# Patient Record
Sex: FEMALE | Race: WHITE | NOT HISPANIC OR LATINO | URBAN - METROPOLITAN AREA
[De-identification: names, ages, dates, MRNs, and addresses within clinical notes are randomized per-mention and may not be internally consistent; named-entity substitution may affect disease eponyms.]

---

## 2017-01-10 ENCOUNTER — OUTPATIENT (OUTPATIENT)
Dept: OUTPATIENT SERVICES | Facility: HOSPITAL | Age: 67
LOS: 1 days | End: 2017-01-10
Payer: MEDICARE

## 2017-01-10 DIAGNOSIS — M96.1 POSTLAMINECTOMY SYNDROME, NOT ELSEWHERE CLASSIFIED: ICD-10-CM

## 2017-01-10 PROCEDURE — 64493 INJ PARAVERT F JNT L/S 1 LEV: CPT | Mod: 50

## 2017-01-10 PROCEDURE — 99156 MOD SED OTH PHYS/QHP 5/>YRS: CPT

## 2017-01-10 PROCEDURE — 99157 MOD SED OTHER PHYS/QHP EA: CPT

## 2017-01-10 PROCEDURE — 64494 INJ PARAVERT F JNT L/S 2 LEV: CPT | Mod: 50

## 2017-01-10 PROCEDURE — 76000 FLUOROSCOPY <1 HR PHYS/QHP: CPT

## 2017-06-02 ENCOUNTER — TRANSCRIPTION ENCOUNTER (OUTPATIENT)
Age: 67
End: 2017-06-02

## 2017-06-02 ENCOUNTER — OUTPATIENT (OUTPATIENT)
Dept: OUTPATIENT SERVICES | Facility: HOSPITAL | Age: 67
LOS: 1 days | End: 2017-06-02
Payer: MEDICARE

## 2017-06-02 DIAGNOSIS — M25.512 PAIN IN LEFT SHOULDER: ICD-10-CM

## 2017-06-02 DIAGNOSIS — M25.511 PAIN IN RIGHT SHOULDER: ICD-10-CM

## 2017-06-02 PROCEDURE — 77002 NEEDLE LOCALIZATION BY XRAY: CPT

## 2017-06-02 PROCEDURE — 20610 DRAIN/INJ JOINT/BURSA W/O US: CPT | Mod: 50

## 2017-07-18 ENCOUNTER — OUTPATIENT (OUTPATIENT)
Dept: OUTPATIENT SERVICES | Facility: HOSPITAL | Age: 67
LOS: 1 days | End: 2017-07-18
Payer: MEDICARE

## 2017-07-18 ENCOUNTER — TRANSCRIPTION ENCOUNTER (OUTPATIENT)
Age: 67
End: 2017-07-18

## 2017-07-18 DIAGNOSIS — M25.511 PAIN IN RIGHT SHOULDER: ICD-10-CM

## 2017-07-18 PROCEDURE — 77002 NEEDLE LOCALIZATION BY XRAY: CPT

## 2017-07-18 PROCEDURE — 20610 DRAIN/INJ JOINT/BURSA W/O US: CPT | Mod: 50

## 2017-08-08 ENCOUNTER — OUTPATIENT (OUTPATIENT)
Dept: OUTPATIENT SERVICES | Facility: HOSPITAL | Age: 67
LOS: 1 days | End: 2017-08-08
Payer: MEDICARE

## 2017-08-08 ENCOUNTER — TRANSCRIPTION ENCOUNTER (OUTPATIENT)
Age: 67
End: 2017-08-08

## 2017-08-08 DIAGNOSIS — M54.16 RADICULOPATHY, LUMBAR REGION: ICD-10-CM

## 2017-08-08 PROCEDURE — 64483 NJX AA&/STRD TFRM EPI L/S 1: CPT

## 2017-08-08 PROCEDURE — 64484 NJX AA&/STRD TFRM EPI L/S EA: CPT

## 2017-08-08 PROCEDURE — 76000 FLUOROSCOPY <1 HR PHYS/QHP: CPT

## 2017-09-08 ENCOUNTER — OUTPATIENT (OUTPATIENT)
Dept: OUTPATIENT SERVICES | Facility: HOSPITAL | Age: 67
LOS: 1 days | Discharge: HOME | End: 2017-09-08
Payer: MEDICARE

## 2017-09-08 ENCOUNTER — TRANSCRIPTION ENCOUNTER (OUTPATIENT)
Age: 67
End: 2017-09-08

## 2017-09-08 DIAGNOSIS — M54.16 RADICULOPATHY, LUMBAR REGION: ICD-10-CM

## 2017-09-08 PROCEDURE — 64483 NJX AA&/STRD TFRM EPI L/S 1: CPT | Mod: LT

## 2017-09-08 PROCEDURE — 76000 FLUOROSCOPY <1 HR PHYS/QHP: CPT

## 2017-09-08 PROCEDURE — 64484 NJX AA&/STRD TFRM EPI L/S EA: CPT | Mod: LT

## 2018-01-03 ENCOUNTER — APPOINTMENT (OUTPATIENT)
Dept: CARDIOLOGY | Facility: CLINIC | Age: 68
End: 2018-01-03
Payer: MEDICARE

## 2018-01-03 PROCEDURE — 93798 PHYS/QHP OP CAR RHAB W/ECG: CPT

## 2018-01-08 ENCOUNTER — APPOINTMENT (OUTPATIENT)
Dept: CARDIOLOGY | Facility: CLINIC | Age: 68
End: 2018-01-08
Payer: MEDICARE

## 2018-01-08 PROCEDURE — 93798 PHYS/QHP OP CAR RHAB W/ECG: CPT

## 2018-01-10 ENCOUNTER — APPOINTMENT (OUTPATIENT)
Dept: CARDIOLOGY | Facility: CLINIC | Age: 68
End: 2018-01-10
Payer: MEDICARE

## 2018-01-10 PROCEDURE — ZZZZZ: CPT

## 2018-01-10 PROCEDURE — 93798 PHYS/QHP OP CAR RHAB W/ECG: CPT

## 2018-01-12 ENCOUNTER — APPOINTMENT (OUTPATIENT)
Dept: CARDIOLOGY | Facility: CLINIC | Age: 68
End: 2018-01-12
Payer: MEDICARE

## 2018-01-12 PROCEDURE — 93798 PHYS/QHP OP CAR RHAB W/ECG: CPT

## 2018-01-12 PROCEDURE — ZZZZZ: CPT

## 2018-01-15 ENCOUNTER — APPOINTMENT (OUTPATIENT)
Dept: CARDIOLOGY | Facility: CLINIC | Age: 68
End: 2018-01-15
Payer: MEDICARE

## 2018-01-15 PROCEDURE — 93798 PHYS/QHP OP CAR RHAB W/ECG: CPT

## 2018-01-17 ENCOUNTER — APPOINTMENT (OUTPATIENT)
Dept: CARDIOLOGY | Facility: CLINIC | Age: 68
End: 2018-01-17
Payer: MEDICARE

## 2018-01-17 PROCEDURE — 93798 PHYS/QHP OP CAR RHAB W/ECG: CPT

## 2018-01-17 PROCEDURE — ZZZZZ: CPT

## 2018-01-23 ENCOUNTER — RECORD ABSTRACTING (OUTPATIENT)
Age: 68
End: 2018-01-23

## 2018-01-23 DIAGNOSIS — N39.41 URGE INCONTINENCE: ICD-10-CM

## 2018-01-23 DIAGNOSIS — F41.9 ANXIETY DISORDER, UNSPECIFIED: ICD-10-CM

## 2018-01-23 DIAGNOSIS — R00.2 PALPITATIONS: ICD-10-CM

## 2018-01-23 DIAGNOSIS — Z78.9 OTHER SPECIFIED HEALTH STATUS: ICD-10-CM

## 2018-01-23 DIAGNOSIS — Z87.898 PERSONAL HISTORY OF OTHER SPECIFIED CONDITIONS: ICD-10-CM

## 2018-01-23 DIAGNOSIS — Z87.19 PERSONAL HISTORY OF OTHER DISEASES OF THE DIGESTIVE SYSTEM: ICD-10-CM

## 2018-01-23 DIAGNOSIS — L40.50 ARTHROPATHIC PSORIASIS, UNSPECIFIED: ICD-10-CM

## 2018-01-23 DIAGNOSIS — Z87.440 PERSONAL HISTORY OF URINARY (TRACT) INFECTIONS: ICD-10-CM

## 2018-01-23 DIAGNOSIS — Z92.89 PERSONAL HISTORY OF OTHER MEDICAL TREATMENT: ICD-10-CM

## 2018-01-24 ENCOUNTER — APPOINTMENT (OUTPATIENT)
Dept: CARDIOLOGY | Facility: CLINIC | Age: 68
End: 2018-01-24
Payer: MEDICARE

## 2018-01-24 PROCEDURE — 93798 PHYS/QHP OP CAR RHAB W/ECG: CPT

## 2018-01-26 ENCOUNTER — APPOINTMENT (OUTPATIENT)
Dept: CARDIOLOGY | Facility: CLINIC | Age: 68
End: 2018-01-26
Payer: MEDICARE

## 2018-01-26 PROCEDURE — 93798 PHYS/QHP OP CAR RHAB W/ECG: CPT

## 2018-01-29 ENCOUNTER — APPOINTMENT (OUTPATIENT)
Dept: CARDIOLOGY | Facility: CLINIC | Age: 68
End: 2018-01-29
Payer: MEDICARE

## 2018-01-29 PROCEDURE — 93798 PHYS/QHP OP CAR RHAB W/ECG: CPT

## 2018-01-31 ENCOUNTER — APPOINTMENT (OUTPATIENT)
Dept: CARDIOLOGY | Facility: CLINIC | Age: 68
End: 2018-01-31

## 2018-02-02 ENCOUNTER — APPOINTMENT (OUTPATIENT)
Dept: CARDIOLOGY | Facility: CLINIC | Age: 68
End: 2018-02-02

## 2018-02-05 ENCOUNTER — APPOINTMENT (OUTPATIENT)
Dept: CARDIOLOGY | Facility: CLINIC | Age: 68
End: 2018-02-05
Payer: MEDICARE

## 2018-02-05 PROCEDURE — 93798 PHYS/QHP OP CAR RHAB W/ECG: CPT

## 2018-02-07 ENCOUNTER — APPOINTMENT (OUTPATIENT)
Dept: CARDIOLOGY | Facility: CLINIC | Age: 68
End: 2018-02-07

## 2018-02-09 ENCOUNTER — APPOINTMENT (OUTPATIENT)
Dept: CARDIOLOGY | Facility: CLINIC | Age: 68
End: 2018-02-09
Payer: MEDICARE

## 2018-02-09 PROCEDURE — 93798 PHYS/QHP OP CAR RHAB W/ECG: CPT

## 2018-02-12 ENCOUNTER — APPOINTMENT (OUTPATIENT)
Dept: CARDIOLOGY | Facility: CLINIC | Age: 68
End: 2018-02-12
Payer: MEDICARE

## 2018-02-12 PROCEDURE — 93798 PHYS/QHP OP CAR RHAB W/ECG: CPT

## 2018-02-14 ENCOUNTER — APPOINTMENT (OUTPATIENT)
Dept: CARDIOLOGY | Facility: CLINIC | Age: 68
End: 2018-02-14

## 2018-02-16 ENCOUNTER — APPOINTMENT (OUTPATIENT)
Dept: CARDIOLOGY | Facility: CLINIC | Age: 68
End: 2018-02-16
Payer: MEDICARE

## 2018-02-16 VITALS
HEIGHT: 63 IN | SYSTOLIC BLOOD PRESSURE: 104 MMHG | RESPIRATION RATE: 14 BRPM | HEART RATE: 61 BPM | WEIGHT: 168 LBS | DIASTOLIC BLOOD PRESSURE: 68 MMHG | BODY MASS INDEX: 29.77 KG/M2

## 2018-02-16 PROCEDURE — 93000 ELECTROCARDIOGRAM COMPLETE: CPT

## 2018-02-16 PROCEDURE — 99214 OFFICE O/P EST MOD 30 MIN: CPT

## 2018-02-19 ENCOUNTER — APPOINTMENT (OUTPATIENT)
Dept: CARDIOLOGY | Facility: CLINIC | Age: 68
End: 2018-02-19
Payer: MEDICARE

## 2018-02-19 PROCEDURE — 93798 PHYS/QHP OP CAR RHAB W/ECG: CPT

## 2018-02-21 ENCOUNTER — APPOINTMENT (OUTPATIENT)
Dept: CARDIOLOGY | Facility: CLINIC | Age: 68
End: 2018-02-21

## 2018-02-23 ENCOUNTER — RX RENEWAL (OUTPATIENT)
Age: 68
End: 2018-02-23

## 2018-02-23 ENCOUNTER — APPOINTMENT (OUTPATIENT)
Dept: CARDIOLOGY | Facility: CLINIC | Age: 68
End: 2018-02-23

## 2018-02-26 ENCOUNTER — APPOINTMENT (OUTPATIENT)
Dept: CARDIOLOGY | Facility: CLINIC | Age: 68
End: 2018-02-26

## 2018-02-28 ENCOUNTER — APPOINTMENT (OUTPATIENT)
Dept: CARDIOLOGY | Facility: CLINIC | Age: 68
End: 2018-02-28

## 2018-03-02 ENCOUNTER — APPOINTMENT (OUTPATIENT)
Dept: CARDIOLOGY | Facility: CLINIC | Age: 68
End: 2018-03-02

## 2018-03-05 ENCOUNTER — APPOINTMENT (OUTPATIENT)
Dept: CARDIOLOGY | Facility: CLINIC | Age: 68
End: 2018-03-05

## 2018-03-07 ENCOUNTER — APPOINTMENT (OUTPATIENT)
Dept: CARDIOLOGY | Facility: CLINIC | Age: 68
End: 2018-03-07

## 2018-03-08 ENCOUNTER — APPOINTMENT (OUTPATIENT)
Dept: CARDIOLOGY | Facility: CLINIC | Age: 68
End: 2018-03-08
Payer: MEDICARE

## 2018-03-08 PROCEDURE — 93306 TTE W/DOPPLER COMPLETE: CPT

## 2018-03-09 ENCOUNTER — APPOINTMENT (OUTPATIENT)
Dept: CARDIOLOGY | Facility: CLINIC | Age: 68
End: 2018-03-09

## 2018-03-12 ENCOUNTER — APPOINTMENT (OUTPATIENT)
Dept: CARDIOLOGY | Facility: CLINIC | Age: 68
End: 2018-03-12

## 2018-03-13 ENCOUNTER — OUTPATIENT (OUTPATIENT)
Dept: OUTPATIENT SERVICES | Facility: HOSPITAL | Age: 68
LOS: 1 days | Discharge: HOME | End: 2018-03-13
Payer: MEDICARE

## 2018-03-13 ENCOUNTER — TRANSCRIPTION ENCOUNTER (OUTPATIENT)
Age: 68
End: 2018-03-13

## 2018-03-13 DIAGNOSIS — M54.16 RADICULOPATHY, LUMBAR REGION: ICD-10-CM

## 2018-03-14 ENCOUNTER — APPOINTMENT (OUTPATIENT)
Dept: CARDIOLOGY | Facility: CLINIC | Age: 68
End: 2018-03-14

## 2018-03-16 ENCOUNTER — APPOINTMENT (OUTPATIENT)
Dept: CARDIOLOGY | Facility: CLINIC | Age: 68
End: 2018-03-16

## 2018-03-19 ENCOUNTER — APPOINTMENT (OUTPATIENT)
Dept: CARDIOLOGY | Facility: CLINIC | Age: 68
End: 2018-03-19

## 2018-03-21 ENCOUNTER — APPOINTMENT (OUTPATIENT)
Dept: CARDIOLOGY | Facility: CLINIC | Age: 68
End: 2018-03-21

## 2018-03-23 ENCOUNTER — APPOINTMENT (OUTPATIENT)
Dept: CARDIOLOGY | Facility: CLINIC | Age: 68
End: 2018-03-23

## 2018-03-26 ENCOUNTER — APPOINTMENT (OUTPATIENT)
Dept: CARDIOLOGY | Facility: CLINIC | Age: 68
End: 2018-03-26

## 2018-03-28 ENCOUNTER — APPOINTMENT (OUTPATIENT)
Dept: CARDIOLOGY | Facility: CLINIC | Age: 68
End: 2018-03-28

## 2018-03-29 ENCOUNTER — APPOINTMENT (OUTPATIENT)
Dept: CARDIOLOGY | Facility: CLINIC | Age: 68
End: 2018-03-29
Payer: MEDICARE

## 2018-03-29 ENCOUNTER — TRANSCRIPTION ENCOUNTER (OUTPATIENT)
Age: 68
End: 2018-03-29

## 2018-03-29 VITALS
SYSTOLIC BLOOD PRESSURE: 139 MMHG | BODY MASS INDEX: 27.46 KG/M2 | HEIGHT: 63 IN | RESPIRATION RATE: 14 BRPM | WEIGHT: 155 LBS | HEART RATE: 98 BPM | DIASTOLIC BLOOD PRESSURE: 82 MMHG

## 2018-03-29 PROCEDURE — 99214 OFFICE O/P EST MOD 30 MIN: CPT

## 2018-03-29 PROCEDURE — 93000 ELECTROCARDIOGRAM COMPLETE: CPT

## 2018-03-29 RX ORDER — FUROSEMIDE 20 MG/1
20 TABLET ORAL
Refills: 0 | Status: DISCONTINUED | COMMUNITY
End: 2018-03-29

## 2018-03-29 RX ORDER — ASPIRIN 81 MG
81 TABLET, DELAYED RELEASE (ENTERIC COATED) ORAL
Refills: 0 | Status: DISCONTINUED | COMMUNITY
End: 2018-03-29

## 2018-03-29 RX ORDER — SPIRONOLACTONE 25 MG/1
25 TABLET ORAL
Qty: 90 | Refills: 1 | Status: DISCONTINUED | COMMUNITY
Start: 2018-02-23 | End: 2018-03-29

## 2018-03-29 RX ORDER — TRAZODONE HYDROCHLORIDE 100 MG/1
100 TABLET ORAL
Qty: 30 | Refills: 0 | Status: ACTIVE | COMMUNITY
Start: 2017-12-01

## 2018-03-30 ENCOUNTER — OUTPATIENT (OUTPATIENT)
Dept: OUTPATIENT SERVICES | Facility: HOSPITAL | Age: 68
LOS: 1 days | End: 2018-03-30
Payer: MEDICARE

## 2018-03-30 ENCOUNTER — APPOINTMENT (OUTPATIENT)
Dept: CARDIOLOGY | Facility: CLINIC | Age: 68
End: 2018-03-30

## 2018-03-30 DIAGNOSIS — M47.816 SPONDYLOSIS WITHOUT MYELOPATHY OR RADICULOPATHY, LUMBAR REGION: ICD-10-CM

## 2018-03-30 DIAGNOSIS — M75.82 OTHER SHOULDER LESIONS, LEFT SHOULDER: ICD-10-CM

## 2018-03-30 PROCEDURE — 64494 INJ PARAVERT F JNT L/S 2 LEV: CPT | Mod: 50

## 2018-03-30 PROCEDURE — 76000 FLUOROSCOPY <1 HR PHYS/QHP: CPT

## 2018-03-30 PROCEDURE — 77002 NEEDLE LOCALIZATION BY XRAY: CPT

## 2018-03-30 PROCEDURE — 64493 INJ PARAVERT F JNT L/S 1 LEV: CPT | Mod: 50

## 2018-03-30 PROCEDURE — 64484 NJX AA&/STRD TFRM EPI L/S EA: CPT | Mod: RT

## 2018-03-30 PROCEDURE — 64483 NJX AA&/STRD TFRM EPI L/S 1: CPT | Mod: RT

## 2018-04-16 ENCOUNTER — RX RENEWAL (OUTPATIENT)
Age: 68
End: 2018-04-16

## 2018-04-26 ENCOUNTER — TRANSCRIPTION ENCOUNTER (OUTPATIENT)
Age: 68
End: 2018-04-26

## 2018-04-27 ENCOUNTER — OUTPATIENT (OUTPATIENT)
Dept: OUTPATIENT SERVICES | Facility: HOSPITAL | Age: 68
LOS: 1 days | Discharge: HOME | End: 2018-04-27
Payer: MEDICARE

## 2018-04-27 DIAGNOSIS — M47.816 SPONDYLOSIS WITHOUT MYELOPATHY OR RADICULOPATHY, LUMBAR REGION: ICD-10-CM

## 2018-04-27 PROCEDURE — 64494 INJ PARAVERT F JNT L/S 2 LEV: CPT | Mod: 50

## 2018-04-27 PROCEDURE — 64493 INJ PARAVERT F JNT L/S 1 LEV: CPT | Mod: 50

## 2018-04-27 PROCEDURE — 76000 FLUOROSCOPY <1 HR PHYS/QHP: CPT

## 2018-05-23 ENCOUNTER — APPOINTMENT (OUTPATIENT)
Dept: CARDIOLOGY | Facility: CLINIC | Age: 68
End: 2018-05-23

## 2018-07-25 ENCOUNTER — RX RENEWAL (OUTPATIENT)
Age: 68
End: 2018-07-25

## 2018-07-25 ENCOUNTER — MEDICATION RENEWAL (OUTPATIENT)
Age: 68
End: 2018-07-25

## 2018-07-26 ENCOUNTER — RX RENEWAL (OUTPATIENT)
Age: 68
End: 2018-07-26

## 2018-09-10 ENCOUNTER — TRANSCRIPTION ENCOUNTER (OUTPATIENT)
Age: 68
End: 2018-09-10

## 2018-09-11 ENCOUNTER — OUTPATIENT (OUTPATIENT)
Dept: OUTPATIENT SERVICES | Facility: HOSPITAL | Age: 68
LOS: 1 days | End: 2018-09-11
Payer: MEDICARE

## 2018-09-11 DIAGNOSIS — M54.16 RADICULOPATHY, LUMBAR REGION: ICD-10-CM

## 2018-09-11 PROCEDURE — 64484 NJX AA&/STRD TFRM EPI L/S EA: CPT | Mod: LT

## 2018-09-11 PROCEDURE — 76000 FLUOROSCOPY <1 HR PHYS/QHP: CPT

## 2018-09-11 PROCEDURE — 64483 NJX AA&/STRD TFRM EPI L/S 1: CPT | Mod: LT

## 2018-10-19 ENCOUNTER — RX RENEWAL (OUTPATIENT)
Age: 68
End: 2018-10-19

## 2018-12-17 ENCOUNTER — TRANSCRIPTION ENCOUNTER (OUTPATIENT)
Age: 68
End: 2018-12-17

## 2018-12-18 ENCOUNTER — OUTPATIENT (OUTPATIENT)
Dept: OUTPATIENT SERVICES | Facility: HOSPITAL | Age: 68
LOS: 1 days | End: 2018-12-18
Payer: MEDICARE

## 2018-12-18 DIAGNOSIS — M54.16 RADICULOPATHY, LUMBAR REGION: ICD-10-CM

## 2019-01-14 ENCOUNTER — TRANSCRIPTION ENCOUNTER (OUTPATIENT)
Age: 69
End: 2019-01-14

## 2019-01-15 ENCOUNTER — OUTPATIENT (OUTPATIENT)
Dept: OUTPATIENT SERVICES | Facility: HOSPITAL | Age: 69
LOS: 1 days | End: 2019-01-15
Payer: MEDICARE

## 2019-01-15 DIAGNOSIS — M54.16 RADICULOPATHY, LUMBAR REGION: ICD-10-CM

## 2019-01-15 PROCEDURE — 62323 NJX INTERLAMINAR LMBR/SAC: CPT

## 2019-01-15 PROCEDURE — 77003 FLUOROGUIDE FOR SPINE INJECT: CPT

## 2019-01-21 ENCOUNTER — RX RENEWAL (OUTPATIENT)
Age: 69
End: 2019-01-21

## 2019-02-11 ENCOUNTER — TRANSCRIPTION ENCOUNTER (OUTPATIENT)
Age: 69
End: 2019-02-11

## 2019-02-12 ENCOUNTER — OUTPATIENT (OUTPATIENT)
Dept: OUTPATIENT SERVICES | Facility: HOSPITAL | Age: 69
LOS: 1 days | End: 2019-02-12
Payer: MEDICARE

## 2019-02-12 DIAGNOSIS — M25.512 PAIN IN LEFT SHOULDER: ICD-10-CM

## 2019-03-11 ENCOUNTER — TRANSCRIPTION ENCOUNTER (OUTPATIENT)
Age: 69
End: 2019-03-11

## 2019-03-12 ENCOUNTER — OUTPATIENT (OUTPATIENT)
Dept: OUTPATIENT SERVICES | Facility: HOSPITAL | Age: 69
LOS: 1 days | Discharge: HOME | End: 2019-03-12
Payer: MEDICARE

## 2019-03-12 DIAGNOSIS — M54.16 RADICULOPATHY, LUMBAR REGION: ICD-10-CM

## 2019-03-12 PROCEDURE — 77003 FLUOROGUIDE FOR SPINE INJECT: CPT

## 2019-03-12 PROCEDURE — 20611 DRAIN/INJ JOINT/BURSA W/US: CPT | Mod: LT

## 2019-03-12 PROCEDURE — 62323 NJX INTERLAMINAR LMBR/SAC: CPT

## 2019-03-12 PROCEDURE — 77002 NEEDLE LOCALIZATION BY XRAY: CPT

## 2019-04-24 ENCOUNTER — TRANSCRIPTION ENCOUNTER (OUTPATIENT)
Age: 69
End: 2019-04-24

## 2019-04-25 ENCOUNTER — OUTPATIENT (OUTPATIENT)
Dept: OUTPATIENT SERVICES | Facility: HOSPITAL | Age: 69
LOS: 1 days | End: 2019-04-25
Payer: MEDICARE

## 2019-04-25 DIAGNOSIS — M54.16 RADICULOPATHY, LUMBAR REGION: ICD-10-CM

## 2019-04-25 PROCEDURE — 76000 FLUOROSCOPY <1 HR PHYS/QHP: CPT

## 2019-04-25 PROCEDURE — 62323 NJX INTERLAMINAR LMBR/SAC: CPT

## 2019-05-04 ENCOUNTER — INPATIENT (INPATIENT)
Facility: HOSPITAL | Age: 69
LOS: 2 days | Discharge: HOME HEALTH | DRG: 177 | End: 2019-05-07
Attending: HOSPITALIST | Admitting: HOSPITALIST
Payer: MEDICARE

## 2019-05-04 VITALS
SYSTOLIC BLOOD PRESSURE: 144 MMHG | WEIGHT: 158.95 LBS | TEMPERATURE: 103 F | DIASTOLIC BLOOD PRESSURE: 87 MMHG | HEART RATE: 123 BPM | HEIGHT: 63 IN | OXYGEN SATURATION: 93 % | RESPIRATION RATE: 22 BRPM

## 2019-05-04 LAB
ALBUMIN SERPL ELPH-MCNC: 4.2 G/DL — SIGNIFICANT CHANGE UP (ref 3.3–5.2)
ALP SERPL-CCNC: 97 U/L — SIGNIFICANT CHANGE UP (ref 40–120)
ALT FLD-CCNC: 26 U/L — SIGNIFICANT CHANGE UP
ANION GAP SERPL CALC-SCNC: 16 MMOL/L — SIGNIFICANT CHANGE UP (ref 5–17)
APPEARANCE UR: CLEAR — SIGNIFICANT CHANGE UP
APTT BLD: 33.8 SEC — SIGNIFICANT CHANGE UP (ref 27.5–36.3)
AST SERPL-CCNC: 29 U/L — SIGNIFICANT CHANGE UP
BILIRUB SERPL-MCNC: 0.4 MG/DL — SIGNIFICANT CHANGE UP (ref 0.4–2)
BILIRUB UR-MCNC: NEGATIVE — SIGNIFICANT CHANGE UP
BUN SERPL-MCNC: 14 MG/DL — SIGNIFICANT CHANGE UP (ref 8–20)
CALCIUM SERPL-MCNC: 9.4 MG/DL — SIGNIFICANT CHANGE UP (ref 8.6–10.2)
CHLORIDE SERPL-SCNC: 92 MMOL/L — LOW (ref 98–107)
CK SERPL-CCNC: 220 U/L — HIGH (ref 25–170)
CO2 SERPL-SCNC: 21 MMOL/L — LOW (ref 22–29)
COLOR SPEC: YELLOW — SIGNIFICANT CHANGE UP
CREAT SERPL-MCNC: 0.91 MG/DL — SIGNIFICANT CHANGE UP (ref 0.5–1.3)
DIFF PNL FLD: ABNORMAL
EOSINOPHIL NFR BLD AUTO: 1 % — SIGNIFICANT CHANGE UP (ref 0–5)
GLUCOSE SERPL-MCNC: 209 MG/DL — HIGH (ref 70–115)
GLUCOSE UR QL: 50 MG/DL
HCT VFR BLD CALC: 43.8 % — SIGNIFICANT CHANGE UP (ref 37–47)
HGB BLD-MCNC: 14.3 G/DL — SIGNIFICANT CHANGE UP (ref 12–16)
INR BLD: 1.09 RATIO — SIGNIFICANT CHANGE UP (ref 0.88–1.16)
KETONES UR-MCNC: NEGATIVE — SIGNIFICANT CHANGE UP
LACTATE BLDV-MCNC: 1.8 MMOL/L — SIGNIFICANT CHANGE UP (ref 0.5–2)
LEUKOCYTE ESTERASE UR-ACNC: ABNORMAL
LYMPHOCYTES # BLD AUTO: 8 % — LOW (ref 20–55)
MCHC RBC-ENTMCNC: 29.9 PG — SIGNIFICANT CHANGE UP (ref 27–31)
MCHC RBC-ENTMCNC: 32.6 G/DL — SIGNIFICANT CHANGE UP (ref 32–36)
MCV RBC AUTO: 91.4 FL — SIGNIFICANT CHANGE UP (ref 81–99)
MONOCYTES NFR BLD AUTO: 12 % — HIGH (ref 3–10)
NEUTROPHILS NFR BLD AUTO: 79 % — HIGH (ref 37–73)
NITRITE UR-MCNC: NEGATIVE — SIGNIFICANT CHANGE UP
PH UR: 6.5 — SIGNIFICANT CHANGE UP (ref 5–8)
PLATELET # BLD AUTO: 253 K/UL — SIGNIFICANT CHANGE UP (ref 150–400)
POTASSIUM SERPL-MCNC: 3.9 MMOL/L — SIGNIFICANT CHANGE UP (ref 3.5–5.3)
POTASSIUM SERPL-SCNC: 3.9 MMOL/L — SIGNIFICANT CHANGE UP (ref 3.5–5.3)
PROT SERPL-MCNC: 7.9 G/DL — SIGNIFICANT CHANGE UP (ref 6.6–8.7)
PROT UR-MCNC: 15 MG/DL
PROTHROM AB SERPL-ACNC: 12.6 SEC — SIGNIFICANT CHANGE UP (ref 10–12.9)
RBC # BLD: 4.79 M/UL — SIGNIFICANT CHANGE UP (ref 4.4–5.2)
RBC # FLD: 13 % — SIGNIFICANT CHANGE UP (ref 11–15.6)
SODIUM SERPL-SCNC: 129 MMOL/L — LOW (ref 135–145)
SP GR SPEC: 1.01 — SIGNIFICANT CHANGE UP (ref 1.01–1.02)
TROPONIN T SERPL-MCNC: <0.01 NG/ML — SIGNIFICANT CHANGE UP (ref 0–0.06)
UROBILINOGEN FLD QL: NEGATIVE MG/DL — SIGNIFICANT CHANGE UP
WBC # BLD: 15.6 K/UL — HIGH (ref 4.8–10.8)
WBC # FLD AUTO: 15.6 K/UL — HIGH (ref 4.8–10.8)

## 2019-05-04 PROCEDURE — 93010 ELECTROCARDIOGRAM REPORT: CPT

## 2019-05-04 PROCEDURE — 71046 X-RAY EXAM CHEST 2 VIEWS: CPT | Mod: 26

## 2019-05-04 PROCEDURE — 99291 CRITICAL CARE FIRST HOUR: CPT

## 2019-05-04 RX ORDER — SODIUM CHLORIDE 9 MG/ML
2200 INJECTION INTRAMUSCULAR; INTRAVENOUS; SUBCUTANEOUS ONCE
Qty: 0 | Refills: 0 | Status: COMPLETED | OUTPATIENT
Start: 2019-05-04 | End: 2019-05-04

## 2019-05-04 RX ORDER — AZITHROMYCIN 500 MG/1
500 TABLET, FILM COATED ORAL ONCE
Qty: 0 | Refills: 0 | Status: COMPLETED | OUTPATIENT
Start: 2019-05-04 | End: 2019-05-04

## 2019-05-04 RX ORDER — CEFTRIAXONE 500 MG/1
1 INJECTION, POWDER, FOR SOLUTION INTRAMUSCULAR; INTRAVENOUS ONCE
Qty: 0 | Refills: 0 | Status: COMPLETED | OUTPATIENT
Start: 2019-05-04 | End: 2019-05-04

## 2019-05-04 RX ORDER — ACETAMINOPHEN 500 MG
650 TABLET ORAL ONCE
Qty: 0 | Refills: 0 | Status: COMPLETED | OUTPATIENT
Start: 2019-05-04 | End: 2019-05-04

## 2019-05-04 RX ADMIN — CEFTRIAXONE 100 GRAM(S): 500 INJECTION, POWDER, FOR SOLUTION INTRAMUSCULAR; INTRAVENOUS at 20:51

## 2019-05-04 RX ADMIN — AZITHROMYCIN 500 MILLIGRAM(S): 500 TABLET, FILM COATED ORAL at 20:51

## 2019-05-04 RX ADMIN — Medication 125 MILLIGRAM(S): at 20:45

## 2019-05-04 RX ADMIN — Medication 650 MILLIGRAM(S): at 20:51

## 2019-05-04 RX ADMIN — SODIUM CHLORIDE 2200 MILLILITER(S): 9 INJECTION INTRAMUSCULAR; INTRAVENOUS; SUBCUTANEOUS at 20:45

## 2019-05-04 NOTE — ED ADULT NURSE NOTE - OBJECTIVE STATEMENT
pt came to ED as code sepsis after feeling SOB with chest pain since last night. Pt denies any N/V/D, headaches, dizziness, numbness/tingling. Pt placed on nasal canula- O2 sat up to 95%. Pt states she had a cold last week. Denies any other symptoms. Safety maintained. Will continue to monitor.

## 2019-05-04 NOTE — ED ADULT NURSE NOTE - CHIEF COMPLAINT QUOTE
patient states chest pain with SOB started yesterday denies N/V has cardiac HX and asthma did not use inhaler, code sepsis called

## 2019-05-04 NOTE — ED ADULT TRIAGE NOTE - CHIEF COMPLAINT QUOTE
patient states chest pain with SOB started yesterday denies N/V has cardiac HX and asthma did not use inhaler patient states chest pain with SOB started yesterday denies N/V has cardiac HX and asthma did not use inhaler, code sepsis called

## 2019-05-04 NOTE — ED PROVIDER NOTE - OBJECTIVE STATEMENT
This patient is a 68 year old woman who presents to the ER c/o chest pain and SOB.  Patient states that her symptoms began with URI and cough with chest pain one week ago.  Sick contacts include her  with URI symptoms.  She denies leg swelling.  Patient states that her breathing has been getting worse wince yesterday.  Patient states that the chest pain is in the middle of her chest and radiates to both arms 5/10 in severity and worse with cough.  Patient states that she called her PMD to get an appointment but will not be able to be seen until next week.

## 2019-05-04 NOTE — ED PROVIDER NOTE - CLINICAL SUMMARY MEDICAL DECISION MAKING FREE TEXT BOX
68 year old with URI symptoms for one week now with chest pain and SOB noted to be tachycardic and febrile code sepsis called.  Patient with right sided wheezing on exam and patient hypoxic.  IVF and Abx given.  Patient signed out to overnight physician pending CT.

## 2019-05-05 DIAGNOSIS — R06.02 SHORTNESS OF BREATH: ICD-10-CM

## 2019-05-05 LAB
ANION GAP SERPL CALC-SCNC: 13 MMOL/L — SIGNIFICANT CHANGE UP (ref 5–17)
BUN SERPL-MCNC: 11 MG/DL — SIGNIFICANT CHANGE UP (ref 8–20)
CALCIUM SERPL-MCNC: 9.6 MG/DL — SIGNIFICANT CHANGE UP (ref 8.6–10.2)
CHLORIDE SERPL-SCNC: 101 MMOL/L — SIGNIFICANT CHANGE UP (ref 98–107)
CO2 SERPL-SCNC: 23 MMOL/L — SIGNIFICANT CHANGE UP (ref 22–29)
CREAT SERPL-MCNC: 0.69 MG/DL — SIGNIFICANT CHANGE UP (ref 0.5–1.3)
CULTURE RESULTS: NO GROWTH — SIGNIFICANT CHANGE UP
GLUCOSE SERPL-MCNC: 266 MG/DL — HIGH (ref 70–115)
HCT VFR BLD CALC: 40.6 % — SIGNIFICANT CHANGE UP (ref 37–47)
HGB BLD-MCNC: 13.7 G/DL — SIGNIFICANT CHANGE UP (ref 12–16)
MAGNESIUM SERPL-MCNC: 2.2 MG/DL — SIGNIFICANT CHANGE UP (ref 1.6–2.6)
MCHC RBC-ENTMCNC: 30.4 PG — SIGNIFICANT CHANGE UP (ref 27–31)
MCHC RBC-ENTMCNC: 33.7 G/DL — SIGNIFICANT CHANGE UP (ref 32–36)
MCV RBC AUTO: 90 FL — SIGNIFICANT CHANGE UP (ref 81–99)
PHOSPHATE SERPL-MCNC: 2 MG/DL — LOW (ref 2.4–4.7)
PLATELET # BLD AUTO: 241 K/UL — SIGNIFICANT CHANGE UP (ref 150–400)
POTASSIUM SERPL-MCNC: 4.6 MMOL/L — SIGNIFICANT CHANGE UP (ref 3.5–5.3)
POTASSIUM SERPL-SCNC: 4.6 MMOL/L — SIGNIFICANT CHANGE UP (ref 3.5–5.3)
RAPID RVP RESULT: SIGNIFICANT CHANGE UP
RBC # BLD: 4.51 M/UL — SIGNIFICANT CHANGE UP (ref 4.4–5.2)
RBC # FLD: 13 % — SIGNIFICANT CHANGE UP (ref 11–15.6)
SODIUM SERPL-SCNC: 137 MMOL/L — SIGNIFICANT CHANGE UP (ref 135–145)
SPECIMEN SOURCE: SIGNIFICANT CHANGE UP
TROPONIN T SERPL-MCNC: <0.01 NG/ML — SIGNIFICANT CHANGE UP (ref 0–0.06)
TSH SERPL-MCNC: 0.51 UIU/ML — SIGNIFICANT CHANGE UP (ref 0.27–4.2)
WBC # BLD: 15.5 K/UL — HIGH (ref 4.8–10.8)
WBC # FLD AUTO: 15.5 K/UL — HIGH (ref 4.8–10.8)

## 2019-05-05 PROCEDURE — 99223 1ST HOSP IP/OBS HIGH 75: CPT

## 2019-05-05 PROCEDURE — 71275 CT ANGIOGRAPHY CHEST: CPT | Mod: 26

## 2019-05-05 PROCEDURE — 93010 ELECTROCARDIOGRAM REPORT: CPT

## 2019-05-05 PROCEDURE — 99232 SBSQ HOSP IP/OBS MODERATE 35: CPT

## 2019-05-05 RX ORDER — AZITHROMYCIN 500 MG/1
500 TABLET, FILM COATED ORAL EVERY 24 HOURS
Qty: 0 | Refills: 0 | Status: DISCONTINUED | OUTPATIENT
Start: 2019-05-05 | End: 2019-05-07

## 2019-05-05 RX ORDER — ASPIRIN/CALCIUM CARB/MAGNESIUM 324 MG
81 TABLET ORAL DAILY
Qty: 0 | Refills: 0 | Status: DISCONTINUED | OUTPATIENT
Start: 2019-05-05 | End: 2019-05-07

## 2019-05-05 RX ORDER — IPRATROPIUM/ALBUTEROL SULFATE 18-103MCG
3 AEROSOL WITH ADAPTER (GRAM) INHALATION EVERY 6 HOURS
Qty: 0 | Refills: 0 | Status: DISCONTINUED | OUTPATIENT
Start: 2019-05-05 | End: 2019-05-07

## 2019-05-05 RX ORDER — IPRATROPIUM BROMIDE 0.2 MG/ML
500 SOLUTION, NON-ORAL INHALATION EVERY 6 HOURS
Qty: 0 | Refills: 0 | Status: DISCONTINUED | OUTPATIENT
Start: 2019-05-05 | End: 2019-05-06

## 2019-05-05 RX ORDER — QUETIAPINE FUMARATE 200 MG/1
300 TABLET, FILM COATED ORAL DAILY
Qty: 0 | Refills: 0 | Status: DISCONTINUED | OUTPATIENT
Start: 2019-05-05 | End: 2019-05-07

## 2019-05-05 RX ORDER — ENOXAPARIN SODIUM 100 MG/ML
40 INJECTION SUBCUTANEOUS DAILY
Qty: 0 | Refills: 0 | Status: DISCONTINUED | OUTPATIENT
Start: 2019-05-05 | End: 2019-05-07

## 2019-05-05 RX ORDER — ZOLPIDEM TARTRATE 10 MG/1
5 TABLET ORAL AT BEDTIME
Qty: 0 | Refills: 0 | Status: DISCONTINUED | OUTPATIENT
Start: 2019-05-05 | End: 2019-05-07

## 2019-05-05 RX ORDER — GABAPENTIN 400 MG/1
300 CAPSULE ORAL THREE TIMES A DAY
Qty: 0 | Refills: 0 | Status: DISCONTINUED | OUTPATIENT
Start: 2019-05-05 | End: 2019-05-07

## 2019-05-05 RX ORDER — CEFTRIAXONE 500 MG/1
1 INJECTION, POWDER, FOR SOLUTION INTRAMUSCULAR; INTRAVENOUS EVERY 24 HOURS
Qty: 0 | Refills: 0 | Status: DISCONTINUED | OUTPATIENT
Start: 2019-05-05 | End: 2019-05-07

## 2019-05-05 RX ORDER — SODIUM CHLORIDE 9 MG/ML
1000 INJECTION INTRAMUSCULAR; INTRAVENOUS; SUBCUTANEOUS
Qty: 0 | Refills: 0 | Status: DISCONTINUED | OUTPATIENT
Start: 2019-05-05 | End: 2019-05-07

## 2019-05-05 RX ORDER — HYDROMORPHONE HYDROCHLORIDE 2 MG/ML
4 INJECTION INTRAMUSCULAR; INTRAVENOUS; SUBCUTANEOUS
Qty: 0 | Refills: 0 | Status: DISCONTINUED | OUTPATIENT
Start: 2019-05-05 | End: 2019-05-07

## 2019-05-05 RX ORDER — SACCHAROMYCES BOULARDII 250 MG
250 POWDER IN PACKET (EA) ORAL
Qty: 0 | Refills: 0 | Status: DISCONTINUED | OUTPATIENT
Start: 2019-05-05 | End: 2019-05-07

## 2019-05-05 RX ORDER — PRIMIDONE 250 MG/1
50 TABLET ORAL DAILY
Qty: 0 | Refills: 0 | Status: DISCONTINUED | OUTPATIENT
Start: 2019-05-05 | End: 2019-05-07

## 2019-05-05 RX ORDER — ATORVASTATIN CALCIUM 80 MG/1
40 TABLET, FILM COATED ORAL AT BEDTIME
Qty: 0 | Refills: 0 | Status: DISCONTINUED | OUTPATIENT
Start: 2019-05-05 | End: 2019-05-07

## 2019-05-05 RX ORDER — TRAZODONE HCL 50 MG
100 TABLET ORAL AT BEDTIME
Qty: 0 | Refills: 0 | Status: DISCONTINUED | OUTPATIENT
Start: 2019-05-05 | End: 2019-05-07

## 2019-05-05 RX ORDER — CARVEDILOL PHOSPHATE 80 MG/1
6.25 CAPSULE, EXTENDED RELEASE ORAL EVERY 12 HOURS
Qty: 0 | Refills: 0 | Status: DISCONTINUED | OUTPATIENT
Start: 2019-05-05 | End: 2019-05-07

## 2019-05-05 RX ADMIN — QUETIAPINE FUMARATE 300 MILLIGRAM(S): 200 TABLET, FILM COATED ORAL at 21:51

## 2019-05-05 RX ADMIN — Medication 81 MILLIGRAM(S): at 17:52

## 2019-05-05 RX ADMIN — Medication 3 MILLILITER(S): at 21:48

## 2019-05-05 RX ADMIN — Medication 40 MILLIGRAM(S): at 10:59

## 2019-05-05 RX ADMIN — Medication 100 MILLIGRAM(S): at 21:51

## 2019-05-05 RX ADMIN — CEFTRIAXONE 100 GRAM(S): 500 INJECTION, POWDER, FOR SOLUTION INTRAMUSCULAR; INTRAVENOUS at 21:52

## 2019-05-05 RX ADMIN — ENOXAPARIN SODIUM 40 MILLIGRAM(S): 100 INJECTION SUBCUTANEOUS at 11:00

## 2019-05-05 RX ADMIN — PRIMIDONE 50 MILLIGRAM(S): 250 TABLET ORAL at 10:59

## 2019-05-05 RX ADMIN — Medication 62.5 MILLIMOLE(S): at 17:49

## 2019-05-05 RX ADMIN — HYDROMORPHONE HYDROCHLORIDE 4 MILLIGRAM(S): 2 INJECTION INTRAMUSCULAR; INTRAVENOUS; SUBCUTANEOUS at 23:32

## 2019-05-05 RX ADMIN — Medication 250 MILLIGRAM(S): at 21:51

## 2019-05-05 RX ADMIN — Medication 3 MILLILITER(S): at 16:13

## 2019-05-05 RX ADMIN — GABAPENTIN 300 MILLIGRAM(S): 400 CAPSULE ORAL at 17:56

## 2019-05-05 RX ADMIN — Medication 500 MICROGRAM(S): at 21:47

## 2019-05-05 RX ADMIN — HYDROMORPHONE HYDROCHLORIDE 4 MILLIGRAM(S): 2 INJECTION INTRAMUSCULAR; INTRAVENOUS; SUBCUTANEOUS at 17:50

## 2019-05-05 RX ADMIN — ATORVASTATIN CALCIUM 40 MILLIGRAM(S): 80 TABLET, FILM COATED ORAL at 21:40

## 2019-05-05 RX ADMIN — GABAPENTIN 300 MILLIGRAM(S): 400 CAPSULE ORAL at 10:59

## 2019-05-05 RX ADMIN — CARVEDILOL PHOSPHATE 6.25 MILLIGRAM(S): 80 CAPSULE, EXTENDED RELEASE ORAL at 17:50

## 2019-05-05 RX ADMIN — SODIUM CHLORIDE 75 MILLILITER(S): 9 INJECTION INTRAMUSCULAR; INTRAVENOUS; SUBCUTANEOUS at 11:00

## 2019-05-05 RX ADMIN — Medication 100 MILLIGRAM(S): at 10:59

## 2019-05-05 RX ADMIN — ZOLPIDEM TARTRATE 5 MILLIGRAM(S): 10 TABLET ORAL at 21:53

## 2019-05-05 NOTE — H&P ADULT - NSICDXPASTMEDICALHX_GEN_ALL_CORE_FT
PAST MEDICAL HISTORY:  Cellulitis     Chronic back pain     Chronic obstructive asthma     HLD (hyperlipidemia)     HTN (hypertension)     NICM (nonischemic cardiomyopathy)     Rheumatoid arthritis

## 2019-05-05 NOTE — H&P ADULT - ASSESSMENT
Patient is a 68 year old female with PMH of RA (not currently on medications), HLD, NICM, Asthma, Neuropathy, Chronic Back Pain s/p L4/L5 fusion (follows with Dr. Ratliff as outpatient), Cellulitis, ETOH use (drinks 2 cocktails of whiskey every night), HTN, Mood Disorder and nonobstructive CAD who presented to the ED with progressively worsening SOB and chest pressure.     1. Acute Hypoxic Respiratory Failure likely secondary to Community Acquired Pneumonia (suspected gram negative organism)  -admit to monitored +  bed  -CTA reviewed; negative for PE, but opacity noted  -check RVP  -check urine legionella  -f/u blood and sputum cultures  -continue ceftriaxone and azithromycin  -supplemental O2 and bronchodilators  -cough expectorant ordered  -short course of PO prednisone   -monitor respiratory status closely    2. Chest pressure/pain; likely pleuritic   -TNI negative x 2; EKG without acute ST-T changes  -given underlying CAD and NICM, will obtain TTE  -cardiology evaluation requested (follows with Dr. Ervin as outpatient)    3. HLD  -continue statin    4. Mood Disorder  -psych medications all verified and resumed     5. Chronic Back pain   -patient on hydromorphone 4 mg BID PRN (confirmed with [facility])  -outpatient follow up with Dr. Ratliff    6. ETOH Abuse  -CIWA 0 in the ED  -continue CIWA protocol  -thiamine/folic acid/MVI    7. RA  -not currently on management  -outpatient follow up recommended    8. Neuropathy  -continue gabapentin    9. HTN  -BP stable off anti-HTN  -not currently on any anti-hypertensives per pharmacy    DVT ppx - Lovenox    Dispo - Discharge planning pending clinical course; estimated LOS 2 days

## 2019-05-05 NOTE — H&P ADULT - HISTORY OF PRESENT ILLNESS
Patient is a 68 year old female with PMH of RA (not currently on medications), HLD, NICM, Asthma, Neuropathy, Chronic Back Pain s/p L4/L5 fusion (follows with Dr. Ratliff as outpatient), Cellulitis, ETOH use (drinks 2 cocktails of whiskey every night), HTN, Mood Disorder and nonobstructive CAD who presented to the ED with progressively worsening SOB and chest pressure. Patient states the SOB started approximately one week ago and was associated with a green productive sputum. States her  was also sick with URI symptoms. Patient denied any fevers or chills, until friday evening. States she did not have any relief with Advil cold and sinus and did not have the opportunity to see her PCP. States inspiration makes her chest pressure worse; however does state the chest pressure is intermittent persist even at rest.  Patient reports that by Saturday she was feeling worse and her family brought her to the ED. In the ED, patient was a code sepsis. CTA was done which was negative for a PE, but revealed an opacity possibly inflammatory vs infectious. Patient was started on board spectrum abx. Cardiology was also consulted. TTE ordered.

## 2019-05-05 NOTE — CONSULT NOTE ADULT - SUBJECTIVE AND OBJECTIVE BOX
CHIEF COMPLAINT: CP    HPI: Patient is a  68y Female with h/o NICM (stress induced) with improvement in EF, nonobstructive CAD, HTN, HLD here with CP, SOB and cough past several days. Started last tuesday with congestion in chest. HAd cough with productive sputum as well. Started feelihng discomfort in middle of chest, worse with exertion and mostly pleuritic. Felt febrile as well. Pain worse at night time. Yesterday felt lethargic and out of it. CP and SOB worsened and came to ED. No pnd/orthopnea/palps/syncope/edema.     PAST MEDICAL & SURGICAL HISTORY:  No pertinent past medical history  No significant past surgical history      MEDICATIONS:  MEDICATIONS  (STANDING):  ALBUTerol/ipratropium for Nebulization 3 milliLiter(s) Nebulizer every 6 hours  atorvastatin 40 milliGRAM(s) Oral at bedtime  azithromycin  IVPB 500 milliGRAM(s) IV Intermittent every 24 hours  benzonatate 100 milliGRAM(s) Oral every 8 hours  cefTRIAXone   IVPB 1 Gram(s) IV Intermittent every 24 hours  enoxaparin Injectable 40 milliGRAM(s) SubCutaneous daily  gabapentin 300 milliGRAM(s) Oral three times a day  ipratropium    for Nebulization 500 MICROGram(s) Nebulizer every 6 hours  predniSONE   Tablet 40 milliGRAM(s) Oral daily  primidone 50 milliGRAM(s) Oral daily  QUEtiapine 300 milliGRAM(s) Oral daily  saccharomyces boulardii 250 milliGRAM(s) Oral two times a day  sodium chloride 0.9%. 1000 milliLiter(s) (75 mL/Hr) IV Continuous <Continuous>  traZODone 100 milliGRAM(s) Oral at bedtime    MEDICATIONS  (PRN):  guaiFENesin  milliGRAM(s) Oral every 12 hours PRN Cough  HYDROmorphone   Tablet 4 milliGRAM(s) Oral two times a day PRN Severe Pain (7 - 10)  zolpidem 5 milliGRAM(s) Oral at bedtime PRN Insomnia  zolpidem 5 milliGRAM(s) Oral at bedtime PRN Insomnia    ALBUTerol/ipratropium for Nebulization 3 milliLiter(s) Nebulizer every 6 hours  atorvastatin 40 milliGRAM(s) Oral at bedtime  azithromycin  IVPB 500 milliGRAM(s) IV Intermittent every 24 hours  benzonatate 100 milliGRAM(s) Oral every 8 hours  cefTRIAXone   IVPB 1 Gram(s) IV Intermittent every 24 hours  enoxaparin Injectable 40 milliGRAM(s) SubCutaneous daily  gabapentin 300 milliGRAM(s) Oral three times a day  guaiFENesin  milliGRAM(s) Oral every 12 hours PRN  HYDROmorphone   Tablet 4 milliGRAM(s) Oral two times a day PRN  ipratropium    for Nebulization 500 MICROGram(s) Nebulizer every 6 hours  predniSONE   Tablet 40 milliGRAM(s) Oral daily  primidone 50 milliGRAM(s) Oral daily  QUEtiapine 300 milliGRAM(s) Oral daily  saccharomyces boulardii 250 milliGRAM(s) Oral two times a day  sodium chloride 0.9%. 1000 milliLiter(s) IV Continuous <Continuous>  traZODone 100 milliGRAM(s) Oral at bedtime  zolpidem 5 milliGRAM(s) Oral at bedtime PRN  zolpidem 5 milliGRAM(s) Oral at bedtime PRN      FAMILY HISTORY:  FAMILY HISTORY:  No pertinent family history in first degree relatives      SOCIAL HISTORY: no EtOH, drugs or tobacco    ROS: GI negative, All others negative    PHYSCIAL EXAM:  Vital Signs Last 24 Hrs  T(C): 37.8 (05 May 2019 11:12), Max: 39.6 (04 May 2019 20:26)  T(F): 100 (05 May 2019 11:12), Max: 103.2 (04 May 2019 20:26)  HR: 86 (05 May 2019 11:12) (86 - 123)  BP: 121/75 (05 May 2019 11:12) (119/59 - 156/72)  BP(mean): --  RR: 18 (05 May 2019 11:12) (17 - 22)  SpO2: 98% (05 May 2019 11:12) (93% - 98%)  I&O's Summary    GEN: NAD  HEENT: MMM, sclera anicteric  RESP: CTA bilaterally  CVS: S1S2, RRR, no JVD, no M/R/G  GI: Soft, NT, ND, BS+  EXT: no C/C/E  NEURO: AAOX3, tremulous  PSYCH: Normal affect    ECG: SR, no ST-T abnormalities     LABS:                        13.7   15.5  )-----------( 241      ( 05 May 2019 08:28 )             40.6     05-05    137  |  101  |  11.0  ----------------------------<  266<H>  4.6   |  23.0  |  0.69    Ca    9.6      05 May 2019 08:28  Phos  2.0     05-05  Mg     2.2     05-05    TPro  7.9  /  Alb  4.2  /  TBili  0.4  /  DBili  x   /  AST  29  /  ALT  26  /  AlkPhos  97  05-04    CARDIAC MARKERS ( 05 May 2019 08:28 )  x     / <0.01 ng/mL / x     / x     / x      CARDIAC MARKERS ( 04 May 2019 20:51 )  x     / <0.01 ng/mL / 220 U/L / x     / 3.8 ng/mL      PT/INR - ( 04 May 2019 20:51 )   PT: 12.6 sec;   INR: 1.09 ratio         PTT - ( 04 May 2019 20:51 )  PTT:33.8 sec      RADIOLOGY & ADDITIONAL STUDIES:    Assessment:    Patient is a  68y Female with a PMH of NICM (stress induced), non-obstructive CAD, HTN here with CP/SOB/cough from bronchitis and possible PNA. Trops negative and ECG non-ischemic. Low suspicion for cardiac etiology to CP. Will obtain echo and continue medical management of prior NICM. PAtient in SR, no signs of CHF.     Plan:  1. Echo  2. Continue ASA, statin, enalapril, carvedilol  3. Antibiotics for possile PNA      Alvin Owens MD

## 2019-05-06 DIAGNOSIS — Z90.710 ACQUIRED ABSENCE OF BOTH CERVIX AND UTERUS: Chronic | ICD-10-CM

## 2019-05-06 DIAGNOSIS — Z98.891 HISTORY OF UTERINE SCAR FROM PREVIOUS SURGERY: Chronic | ICD-10-CM

## 2019-05-06 LAB
ANION GAP SERPL CALC-SCNC: 13 MMOL/L — SIGNIFICANT CHANGE UP (ref 5–17)
BASOPHILS # BLD AUTO: 0 K/UL — SIGNIFICANT CHANGE UP (ref 0–0.2)
BASOPHILS NFR BLD AUTO: 0.1 % — SIGNIFICANT CHANGE UP (ref 0–2)
BUN SERPL-MCNC: 13 MG/DL — SIGNIFICANT CHANGE UP (ref 8–20)
CALCIUM SERPL-MCNC: 9.5 MG/DL — SIGNIFICANT CHANGE UP (ref 8.6–10.2)
CHLORIDE SERPL-SCNC: 102 MMOL/L — SIGNIFICANT CHANGE UP (ref 98–107)
CO2 SERPL-SCNC: 24 MMOL/L — SIGNIFICANT CHANGE UP (ref 22–29)
CREAT SERPL-MCNC: 0.61 MG/DL — SIGNIFICANT CHANGE UP (ref 0.5–1.3)
EOSINOPHIL # BLD AUTO: 0 K/UL — SIGNIFICANT CHANGE UP (ref 0–0.5)
EOSINOPHIL NFR BLD AUTO: 0.1 % — SIGNIFICANT CHANGE UP (ref 0–6)
GLUCOSE SERPL-MCNC: 116 MG/DL — HIGH (ref 70–115)
HBA1C BLD-MCNC: 5.9 % — HIGH (ref 4–5.6)
HCT VFR BLD CALC: 38.2 % — SIGNIFICANT CHANGE UP (ref 37–47)
HGB BLD-MCNC: 12.7 G/DL — SIGNIFICANT CHANGE UP (ref 12–16)
LYMPHOCYTES # BLD AUTO: 1 K/UL — SIGNIFICANT CHANGE UP (ref 1–4.8)
LYMPHOCYTES # BLD AUTO: 6 % — LOW (ref 20–55)
MAGNESIUM SERPL-MCNC: 2.3 MG/DL — SIGNIFICANT CHANGE UP (ref 1.6–2.6)
MCHC RBC-ENTMCNC: 30.2 PG — SIGNIFICANT CHANGE UP (ref 27–31)
MCHC RBC-ENTMCNC: 33.2 G/DL — SIGNIFICANT CHANGE UP (ref 32–36)
MCV RBC AUTO: 90.7 FL — SIGNIFICANT CHANGE UP (ref 81–99)
MONOCYTES # BLD AUTO: 1.3 K/UL — HIGH (ref 0–0.8)
MONOCYTES NFR BLD AUTO: 7.2 % — SIGNIFICANT CHANGE UP (ref 3–10)
NEUTROPHILS # BLD AUTO: 15.1 K/UL — HIGH (ref 1.8–8)
NEUTROPHILS NFR BLD AUTO: 86.2 % — HIGH (ref 37–73)
PHOSPHATE SERPL-MCNC: 2.7 MG/DL — SIGNIFICANT CHANGE UP (ref 2.4–4.7)
PLATELET # BLD AUTO: 255 K/UL — SIGNIFICANT CHANGE UP (ref 150–400)
POTASSIUM SERPL-MCNC: 4.3 MMOL/L — SIGNIFICANT CHANGE UP (ref 3.5–5.3)
POTASSIUM SERPL-SCNC: 4.3 MMOL/L — SIGNIFICANT CHANGE UP (ref 3.5–5.3)
PROCALCITONIN SERPL-MCNC: 0.15 NG/ML — HIGH (ref 0.02–0.1)
RBC # BLD: 4.21 M/UL — LOW (ref 4.4–5.2)
RBC # FLD: 13.4 % — SIGNIFICANT CHANGE UP (ref 11–15.6)
SODIUM SERPL-SCNC: 139 MMOL/L — SIGNIFICANT CHANGE UP (ref 135–145)
TROPONIN T SERPL-MCNC: <0.01 NG/ML — SIGNIFICANT CHANGE UP (ref 0–0.06)
WBC # BLD: 17.4 K/UL — HIGH (ref 4.8–10.8)
WBC # FLD AUTO: 17.4 K/UL — HIGH (ref 4.8–10.8)

## 2019-05-06 PROCEDURE — 93306 TTE W/DOPPLER COMPLETE: CPT | Mod: 26

## 2019-05-06 PROCEDURE — 99232 SBSQ HOSP IP/OBS MODERATE 35: CPT

## 2019-05-06 PROCEDURE — 99232 SBSQ HOSP IP/OBS MODERATE 35: CPT | Mod: GC

## 2019-05-06 RX ORDER — SIMETHICONE 80 MG/1
80 TABLET, CHEWABLE ORAL
Qty: 0 | Refills: 0 | Status: DISCONTINUED | OUTPATIENT
Start: 2019-05-06 | End: 2019-05-07

## 2019-05-06 RX ORDER — PANTOPRAZOLE SODIUM 20 MG/1
40 TABLET, DELAYED RELEASE ORAL DAILY
Qty: 0 | Refills: 0 | Status: DISCONTINUED | OUTPATIENT
Start: 2019-05-06 | End: 2019-05-07

## 2019-05-06 RX ORDER — THIAMINE MONONITRATE (VIT B1) 100 MG
100 TABLET ORAL DAILY
Qty: 0 | Refills: 0 | Status: DISCONTINUED | OUTPATIENT
Start: 2019-05-06 | End: 2019-05-07

## 2019-05-06 RX ORDER — FOLIC ACID 0.8 MG
1 TABLET ORAL DAILY
Qty: 0 | Refills: 0 | Status: DISCONTINUED | OUTPATIENT
Start: 2019-05-06 | End: 2019-05-07

## 2019-05-06 RX ADMIN — GABAPENTIN 300 MILLIGRAM(S): 400 CAPSULE ORAL at 02:55

## 2019-05-06 RX ADMIN — Medication 3 MILLILITER(S): at 20:35

## 2019-05-06 RX ADMIN — GABAPENTIN 300 MILLIGRAM(S): 400 CAPSULE ORAL at 23:37

## 2019-05-06 RX ADMIN — Medication 100 MILLIGRAM(S): at 05:26

## 2019-05-06 RX ADMIN — ENOXAPARIN SODIUM 40 MILLIGRAM(S): 100 INJECTION SUBCUTANEOUS at 23:35

## 2019-05-06 RX ADMIN — AZITHROMYCIN 255 MILLIGRAM(S): 500 TABLET, FILM COATED ORAL at 02:58

## 2019-05-06 RX ADMIN — Medication 100 MILLIGRAM(S): at 23:37

## 2019-05-06 RX ADMIN — Medication 1 TABLET(S): at 13:03

## 2019-05-06 RX ADMIN — Medication 3 MILLILITER(S): at 04:20

## 2019-05-06 RX ADMIN — Medication 3 MILLILITER(S): at 08:23

## 2019-05-06 RX ADMIN — Medication 40 MILLIGRAM(S): at 05:25

## 2019-05-06 RX ADMIN — Medication 2 MILLIGRAM(S): at 08:02

## 2019-05-06 RX ADMIN — GABAPENTIN 300 MILLIGRAM(S): 400 CAPSULE ORAL at 08:01

## 2019-05-06 RX ADMIN — Medication 250 MILLIGRAM(S): at 05:26

## 2019-05-06 RX ADMIN — Medication 500 MICROGRAM(S): at 04:21

## 2019-05-06 RX ADMIN — PRIMIDONE 50 MILLIGRAM(S): 250 TABLET ORAL at 08:01

## 2019-05-06 RX ADMIN — Medication 3 MILLILITER(S): at 15:13

## 2019-05-06 RX ADMIN — Medication 1 MILLIGRAM(S): at 13:03

## 2019-05-06 RX ADMIN — SIMETHICONE 80 MILLIGRAM(S): 80 TABLET, CHEWABLE ORAL at 13:04

## 2019-05-06 RX ADMIN — Medication 100 MILLIGRAM(S): at 13:03

## 2019-05-06 RX ADMIN — ATORVASTATIN CALCIUM 40 MILLIGRAM(S): 80 TABLET, FILM COATED ORAL at 23:37

## 2019-05-06 RX ADMIN — HYDROMORPHONE HYDROCHLORIDE 4 MILLIGRAM(S): 2 INJECTION INTRAMUSCULAR; INTRAVENOUS; SUBCUTANEOUS at 01:00

## 2019-05-06 RX ADMIN — QUETIAPINE FUMARATE 300 MILLIGRAM(S): 200 TABLET, FILM COATED ORAL at 23:37

## 2019-05-06 RX ADMIN — GABAPENTIN 300 MILLIGRAM(S): 400 CAPSULE ORAL at 13:03

## 2019-05-06 RX ADMIN — CEFTRIAXONE 100 GRAM(S): 500 INJECTION, POWDER, FOR SOLUTION INTRAMUSCULAR; INTRAVENOUS at 23:36

## 2019-05-06 RX ADMIN — Medication 250 MILLIGRAM(S): at 17:07

## 2019-05-06 RX ADMIN — HYDROMORPHONE HYDROCHLORIDE 4 MILLIGRAM(S): 2 INJECTION INTRAMUSCULAR; INTRAVENOUS; SUBCUTANEOUS at 13:03

## 2019-05-06 RX ADMIN — PANTOPRAZOLE SODIUM 40 MILLIGRAM(S): 20 TABLET, DELAYED RELEASE ORAL at 13:04

## 2019-05-06 RX ADMIN — HYDROMORPHONE HYDROCHLORIDE 4 MILLIGRAM(S): 2 INJECTION INTRAMUSCULAR; INTRAVENOUS; SUBCUTANEOUS at 13:30

## 2019-05-06 RX ADMIN — Medication 5 MILLIGRAM(S): at 05:26

## 2019-05-06 RX ADMIN — Medication 100 MILLIGRAM(S): at 13:02

## 2019-05-06 RX ADMIN — CARVEDILOL PHOSPHATE 6.25 MILLIGRAM(S): 80 CAPSULE, EXTENDED RELEASE ORAL at 05:25

## 2019-05-06 RX ADMIN — Medication 81 MILLIGRAM(S): at 08:01

## 2019-05-06 NOTE — PROGRESS NOTE ADULT - ASSESSMENT
· Assessment		  Patient is a 68 year old female with PMH of RA (not currently on medications), HLD, NICM, Asthma, Neuropathy, Chronic Back Pain s/p L4/L5 fusion (follows with Dr. Ratliff as outpatient), Cellulitis, ETOH use (drinks 2 cocktails of whiskey every night), HTN, Mood Disorder and nonobstructive CAD who presented to the ED with progressively worsening SOB and chest pressure. cardiology on board, CT chest showed pneumonia and atelactasis. On iv antibiotics and oxygen. pending blood and sputum culture.    1. Acute Hypoxic Respiratory Failure likely secondary to Community Acquired Pneumonia (suspected gram negative organism), leukocytosis  -CTA reviewed; negative for PE, but opacity noted, and atelactasis  incentive spirometer  will check procalcitonin  - RVP _ve  -check urine legionella  -f/u blood and sputum cultures  -continue ceftriaxone and azithromycin day 2  -supplemental O2 and bronchodilators  -cough expectorant ordered  -short course of PO prednisone   -monitor respiratory status closely    2. Chest pressure/pain; likely pleuritic   -TNI negative x 2; EKG without acute ST-T changes  -given underlying CAD and NICM, will obtain TTE  -cardiology evaluation noticed    3. HLD  -continue statin    4. Mood Disorder  -psych medications all verified and resumed     5. Chronic Back pain   -patient on hydromorphone 4 mg BID PRN (confirmed with [facility])  -outpatient follow up with Dr. Ratliff    6. ETOH Abuse  -CIWA 0 in the ED  -continue CIWA protocol  -thiamine/folic acid/MVI    7. RA  -not currently on management  -outpatient follow up recommended    8. Neuropathy  -continue gabapentin    9. HTN  -BP stable off anti-HTN  -not currently on any anti-hypertensives per pharmacy    DVT ppx - Lovenox    Dispo - Discharge planning pending clinical course; estimated LOS 2 days

## 2019-05-06 NOTE — PROGRESS NOTE ADULT - SUBJECTIVE AND OBJECTIVE BOX
Patient seen today @ 1730    TULIO SUGGS  [id number]      Chief Complaint:  CP/SOB/Cough    Interval History:  Patient still with occ cough and mild SOB.   No further CP.  No palps.    Tele:  No events      ALBUTerol/ipratropium for Nebulization 3 milliLiter(s) Nebulizer every 6 hours  aspirin  chewable 81 milliGRAM(s) Oral daily  atorvastatin 40 milliGRAM(s) Oral at bedtime  azithromycin  IVPB 500 milliGRAM(s) IV Intermittent every 24 hours  benzonatate 100 milliGRAM(s) Oral every 8 hours  carvedilol 6.25 milliGRAM(s) Oral every 12 hours  cefTRIAXone   IVPB 1 Gram(s) IV Intermittent every 24 hours  enalapril 5 milliGRAM(s) Oral daily  enoxaparin Injectable 40 milliGRAM(s) SubCutaneous daily  folic acid 1 milliGRAM(s) Oral daily  gabapentin 300 milliGRAM(s) Oral three times a day  guaiFENesin  milliGRAM(s) Oral every 12 hours PRN  HYDROmorphone   Tablet 4 milliGRAM(s) Oral two times a day PRN  multivitamin 1 Tablet(s) Oral daily  pantoprazole   Suspension 40 milliGRAM(s) Oral daily  predniSONE   Tablet 40 milliGRAM(s) Oral daily  primidone 50 milliGRAM(s) Oral daily  QUEtiapine 300 milliGRAM(s) Oral daily  saccharomyces boulardii 250 milliGRAM(s) Oral two times a day  simethicone 80 milliGRAM(s) Chew two times a day PRN  sodium chloride 0.9%. 1000 milliLiter(s) IV Continuous <Continuous>  thiamine 100 milliGRAM(s) Oral daily  traZODone 100 milliGRAM(s) Oral at bedtime  zolpidem 5 milliGRAM(s) Oral at bedtime PRN  zolpidem 5 milliGRAM(s) Oral at bedtime PRN          Physical Exam:  T(C): 36.3 (05-06-19 @ 20:03), Max: 36.7 (05-06-19 @ 07:54)  HR: 88 (05-06-19 @ 20:36) (63 - 88)  BP: 112/65 (05-06-19 @ 20:03) (99/67 - 123/81)  RR: 18 (05-06-19 @ 20:03) (18 - 18)  SpO2: 97% (05-06-19 @ 20:36) (95% - 97%)  Wt(kg): --  General: Comfortable in NAD  Neck: No JVD  CVS: nl s1s2, no s3  Pulm: CTA b/l  Abd: soft, non-tender  Ext: No c/c/e  Neuro A&O x3  Psych: Normal affect      Labs:   06 May 2019 08:04    139    |  102    |  13.0   ----------------------------<  116    4.3     |  24.0   |  0.61     Ca    9.5        06 May 2019 08:04  Phos  2.7       06 May 2019 08:04  Mg     2.3       06 May 2019 08:04                            12.7   17.4  )-----------( 255      ( 06 May 2019 08:04 )             38.2       CARDIAC MARKERS ( 06 May 2019 08:04 )  x     / <0.01 ng/mL / x     / x     / x      CARDIAC MARKERS ( 05 May 2019 08:28 )  x     / <0.01 ng/mL / x     / x     / x          Echo personally reviewed:   1. Left ventricular ejection fraction, by visual estimation, is 55 to 60%.   2. Normal global left ventricular systolic function.   3. Normal left ventricular internal cavity size.   4. There is mild concentric left ventricular hypertrophy.   5. Normal left atrial size.   6. Mild thickening of the anterior and posterior mitral valve leaflets.   7. Trace mitral valve regurgitation.   8. There is no evidence of pericardial effusion.        Assessment:    Patient is a  68y Female with a PMH of NICM (stress induced), non-obstructive CAD, HTN here with CP/SOB/cough from bronchitis and possible PNA. Trops negative and ECG non-ischemic. Low suspicion for cardiac etiology to CP. Will obtain echo and continue medical management of prior NICM. Patient in SR, no signs of CHF.   -Echo with normal EF.  No effusion or significant VHD.  -CP likely MSK related, ?PNA.    Plan:  1. No further CV testing.  2. Continue current CV meds at current doses.  3. Antibiotics for PNA.  4. OP f/u for possible stress testing post d/c.  5. Will sign off.  Please call with questions.  Thanks!

## 2019-05-06 NOTE — PROGRESS NOTE ADULT - SUBJECTIVE AND OBJECTIVE BOX
TULIO SUGGS    [id number]    68y      Female    CC: SOB, CHEST PAIN    INTERVAL HPI/OVERNIGHT EVENTS:  Was seen and examined at bedside, mentioned her sob is better, never smoke cigg in past and never been diagnosed with copd. NO nebulizer and inhaler at home.      HPI:  Patient is a 68 year old female with PMH of RA (not currently on medications), HLD, NICM, Asthma, Neuropathy, Chronic Back Pain s/p L4/L5 fusion (follows with Dr. Ratliff as outpatient), Cellulitis, ETOH use (drinks 2 cocktails of whiskey every night), HTN, Mood Disorder and nonobstructive CAD who presented to the ED with progressively worsening SOB and chest pressure. Patient states the SOB started approximately one week ago and was associated with a green productive sputum. States her  was also sick with URI symptoms. Patient denied any fevers or chills, until friday evening. States she did not have any relief with Advil cold and sinus and did not have the opportunity to see her PCP. States inspiration makes her chest pressure worse; however does state the chest pressure is intermittent persist even at rest.  Patient reports that by Saturday she was feeling worse and her family brought her to the ED. In the ED, patient was a code sepsis. CTA was done which was negative for a PE, but revealed an opacity possibly inflammatory vs infectious. Patient was started on board spectrum abx. Cardiology was also consulted. TTE ordered. (2019)      Vital Signs Last 24 Hrs  T(C): 36.7 (06 May 2019 07:54), Max: 37.3 (05 May 2019 16:05)  T(F): 98.1 (06 May 2019 07:54), Max: 99.2 (05 May 2019 16:05)  HR: 71 (06 May 2019 08:24) (63 - 86)  BP: 99/67 (06 May 2019 07:54) (99/67 - 124/71)  BP(mean): --  RR: 18 (06 May 2019 07:54) (18 - 18)  SpO2: 96% (06 May 2019 08:24) (95% - 98%)    PHYSICAL EXAM:    GENERAL: NAD, well-groomed, on oxygen , getting nebulizer treatment  HEENT: PERRL, +EOMI  NECK: soft, Supple, No JVD,   CHEST/LUNG: decrease breath sounds at bases  HEART: S1S2+, Regular rate and rhythm; No murmurs, rubs, or gallops  ABDOMEN: Soft, Nontender, Nondistended; Bowel sounds present  EXTREMITIES:  2+ Peripheral Pulses, No clubbing, cyanosis, or edema  SKIN: No rashes or lesions  NEURO: AAOX3, no focal deficits, no motor r sensory loss  PSYCH: normal mood      LABS:                        12.7   17.4  )-----------( 255      ( 06 May 2019 08:04 )             38.2     05-06    139  |  102  |  13.0  ----------------------------<  116<H>  4.3   |  24.0  |  0.61    Ca    9.5      06 May 2019 08:04  Phos  2.7     05-06  Mg     2.3     05-06    TPro  7.9  /  Alb  4.2  /  TBili  0.4  /  DBili  x   /  AST  29  /  ALT  26  /  AlkPhos  97  05-04    PT/INR - ( 04 May 2019 20:51 )   PT: 12.6 sec;   INR: 1.09 ratio         PTT - ( 04 May 2019 20:51 )  PTT:33.8 sec  Urinalysis Basic - ( 04 May 2019 21:17 )    Color: Yellow / Appearance: Clear / S.010 / pH: x  Gluc: x / Ketone: Negative  / Bili: Negative / Urobili: Negative mg/dL   Blood: x / Protein: 15 mg/dL / Nitrite: Negative   Leuk Esterase: Trace / RBC: 0-2 /HPF / WBC 0-2   Sq Epi: x / Non Sq Epi: Occasional / Bacteria: x          MEDICATIONS  (STANDING):  ALBUTerol/ipratropium for Nebulization 3 milliLiter(s) Nebulizer every 6 hours  aspirin  chewable 81 milliGRAM(s) Oral daily  atorvastatin 40 milliGRAM(s) Oral at bedtime  azithromycin  IVPB 500 milliGRAM(s) IV Intermittent every 24 hours  benzonatate 100 milliGRAM(s) Oral every 8 hours  carvedilol 6.25 milliGRAM(s) Oral every 12 hours  cefTRIAXone   IVPB 1 Gram(s) IV Intermittent every 24 hours  enalapril 5 milliGRAM(s) Oral daily  enoxaparin Injectable 40 milliGRAM(s) SubCutaneous daily  folic acid 1 milliGRAM(s) Oral daily  gabapentin 300 milliGRAM(s) Oral three times a day  multivitamin 1 Tablet(s) Oral daily  predniSONE   Tablet 40 milliGRAM(s) Oral daily  primidone 50 milliGRAM(s) Oral daily  QUEtiapine 300 milliGRAM(s) Oral daily  saccharomyces boulardii 250 milliGRAM(s) Oral two times a day  sodium chloride 0.9%. 1000 milliLiter(s) (75 mL/Hr) IV Continuous <Continuous>  thiamine 100 milliGRAM(s) Oral daily  traZODone 100 milliGRAM(s) Oral at bedtime    MEDICATIONS  (PRN):  guaiFENesin  milliGRAM(s) Oral every 12 hours PRN Cough  HYDROmorphone   Tablet 4 milliGRAM(s) Oral two times a day PRN Severe Pain (7 - 10)  zolpidem 5 milliGRAM(s) Oral at bedtime PRN Insomnia  zolpidem 5 milliGRAM(s) Oral at bedtime PRN Insomnia      RADIOLOGY & ADDITIONAL TESTS:

## 2019-05-07 ENCOUNTER — TRANSCRIPTION ENCOUNTER (OUTPATIENT)
Age: 69
End: 2019-05-07

## 2019-05-07 ENCOUNTER — INBOUND DOCUMENT (OUTPATIENT)
Age: 69
End: 2019-05-07

## 2019-05-07 VITALS — SYSTOLIC BLOOD PRESSURE: 110 MMHG | HEART RATE: 77 BPM | TEMPERATURE: 99 F | DIASTOLIC BLOOD PRESSURE: 71 MMHG

## 2019-05-07 LAB
HCV AB S/CO SERPL IA: 0.04 S/CO — SIGNIFICANT CHANGE UP (ref 0–0.99)
HCV AB SERPL-IMP: SIGNIFICANT CHANGE UP
LEGIONELLA AG UR QL: NEGATIVE — SIGNIFICANT CHANGE UP

## 2019-05-07 PROCEDURE — 84443 ASSAY THYROID STIM HORMONE: CPT

## 2019-05-07 PROCEDURE — 87486 CHLMYD PNEUM DNA AMP PROBE: CPT

## 2019-05-07 PROCEDURE — 84145 PROCALCITONIN (PCT): CPT

## 2019-05-07 PROCEDURE — 93005 ELECTROCARDIOGRAM TRACING: CPT

## 2019-05-07 PROCEDURE — 82550 ASSAY OF CK (CPK): CPT

## 2019-05-07 PROCEDURE — 71046 X-RAY EXAM CHEST 2 VIEWS: CPT

## 2019-05-07 PROCEDURE — 87633 RESP VIRUS 12-25 TARGETS: CPT

## 2019-05-07 PROCEDURE — 85027 COMPLETE CBC AUTOMATED: CPT

## 2019-05-07 PROCEDURE — 87449 NOS EACH ORGANISM AG IA: CPT

## 2019-05-07 PROCEDURE — 83036 HEMOGLOBIN GLYCOSYLATED A1C: CPT

## 2019-05-07 PROCEDURE — 87086 URINE CULTURE/COLONY COUNT: CPT

## 2019-05-07 PROCEDURE — 94640 AIRWAY INHALATION TREATMENT: CPT

## 2019-05-07 PROCEDURE — 94760 N-INVAS EAR/PLS OXIMETRY 1: CPT

## 2019-05-07 PROCEDURE — 80048 BASIC METABOLIC PNL TOTAL CA: CPT

## 2019-05-07 PROCEDURE — 96375 TX/PRO/DX INJ NEW DRUG ADDON: CPT | Mod: XU

## 2019-05-07 PROCEDURE — 83735 ASSAY OF MAGNESIUM: CPT

## 2019-05-07 PROCEDURE — 87040 BLOOD CULTURE FOR BACTERIA: CPT

## 2019-05-07 PROCEDURE — 36415 COLL VENOUS BLD VENIPUNCTURE: CPT

## 2019-05-07 PROCEDURE — 96374 THER/PROPH/DIAG INJ IV PUSH: CPT | Mod: XU

## 2019-05-07 PROCEDURE — 85730 THROMBOPLASTIN TIME PARTIAL: CPT

## 2019-05-07 PROCEDURE — 84484 ASSAY OF TROPONIN QUANT: CPT

## 2019-05-07 PROCEDURE — 86803 HEPATITIS C AB TEST: CPT

## 2019-05-07 PROCEDURE — 87581 M.PNEUMON DNA AMP PROBE: CPT

## 2019-05-07 PROCEDURE — 99285 EMERGENCY DEPT VISIT HI MDM: CPT | Mod: 25

## 2019-05-07 PROCEDURE — 71275 CT ANGIOGRAPHY CHEST: CPT

## 2019-05-07 PROCEDURE — 81001 URINALYSIS AUTO W/SCOPE: CPT

## 2019-05-07 PROCEDURE — 83605 ASSAY OF LACTIC ACID: CPT

## 2019-05-07 PROCEDURE — 82553 CREATINE MB FRACTION: CPT

## 2019-05-07 PROCEDURE — 87798 DETECT AGENT NOS DNA AMP: CPT

## 2019-05-07 PROCEDURE — 80053 COMPREHEN METABOLIC PANEL: CPT

## 2019-05-07 PROCEDURE — 99239 HOSP IP/OBS DSCHRG MGMT >30: CPT

## 2019-05-07 PROCEDURE — 93306 TTE W/DOPPLER COMPLETE: CPT

## 2019-05-07 PROCEDURE — 85610 PROTHROMBIN TIME: CPT

## 2019-05-07 PROCEDURE — 84100 ASSAY OF PHOSPHORUS: CPT

## 2019-05-07 RX ORDER — ALBUTEROL 90 UG/1
1 AEROSOL, METERED ORAL
Qty: 15 | Refills: 0
Start: 2019-05-07

## 2019-05-07 RX ORDER — IPRATROPIUM/ALBUTEROL SULFATE 18-103MCG
3 AEROSOL WITH ADAPTER (GRAM) INHALATION
Qty: 15 | Refills: 0 | OUTPATIENT
Start: 2019-05-07 | End: 2019-05-13

## 2019-05-07 RX ORDER — CARVEDILOL PHOSPHATE 80 MG/1
1 CAPSULE, EXTENDED RELEASE ORAL
Qty: 30 | Refills: 0
Start: 2019-05-07 | End: 2019-05-21

## 2019-05-07 RX ORDER — ASPIRIN/CALCIUM CARB/MAGNESIUM 324 MG
1 TABLET ORAL
Qty: 0 | Refills: 0 | DISCHARGE
Start: 2019-05-07

## 2019-05-07 RX ORDER — PANTOPRAZOLE SODIUM 20 MG/1
0 TABLET, DELAYED RELEASE ORAL
Qty: 0 | Refills: 0 | DISCHARGE
Start: 2019-05-07

## 2019-05-07 RX ORDER — HYDROMORPHONE HYDROCHLORIDE 2 MG/ML
1 INJECTION INTRAMUSCULAR; INTRAVENOUS; SUBCUTANEOUS
Qty: 0 | Refills: 0 | COMMUNITY

## 2019-05-07 RX ORDER — SACCHAROMYCES BOULARDII 250 MG
1 POWDER IN PACKET (EA) ORAL
Qty: 8 | Refills: 0
Start: 2019-05-07 | End: 2019-05-10

## 2019-05-07 RX ORDER — SIMETHICONE 80 MG/1
1 TABLET, CHEWABLE ORAL
Qty: 0 | Refills: 0 | DISCHARGE
Start: 2019-05-07

## 2019-05-07 RX ORDER — FOLIC ACID 0.8 MG
1 TABLET ORAL
Qty: 0 | Refills: 0 | DISCHARGE
Start: 2019-05-07

## 2019-05-07 RX ORDER — AZITHROMYCIN 500 MG/1
1 TABLET, FILM COATED ORAL
Qty: 3 | Refills: 0
Start: 2019-05-07 | End: 2019-05-09

## 2019-05-07 RX ORDER — CEFPODOXIME PROXETIL 100 MG
1 TABLET ORAL
Qty: 8 | Refills: 0
Start: 2019-05-07 | End: 2019-05-10

## 2019-05-07 RX ORDER — CARVEDILOL PHOSPHATE 80 MG/1
1 CAPSULE, EXTENDED RELEASE ORAL
Qty: 0 | Refills: 0 | COMMUNITY
Start: 2019-05-07

## 2019-05-07 RX ORDER — THIAMINE MONONITRATE (VIT B1) 100 MG
1 TABLET ORAL
Qty: 0 | Refills: 0 | DISCHARGE
Start: 2019-05-07

## 2019-05-07 RX ORDER — IPRATROPIUM/ALBUTEROL SULFATE 18-103MCG
3 AEROSOL WITH ADAPTER (GRAM) INHALATION
Qty: 15 | Refills: 0
Start: 2019-05-07 | End: 2019-05-13

## 2019-05-07 RX ADMIN — GABAPENTIN 300 MILLIGRAM(S): 400 CAPSULE ORAL at 12:52

## 2019-05-07 RX ADMIN — QUETIAPINE FUMARATE 300 MILLIGRAM(S): 200 TABLET, FILM COATED ORAL at 12:52

## 2019-05-07 RX ADMIN — Medication 250 MILLIGRAM(S): at 05:32

## 2019-05-07 RX ADMIN — PANTOPRAZOLE SODIUM 40 MILLIGRAM(S): 20 TABLET, DELAYED RELEASE ORAL at 12:51

## 2019-05-07 RX ADMIN — CARVEDILOL PHOSPHATE 6.25 MILLIGRAM(S): 80 CAPSULE, EXTENDED RELEASE ORAL at 05:32

## 2019-05-07 RX ADMIN — Medication 100 MILLIGRAM(S): at 05:32

## 2019-05-07 RX ADMIN — Medication 1 MILLIGRAM(S): at 12:52

## 2019-05-07 RX ADMIN — Medication 81 MILLIGRAM(S): at 12:52

## 2019-05-07 RX ADMIN — Medication 3 MILLILITER(S): at 09:07

## 2019-05-07 RX ADMIN — CARVEDILOL PHOSPHATE 6.25 MILLIGRAM(S): 80 CAPSULE, EXTENDED RELEASE ORAL at 17:24

## 2019-05-07 RX ADMIN — Medication 1 TABLET(S): at 12:52

## 2019-05-07 RX ADMIN — Medication 100 MILLIGRAM(S): at 12:51

## 2019-05-07 RX ADMIN — Medication 100 MILLIGRAM(S): at 12:52

## 2019-05-07 RX ADMIN — ENOXAPARIN SODIUM 40 MILLIGRAM(S): 100 INJECTION SUBCUTANEOUS at 12:50

## 2019-05-07 RX ADMIN — GABAPENTIN 300 MILLIGRAM(S): 400 CAPSULE ORAL at 05:32

## 2019-05-07 RX ADMIN — Medication 5 MILLIGRAM(S): at 05:32

## 2019-05-07 RX ADMIN — AZITHROMYCIN 255 MILLIGRAM(S): 500 TABLET, FILM COATED ORAL at 02:59

## 2019-05-07 RX ADMIN — ZOLPIDEM TARTRATE 5 MILLIGRAM(S): 10 TABLET ORAL at 00:52

## 2019-05-07 RX ADMIN — Medication 40 MILLIGRAM(S): at 05:32

## 2019-05-07 RX ADMIN — Medication 100 MILLIGRAM(S): at 00:08

## 2019-05-07 RX ADMIN — Medication 250 MILLIGRAM(S): at 17:24

## 2019-05-07 RX ADMIN — PRIMIDONE 50 MILLIGRAM(S): 250 TABLET ORAL at 12:52

## 2019-05-07 RX ADMIN — Medication 3 MILLILITER(S): at 16:02

## 2019-05-07 NOTE — DISCHARGE NOTE NURSING/CASE MANAGEMENT/SOCIAL WORK - NSDCDPATPORTLINK_GEN_ALL_CORE
You can access the [facility] Portal, offered by [facility], by registering with the following website: [email or web address]

## 2019-05-07 NOTE — DISCHARGE NOTE PROVIDER - HOSPITAL COURSE
Patient is a 68 year old female with PMH of RA (not currently on medications), HLD, NICM, Asthma, Neuropathy, Chronic Back Pain s/p L4/L5 fusion (follows with Dr. Ratliff as outpatient), Cellulitis, ETOH use (drinks 2 cocktails of whiskey every night), HTN, Mood Disorder and nonobstructive CAD who presented to the ED with progressively worsening SOB and chest pressure. cardiology on board, CT chest showed pneumonia and atelactasis. started on iv antiobiotics and nebulizer treatment. Blood culture negative. Needs to follow up with cardiology as an outpatient for stress test. and pulmonary follow up for pulmonary function test. discussed with patient.        1. Acute Hypoxic Respiratory Failure likely secondary to Community Acquired Pneumonia (suspected gram negative organism), leukocytosis    -CTA reviewed; negative for PE, but opacity noted, and atelactasis    incentive spirometer    - RVP _ve    blood culture negative    -continue ceftriaxone and azithromycin     oxygen on room air improved    -cough expectorant ordered    -short course of PO prednisone     -        2. Chest pressure/pain; likely pleuritic     -TNI negative x 2; EKG without acute ST-T changes    -given underlying CAD and NICM, TTE results noted, f.u with cardiology and outpatient stress test.    -cardiology evaluation noticed        3. HLD    -continue statin        4. Mood Disorder    -psych medications all verified and resumed         5. Chronic Back pain     -patient on hydromorphone 4 mg BID PRN (confirmed with [facility])    -outpatient follow up with Dr. Ratliff        6. ETOH Abuse    -CIWA 0 in the ED    -continue CIWA protocol    -thiamine/folic acid/MVI        7. RA    -not currently on management    -outpatient follow up recommended        8. Neuropathy    -continue gabapentin        9. HTN    continue bb and acei                Vital Signs Last 24 Hrs    T(C): 36.7 (06 May 2019 07:54), Max: 37.3 (05 May 2019 16:05)    T(F): 98.1 (06 May 2019 07:54), Max: 99.2 (05 May 2019 16:05)    HR: 71 (06 May 2019 08:24) (63 - 86)    BP: 99/67 (06 May 2019 07:54) (99/67 - 124/71)    BP(mean): --    RR: 18 (06 May 2019 07:54) (18 - 18)    SpO2: 96% (06 May 2019 08:24) (95% - 98%)        PHYSICAL EXAM:        GENERAL: NAD, well-groomed, on room air    HEENT: PERRL, +EOMI    NECK: soft, Supple, No JVD,     CHEST/LUNG: decrease breath sounds at bases    HEART: S1S2+, Regular rate and rhythm; No murmurs, rubs, or gallops    ABDOMEN: Soft, Nontender, Nondistended; Bowel sounds present    EXTREMITIES:  2+ Peripheral Pulses, No clubbing, cyanosis, or edema    SKIN: No rashes or lesions    NEURO: AAOX3, no focal deficits, no motor r sensory loss    PSYCH: normal mood        35 min spent in discharging the patient.

## 2019-05-07 NOTE — DISCHARGE NOTE PROVIDER - NSDCCPCAREPLAN_GEN_ALL_CORE_FT
PRINCIPAL DISCHARGE DIAGNOSIS  Diagnosis: SOB (shortness of breath)  Assessment and Plan of Treatment: improved, you have pneumonia, needs oral antibiotics and oral prednisone. f/u with pulmnary as an outpatient  cardiology recommended stress test for you as an outpatient. pelase call to make an appointment      SECONDARY DISCHARGE DIAGNOSES  Diagnosis: Hypoxia  Assessment and Plan of Treatment: improved

## 2019-05-07 NOTE — DISCHARGE NOTE PROVIDER - CARE PROVIDERS DIRECT ADDRESSES
[email or web address]

## 2019-05-07 NOTE — DISCHARGE NOTE PROVIDER - CARE PROVIDER_API CALL
Alvin Owens)  Cardiovascular Disease  [address] [place] [address]  Phone: [phone]  Fax: [phone]  Follow Up Time:     Oscar Hawkins ()  Critical Care Medicine; Internal Medicine; Pulmonary Disease  [address] [facility], [address]  [place] [address]  Phone: [phone]  Fax: [phone]  Follow Up Time:

## 2019-05-08 ENCOUNTER — INBOUND DOCUMENT (OUTPATIENT)
Age: 69
End: 2019-05-08

## 2019-05-08 PROBLEM — I42.8 OTHER CARDIOMYOPATHIES: Chronic | Status: ACTIVE | Noted: 2019-05-06

## 2019-05-08 PROBLEM — L03.90 CELLULITIS, UNSPECIFIED: Chronic | Status: ACTIVE | Noted: 2019-05-06

## 2019-05-08 PROBLEM — J44.9 CHRONIC OBSTRUCTIVE PULMONARY DISEASE, UNSPECIFIED: Chronic | Status: ACTIVE | Noted: 2019-05-06

## 2019-05-08 PROBLEM — M54.9 DORSALGIA, UNSPECIFIED: Chronic | Status: ACTIVE | Noted: 2019-05-06

## 2019-05-08 PROBLEM — E78.5 HYPERLIPIDEMIA, UNSPECIFIED: Chronic | Status: ACTIVE | Noted: 2019-05-06

## 2019-05-08 PROBLEM — M06.9 RHEUMATOID ARTHRITIS, UNSPECIFIED: Chronic | Status: ACTIVE | Noted: 2019-05-06

## 2019-05-08 PROBLEM — I10 ESSENTIAL (PRIMARY) HYPERTENSION: Chronic | Status: ACTIVE | Noted: 2019-05-06

## 2019-05-09 ENCOUNTER — APPOINTMENT (OUTPATIENT)
Dept: PULMONOLOGY | Facility: CLINIC | Age: 69
End: 2019-05-09
Payer: MEDICARE

## 2019-05-09 VITALS
SYSTOLIC BLOOD PRESSURE: 130 MMHG | DIASTOLIC BLOOD PRESSURE: 92 MMHG | WEIGHT: 157 LBS | BODY MASS INDEX: 28.89 KG/M2 | OXYGEN SATURATION: 96 % | HEART RATE: 90 BPM | HEIGHT: 62 IN

## 2019-05-09 DIAGNOSIS — Z78.9 OTHER SPECIFIED HEALTH STATUS: ICD-10-CM

## 2019-05-09 DIAGNOSIS — Z87.39 PERSONAL HISTORY OF OTHER DISEASES OF THE MUSCULOSKELETAL SYSTEM AND CONNECTIVE TISSUE: ICD-10-CM

## 2019-05-09 DIAGNOSIS — Z80.6 FAMILY HISTORY OF LEUKEMIA: ICD-10-CM

## 2019-05-09 DIAGNOSIS — Z87.19 PERSONAL HISTORY OF OTHER DISEASES OF THE DIGESTIVE SYSTEM: ICD-10-CM

## 2019-05-09 DIAGNOSIS — J18.9 PNEUMONIA, UNSPECIFIED ORGANISM: ICD-10-CM

## 2019-05-09 DIAGNOSIS — Z82.49 FAMILY HISTORY OF ISCHEMIC HEART DISEASE AND OTHER DISEASES OF THE CIRCULATORY SYSTEM: ICD-10-CM

## 2019-05-09 DIAGNOSIS — Z82.3 FAMILY HISTORY OF STROKE: ICD-10-CM

## 2019-05-09 LAB
CULTURE RESULTS: SIGNIFICANT CHANGE UP
CULTURE RESULTS: SIGNIFICANT CHANGE UP
SPECIMEN SOURCE: SIGNIFICANT CHANGE UP
SPECIMEN SOURCE: SIGNIFICANT CHANGE UP

## 2019-05-09 PROCEDURE — 94060 EVALUATION OF WHEEZING: CPT

## 2019-05-09 PROCEDURE — 99496 TRANSJ CARE MGMT HIGH F2F 7D: CPT | Mod: 25

## 2019-05-09 PROCEDURE — 94727 GAS DIL/WSHOT DETER LNG VOL: CPT

## 2019-05-09 PROCEDURE — 85018 HEMOGLOBIN: CPT | Mod: QW

## 2019-05-09 PROCEDURE — 94664 DEMO&/EVAL PT USE INHALER: CPT | Mod: 59

## 2019-05-09 PROCEDURE — 94729 DIFFUSING CAPACITY: CPT

## 2019-05-09 RX ORDER — SOLIFENACIN SUCCINATE 10 MG/1
TABLET, FILM COATED ORAL
Refills: 0 | Status: DISCONTINUED | COMMUNITY
End: 2019-05-09

## 2019-05-09 RX ORDER — ESTROGENS, CONJUGATED 0.45 MG/1
TABLET, FILM COATED ORAL
Refills: 0 | Status: DISCONTINUED | COMMUNITY
End: 2019-05-09

## 2019-05-09 RX ORDER — ALPRAZOLAM 0.5 MG/1
0.5 TABLET ORAL
Qty: 60 | Refills: 0 | Status: DISCONTINUED | COMMUNITY
Start: 2017-11-14 | End: 2019-05-09

## 2019-05-09 RX ORDER — METHYLPREDNISOLONE 4 MG/1
4 TABLET ORAL
Refills: 0 | Status: DISCONTINUED | COMMUNITY
End: 2019-05-09

## 2019-05-09 NOTE — PROCEDURE
[FreeTextEntry1] : EXAM: CT ANGIO CHEST (W)AW IC \par \par PROCEDURE DATE: 05/05/2019 \par \par \par \par INTERPRETATION: CT ANGIOGRAM CHEST WITH CONTRAST \par \par HISTORY: SOB and hypoxia. \par \par COMPARISON: No prior chest CT. \par \par TECHNIQUE: CT pulmonary angiogram of the chest was performed after \par intravenous contrast. Sagittal, coronal and 3-D/MIP reformations were made. \par \par FINDINGS: \par There is no luminal filling defect in the pulmonary arteries to the \par segmental level to suggest thromboembolic disease. 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 \par \par No enlarged axillary, mediastinal or hilar adenopathy. \par \par Heart size is within normal limits. No pericardial effusion. \par \par There is a bandlike opacity in the left upper lobe probably represents \par subsegmental atelectasis. A nodular opacity in the right upper lobe \par measuring approximately 1.2 cm nonspecific, may also represent subsegmental \par atelectasis versus infection/inflammation. There is also subsegmental \par atelectasis of the right middle lobe and both lower lobes. No pneumothorax \par or pleural effusion. Trachea and central airways are patent. \par \par Limited arterial phase images of the visualized upper abdomen are grossly \par unremarkable. \par \par Degenerative changes in the spine. \par \par IMPRESSION: \par No CT evidence of pulmonary embolism. \par \par Bilateral subsegmental atelectasis. Small nodular opacity right upper lobe \par measuring 1.2 cm is nonspecific, may also represent subsegmental atelectasis \par versus infection/inflammation. Recommend clinical correlation and follow-up. \par \par \par \par CHARISSA BUTLER MD., ATTENDING RADIOLOGIST \par This document has been electronically signed. May 5 2019 4:38AM \par Chest CAT scan reviewed on PACS with the patient.\par \par Pulmonary function test demonstrates a moderate degree of restriction and airway obstruction without postbronchodilator response. Diffusion capacity is normal

## 2019-05-09 NOTE — PHYSICAL EXAM
[General Appearance - Well Developed] : well developed [Normal Appearance] : normal appearance [General Appearance - In No Acute Distress] : no acute distress [No Deformities] : no deformities [Well Groomed] : well groomed [General Appearance - Well Nourished] : well nourished [Eyelids - No Xanthelasma] : the eyelids demonstrated no xanthelasmas [Normal Conjunctiva] : the conjunctiva exhibited no abnormalities [Neck Cervical Mass (___cm)] : no neck mass was observed [Normal Oropharynx] : normal oropharynx [Neck Appearance] : the appearance of the neck was normal [Thyroid Nodule] : there were no palpable thyroid nodules [Thyroid Diffuse Enlargement] : the thyroid was not enlarged [Jugular Venous Distention Increased] : there was no jugular-venous distention [Heart Rate And Rhythm] : heart rate and rhythm were normal [Heart Sounds] : normal S1 and S2 [Murmurs] : no murmurs present [Respiration, Rhythm And Depth] : normal respiratory rhythm and effort [Exaggerated Use Of Accessory Muscles For Inspiration] : no accessory muscle use [Auscultation Breath Sounds / Voice Sounds] : lungs were clear to auscultation bilaterally [Abdomen Soft] : soft [Abdomen Mass (___ Cm)] : no abdominal mass palpated [Abdomen Tenderness] : non-tender [Gait - Sufficient For Exercise Testing] : the gait was sufficient for exercise testing [Abnormal Walk] : normal gait [Nail Clubbing] : no clubbing of the fingernails [Cyanosis, Localized] : no localized cyanosis [Petechial Hemorrhages (___cm)] : no petechial hemorrhages [Skin Color & Pigmentation] : normal skin color and pigmentation [Deep Tendon Reflexes (DTR)] : deep tendon reflexes were 2+ and symmetric [] : no rash [Skin Turgor] : normal skin turgor [Sensation] : the sensory exam was normal to light touch and pinprick [No Focal Deficits] : no focal deficits [FreeTextEntry1] : scars

## 2019-05-09 NOTE — DISCUSSION/SUMMARY
[FreeTextEntry1] : 60-year-old female, seen today status post hospitalization. Patient is to have bilateral pneumonia, complicated by moderate persistent asthma. Patient requires long-acting bronchodilators and inhaled corticosteroids. Has been provided a prescription for the same as well as nebulizer. A followup CAT scan was performed in 12 weeks.

## 2019-05-09 NOTE — HISTORY OF PRESENT ILLNESS
[FreeTextEntry1] : 68-year-old female with a history of a nonischemic myopathy, rheumatoid arthritis without treatment seen today status post discharge from [facility] on 5/6/19 for community-acquired pneumonia. Patient states that she developed symptoms of increased cough with wheezing and yellow sputum approximately one week prior to admission and on admission was found to have fever with infiltrates as described below. She is treated with a course of antibiotics, as well as steroids and nebulization with complete resolution of symptoms.\par \par Patient does not have a rescue inhaler or is maintained on long acting bronchodilators. A diagnosis of asthma. Has been present for many years and is usually treated on a p.r.n. basis by your office. She has no complaints of chronic dyspnea on exertion, cough, or wheeze. She has suffered from intermittent sinusitis, as well as reflux, which has mostly resolved since her fundoplication. There is no history of chest pains, palpitations, leg edema, paroxysmal nocturnal dyspnea, or orthopnea

## 2019-05-09 NOTE — CONSULT LETTER
[Dear  ___] : Dear  [unfilled], [Please see my note below.] : Please see my note below. [Consult Letter:] : I had the pleasure of evaluating your patient, [unfilled]. [Sincerely,] : Sincerely, [FreeTextEntry3] : Maicol Pederson MD FCCP\par Pulmonary/Critical Care/Sleep Medicine\par Department of Internal Medicine\par \par [facility] [employer]\par

## 2019-05-13 ENCOUNTER — EMERGENCY (EMERGENCY)
Facility: HOSPITAL | Age: 69
LOS: 1 days | Discharge: HOME | End: 2019-05-13
Attending: EMERGENCY MEDICINE
Payer: MEDICARE

## 2019-05-13 VITALS
TEMPERATURE: 97 F | OXYGEN SATURATION: 97 % | SYSTOLIC BLOOD PRESSURE: 112 MMHG | DIASTOLIC BLOOD PRESSURE: 70 MMHG | RESPIRATION RATE: 20 BRPM | HEART RATE: 88 BPM

## 2019-05-13 VITALS
RESPIRATION RATE: 18 BRPM | WEIGHT: 158.95 LBS | OXYGEN SATURATION: 98 % | HEART RATE: 113 BPM | TEMPERATURE: 99 F | DIASTOLIC BLOOD PRESSURE: 75 MMHG | HEIGHT: 62 IN | SYSTOLIC BLOOD PRESSURE: 114 MMHG

## 2019-05-13 DIAGNOSIS — Z90.710 ACQUIRED ABSENCE OF BOTH CERVIX AND UTERUS: Chronic | ICD-10-CM

## 2019-05-13 DIAGNOSIS — Z98.891 HISTORY OF UTERINE SCAR FROM PREVIOUS SURGERY: Chronic | ICD-10-CM

## 2019-05-13 LAB
ALBUMIN SERPL ELPH-MCNC: 4 G/DL — SIGNIFICANT CHANGE UP (ref 3.3–5.2)
ALP SERPL-CCNC: 81 U/L — SIGNIFICANT CHANGE UP (ref 40–120)
ALT FLD-CCNC: 25 U/L — SIGNIFICANT CHANGE UP
ANION GAP SERPL CALC-SCNC: 13 MMOL/L — SIGNIFICANT CHANGE UP (ref 5–17)
APTT BLD: 29.3 SEC — SIGNIFICANT CHANGE UP (ref 27.5–36.3)
AST SERPL-CCNC: 16 U/L — SIGNIFICANT CHANGE UP
BILIRUB SERPL-MCNC: 0.4 MG/DL — SIGNIFICANT CHANGE UP (ref 0.4–2)
BUN SERPL-MCNC: 20 MG/DL — SIGNIFICANT CHANGE UP (ref 8–20)
CALCIUM SERPL-MCNC: 9.7 MG/DL — SIGNIFICANT CHANGE UP (ref 8.6–10.2)
CHLORIDE SERPL-SCNC: 99 MMOL/L — SIGNIFICANT CHANGE UP (ref 98–107)
CK SERPL-CCNC: 42 U/L — SIGNIFICANT CHANGE UP (ref 25–170)
CO2 SERPL-SCNC: 27 MMOL/L — SIGNIFICANT CHANGE UP (ref 22–29)
CREAT SERPL-MCNC: 0.85 MG/DL — SIGNIFICANT CHANGE UP (ref 0.5–1.3)
EOSINOPHIL # BLD AUTO: 0.2 K/UL — SIGNIFICANT CHANGE UP (ref 0–0.5)
GLUCOSE SERPL-MCNC: 75 MG/DL — SIGNIFICANT CHANGE UP (ref 70–115)
HCT VFR BLD CALC: 43.7 % — SIGNIFICANT CHANGE UP (ref 37–47)
HGB BLD-MCNC: 14.7 G/DL — SIGNIFICANT CHANGE UP (ref 12–16)
INR BLD: 0.95 RATIO — SIGNIFICANT CHANGE UP (ref 0.88–1.16)
LACTATE BLDV-MCNC: 1.3 MMOL/L — SIGNIFICANT CHANGE UP (ref 0.5–2)
LYMPHOCYTES # BLD AUTO: 1.6 K/UL — SIGNIFICANT CHANGE UP (ref 1–4.8)
LYMPHOCYTES # BLD AUTO: 15 % — LOW (ref 20–55)
MCHC RBC-ENTMCNC: 30 PG — SIGNIFICANT CHANGE UP (ref 27–31)
MCHC RBC-ENTMCNC: 33.6 G/DL — SIGNIFICANT CHANGE UP (ref 32–36)
MCV RBC AUTO: 89.2 FL — SIGNIFICANT CHANGE UP (ref 81–99)
MONOCYTES # BLD AUTO: 1.8 K/UL — HIGH (ref 0–0.8)
MONOCYTES NFR BLD AUTO: 14 % — HIGH (ref 3–10)
NEUTROPHILS # BLD AUTO: 6.8 K/UL — SIGNIFICANT CHANGE UP (ref 1.8–8)
NEUTROPHILS NFR BLD AUTO: 71 % — SIGNIFICANT CHANGE UP (ref 37–73)
PLAT MORPH BLD: NORMAL — SIGNIFICANT CHANGE UP
PLATELET # BLD AUTO: 319 K/UL — SIGNIFICANT CHANGE UP (ref 150–400)
POTASSIUM SERPL-MCNC: 4.1 MMOL/L — SIGNIFICANT CHANGE UP (ref 3.5–5.3)
POTASSIUM SERPL-SCNC: 4.1 MMOL/L — SIGNIFICANT CHANGE UP (ref 3.5–5.3)
PROT SERPL-MCNC: 7.5 G/DL — SIGNIFICANT CHANGE UP (ref 6.6–8.7)
PROTHROM AB SERPL-ACNC: 10.9 SEC — SIGNIFICANT CHANGE UP (ref 10–12.9)
RBC # BLD: 4.9 M/UL — SIGNIFICANT CHANGE UP (ref 4.4–5.2)
RBC # FLD: 13.3 % — SIGNIFICANT CHANGE UP (ref 11–15.6)
RBC BLD AUTO: NORMAL — SIGNIFICANT CHANGE UP
SODIUM SERPL-SCNC: 139 MMOL/L — SIGNIFICANT CHANGE UP (ref 135–145)
TROPONIN T SERPL-MCNC: <0.01 NG/ML — SIGNIFICANT CHANGE UP (ref 0–0.06)
TROPONIN T SERPL-MCNC: <0.01 NG/ML — SIGNIFICANT CHANGE UP (ref 0–0.06)
WBC # BLD: 10.5 K/UL — SIGNIFICANT CHANGE UP (ref 4.8–10.8)
WBC # FLD AUTO: 10.5 K/UL — SIGNIFICANT CHANGE UP (ref 4.8–10.8)

## 2019-05-13 PROCEDURE — 85730 THROMBOPLASTIN TIME PARTIAL: CPT

## 2019-05-13 PROCEDURE — 84484 ASSAY OF TROPONIN QUANT: CPT

## 2019-05-13 PROCEDURE — 96361 HYDRATE IV INFUSION ADD-ON: CPT

## 2019-05-13 PROCEDURE — 82550 ASSAY OF CK (CPK): CPT

## 2019-05-13 PROCEDURE — 71275 CT ANGIOGRAPHY CHEST: CPT

## 2019-05-13 PROCEDURE — 99284 EMERGENCY DEPT VISIT MOD MDM: CPT | Mod: 25

## 2019-05-13 PROCEDURE — 85610 PROTHROMBIN TIME: CPT

## 2019-05-13 PROCEDURE — 36415 COLL VENOUS BLD VENIPUNCTURE: CPT

## 2019-05-13 PROCEDURE — 71275 CT ANGIOGRAPHY CHEST: CPT | Mod: 26

## 2019-05-13 PROCEDURE — 99222 1ST HOSP IP/OBS MODERATE 55: CPT

## 2019-05-13 PROCEDURE — 80053 COMPREHEN METABOLIC PANEL: CPT

## 2019-05-13 PROCEDURE — 96374 THER/PROPH/DIAG INJ IV PUSH: CPT | Mod: XU

## 2019-05-13 PROCEDURE — 93005 ELECTROCARDIOGRAM TRACING: CPT

## 2019-05-13 PROCEDURE — 99285 EMERGENCY DEPT VISIT HI MDM: CPT

## 2019-05-13 PROCEDURE — 71046 X-RAY EXAM CHEST 2 VIEWS: CPT

## 2019-05-13 PROCEDURE — 93010 ELECTROCARDIOGRAM REPORT: CPT

## 2019-05-13 PROCEDURE — 85027 COMPLETE CBC AUTOMATED: CPT

## 2019-05-13 PROCEDURE — 71046 X-RAY EXAM CHEST 2 VIEWS: CPT | Mod: 26

## 2019-05-13 PROCEDURE — 83605 ASSAY OF LACTIC ACID: CPT

## 2019-05-13 RX ORDER — KETOROLAC TROMETHAMINE 30 MG/ML
15 SYRINGE (ML) INJECTION ONCE
Refills: 0 | Status: DISCONTINUED | OUTPATIENT
Start: 2019-05-13 | End: 2019-05-13

## 2019-05-13 RX ORDER — SODIUM CHLORIDE 9 MG/ML
500 INJECTION INTRAMUSCULAR; INTRAVENOUS; SUBCUTANEOUS ONCE
Refills: 0 | Status: COMPLETED | OUTPATIENT
Start: 2019-05-13 | End: 2019-05-13

## 2019-05-13 RX ORDER — IBUPROFEN 200 MG
1 TABLET ORAL
Qty: 21 | Refills: 0
Start: 2019-05-13 | End: 2019-05-19

## 2019-05-13 RX ADMIN — Medication 15 MILLIGRAM(S): at 16:36

## 2019-05-13 RX ADMIN — SODIUM CHLORIDE 500 MILLILITER(S): 9 INJECTION INTRAMUSCULAR; INTRAVENOUS; SUBCUTANEOUS at 14:52

## 2019-05-13 RX ADMIN — SODIUM CHLORIDE 500 MILLILITER(S): 9 INJECTION INTRAMUSCULAR; INTRAVENOUS; SUBCUTANEOUS at 13:52

## 2019-05-13 NOTE — CONSULT NOTE ADULT - SUBJECTIVE AND OBJECTIVE BOX
TULIO SUGGS  [id number]      HPI:  67 y/o female PMHx NICM (stress induced) with improvement in EF, nonobstructive CAD, HTN, HLD p/w CP.  Patient here last week with CP a/w SOB and cough and treated for PNA.  Pain persisted, improving briefly on Saturday, but again worsened yesterday and awoke from sleep at 4am this am.  Pain is now persistent, worse with inspiration, cough and lying flat, improved with sitting up.  SOB and cough remain improved.  Denies palps, syncope.      ALLERGIES:  No Known Allergies      PAST MEDICAL & SURGICAL HISTORY:  Chronic obstructive asthma  Chronic back pain  Rheumatoid arthritis  S/P hysterectomy  S/P   Otherwise, as noted above      MEDICATIONS (Hospital):  ketorolac   Injectable 15 milliGRAM(s) IV Push Once      SOCIAL HISTORY:  Patient denies alcohol, tobacco, drug use    FAMILY HISTORY:  FH: CAD (coronary artery disease)  Family history of diabetes mellitus (DM)      ROS:  Patient denies cough, and other than noted above full ROS is unremarkable      PHYSICAL EXAM:  Vital Signs Last 24 Hrs  T(C): 37.4 (13 May 2019 11:17), Max: 37.4 (13 May 2019 11:17)  T(F): 99.4 (13 May 2019 11:17), Max: 99.4 (13 May 2019 11:17)  HR: 113 (13 May 2019 11:17) (113 - 113)  BP: 114/75 (13 May 2019 11:17) (114/75 - 114/75)  BP(mean): --  RR: 18 (13 May 2019 11:17) (18 - 18)  SpO2: 98% (13 May 2019 11:17) (98% - 98%)  General: Patient comfortable in NAD  HEENT: NCAT, mmm, EOMI  Neck: no JVD, no carotid bruits  CVS: nl s1, split s2, no s3, no s4, no murmur, ?rub, RRR  Chest: CTA b/l  Abdomen: soft, nt/nd  Extremities: No c/c/e  Neuro: A&O x3  Psych: Normal affect      ECG:  SR with LAFB.   No significant ST-T changes.  PRWP.    LABS:                        14.7   10.5  )-----------( 319      ( 13 May 2019 13:39 )             43.7     05-13    139  |  99  |  20.0  ----------------------------<  75  4.1   |  27.0  |  0.85    Ca    9.7      13 May 2019 13:39    TPro  7.5  /  Alb  4.0  /  TBili  0.4  /  DBili  x   /  AST  16  /  ALT  25  /  AlkPhos  81  05-13    CARDIAC MARKERS ( 13 May 2019 13:39 )  x     / <0.01 ng/mL / 42 U/L / x     / x          PT/INR - ( 13 May 2019 13:39 )   PT: 10.9 sec;   INR: 0.95 ratio         PTT - ( 13 May 2019 13:39 )  PTT:29.3 sec      RADIOLOGY:  CXR:  No acute radiographic cardiopulmonary pathology.        Assessment:  67 y/o female PMHx NICM (stress induced) with improvement in EF, nonobstructive CAD, HTN, HLD p/w CP.  Similar CP last week a/w ?PNA, but CP never really improved.  Symptoms most c/w pericarditis/pleuritis.  Would treat as such.  Highly doubt ACS, EKG nonischemic, trop negative, symptoms very atypical.  Echo and CTPA last week for similar pain unremarkable, CXR clear.      Plan:  1. Check 2nd trop.  2. If trop negative can d/c home.  3. Tordaol given, continue with Ibuprofen 600mg TID for 2 weeks along with PPI.  4. Continue other current CV meds at current doses from home.    D/w Dr. Garcia.  Thanks!

## 2019-05-13 NOTE — ED ADULT NURSE NOTE - NSIMPLEMENTINTERV_GEN_ALL_ED
Implemented All Universal Safety Interventions:  [place] to call system. Call bell, personal items and telephone within reach. Instruct patient to call for assistance. Room bathroom lighting operational. Non-slip footwear when patient is off stretcher. Physically safe environment: no spills, clutter or unnecessary equipment. Stretcher in lowest position, wheels locked, appropriate side rails in place.

## 2019-05-13 NOTE — ED STATDOCS - PMH
Cellulitis    Chronic back pain    Chronic obstructive asthma    HLD (hyperlipidemia)    HTN (hypertension)    NICM (nonischemic cardiomyopathy)    Rheumatoid arthritis

## 2019-05-13 NOTE — ED STATDOCS - PROGRESS NOTE DETAILS
Sukhi Gray for Dr Nicky Clay : Pt is a 67 y/o F, with PMHx, of HLD, HTN, RA, presenting to the ED with c/o chest pain. Pt discharged from [facility] 6 days ago after 5 day hospital stay for PNA. States she was feeling fine post discharge but developed chest pain last night that has been constant since. Pain is worse with deep breath. Reports her cough has improved. Denies fever and SOB. Sent to [facility] for further evaluation.   Pt with pleuritic chest pain. ACS vs PE.

## 2019-05-13 NOTE — ED ADULT TRIAGE NOTE - CHIEF COMPLAINT QUOTE
Patient arrived to ED today with c/o chest pains since yesterday.  Patient had recent pneumonia admit.

## 2019-05-13 NOTE — ED ADULT NURSE NOTE - CADM POA PRESS ULCER
Called patient regarding FMLA form  Please call office.  
Patient is calling to follow up on her FMLA, pls. Call back [phone]  
No

## 2019-05-13 NOTE — ED PROVIDER NOTE - OBJECTIVE STATEMENT
The patient is a 68 year old female presents with chest pain pleuritic and SOB and [place] and also positional.  Dr Ervin the Cardiology

## 2019-05-13 NOTE — ED ADULT NURSE NOTE - OBJECTIVE STATEMENT
generalized sharp cp with mild sob, constant since 5/12 in the am. pt reports worse with laying down. reports was admitted last week for pna. pt has no other complaints at this time. a and o x3. breathing even and unlabored. nsr on cm. will continue to monitor.

## 2019-05-13 NOTE — ED PROVIDER NOTE - CLINICAL SUMMARY MEDICAL DECISION MAKING FREE TEXT BOX
The patient presents with pleuritic chest pain and CT angio negative for PE and two trop negative and seen by Cardiology and recommend DC home.

## 2019-05-13 NOTE — ED PROVIDER NOTE - PROGRESS NOTE DETAILS
The patient feels better and two trop negative and CT angio negative for PE and Cardiology seen patient and recommend dc home and follow up with him

## 2019-05-16 NOTE — CDI QUERY NOTE - NSCDIOTHERTXTBX_GEN_ALL_CORE_HH
Clinical documentation indicates that this patient has Sepsis.  The Physician's or Provider's documentation of sepsis is clinically supported by the patient's presentation, evaluation and medical management, as indicated below.  There is a need to further clarify the status of sepsis.  Please indicate status of the patient's sepsis diagnosis in your progress notes and discharge summary as : Sepsis treatment continues, or Sepsis resolving, or Sepsis resolved, or Sepsis ruled-out.     SUPPORTING DOCUMENTATION AND/OR CLINICAL EVIDENCE:  code sepsis called in ED, met 3 SIRS criteria  code sepsis documented in hospitalist's note 5/6, H&P and chest x-ray clinical indicator  patient treated for pna    Vital Signs on Admission:  T 97.4 - 103.2  HR 70 - 123  BP 99/67 - 156/72  RR 18 - 22  O2 sat 92 - 98%      WBC: WBC Count: 15.6                              Bands:  15.1 on 5/6         Procalcitonin: 0.15 on 5/6        Blood Cultures:  Culture - Blood  Source: .Blood  Final Report (05-09-19):    No growth at 5 days.    Culture - Blood  Source: .Blood  Final Report (05-09-19):    No growth at 5 days.        Antibiotics:  Zithromax 500 mg q24h  ceftriaxone 1 gm q24h  discharged on:  azithromycin 250 mg qd                          cefpodoxime 100 mg BID          Please clarify, in addendum to dc summary, the status of the sepsis.     STATUS:  Sepsis on admission, resolved  Sepsis ruled out  other  not clinically significant        Thank you.

## 2019-05-21 ENCOUNTER — INBOUND DOCUMENT (OUTPATIENT)
Age: 69
End: 2019-05-21

## 2019-05-22 ENCOUNTER — INBOUND DOCUMENT (OUTPATIENT)
Age: 69
End: 2019-05-22

## 2019-06-05 ENCOUNTER — TRANSCRIPTION ENCOUNTER (OUTPATIENT)
Age: 69
End: 2019-06-05

## 2019-06-06 ENCOUNTER — OUTPATIENT (OUTPATIENT)
Dept: OUTPATIENT SERVICES | Facility: HOSPITAL | Age: 69
LOS: 1 days | End: 2019-06-06
Payer: MEDICARE

## 2019-06-06 DIAGNOSIS — Z90.710 ACQUIRED ABSENCE OF BOTH CERVIX AND UTERUS: Chronic | ICD-10-CM

## 2019-06-06 DIAGNOSIS — Z98.891 HISTORY OF UTERINE SCAR FROM PREVIOUS SURGERY: Chronic | ICD-10-CM

## 2019-06-06 DIAGNOSIS — M25.512 PAIN IN LEFT SHOULDER: ICD-10-CM

## 2019-07-01 ENCOUNTER — TRANSCRIPTION ENCOUNTER (OUTPATIENT)
Age: 69
End: 2019-07-01

## 2019-07-02 ENCOUNTER — OUTPATIENT (OUTPATIENT)
Dept: OUTPATIENT SERVICES | Facility: HOSPITAL | Age: 69
LOS: 1 days | End: 2019-07-02
Payer: MEDICARE

## 2019-07-02 DIAGNOSIS — Z98.891 HISTORY OF UTERINE SCAR FROM PREVIOUS SURGERY: Chronic | ICD-10-CM

## 2019-07-02 DIAGNOSIS — Z90.710 ACQUIRED ABSENCE OF BOTH CERVIX AND UTERUS: Chronic | ICD-10-CM

## 2019-07-02 DIAGNOSIS — M25.512 PAIN IN LEFT SHOULDER: ICD-10-CM

## 2019-07-02 PROCEDURE — 64642 CHEMODENERV 1 EXTREMITY 1-4: CPT

## 2019-07-02 PROCEDURE — 77003 FLUOROGUIDE FOR SPINE INJECT: CPT

## 2019-07-02 PROCEDURE — 62323 NJX INTERLAMINAR LMBR/SAC: CPT

## 2019-07-02 PROCEDURE — 76000 FLUOROSCOPY <1 HR PHYS/QHP: CPT

## 2019-07-18 ENCOUNTER — RX RENEWAL (OUTPATIENT)
Age: 69
End: 2019-07-18

## 2019-08-05 ENCOUNTER — FORM ENCOUNTER (OUTPATIENT)
Age: 69
End: 2019-08-05

## 2019-08-06 ENCOUNTER — OUTPATIENT (OUTPATIENT)
Dept: OUTPATIENT SERVICES | Facility: HOSPITAL | Age: 69
LOS: 1 days | End: 2019-08-06
Payer: MEDICARE

## 2019-08-06 ENCOUNTER — APPOINTMENT (OUTPATIENT)
Dept: CT IMAGING | Facility: CLINIC | Age: 69
End: 2019-08-06
Payer: MEDICARE

## 2019-08-06 DIAGNOSIS — Z98.891 HISTORY OF UTERINE SCAR FROM PREVIOUS SURGERY: Chronic | ICD-10-CM

## 2019-08-06 DIAGNOSIS — R91.8 OTHER NONSPECIFIC ABNORMAL FINDING OF LUNG FIELD: ICD-10-CM

## 2019-08-06 DIAGNOSIS — Z90.710 ACQUIRED ABSENCE OF BOTH CERVIX AND UTERUS: Chronic | ICD-10-CM

## 2019-08-06 PROCEDURE — 71250 CT THORAX DX C-: CPT

## 2019-08-06 PROCEDURE — 71250 CT THORAX DX C-: CPT | Mod: 26

## 2019-08-09 ENCOUNTER — APPOINTMENT (OUTPATIENT)
Dept: PULMONOLOGY | Facility: CLINIC | Age: 69
End: 2019-08-09
Payer: MEDICARE

## 2019-08-09 VITALS — OXYGEN SATURATION: 95 % | DIASTOLIC BLOOD PRESSURE: 80 MMHG | HEART RATE: 86 BPM | SYSTOLIC BLOOD PRESSURE: 124 MMHG

## 2019-08-09 VITALS — WEIGHT: 164 LBS | BODY MASS INDEX: 30 KG/M2

## 2019-08-09 PROCEDURE — 94010 BREATHING CAPACITY TEST: CPT

## 2019-08-09 PROCEDURE — 99215 OFFICE O/P EST HI 40 MIN: CPT | Mod: 25

## 2019-08-09 RX ORDER — FLUTICASONE FUROATE AND VILANTEROL TRIFENATATE 200; 25 UG/1; UG/1
200-25 POWDER RESPIRATORY (INHALATION) DAILY
Qty: 1 | Refills: 5 | Status: DISCONTINUED | COMMUNITY
Start: 2019-05-09 | End: 2019-08-09

## 2019-08-09 NOTE — HISTORY OF PRESENT ILLNESS
[Moderate Persistent] : moderate persistent [Doing Well] : doing well [Well Controlled] : Well controlled [None] : The patient is currently asymptomatic [PFTs] : pulmonary function tests [Adherent] : the patient is not adherent with ~his/her~ medication regimen [FreeTextEntry3] : not using Breo [FreeTextEntry1] : S/p CCT

## 2019-08-09 NOTE — DISCUSSION/SUMMARY
[Stable] : stable [Asthma] : asthma [Medication Changes Per Orders] : Medication changes are as documented in orders [Intermittent] : intermittent [FreeTextEntry1] : Patient's findings on CAT scan demonstrated resolution of previously seen pneumonia. Residual atelectasis. No evidence of malignancy

## 2019-08-09 NOTE — CONSULT LETTER
[Dear  ___] : Dear  [unfilled], [Consult Letter:] : I had the pleasure of evaluating your patient, [unfilled]. [Please see my note below.] : Please see my note below. [Sincerely,] : Sincerely, [FreeTextEntry3] : Maicol Pederson MD FCCP\par Pulmonary/Critical Care/Sleep Medicine\par Department of Internal Medicine\par \par [facility] [employer] [facility]\par

## 2019-09-02 ENCOUNTER — TRANSCRIPTION ENCOUNTER (OUTPATIENT)
Age: 69
End: 2019-09-02

## 2019-09-03 ENCOUNTER — OUTPATIENT (OUTPATIENT)
Dept: OUTPATIENT SERVICES | Facility: HOSPITAL | Age: 69
LOS: 1 days | End: 2019-09-03
Payer: MEDICARE

## 2019-09-03 DIAGNOSIS — M25.512 PAIN IN LEFT SHOULDER: ICD-10-CM

## 2019-09-03 DIAGNOSIS — M25.511 PAIN IN RIGHT SHOULDER: ICD-10-CM

## 2019-09-03 DIAGNOSIS — Z90.710 ACQUIRED ABSENCE OF BOTH CERVIX AND UTERUS: Chronic | ICD-10-CM

## 2019-09-03 DIAGNOSIS — Z98.891 HISTORY OF UTERINE SCAR FROM PREVIOUS SURGERY: Chronic | ICD-10-CM

## 2019-09-03 PROCEDURE — 77002 NEEDLE LOCALIZATION BY XRAY: CPT

## 2019-09-03 PROCEDURE — 20610 DRAIN/INJ JOINT/BURSA W/O US: CPT | Mod: 50

## 2019-10-09 ENCOUNTER — TRANSCRIPTION ENCOUNTER (OUTPATIENT)
Age: 69
End: 2019-10-09

## 2019-10-10 ENCOUNTER — OUTPATIENT (OUTPATIENT)
Dept: OUTPATIENT SERVICES | Facility: HOSPITAL | Age: 69
LOS: 1 days | End: 2019-10-10
Payer: MEDICARE

## 2019-10-10 DIAGNOSIS — Z90.710 ACQUIRED ABSENCE OF BOTH CERVIX AND UTERUS: Chronic | ICD-10-CM

## 2019-10-10 DIAGNOSIS — Z98.891 HISTORY OF UTERINE SCAR FROM PREVIOUS SURGERY: Chronic | ICD-10-CM

## 2019-10-10 DIAGNOSIS — M19.012 PRIMARY OSTEOARTHRITIS, LEFT SHOULDER: ICD-10-CM

## 2019-10-10 PROCEDURE — 20610 DRAIN/INJ JOINT/BURSA W/O US: CPT | Mod: LT

## 2019-10-10 PROCEDURE — 77002 NEEDLE LOCALIZATION BY XRAY: CPT

## 2019-10-28 ENCOUNTER — TRANSCRIPTION ENCOUNTER (OUTPATIENT)
Age: 69
End: 2019-10-28

## 2019-10-29 ENCOUNTER — OUTPATIENT (OUTPATIENT)
Dept: OUTPATIENT SERVICES | Facility: HOSPITAL | Age: 69
LOS: 1 days | End: 2019-10-29
Payer: MEDICARE

## 2019-10-29 DIAGNOSIS — M19.012 PRIMARY OSTEOARTHRITIS, LEFT SHOULDER: ICD-10-CM

## 2019-10-29 DIAGNOSIS — Z98.891 HISTORY OF UTERINE SCAR FROM PREVIOUS SURGERY: Chronic | ICD-10-CM

## 2019-10-29 DIAGNOSIS — Z90.710 ACQUIRED ABSENCE OF BOTH CERVIX AND UTERUS: Chronic | ICD-10-CM

## 2019-10-29 PROCEDURE — 20610 DRAIN/INJ JOINT/BURSA W/O US: CPT | Mod: LT

## 2019-10-29 PROCEDURE — 77002 NEEDLE LOCALIZATION BY XRAY: CPT

## 2019-11-04 ENCOUNTER — TRANSCRIPTION ENCOUNTER (OUTPATIENT)
Age: 69
End: 2019-11-04

## 2019-11-05 ENCOUNTER — OUTPATIENT (OUTPATIENT)
Dept: OUTPATIENT SERVICES | Facility: HOSPITAL | Age: 69
LOS: 1 days | End: 2019-11-05
Payer: MEDICARE

## 2019-11-05 DIAGNOSIS — Z98.891 HISTORY OF UTERINE SCAR FROM PREVIOUS SURGERY: Chronic | ICD-10-CM

## 2019-11-05 DIAGNOSIS — M19.012 PRIMARY OSTEOARTHRITIS, LEFT SHOULDER: ICD-10-CM

## 2019-11-05 DIAGNOSIS — Z90.710 ACQUIRED ABSENCE OF BOTH CERVIX AND UTERUS: Chronic | ICD-10-CM

## 2019-11-25 ENCOUNTER — TRANSCRIPTION ENCOUNTER (OUTPATIENT)
Age: 69
End: 2019-11-25

## 2019-11-26 ENCOUNTER — OUTPATIENT (OUTPATIENT)
Dept: OUTPATIENT SERVICES | Facility: HOSPITAL | Age: 69
LOS: 1 days | End: 2019-11-26
Payer: MEDICARE

## 2019-11-26 DIAGNOSIS — Z98.891 HISTORY OF UTERINE SCAR FROM PREVIOUS SURGERY: Chronic | ICD-10-CM

## 2019-11-26 DIAGNOSIS — Z90.710 ACQUIRED ABSENCE OF BOTH CERVIX AND UTERUS: Chronic | ICD-10-CM

## 2019-11-26 DIAGNOSIS — M46.1 SACROILIITIS, NOT ELSEWHERE CLASSIFIED: ICD-10-CM

## 2019-11-26 PROCEDURE — G0260: CPT | Mod: 50

## 2019-11-26 PROCEDURE — 20610 DRAIN/INJ JOINT/BURSA W/O US: CPT | Mod: LT

## 2019-11-26 PROCEDURE — 77002 NEEDLE LOCALIZATION BY XRAY: CPT

## 2019-11-26 PROCEDURE — 76000 FLUOROSCOPY <1 HR PHYS/QHP: CPT

## 2020-01-20 ENCOUNTER — TRANSCRIPTION ENCOUNTER (OUTPATIENT)
Age: 70
End: 2020-01-20

## 2020-01-21 ENCOUNTER — OUTPATIENT (OUTPATIENT)
Dept: OUTPATIENT SERVICES | Facility: HOSPITAL | Age: 70
LOS: 1 days | End: 2020-01-21
Payer: MEDICARE

## 2020-01-21 DIAGNOSIS — Z98.891 HISTORY OF UTERINE SCAR FROM PREVIOUS SURGERY: Chronic | ICD-10-CM

## 2020-01-21 DIAGNOSIS — M19.019 PRIMARY OSTEOARTHRITIS, UNSPECIFIED SHOULDER: ICD-10-CM

## 2020-01-21 DIAGNOSIS — Z90.710 ACQUIRED ABSENCE OF BOTH CERVIX AND UTERUS: Chronic | ICD-10-CM

## 2020-01-21 PROCEDURE — 77002 NEEDLE LOCALIZATION BY XRAY: CPT

## 2020-01-21 PROCEDURE — 20610 DRAIN/INJ JOINT/BURSA W/O US: CPT | Mod: LT

## 2020-02-12 ENCOUNTER — APPOINTMENT (OUTPATIENT)
Dept: PULMONOLOGY | Facility: CLINIC | Age: 70
End: 2020-02-12
Payer: MEDICARE

## 2020-02-12 VITALS
BODY MASS INDEX: 29.79 KG/M2 | HEIGHT: 62.5 IN | SYSTOLIC BLOOD PRESSURE: 120 MMHG | DIASTOLIC BLOOD PRESSURE: 80 MMHG | WEIGHT: 166 LBS | HEART RATE: 78 BPM | OXYGEN SATURATION: 94 %

## 2020-02-12 PROCEDURE — 99214 OFFICE O/P EST MOD 30 MIN: CPT | Mod: 25

## 2020-02-12 PROCEDURE — 94010 BREATHING CAPACITY TEST: CPT

## 2020-02-12 RX ORDER — ENALAPRIL MALEATE 5 MG/1
5 TABLET ORAL
Qty: 180 | Refills: 1 | Status: DISCONTINUED | COMMUNITY
Start: 2018-04-16 | End: 2020-02-12

## 2020-02-12 RX ORDER — ATORVASTATIN CALCIUM 40 MG/1
40 TABLET, FILM COATED ORAL
Qty: 90 | Refills: 1 | Status: DISCONTINUED | COMMUNITY
Start: 2018-10-19 | End: 2020-02-12

## 2020-02-12 RX ORDER — CARVEDILOL 6.25 MG/1
6.25 TABLET, FILM COATED ORAL
Qty: 180 | Refills: 0 | Status: DISCONTINUED | COMMUNITY
End: 2020-02-12

## 2020-02-12 RX ORDER — MELOXICAM 15 MG/1
15 TABLET ORAL
Qty: 14 | Refills: 0 | Status: DISCONTINUED | COMMUNITY
Start: 2019-01-09 | End: 2020-02-12

## 2020-02-12 NOTE — HISTORY OF PRESENT ILLNESS
[Moderate Persistent] : moderate persistent [Doing Well] : doing well [Well Controlled] : Well controlled [None] : The patient is currently asymptomatic [PFTs] : pulmonary function tests [More Frequent Use Needed Recently] : normal [Adherent] : the patient is not adherent with ~his/her~ medication regimen [de-identified] : sinusitis [FreeTextEntry3] : not using Breo [de-identified] : coughing episode 2 wks ago that resolved [de-identified] : bronchitis x 1

## 2020-02-12 NOTE — DISCUSSION/SUMMARY
[Asthma] : asthma [Medication Changes Per Orders] : Medication changes are as documented in orders [Decompensated] : decompensated [Mild Persistent] : mild persistent

## 2020-02-12 NOTE — CONSULT LETTER
[Dear  ___] : Dear  [unfilled], [Please see my note below.] : Please see my note below. [Consult Letter:] : I had the pleasure of evaluating your patient, [unfilled]. [Sincerely,] : Sincerely, [FreeTextEntry3] : Maicol Pederson MD FCCP\par Pulmonary/Critical Care/Sleep Medicine\par Department of Internal Medicine\par \par [facility] [employer]\par

## 2020-02-12 NOTE — PHYSICAL EXAM
[General Appearance - Well Developed] : well developed [Normal Appearance] : normal appearance [General Appearance - Well Nourished] : well nourished [Well Groomed] : well groomed [No Deformities] : no deformities [Eyelids - No Xanthelasma] : the eyelids demonstrated no xanthelasmas [General Appearance - In No Acute Distress] : no acute distress [Normal Conjunctiva] : the conjunctiva exhibited no abnormalities [Neck Cervical Mass (___cm)] : no neck mass was observed [Normal Oropharynx] : normal oropharynx [Neck Appearance] : the appearance of the neck was normal [Thyroid Nodule] : there were no palpable thyroid nodules [Jugular Venous Distention Increased] : there was no jugular-venous distention [Thyroid Diffuse Enlargement] : the thyroid was not enlarged [Heart Rate And Rhythm] : heart rate and rhythm were normal [Murmurs] : no murmurs present [Heart Sounds] : normal S1 and S2 [Respiration, Rhythm And Depth] : normal respiratory rhythm and effort [Exaggerated Use Of Accessory Muscles For Inspiration] : no accessory muscle use [Abdomen Soft] : soft [Auscultation Breath Sounds / Voice Sounds] : lungs were clear to auscultation bilaterally [Abdomen Tenderness] : non-tender [Abdomen Mass (___ Cm)] : no abdominal mass palpated [Gait - Sufficient For Exercise Testing] : the gait was sufficient for exercise testing [Abnormal Walk] : normal gait [Nail Clubbing] : no clubbing of the fingernails [Cyanosis, Localized] : no localized cyanosis [] : no ischemic changes [Petechial Hemorrhages (___cm)] : no petechial hemorrhages [Deep Tendon Reflexes (DTR)] : deep tendon reflexes were 2+ and symmetric [Sensation] : the sensory exam was normal to light touch and pinprick [No Focal Deficits] : no focal deficits [FreeTextEntry1] : scars

## 2020-04-14 ENCOUNTER — APPOINTMENT (OUTPATIENT)
Dept: PULMONOLOGY | Facility: CLINIC | Age: 70
End: 2020-04-14
Payer: MEDICARE

## 2020-06-02 NOTE — H&P ADULT - REASON FOR ADMISSION
chest pain, SOB
[FreeTextEntry1] : \par Kiarra Garnett returns for f/u regarding non-occlusive CAD, HTN and HLD.

## 2020-06-04 ENCOUNTER — APPOINTMENT (OUTPATIENT)
Dept: PULMONOLOGY | Facility: CLINIC | Age: 70
End: 2020-06-04
Payer: MEDICARE

## 2020-06-04 VITALS — BODY MASS INDEX: 30.6 KG/M2 | SYSTOLIC BLOOD PRESSURE: 124 MMHG | WEIGHT: 170 LBS | DIASTOLIC BLOOD PRESSURE: 80 MMHG

## 2020-06-04 VITALS — HEART RATE: 84 BPM | OXYGEN SATURATION: 92 %

## 2020-06-04 DIAGNOSIS — R91.8 OTHER NONSPECIFIC ABNORMAL FINDING OF LUNG FIELD: ICD-10-CM

## 2020-06-04 PROCEDURE — 99214 OFFICE O/P EST MOD 30 MIN: CPT

## 2020-06-04 NOTE — CONSULT LETTER
[Dear  ___] : Dear  [unfilled], [Please see my note below.] : Please see my note below. [Consult Letter:] : I had the pleasure of evaluating your patient, [unfilled]. [Consult Closing:] : Thank you very much for allowing me to participate in the care of this patient.  If you have any questions, please do not hesitate to contact me. [Sincerely,] : Sincerely, [FreeTextEntry3] : Maicol Pederson MD FCCP\par Pulmonary/Critical Care/Sleep Medicine\par Department of Internal Medicine\par \par [facility] [employer]\par

## 2020-06-04 NOTE — DISCUSSION/SUMMARY
[Asthma] : asthma [Mild Persistent] : mild persistent [Stable] : stable [Responding to Treatment] : responding to treatment [None] : There are no changes in medication management

## 2020-06-04 NOTE — PHYSICAL EXAM
[No Acute Distress] : no acute distress [Normal Appearance] : normal appearance [Normal Oropharynx] : normal oropharynx [No Neck Mass] : no neck mass [Normal Rate/Rhythm] : normal rate/rhythm [Normal S1, S2] : normal s1, s2 [No Murmurs] : no murmurs [No Resp Distress] : no resp distress [Clear to Auscultation Bilaterally] : clear to auscultation bilaterally [No Abnormalities] : no abnormalities [Benign] : benign [Normal Gait] : normal gait [No Clubbing] : no clubbing [No Cyanosis] : no cyanosis [No Edema] : no edema [FROM] : FROM [No Focal Deficits] : no focal deficits [Normal Color/ Pigmentation] : normal color/ pigmentation [Oriented x3] : oriented x3 [Normal Affect] : normal affect

## 2020-06-04 NOTE — HISTORY OF PRESENT ILLNESS
[Moderate Persistent] : moderate persistent [Doing Well] : doing well [Well Controlled] : Well controlled [None] : The patient is currently asymptomatic [PFTs] : pulmonary function tests [More Frequent Use Needed Recently] : normal [Adherent] : the patient is not adherent with ~his/her~ medication regimen [de-identified] : sinusitis [de-identified] : Breo added at last visit

## 2020-06-07 ENCOUNTER — APPOINTMENT (OUTPATIENT)
Dept: DISASTER EMERGENCY | Facility: CLINIC | Age: 70
End: 2020-06-07

## 2020-06-07 LAB — SARS-COV-2 N GENE NPH QL NAA+PROBE: NOT DETECTED

## 2020-06-08 ENCOUNTER — TRANSCRIPTION ENCOUNTER (OUTPATIENT)
Age: 70
End: 2020-06-08

## 2020-06-09 ENCOUNTER — OUTPATIENT (OUTPATIENT)
Dept: OUTPATIENT SERVICES | Facility: HOSPITAL | Age: 70
LOS: 1 days | End: 2020-06-09
Payer: MEDICARE

## 2020-06-09 DIAGNOSIS — Z90.710 ACQUIRED ABSENCE OF BOTH CERVIX AND UTERUS: Chronic | ICD-10-CM

## 2020-06-09 DIAGNOSIS — Z98.891 HISTORY OF UTERINE SCAR FROM PREVIOUS SURGERY: Chronic | ICD-10-CM

## 2020-06-09 DIAGNOSIS — M25.512 PAIN IN LEFT SHOULDER: ICD-10-CM

## 2020-06-09 PROCEDURE — 76000 FLUOROSCOPY <1 HR PHYS/QHP: CPT

## 2020-06-09 PROCEDURE — 64640 INJECTION TREATMENT OF NERVE: CPT | Mod: LT

## 2020-07-19 ENCOUNTER — APPOINTMENT (OUTPATIENT)
Dept: DISASTER EMERGENCY | Facility: CLINIC | Age: 70
End: 2020-07-19

## 2020-07-20 LAB — SARS-COV-2 N GENE NPH QL NAA+PROBE: NOT DETECTED

## 2020-08-15 ENCOUNTER — NON-APPOINTMENT (OUTPATIENT)
Age: 70
End: 2020-08-15

## 2020-08-18 NOTE — ED PROVIDER NOTE - TOBACCO USE
[phone]    denies domestic or international travel in the past 3 weeks Never smoker [phone]    denies domestic or international travel in the past 3 weeks/yes

## 2020-09-05 ENCOUNTER — APPOINTMENT (OUTPATIENT)
Dept: DISASTER EMERGENCY | Facility: CLINIC | Age: 70
End: 2020-09-05

## 2020-09-15 ENCOUNTER — APPOINTMENT (OUTPATIENT)
Dept: CARDIOLOGY | Facility: CLINIC | Age: 70
End: 2020-09-15

## 2020-10-03 ENCOUNTER — APPOINTMENT (OUTPATIENT)
Dept: DISASTER EMERGENCY | Facility: CLINIC | Age: 70
End: 2020-10-03

## 2020-10-04 LAB — SARS-COV-2 N GENE NPH QL NAA+PROBE: NOT DETECTED

## 2020-10-05 ENCOUNTER — TRANSCRIPTION ENCOUNTER (OUTPATIENT)
Age: 70
End: 2020-10-05

## 2020-10-06 ENCOUNTER — OUTPATIENT (OUTPATIENT)
Dept: OUTPATIENT SERVICES | Facility: HOSPITAL | Age: 70
LOS: 1 days | End: 2020-10-06
Payer: MEDICARE

## 2020-10-06 DIAGNOSIS — M25.512 PAIN IN LEFT SHOULDER: ICD-10-CM

## 2020-10-06 DIAGNOSIS — Z98.891 HISTORY OF UTERINE SCAR FROM PREVIOUS SURGERY: Chronic | ICD-10-CM

## 2020-10-06 DIAGNOSIS — Z90.710 ACQUIRED ABSENCE OF BOTH CERVIX AND UTERUS: Chronic | ICD-10-CM

## 2020-10-06 PROCEDURE — 0440T ABLTJ PERC UXTR/PERPH NRV: CPT

## 2020-10-06 PROCEDURE — 76000 FLUOROSCOPY <1 HR PHYS/QHP: CPT

## 2020-10-30 ENCOUNTER — APPOINTMENT (OUTPATIENT)
Dept: CARDIOLOGY | Facility: CLINIC | Age: 70
End: 2020-10-30
Payer: MEDICARE

## 2020-10-30 ENCOUNTER — APPOINTMENT (OUTPATIENT)
Dept: DISASTER EMERGENCY | Facility: CLINIC | Age: 70
End: 2020-10-30

## 2020-10-30 ENCOUNTER — NON-APPOINTMENT (OUTPATIENT)
Age: 70
End: 2020-10-30

## 2020-10-30 VITALS
HEART RATE: 99 BPM | DIASTOLIC BLOOD PRESSURE: 93 MMHG | HEIGHT: 62 IN | SYSTOLIC BLOOD PRESSURE: 154 MMHG | WEIGHT: 154 LBS | TEMPERATURE: 95.3 F | BODY MASS INDEX: 28.34 KG/M2 | RESPIRATION RATE: 15 BRPM

## 2020-10-30 DIAGNOSIS — Z01.818 ENCOUNTER FOR OTHER PREPROCEDURAL EXAMINATION: ICD-10-CM

## 2020-10-30 PROCEDURE — 99214 OFFICE O/P EST MOD 30 MIN: CPT

## 2020-10-30 PROCEDURE — 93000 ELECTROCARDIOGRAM COMPLETE: CPT

## 2020-10-30 NOTE — ASSESSMENT
[FreeTextEntry1] : Echocardiogram March 8, 2018 reveals left ventricle normal in size with low-normal function ejection fraction of 50-55%. There is mild mitral and trace aortic regurgitation with mild tricuspid and pulmonic regurgitation but no other significant structural abnormalities noted.\par \par EKG: Sinus rhythm with no significant ST or T wave changes. Left anterior fascicular block and poor R-wave progression.\par \par 70-year-old female with a past medical history of CAD, prior severe CMP, with improved EF, hypertension presenting for f/u. Patient recently was in the hospital with chest pain and had a catheterization showing severe stenosis of the circumflex status post a single stent. She also has residual LAD disease. There is no evidence of ongoing ischemia at this time. Her episode of chest pain from a month ago was very brief and likely either radiculopathic or gastrointestinally mediated. No evidence of cardiac symptoms. She does have some shortness of breath on exertion which is more likely secondary to deconditioning. There is no overt evidence of acute heart failure. Her EF in the hospital was slightly lower than her last echo here but not very significantly so.

## 2020-10-30 NOTE — HISTORY OF PRESENT ILLNESS
[FreeTextEntry1] : Patient presents back having not been in the office for over 2-1/2 years. She presented to the hospital over the summer with complaints of chest pain and underwent catheterization. She received a stent to the circumflex and had residual 40% LAD disease. She was ultimately discharged home. Since being home she is feeling reasonably well. She does report some shortness of breath on exertion at times which has been stable and relatively unchanged since even before her stenting. A month ago she had another episode of chest discomfort across her upper chest with some abnormal sensation in her left arm with tingling which lasted for a few seconds. She has had no other chest pain since the hospital and that episode felt different than the one that brought her to the hospital. She seems to be tolerating all of her medications and taking them faithfully. Patient denies palpitations, orthopnea, presyncope, syncope.\par \par

## 2020-10-30 NOTE — DISCUSSION/SUMMARY
[FreeTextEntry1] : 1. Continue current cardiac meds in doses as noted above for hypertension, CAD, cardiomyopathy and CHF.\par 2. No additional cardiac testing at this time.\par 3. Monitor BP at home, keep a log and bring to f/u.\par 4. Patient is encouraged to exercise at least 30 minutes a day everyday of the week.\par 5. Repeat blood work.\par 6. Follow up here in two months.\par \par

## 2020-10-30 NOTE — PHYSICAL EXAM
[General Appearance - In No Acute Distress] : no acute distress [Normal Conjunctiva] : the conjunctiva exhibited no abnormalities [Normal Oral Mucosa] : normal oral mucosa [Normal Oropharynx] : normal oropharynx [Auscultation Breath Sounds / Voice Sounds] : lungs were clear to auscultation bilaterally [Abdomen Soft] : soft [Abdomen Tenderness] : non-tender [Abnormal Walk] : normal gait [Nail Clubbing] : no clubbing of the fingernails [Cyanosis, Localized] : no localized cyanosis [Skin Color & Pigmentation] : normal skin color and pigmentation [Oriented To Time, Place, And Person] : oriented to person, place, and time [Affect] : the affect was normal [Normal Rate] : normal [Rhythm Regular] : regular [Normal S1] : normal S1 [Normal S2] : normal S2 [No Murmur] : no murmurs heard [FreeTextEntry1] : No JVD, no carotid bruits. [S3] : no S3 [S4] : no S4

## 2020-11-01 LAB — SARS-COV-2 N GENE NPH QL NAA+PROBE: NOT DETECTED

## 2020-11-02 ENCOUNTER — TRANSCRIPTION ENCOUNTER (OUTPATIENT)
Age: 70
End: 2020-11-02

## 2020-11-03 ENCOUNTER — OUTPATIENT (OUTPATIENT)
Dept: OUTPATIENT SERVICES | Facility: HOSPITAL | Age: 70
LOS: 1 days | End: 2020-11-03
Payer: MEDICARE

## 2020-11-03 DIAGNOSIS — Z98.891 HISTORY OF UTERINE SCAR FROM PREVIOUS SURGERY: Chronic | ICD-10-CM

## 2020-11-03 DIAGNOSIS — M47.816 SPONDYLOSIS WITHOUT MYELOPATHY OR RADICULOPATHY, LUMBAR REGION: ICD-10-CM

## 2020-11-03 DIAGNOSIS — Z90.710 ACQUIRED ABSENCE OF BOTH CERVIX AND UTERUS: Chronic | ICD-10-CM

## 2020-11-03 PROCEDURE — 64495 INJ PARAVERT F JNT L/S 3 LEV: CPT | Mod: RT

## 2020-11-03 PROCEDURE — 76000 FLUOROSCOPY <1 HR PHYS/QHP: CPT

## 2020-11-03 PROCEDURE — 64494 INJ PARAVERT F JNT L/S 2 LEV: CPT | Mod: RT

## 2020-11-03 PROCEDURE — 64493 INJ PARAVERT F JNT L/S 1 LEV: CPT | Mod: RT

## 2020-11-12 LAB
ALBUMIN SERPL ELPH-MCNC: 4.9 G/DL
ALP BLD-CCNC: 94 U/L
ALT SERPL-CCNC: 20 U/L
ANION GAP SERPL CALC-SCNC: 18 MMOL/L
AST SERPL-CCNC: 20 U/L
BASOPHILS # BLD AUTO: 0.05 K/UL
BASOPHILS NFR BLD AUTO: 0.6 %
BILIRUB SERPL-MCNC: 0.3 MG/DL
BUN SERPL-MCNC: 19 MG/DL
CALCIUM SERPL-MCNC: 9.8 MG/DL
CHLORIDE SERPL-SCNC: 95 MMOL/L
CHOLEST SERPL-MCNC: 233 MG/DL
CO2 SERPL-SCNC: 22 MMOL/L
CREAT SERPL-MCNC: 0.86 MG/DL
EOSINOPHIL # BLD AUTO: 0.09 K/UL
EOSINOPHIL NFR BLD AUTO: 1.1 %
ESTIMATED AVERAGE GLUCOSE: 123 MG/DL
GLUCOSE SERPL-MCNC: 95 MG/DL
HBA1C MFR BLD HPLC: 5.9 %
HCT VFR BLD CALC: 44.9 %
HDLC SERPL-MCNC: 60 MG/DL
HGB BLD-MCNC: 14.4 G/DL
IMM GRANULOCYTES NFR BLD AUTO: 0.2 %
LDLC SERPL CALC-MCNC: 132 MG/DL
LYMPHOCYTES # BLD AUTO: 1.82 K/UL
LYMPHOCYTES NFR BLD AUTO: 22.7 %
MAGNESIUM SERPL-MCNC: 2.2 MG/DL
MAN DIFF?: NORMAL
MCHC RBC-ENTMCNC: 29.3 PG
MCHC RBC-ENTMCNC: 32.1 GM/DL
MCV RBC AUTO: 91.3 FL
MONOCYTES # BLD AUTO: 0.74 K/UL
MONOCYTES NFR BLD AUTO: 9.2 %
NEUTROPHILS # BLD AUTO: 5.29 K/UL
NEUTROPHILS NFR BLD AUTO: 66.2 %
NONHDLC SERPL-MCNC: 173 MG/DL
NT-PROBNP SERPL-MCNC: 199 PG/ML
PLATELET # BLD AUTO: 293 K/UL
POTASSIUM SERPL-SCNC: 4.3 MMOL/L
PROT SERPL-MCNC: 7.4 G/DL
RBC # BLD: 4.92 M/UL
RBC # FLD: 13.3 %
SODIUM SERPL-SCNC: 134 MMOL/L
TRIGL SERPL-MCNC: 207 MG/DL
WBC # FLD AUTO: 8.01 K/UL

## 2020-11-21 ENCOUNTER — APPOINTMENT (OUTPATIENT)
Dept: DISASTER EMERGENCY | Facility: CLINIC | Age: 70
End: 2020-11-21

## 2020-11-22 LAB — SARS-COV-2 N GENE NPH QL NAA+PROBE: NOT DETECTED

## 2020-11-28 ENCOUNTER — APPOINTMENT (OUTPATIENT)
Dept: DISASTER EMERGENCY | Facility: CLINIC | Age: 70
End: 2020-11-28

## 2020-11-29 LAB — SARS-COV-2 N GENE NPH QL NAA+PROBE: NOT DETECTED

## 2020-11-30 ENCOUNTER — TRANSCRIPTION ENCOUNTER (OUTPATIENT)
Age: 70
End: 2020-11-30

## 2020-11-30 ENCOUNTER — NON-APPOINTMENT (OUTPATIENT)
Age: 70
End: 2020-11-30

## 2020-12-01 ENCOUNTER — OUTPATIENT (OUTPATIENT)
Dept: OUTPATIENT SERVICES | Facility: HOSPITAL | Age: 70
LOS: 1 days | End: 2020-12-01
Payer: MEDICARE

## 2020-12-01 DIAGNOSIS — Z90.710 ACQUIRED ABSENCE OF BOTH CERVIX AND UTERUS: Chronic | ICD-10-CM

## 2020-12-01 DIAGNOSIS — Z98.891 HISTORY OF UTERINE SCAR FROM PREVIOUS SURGERY: Chronic | ICD-10-CM

## 2020-12-01 DIAGNOSIS — M47.816 SPONDYLOSIS WITHOUT MYELOPATHY OR RADICULOPATHY, LUMBAR REGION: ICD-10-CM

## 2020-12-01 PROCEDURE — 76000 FLUOROSCOPY <1 HR PHYS/QHP: CPT

## 2020-12-01 PROCEDURE — 62323 NJX INTERLAMINAR LMBR/SAC: CPT

## 2020-12-10 ENCOUNTER — APPOINTMENT (OUTPATIENT)
Dept: PULMONOLOGY | Facility: CLINIC | Age: 70
End: 2020-12-10

## 2020-12-26 ENCOUNTER — APPOINTMENT (OUTPATIENT)
Dept: DISASTER EMERGENCY | Facility: CLINIC | Age: 70
End: 2020-12-26

## 2020-12-27 LAB — SARS-COV-2 N GENE NPH QL NAA+PROBE: NOT DETECTED

## 2020-12-29 ENCOUNTER — APPOINTMENT (OUTPATIENT)
Dept: PULMONOLOGY | Facility: CLINIC | Age: 70
End: 2020-12-29
Payer: MEDICARE

## 2020-12-29 PROCEDURE — 94010 BREATHING CAPACITY TEST: CPT

## 2021-01-05 ENCOUNTER — APPOINTMENT (OUTPATIENT)
Dept: CARDIOLOGY | Facility: CLINIC | Age: 71
End: 2021-01-05
Payer: MEDICARE

## 2021-01-05 ENCOUNTER — NON-APPOINTMENT (OUTPATIENT)
Age: 71
End: 2021-01-05

## 2021-01-05 VITALS
TEMPERATURE: 95.2 F | WEIGHT: 160 LBS | BODY MASS INDEX: 29.44 KG/M2 | HEART RATE: 56 BPM | OXYGEN SATURATION: 96 % | DIASTOLIC BLOOD PRESSURE: 66 MMHG | HEIGHT: 62 IN | RESPIRATION RATE: 16 BRPM | SYSTOLIC BLOOD PRESSURE: 105 MMHG

## 2021-01-05 PROCEDURE — 99214 OFFICE O/P EST MOD 30 MIN: CPT

## 2021-01-05 PROCEDURE — 93000 ELECTROCARDIOGRAM COMPLETE: CPT

## 2021-01-05 RX ORDER — METOPROLOL TARTRATE 25 MG/1
25 TABLET, FILM COATED ORAL TWICE DAILY
Qty: 180 | Refills: 0 | Status: DISCONTINUED | COMMUNITY
Start: 2020-09-25 | End: 2021-01-05

## 2021-01-05 RX ORDER — SERTRALINE HYDROCHLORIDE 50 MG/1
50 TABLET, FILM COATED ORAL
Qty: 30 | Refills: 0 | Status: DISCONTINUED | COMMUNITY
Start: 2018-12-21 | End: 2021-01-05

## 2021-01-05 RX ORDER — ZOLPIDEM TARTRATE 5 MG/1
5 TABLET, FILM COATED ORAL
Refills: 0 | Status: DISCONTINUED | COMMUNITY
End: 2021-01-05

## 2021-01-05 NOTE — DISCUSSION/SUMMARY
[FreeTextEntry1] : 1. Proceed with procedure as planned.\par 2. Monitor through the procedure until stable post procedurally.\par 3. Continue current cardiac meds in doses as noted above for hypertension, CAD, cardiomyopathy and CHF.\par 4. Plan repeat echocardiogram to evaluate her EF prior to follow-up.\par 5. Monitor BP at home, keep a log and bring to f/u.\par 6. Patient is encouraged to exercise at least 30 minutes a day everyday of the week.\par 7. Repeat blood work.\par 8. Follow up here in two months.\par \par

## 2021-01-05 NOTE — ASSESSMENT
[FreeTextEntry1] : Echocardiogram March 8, 2018 reveals left ventricle normal in size with low-normal function ejection fraction of 50-55%. There is mild mitral and trace aortic regurgitation with mild tricuspid and pulmonic regurgitation but no other significant structural abnormalities noted.\par \par EKG: Sinus rhythm with no significant ST or T wave changes. Poor R-wave progression.\par \par 70-year-old female with a past medical history of CAD, prior severe CMP, with improved EF, hypertension presenting for f/u.  Patient presents back today planning to undergo cryoablation of nerves in her shoulder.  She did successfully have a back injection last month with holding of her antiplatelet therapy without a problem.  There is certainly some risk given her unstable angina and stenting in August but at this point with modern day stenting this is less concerning.  She shows no evidence of recurrent ischemia at this time or any CHF.  She is without cardiac contraindication to this procedure.

## 2021-01-05 NOTE — HISTORY OF PRESENT ILLNESS
[FreeTextEntry1] : Patient presents back to the office today having a lot of issues still with her back and now her shoulder.  She had an injection in her back last month which went well but she is still having some pain.  She is going to have a cryoablation of her nerves in her shoulder.  She did hold her antiplatelets for the purpose of the back injection and had no issues.  She reports no chest pain at all.  Her shortness of breath is unchanged.  She reports no other new symptoms at this time.  Patient denies palpitations, orthopnea, presyncope, syncope.

## 2021-01-05 NOTE — PHYSICAL EXAM
[General Appearance - In No Acute Distress] : no acute distress [Normal Conjunctiva] : the conjunctiva exhibited no abnormalities [Normal Oral Mucosa] : normal oral mucosa [Normal Oropharynx] : normal oropharynx [Auscultation Breath Sounds / Voice Sounds] : lungs were clear to auscultation bilaterally [Abdomen Soft] : soft [Abdomen Tenderness] : non-tender [Abnormal Walk] : normal gait [Nail Clubbing] : no clubbing of the fingernails [Cyanosis, Localized] : no localized cyanosis [Skin Color & Pigmentation] : normal skin color and pigmentation [Oriented To Time, Place, And Person] : oriented to person, place, and time [Affect] : the affect was normal [FreeTextEntry1] : No JVD, no carotid bruits. [Normal Rate] : normal [Rhythm Regular] : regular [Normal S1] : normal S1 [Normal S2] : normal S2 [S3] : no S3 [S4] : no S4 [No Murmur] : no murmurs heard

## 2021-01-09 ENCOUNTER — APPOINTMENT (OUTPATIENT)
Dept: DISASTER EMERGENCY | Facility: CLINIC | Age: 71
End: 2021-01-09

## 2021-01-09 ENCOUNTER — LABORATORY RESULT (OUTPATIENT)
Age: 71
End: 2021-01-09

## 2021-01-12 ENCOUNTER — OUTPATIENT (OUTPATIENT)
Dept: OUTPATIENT SERVICES | Facility: HOSPITAL | Age: 71
LOS: 1 days | End: 2021-01-12
Payer: MEDICARE

## 2021-01-12 DIAGNOSIS — Z90.710 ACQUIRED ABSENCE OF BOTH CERVIX AND UTERUS: Chronic | ICD-10-CM

## 2021-01-12 DIAGNOSIS — Z98.891 HISTORY OF UTERINE SCAR FROM PREVIOUS SURGERY: Chronic | ICD-10-CM

## 2021-01-12 DIAGNOSIS — M25.511 PAIN IN RIGHT SHOULDER: ICD-10-CM

## 2021-01-12 PROCEDURE — 76000 FLUOROSCOPY <1 HR PHYS/QHP: CPT

## 2021-01-12 PROCEDURE — 64640 INJECTION TREATMENT OF NERVE: CPT | Mod: RT

## 2021-02-21 ENCOUNTER — LABORATORY RESULT (OUTPATIENT)
Age: 71
End: 2021-02-21

## 2021-02-21 ENCOUNTER — APPOINTMENT (OUTPATIENT)
Dept: DISASTER EMERGENCY | Facility: CLINIC | Age: 71
End: 2021-02-21

## 2021-02-24 ENCOUNTER — NON-APPOINTMENT (OUTPATIENT)
Age: 71
End: 2021-02-24

## 2021-02-27 LAB
ALBUMIN SERPL ELPH-MCNC: 4.4 G/DL
ALP BLD-CCNC: 85 U/L
ALT SERPL-CCNC: 28 U/L
AST SERPL-CCNC: 20 U/L
BILIRUB DIRECT SERPL-MCNC: 0.1 MG/DL
BILIRUB INDIRECT SERPL-MCNC: 0.2 MG/DL
BILIRUB SERPL-MCNC: 0.4 MG/DL
CHOLEST SERPL-MCNC: 166 MG/DL
HDLC SERPL-MCNC: 63 MG/DL
LDLC SERPL CALC-MCNC: 73 MG/DL
NONHDLC SERPL-MCNC: 104 MG/DL
PROT SERPL-MCNC: 6.9 G/DL
TRIGL SERPL-MCNC: 152 MG/DL

## 2021-03-02 ENCOUNTER — APPOINTMENT (OUTPATIENT)
Dept: CARDIOLOGY | Facility: CLINIC | Age: 71
End: 2021-03-02
Payer: MEDICARE

## 2021-03-02 PROCEDURE — 93306 TTE W/DOPPLER COMPLETE: CPT

## 2021-03-02 RX ORDER — PERFLUTREN 6.52 MG/ML
6.52 INJECTION, SUSPENSION INTRAVENOUS
Qty: 4 | Refills: 0 | Status: COMPLETED | OUTPATIENT
Start: 2021-03-02

## 2021-03-02 RX ADMIN — PERFLUTREN MG/ML: 6.52 INJECTION, SUSPENSION INTRAVENOUS at 00:00

## 2021-03-09 ENCOUNTER — OUTPATIENT (OUTPATIENT)
Dept: OUTPATIENT SERVICES | Facility: HOSPITAL | Age: 71
LOS: 1 days | Discharge: HOME | End: 2021-03-09

## 2021-03-09 DIAGNOSIS — Z90.710 ACQUIRED ABSENCE OF BOTH CERVIX AND UTERUS: Chronic | ICD-10-CM

## 2021-03-09 DIAGNOSIS — D45 POLYCYTHEMIA VERA: ICD-10-CM

## 2021-03-09 DIAGNOSIS — Z98.891 HISTORY OF UTERINE SCAR FROM PREVIOUS SURGERY: Chronic | ICD-10-CM

## 2021-03-10 ENCOUNTER — APPOINTMENT (OUTPATIENT)
Dept: HEMATOLOGY ONCOLOGY | Facility: CLINIC | Age: 71
End: 2021-03-10
Payer: MEDICARE

## 2021-03-10 ENCOUNTER — RESULT REVIEW (OUTPATIENT)
Age: 71
End: 2021-03-10

## 2021-03-10 VITALS
BODY MASS INDEX: 27.89 KG/M2 | HEIGHT: 62 IN | WEIGHT: 151.56 LBS | SYSTOLIC BLOOD PRESSURE: 136 MMHG | HEART RATE: 57 BPM | OXYGEN SATURATION: 95 % | DIASTOLIC BLOOD PRESSURE: 84 MMHG

## 2021-03-10 DIAGNOSIS — D58.2 OTHER HEMOGLOBINOPATHIES: ICD-10-CM

## 2021-03-10 LAB
BASOPHILS # BLD AUTO: 0 K/UL — SIGNIFICANT CHANGE UP (ref 0–0.2)
BASOPHILS NFR BLD AUTO: 0.7 % — SIGNIFICANT CHANGE UP (ref 0–2)
EOSINOPHIL # BLD AUTO: 0.1 K/UL — SIGNIFICANT CHANGE UP (ref 0–0.5)
EOSINOPHIL NFR BLD AUTO: 1.1 % — SIGNIFICANT CHANGE UP (ref 0–6)
HCT VFR BLD CALC: 43.4 % — SIGNIFICANT CHANGE UP (ref 34.5–45)
HGB BLD-MCNC: 13.7 G/DL — SIGNIFICANT CHANGE UP (ref 11.5–15.5)
LYMPHOCYTES # BLD AUTO: 1.8 K/UL — SIGNIFICANT CHANGE UP (ref 1–3.3)
LYMPHOCYTES # BLD AUTO: 25.1 % — SIGNIFICANT CHANGE UP (ref 13–44)
MCHC RBC-ENTMCNC: 29.2 PG — SIGNIFICANT CHANGE UP (ref 27–34)
MCHC RBC-ENTMCNC: 31.6 G/DL — LOW (ref 32–36)
MCV RBC AUTO: 92.6 FL — SIGNIFICANT CHANGE UP (ref 80–100)
MONOCYTES # BLD AUTO: 0.9 K/UL — SIGNIFICANT CHANGE UP (ref 0–0.9)
MONOCYTES NFR BLD AUTO: 11.8 % — SIGNIFICANT CHANGE UP (ref 2–14)
NEUTROPHILS # BLD AUTO: 4.5 K/UL — SIGNIFICANT CHANGE UP (ref 1.8–7.4)
NEUTROPHILS NFR BLD AUTO: 61.3 % — SIGNIFICANT CHANGE UP (ref 43–77)
PLATELET # BLD AUTO: 289 K/UL — SIGNIFICANT CHANGE UP (ref 150–400)
RBC # BLD: 4.68 M/UL — SIGNIFICANT CHANGE UP (ref 3.8–5.2)
RBC # FLD: 12.2 % — SIGNIFICANT CHANGE UP (ref 10.3–14.5)
WBC # BLD: 7.3 K/UL — SIGNIFICANT CHANGE UP (ref 3.8–10.5)
WBC # FLD AUTO: 7.3 K/UL — SIGNIFICANT CHANGE UP (ref 3.8–10.5)

## 2021-03-10 PROCEDURE — 99203 OFFICE O/P NEW LOW 30 MIN: CPT

## 2021-03-10 RX ORDER — MELOXICAM 15 MG/1
15 TABLET ORAL
Qty: 30 | Refills: 2 | Status: DISCONTINUED | COMMUNITY
Start: 2020-10-30 | End: 2021-03-10

## 2021-03-11 ENCOUNTER — APPOINTMENT (OUTPATIENT)
Dept: PULMONOLOGY | Facility: CLINIC | Age: 71
End: 2021-03-11
Payer: MEDICARE

## 2021-03-11 VITALS
WEIGHT: 154 LBS | DIASTOLIC BLOOD PRESSURE: 62 MMHG | SYSTOLIC BLOOD PRESSURE: 126 MMHG | BODY MASS INDEX: 28.17 KG/M2 | OXYGEN SATURATION: 91 % | HEART RATE: 71 BPM | TEMPERATURE: 96.5 F

## 2021-03-11 PROCEDURE — 99215 OFFICE O/P EST HI 40 MIN: CPT

## 2021-03-11 NOTE — DISCUSSION/SUMMARY
[Asthma] : asthma [Stable] : stable [Responding to Treatment] : responding to treatment [Mild Persistent] : mild persistent [None] : There are no changes in medication management [FreeTextEntry1] : 70-year-old female, seen today for the above. Patient's findings on CAT scan, mostly. Be on the basis of chronic right diaphragmatic paralysis without any evidence of endobronchial obstruction. This most likely lead to compressive atelectasis and right middle lobe. A sniff test will be performed on followup CAT scan in 3 months. Her pulmonary functions are consistent with secondary restrictive disease from the above

## 2021-03-11 NOTE — HISTORY OF PRESENT ILLNESS
[Moderate Persistent] : moderate persistent [Doing Well] : doing well [None] : ~He/She~ has no significant interval events [Well Controlled] : Well controlled [Adherent] : the patient is adherent with ~his/her~ medication regimen [PFTs] : pulmonary function tests [ESS] : 0 [Wheezing] : denies wheezing [Cough] : denies coughing [Shortness Of Breath] : stable shortness of breath [Chest Tightness Or Heavy Pressure] : denies chest tightness [Cough at Night] : not awakened at night with cough [Wheezing at Night] : not awakened at night because of wheezing or trouble breathing [More Frequent Use Needed Recently] : normal [de-identified] : sinusitis

## 2021-03-11 NOTE — CONSULT LETTER
[Dear  ___] : Dear  [unfilled], [Consult Letter:] : I had the pleasure of evaluating your patient, [unfilled]. [Please see my note below.] : Please see my note below. [Consult Closing:] : Thank you very much for allowing me to participate in the care of this patient.  If you have any questions, please do not hesitate to contact me. [Sincerely,] : Sincerely, [FreeTextEntry3] : Maicol Pederson MD FCCP\par Pulmonary/Critical Care/Sleep Medicine\par Department of Internal Medicine\par \par [facility] [employer]\par

## 2021-03-12 ENCOUNTER — APPOINTMENT (OUTPATIENT)
Dept: CARDIOLOGY | Facility: CLINIC | Age: 71
End: 2021-03-12
Payer: MEDICARE

## 2021-03-12 VITALS
HEART RATE: 61 BPM | RESPIRATION RATE: 16 BRPM | TEMPERATURE: 96.9 F | DIASTOLIC BLOOD PRESSURE: 78 MMHG | HEIGHT: 62 IN | BODY MASS INDEX: 27.97 KG/M2 | SYSTOLIC BLOOD PRESSURE: 115 MMHG | WEIGHT: 152 LBS

## 2021-03-12 PROCEDURE — 99214 OFFICE O/P EST MOD 30 MIN: CPT

## 2021-03-12 PROCEDURE — 93000 ELECTROCARDIOGRAM COMPLETE: CPT

## 2021-03-12 NOTE — HISTORY OF PRESENT ILLNESS
[FreeTextEntry1] : Patient presents back today having successfully had her nerve ablation and then a subsequent steroid injection recently and now her shoulder is much improved.  She is planning for additional injections in her back in the future.  She continues to have shortness of breath on exertion which has not really changed.  She is also reporting episodes where she will be sitting or eating and suddenly feels short of breath.  She discussed this with her pulmonologist who believes it may be related to atelectasis in her right middle lobe secondary to a paralyzed right hemidiaphragm, and this is being worked up further.  No other new symptoms at this time.  She has not been checking her blood pressure but says it is well controlled when she goes to doctors offices, which she does frequently.  Medications have not changed.  Patient denies chest pain, palpitations, orthopnea, presyncope, syncope.

## 2021-03-12 NOTE — ASSESSMENT
[FreeTextEntry1] : Echocardiogram March 2, 2021 demonstrated left ventricle normal size and function with ejection fraction of 55 to 60%.  Mild mitral, trace aortic and mild tricuspid regurgitation noted.\par \par EKG: Sinus rhythm with no significant ST or T wave changes. Poor R-wave progression.\par \par 70-year-old female with a past medical history of CAD, prior severe CMP, with improved EF, hypertension presenting for f/u.  Patient is having some issues with shortness of breath which seems to be related to her pulmonary disease.  There is no evidence of a cardiac cause and no evidence of CHF.  Echocardiogram shows a now normal EF and no significant valvular disease.  There is no symptoms that are very consistent with ischemia and I do not believe further work-up to rule that out is needed at this time.  Blood pressure appears to be adequately controlled.  Lipids are now well controlled as long as she takes her atorvastatin.

## 2021-03-12 NOTE — DISCUSSION/SUMMARY
[FreeTextEntry1] : 1. Continue current cardiac meds in doses as noted above for hypertension, CAD, cardiomyopathy and CHF.\par 2. No additional cardiac testing at this time.\par 3. Monitor BP at home, keep a log and bring to f/u.\par 4. Patient is encouraged to exercise at least 30 minutes a day everyday of the week.\par 5. There is no cardiac contraindication at this time to pain injections.  She may hold her aspirin and Plavix for 5 days prior to the procedure.  She is again made aware of the risks given her stenting in the summer of last year but this risk is low given moderate stenting.  Patient understands the risks and tolerated holding the medication well in January.\par 6. Follow up here in three months.\par \par

## 2021-04-01 ENCOUNTER — APPOINTMENT (OUTPATIENT)
Dept: CT IMAGING | Facility: CLINIC | Age: 71
End: 2021-04-01
Payer: MEDICARE

## 2021-04-01 ENCOUNTER — APPOINTMENT (OUTPATIENT)
Dept: INTERVENTIONAL RADIOLOGY/VASCULAR | Facility: CLINIC | Age: 71
End: 2021-04-01
Payer: MEDICARE

## 2021-04-01 ENCOUNTER — OUTPATIENT (OUTPATIENT)
Dept: OUTPATIENT SERVICES | Facility: HOSPITAL | Age: 71
LOS: 1 days | End: 2021-04-01

## 2021-04-01 ENCOUNTER — RESULT REVIEW (OUTPATIENT)
Age: 71
End: 2021-04-01

## 2021-04-01 DIAGNOSIS — J98.6 DISORDERS OF DIAPHRAGM: ICD-10-CM

## 2021-04-01 DIAGNOSIS — Z98.891 HISTORY OF UTERINE SCAR FROM PREVIOUS SURGERY: Chronic | ICD-10-CM

## 2021-04-01 DIAGNOSIS — Z90.710 ACQUIRED ABSENCE OF BOTH CERVIX AND UTERUS: Chronic | ICD-10-CM

## 2021-04-01 PROCEDURE — 76000 FLUOROSCOPY <1 HR PHYS/QHP: CPT | Mod: 26

## 2021-04-01 PROCEDURE — 71250 CT THORAX DX C-: CPT | Mod: 26

## 2021-04-16 ENCOUNTER — NON-APPOINTMENT (OUTPATIENT)
Age: 71
End: 2021-04-16

## 2021-04-17 ENCOUNTER — LABORATORY RESULT (OUTPATIENT)
Age: 71
End: 2021-04-17

## 2021-04-17 ENCOUNTER — APPOINTMENT (OUTPATIENT)
Dept: DISASTER EMERGENCY | Facility: CLINIC | Age: 71
End: 2021-04-17

## 2021-04-19 ENCOUNTER — TRANSCRIPTION ENCOUNTER (OUTPATIENT)
Age: 71
End: 2021-04-19

## 2021-04-20 ENCOUNTER — OUTPATIENT (OUTPATIENT)
Dept: OUTPATIENT SERVICES | Facility: HOSPITAL | Age: 71
LOS: 1 days | End: 2021-04-20
Payer: MEDICARE

## 2021-04-20 DIAGNOSIS — Z98.891 HISTORY OF UTERINE SCAR FROM PREVIOUS SURGERY: Chronic | ICD-10-CM

## 2021-04-20 DIAGNOSIS — Z90.710 ACQUIRED ABSENCE OF BOTH CERVIX AND UTERUS: Chronic | ICD-10-CM

## 2021-04-20 DIAGNOSIS — M54.16 RADICULOPATHY, LUMBAR REGION: ICD-10-CM

## 2021-04-20 PROCEDURE — 77003 FLUOROGUIDE FOR SPINE INJECT: CPT

## 2021-04-20 PROCEDURE — 62323 NJX INTERLAMINAR LMBR/SAC: CPT

## 2021-05-07 ENCOUNTER — APPOINTMENT (OUTPATIENT)
Dept: NUCLEAR MEDICINE | Facility: CLINIC | Age: 71
End: 2021-05-07
Payer: MEDICARE

## 2021-05-07 ENCOUNTER — OUTPATIENT (OUTPATIENT)
Dept: OUTPATIENT SERVICES | Facility: HOSPITAL | Age: 71
LOS: 1 days | End: 2021-05-07

## 2021-05-07 DIAGNOSIS — Z98.891 HISTORY OF UTERINE SCAR FROM PREVIOUS SURGERY: Chronic | ICD-10-CM

## 2021-05-07 DIAGNOSIS — Z90.710 ACQUIRED ABSENCE OF BOTH CERVIX AND UTERUS: Chronic | ICD-10-CM

## 2021-05-07 DIAGNOSIS — G25.0 ESSENTIAL TREMOR: ICD-10-CM

## 2021-05-07 PROCEDURE — 78803 RP LOCLZJ TUM SPECT 1 AREA: CPT | Mod: 26

## 2021-05-22 ENCOUNTER — APPOINTMENT (OUTPATIENT)
Dept: DISASTER EMERGENCY | Facility: CLINIC | Age: 71
End: 2021-05-22

## 2021-05-23 LAB — SARS-COV-2 N GENE NPH QL NAA+PROBE: NOT DETECTED

## 2021-05-24 ENCOUNTER — TRANSCRIPTION ENCOUNTER (OUTPATIENT)
Age: 71
End: 2021-05-24

## 2021-05-25 ENCOUNTER — OUTPATIENT (OUTPATIENT)
Dept: OUTPATIENT SERVICES | Facility: HOSPITAL | Age: 71
LOS: 1 days | End: 2021-05-25
Payer: MEDICARE

## 2021-05-25 DIAGNOSIS — Z90.710 ACQUIRED ABSENCE OF BOTH CERVIX AND UTERUS: Chronic | ICD-10-CM

## 2021-05-25 DIAGNOSIS — Z98.891 HISTORY OF UTERINE SCAR FROM PREVIOUS SURGERY: Chronic | ICD-10-CM

## 2021-05-25 DIAGNOSIS — M47.816 SPONDYLOSIS WITHOUT MYELOPATHY OR RADICULOPATHY, LUMBAR REGION: ICD-10-CM

## 2021-05-25 PROCEDURE — 64493 INJ PARAVERT F JNT L/S 1 LEV: CPT | Mod: RT

## 2021-05-25 PROCEDURE — 64494 INJ PARAVERT F JNT L/S 2 LEV: CPT

## 2021-05-25 PROCEDURE — 64495 INJ PARAVERT F JNT L/S 3 LEV: CPT

## 2021-05-25 PROCEDURE — 76000 FLUOROSCOPY <1 HR PHYS/QHP: CPT

## 2021-06-02 ENCOUNTER — RX RENEWAL (OUTPATIENT)
Age: 71
End: 2021-06-02

## 2021-06-12 ENCOUNTER — APPOINTMENT (OUTPATIENT)
Dept: DISASTER EMERGENCY | Facility: CLINIC | Age: 71
End: 2021-06-12

## 2021-06-13 LAB — SARS-COV-2 N GENE NPH QL NAA+PROBE: NOT DETECTED

## 2021-06-16 ENCOUNTER — APPOINTMENT (OUTPATIENT)
Dept: PULMONOLOGY | Facility: CLINIC | Age: 71
End: 2021-06-16
Payer: MEDICARE

## 2021-06-16 VITALS — DIASTOLIC BLOOD PRESSURE: 60 MMHG | SYSTOLIC BLOOD PRESSURE: 110 MMHG

## 2021-06-16 VITALS — HEIGHT: 60.5 IN | TEMPERATURE: 96.7 F

## 2021-06-16 VITALS — HEART RATE: 75 BPM | OXYGEN SATURATION: 97 % | RESPIRATION RATE: 16 BRPM

## 2021-06-16 PROCEDURE — 94010 BREATHING CAPACITY TEST: CPT

## 2021-06-16 PROCEDURE — 99215 OFFICE O/P EST HI 40 MIN: CPT | Mod: 25

## 2021-06-16 NOTE — DISCUSSION/SUMMARY
[Asthma] : asthma [Responding to Treatment] : responding to treatment [Mild Persistent] : mild persistent [FreeTextEntry1] : 70-year-old female, seen today for the above. Patient's findings on CAT scan, mostly. Be on the basis of chronic right diaphragmatic paralysis without any evidence of endobronchial obstruction. This most likely lead to compressive atelectasis of right middle lobe. A sniff test will be performed  [Decompensated] : decompensated [Medication Changes Per Orders] : Medication changes are as documented in orders [de-identified] : due to poor complaince with Breo

## 2021-06-16 NOTE — CONSULT LETTER
[Dear  ___] : Dear  [unfilled], [Consult Letter:] : I had the pleasure of evaluating your patient, [unfilled]. [Please see my note below.] : Please see my note below. [Consult Closing:] : Thank you very much for allowing me to participate in the care of this patient.  If you have any questions, please do not hesitate to contact me. [Sincerely,] : Sincerely, [FreeTextEntry3] : Maicol Pederson MD FCCP\par Pulmonary/Critical Care/Sleep Medicine\par Department of Internal Medicine\par \par [facility] [employer]\par

## 2021-06-16 NOTE — HISTORY OF PRESENT ILLNESS
[Moderate Persistent] : moderate persistent [Doing Well] : doing well [None] : ~He/She~ has no significant interval events [Cough] : denies coughing [Chest Tightness Or Heavy Pressure] : denies chest tightness [PFTs] : pulmonary function tests [Wheezing] : worsened wheezing [Shortness Of Breath] : worsened shortness of breath [ESS] : 0 [Not Well Controlled] : Not well controlled [Cough at Night] : not awakened at night with cough [Wheezing at Night] : not awakened at night because of wheezing or trouble breathing [More Frequent Use Needed Recently] : normal [Adherent] : the patient is not adherent with ~his/her~ medication regimen [de-identified] : sinusitis, rt diaphragmatic paralysis [FreeTextEntry3] : forgets to take

## 2021-06-16 NOTE — PHYSICAL EXAM
[No Acute Distress] : no acute distress [Normal Oropharynx] : normal oropharynx [Normal Appearance] : normal appearance [No Neck Mass] : no neck mass [Normal Rate/Rhythm] : normal rate/rhythm [Normal S1, S2] : normal s1, s2 [No Murmurs] : no murmurs [Benign] : benign [Normal Gait] : normal gait [No Clubbing] : no clubbing [No Cyanosis] : no cyanosis [No Edema] : no edema [FROM] : FROM [Normal Color/ Pigmentation] : normal color/ pigmentation [No Focal Deficits] : no focal deficits [Oriented x3] : oriented x3 [Normal Affect] : normal affect [TextBox_68] : decreased BS ricardo [TextBox_80] : absent diaphragmatic descent on rt

## 2021-06-16 NOTE — QUALITY MEASURES
Patient complains of nausea and emesis that started today. [Spirometry performed within] : spirometry performed within the last 12 months

## 2021-06-16 NOTE — PROCEDURE
[___%] : last visit [unfilled]U% [___%] : last visit [unfilled]U% [Spirometry] : worsening [FreeTextEntry1] : \par \par

## 2021-06-24 ENCOUNTER — APPOINTMENT (OUTPATIENT)
Dept: CARDIOLOGY | Facility: CLINIC | Age: 71
End: 2021-06-24
Payer: MEDICARE

## 2021-06-24 ENCOUNTER — NON-APPOINTMENT (OUTPATIENT)
Age: 71
End: 2021-06-24

## 2021-06-24 VITALS
RESPIRATION RATE: 16 BRPM | WEIGHT: 156 LBS | HEART RATE: 76 BPM | SYSTOLIC BLOOD PRESSURE: 124 MMHG | HEIGHT: 63 IN | DIASTOLIC BLOOD PRESSURE: 70 MMHG | BODY MASS INDEX: 27.64 KG/M2

## 2021-06-24 PROCEDURE — 93000 ELECTROCARDIOGRAM COMPLETE: CPT

## 2021-06-24 PROCEDURE — 99214 OFFICE O/P EST MOD 30 MIN: CPT

## 2021-06-24 NOTE — PHYSICAL EXAM
[General Appearance - In No Acute Distress] : no acute distress [Normal Conjunctiva] : the conjunctiva exhibited no abnormalities [Normal Oral Mucosa] : normal oral mucosa [Normal Oropharynx] : normal oropharynx [Auscultation Breath Sounds / Voice Sounds] : lungs were clear to auscultation bilaterally [Normal Rate] : normal [Rhythm Regular] : regular [Normal S1] : normal S1 [Normal S2] : normal S2 [No Murmur] : no murmurs heard [Abdomen Soft] : soft [Abdomen Tenderness] : non-tender [Abnormal Walk] : normal gait [Nail Clubbing] : no clubbing of the fingernails [Cyanosis, Localized] : no localized cyanosis [Skin Color & Pigmentation] : normal skin color and pigmentation [Oriented To Time, Place, And Person] : oriented to person, place, and time [Affect] : the affect was normal [FreeTextEntry1] : No JVD, no carotid bruits. [S3] : no S3 [S4] : no S4

## 2021-06-24 NOTE — DISCUSSION/SUMMARY
[FreeTextEntry1] : 1. She will take aspirin 81 mg daily for a week and then she can discontinue Plavix.  She understands the risks and altering her antiplatelet therapy.\par 2. Continue other current cardiac meds in doses as noted above for hypertension, CAD, cardiomyopathy and CHF.\par 3. No additional cardiac testing at this time.  Consider stress testing at follow-up if she is still having issues with shortness of breath despite adequate pulmonary treatment.\par 4. Monitor BP at home, keep a log and bring to f/u.\par 5. Patient is encouraged to exercise at least 30 minutes a day everyday of the week.\par 6. There is no cardiac contraindication at this time to pain injections. She may hold her aspirin and Plavix for 5 days prior to the procedure. \par 7. Follow up here in 6 weeks.\par

## 2021-06-24 NOTE — HISTORY OF PRESENT ILLNESS
[FreeTextEntry1] : Patient presents back today having more issues with shortness of breath recently.  She followed up with pulmonary who said she appears to be in the midst of an asthma exacerbation and gave her Breo.  She notes some mild improvement with the Breo but the breathing is still an issue.  She did have the injection in her back and that seemed to help some and now she is planning one on the other side.  She confesses to me that a few months ago she stopped aspirin because she was having issues with bruising.  She is still having issues with bruising and wants to stop the Plavix.  She does not have any chest pain.  She is taking her other medications.  Patient denies palpitations, orthopnea, presyncope, syncope.

## 2021-06-24 NOTE — ASSESSMENT
[FreeTextEntry1] : Echocardiogram March 2, 2021 demonstrated left ventricle normal size and function with ejection fraction of 55 to 60%.  Mild mitral, trace aortic and mild tricuspid regurgitation noted.\par \par EKG: Sinus rhythm with no significant ST or T wave changes. Poor R-wave progression.  LAFB.\par \par 70-year-old female with a past medical history of CAD, prior severe CMP, with improved EF, hypertension presenting for f/u.  Patient continues to have issues with shortness of breath but this still seems to be more likely pulmonary and related to her asthma rather than cardiac cause.  There is no other evidence of heart failure at this time.  If she continues to have issues with shortness of breath despite adequate treatment from a pulmonary perspective I might reconsider stress testing.  I will hold off for now.  With regards to her antiplatelet agents I have told her not to change her medication without speaking to me.  For now she is unwilling to consider on dual antiplatelet therapy and given that it has been almost a year since her stenting this is not unreasonable.  She would like to switch from Plavix and I have asked her to take aspirin for a week along with the Plavix before discontinuing Plavix and she is willing to do that.  Her blood pressure appears to be adequately controlled.

## 2021-07-28 ENCOUNTER — APPOINTMENT (OUTPATIENT)
Dept: PULMONOLOGY | Facility: CLINIC | Age: 71
End: 2021-07-28
Payer: MEDICARE

## 2021-07-28 VITALS
OXYGEN SATURATION: 91 % | BODY MASS INDEX: 27.64 KG/M2 | DIASTOLIC BLOOD PRESSURE: 60 MMHG | WEIGHT: 156 LBS | SYSTOLIC BLOOD PRESSURE: 120 MMHG | HEART RATE: 76 BPM | HEIGHT: 63 IN

## 2021-07-28 PROCEDURE — 99215 OFFICE O/P EST HI 40 MIN: CPT | Mod: 25

## 2021-07-28 PROCEDURE — 94010 BREATHING CAPACITY TEST: CPT

## 2021-07-28 RX ORDER — FLUTICASONE FUROATE AND VILANTEROL TRIFENATATE 200; 25 UG/1; UG/1
200-25 POWDER RESPIRATORY (INHALATION) DAILY
Qty: 1 | Refills: 5 | Status: DISCONTINUED | COMMUNITY
Start: 2020-02-12 | End: 2021-07-28

## 2021-07-28 RX ORDER — PREDNISONE 10 MG/1
10 TABLET ORAL
Qty: 30 | Refills: 1 | Status: DISCONTINUED | COMMUNITY
Start: 2021-06-25 | End: 2021-07-28

## 2021-07-28 RX ORDER — PREDNISONE 50 MG/1
50 TABLET ORAL
Qty: 5 | Refills: 0 | Status: DISCONTINUED | COMMUNITY
Start: 2021-06-16 | End: 2021-07-28

## 2021-07-28 NOTE — DISCUSSION/SUMMARY
[Asthma] : asthma [Responding to Treatment] : responding to treatment [Mild Persistent] : mild persistent [Medication Changes Per Orders] : Medication changes are as documented in orders [FreeTextEntry1] : Chest pain most likely MSK. Would consider w/u with spinal MRI [Improving] : improving [de-identified] : with chronic rt diaphragmatic paralysis [de-identified] : Not at baseline

## 2021-07-28 NOTE — HISTORY OF PRESENT ILLNESS
[Moderate Persistent] : moderate persistent [Doing Well] : doing well [Not Well Controlled] : Not well controlled [Cough] : denies coughing [Shortness Of Breath] : worsened shortness of breath [Chest Tightness Or Heavy Pressure] : denies chest tightness [PFTs] : pulmonary function tests [Wheezing] : improved wheezing [Never] : never [ESS] : 0 [Cough at Night] : not awakened at night with cough [Wheezing at Night] : not awakened at night because of wheezing or trouble breathing [More Frequent Use Needed Recently] : normal [Adherent] : the patient is not adherent with ~his/her~ medication regimen [de-identified] : sinusitis, rt diaphragmatic paralysis [de-identified] : Chest pain resolved after prednisone [FreeTextEntry3] : forgets to take

## 2021-07-28 NOTE — CONSULT LETTER
[Dear  ___] : Dear  [unfilled], [Consult Letter:] : I had the pleasure of evaluating your patient, [unfilled]. [Please see my note below.] : Please see my note below. [Consult Closing:] : Thank you very much for allowing me to participate in the care of this patient.  If you have any questions, please do not hesitate to contact me. [Sincerely,] : Sincerely, [FreeTextEntry3] : Maicol Pederson MD FCCP\par Pulmonary/Critical Care/Sleep Medicine\par Department of Internal Medicine\par \par [facility] [employer]\par

## 2021-07-30 ENCOUNTER — NON-APPOINTMENT (OUTPATIENT)
Age: 71
End: 2021-07-30

## 2021-07-30 ENCOUNTER — APPOINTMENT (OUTPATIENT)
Dept: CARDIOLOGY | Facility: CLINIC | Age: 71
End: 2021-07-30
Payer: MEDICARE

## 2021-07-30 VITALS
RESPIRATION RATE: 16 BRPM | HEIGHT: 65 IN | SYSTOLIC BLOOD PRESSURE: 143 MMHG | HEART RATE: 74 BPM | BODY MASS INDEX: 25.66 KG/M2 | WEIGHT: 154 LBS | DIASTOLIC BLOOD PRESSURE: 85 MMHG

## 2021-07-30 PROCEDURE — 99214 OFFICE O/P EST MOD 30 MIN: CPT

## 2021-07-30 PROCEDURE — 93000 ELECTROCARDIOGRAM COMPLETE: CPT

## 2021-07-30 RX ORDER — CLOPIDOGREL BISULFATE 75 MG/1
75 TABLET, FILM COATED ORAL DAILY
Qty: 90 | Refills: 1 | Status: DISCONTINUED | COMMUNITY
Start: 2020-09-25 | End: 2021-07-30

## 2021-07-30 NOTE — DISCUSSION/SUMMARY
[FreeTextEntry1] : 1. Plan nuclear stress test to fully rule out recurrent ischemia as any part of her shortness of breath issues.\par 2. Continue other current cardiac meds in doses as noted above for hypertension, CAD, cardiomyopathy and CHF.\par 3. Monitor BP at home, keep a log and bring to f/u.\par 4. Patient is encouraged to exercise at least 30 minutes a day everyday of the week.\par 5. There is no cardiac contraindication at this time to pain injections. She may hold her aspirin for 7 days prior to the procedure. \par 6. Follow up here in 6 weeks.\par

## 2021-07-30 NOTE — HISTORY OF PRESENT ILLNESS
[FreeTextEntry1] : Patient presents back today still having some issues with shortness of breath.  She was again seen in [facility] about a month ago with shortness of breath and back pain.  She was given pain meds and a nebulizer and got better.  She still believes at this point her shortness of breath is more secondary to her asthma.  She does get some intermittent heartburn at time but that gets better with treatment for the heartburn.  She does not have any exertional chest pain.  Patient denies palpitations, orthopnea, presyncope, syncope.

## 2021-07-30 NOTE — ASSESSMENT
[FreeTextEntry1] : Echocardiogram March 2, 2021 demonstrated left ventricle normal size and function with ejection fraction of 55 to 60%.  Mild mitral, trace aortic and mild tricuspid regurgitation noted.\par \par EKG: Sinus rhythm with no significant ST or T wave changes. Poor R-wave progression.  LAFB.\par \par 70-year-old female with a past medical history of CAD, prior severe CMP, with improved EF, hypertension presenting for f/u.  Patient continues to have issues with shortness of breath.  This still more likely is related to her underlying lung disease but at this point I have recommended stress testing to fully rule out any recurrent ischemia.  This will also let us reevaluate her EF.  There is no overt evidence of CHF on exam.  Blood pressure appears to be likely adequately controlled but she should be checking it more closely at home.  I'll make no changes to her medication at this time.

## 2021-09-04 ENCOUNTER — APPOINTMENT (OUTPATIENT)
Dept: DISASTER EMERGENCY | Facility: CLINIC | Age: 71
End: 2021-09-04

## 2021-09-04 ENCOUNTER — LABORATORY RESULT (OUTPATIENT)
Age: 71
End: 2021-09-04

## 2021-09-08 ENCOUNTER — APPOINTMENT (OUTPATIENT)
Dept: PULMONOLOGY | Facility: CLINIC | Age: 71
End: 2021-09-08
Payer: MEDICARE

## 2021-09-08 VITALS
RESPIRATION RATE: 16 BRPM | HEART RATE: 72 BPM | OXYGEN SATURATION: 95 % | DIASTOLIC BLOOD PRESSURE: 80 MMHG | SYSTOLIC BLOOD PRESSURE: 140 MMHG

## 2021-09-08 VITALS — HEIGHT: 62 IN | BODY MASS INDEX: 27.6 KG/M2 | WEIGHT: 150 LBS

## 2021-09-08 PROCEDURE — G0008: CPT

## 2021-09-08 PROCEDURE — 99214 OFFICE O/P EST MOD 30 MIN: CPT | Mod: 25

## 2021-09-08 PROCEDURE — 90662 IIV NO PRSV INCREASED AG IM: CPT

## 2021-09-08 PROCEDURE — 94010 BREATHING CAPACITY TEST: CPT

## 2021-09-08 NOTE — HISTORY OF PRESENT ILLNESS
[Never] : never [Moderate Persistent] : moderate persistent [Doing Well] : doing well [Not Well Controlled] : Not well controlled [Cough] : denies coughing [Chest Tightness Or Heavy Pressure] : denies chest tightness [PFTs] : pulmonary function tests [FreeTextEntry3] : forgets to take [Wheezing] : denies wheezing [Shortness Of Breath] : denies shortness of breath [Adherent] : the patient is adherent with ~his/her~ medication regimen [ESS] : 0 [Cough at Night] : not awakened at night with cough [Wheezing at Night] : not awakened at night because of wheezing or trouble breathing [More Frequent Use Needed Recently] : normal [de-identified] : sinusitis, rt diaphragmatic paralysis [de-identified] : had pain management

## 2021-09-08 NOTE — DISCUSSION/SUMMARY
[Asthma] : asthma [Improving] : improving [Responding to Treatment] : responding to treatment [Mild Persistent] : mild persistent [Medication Changes Per Orders] : Medication changes are as documented in orders [de-identified] : with chronic rt diaphragmatic paralysis

## 2021-09-08 NOTE — CONSULT LETTER
[Dear  ___] : Dear  [unfilled], [Consult Letter:] : I had the pleasure of evaluating your patient, [unfilled]. [Please see my note below.] : Please see my note below. [Consult Closing:] : Thank you very much for allowing me to participate in the care of this patient.  If you have any questions, please do not hesitate to contact me. [Sincerely,] : Sincerely, [FreeTextEntry3] : Maicol Pederson MD FCCP\par Pulmonary/Critical Care/Sleep Medicine\par Department of Internal Medicine\par \par [facility] [employer]\par

## 2021-10-07 ENCOUNTER — APPOINTMENT (OUTPATIENT)
Dept: CARDIOLOGY | Facility: CLINIC | Age: 71
End: 2021-10-07

## 2021-11-01 ENCOUNTER — NON-APPOINTMENT (OUTPATIENT)
Age: 71
End: 2021-11-01

## 2021-11-01 ENCOUNTER — APPOINTMENT (OUTPATIENT)
Dept: CARDIOLOGY | Facility: CLINIC | Age: 71
End: 2021-11-01
Payer: MEDICARE

## 2021-11-01 VITALS
HEART RATE: 89 BPM | DIASTOLIC BLOOD PRESSURE: 91 MMHG | OXYGEN SATURATION: 93 % | BODY MASS INDEX: 26.34 KG/M2 | RESPIRATION RATE: 16 BRPM | WEIGHT: 144 LBS | SYSTOLIC BLOOD PRESSURE: 136 MMHG

## 2021-11-01 DIAGNOSIS — Z01.818 ENCOUNTER FOR OTHER PREPROCEDURAL EXAMINATION: ICD-10-CM

## 2021-11-01 DIAGNOSIS — I42.8 OTHER CARDIOMYOPATHIES: ICD-10-CM

## 2021-11-01 PROCEDURE — 99214 OFFICE O/P EST MOD 30 MIN: CPT

## 2021-11-01 PROCEDURE — 93000 ELECTROCARDIOGRAM COMPLETE: CPT

## 2021-11-01 RX ORDER — HYDROMORPHONE HYDROCHLORIDE 4 MG/1
4 TABLET ORAL
Qty: 60 | Refills: 0 | Status: DISCONTINUED | COMMUNITY
Start: 2019-01-03 | End: 2021-11-01

## 2021-11-01 RX ORDER — ABATACEPT 250 MG/15ML
250 INJECTION, POWDER, LYOPHILIZED, FOR SOLUTION INTRAVENOUS
Refills: 0 | Status: DISCONTINUED | COMMUNITY
Start: 2021-06-24 | End: 2021-11-01

## 2021-11-01 RX ORDER — ALBUTEROL SULFATE 2.5 MG/3ML
(2.5 MG/3ML) SOLUTION RESPIRATORY (INHALATION)
Qty: 1 | Refills: 3 | Status: DISCONTINUED | COMMUNITY
Start: 2019-05-09 | End: 2021-11-01

## 2021-11-01 RX ORDER — BREXPIPRAZOLE 2 MG/1
2 TABLET ORAL
Refills: 0 | Status: DISCONTINUED | COMMUNITY
End: 2021-11-01

## 2021-11-01 RX ORDER — LISINOPRIL 10 MG/1
10 TABLET ORAL DAILY
Qty: 90 | Refills: 0 | Status: DISCONTINUED | COMMUNITY
Start: 2020-09-25 | End: 2021-11-01

## 2021-11-01 RX ORDER — MIRABEGRON 50 MG/1
50 TABLET, FILM COATED, EXTENDED RELEASE ORAL
Qty: 30 | Refills: 0 | Status: DISCONTINUED | COMMUNITY
Start: 2021-07-26 | End: 2021-11-01

## 2021-11-01 RX ORDER — PANTOPRAZOLE SODIUM 40 MG/1
40 TABLET, DELAYED RELEASE ORAL DAILY
Qty: 90 | Refills: 1 | Status: ACTIVE | COMMUNITY

## 2021-11-01 RX ORDER — CYCLOBENZAPRINE HYDROCHLORIDE 10 MG/1
10 TABLET, FILM COATED ORAL
Qty: 60 | Refills: 0 | Status: DISCONTINUED | COMMUNITY
Start: 2021-04-26 | End: 2021-11-01

## 2021-11-01 RX ORDER — ALPRAZOLAM 0.25 MG/1
0.25 TABLET ORAL
Qty: 60 | Refills: 0 | Status: DISCONTINUED | COMMUNITY
Start: 2021-07-02 | End: 2021-11-01

## 2021-11-01 RX ORDER — ALBUTEROL SULFATE 90 UG/1
108 (90 BASE) POWDER, METERED RESPIRATORY (INHALATION) EVERY 4 HOURS
Qty: 1 | Refills: 5 | Status: DISCONTINUED | COMMUNITY
Start: 2019-05-09 | End: 2021-11-01

## 2021-11-01 RX ORDER — ENOXAPARIN SODIUM 100 MG/ML
40 INJECTION SUBCUTANEOUS
Refills: 0 | Status: DISCONTINUED | COMMUNITY
End: 2021-11-01

## 2021-11-01 RX ORDER — ATORVASTATIN CALCIUM 80 MG/1
80 TABLET, FILM COATED ORAL
Qty: 90 | Refills: 1 | Status: DISCONTINUED | COMMUNITY
Start: 2020-10-30 | End: 2021-11-01

## 2021-11-01 RX ORDER — ZOLPIDEM TARTRATE 10 MG/1
10 TABLET ORAL
Qty: 30 | Refills: 0 | Status: DISCONTINUED | COMMUNITY
Start: 2018-11-26 | End: 2021-11-01

## 2021-11-01 RX ORDER — FLUTICASONE FUROATE, UMECLIDINIUM BROMIDE AND VILANTEROL TRIFENATATE 200; 62.5; 25 UG/1; UG/1; UG/1
200-62.5-25 POWDER RESPIRATORY (INHALATION) DAILY
Qty: 3 | Refills: 5 | Status: DISCONTINUED | OUTPATIENT
Start: 2021-07-28 | End: 2021-11-01

## 2021-11-01 RX ORDER — PRIMIDONE 50 MG/1
50 TABLET ORAL
Refills: 0 | Status: ACTIVE | COMMUNITY

## 2021-11-01 RX ORDER — PANTOPRAZOLE SODIUM 20 MG/1
TABLET, DELAYED RELEASE ORAL
Refills: 0 | Status: DISCONTINUED | COMMUNITY
End: 2021-11-01

## 2021-11-01 RX ORDER — ESCITALOPRAM OXALATE 20 MG/1
TABLET ORAL
Refills: 0 | Status: DISCONTINUED | COMMUNITY
End: 2021-11-01

## 2021-11-01 RX ORDER — MAGNESIUM OXIDE 241.3 MG/1000MG
400 TABLET ORAL
Qty: 90 | Refills: 0 | Status: DISCONTINUED | COMMUNITY
Start: 2021-05-14 | End: 2021-11-01

## 2021-11-01 RX ORDER — DONEPEZIL HYDROCHLORIDE 23 MG/1
TABLET, FILM COATED ORAL
Refills: 0 | Status: DISCONTINUED | COMMUNITY
End: 2021-11-01

## 2021-11-01 RX ORDER — TAMSULOSIN HYDROCHLORIDE 0.4 MG/1
0.4 CAPSULE ORAL
Qty: 30 | Refills: 0 | Status: COMPLETED | COMMUNITY
Start: 2021-10-26

## 2021-11-01 NOTE — DISCUSSION/SUMMARY
[FreeTextEntry1] : 1. Plan nuclear stress test to evaluate her new drop in EF.\par 2. Plan repeat echocardiogram to evaluate to see for recovery of her EF if this was a stress-induced cardiomyopathy.\par 3. Start valsartan 80 mg daily.  If her EF remains this low we will change to Entresto.  Blood work in 2 weeks.\par 4. Continue other current cardiac meds in doses as noted above for hypertension, CAD, cardiomyopathy and CHF.\par 5. Monitor BP at home, keep a log and bring to f/u.\par 6. Continue work with physical therapy.\par 7. Follow up here in one month.\par

## 2021-11-01 NOTE — ASSESSMENT
[FreeTextEntry1] : Echocardiogram March 2, 2021 demonstrated left ventricle normal size and function with ejection fraction of 55 to 60%.  Mild mitral, trace aortic and mild tricuspid regurgitation noted.\par \par Echocardiogram November 1, 2021 demonstrated left ventricle normal in size with severely reduced function and an ejection fraction of 20 to 25%.  Mild concentric LVH.  Mid to apical cavity hypokinesis with basal preservation of function noted possibly consistent with stress-induced cardiomyopathy.  No significant valve disease noted.\par \par EKG: Sinus rhythm with diffuse T wave inversions.\par \par 70-year-old female with a past medical history of CAD, prior severe CMP, with improved EF, hypertension presenting for f/u.  Patient was recently in the hospital with initially a fall but then moderately severe COVID-19.  Patient did recover and was ultimately sent home.  Prior to discharge an echocardiogram showed a severe drop in her EF in a pattern possibly consistent with stress-induced cardiomyopathy.  She was sent home only on a beta-blocker because she had been having issues with her renal function and hypotension.  Blood pressures gotten better.  I will start her with valsartan at this time and if her EF remains low consider change to Entresto.  She is also now clopidogrel and I will continue off that for now as it has been over a year since her MI and stents.  She does not show significant acute heart failure on exam here today.

## 2021-11-30 ENCOUNTER — RX RENEWAL (OUTPATIENT)
Age: 71
End: 2021-11-30

## 2021-12-03 LAB
ANION GAP SERPL CALC-SCNC: 14 MMOL/L
BUN SERPL-MCNC: 13 MG/DL
CALCIUM SERPL-MCNC: 9.6 MG/DL
CHLORIDE SERPL-SCNC: 100 MMOL/L
CO2 SERPL-SCNC: 28 MMOL/L
CREAT SERPL-MCNC: 1.24 MG/DL
GLUCOSE SERPL-MCNC: 201 MG/DL
MAGNESIUM SERPL-MCNC: 1.8 MG/DL
POTASSIUM SERPL-SCNC: 4 MMOL/L
SODIUM SERPL-SCNC: 142 MMOL/L

## 2021-12-06 ENCOUNTER — NON-APPOINTMENT (OUTPATIENT)
Age: 71
End: 2021-12-06

## 2021-12-13 ENCOUNTER — MED ADMIN CHARGE (OUTPATIENT)
Age: 71
End: 2021-12-13

## 2021-12-13 ENCOUNTER — APPOINTMENT (OUTPATIENT)
Dept: CARDIOLOGY | Facility: CLINIC | Age: 71
End: 2021-12-13
Payer: MEDICARE

## 2021-12-13 PROCEDURE — 93306 TTE W/DOPPLER COMPLETE: CPT

## 2021-12-13 RX ORDER — PERFLUTREN 6.52 MG/ML
6.52 INJECTION, SUSPENSION INTRAVENOUS
Qty: 2 | Refills: 0 | Status: COMPLETED | OUTPATIENT
Start: 2021-12-13

## 2021-12-13 RX ADMIN — PERFLUTREN MG/ML: 6.52 INJECTION, SUSPENSION INTRAVENOUS at 00:00

## 2021-12-20 ENCOUNTER — APPOINTMENT (OUTPATIENT)
Dept: CARDIOLOGY | Facility: CLINIC | Age: 71
End: 2021-12-20
Payer: MEDICARE

## 2021-12-20 PROCEDURE — ZZZZZ: CPT

## 2021-12-27 ENCOUNTER — APPOINTMENT (OUTPATIENT)
Dept: CARDIOLOGY | Facility: CLINIC | Age: 71
End: 2021-12-27
Payer: MEDICARE

## 2021-12-27 PROCEDURE — 93015 CV STRESS TEST SUPVJ I&R: CPT

## 2021-12-27 PROCEDURE — 78452 HT MUSCLE IMAGE SPECT MULT: CPT

## 2021-12-27 PROCEDURE — A9500: CPT

## 2021-12-27 RX ADMIN — REGADENOSON 0 MG/5ML: 0.08 INJECTION, SOLUTION INTRAVENOUS at 00:00

## 2021-12-27 RX ADMIN — AMINOPHYLLINE 3 MG/ML: 25 INJECTION, SOLUTION INTRAVENOUS at 00:00

## 2022-01-03 ENCOUNTER — TRANSCRIPTION ENCOUNTER (OUTPATIENT)
Age: 72
End: 2022-01-03

## 2022-01-03 RX ORDER — REGADENOSON 0.08 MG/ML
0.4 INJECTION, SOLUTION INTRAVENOUS
Qty: 4 | Refills: 0 | Status: COMPLETED | OUTPATIENT
Start: 2021-12-27

## 2022-01-03 RX ORDER — AMINOPHYLLINE 25 MG/ML
25 INJECTION, SOLUTION INTRAVENOUS
Qty: 0 | Refills: 0 | Status: COMPLETED | OUTPATIENT
Start: 2021-12-27

## 2022-01-04 ENCOUNTER — OUTPATIENT (OUTPATIENT)
Dept: OUTPATIENT SERVICES | Facility: HOSPITAL | Age: 72
LOS: 1 days | End: 2022-01-04
Payer: MEDICARE

## 2022-01-04 DIAGNOSIS — Z98.891 HISTORY OF UTERINE SCAR FROM PREVIOUS SURGERY: Chronic | ICD-10-CM

## 2022-01-04 DIAGNOSIS — M54.16 RADICULOPATHY, LUMBAR REGION: ICD-10-CM

## 2022-01-04 DIAGNOSIS — Z90.710 ACQUIRED ABSENCE OF BOTH CERVIX AND UTERUS: Chronic | ICD-10-CM

## 2022-01-04 PROCEDURE — 76000 FLUOROSCOPY <1 HR PHYS/QHP: CPT

## 2022-01-04 PROCEDURE — 64484 NJX AA&/STRD TFRM EPI L/S EA: CPT

## 2022-01-04 PROCEDURE — 64483 NJX AA&/STRD TFRM EPI L/S 1: CPT | Mod: RT

## 2022-01-10 ENCOUNTER — APPOINTMENT (OUTPATIENT)
Dept: CARDIOLOGY | Facility: CLINIC | Age: 72
End: 2022-01-10

## 2022-02-07 ENCOUNTER — TRANSCRIPTION ENCOUNTER (OUTPATIENT)
Age: 72
End: 2022-02-07

## 2022-02-08 ENCOUNTER — OUTPATIENT (OUTPATIENT)
Dept: OUTPATIENT SERVICES | Facility: HOSPITAL | Age: 72
LOS: 1 days | Discharge: HOME | End: 2022-02-08
Payer: MEDICARE

## 2022-02-08 DIAGNOSIS — M54.16 RADICULOPATHY, LUMBAR REGION: ICD-10-CM

## 2022-02-08 DIAGNOSIS — Z98.891 HISTORY OF UTERINE SCAR FROM PREVIOUS SURGERY: Chronic | ICD-10-CM

## 2022-02-08 DIAGNOSIS — Z90.710 ACQUIRED ABSENCE OF BOTH CERVIX AND UTERUS: Chronic | ICD-10-CM

## 2022-02-08 PROCEDURE — 64483 NJX AA&/STRD TFRM EPI L/S 1: CPT | Mod: LT

## 2022-02-08 PROCEDURE — 64484 NJX AA&/STRD TFRM EPI L/S EA: CPT | Mod: LT

## 2022-02-08 PROCEDURE — 76000 FLUOROSCOPY <1 HR PHYS/QHP: CPT

## 2022-02-09 ENCOUNTER — NON-APPOINTMENT (OUTPATIENT)
Age: 72
End: 2022-02-09

## 2022-02-09 ENCOUNTER — APPOINTMENT (OUTPATIENT)
Dept: CARDIOLOGY | Facility: CLINIC | Age: 72
End: 2022-02-09
Payer: MEDICARE

## 2022-02-09 VITALS
SYSTOLIC BLOOD PRESSURE: 118 MMHG | WEIGHT: 146 LBS | BODY MASS INDEX: 26.87 KG/M2 | HEIGHT: 62 IN | RESPIRATION RATE: 15 BRPM | DIASTOLIC BLOOD PRESSURE: 88 MMHG | HEART RATE: 102 BPM | OXYGEN SATURATION: 93 %

## 2022-02-09 DIAGNOSIS — Z23 ENCOUNTER FOR IMMUNIZATION: ICD-10-CM

## 2022-02-09 PROCEDURE — 99214 OFFICE O/P EST MOD 30 MIN: CPT

## 2022-02-09 PROCEDURE — 93000 ELECTROCARDIOGRAM COMPLETE: CPT

## 2022-02-09 RX ORDER — LORATADINE 5 MG
17 TABLET,CHEWABLE ORAL
Refills: 0 | Status: DISCONTINUED | COMMUNITY
End: 2022-02-09

## 2022-02-09 RX ORDER — GABAPENTIN 300 MG/1
300 CAPSULE ORAL TWICE DAILY
Refills: 0 | Status: ACTIVE | COMMUNITY

## 2022-02-09 RX ORDER — VALSARTAN 80 MG/1
80 TABLET, COATED ORAL DAILY
Qty: 90 | Refills: 1 | Status: DISCONTINUED | COMMUNITY
Start: 2021-11-01 | End: 2022-02-09

## 2022-02-09 RX ORDER — METOPROLOL SUCCINATE 50 MG/1
50 TABLET, EXTENDED RELEASE ORAL DAILY
Qty: 90 | Refills: 1 | Status: DISCONTINUED | COMMUNITY
Start: 2021-01-05 | End: 2022-02-09

## 2022-03-04 NOTE — DISCUSSION/SUMMARY
[FreeTextEntry1] : 1. No additional cardiac testing at this time.\par 2. Hold off on the addition of ACE inhibitor or ARB now but will reconsider at follow-up if her blood pressures are stable.\par 3. Continue other current cardiac meds in doses as noted above for hypertension, CAD, cardiomyopathy and CHF.\par 4. Monitor BP at home, keep a log and bring to f/u.\par 5. Encourage patient to be as active as possible.\par 6. Follow up here in three months.

## 2022-03-04 NOTE — ADDENDUM
[FreeTextEntry1] : Given patient's stable cardiac pattern as noted above there is no cardiac contraindication to lumbar spine injection at this time.  Aspirin may be held for up to a week prior to injection and restarted at the discretion of pain management.  Monitor through the procedure until stable post procedurally.  Continue other current cardiac meds in doses as noted above

## 2022-03-04 NOTE — HISTORY OF PRESENT ILLNESS
[FreeTextEntry1] : Patient presents back to the office today feeling fairly well.  Her blood pressures have apparently been low when she went to her primary doctors office and her valsartan was discontinued and her metoprolol was lowered.  Since then her blood pressures seem to have come up into the 110s over 70s to 80s.  She reports any real symptoms with the low blood pressure.  No significant dizziness or lightheadedness.  She is trying to be a little bit more active and has not had any significant shortness of breath.  No edema.  Patient denies chest pain, palpitations, orthopnea, presyncope, syncope.

## 2022-03-04 NOTE — ASSESSMENT
[FreeTextEntry1] : Echocardiogram March 2, 2021 demonstrated left ventricle normal size and function with ejection fraction of 55 to 60%.  Mild mitral, trace aortic and mild tricuspid regurgitation noted.\par \par Echocardiogram November 1, 2021 demonstrated left ventricle normal in size with severely reduced function and an ejection fraction of 20 to 25%.  Mild concentric LVH.  Mid to apical cavity hypokinesis with basal preservation of function noted possibly consistent with stress-induced cardiomyopathy.  No significant valve disease noted.\par \par Echocardiogram December 13, 2021 demonstrated left ventricle normal in size and function with ejection fraction of 55 to 60%.  Mild asymmetric LVH.  Mild mitral, trace aortic and mild tricuspid regurgitation.\par \par Nuclear stress test December 27, 2021 was a pharmacologic study which was tolerated well.  Blood pressure response to infusion was normal.  EKG showed no evidence of ischemia with infusion.  Evaluation of nuclear imaging showed no evidence of ischemia or infarct and ejection fraction of 58%.  Prominent liver noted in the right chest consistent with known elevated right hemidiaphragm.\par \par EKG: Sinus rhythm with borderline T wave changes.  Poor R-wave progression.\par \par 70-year-old female with a past medical history of CAD, prior severe CMP with improved EF, hypertension presenting for f/u.  Echocardiogram demonstrates a complete recovery in her EF following her illness.  Stress testing shows no evidence of ischemia and again a normal EF.  There is no evidence of heart failure at this time.  Her blood pressures have been low, likely secondary to her low EF and recent illness and her valsartan was stopped and her metoprolol decreased.  Ultimately I would like to get her back on lisinopril that she was on previously but will hold off for now.  She will start monitoring her blood pressure at home to ensure that is stable for restarting that or possibly increasing her metoprolol.

## 2022-03-12 ENCOUNTER — APPOINTMENT (OUTPATIENT)
Dept: DISASTER EMERGENCY | Facility: CLINIC | Age: 72
End: 2022-03-12

## 2022-03-15 ENCOUNTER — OUTPATIENT (OUTPATIENT)
Dept: OUTPATIENT SERVICES | Facility: HOSPITAL | Age: 72
LOS: 1 days | End: 2022-03-15
Payer: MEDICARE

## 2022-03-15 ENCOUNTER — APPOINTMENT (OUTPATIENT)
Dept: PULMONOLOGY | Facility: CLINIC | Age: 72
End: 2022-03-15
Payer: MEDICARE

## 2022-03-15 VITALS — BODY MASS INDEX: 26.62 KG/M2 | HEIGHT: 61 IN | WEIGHT: 141 LBS

## 2022-03-15 VITALS
SYSTOLIC BLOOD PRESSURE: 132 MMHG | OXYGEN SATURATION: 93 % | HEART RATE: 82 BPM | RESPIRATION RATE: 16 BRPM | DIASTOLIC BLOOD PRESSURE: 88 MMHG

## 2022-03-15 DIAGNOSIS — Z98.891 HISTORY OF UTERINE SCAR FROM PREVIOUS SURGERY: Chronic | ICD-10-CM

## 2022-03-15 DIAGNOSIS — Z90.710 ACQUIRED ABSENCE OF BOTH CERVIX AND UTERUS: Chronic | ICD-10-CM

## 2022-03-15 DIAGNOSIS — J45.909 UNSPECIFIED ASTHMA, UNCOMPLICATED: ICD-10-CM

## 2022-03-15 PROCEDURE — 99214 OFFICE O/P EST MOD 30 MIN: CPT | Mod: 25

## 2022-03-15 PROCEDURE — 94010 BREATHING CAPACITY TEST: CPT

## 2022-03-15 PROCEDURE — 71046 X-RAY EXAM CHEST 2 VIEWS: CPT | Mod: 26

## 2022-03-15 RX ORDER — FLUTICASONE FUROATE AND VILANTEROL TRIFENATATE 100; 25 UG/1; UG/1
100-25 POWDER RESPIRATORY (INHALATION)
Refills: 0 | Status: DISCONTINUED | COMMUNITY
End: 2022-03-15

## 2022-03-15 NOTE — CONSULT LETTER
[Dear  ___] : Dear  [unfilled], [Consult Letter:] : I had the pleasure of evaluating your patient, [unfilled]. [Please see my note below.] : Please see my note below. [Consult Closing:] : Thank you very much for allowing me to participate in the care of this patient.  If you have any questions, please do not hesitate to contact me. [Sincerely,] : Sincerely, [FreeTextEntry3] : Maicol Pederson MD FCCP\par Pulmonary/Critical Care/Sleep Medicine\par Department of Internal Medicine\par \par [facility] [employer]\par

## 2022-03-15 NOTE — DISCUSSION/SUMMARY
[Asthma] : asthma [Responding to Treatment] : responding to treatment [Mild Persistent] : mild persistent [Medication Changes Per Orders] : Medication changes are as documented in orders [Decompensated] : decompensated [de-identified] : with chronic rt diaphragmatic paralysis [de-identified] : CHF

## 2022-03-15 NOTE — HISTORY OF PRESENT ILLNESS
[Never] : never [Moderate Persistent] : moderate persistent [Doing Well] : doing well [Not Well Controlled] : Not well controlled [Wheezing] : denies wheezing [Cough] : denies coughing [Shortness Of Breath] : denies shortness of breath [Chest Tightness Or Heavy Pressure] : denies chest tightness [Adherent] : the patient is adherent with ~his/her~ medication regimen [PFTs] : pulmonary function tests [ESS] : 0 [Cough at Night] : not awakened at night with cough [Wheezing at Night] : not awakened at night because of wheezing or trouble breathing [More Frequent Use Needed Recently] : normal [de-identified] : sinusitis, rt diaphragmatic paralysis, cardiomyopathy [de-identified] : Hospitalized at [facility] 14 days in Sept for Covid 19.

## 2022-03-30 RX ORDER — KIT FOR THE PREPARATION OF TECHNETIUM TC99M SESTAMIBI 1 MG/5ML
INJECTION, POWDER, LYOPHILIZED, FOR SOLUTION PARENTERAL
Refills: 0 | Status: COMPLETED | OUTPATIENT
Start: 2022-03-30

## 2022-03-30 RX ADMIN — KIT FOR THE PREPARATION OF TECHNETIUM TC99M SESTAMIBI 0: 1 INJECTION, POWDER, LYOPHILIZED, FOR SOLUTION PARENTERAL at 00:00

## 2022-04-06 NOTE — ASSESSMENT
[FreeTextEntry1] : Mrs. Simpson is a 69 yo WF referred for evaluation of an elevated HGB / HCT. Review of her records reveal that her HGB / HCT has been normal up until Feb. 10, 2021 when her HGb was 16.7 and HCT was 50.1.Her WBC and Platelet count and differential were always norm. Today her HGB is 13.7, HCT-43.4, MCV 92.6,  WBC-7.3, Platelet count is 289,000. The differential is normal. Dr. Jean Baptiste in to see patient, reviewed prior lab work , compared to today's lab work, She feels no further work up is needed at this time. Patient has lab work done with Dr. Martins  regularly. They may contact us if there are any concerns regarding her CBC in the future.

## 2022-04-06 NOTE — REVIEW OF SYSTEMS
[Fatigue] : fatigue [Negative] : Allergic/Immunologic [FreeTextEntry9] : chronic back pain and joint pain

## 2022-04-06 NOTE — PHYSICAL EXAM
[Fully active, able to carry on all pre-disease performance without restriction] : Status 0 - Fully active, able to carry on all pre-disease performance without restriction [Thin] : thin [Normal] : affect appropriate [de-identified] : FRANNY changes

## 2022-04-06 NOTE — HISTORY OF PRESENT ILLNESS
[de-identified] : Mrs. Simpson is a 69 yo WF referred for evaluation of an elevated HGB / HCT. Review of her records reveal that her HGB / HCT has been normal up until Feb. 10, 2021 when her HGb was 16.7 and HCT was 50.1.Her WBC and Platelet count and differential were always normal. [de-identified] : She has fatigue, but this is not new, she is receiving Orencia for RA. She has chronic back pain an peripheral neuropathy from prior back surgery, for which she is on multiple medications, including narcotics and gabapentin.. She denies any neurological symptoms, no pruritis.

## 2022-06-20 ENCOUNTER — APPOINTMENT (OUTPATIENT)
Dept: CARDIOLOGY | Facility: CLINIC | Age: 72
End: 2022-06-20
Payer: MEDICARE

## 2022-06-20 ENCOUNTER — NON-APPOINTMENT (OUTPATIENT)
Age: 72
End: 2022-06-20

## 2022-06-20 ENCOUNTER — TRANSCRIPTION ENCOUNTER (OUTPATIENT)
Age: 72
End: 2022-06-20

## 2022-06-20 VITALS
WEIGHT: 145 LBS | HEART RATE: 84 BPM | HEIGHT: 61 IN | SYSTOLIC BLOOD PRESSURE: 143 MMHG | DIASTOLIC BLOOD PRESSURE: 87 MMHG | RESPIRATION RATE: 16 BRPM | BODY MASS INDEX: 27.38 KG/M2 | OXYGEN SATURATION: 95 %

## 2022-06-20 DIAGNOSIS — M25.569 PAIN IN UNSPECIFIED KNEE: ICD-10-CM

## 2022-06-20 PROCEDURE — 93000 ELECTROCARDIOGRAM COMPLETE: CPT

## 2022-06-20 PROCEDURE — 99214 OFFICE O/P EST MOD 30 MIN: CPT

## 2022-06-20 RX ORDER — VENLAFAXINE HYDROCHLORIDE 75 MG/1
75 CAPSULE, EXTENDED RELEASE ORAL
Qty: 30 | Refills: 0 | Status: ACTIVE | COMMUNITY
Start: 2022-05-02

## 2022-06-20 RX ORDER — QUETIAPINE FUMARATE 300 MG/1
300 TABLET ORAL
Qty: 30 | Refills: 0 | Status: ACTIVE | COMMUNITY
Start: 2021-11-10

## 2022-06-20 RX ORDER — QUETIAPINE FUMARATE 100 MG/1
100 TABLET ORAL
Refills: 0 | Status: DISCONTINUED | COMMUNITY
End: 2022-06-20

## 2022-06-20 RX ORDER — VENLAFAXINE HYDROCHLORIDE 150 MG/1
150 CAPSULE, EXTENDED RELEASE ORAL
Qty: 30 | Refills: 0 | Status: ACTIVE | COMMUNITY
Start: 2022-05-02

## 2022-06-21 ENCOUNTER — OUTPATIENT (OUTPATIENT)
Dept: OUTPATIENT SERVICES | Facility: HOSPITAL | Age: 72
LOS: 1 days | End: 2022-06-21
Payer: MEDICARE

## 2022-06-21 DIAGNOSIS — Z90.710 ACQUIRED ABSENCE OF BOTH CERVIX AND UTERUS: Chronic | ICD-10-CM

## 2022-06-21 DIAGNOSIS — Z98.891 HISTORY OF UTERINE SCAR FROM PREVIOUS SURGERY: Chronic | ICD-10-CM

## 2022-06-21 DIAGNOSIS — M25.511 PAIN IN RIGHT SHOULDER: ICD-10-CM

## 2022-06-21 PROCEDURE — 64999 UNLISTED PX NERVOUS SYSTEM: CPT | Mod: RT

## 2022-06-21 PROCEDURE — 76000 FLUOROSCOPY <1 HR PHYS/QHP: CPT

## 2022-08-11 NOTE — ASSESSMENT
[FreeTextEntry1] : Echocardiogram March 2, 2021 demonstrated left ventricle normal size and function with ejection fraction of 55 to 60%.  Mild mitral, trace aortic and mild tricuspid regurgitation noted.\par \par Echocardiogram November 1, 2021 demonstrated left ventricle normal in size with severely reduced function and an ejection fraction of 20 to 25%.  Mild concentric LVH.  Mid to apical cavity hypokinesis with basal preservation of function noted possibly consistent with stress-induced cardiomyopathy.  No significant valve disease noted.\par \par Echocardiogram December 13, 2021 demonstrated left ventricle normal in size and function with ejection fraction of 55 to 60%.  Mild asymmetric LVH.  Mild mitral, trace aortic and mild tricuspid regurgitation.\par \par Nuclear stress test December 27, 2021 was a pharmacologic study which was tolerated well.  Blood pressure response to infusion was normal.  EKG showed no evidence of ischemia with infusion.  Evaluation of nuclear imaging showed no evidence of ischemia or infarct and ejection fraction of 58%.  Prominent liver noted in the right chest consistent with known elevated right hemidiaphragm.\par \par EKG: Sinus rhythm with borderline T wave changes.  Poor R-wave progression.\par \par 71-year-old female with a past medical history of CAD, prior severe CMP with improved EF, hypertension presenting for f/u.  Patient presents back to the office today appearing reasonably well from a cardiovascular standpoint.  No evidence of acute heart failure at this time.  She is not having any cardiovascular type symptoms.  Her main issue is with her knee.  She is going to follow-up with orthopedics and pain management.  Ultimately she will need a knee replacement.  Blood pressure appears to be adequately controlled but she needs to bring a list with her at follow-up.  I will add back lisinopril as her blood pressure can certainly tolerate it and given her history of cardiomyopathy.  EKG appears unchanged.

## 2022-08-11 NOTE — DISCUSSION/SUMMARY
[EKG obtained to assist in diagnosis and management of assessed problem(s)] : EKG obtained to assist in diagnosis and management of assessed problem(s) [FreeTextEntry1] : 1. No additional cardiac testing at this time.\par 2. Restart lisinopril 10 mg daily given her history of cardiomyopathy and elevated blood pressure.\par 3. Continue other current cardiac meds in doses as noted above for hypertension, CAD, cardiomyopathy and CHF.\par 4. Monitor BP at home, keep a log and bring to f/u.\par 5. Encourage patient to be as active as possible.\par 6. Follow-up with orthopedics and pain management.\par 7. She will have blood work done.\par 8. Follow up here in three months.

## 2022-08-11 NOTE — ADDENDUM
[FreeTextEntry1] : Given the patient's stable cardiac pattern as noted above there is no cardiac contraindication to epidural shot at this time.  Aspirin can be held for 1 week prior to the shot and restarted post procedurally at the discretion of pain management.  Continue other current cardiac meds in doses as noted above.  Monitor through the procedure until stable post procedurally.

## 2022-08-12 ENCOUNTER — NON-APPOINTMENT (OUTPATIENT)
Age: 72
End: 2022-08-12

## 2022-08-12 DIAGNOSIS — Z01.812 ENCOUNTER FOR PREPROCEDURAL LABORATORY EXAMINATION: ICD-10-CM

## 2022-08-12 DIAGNOSIS — Z01.811 ENCOUNTER FOR PREPROCEDURAL RESPIRATORY EXAMINATION: ICD-10-CM

## 2022-08-12 LAB
ALBUMIN SERPL ELPH-MCNC: 4.2 G/DL
ALP BLD-CCNC: 108 U/L
ALT SERPL-CCNC: 12 U/L
ANION GAP SERPL CALC-SCNC: 13 MMOL/L
AST SERPL-CCNC: 17 U/L
BASOPHILS # BLD AUTO: 0.03 K/UL
BASOPHILS NFR BLD AUTO: 0.5 %
BILIRUB SERPL-MCNC: 0.3 MG/DL
BUN SERPL-MCNC: 13 MG/DL
CALCIUM SERPL-MCNC: 9.6 MG/DL
CHLORIDE SERPL-SCNC: 99 MMOL/L
CHOLEST SERPL-MCNC: 203 MG/DL
CO2 SERPL-SCNC: 26 MMOL/L
CREAT SERPL-MCNC: 0.72 MG/DL
EGFR: 89 ML/MIN/1.73M2
EOSINOPHIL # BLD AUTO: 0.06 K/UL
EOSINOPHIL NFR BLD AUTO: 1 %
ESTIMATED AVERAGE GLUCOSE: 120 MG/DL
GLUCOSE SERPL-MCNC: 105 MG/DL
HBA1C MFR BLD HPLC: 5.8 %
HCT VFR BLD CALC: 40.9 %
HDLC SERPL-MCNC: 68 MG/DL
HGB BLD-MCNC: 13.1 G/DL
IMM GRANULOCYTES NFR BLD AUTO: 0.3 %
LDLC SERPL CALC-MCNC: 85 MG/DL
LYMPHOCYTES # BLD AUTO: 1.41 K/UL
LYMPHOCYTES NFR BLD AUTO: 23.7 %
MAGNESIUM SERPL-MCNC: 2.1 MG/DL
MAN DIFF?: NORMAL
MCHC RBC-ENTMCNC: 30.1 PG
MCHC RBC-ENTMCNC: 32 GM/DL
MCV RBC AUTO: 94 FL
MONOCYTES # BLD AUTO: 0.92 K/UL
MONOCYTES NFR BLD AUTO: 15.5 %
NEUTROPHILS # BLD AUTO: 3.5 K/UL
NEUTROPHILS NFR BLD AUTO: 59 %
NONHDLC SERPL-MCNC: 135 MG/DL
PLATELET # BLD AUTO: 332 K/UL
POTASSIUM SERPL-SCNC: 4.8 MMOL/L
PROT SERPL-MCNC: 6.8 G/DL
RBC # BLD: 4.35 M/UL
RBC # FLD: 13.5 %
SODIUM SERPL-SCNC: 138 MMOL/L
TRIGL SERPL-MCNC: 248 MG/DL
WBC # FLD AUTO: 5.94 K/UL

## 2022-08-15 LAB — SARS-COV-2 N GENE NPH QL NAA+PROBE: NOT DETECTED

## 2022-08-16 ENCOUNTER — APPOINTMENT (OUTPATIENT)
Dept: PULMONOLOGY | Facility: CLINIC | Age: 72
End: 2022-08-16

## 2022-08-16 VITALS — BODY MASS INDEX: 27.88 KG/M2 | HEIGHT: 60 IN | WEIGHT: 142 LBS

## 2022-08-16 VITALS — DIASTOLIC BLOOD PRESSURE: 74 MMHG | RESPIRATION RATE: 16 BRPM | SYSTOLIC BLOOD PRESSURE: 136 MMHG

## 2022-08-16 PROCEDURE — 94010 BREATHING CAPACITY TEST: CPT

## 2022-08-16 PROCEDURE — 99213 OFFICE O/P EST LOW 20 MIN: CPT | Mod: 25

## 2022-08-16 RX ORDER — METHYLPREDNISOLONE 4 MG/1
4 TABLET ORAL
Qty: 21 | Refills: 0 | Status: DISCONTINUED | COMMUNITY
Start: 2022-06-20 | End: 2022-08-16

## 2022-08-16 RX ORDER — ZOLPIDEM TARTRATE 10 MG/1
10 TABLET, FILM COATED ORAL
Refills: 0 | Status: ACTIVE | COMMUNITY

## 2022-08-16 RX ORDER — HYDROXYZINE HYDROCHLORIDE 25 MG/1
25 TABLET ORAL
Qty: 30 | Refills: 0 | Status: DISCONTINUED | COMMUNITY
Start: 2022-05-31 | End: 2022-08-16

## 2022-08-16 RX ORDER — CHLORHEXIDINE GLUCONATE 4 %
1000 LIQUID (ML) TOPICAL
Refills: 0 | Status: DISCONTINUED | COMMUNITY
End: 2022-08-16

## 2022-08-16 RX ORDER — BENZONATATE 100 MG/1
100 CAPSULE ORAL
Qty: 30 | Refills: 0 | Status: DISCONTINUED | COMMUNITY
Start: 2022-07-05 | End: 2022-08-16

## 2022-08-16 RX ORDER — METHYLPREDNISOLONE 4 MG/1
4 TABLET ORAL
Qty: 21 | Refills: 0 | Status: DISCONTINUED | COMMUNITY
Start: 2022-07-05 | End: 2022-08-16

## 2022-08-16 RX ORDER — AZITHROMYCIN 250 MG/1
250 TABLET, FILM COATED ORAL
Qty: 6 | Refills: 0 | Status: DISCONTINUED | COMMUNITY
Start: 2022-06-28 | End: 2022-08-16

## 2022-08-16 RX ORDER — DICLOFENAC SODIUM 75 MG/1
75 TABLET, DELAYED RELEASE ORAL
Qty: 60 | Refills: 0 | Status: DISCONTINUED | COMMUNITY
Start: 2022-05-06 | End: 2022-08-16

## 2022-08-16 NOTE — REVIEW OF SYSTEMS
[Wheezing] : wheezing [Chronic Pain] : chronic pain [Blood Transfusion] : blood transfusion [Fever] : no fever [Fatigue] : no fatigue [Chills] : no chills [Dry Eyes] : no dry eyes [Nasal Congestion] : no nasal congestion [Cough] : no cough [Hemoptysis] : no hemoptysis [Sputum] : no sputum [SOB on Exertion] : no sob on exertion [GERD] : no gerd [Nausea] : no nausea [Vomiting] : no vomiting [Bleeding] : no bleeding [Rash] : no rash [Diabetes] : no diabetes [Thyroid Problem] : no thyroid problem [TextBox_94] : back shot

## 2022-08-16 NOTE — PHYSICAL EXAM
[III] : Mallampati Class: III [Supple] : supple [No Neck Mass] : no neck mass [No JVD] : no jvd [Normal S1, S2] : normal s1, s2 [No Murmurs] : no murmurs [Clear to Auscultation Bilaterally] : clear to auscultation bilaterally [Kyphosis] : kyphosis [Not Tender] : not tender [No Masses] : no masses [Soft] : soft [No Hernias] : no hernias [Normal Bowel Sounds] : normal bowel sounds [Normal Gait] : normal gait [No Clubbing] : no clubbing [No Edema] : no edema [No Rash] : no rash [No Focal Deficits] : no focal deficits [No Motor Deficits] : no motor deficits [Normal Affect] : normal affect [TextBox_2] : pleasant f no distress speaking full sentences no cough  noted

## 2022-08-16 NOTE — ASSESSMENT
[FreeTextEntry1] : 73y/o female\par \par 1- asthma  controlled\par 2- severe back pain / kyphosis with restrictive component\par 3- OA  - followed by rheumatology\par \par \par Recommendations\par 1- trelegy 200\par 2- ventolin MDI prn\par 3- back  pain  --  injections\par \par follow up Dr Tarango\par

## 2022-08-22 ENCOUNTER — TRANSCRIPTION ENCOUNTER (OUTPATIENT)
Age: 72
End: 2022-08-22

## 2022-08-23 ENCOUNTER — OUTPATIENT (OUTPATIENT)
Dept: OUTPATIENT SERVICES | Facility: HOSPITAL | Age: 72
LOS: 1 days | End: 2022-08-23
Payer: MEDICARE

## 2022-08-23 DIAGNOSIS — M54.16 RADICULOPATHY, LUMBAR REGION: ICD-10-CM

## 2022-08-23 DIAGNOSIS — Z98.891 HISTORY OF UTERINE SCAR FROM PREVIOUS SURGERY: Chronic | ICD-10-CM

## 2022-08-23 DIAGNOSIS — Z90.710 ACQUIRED ABSENCE OF BOTH CERVIX AND UTERUS: Chronic | ICD-10-CM

## 2022-08-23 PROCEDURE — 77003 FLUOROGUIDE FOR SPINE INJECT: CPT

## 2022-08-23 PROCEDURE — 62323 NJX INTERLAMINAR LMBR/SAC: CPT

## 2022-09-07 ENCOUNTER — NON-APPOINTMENT (OUTPATIENT)
Age: 72
End: 2022-09-07

## 2022-09-07 ENCOUNTER — APPOINTMENT (OUTPATIENT)
Dept: CARDIOLOGY | Facility: CLINIC | Age: 72
End: 2022-09-07

## 2022-09-07 VITALS
SYSTOLIC BLOOD PRESSURE: 100 MMHG | DIASTOLIC BLOOD PRESSURE: 58 MMHG | WEIGHT: 141 LBS | HEIGHT: 60 IN | BODY MASS INDEX: 27.68 KG/M2 | RESPIRATION RATE: 16 BRPM

## 2022-09-07 PROCEDURE — 99214 OFFICE O/P EST MOD 30 MIN: CPT

## 2022-09-07 PROCEDURE — 93000 ELECTROCARDIOGRAM COMPLETE: CPT

## 2022-09-07 RX ORDER — METOPROLOL SUCCINATE 25 MG/1
25 TABLET, EXTENDED RELEASE ORAL DAILY
Qty: 90 | Refills: 1 | Status: DISCONTINUED | COMMUNITY
End: 2022-09-07

## 2022-09-07 RX ORDER — LISINOPRIL 10 MG/1
10 TABLET ORAL DAILY
Qty: 90 | Refills: 1 | Status: DISCONTINUED | COMMUNITY
Start: 2022-06-20 | End: 2022-09-07

## 2022-09-07 RX ORDER — ALBUTEROL SULFATE 90 UG/1
108 (90 BASE) AEROSOL, METERED RESPIRATORY (INHALATION)
Qty: 18 | Refills: 0 | Status: DISCONTINUED | COMMUNITY
Start: 2022-06-28 | End: 2022-09-07

## 2022-09-07 RX ORDER — LIDOCAINE 5% 700 MG/1
5 PATCH TOPICAL
Qty: 14 | Refills: 0 | Status: DISCONTINUED | COMMUNITY
Start: 2022-03-24 | End: 2022-09-07

## 2022-09-07 RX ORDER — ASPIRIN ENTERIC COATED TABLETS 81 MG 81 MG/1
81 TABLET, DELAYED RELEASE ORAL
Refills: 1 | Status: DISCONTINUED | COMMUNITY
Start: 2018-02-23 | End: 2022-09-07

## 2022-09-07 RX ORDER — MELOXICAM 10 MG/1
10 CAPSULE ORAL
Refills: 0 | Status: DISCONTINUED | COMMUNITY
Start: 2022-06-20 | End: 2022-09-07

## 2022-09-14 NOTE — ADDENDUM
[FreeTextEntry1] : Patient remains without cardiac contraindication to knee surgery as noted above.  Monitor through the procedure until stable post procedurally.\par

## 2022-09-14 NOTE — DISCUSSION/SUMMARY
[EKG obtained to assist in diagnosis and management of assessed problem(s)] : EKG obtained to assist in diagnosis and management of assessed problem(s) [FreeTextEntry1] : 1. No additional cardiac testing at this time.\par 2. Continue off lisinopril and metoprolol for now.  We will reconsider as her blood pressure comes up.\par 3. Continue other current cardiac meds in doses as noted above for hypertension, CAD, cardiomyopathy and CHF.\par 4. Monitor BP at home, keep a log and bring to f/u.\par 5. Encourage patient to be as active as possible.\par 6. Follow-up with orthopedics and pain management.  There is still no cardiac contraindication to surgery if planned.\par 7. Follow up here in 1 month.

## 2022-09-14 NOTE — ASSESSMENT
[FreeTextEntry1] : Echocardiogram March 2, 2021 demonstrated left ventricle normal size and function with ejection fraction of 55 to 60%.  Mild mitral, trace aortic and mild tricuspid regurgitation noted.\par \par Echocardiogram November 1, 2021 demonstrated left ventricle normal in size with severely reduced function and an ejection fraction of 20 to 25%.  Mild concentric LVH.  Mid to apical cavity hypokinesis with basal preservation of function noted possibly consistent with stress-induced cardiomyopathy.  No significant valve disease noted.\par \par Echocardiogram December 13, 2021 demonstrated left ventricle normal in size and function with ejection fraction of 55 to 60%.  Mild asymmetric LVH.  Mild mitral, trace aortic and mild tricuspid regurgitation.\par \par Nuclear stress test December 27, 2021 was a pharmacologic study which was tolerated well.  Blood pressure response to infusion was normal.  EKG showed no evidence of ischemia with infusion.  Evaluation of nuclear imaging showed no evidence of ischemia or infarct and ejection fraction of 58%.  Prominent liver noted in the right chest consistent with known elevated right hemidiaphragm.\par \par EKG: Sinus rhythm with borderline T wave changes.  Poor R-wave progression.\par \par 71-year-old female with a past medical history of CAD, prior severe CMP with improved EF, hypertension presenting for f/u.  Patient presents back today having had her knee surgery canceled because of low blood pressure.  Her blood pressure continues to have some lability to it.  I believe some of her hypotension is secondary to her being dehydrated and encouraged her to increase her water intake significantly.  There is no evidence of heart failure at this time.  For now we will continue off beta-blocker and ACE inhibitor pending rescheduling of her surgery.  As her blood pressure comes up I will consider reinitiating the medication.  No evidence of any active ischemia at this time.

## 2022-09-21 ENCOUNTER — APPOINTMENT (OUTPATIENT)
Dept: CARDIOLOGY | Facility: CLINIC | Age: 72
End: 2022-09-21

## 2022-10-07 ENCOUNTER — APPOINTMENT (OUTPATIENT)
Dept: PULMONOLOGY | Facility: CLINIC | Age: 72
End: 2022-10-07

## 2022-12-05 NOTE — END OF VISIT
[>50% of Time Spent on Counseling for ____] : Greater than 50% of the encounter time was spent on counseling for [unfilled] [Time Spent: ___ minutes] : I have spent [unfilled] minutes of face to face time with the patient Heterosexual

## 2023-01-04 ENCOUNTER — RX RENEWAL (OUTPATIENT)
Age: 73
End: 2023-01-04

## 2023-03-01 ENCOUNTER — OFFICE (OUTPATIENT)
Dept: URBAN - METROPOLITAN AREA CLINIC 104 | Facility: CLINIC | Age: 73
Setting detail: OPHTHALMOLOGY
End: 2023-03-01
Payer: MEDICARE

## 2023-03-01 DIAGNOSIS — H01.004: ICD-10-CM

## 2023-03-01 DIAGNOSIS — H04.121: ICD-10-CM

## 2023-03-01 DIAGNOSIS — H01.001: ICD-10-CM

## 2023-03-01 DIAGNOSIS — Z96.1: ICD-10-CM

## 2023-03-01 DIAGNOSIS — H04.123: ICD-10-CM

## 2023-03-01 DIAGNOSIS — H04.122: ICD-10-CM

## 2023-03-01 PROCEDURE — 99213 OFFICE O/P EST LOW 20 MIN: CPT | Performed by: OPTOMETRIST

## 2023-03-01 ASSESSMENT — REFRACTION_AUTOREFRACTION
OS_AXIS: 26
OD_AXIS: 119
OD_CYLINDER: -0.25
OS_SPHERE: -0.25
OS_CYLINDER: -1.50
OD_SPHERE: 0.00

## 2023-03-01 ASSESSMENT — SUPERFICIAL PUNCTATE KERATITIS (SPK)
OD_SPK: 1+
OS_SPK: 1+

## 2023-03-01 ASSESSMENT — REFRACTION_MANIFEST
OD_AXIS: 60
OS_VA1: 20/25+
OS_SPHERE: PLANO
OD_SPHERE: PLANO
OD_VA1: 20/20
OD_CYLINDER: -0.50

## 2023-03-01 ASSESSMENT — LID POSITION - COMMENTS
OS_COMMENTS: LIDS IN IDEAL ANATOMIC POSITION
OD_COMMENTS: LIDS IN IDEAL ANATOMIC POSITION. LID CREASES HEALING WELL.

## 2023-03-01 ASSESSMENT — TONOMETRY
OS_IOP_MMHG: 15
OD_IOP_MMHG: 15

## 2023-03-01 ASSESSMENT — CONFRONTATIONAL VISUAL FIELD TEST (CVF)
OS_FINDINGS: FULL
OD_FINDINGS: FULL

## 2023-03-01 ASSESSMENT — KERATOMETRY
OS_K2POWER_DIOPTERS: 46.50
OD_AXISANGLE_DEGREES: 49
OS_AXISANGLE_DEGREES: 128
OD_K2POWER_DIOPTERS: 45.75
OS_K1POWER_DIOPTERS: 45.50
OD_K1POWER_DIOPTERS: 45.00

## 2023-03-01 ASSESSMENT — AXIALLENGTH_DERIVED
OS_AL: 23.0748
OD_AL: 22.9688

## 2023-03-01 ASSESSMENT — LID EXAM ASSESSMENTS
OD_BLEPHARITIS: RUL T
OS_BLEPHARITIS: LUL T

## 2023-03-01 ASSESSMENT — VISUAL ACUITY
OS_BCVA: 20/25-1
OD_BCVA: 20/30-2

## 2023-03-01 ASSESSMENT — SPHEQUIV_DERIVED
OS_SPHEQUIV: -1
OD_SPHEQUIV: -0.125

## 2023-03-04 ENCOUNTER — RX RENEWAL (OUTPATIENT)
Age: 73
End: 2023-03-04

## 2023-03-06 ENCOUNTER — RX RENEWAL (OUTPATIENT)
Age: 73
End: 2023-03-06

## 2023-03-09 ENCOUNTER — APPOINTMENT (OUTPATIENT)
Dept: CARDIOLOGY | Facility: CLINIC | Age: 73
End: 2023-03-09

## 2023-03-17 ENCOUNTER — NON-APPOINTMENT (OUTPATIENT)
Age: 73
End: 2023-03-17

## 2023-03-29 ENCOUNTER — OFFICE (OUTPATIENT)
Dept: URBAN - METROPOLITAN AREA CLINIC 104 | Facility: CLINIC | Age: 73
Setting detail: OPHTHALMOLOGY
End: 2023-03-29
Payer: MEDICARE

## 2023-03-29 DIAGNOSIS — H01.004: ICD-10-CM

## 2023-03-29 DIAGNOSIS — H35.373: ICD-10-CM

## 2023-03-29 DIAGNOSIS — H04.121: ICD-10-CM

## 2023-03-29 DIAGNOSIS — H01.001: ICD-10-CM

## 2023-03-29 DIAGNOSIS — H04.122: ICD-10-CM

## 2023-03-29 DIAGNOSIS — H04.123: ICD-10-CM

## 2023-03-29 DIAGNOSIS — Z96.1: ICD-10-CM

## 2023-03-29 PROCEDURE — 92134 CPTRZ OPH DX IMG PST SGM RTA: CPT | Performed by: OPTOMETRIST

## 2023-03-29 PROCEDURE — 92014 COMPRE OPH EXAM EST PT 1/>: CPT | Performed by: OPTOMETRIST

## 2023-03-29 ASSESSMENT — KERATOMETRY
OS_K2POWER_DIOPTERS: 45.79
OD_K2POWER_DIOPTERS: 45.73
OD_K1POWER_DIOPTERS: 45.00
OS_K1POWER_DIOPTERS: 44.70
OD_AXISANGLE_DEGREES: 051
OS_AXISANGLE_DEGREES: 093

## 2023-03-29 ASSESSMENT — REFRACTION_AUTOREFRACTION
OD_CYLINDER: -1.00
OD_SPHERE: -0.75
OS_AXIS: 067
OS_CYLINDER: -1.25
OS_SPHERE: PLANO
OD_AXIS: 116

## 2023-03-29 ASSESSMENT — CONFRONTATIONAL VISUAL FIELD TEST (CVF)
OS_FINDINGS: FULL
OD_FINDINGS: FULL

## 2023-03-29 ASSESSMENT — VISUAL ACUITY
OD_BCVA: 20/30-2
OS_BCVA: 20/25

## 2023-03-29 ASSESSMENT — TONOMETRY
OD_IOP_MMHG: 14
OS_IOP_MMHG: 14

## 2023-03-29 ASSESSMENT — LID EXAM ASSESSMENTS
OD_BLEPHARITIS: RUL T
OS_BLEPHARITIS: LUL T

## 2023-03-29 ASSESSMENT — SUPERFICIAL PUNCTATE KERATITIS (SPK)
OD_SPK: T
OS_SPK: T 1+

## 2023-03-29 ASSESSMENT — REFRACTION_MANIFEST
OD_SPHERE: PLANO
OD_CYLINDER: -0.50
OS_VA1: 20/25+
OD_VA1: 20/20
OD_AXIS: 60
OS_SPHERE: PLANO

## 2023-03-29 ASSESSMENT — LID POSITION - COMMENTS
OD_COMMENTS: LIDS IN IDEAL ANATOMIC POSITION. LID CREASES HEALING WELL.
OS_COMMENTS: LIDS IN IDEAL ANATOMIC POSITION

## 2023-03-29 ASSESSMENT — SPHEQUIV_DERIVED: OD_SPHEQUIV: -1.25

## 2023-03-29 ASSESSMENT — AXIALLENGTH_DERIVED: OD_AL: 23.4

## 2023-04-17 ENCOUNTER — TRANSCRIPTION ENCOUNTER (OUTPATIENT)
Age: 73
End: 2023-04-17

## 2023-04-18 ENCOUNTER — OUTPATIENT (OUTPATIENT)
Dept: OUTPATIENT SERVICES | Facility: HOSPITAL | Age: 73
LOS: 1 days | End: 2023-04-18
Payer: MEDICARE

## 2023-04-18 DIAGNOSIS — M54.16 RADICULOPATHY, LUMBAR REGION: ICD-10-CM

## 2023-04-18 DIAGNOSIS — Z98.891 HISTORY OF UTERINE SCAR FROM PREVIOUS SURGERY: Chronic | ICD-10-CM

## 2023-04-18 DIAGNOSIS — Z90.710 ACQUIRED ABSENCE OF BOTH CERVIX AND UTERUS: Chronic | ICD-10-CM

## 2023-04-18 PROCEDURE — 64483 NJX AA&/STRD TFRM EPI L/S 1: CPT | Mod: RT

## 2023-04-18 PROCEDURE — 64484 NJX AA&/STRD TFRM EPI L/S EA: CPT

## 2023-04-18 PROCEDURE — 76000 FLUOROSCOPY <1 HR PHYS/QHP: CPT

## 2023-05-02 ENCOUNTER — NON-APPOINTMENT (OUTPATIENT)
Age: 73
End: 2023-05-02

## 2023-05-02 ENCOUNTER — APPOINTMENT (OUTPATIENT)
Dept: CARDIOLOGY | Facility: CLINIC | Age: 73
End: 2023-05-02
Payer: MEDICARE

## 2023-05-02 VITALS
HEIGHT: 60 IN | SYSTOLIC BLOOD PRESSURE: 112 MMHG | RESPIRATION RATE: 16 BRPM | HEART RATE: 105 BPM | OXYGEN SATURATION: 90 % | BODY MASS INDEX: 27.48 KG/M2 | DIASTOLIC BLOOD PRESSURE: 76 MMHG | WEIGHT: 140 LBS

## 2023-05-02 PROCEDURE — 93000 ELECTROCARDIOGRAM COMPLETE: CPT | Mod: NC

## 2023-05-02 PROCEDURE — 99214 OFFICE O/P EST MOD 30 MIN: CPT

## 2023-05-02 RX ORDER — ATORVASTATIN CALCIUM 80 MG/1
80 TABLET, FILM COATED ORAL
Qty: 90 | Refills: 1 | Status: DISCONTINUED | COMMUNITY
Start: 2021-11-01 | End: 2023-05-02

## 2023-06-01 NOTE — DISCUSSION/SUMMARY
[EKG obtained to assist in diagnosis and management of assessed problem(s)] : EKG obtained to assist in diagnosis and management of assessed problem(s) [FreeTextEntry1] : 1. Proceed with surgery as planned.\par 2. Monitor through the procedure until stable post procedurally.\par 3. Plan echocardiogram to reevaluate her prior reduced EF which improved on her last echo.  \par 4. Continue off lisinopril and metoprolol for now.  Reconsider follow-up on the basis of her echo and her blood pressures.\par 5. Restart atorvastatin 80 mg daily given her CAD.  She will also try Plavix 75 mg daily given her CAD and she will let me know if she has issues with bruising.\par 6. Monitor BP at home, keep a log and bring to f/u.\par 7. Encourage patient to be as active as possible.\par 8. Follow-up with orthopedics and pain management. \par 9. Follow up here in three months.

## 2023-06-01 NOTE — HISTORY OF PRESENT ILLNESS
[FreeTextEntry1] : Patient presents back to the office today having not seen me since September.  At that time she did have her knee replacement which ultimately went well but she still having a lot of issues with the knee itself.  More recently she been having issues with a raspy voice which gets worse at night.  Because of this she was started on Protonix but she has not been taking it every day.  She is also now planning an endoscopy.  She reports no other real physical symptoms at this time.  She does note chronic issues with shortness of breath on exertion when she goes upstairs but otherwise she is able to do all of her activities without any limitation.  Patient denies chest pain, palpitations, orthopnea, presyncope, syncope.

## 2023-06-01 NOTE — ASSESSMENT
[FreeTextEntry1] : Echocardiogram March 2, 2021 demonstrated left ventricle normal size and function with ejection fraction of 55 to 60%.  Mild mitral, trace aortic and mild tricuspid regurgitation noted.\par \par Echocardiogram November 1, 2021 demonstrated left ventricle normal in size with severely reduced function and an ejection fraction of 20 to 25%.  Mild concentric LVH.  Mid to apical cavity hypokinesis with basal preservation of function noted possibly consistent with stress-induced cardiomyopathy.  No significant valve disease noted.\par \par Echocardiogram December 13, 2021 demonstrated left ventricle normal in size and function with ejection fraction of 55 to 60%.  Mild asymmetric LVH.  Mild mitral, trace aortic and mild tricuspid regurgitation.\par \par Nuclear stress test December 27, 2021 was a pharmacologic study which was tolerated well.  Blood pressure response to infusion was normal.  EKG showed no evidence of ischemia with infusion.  Evaluation of nuclear imaging showed no evidence of ischemia or infarct and ejection fraction of 58%.  Prominent liver noted in the right chest consistent with known elevated right hemidiaphragm.\par \par EKG: Sinus rhythm with no significant ST or T wave changes.  Poor R-wave progression.\par \par 72-year-old female with a past medical history of CAD, prior severe CMP with improved EF, hypertension presenting for f/u.  Patient successfully had her knee surgery but is still having some issues with the knee.  She did not come back to me she says because of that.  She continues off all medication for cardiovascular disease at this time.  She even is not taking her atorvastatin.  We had a long talk and she will restart her atorvastatin.  Additionally she is concerned about some bruising she is having now but should be on antiplatelet agent.  We have agreed to try Plavix 75 mg daily and see if this does not cause too much bruising.  With regards to endoscopy she is at moderate risk but there is no cardiac contraindication at this time.  Her EKG is unchanged.  No evidence of acute heart failure at this time.

## 2023-06-01 NOTE — ADDENDUM
[FreeTextEntry1] : Patient is now planned for injection into her shoulder.  Given patient's stable cardiac pattern as noted above there is no cardiac contraindication to injection at this time.  Clopidogrel can be held for 5 days prior to the procedure and restarted at the discretion of pain management.  Continue other current cardiac meds in doses as noted above.  Monitor through the procedure until stable post procedurally.\par

## 2023-06-12 ENCOUNTER — TRANSCRIPTION ENCOUNTER (OUTPATIENT)
Age: 73
End: 2023-06-12

## 2023-06-13 ENCOUNTER — OUTPATIENT (OUTPATIENT)
Dept: OUTPATIENT SERVICES | Facility: HOSPITAL | Age: 73
LOS: 1 days | End: 2023-06-13
Payer: MEDICARE

## 2023-06-13 DIAGNOSIS — Z98.891 HISTORY OF UTERINE SCAR FROM PREVIOUS SURGERY: Chronic | ICD-10-CM

## 2023-06-13 DIAGNOSIS — Z90.710 ACQUIRED ABSENCE OF BOTH CERVIX AND UTERUS: Chronic | ICD-10-CM

## 2023-06-13 DIAGNOSIS — M25.512 PAIN IN LEFT SHOULDER: ICD-10-CM

## 2023-06-13 PROCEDURE — 76000 FLUOROSCOPY <1 HR PHYS/QHP: CPT

## 2023-06-13 PROCEDURE — 64999 UNLISTED PX NERVOUS SYSTEM: CPT

## 2023-08-02 ENCOUNTER — APPOINTMENT (OUTPATIENT)
Dept: CARDIOLOGY | Facility: CLINIC | Age: 73
End: 2023-08-02

## 2023-08-02 ENCOUNTER — APPOINTMENT (OUTPATIENT)
Dept: PULMONOLOGY | Facility: CLINIC | Age: 73
End: 2023-08-02
Payer: MEDICARE

## 2023-08-02 VITALS
BODY MASS INDEX: 27.48 KG/M2 | RESPIRATION RATE: 16 BRPM | OXYGEN SATURATION: 95 % | DIASTOLIC BLOOD PRESSURE: 68 MMHG | WEIGHT: 140 LBS | SYSTOLIC BLOOD PRESSURE: 110 MMHG | HEIGHT: 60 IN | HEART RATE: 102 BPM

## 2023-08-02 PROCEDURE — 99214 OFFICE O/P EST MOD 30 MIN: CPT

## 2023-08-02 RX ORDER — ATORVASTATIN CALCIUM 80 MG/1
80 TABLET, FILM COATED ORAL
Qty: 90 | Refills: 1 | Status: DISCONTINUED | COMMUNITY
Start: 2023-05-02 | End: 2023-08-02

## 2023-08-02 RX ORDER — FLUTICASONE FUROATE, UMECLIDINIUM BROMIDE AND VILANTEROL TRIFENATATE 200; 62.5; 25 UG/1; UG/1; UG/1
200-62.5-25 POWDER RESPIRATORY (INHALATION) DAILY
Qty: 1 | Refills: 5 | Status: DISCONTINUED | COMMUNITY
Start: 2022-03-15 | End: 2023-08-02

## 2023-08-02 NOTE — CONSULT LETTER
[Dear  ___] : Dear  [unfilled], [Consult Letter:] : I had the pleasure of evaluating your patient, [unfilled]. [Please see my note below.] : Please see my note below. [Consult Closing:] : Thank you very much for allowing me to participate in the care of this patient.  If you have any questions, please do not hesitate to contact me. [Sincerely,] : Sincerely, [FreeTextEntry3] : Maicol Pederson MD FCCP\par  Pulmonary/Critical Care/Sleep Medicine\par  Department of Internal Medicine\par  \par  [facility] [employer]\par

## 2023-08-02 NOTE — DISCUSSION/SUMMARY
[Asthma] : asthma [Decompensated] : decompensated [Mild Persistent] : mild persistent [Medication Changes Per Orders] : Medication changes are as documented in orders [Patient] : the patient [de-identified] : with chronic rt diaphragmatic paralysis [de-identified] : due to non-compliance [de-identified] : CHF

## 2023-08-02 NOTE — HISTORY OF PRESENT ILLNESS
[Moderate Persistent] : moderate persistent [Doing Well] : doing well [Not Well Controlled] : Not well controlled [Wheezing] : denies wheezing [Cough] : denies coughing [Shortness Of Breath] : denies shortness of breath [Chest Tightness Or Heavy Pressure] : denies chest tightness [PFTs] : pulmonary function tests [ESS] : 0 [Cough at Night] : not awakened at night with cough [Wheezing at Night] : not awakened at night because of wheezing or trouble breathing [More Frequent Use Needed Recently] : normal [Adherent] : the patient is not adherent with ~his/her~ medication regimen [Unable To Manage Meds] : inability to manage ~his/her~ medications [Intolerance] : intolerance [de-identified] : sinusitis, rt diaphragmatic paralysis, cardiomyopathy, TKR [de-identified] : worse with hot weather last week [FreeTextEntry3] : using PRN, shakes with trelegy

## 2023-08-30 ENCOUNTER — APPOINTMENT (OUTPATIENT)
Dept: CARDIOLOGY | Facility: CLINIC | Age: 73
End: 2023-08-30
Payer: MEDICARE

## 2023-08-30 PROCEDURE — 93306 TTE W/DOPPLER COMPLETE: CPT

## 2023-08-30 RX ADMIN — PERFLUTREN MG/ML: 6.52 INJECTION, SUSPENSION INTRAVENOUS at 00:00

## 2023-08-31 RX ORDER — PERFLUTREN 6.52 MG/ML
6.52 INJECTION, SUSPENSION INTRAVENOUS
Qty: 2 | Refills: 0 | Status: COMPLETED | OUTPATIENT
Start: 2023-08-30

## 2023-09-06 ENCOUNTER — APPOINTMENT (OUTPATIENT)
Dept: CARDIOLOGY | Facility: CLINIC | Age: 73
End: 2023-09-06
Payer: MEDICARE

## 2023-09-06 VITALS
WEIGHT: 139 LBS | BODY MASS INDEX: 27.29 KG/M2 | RESPIRATION RATE: 16 BRPM | DIASTOLIC BLOOD PRESSURE: 85 MMHG | HEIGHT: 60 IN | HEART RATE: 102 BPM | SYSTOLIC BLOOD PRESSURE: 144 MMHG

## 2023-09-06 PROCEDURE — 93000 ELECTROCARDIOGRAM COMPLETE: CPT

## 2023-09-06 PROCEDURE — 99215 OFFICE O/P EST HI 40 MIN: CPT

## 2023-09-06 RX ORDER — FLUTICASONE FUROATE 200 UG/1
200 POWDER RESPIRATORY (INHALATION) DAILY
Qty: 1 | Refills: 5 | Status: DISCONTINUED | COMMUNITY
Start: 2023-08-02 | End: 2023-09-06

## 2023-09-06 RX ORDER — CLOPIDOGREL BISULFATE 75 MG/1
75 TABLET, FILM COATED ORAL DAILY
Qty: 90 | Refills: 1 | Status: DISCONTINUED | COMMUNITY
Start: 2023-05-02 | End: 2023-09-06

## 2023-09-06 RX ORDER — ATORVASTATIN CALCIUM 80 MG/1
80 TABLET, FILM COATED ORAL
Qty: 90 | Refills: 1 | Status: ACTIVE | COMMUNITY
Start: 2023-09-06

## 2023-09-06 NOTE — ASSESSMENT
[FreeTextEntry1] : Echocardiogram March 2, 2021 demonstrated left ventricle normal size and function with ejection fraction of 55 to 60%.  Mild mitral, trace aortic and mild tricuspid regurgitation noted.  Echocardiogram November 1, 2021 demonstrated left ventricle normal in size with severely reduced function and an ejection fraction of 20 to 25%.  Mild concentric LVH.  Mid to apical cavity hypokinesis with basal preservation of function noted possibly consistent with stress-induced cardiomyopathy.  No significant valve disease noted.  Echocardiogram December 13, 2021 demonstrated left ventricle normal in size and function with ejection fraction of 55 to 60%.  Mild asymmetric LVH.  Mild mitral, trace aortic and mild tricuspid regurgitation.  Nuclear stress test December 27, 2021 was a pharmacologic study which was tolerated well.  Blood pressure response to infusion was normal.  EKG showed no evidence of ischemia with infusion.  Evaluation of nuclear imaging showed no evidence of ischemia or infarct and ejection fraction of 58%.  Prominent liver noted in the right chest consistent with known elevated right hemidiaphragm.  Echocardiogram August 30, 2023 demonstrated left ventricle normal in size with mild to moderately reduced function and ejection fraction of 40 to 45%.  Mild concentric LVH.  Mild mitral, trace aortic and mild to moderate tricuspid regurgitation.  73-year-old female with a past medical history of CAD s/p PCI, prior severe CMP with improved EF, hypertension presenting for f/u.  Patient presents back today having had her echocardiogram which demonstrates a drop in her EF.  I have recommended nuclear stress testing to rule out any potential ischemic cause and to further reevaluate her EF.  I will start her on carvedilol 3.125 mg twice daily and then consider Entresto or an ARB at our next visit.  I will go slowly given issues with hypotension in the past.  She is unable to take any antiplatelet agents because of issues of severe bruising and sores.  She is on atorvastatin and will continue on that.  No evidence of acute heart failure at this time.

## 2023-09-06 NOTE — DISCUSSION/SUMMARY
[FreeTextEntry1] : 1.  Check nuclear stress test given the drop in her EF. 2.  Add carvedilol 3.125 mg twice daily given the drop in her EF.  We will consider valsartan or Entresto at follow-up if her blood pressure does not go too low. 3.  Continue atorvastatin at 80 mg daily for CAD. 4.  Continue off all antiplatelet agents given her severe bruising and sores. 5. Monitor BP at home, keep a log and bring to f/u. 6. Follow-up with orthopedics and pain management.  7. Follow-up with hematology. 8.  Follow up here after testing, and will make further recommendations at that time. [EKG obtained to assist in diagnosis and management of assessed problem(s)] : EKG obtained to assist in diagnosis and management of assessed problem(s)

## 2023-09-06 NOTE — HISTORY OF PRESENT ILLNESS
[FreeTextEntry1] : Patient presents back to the office today having taken Plavix for about a month but then having much more issues with bruising and sores and having stopped it.  She is seeing hematologist to see if there are other reasons why she is bruising so much but so far the work-up has apparently been unremarkable.  She continues to have issues with bruising even off Plavix.  She did restart the atorvastatin that I had suggested.  She continues to have issues with shortness of breath at times which she blames primarily on her asthma.  No real issues with edema.  She is also having balance issues for which she is planning to see neurology.  Blood pressures at home she reports in the 120s to 130s systolic.  Patient denies chest pain, palpitations, orthopnea, presyncope, syncope.

## 2023-09-06 NOTE — PHYSICAL EXAM
[General Appearance - In No Acute Distress] : no acute distress [Normal Conjunctiva] : the conjunctiva exhibited no abnormalities [Normal Oral Mucosa] : normal oral mucosa [Normal Oropharynx] : normal oropharynx [Auscultation Breath Sounds / Voice Sounds] : lungs were clear to auscultation bilaterally [Normal Rate] : normal [Rhythm Regular] : regular [Normal S1] : normal S1 [Normal S2] : normal S2 [No Murmur] : no murmurs heard [Abdomen Soft] : soft [Abdomen Tenderness] : non-tender [Abnormal Walk] : normal gait [Nail Clubbing] : no clubbing of the fingernails [Cyanosis, Localized] : no localized cyanosis [Skin Color & Pigmentation] : normal skin color and pigmentation [Oriented To Time, Place, And Person] : oriented to person, place, and time [Affect] : the affect was normal [S3] : no S3 [FreeTextEntry1] : No JVD, no carotid bruits. [S4] : no S4

## 2023-09-20 ENCOUNTER — APPOINTMENT (OUTPATIENT)
Dept: PULMONOLOGY | Facility: CLINIC | Age: 73
End: 2023-09-20
Payer: MEDICARE

## 2023-09-20 VITALS
DIASTOLIC BLOOD PRESSURE: 74 MMHG | RESPIRATION RATE: 16 BRPM | WEIGHT: 134 LBS | BODY MASS INDEX: 25.3 KG/M2 | HEART RATE: 81 BPM | SYSTOLIC BLOOD PRESSURE: 126 MMHG | HEIGHT: 61 IN | OXYGEN SATURATION: 95 %

## 2023-09-20 VITALS — WEIGHT: 134 LBS | HEIGHT: 61 IN | BODY MASS INDEX: 25.3 KG/M2

## 2023-09-20 DIAGNOSIS — J98.11 ATELECTASIS: ICD-10-CM

## 2023-09-20 PROCEDURE — 94010 BREATHING CAPACITY TEST: CPT

## 2023-09-20 PROCEDURE — 99215 OFFICE O/P EST HI 40 MIN: CPT | Mod: 25

## 2023-09-20 RX ORDER — TIOTROPIUM BROMIDE INHALATION SPRAY 1.56 UG/1
1.25 SPRAY, METERED RESPIRATORY (INHALATION) DAILY
Qty: 1 | Refills: 5 | Status: DISCONTINUED | COMMUNITY
Start: 2023-08-02 | End: 2023-09-20

## 2023-10-04 ENCOUNTER — APPOINTMENT (OUTPATIENT)
Dept: CARDIOLOGY | Facility: CLINIC | Age: 73
End: 2023-10-04
Payer: MEDICARE

## 2023-10-04 PROCEDURE — 93015 CV STRESS TEST SUPVJ I&R: CPT

## 2023-10-04 PROCEDURE — A9500: CPT

## 2023-10-04 PROCEDURE — 78452 HT MUSCLE IMAGE SPECT MULT: CPT

## 2023-10-04 RX ORDER — REGADENOSON 0.08 MG/ML
0.4 INJECTION, SOLUTION INTRAVENOUS
Qty: 4 | Refills: 0 | Status: COMPLETED | OUTPATIENT
Start: 2023-10-04

## 2023-10-04 RX ORDER — AMINOPHYLLINE 25 MG/ML
25 INJECTION, SOLUTION INTRAVENOUS
Qty: 0 | Refills: 0 | Status: COMPLETED | OUTPATIENT
Start: 2023-10-04

## 2023-11-27 ENCOUNTER — TRANSCRIPTION ENCOUNTER (OUTPATIENT)
Age: 73
End: 2023-11-27

## 2023-11-28 ENCOUNTER — OUTPATIENT (OUTPATIENT)
Dept: OUTPATIENT SERVICES | Facility: HOSPITAL | Age: 73
LOS: 1 days | End: 2023-11-28
Payer: MEDICARE

## 2023-11-28 DIAGNOSIS — Z90.710 ACQUIRED ABSENCE OF BOTH CERVIX AND UTERUS: Chronic | ICD-10-CM

## 2023-11-28 DIAGNOSIS — S22.000A WEDGE COMPRESSION FRACTURE OF UNSPECIFIED THORACIC VERTEBRA, INITIAL ENCOUNTER FOR CLOSED FRACTURE: ICD-10-CM

## 2023-11-28 DIAGNOSIS — Z98.891 HISTORY OF UTERINE SCAR FROM PREVIOUS SURGERY: Chronic | ICD-10-CM

## 2023-11-28 PROCEDURE — 62321 NJX INTERLAMINAR CRV/THRC: CPT

## 2023-11-28 PROCEDURE — 77003 FLUOROGUIDE FOR SPINE INJECT: CPT

## 2023-11-29 ENCOUNTER — APPOINTMENT (OUTPATIENT)
Dept: CARDIOLOGY | Facility: CLINIC | Age: 73
End: 2023-11-29
Payer: MEDICARE

## 2023-11-29 VITALS
BODY MASS INDEX: 26.06 KG/M2 | OXYGEN SATURATION: 97 % | WEIGHT: 138 LBS | SYSTOLIC BLOOD PRESSURE: 130 MMHG | HEIGHT: 61 IN | DIASTOLIC BLOOD PRESSURE: 80 MMHG | HEART RATE: 90 BPM | RESPIRATION RATE: 16 BRPM

## 2023-11-29 PROCEDURE — 99214 OFFICE O/P EST MOD 30 MIN: CPT

## 2023-12-01 ENCOUNTER — APPOINTMENT (OUTPATIENT)
Dept: PULMONOLOGY | Facility: CLINIC | Age: 73
End: 2023-12-01
Payer: MEDICARE

## 2023-12-01 VITALS
SYSTOLIC BLOOD PRESSURE: 130 MMHG | RESPIRATION RATE: 16 BRPM | DIASTOLIC BLOOD PRESSURE: 76 MMHG | HEART RATE: 67 BPM | OXYGEN SATURATION: 96 %

## 2023-12-01 VITALS — HEIGHT: 60.5 IN | WEIGHT: 135 LBS | BODY MASS INDEX: 25.82 KG/M2

## 2023-12-01 PROCEDURE — 94010 BREATHING CAPACITY TEST: CPT

## 2023-12-01 PROCEDURE — 99214 OFFICE O/P EST MOD 30 MIN: CPT | Mod: 25

## 2024-01-15 ENCOUNTER — TRANSCRIPTION ENCOUNTER (OUTPATIENT)
Age: 74
End: 2024-01-15

## 2024-01-16 ENCOUNTER — OUTPATIENT (OUTPATIENT)
Dept: OUTPATIENT SERVICES | Facility: HOSPITAL | Age: 74
LOS: 1 days | End: 2024-01-16
Payer: MEDICARE

## 2024-01-16 DIAGNOSIS — Z98.891 HISTORY OF UTERINE SCAR FROM PREVIOUS SURGERY: Chronic | ICD-10-CM

## 2024-01-16 DIAGNOSIS — Z90.710 ACQUIRED ABSENCE OF BOTH CERVIX AND UTERUS: Chronic | ICD-10-CM

## 2024-01-16 DIAGNOSIS — M54.16 RADICULOPATHY, LUMBAR REGION: ICD-10-CM

## 2024-01-16 PROCEDURE — 77003 FLUOROGUIDE FOR SPINE INJECT: CPT

## 2024-01-16 PROCEDURE — 62323 NJX INTERLAMINAR LMBR/SAC: CPT

## 2024-01-22 RX ORDER — KIT FOR THE PREPARATION OF TECHNETIUM TC99M SESTAMIBI 1 MG/5ML
INJECTION, POWDER, LYOPHILIZED, FOR SOLUTION PARENTERAL
Refills: 0 | Status: COMPLETED | OUTPATIENT
Start: 2024-01-22

## 2024-01-22 RX ADMIN — KIT FOR THE PREPARATION OF TECHNETIUM TC99M SESTAMIBI 0: 1 INJECTION, POWDER, LYOPHILIZED, FOR SOLUTION PARENTERAL at 00:00

## 2024-02-07 LAB
ANION GAP SERPL CALC-SCNC: 14 MMOL/L
BUN SERPL-MCNC: 21 MG/DL
CALCIUM SERPL-MCNC: 9.3 MG/DL
CHLORIDE SERPL-SCNC: 97 MMOL/L
CO2 SERPL-SCNC: 26 MMOL/L
CREAT SERPL-MCNC: 0.97 MG/DL
EGFR: 62 ML/MIN/1.73M2
GLUCOSE SERPL-MCNC: 65 MG/DL
MAGNESIUM SERPL-MCNC: 2.3 MG/DL
POTASSIUM SERPL-SCNC: 4.6 MMOL/L
SODIUM SERPL-SCNC: 137 MMOL/L

## 2024-03-04 ENCOUNTER — INPATIENT (INPATIENT)
Facility: HOSPITAL | Age: 74
LOS: 6 days | Discharge: HOME | DRG: 177 | End: 2024-03-11
Attending: INTERNAL MEDICINE | Admitting: INTERNAL MEDICINE
Payer: MEDICARE

## 2024-03-04 VITALS
OXYGEN SATURATION: 93 % | WEIGHT: 140.21 LBS | DIASTOLIC BLOOD PRESSURE: 69 MMHG | RESPIRATION RATE: 20 BRPM | SYSTOLIC BLOOD PRESSURE: 113 MMHG | HEIGHT: 63 IN | HEART RATE: 111 BPM | TEMPERATURE: 98 F

## 2024-03-04 DIAGNOSIS — Z90.710 ACQUIRED ABSENCE OF BOTH CERVIX AND UTERUS: Chronic | ICD-10-CM

## 2024-03-04 DIAGNOSIS — Z98.891 HISTORY OF UTERINE SCAR FROM PREVIOUS SURGERY: Chronic | ICD-10-CM

## 2024-03-04 LAB
ALBUMIN SERPL ELPH-MCNC: 4.2 G/DL — SIGNIFICANT CHANGE UP (ref 3.3–5.2)
ALP SERPL-CCNC: 97 U/L — SIGNIFICANT CHANGE UP (ref 40–120)
ALT FLD-CCNC: 35 U/L — HIGH
ANION GAP SERPL CALC-SCNC: 12 MMOL/L — SIGNIFICANT CHANGE UP (ref 5–17)
AST SERPL-CCNC: 24 U/L — SIGNIFICANT CHANGE UP
BASOPHILS # BLD AUTO: 0.01 K/UL — SIGNIFICANT CHANGE UP (ref 0–0.2)
BASOPHILS NFR BLD AUTO: 0.1 % — SIGNIFICANT CHANGE UP (ref 0–2)
BILIRUB SERPL-MCNC: <0.2 MG/DL — LOW (ref 0.4–2)
BUN SERPL-MCNC: 13.8 MG/DL — SIGNIFICANT CHANGE UP (ref 8–20)
CALCIUM SERPL-MCNC: 9.2 MG/DL — SIGNIFICANT CHANGE UP (ref 8.4–10.5)
CHLORIDE SERPL-SCNC: 96 MMOL/L — SIGNIFICANT CHANGE UP (ref 96–108)
CO2 SERPL-SCNC: 28 MMOL/L — SIGNIFICANT CHANGE UP (ref 22–29)
CREAT SERPL-MCNC: 0.68 MG/DL — SIGNIFICANT CHANGE UP (ref 0.5–1.3)
EGFR: 92 ML/MIN/1.73M2 — SIGNIFICANT CHANGE UP
EOSINOPHIL # BLD AUTO: 0 K/UL — SIGNIFICANT CHANGE UP (ref 0–0.5)
EOSINOPHIL NFR BLD AUTO: 0 % — SIGNIFICANT CHANGE UP (ref 0–6)
FLUAV AG NPH QL: SIGNIFICANT CHANGE UP
FLUBV AG NPH QL: SIGNIFICANT CHANGE UP
GLUCOSE SERPL-MCNC: 76 MG/DL — SIGNIFICANT CHANGE UP (ref 70–99)
HCT VFR BLD CALC: 38.2 % — SIGNIFICANT CHANGE UP (ref 34.5–45)
HGB BLD-MCNC: 12.9 G/DL — SIGNIFICANT CHANGE UP (ref 11.5–15.5)
IMM GRANULOCYTES NFR BLD AUTO: 0.3 % — SIGNIFICANT CHANGE UP (ref 0–0.9)
LYMPHOCYTES # BLD AUTO: 1.23 K/UL — SIGNIFICANT CHANGE UP (ref 1–3.3)
LYMPHOCYTES # BLD AUTO: 16 % — SIGNIFICANT CHANGE UP (ref 13–44)
MCHC RBC-ENTMCNC: 30.7 PG — SIGNIFICANT CHANGE UP (ref 27–34)
MCHC RBC-ENTMCNC: 33.8 GM/DL — SIGNIFICANT CHANGE UP (ref 32–36)
MCV RBC AUTO: 91 FL — SIGNIFICANT CHANGE UP (ref 80–100)
MONOCYTES # BLD AUTO: 0.75 K/UL — SIGNIFICANT CHANGE UP (ref 0–0.9)
MONOCYTES NFR BLD AUTO: 9.7 % — SIGNIFICANT CHANGE UP (ref 2–14)
NEUTROPHILS # BLD AUTO: 5.69 K/UL — SIGNIFICANT CHANGE UP (ref 1.8–7.4)
NEUTROPHILS NFR BLD AUTO: 73.9 % — SIGNIFICANT CHANGE UP (ref 43–77)
PLATELET # BLD AUTO: 275 K/UL — SIGNIFICANT CHANGE UP (ref 150–400)
POTASSIUM SERPL-MCNC: 4.8 MMOL/L — SIGNIFICANT CHANGE UP (ref 3.5–5.3)
POTASSIUM SERPL-SCNC: 4.8 MMOL/L — SIGNIFICANT CHANGE UP (ref 3.5–5.3)
PROT SERPL-MCNC: 7.4 G/DL — SIGNIFICANT CHANGE UP (ref 6.6–8.7)
RBC # BLD: 4.2 M/UL — SIGNIFICANT CHANGE UP (ref 3.8–5.2)
RBC # FLD: 13.1 % — SIGNIFICANT CHANGE UP (ref 10.3–14.5)
RSV RNA NPH QL NAA+NON-PROBE: SIGNIFICANT CHANGE UP
SARS-COV-2 RNA SPEC QL NAA+PROBE: DETECTED
SODIUM SERPL-SCNC: 136 MMOL/L — SIGNIFICANT CHANGE UP (ref 135–145)
WBC # BLD: 7.7 K/UL — SIGNIFICANT CHANGE UP (ref 3.8–10.5)
WBC # FLD AUTO: 7.7 K/UL — SIGNIFICANT CHANGE UP (ref 3.8–10.5)

## 2024-03-04 PROCEDURE — 99285 EMERGENCY DEPT VISIT HI MDM: CPT | Mod: GC

## 2024-03-04 PROCEDURE — 93010 ELECTROCARDIOGRAM REPORT: CPT

## 2024-03-04 PROCEDURE — 71046 X-RAY EXAM CHEST 2 VIEWS: CPT | Mod: 26

## 2024-03-04 RX ORDER — IPRATROPIUM/ALBUTEROL SULFATE 18-103MCG
3 AEROSOL WITH ADAPTER (GRAM) INHALATION
Refills: 0 | Status: COMPLETED | OUTPATIENT
Start: 2024-03-04 | End: 2024-03-04

## 2024-03-04 RX ORDER — MAGNESIUM SULFATE 500 MG/ML
2 VIAL (ML) INJECTION ONCE
Refills: 0 | Status: COMPLETED | OUTPATIENT
Start: 2024-03-04 | End: 2024-03-04

## 2024-03-04 RX ORDER — SODIUM CHLORIDE 9 MG/ML
1000 INJECTION INTRAMUSCULAR; INTRAVENOUS; SUBCUTANEOUS ONCE
Refills: 0 | Status: COMPLETED | OUTPATIENT
Start: 2024-03-04 | End: 2024-03-04

## 2024-03-04 RX ADMIN — Medication 3 MILLILITER(S): at 22:16

## 2024-03-04 RX ADMIN — Medication 3 MILLILITER(S): at 22:00

## 2024-03-04 RX ADMIN — SODIUM CHLORIDE 1000 MILLILITER(S): 9 INJECTION INTRAMUSCULAR; INTRAVENOUS; SUBCUTANEOUS at 21:58

## 2024-03-04 RX ADMIN — Medication 600 MILLIGRAM(S): at 21:58

## 2024-03-04 RX ADMIN — Medication 3 MILLILITER(S): at 21:59

## 2024-03-04 RX ADMIN — Medication 150 GRAM(S): at 21:59

## 2024-03-04 RX ADMIN — Medication 125 MILLIGRAM(S): at 21:58

## 2024-03-04 NOTE — ED ADULT NURSE NOTE - OBJECTIVE STATEMENT
pt came to the ED for c/o SOB and dry cough x 2 weeks.  pt also stated that has a past medical history of COPD

## 2024-03-04 NOTE — ED PROVIDER NOTE - PHYSICAL EXAMINATION
Gen: NAD, AOx3  Head: NCAT  HEENT: EOMI, oral mucosa moist, normal conjunctiva, neck supple  Lung: bilateral expiratory wheezing  CV: rrr, no murmur, Normal perfusion  Abd: soft, NTND  MSK: No edema, no visible deformities  Neuro: No focal neurologic deficits  Skin: No rash   Psych: normal affect

## 2024-03-04 NOTE — ED ADULT NURSE NOTE - NSFALLHARMRISKINTERV_ED_ALL_ED

## 2024-03-04 NOTE — ED PROVIDER NOTE - ATTENDING CONTRIBUTION TO CARE
I, Laurent Reddy, personally saw the patient with the resident, and completed the key components of the history and physical exam. I then discussed the management plan with the resident.

## 2024-03-04 NOTE — ED PROVIDER NOTE - PROGRESS NOTE DETAILS
On reassessment, cough persisted. Xray negative for PNA. Flu results positive for COVID-19. Attempted to ambulate patient, resulted in SpO2 dropping to 85%. Patient will be admitted at this time for further management.  Claudia Pop MD

## 2024-03-04 NOTE — ED PROVIDER NOTE - CARE PLAN
Principal Discharge DX:	Viral URI  Secondary Diagnosis:	Acute bronchitis due to COVID-19 virus   1 Principal Discharge DX:	Acute respiratory failure with hypoxia  Secondary Diagnosis:	Acute bronchitis due to COVID-19 virus

## 2024-03-04 NOTE — ED PROVIDER NOTE - CLINICAL SUMMARY MEDICAL DECISION MAKING FREE TEXT BOX
73 year old female w/PMHx of asthma, CAD, HTN, and RA presents to the ED with SOB. Currently c/o persistent cough, SOB. Vitals stable, SpO2 93% on RA. Patient appears well, alert and oriented. Examination benign. Lungs with bilateral expiratory wheezing. Will obtain labs, viral panel, chest xray. Likely asthma exacerbation secondary to a viral URI complicated by bronchitis. Given 125mg solumedrol, duoneb, 2g IV magnesium, and Guafenesin for sx management. 73 year old female w/PMHx of asthma, CAD, HTN, and RA presents to the ED with SOB. Currently c/o persistent cough, SOB. Vitals stable, SpO2 93% on RA. Patient appears well, alert and oriented. Examination benign. Lungs with bilateral expiratory wheezing. Will obtain labs, viral panel, chest xray. Likely asthma exacerbation secondary to a viral URI complicated by bronchitis. Given 125mg solumedrol, duoneb, 2g IV magnesium, and Guafenesin for sx management.    Estinfa: Patient with continued cough with scattered rhonchi. Patient with hypoxia on ambulation. Discussed case with Dr. Guerra for admission. His team will follow-up with outstanding studies.

## 2024-03-04 NOTE — ED PROVIDER NOTE - OBJECTIVE STATEMENT
73 year old female w/PMHx of asthma, CAD, HTN, and RA presents to the ED with SOB. Patient states that she developed a cough, headache and chills x6 days ago, saw her PCP on Friday (3 days ago) who prescribed her prednisone, albuterol for presumed bronchitis secondary to a viral URI. Patient notes that meds have not relieved her symptoms. Has tried OTC tylenol without relief as well. Chills resolved but cough persisted. Today noted increasing SOB on exertion. Denies any chest pain, dizziness, blurry vision, LE swelling, weakness, constipation, diarrhea, dysuria. Follows with Dr. Ervin in [place], last stress test Jan 2024. No known allergies.

## 2024-03-05 DIAGNOSIS — J96.01 ACUTE RESPIRATORY FAILURE WITH HYPOXIA: ICD-10-CM

## 2024-03-05 LAB
ALBUMIN SERPL ELPH-MCNC: 4 G/DL — SIGNIFICANT CHANGE UP (ref 3.3–5.2)
ALP SERPL-CCNC: 85 U/L — SIGNIFICANT CHANGE UP (ref 40–120)
ALT FLD-CCNC: 30 U/L — SIGNIFICANT CHANGE UP
AST SERPL-CCNC: 21 U/L — SIGNIFICANT CHANGE UP
BILIRUB DIRECT SERPL-MCNC: <0.1 MG/DL — SIGNIFICANT CHANGE UP (ref 0–0.3)
BILIRUB INDIRECT FLD-MCNC: SIGNIFICANT CHANGE UP MG/DL (ref 0.2–1)
BILIRUB SERPL-MCNC: <0.2 MG/DL — LOW (ref 0.4–2)
CREAT SERPL-MCNC: 0.71 MG/DL — SIGNIFICANT CHANGE UP (ref 0.5–1.3)
EGFR: 90 ML/MIN/1.73M2 — SIGNIFICANT CHANGE UP
INR BLD: 0.94 RATIO — SIGNIFICANT CHANGE UP (ref 0.85–1.18)
PROT SERPL-MCNC: 6.8 G/DL — SIGNIFICANT CHANGE UP (ref 6.6–8.7)
PROTHROM AB SERPL-ACNC: 10.5 SEC — SIGNIFICANT CHANGE UP (ref 9.5–13)

## 2024-03-05 PROCEDURE — 71275 CT ANGIOGRAPHY CHEST: CPT | Mod: 26

## 2024-03-05 PROCEDURE — 99223 1ST HOSP IP/OBS HIGH 75: CPT

## 2024-03-05 RX ORDER — OXYBUTYNIN CHLORIDE 5 MG
10 TABLET ORAL DAILY
Refills: 0 | Status: DISCONTINUED | OUTPATIENT
Start: 2024-03-05 | End: 2024-03-05

## 2024-03-05 RX ORDER — GABAPENTIN 400 MG/1
1 CAPSULE ORAL
Refills: 0 | DISCHARGE

## 2024-03-05 RX ORDER — OXYCODONE AND ACETAMINOPHEN 5; 325 MG/1; MG/1
1 TABLET ORAL
Refills: 0 | DISCHARGE

## 2024-03-05 RX ORDER — CARVEDILOL PHOSPHATE 80 MG/1
1 CAPSULE, EXTENDED RELEASE ORAL
Refills: 0 | DISCHARGE

## 2024-03-05 RX ORDER — ENOXAPARIN SODIUM 100 MG/ML
40 INJECTION SUBCUTANEOUS EVERY 24 HOURS
Refills: 0 | Status: DISCONTINUED | OUTPATIENT
Start: 2024-03-05 | End: 2024-03-11

## 2024-03-05 RX ORDER — REMDESIVIR 5 MG/ML
200 INJECTION INTRAVENOUS EVERY 24 HOURS
Refills: 0 | Status: COMPLETED | OUTPATIENT
Start: 2024-03-05 | End: 2024-03-05

## 2024-03-05 RX ORDER — QUETIAPINE FUMARATE 200 MG/1
300 TABLET, FILM COATED ORAL AT BEDTIME
Refills: 0 | Status: DISCONTINUED | OUTPATIENT
Start: 2024-03-05 | End: 2024-03-11

## 2024-03-05 RX ORDER — BREXPIPRAZOLE 0.25 MG/1
1 TABLET ORAL
Refills: 0 | DISCHARGE

## 2024-03-05 RX ORDER — REMDESIVIR 5 MG/ML
100 INJECTION INTRAVENOUS EVERY 24 HOURS
Refills: 0 | Status: COMPLETED | OUTPATIENT
Start: 2024-03-06 | End: 2024-03-09

## 2024-03-05 RX ORDER — OXYBUTYNIN CHLORIDE 5 MG
1 TABLET ORAL
Refills: 0 | DISCHARGE

## 2024-03-05 RX ORDER — OXYBUTYNIN CHLORIDE 5 MG
10 TABLET ORAL DAILY
Refills: 0 | Status: DISCONTINUED | OUTPATIENT
Start: 2024-03-05 | End: 2024-03-11

## 2024-03-05 RX ORDER — TRAZODONE HCL 50 MG
1 TABLET ORAL
Qty: 0 | Refills: 0 | DISCHARGE

## 2024-03-05 RX ORDER — ALBUTEROL 90 UG/1
2 AEROSOL, METERED ORAL EVERY 4 HOURS
Refills: 0 | Status: DISCONTINUED | OUTPATIENT
Start: 2024-03-05 | End: 2024-03-11

## 2024-03-05 RX ORDER — VENLAFAXINE HCL 75 MG
225 CAPSULE, EXT RELEASE 24 HR ORAL DAILY
Refills: 0 | Status: DISCONTINUED | OUTPATIENT
Start: 2024-03-05 | End: 2024-03-11

## 2024-03-05 RX ORDER — ZOLPIDEM TARTRATE 10 MG/1
1 TABLET ORAL
Qty: 0 | Refills: 0 | DISCHARGE

## 2024-03-05 RX ORDER — GABAPENTIN 400 MG/1
1 CAPSULE ORAL
Qty: 0 | Refills: 0 | DISCHARGE

## 2024-03-05 RX ORDER — ALPRAZOLAM 0.25 MG
0.5 TABLET ORAL
Refills: 0 | Status: DISCONTINUED | OUTPATIENT
Start: 2024-03-05 | End: 2024-03-08

## 2024-03-05 RX ORDER — CARVEDILOL PHOSPHATE 80 MG/1
3.12 CAPSULE, EXTENDED RELEASE ORAL EVERY 12 HOURS
Refills: 0 | Status: DISCONTINUED | OUTPATIENT
Start: 2024-03-05 | End: 2024-03-11

## 2024-03-05 RX ORDER — ATORVASTATIN CALCIUM 80 MG/1
1 TABLET, FILM COATED ORAL
Qty: 0 | Refills: 0 | DISCHARGE

## 2024-03-05 RX ORDER — LANOLIN ALCOHOL/MO/W.PET/CERES
3 CREAM (GRAM) TOPICAL AT BEDTIME
Refills: 0 | Status: DISCONTINUED | OUTPATIENT
Start: 2024-03-05 | End: 2024-03-11

## 2024-03-05 RX ORDER — PRIMIDONE 250 MG/1
1 TABLET ORAL
Qty: 0 | Refills: 0 | DISCHARGE

## 2024-03-05 RX ORDER — ALPRAZOLAM 0.25 MG
1 TABLET ORAL
Qty: 0 | Refills: 0 | DISCHARGE

## 2024-03-05 RX ORDER — ONDANSETRON 8 MG/1
4 TABLET, FILM COATED ORAL EVERY 8 HOURS
Refills: 0 | Status: DISCONTINUED | OUTPATIENT
Start: 2024-03-05 | End: 2024-03-11

## 2024-03-05 RX ORDER — ZOLPIDEM TARTRATE 10 MG/1
5 TABLET ORAL AT BEDTIME
Refills: 0 | Status: DISCONTINUED | OUTPATIENT
Start: 2024-03-05 | End: 2024-03-11

## 2024-03-05 RX ORDER — TRAZODONE HCL 50 MG
100 TABLET ORAL AT BEDTIME
Refills: 0 | Status: DISCONTINUED | OUTPATIENT
Start: 2024-03-05 | End: 2024-03-11

## 2024-03-05 RX ORDER — ASCORBIC ACID 60 MG
500 TABLET,CHEWABLE ORAL DAILY
Refills: 0 | Status: DISCONTINUED | OUTPATIENT
Start: 2024-03-05 | End: 2024-03-11

## 2024-03-05 RX ORDER — ALPRAZOLAM 0.25 MG
0.5 TABLET ORAL ONCE
Refills: 0 | Status: DISCONTINUED | OUTPATIENT
Start: 2024-03-05 | End: 2024-03-05

## 2024-03-05 RX ORDER — ACETAMINOPHEN 500 MG
650 TABLET ORAL EVERY 6 HOURS
Refills: 0 | Status: DISCONTINUED | OUTPATIENT
Start: 2024-03-05 | End: 2024-03-11

## 2024-03-05 RX ORDER — GABAPENTIN 400 MG/1
300 CAPSULE ORAL DAILY
Refills: 0 | Status: DISCONTINUED | OUTPATIENT
Start: 2024-03-05 | End: 2024-03-11

## 2024-03-05 RX ORDER — REMDESIVIR 5 MG/ML
INJECTION INTRAVENOUS
Refills: 0 | Status: COMPLETED | OUTPATIENT
Start: 2024-03-05 | End: 2024-03-09

## 2024-03-05 RX ORDER — SERTRALINE 25 MG/1
1 TABLET, FILM COATED ORAL
Qty: 0 | Refills: 0 | DISCHARGE

## 2024-03-05 RX ORDER — QUETIAPINE FUMARATE 200 MG/1
1 TABLET, FILM COATED ORAL
Refills: 0 | DISCHARGE

## 2024-03-05 RX ORDER — GUAIFENESIN/DEXTROMETHORPHAN 600MG-30MG
10 TABLET, EXTENDED RELEASE 12 HR ORAL EVERY 4 HOURS
Refills: 0 | Status: DISCONTINUED | OUTPATIENT
Start: 2024-03-05 | End: 2024-03-06

## 2024-03-05 RX ORDER — VENLAFAXINE HCL 75 MG
1 CAPSULE, EXT RELEASE 24 HR ORAL
Refills: 0 | DISCHARGE

## 2024-03-05 RX ORDER — GABAPENTIN 400 MG/1
400 CAPSULE ORAL AT BEDTIME
Refills: 0 | Status: DISCONTINUED | OUTPATIENT
Start: 2024-03-05 | End: 2024-03-11

## 2024-03-05 RX ORDER — DEXAMETHASONE 0.5 MG/5ML
6 ELIXIR ORAL DAILY
Refills: 0 | Status: DISCONTINUED | OUTPATIENT
Start: 2024-03-05 | End: 2024-03-11

## 2024-03-05 RX ORDER — THIAMINE MONONITRATE (VIT B1) 100 MG
100 TABLET ORAL DAILY
Refills: 0 | Status: DISCONTINUED | OUTPATIENT
Start: 2024-03-05 | End: 2024-03-11

## 2024-03-05 RX ADMIN — REMDESIVIR 200 MILLIGRAM(S): 5 INJECTION INTRAVENOUS at 05:59

## 2024-03-05 RX ADMIN — ZOLPIDEM TARTRATE 5 MILLIGRAM(S): 10 TABLET ORAL at 21:15

## 2024-03-05 RX ADMIN — Medication 0.5 MILLIGRAM(S): at 05:58

## 2024-03-05 RX ADMIN — Medication 0.5 MILLIGRAM(S): at 05:59

## 2024-03-05 RX ADMIN — ENOXAPARIN SODIUM 40 MILLIGRAM(S): 100 INJECTION SUBCUTANEOUS at 05:59

## 2024-03-05 RX ADMIN — Medication 650 MILLIGRAM(S): at 18:08

## 2024-03-05 RX ADMIN — QUETIAPINE FUMARATE 300 MILLIGRAM(S): 200 TABLET, FILM COATED ORAL at 21:15

## 2024-03-05 RX ADMIN — Medication 0.5 MILLIGRAM(S): at 17:49

## 2024-03-05 RX ADMIN — GABAPENTIN 400 MILLIGRAM(S): 400 CAPSULE ORAL at 21:14

## 2024-03-05 RX ADMIN — GABAPENTIN 300 MILLIGRAM(S): 400 CAPSULE ORAL at 10:04

## 2024-03-05 RX ADMIN — Medication 100 MILLIGRAM(S): at 10:26

## 2024-03-05 RX ADMIN — ZOLPIDEM TARTRATE 5 MILLIGRAM(S): 10 TABLET ORAL at 23:21

## 2024-03-05 RX ADMIN — Medication 500 MILLIGRAM(S): at 10:27

## 2024-03-05 RX ADMIN — Medication 225 MILLIGRAM(S): at 10:05

## 2024-03-05 RX ADMIN — CARVEDILOL PHOSPHATE 3.12 MILLIGRAM(S): 80 CAPSULE, EXTENDED RELEASE ORAL at 17:49

## 2024-03-05 RX ADMIN — Medication 10 MILLIGRAM(S): at 10:05

## 2024-03-05 RX ADMIN — Medication 100 MILLIGRAM(S): at 21:15

## 2024-03-05 RX ADMIN — Medication 6 MILLIGRAM(S): at 05:59

## 2024-03-05 RX ADMIN — CARVEDILOL PHOSPHATE 3.12 MILLIGRAM(S): 80 CAPSULE, EXTENDED RELEASE ORAL at 05:59

## 2024-03-05 NOTE — H&P ADULT - TIME BILLING
Labs/Imaging/Notes reviewed. Discussed findings and plan with patient. Med rec confirmed. Orders placed.

## 2024-03-05 NOTE — H&P ADULT - NSHPPHYSICALEXAM_GEN_ALL_CORE
Vital Signs Last 24 Hrs  T(C): 36.8 (05 Mar 2024 04:45), Max: 37 (04 Mar 2024 23:56)  T(F): 98.2 (05 Mar 2024 04:45), Max: 98.6 (04 Mar 2024 23:56)  HR: 88 (05 Mar 2024 04:45) (86 - 111)  BP: 144/84 (05 Mar 2024 04:45) (113/69 - 147/77)  BP(mean): --  RR: 18 (05 Mar 2024 04:45) (18 - 20)  SpO2: 98% (05 Mar 2024 04:45) (93% - 98%)    Parameters below as of 05 Mar 2024 04:45  Patient On (Oxygen Delivery Method): room air    Constitutional: Well-appearing, NAD, VSS  Head: NC/AT  Eyes: PERRL, EOMI, anicteric sclera, conjunctiva WNL  ENT: Normal Pharynx, MMM, No tonsillar exudate/erythema  Neck: Supple, Non-tender  Chest: Non-tender, no rashes  Cardio: RRR, s1/s2, no appreciable murmurs/rubs/gallops  Resp: +Faint Wheezing on expiration  Abd: Soft, Non-tender, Non-distended, no rebound/guarding/rigidity  : not examined  Rectal: not examined  MSK: moving all extremities, no motor weakness, full ROM x4  Ext: palpable distal pulses, good capillary refill, no clubbing/cyanosis/edema  Psych: appropriate, cooperative  Neuro: CN II-XII grossly intact, no focal deficits  Skin: Warm/Dry. No rashes.

## 2024-03-05 NOTE — CHART NOTE - NSCHARTNOTEFT_GEN_A_CORE
patient being seen for COVID> patient complains of fatigue and cough    patient is on 1L NC    a/p  73F with PMHX Asthma, RA, CAD, HTN, Mood Disorder, MDD, Anxiety presents to [facility] ER c/o Cough and SOB/DAVENPORT admitted for Acute Hypoxic Respiratory Failure 2/2 Acute Asthma Exacerbation and COVID19.     PLAN:  Acute Hypoxic Respiratory Failure 2/2 COVID19  -RVP +COVID  -CTA Chest no PE +r hemidiaphragm elevated  cw rendesivir and decadron   - on 1-2L nc    HTN  -Carvedilol 3.125mg BID    OAB  -Oxybutynin 10mg q24    RA, Chronic Pain  -Gabapentin 300mg qAM + Gabapentin 400mg qPM  -Oxycodone 10mg TID PRN (does not take daily)    Mood Disorder, MDD, Anxiety, Insomnia  -Venlafaxine 225mg q24  -Rexulti 2mg q24 -> need to bring med in from home and order when available  -Seroquel 300mg qHS  -Trazodone 100mg qHS  -Alprazolam 0.5mg BID PRN  -Zolpidem 10mg PRN QHS    dispo - dc in 1-2 days reinaldo

## 2024-03-05 NOTE — H&P ADULT - HISTORY OF PRESENT ILLNESS
73F with PMHX Asthma, RA, CAD, HTN, Mood Disorder, MDD, Anxiety presents to [facility] ER c/o Cough and SOB/DAVEN[place]. Reported HA and Chills which began 6 days aog now improving. Saw her PCP last Fri who rx'd abx, prednisone, and albuterol for Bronchitis without improvement. Hypoxic on arrival satting 92% at rest on NC. Desats with exertion to 80s. Came to ER today for worsening DAVENPORT. CXR with R elevated hemidiaphragm. CTA Chest IVC no PE +chronic R elevation diaphragm. RVP +COVID. Admits to anxiety. No othe complaints.     ROS negative unless mentioned.

## 2024-03-05 NOTE — H&P ADULT - NSHPLABSRESULTS_GEN_ALL_CORE
CXR +R elevated hemidiaphragm no obvious infiltrate read pending    CTA Chest IVC IMPRESSION:  No pulmonary embolism.  Continued elevated right hemidiaphragm with adjacent right basilar   atelectasis and right middle lobe atelectasis. Recommend clinical   correlation to assess for superimposed infection.

## 2024-03-05 NOTE — H&P ADULT - ASSESSMENT
ASSESSMENT:  73F with PMHX Asthma, RA, CAD, HTN, Mood Disorder, MDD, Anxiety presents to [facility] ER c/o Cough and SOB/DAVENPORT admitted for Acute Hypoxic Respiratory Failure 2/2 Acute Asthma Exacerbation and COVID19.     PLAN:  Acute Hypoxic Respiratory Failure 2/2 COVID19  -Admit to   -Repeat Labs  -RVP +COVID  -CTA Chest no PE +r hemidiaphragm elevated  -DC ABX  -Albuterol q6  -Solumedrol 125mg IV q24  -Dexamethasone 6mg IV q24  -Remdesivir Taper  -Titrate O2 as able off NC for SPo2 >92%  -Isolation Precautions    HTN  -Carvedilol 3.125mg BID    OAB  -Oxybutynin 10mg q24    RA, Chronic Pain  -Gabapentin 300mg qAM + Gabapentin 400mg qPM  -Oxycodone 10mg TID PRN (does not take daily)    Mood Disorder, MDD, Anxiety, Insomnia  -Venlafaxine 225mg q24  -Rexulti 2mg q24 -> need to bring med in from home and order when available  -Seroquel 300mg qHS  -Trazodone 100mg qHS  -Alprazolam 0.5mg BID PRN  -Zolpidem 10mg PRN QHS

## 2024-03-06 LAB
ALBUMIN SERPL ELPH-MCNC: 3.8 G/DL — SIGNIFICANT CHANGE UP (ref 3.3–5.2)
ALBUMIN SERPL ELPH-MCNC: 3.8 G/DL — SIGNIFICANT CHANGE UP (ref 3.3–5.2)
ALP SERPL-CCNC: 81 U/L — SIGNIFICANT CHANGE UP (ref 40–120)
ALP SERPL-CCNC: 91 U/L — SIGNIFICANT CHANGE UP (ref 40–120)
ALT FLD-CCNC: 26 U/L — SIGNIFICANT CHANGE UP
ALT FLD-CCNC: 28 U/L — SIGNIFICANT CHANGE UP
ANION GAP SERPL CALC-SCNC: 10 MMOL/L — SIGNIFICANT CHANGE UP (ref 5–17)
ANION GAP SERPL CALC-SCNC: 12 MMOL/L — SIGNIFICANT CHANGE UP (ref 5–17)
AST SERPL-CCNC: 22 U/L — SIGNIFICANT CHANGE UP
AST SERPL-CCNC: 24 U/L — SIGNIFICANT CHANGE UP
BILIRUB SERPL-MCNC: 0.2 MG/DL — LOW (ref 0.4–2)
BILIRUB SERPL-MCNC: <0.2 MG/DL — LOW (ref 0.4–2)
BUN SERPL-MCNC: 15.5 MG/DL — SIGNIFICANT CHANGE UP (ref 8–20)
BUN SERPL-MCNC: 16.1 MG/DL — SIGNIFICANT CHANGE UP (ref 8–20)
CALCIUM SERPL-MCNC: 8.9 MG/DL — SIGNIFICANT CHANGE UP (ref 8.4–10.5)
CALCIUM SERPL-MCNC: 9 MG/DL — SIGNIFICANT CHANGE UP (ref 8.4–10.5)
CHLORIDE SERPL-SCNC: 87 MMOL/L — LOW (ref 96–108)
CHLORIDE SERPL-SCNC: 90 MMOL/L — LOW (ref 96–108)
CO2 SERPL-SCNC: 27 MMOL/L — SIGNIFICANT CHANGE UP (ref 22–29)
CO2 SERPL-SCNC: 28 MMOL/L — SIGNIFICANT CHANGE UP (ref 22–29)
CREAT SERPL-MCNC: 0.65 MG/DL — SIGNIFICANT CHANGE UP (ref 0.5–1.3)
CREAT SERPL-MCNC: 0.69 MG/DL — SIGNIFICANT CHANGE UP (ref 0.5–1.3)
EGFR: 92 ML/MIN/1.73M2 — SIGNIFICANT CHANGE UP
EGFR: 93 ML/MIN/1.73M2 — SIGNIFICANT CHANGE UP
GLUCOSE SERPL-MCNC: 106 MG/DL — HIGH (ref 70–99)
GLUCOSE SERPL-MCNC: 91 MG/DL — SIGNIFICANT CHANGE UP (ref 70–99)
HCT VFR BLD CALC: 36.5 % — SIGNIFICANT CHANGE UP (ref 34.5–45)
HGB BLD-MCNC: 12.3 G/DL — SIGNIFICANT CHANGE UP (ref 11.5–15.5)
INR BLD: 0.95 RATIO — SIGNIFICANT CHANGE UP (ref 0.85–1.18)
MAGNESIUM SERPL-MCNC: 2.2 MG/DL — SIGNIFICANT CHANGE UP (ref 1.8–2.6)
MCHC RBC-ENTMCNC: 30.4 PG — SIGNIFICANT CHANGE UP (ref 27–34)
MCHC RBC-ENTMCNC: 33.7 GM/DL — SIGNIFICANT CHANGE UP (ref 32–36)
MCV RBC AUTO: 90.1 FL — SIGNIFICANT CHANGE UP (ref 80–100)
PLATELET # BLD AUTO: 274 K/UL — SIGNIFICANT CHANGE UP (ref 150–400)
POTASSIUM SERPL-MCNC: 4.8 MMOL/L — SIGNIFICANT CHANGE UP (ref 3.5–5.3)
POTASSIUM SERPL-MCNC: 5 MMOL/L — SIGNIFICANT CHANGE UP (ref 3.5–5.3)
POTASSIUM SERPL-SCNC: 4.8 MMOL/L — SIGNIFICANT CHANGE UP (ref 3.5–5.3)
POTASSIUM SERPL-SCNC: 5 MMOL/L — SIGNIFICANT CHANGE UP (ref 3.5–5.3)
PROT SERPL-MCNC: 6.5 G/DL — LOW (ref 6.6–8.7)
PROT SERPL-MCNC: 6.9 G/DL — SIGNIFICANT CHANGE UP (ref 6.6–8.7)
PROTHROM AB SERPL-ACNC: 10.6 SEC — SIGNIFICANT CHANGE UP (ref 9.5–13)
RBC # BLD: 4.05 M/UL — SIGNIFICANT CHANGE UP (ref 3.8–5.2)
RBC # FLD: 13 % — SIGNIFICANT CHANGE UP (ref 10.3–14.5)
SODIUM SERPL-SCNC: 126 MMOL/L — LOW (ref 135–145)
SODIUM SERPL-SCNC: 128 MMOL/L — LOW (ref 135–145)
WBC # BLD: 11.4 K/UL — HIGH (ref 3.8–10.5)
WBC # FLD AUTO: 11.4 K/UL — HIGH (ref 3.8–10.5)

## 2024-03-06 PROCEDURE — 99233 SBSQ HOSP IP/OBS HIGH 50: CPT

## 2024-03-06 RX ORDER — UREA 15 G
15 POWDER IN PACKET (EA) ORAL
Refills: 0 | Status: DISCONTINUED | OUTPATIENT
Start: 2024-03-06 | End: 2024-03-07

## 2024-03-06 RX ORDER — SODIUM CHLORIDE 9 MG/ML
1 INJECTION INTRAMUSCULAR; INTRAVENOUS; SUBCUTANEOUS EVERY 8 HOURS
Refills: 0 | Status: DISCONTINUED | OUTPATIENT
Start: 2024-03-06 | End: 2024-03-11

## 2024-03-06 RX ADMIN — GABAPENTIN 300 MILLIGRAM(S): 400 CAPSULE ORAL at 09:46

## 2024-03-06 RX ADMIN — Medication 225 MILLIGRAM(S): at 09:46

## 2024-03-06 RX ADMIN — GABAPENTIN 400 MILLIGRAM(S): 400 CAPSULE ORAL at 22:56

## 2024-03-06 RX ADMIN — ENOXAPARIN SODIUM 40 MILLIGRAM(S): 100 INJECTION SUBCUTANEOUS at 05:05

## 2024-03-06 RX ADMIN — SODIUM CHLORIDE 1 GRAM(S): 9 INJECTION INTRAMUSCULAR; INTRAVENOUS; SUBCUTANEOUS at 15:40

## 2024-03-06 RX ADMIN — Medication 0.5 MILLIGRAM(S): at 05:05

## 2024-03-06 RX ADMIN — CARVEDILOL PHOSPHATE 3.12 MILLIGRAM(S): 80 CAPSULE, EXTENDED RELEASE ORAL at 17:14

## 2024-03-06 RX ADMIN — Medication 100 MILLIGRAM(S): at 09:46

## 2024-03-06 RX ADMIN — Medication 100 MILLIGRAM(S): at 22:57

## 2024-03-06 RX ADMIN — Medication 10 MILLIGRAM(S): at 09:46

## 2024-03-06 RX ADMIN — REMDESIVIR 200 MILLIGRAM(S): 5 INJECTION INTRAVENOUS at 05:05

## 2024-03-06 RX ADMIN — Medication 650 MILLIGRAM(S): at 13:36

## 2024-03-06 RX ADMIN — CARVEDILOL PHOSPHATE 3.12 MILLIGRAM(S): 80 CAPSULE, EXTENDED RELEASE ORAL at 05:05

## 2024-03-06 RX ADMIN — Medication 0.5 MILLIGRAM(S): at 17:14

## 2024-03-06 RX ADMIN — SODIUM CHLORIDE 1 GRAM(S): 9 INJECTION INTRAMUSCULAR; INTRAVENOUS; SUBCUTANEOUS at 22:57

## 2024-03-06 RX ADMIN — QUETIAPINE FUMARATE 300 MILLIGRAM(S): 200 TABLET, FILM COATED ORAL at 22:57

## 2024-03-06 RX ADMIN — Medication 6 MILLIGRAM(S): at 05:07

## 2024-03-06 RX ADMIN — Medication 500 MILLIGRAM(S): at 09:46

## 2024-03-06 NOTE — PHYSICAL THERAPY INITIAL EVALUATION ADULT - ORIENTATION, REHAB EVAL
oriented to person, place, time and situation [Joint Pain] : joint pain [Back Pain] : back pain [Negative] : Heme/Lymph

## 2024-03-06 NOTE — PHYSICAL THERAPY INITIAL EVALUATION ADULT - PERTINENT HX OF CURRENT PROBLEM, REHAB EVAL
as per medical chart: presents to [facility] ER c/o Cough and SOB/[place]VEN[place]. Reported HA and Chills which began 6 days aog now improving. Saw her PCP last Fri who rx'd abx, prednisone, and albuterol for Bronchitis without improvement. Hypoxic on arrival satting 92% at rest on NC. Desats with exertion to 80s. Came to ER for worsening DAVENPORT. C

## 2024-03-06 NOTE — CONSULT NOTE ADULT - ASSESSMENT
he patient is a 73y Female presents to the ED complaining of shortness of breath. She has a PMHx of asthma, CAD, HTN, and RA presents to the ED with SOB. Patient states that she developed a cough, headache and chills x6 days ago, saw her PCP on Friday (3 days ago) who prescribed her prednisone, albuterol for presumed bronchitis secondary to a viral URI. Patient notes that meds have not relieved her symptoms. Has tried OTC tylenol without relief as well. Chills resolved but cough persisted. Today noted increasing SOB on exertion. Denies any chest pain, dizziness, blurry vision, LE swelling, weakness, constipation, diarrhea, dysuria. Follows with Dr. Ervin in [place], last stress test Jan 2024. No known allergies.    Ms Frost is a known pt of Dr Mazariegos and is seen at [facility] for spontaneous ecchymosis, Her spontaneous ecchymosis is likely due to her severe Vit C def /Scurvy ,Von Willebrand panel, hepatitis panel, Factor XIII and PT/INR wnl Pt is currently not on any NSAIDs, Asa or Plavix Pt admits to lack of proper diet Last seen in the office on 2/24 with symptoms of fatigue.      Acute Hypoxic Resp Failure 2/2 Asthma Exacerbation and COVID  + SRS COVID 2  cont with IV anabiotics per medicine  Cont with Neb Tx per medicine  O2 NC 2-3L  CT angio neg PE  CXR right basilar atelectasis      hyponatremia - could be secondaryt  effexor  Na 126  Nephrology consult  per medicine salt tabs started     DVT ppx  per medicine The patient is a 73y Female presents to the ED complaining of shortness of breath. She has a PMHx of asthma, CAD, HTN, and RA presents to the ED with SOB. Patient states that she developed a cough, headache and chills x6 days ago, saw her PCP on Friday (3 days ago) who prescribed her prednisone, albuterol for presumed bronchitis secondary to a viral URI. Patient notes that meds have not relieved her symptoms. Has tried OTC tylenol without relief as well. Chills resolved but cough persisted. Today noted increasing SOB on exertion. Denies any chest pain, dizziness, blurry vision, LE swelling, weakness, constipation, diarrhea, dysuria. Follows with Dr. Ervin in [place], last stress test Jan 2024. No known allergies.    Ms Frost is a known pt of Dr Mazariegos and is seen at [facility] for spontaneous ecchymosis, Her spontaneous ecchymosis is likely due to her severe Vit C def /Scurvy ,Von Willebrand panel, hepatitis panel, Factor XIII and PT/INR wnl.  Pt is currently not on any NSAIDs, Asa or Plavix Pt admits to lack of proper diet Last seen in the office on 2/24 with symptoms of fatigue.      Acute Hypoxic Resp Failure 2/2 Asthma Exacerbation and COVID  + SARS COVID 2  cont with IV antibiotics per medicine  Cont with Neb Tx per medicine  O2 NC 2-3L  CT angio neg PE  CXR right basilar atelectasis      hyponatremia - could be secondary to effexor  Na 126  Nephrology consult  per medicine salt tabs started     DVT ppx

## 2024-03-06 NOTE — CONSULT NOTE ADULT - SUBJECTIVE AND OBJECTIVE BOX
The patient is a 73y Female presents to the ED complaining of shortness of breath. She has a PMHx of asthma, CAD, HTN, and RA presents to the ED with SOB. Patient states that she developed a cough, headache and chills x6 days ago, saw her PCP on Friday (3 days ago) who prescribed her prednisone, albuterol for presumed bronchitis secondary to a viral URI. Patient notes that meds have not relieved her symptoms. Has tried OTC tylenol without relief as well. Chills resolved but cough persisted. Today noted increasing SOB on exertion. Denies any chest pain, dizziness, blurry vision, LE swelling, weakness, constipation, diarrhea, dysuria. Follows with Dr. Ervin in [place], last stress test 2024. No known allergies.    Ms Frost is a known pt of Dr Mazariegos and is seen at [facility] for spontaneous ecchymosis, Her spontaneous ecchymosis is likely due to her severe Vit C def /Scurvy ,Von Willebrand panel, hepatitis panel, Factor XIII and PT/INR wnl Pt is currently not on any NSAIDs, Asa or Plavix Pt admits to lack of proper diet Last seen in the office on  with symptoms of fatigue.      PAST MEDICAL & SURGICAL HISTORY:  NICM (nonischemic cardiomyopathy)      HTN (hypertension)      Rheumatoid arthritis      Chronic back pain      HLD (hyperlipidemia)      Chronic obstructive asthma      Cellulitis      S/P       S/P hysterectomy        Home Medications:  ALPRAZolam 0.5 mg oral tablet: 1 tab(s) orally 2 times a day  carvedilol 3.125 mg oral tablet: 1 tab(s) orally 2 times a day (05 Mar 2024 05:37)  gabapentin 300 mg oral capsule: 1 cap(s) orally once a day (05 Mar 2024 05:37)  gabapentin 400 mg oral capsule: 1 cap(s) orally once a day (at bedtime) (05 Mar 2024 05:37)  oxyBUTYnin 10 mg/24 hr oral tablet, extended release: 1 tab(s) orally once a day (05 Mar 2024 05:37)  oxycodone-acetaminophen 10 mg-325 mg oral tablet: 1 tab(s) orally 3 times a day as needed for Severe Pain (05 Mar 2024 05:37)  QUEtiapine 300 mg oral tablet: 1 tab(s) orally once a day (at bedtime) (05 Mar 2024 05:34)  Rexulti 2 mg oral tablet: 1 tab(s) orally once a day (05 Mar 2024 05:37)  traZODone 100 mg oral tablet: 1 tab(s) orally once a day (05 Mar 2024 05:38)  venlafaxine 225 mg oral tablet, extended release: 1 tab(s) orally once a day (05 Mar 2024 05:37)  zolpidem 10 mg oral tablet: 1 tab(s) orally once a day (at bedtime)  verified in iSTOP (05 Mar 2024 05:38)      MEDICATIONS  (STANDING):  ALPRAZolam 0.5 milliGRAM(s) Oral two times a day  ascorbic acid 500 milliGRAM(s) Oral daily  carvedilol 3.125 milliGRAM(s) Oral every 12 hours  dexAMETHasone  Injectable 6 milliGRAM(s) IV Push daily  enoxaparin Injectable 40 milliGRAM(s) SubCutaneous every 24 hours  gabapentin 400 milliGRAM(s) Oral at bedtime  gabapentin 300 milliGRAM(s) Oral daily  hydrocodone/homatropine Syrup 5 milliLiter(s) Oral every 6 hours  oxybutynin XL 10 milliGRAM(s) Oral daily  QUEtiapine 300 milliGRAM(s) Oral at bedtime  remdesivir  IVPB   IV Intermittent   remdesivir  IVPB 100 milliGRAM(s) IV Intermittent every 24 hours  sodium chloride 1 Gram(s) Oral every 8 hours  thiamine 100 milliGRAM(s) Oral daily  traZODone 100 milliGRAM(s) Oral at bedtime  urea Oral Powder 15 Gram(s) Oral two times a day  venlafaxine XR. 225 milliGRAM(s) Oral daily    MEDICATIONS  (PRN):  acetaminophen     Tablet .. 650 milliGRAM(s) Oral every 6 hours PRN Temp greater or equal to 38C (100.4F), Mild Pain (1 - 3)  albuterol    90 MICROgram(s) HFA Inhaler 2 Puff(s) Inhalation every 4 hours PRN Shortness of Breath and/or Wheezing  aluminum hydroxide/magnesium hydroxide/simethicone Suspension 30 milliLiter(s) Oral every 4 hours PRN Dyspepsia  benzonatate 100 milliGRAM(s) Oral three times a day PRN Cough  melatonin 3 milliGRAM(s) Oral at bedtime PRN Insomnia  ondansetron Injectable 4 milliGRAM(s) IV Push every 8 hours PRN Nausea and/or Vomiting  zolpidem 5 milliGRAM(s) Oral at bedtime PRN Insomnia  zolpidem 5 milliGRAM(s) Oral at bedtime PRN Insomnia    Vital Signs Last 24 Hrs  T(C): 36.7 (06 Mar 2024 15:57), Max: 37.1 (06 Mar 2024 11:02)  T(F): 98.1 (06 Mar 2024 15:57), Max: 98.8 (06 Mar 2024 11:02)  HR: 80 (06 Mar 2024 15:57) (74 - 97)  BP: 135/85 (06 Mar 2024 15:57) (120/76 - 157/97)  BP(mean): --  RR: 18 (06 Mar 2024 15:57) (18 - 18)  SpO2: 97% (06 Mar 2024 15:57) (94% - 97%)    Parameters below as of 06 Mar 2024 15:57  Patient On (Oxygen Delivery Method): nasal cannula                 12.3   11.40 )-----------( 274      ( 06 Mar 2024 02:46 )             36.5                       03-06    126<L>  |  87<L>  |  16.1  ----------------------------<  106<H>  4.8   |  27.0  |  0.69    Ca    8.9      06 Mar 2024 12:27  Mg     2.2     03-06        TPro  6.9  /  Alb  3.8  /  TBili  <0.2<L>  /  DBili  x   /  AST  24  /  ALT  28  /  AlkPhos  91  03-06  MSK: moving all extremities, no motor weakness, full ROM x4  Ext: palpable distal pulses, good capillary refill, no clubbing/cyanosis/edema  Psych: appropriate, cooperative  Neuro: CN II-XII grossly intact, no focal deficits  Skin: Warm/Dry. No rashes.    Constitutional: Well-appearing, NAD, VSS  Head: NC/AT  Eyes: PERRL, EOMI, anicteric sclera, conjunctiva WNL  ENT: Normal Pharynx, MMM, No tonsillar exudate/erythema  Neck: Supple, Non-tender  Chest: Non-tender, no rashes  Cardio: RRR, s1/s2, no appreciable murmurs/rubs/gallops  Resp: +Faint Wheezing on expiration  Abd: Soft, Non-tender, Non-distended, no rebound/guarding/rigidity The patient is a 73y Female presents to the ED complaining of shortness of breath. She has a PMHx of asthma, CAD, HTN, and RA presents to the ED with SOB. Patient states that she developed a cough, headache and chills x6 days ago, saw her PCP on Friday (3 days ago) who prescribed her prednisone, albuterol for presumed bronchitis secondary to a viral URI. Patient notes that meds have not relieved her symptoms. Has tried OTC tylenol without relief as well. Chills resolved but cough persisted. Today noted increasing SOB on exertion. Denies any chest pain, dizziness, blurry vision, LE swelling, weakness, constipation, diarrhea, dysuria. Follows with Dr. Ervin in [place], last stress test 2024. No known allergies.    Ms Ramirez is a known pt of Dr Mazariegos and is seen at [facility] for spontaneous ecchymosis, Her spontaneous ecchymosis was likely due to her severe Vit C def /Scurvy in past. Von Willebrand panel, hepatitis panel, Factor XIII and PT/INR wnl.  Pt is currently not on any NSAIDs, Asa or Plavix Pt admits to lack of proper diet Last seen in the office on  with symptoms of fatigue.      PAST MEDICAL & SURGICAL HISTORY:  NICM (nonischemic cardiomyopathy)      HTN (hypertension)      Rheumatoid arthritis      Chronic back pain      HLD (hyperlipidemia)      Chronic obstructive asthma      Cellulitis      S/P       S/P hysterectomy        Home Medications:  ALPRAZolam 0.5 mg oral tablet: 1 tab(s) orally 2 times a day  carvedilol 3.125 mg oral tablet: 1 tab(s) orally 2 times a day (05 Mar 2024 05:37)  gabapentin 300 mg oral capsule: 1 cap(s) orally once a day (05 Mar 2024 05:37)  gabapentin 400 mg oral capsule: 1 cap(s) orally once a day (at bedtime) (05 Mar 2024 05:37)  oxyBUTYnin 10 mg/24 hr oral tablet, extended release: 1 tab(s) orally once a day (05 Mar 2024 05:37)  oxycodone-acetaminophen 10 mg-325 mg oral tablet: 1 tab(s) orally 3 times a day as needed for Severe Pain (05 Mar 2024 05:37)  QUEtiapine 300 mg oral tablet: 1 tab(s) orally once a day (at bedtime) (05 Mar 2024 05:34)  Rexulti 2 mg oral tablet: 1 tab(s) orally once a day (05 Mar 2024 05:37)  traZODone 100 mg oral tablet: 1 tab(s) orally once a day (05 Mar 2024 05:38)  venlafaxine 225 mg oral tablet, extended release: 1 tab(s) orally once a day (05 Mar 2024 05:37)  zolpidem 10 mg oral tablet: 1 tab(s) orally once a day (at bedtime)  verified in iSTOP (05 Mar 2024 05:38)      MEDICATIONS  (STANDING):  ALPRAZolam 0.5 milliGRAM(s) Oral two times a day  ascorbic acid 500 milliGRAM(s) Oral daily  carvedilol 3.125 milliGRAM(s) Oral every 12 hours  dexAMETHasone  Injectable 6 milliGRAM(s) IV Push daily  enoxaparin Injectable 40 milliGRAM(s) SubCutaneous every 24 hours  gabapentin 400 milliGRAM(s) Oral at bedtime  gabapentin 300 milliGRAM(s) Oral daily  hydrocodone/homatropine Syrup 5 milliLiter(s) Oral every 6 hours  oxybutynin XL 10 milliGRAM(s) Oral daily  QUEtiapine 300 milliGRAM(s) Oral at bedtime  remdesivir  IVPB   IV Intermittent   remdesivir  IVPB 100 milliGRAM(s) IV Intermittent every 24 hours  sodium chloride 1 Gram(s) Oral every 8 hours  thiamine 100 milliGRAM(s) Oral daily  traZODone 100 milliGRAM(s) Oral at bedtime  urea Oral Powder 15 Gram(s) Oral two times a day  venlafaxine XR. 225 milliGRAM(s) Oral daily    MEDICATIONS  (PRN):  acetaminophen     Tablet .. 650 milliGRAM(s) Oral every 6 hours PRN Temp greater or equal to 38C (100.4F), Mild Pain (1 - 3)  albuterol    90 MICROgram(s) HFA Inhaler 2 Puff(s) Inhalation every 4 hours PRN Shortness of Breath and/or Wheezing  aluminum hydroxide/magnesium hydroxide/simethicone Suspension 30 milliLiter(s) Oral every 4 hours PRN Dyspepsia  benzonatate 100 milliGRAM(s) Oral three times a day PRN Cough  melatonin 3 milliGRAM(s) Oral at bedtime PRN Insomnia  ondansetron Injectable 4 milliGRAM(s) IV Push every 8 hours PRN Nausea and/or Vomiting  zolpidem 5 milliGRAM(s) Oral at bedtime PRN Insomnia  zolpidem 5 milliGRAM(s) Oral at bedtime PRN Insomnia    Vital Signs Last 24 Hrs  T(C): 36.7 (06 Mar 2024 15:57), Max: 37.1 (06 Mar 2024 11:02)  T(F): 98.1 (06 Mar 2024 15:57), Max: 98.8 (06 Mar 2024 11:02)  HR: 80 (06 Mar 2024 15:57) (74 - 97)  BP: 135/85 (06 Mar 2024 15:57) (120/76 - 157/97)  BP(mean): --  RR: 18 (06 Mar 2024 15:57) (18 - 18)  SpO2: 97% (06 Mar 2024 15:57) (94% - 97%)    Parameters below as of 06 Mar 2024 15:57  Patient On (Oxygen Delivery Method): nasal cannula                 12.3   11.40 )-----------( 274      ( 06 Mar 2024 02:46 )             36.5                       03-06    126<L>  |  87<L>  |  16.1  ----------------------------<  106<H>  4.8   |  27.0  |  0.69    Ca    8.9      06 Mar 2024 12:27  Mg     2.2     03-06        TPro  6.9  /  Alb  3.8  /  TBili  <0.2<L>  /  DBili  x   /  AST  24  /  ALT  28  /  AlkPhos  91  03-06  MSK: moving all extremities, no motor weakness, full ROM x4  Ext: palpable distal pulses, good capillary refill, no clubbing/cyanosis/edema  Psych: appropriate, cooperative  Neuro: CN II-XII grossly intact, no focal deficits  Skin: Warm/Dry. No rashes.    Constitutional: Well-appearing, NAD, VSS  Head: NC/AT  Eyes: PERRL, EOMI, anicteric sclera, conjunctiva WNL  ENT: Normal Pharynx, MMM, No tonsillar exudate/erythema  Neck: Supple, Non-tender  Chest: Non-tender, no rashes  Cardio: RRR, s1/s2, no appreciable murmurs/rubs/gallops  Resp: +Faint Wheezing on expiration  Abd: Soft, Non-tender, Non-distended, no rebound/guarding/rigidity

## 2024-03-06 NOTE — CONSULT NOTE ADULT - ASSESSMENT
73F with PMHX Asthma, RA, CAD, HTN, Mood Disorder, MDD, Anxiety presents to [facility] ER c/o Cough and SOB/DAVENPORT admitted for Acute Hypoxic Respiratory Failure 2/2 Acute Asthma Exacerbation and COVID19.  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

## 2024-03-06 NOTE — PHYSICAL THERAPY INITIAL EVALUATION ADULT - ADDITIONAL COMMENTS
as pe pt: resides in the private house 3 steps (+) rail to enter, 14 steps (+) rail to the bedroom, owns RW, SAC,  available to assist as needed upon D/C home, (+) fall indoor fall in the past 6 months

## 2024-03-06 NOTE — CONSULT NOTE ADULT - REASON FOR ADMISSION
Acute Hypoxic Resp Failure 2/2 Asthma Exacerbation and COVID
Acute Hypoxic Resp Failure 2/2 Asthma Exacerbation and COVID

## 2024-03-06 NOTE — CONSULT NOTE ADULT - SUBJECTIVE AND OBJECTIVE BOX
Patient is a 73y old  Female who presents with a chief complaint of Acute Hypoxic Resp Failure 2/2 Asthma Exacerbation and COVID (06 Mar 2024 13:32)      HPI:  73F with PMHX Asthma, RA, CAD, HTN, Mood Disorder, MDD, Anxiety presents to [facility] ER c/o Cough and SOB/DAVENPORT. Reported HA and Chills which began 6 days aog now improving. Saw her PCP last Fri who rx'd abx, prednisone, and albuterol for Bronchitis without improvement. Hypoxic on arrival satting 92% at rest on NC. Desats with exertion to 80s. Came to ER today for worsening DAVENPORT. CXR with R elevated hemidiaphragm. CTA Chest IVC no PE +chronic R elevation diaphragm. RVP +COVID. Admits to anxiety. No othe complaints.     Feels better   Na decreasing in the hospital  She has been pushing herself to drink water   No diuretics   CT chest w/ IV contrast noted   Takes multiple Psych meds for severe insomnia   No diuretics       PAST MEDICAL & SURGICAL HISTORY:  NICM (nonischemic cardiomyopathy)      HTN (hypertension)      Rheumatoid arthritis      Chronic back pain      HLD (hyperlipidemia)      Chronic obstructive asthma      Cellulitis      S/P       S/P hysterectomy          FAMILY HISTORY:  Family history of diabetes mellitus (DM)    FH: CAD (coronary artery disease)  Home Medications:   * Patient Currently Takes Medications as of 05-Mar-2024 05:38 documented in Structured Notes  · 	Ventolin HFA 90 mcg/inh inhalation aerosol: Last Dose Taken:  , 1 puff(s) inhaled once a day as needed for bronchospasm  · 	gabapentin 300 mg oral capsule: Last Dose Taken:  , 1 cap(s) orally once a day  · 	carvedilol 3.125 mg oral tablet: Last Dose Taken:  , 1 tab(s) orally 2 times a day  · 	ALPRAZolam 0.5 mg oral tablet: Last Dose Taken:  , 1 tab(s) orally 2 times a day  	verified in iSTOP  · 	oxyBUTYnin 10 mg/24 hr oral tablet, extended release: Last Dose Taken:  , 1 tab(s) orally once a day  · 	traZODone 100 mg oral tablet: Last Dose Taken:  , 1 tab(s) orally once a day  · 	zolpidem 10 mg oral tablet: Last Dose Taken:  , 1 tab(s) orally once a day (at bedtime)  	verified in iSTOP  · 	venlafaxine 225 mg oral tablet, extended release: Last Dose Taken:  , 1 tab(s) orally once a day  · 	gabapentin 400 mg oral capsule: Last Dose Taken:  , 1 cap(s) orally once a day (at bedtime)  · 	oxycodone-acetaminophen 10 mg-325 mg oral tablet: Last Dose Taken:  , 1 tab(s) orally 3 times a day as needed for Severe Pain  · 	QUEtiapine 300 mg oral tablet: Last Dose Taken:  , 1 tab(s) orally once a day (at bedtime)  · 	Rexulti 2 mg oral tablet: Last Dose Taken:  , 1 tab(s) orally once a day      Social History:    MEDICATIONS  (STANDING):  ALPRAZolam 0.5 milliGRAM(s) Oral two times a day  ascorbic acid 500 milliGRAM(s) Oral daily  carvedilol 3.125 milliGRAM(s) Oral every 12 hours  dexAMETHasone  Injectable 6 milliGRAM(s) IV Push daily  enoxaparin Injectable 40 milliGRAM(s) SubCutaneous every 24 hours  gabapentin 400 milliGRAM(s) Oral at bedtime  gabapentin 300 milliGRAM(s) Oral daily  hydrocodone/homatropine Syrup 5 milliLiter(s) Oral every 6 hours  oxybutynin XL 10 milliGRAM(s) Oral daily  QUEtiapine 300 milliGRAM(s) Oral at bedtime  remdesivir  IVPB   IV Intermittent   remdesivir  IVPB 100 milliGRAM(s) IV Intermittent every 24 hours  sodium chloride 1 Gram(s) Oral every 8 hours  thiamine 100 milliGRAM(s) Oral daily  traZODone 100 milliGRAM(s) Oral at bedtime  venlafaxine XR. 225 milliGRAM(s) Oral daily    MEDICATIONS  (PRN):  acetaminophen     Tablet .. 650 milliGRAM(s) Oral every 6 hours PRN Temp greater or equal to 38C (100.4F), Mild Pain (1 - 3)  albuterol    90 MICROgram(s) HFA Inhaler 2 Puff(s) Inhalation every 4 hours PRN Shortness of Breath and/or Wheezing  aluminum hydroxide/magnesium hydroxide/simethicone Suspension 30 milliLiter(s) Oral every 4 hours PRN Dyspepsia  benzonatate 100 milliGRAM(s) Oral three times a day PRN Cough  melatonin 3 milliGRAM(s) Oral at bedtime PRN Insomnia  ondansetron Injectable 4 milliGRAM(s) IV Push every 8 hours PRN Nausea and/or Vomiting  zolpidem 5 milliGRAM(s) Oral at bedtime PRN Insomnia  zolpidem 5 milliGRAM(s) Oral at bedtime PRN Insomnia      Allergies    No Known Allergies    Intolerances        R    Vital Signs Last 24 Hrs  T(C): 36.7 (06 Mar 2024 15:57), Max: 37.1 (06 Mar 2024 11:02)  T(F): 98.1 (06 Mar 2024 15:57), Max: 98.8 (06 Mar 2024 11:02)  HR: 80 (06 Mar 2024 15:57) (74 - 97)  BP: 135/85 (06 Mar 2024 15:57) (120/76 - 157/97)  BP(mean): --  RR: 18 (06 Mar 2024 15:57) (18 - 18)  SpO2: 97% (06 Mar 2024 15:57) (94% - 97%)    Parameters below as of 06 Mar 2024 15:57  Patient On (Oxygen Delivery Method): nasal cannula      Daily     Daily   I&O's Detail    05 Mar 2024 07:01  -  06 Mar 2024 07:00  --------------------------------------------------------  IN:    Oral Fluid: 480 mL  Total IN: 480 mL    OUT:    Voided (mL): 700 mL  Total OUT: 700 mL    Total NET: -220 mL        I&O's Summary    05 Mar 2024 07:01  -  06 Mar 2024 07:00  --------------------------------------------------------  IN: 480 mL / OUT: 700 mL / NET: -220 mL        PHYSICAL EXAM:    GENERAL: NAD,   HEAD:  Atraumatic, dry MM   EYNECK: Supple, No JVD, Normal thyroid  NERVOUS SYSTEM:  Alert & Oriented X3,   CHEST/LUNG: Clear / EAE . No wheeze   HEART: Regular rate and rhythm; No murmurs, rubs, or gallops  ABDOMEN: Soft, Nontender, Nondistended; Bowel sounds present  EXTREMITIES: No edema       LABS:                        12.3   11.40 )-----------( 274      ( 06 Mar 2024 02:46 )             36.5     03-06    126<L>  |  87<L>  |  16.1  ----------------------------<  106<H>  4.8   |  27.0  |  0.69    Ca    8.9      06 Mar 2024 12:27  Mg     2.2     03-06    TPro  6.9  /  Alb  3.8  /  TBili  <0.2<L>  /  DBili  x   /  AST  24  /  ALT  28  /  AlkPhos  91  -06    PT/INR - ( 06 Mar 2024 02:46 )   PT: 10.6 sec;   INR: 0.95 ratio           Urinalysis Basic - ( 06 Mar 2024 12:27 )    Color: x / Appearance: x / SG: x / pH: x  Gluc: 106 mg/dL / Ketone: x  / Bili: x / Urobili: x   Blood: x / Protein: x / Nitrite: x   Leuk Esterase: x / RBC: x / WBC x   Sq Epi: x / Non Sq Epi: x / Bacteria: x      Magnesium: 2.2 mg/dL ( @ 02:46)          < from: CT Angio Chest PE Protocol w/ IV Cont (24 @ 02:21) >    ACC: [id number] EXAM:  CT ANGIO CHEST PULM ART WAWIC   ORDERED BY:   LALO SINHA     PROCEDURE DATE:  2024          INTERPRETATION:  CLINICAL INFORMATION: Persistent cough.    COMPARISON: CT chest 2021.    CONTRAST/COMPLICATIONS:  IV Contrast: Omnipaque 350  56 cc administered  Oral Contrast: NONE  Complications: None reported at time of study completion    PROCEDURE:  CT Angiography of the Chest.  Sagittal and coronal reformats were performed as well as 3D (MIP)   reconstructions.    FINDINGS:    LUNGS AND AIRWAYS: Patent central airways.  Continued elevated right   hemidiaphragm with adjacent right basilar atelectasis and right middle   lobe atelectasis. Recommend clinical correlation to assess for   superimposed infection. Mild subsegmental atelectasis in the lingula.  PLEURA: No pleural effusion.  MEDIASTINUM AND ALE: No lymphadenopathy.  VESSELS: No pulmonary embolism. 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  HEART: Mild cardiomegaly. No pericardial effusion. Coronary artery   calcification and/or stenting.  CHEST WALL AND LOWER NECK: Within normal limits.  VISUALIZED UPPER ABDOMEN: Gastric postoperative changes.  BONES: Degenerative changes. Mild compression deformity of the superior   endplate of T12 and L1, likely related to Schmorl's node.    IMPRESSION:    No pulmonary embolism.    Continued elevated right hemidiaphragm with adjacent right basilar   atelectasis and right middle lobe atelectasis. Recommend clinical   correlation to assess forsuperimposed infection.    --- End of Report ---            KIMMY DICKEY MD; Attending Radiologist  This document has been electronically signed. Mar  5 2024  2:53AM    < end of copied text >  RADIOLOGY & ADDITIONAL TESTS:  < from: TTE Echo Complete w/Doppler (19 @ 10:46) >    Summary:   1. Left ventricular ejection fraction, by visual estimation, is 55 to   60%.   2. Normal global left ventricular systolic function.   3. Normal left ventricular internal cavity size.   4. There is mild concentric left ventricular hypertrophy.   5. Normal left atrial size.   6. Mild thickening of the anterior and posterior mitral valve leaflets.   7. Trace mitral valve regurgitation.   8. There is no evidence of pericardial effusion.    MD Mona Electronically signed on 2019 at 5:11:14 PM       < end of copied text >

## 2024-03-06 NOTE — PROGRESS NOTE ADULT - SUBJECTIVE AND OBJECTIVE BOX
TULIO SUGGS    [id number]    73y      Female    INTERVAL HPI/OVERNIGHT EVENTS:  patient being seen for covid pna and new diagnosis of hyponatremia    patient is on 1LNC and states feeling well.     REVIEW OF SYSTEMS:    CONSTITUTIONAL: No fever, weight loss, or fatigue  RESPIRATORY: No cough, wheezing, hemoptysis; No shortness of breath  CARDIOVASCULAR: No chest pain, palpitations  GASTROINTESTINAL: No abdominal or epigastric pain. No nausea, vomiting  NEUROLOGICAL: No headaches, memory loss, loss of strength.  MISCELLANEOUS:      Vital Signs Last 24 Hrs  T(C): 37.1 (06 Mar 2024 11:02), Max: 37.1 (06 Mar 2024 11:02)  T(F): 98.8 (06 Mar 2024 11:02), Max: 98.8 (06 Mar 2024 11:02)  HR: 97 (06 Mar 2024 11:02) (74 - 97)  BP: 145/93 (06 Mar 2024 11:02) (120/76 - 165/95)  BP(mean): --  RR: 18 (06 Mar 2024 11:02) (18 - 18)  SpO2: 94% (06 Mar 2024 11:02) (94% - 96%)    Parameters below as of 06 Mar 2024 11:02  Patient On (Oxygen Delivery Method): nasal cannula        PHYSICAL EXAM:    Constitutional: Well-appearing, NAD, VSS  Head: NC/AT  Eyes: PERRL, EOMI, anicteric sclera, conjunctiva WNL  ENT: Normal Pharynx, MMM, No tonsillar exudate/erythema  Neck: Supple, Non-tender  Chest: Non-tender, no rashes  Cardio: RRR, s1/s2, no appreciable murmurs/rubs/gallops  Resp: +Faint Wheezing on expiration  Abd: Soft, Non-tender, Non-distended, no rebound/guarding/rigidity  MSK: moving all extremities, no motor weakness, full ROM x4  Ext: palpable distal pulses, good capillary refill, no clubbing/cyanosis/edema  Psych: appropriate, cooperative  Neuro: CN II-XII grossly intact, no focal deficits  Skin: Warm/Dry. No rashes.      LABS:                        12.3   11.40 )-----------( 274      ( 06 Mar 2024 02:46 )             36.5     03-06    126<L>  |  87<L>  |  16.1  ----------------------------<  106<H>  4.8   |  27.0  |  0.69    Ca    8.9      06 Mar 2024 12:27  Mg     2.2     03-06    TPro  6.9  /  Alb  3.8  /  TBili  <0.2<L>  /  DBili  x   /  AST  24  /  ALT  28  /  AlkPhos  91  03-06    PT/INR - ( 06 Mar 2024 02:46 )   PT: 10.6 sec;   INR: 0.95 ratio           Urinalysis Basic - ( 06 Mar 2024 12:27 )    Color: x / Appearance: x / SG: x / pH: x  Gluc: 106 mg/dL / Ketone: x  / Bili: x / Urobili: x   Blood: x / Protein: x / Nitrite: x   Leuk Esterase: x / RBC: x / WBC x   Sq Epi: x / Non Sq Epi: x / Bacteria: x      MEDICATIONS  (STANDING):  ALPRAZolam 0.5 milliGRAM(s) Oral two times a day  ascorbic acid 500 milliGRAM(s) Oral daily  carvedilol 3.125 milliGRAM(s) Oral every 12 hours  dexAMETHasone  Injectable 6 milliGRAM(s) IV Push daily  enoxaparin Injectable 40 milliGRAM(s) SubCutaneous every 24 hours  gabapentin 400 milliGRAM(s) Oral at bedtime  gabapentin 300 milliGRAM(s) Oral daily  hydrocodone/homatropine Syrup 5 milliLiter(s) Oral every 6 hours  oxybutynin XL 10 milliGRAM(s) Oral daily  QUEtiapine 300 milliGRAM(s) Oral at bedtime  remdesivir  IVPB   IV Intermittent   remdesivir  IVPB 100 milliGRAM(s) IV Intermittent every 24 hours  sodium chloride 1 Gram(s) Oral every 8 hours  thiamine 100 milliGRAM(s) Oral daily  traZODone 100 milliGRAM(s) Oral at bedtime  venlafaxine XR. 225 milliGRAM(s) Oral daily    MEDICATIONS  (PRN):  acetaminophen     Tablet .. 650 milliGRAM(s) Oral every 6 hours PRN Temp greater or equal to 38C (100.4F), Mild Pain (1 - 3)  albuterol    90 MICROgram(s) HFA Inhaler 2 Puff(s) Inhalation every 4 hours PRN Shortness of Breath and/or Wheezing  aluminum hydroxide/magnesium hydroxide/simethicone Suspension 30 milliLiter(s) Oral every 4 hours PRN Dyspepsia  benzonatate 100 milliGRAM(s) Oral three times a day PRN Cough  melatonin 3 milliGRAM(s) Oral at bedtime PRN Insomnia  ondansetron Injectable 4 milliGRAM(s) IV Push every 8 hours PRN Nausea and/or Vomiting  zolpidem 5 milliGRAM(s) Oral at bedtime PRN Insomnia  zolpidem 5 milliGRAM(s) Oral at bedtime PRN Insomnia      RADIOLOGY & ADDITIONAL TESTS:

## 2024-03-06 NOTE — PROGRESS NOTE ADULT - ASSESSMENT
73F with PMHX Asthma, RA, CAD, HTN, Mood Disorder, MDD, Anxiety presents to [facility] ER c/o Cough and SOB/DAVENPORT admitted for Acute Hypoxic Respiratory Failure 2/2 Acute Asthma Exacerbation and COVID19.  patient started on NC and remdesivir and course is now complicated     PLAN:  Acute Hypoxic Respiratory Failure 2/2 COVID19  -RVP +COVID  -CTA Chest no PE +r hemidiaphragm elevated  cw rendesivir day #2 of 5 and decadron   - on 1-2L nc    hyponatremia - could be secondaryt o effexor  - will consult renal  - start salt tabs    HTN  -Carvedilol 3.125mg BID    OAB  -Oxybutynin 10mg q24    RA, Chronic Pain  -Gabapentin 300mg qAM + Gabapentin 400mg qPM  -Oxycodone 10mg TID PRN (does not take daily)    Mood Disorder, MDD, Anxiety, Insomnia  -Venlafaxine 225mg q24  -Rexulti 2mg q24 -> need to bring med in from home and order when available  -Seroquel 300mg qHS  -Trazodone 100mg qHS  -Alprazolam 0.5mg BID PRN  -Zolpidem 10mg PRN QHS    dc in 1-2 days

## 2024-03-07 LAB
ALBUMIN SERPL ELPH-MCNC: 3.3 G/DL — SIGNIFICANT CHANGE UP (ref 3.3–5.2)
ALP SERPL-CCNC: 77 U/L — SIGNIFICANT CHANGE UP (ref 40–120)
ALT FLD-CCNC: 21 U/L — SIGNIFICANT CHANGE UP
ANION GAP SERPL CALC-SCNC: 11 MMOL/L — SIGNIFICANT CHANGE UP (ref 5–17)
AST SERPL-CCNC: 17 U/L — SIGNIFICANT CHANGE UP
BASOPHILS # BLD AUTO: 0.02 K/UL — SIGNIFICANT CHANGE UP (ref 0–0.2)
BASOPHILS NFR BLD AUTO: 0.3 % — SIGNIFICANT CHANGE UP (ref 0–2)
BILIRUB SERPL-MCNC: <0.2 MG/DL — LOW (ref 0.4–2)
BUN SERPL-MCNC: 17.2 MG/DL — SIGNIFICANT CHANGE UP (ref 8–20)
CALCIUM SERPL-MCNC: 8.6 MG/DL — SIGNIFICANT CHANGE UP (ref 8.4–10.5)
CHLORIDE SERPL-SCNC: 93 MMOL/L — LOW (ref 96–108)
CO2 SERPL-SCNC: 27 MMOL/L — SIGNIFICANT CHANGE UP (ref 22–29)
CORTIS AM PEAK SERPL-MCNC: 1.8 UG/DL — LOW (ref 6–18.4)
CREAT SERPL-MCNC: 0.84 MG/DL — SIGNIFICANT CHANGE UP (ref 0.5–1.3)
EGFR: 73 ML/MIN/1.73M2 — SIGNIFICANT CHANGE UP
EOSINOPHIL # BLD AUTO: 0.06 K/UL — SIGNIFICANT CHANGE UP (ref 0–0.5)
EOSINOPHIL NFR BLD AUTO: 0.8 % — SIGNIFICANT CHANGE UP (ref 0–6)
GLUCOSE SERPL-MCNC: 99 MG/DL — SIGNIFICANT CHANGE UP (ref 70–99)
HCT VFR BLD CALC: 37.7 % — SIGNIFICANT CHANGE UP (ref 34.5–45)
HGB BLD-MCNC: 12.9 G/DL — SIGNIFICANT CHANGE UP (ref 11.5–15.5)
IMM GRANULOCYTES NFR BLD AUTO: 0.7 % — SIGNIFICANT CHANGE UP (ref 0–0.9)
INR BLD: 1.02 RATIO — SIGNIFICANT CHANGE UP (ref 0.85–1.18)
LYMPHOCYTES # BLD AUTO: 2.13 K/UL — SIGNIFICANT CHANGE UP (ref 1–3.3)
LYMPHOCYTES # BLD AUTO: 27.7 % — SIGNIFICANT CHANGE UP (ref 13–44)
MAGNESIUM SERPL-MCNC: 2 MG/DL — SIGNIFICANT CHANGE UP (ref 1.6–2.6)
MCHC RBC-ENTMCNC: 30.4 PG — SIGNIFICANT CHANGE UP (ref 27–34)
MCHC RBC-ENTMCNC: 34.2 GM/DL — SIGNIFICANT CHANGE UP (ref 32–36)
MCV RBC AUTO: 88.9 FL — SIGNIFICANT CHANGE UP (ref 80–100)
MONOCYTES # BLD AUTO: 1.1 K/UL — HIGH (ref 0–0.9)
MONOCYTES NFR BLD AUTO: 14.3 % — HIGH (ref 2–14)
NEUTROPHILS # BLD AUTO: 4.32 K/UL — SIGNIFICANT CHANGE UP (ref 1.8–7.4)
NEUTROPHILS NFR BLD AUTO: 56.2 % — SIGNIFICANT CHANGE UP (ref 43–77)
PLATELET # BLD AUTO: 262 K/UL — SIGNIFICANT CHANGE UP (ref 150–400)
POTASSIUM SERPL-MCNC: 4 MMOL/L — SIGNIFICANT CHANGE UP (ref 3.5–5.3)
POTASSIUM SERPL-SCNC: 4 MMOL/L — SIGNIFICANT CHANGE UP (ref 3.5–5.3)
PROT SERPL-MCNC: 6 G/DL — LOW (ref 6.6–8.7)
PROTHROM AB SERPL-ACNC: 11.3 SEC — SIGNIFICANT CHANGE UP (ref 9.5–13)
RBC # BLD: 4.24 M/UL — SIGNIFICANT CHANGE UP (ref 3.8–5.2)
RBC # FLD: 12.7 % — SIGNIFICANT CHANGE UP (ref 10.3–14.5)
SODIUM SERPL-SCNC: 131 MMOL/L — LOW (ref 135–145)
T4 FREE SERPL-MCNC: 0.7 NG/DL — LOW (ref 0.9–1.8)
TSH SERPL-MCNC: 3.26 UIU/ML — SIGNIFICANT CHANGE UP (ref 0.27–4.2)
URATE SERPL-MCNC: 3.5 MG/DL — SIGNIFICANT CHANGE UP (ref 2.4–5.7)
WBC # BLD: 7.68 K/UL — SIGNIFICANT CHANGE UP (ref 3.8–10.5)
WBC # FLD AUTO: 7.68 K/UL — SIGNIFICANT CHANGE UP (ref 3.8–10.5)

## 2024-03-07 PROCEDURE — 99233 SBSQ HOSP IP/OBS HIGH 50: CPT

## 2024-03-07 RX ORDER — ALPRAZOLAM 0.25 MG
0.25 TABLET ORAL DAILY
Refills: 0 | Status: DISCONTINUED | OUTPATIENT
Start: 2024-03-07 | End: 2024-03-11

## 2024-03-07 RX ORDER — ALPRAZOLAM 0.25 MG
0.25 TABLET ORAL ONCE
Refills: 0 | Status: DISCONTINUED | OUTPATIENT
Start: 2024-03-07 | End: 2024-03-07

## 2024-03-07 RX ADMIN — GABAPENTIN 400 MILLIGRAM(S): 400 CAPSULE ORAL at 21:04

## 2024-03-07 RX ADMIN — ENOXAPARIN SODIUM 40 MILLIGRAM(S): 100 INJECTION SUBCUTANEOUS at 05:34

## 2024-03-07 RX ADMIN — Medication 225 MILLIGRAM(S): at 12:27

## 2024-03-07 RX ADMIN — ZOLPIDEM TARTRATE 5 MILLIGRAM(S): 10 TABLET ORAL at 23:44

## 2024-03-07 RX ADMIN — SODIUM CHLORIDE 1 GRAM(S): 9 INJECTION INTRAMUSCULAR; INTRAVENOUS; SUBCUTANEOUS at 21:04

## 2024-03-07 RX ADMIN — SODIUM CHLORIDE 1 GRAM(S): 9 INJECTION INTRAMUSCULAR; INTRAVENOUS; SUBCUTANEOUS at 05:33

## 2024-03-07 RX ADMIN — Medication 100 MILLIGRAM(S): at 21:04

## 2024-03-07 RX ADMIN — ZOLPIDEM TARTRATE 5 MILLIGRAM(S): 10 TABLET ORAL at 00:29

## 2024-03-07 RX ADMIN — CARVEDILOL PHOSPHATE 3.12 MILLIGRAM(S): 80 CAPSULE, EXTENDED RELEASE ORAL at 05:33

## 2024-03-07 RX ADMIN — Medication 10 MILLIGRAM(S): at 12:28

## 2024-03-07 RX ADMIN — Medication 0.5 MILLIGRAM(S): at 17:34

## 2024-03-07 RX ADMIN — REMDESIVIR 200 MILLIGRAM(S): 5 INJECTION INTRAVENOUS at 05:31

## 2024-03-07 RX ADMIN — Medication 0.25 MILLIGRAM(S): at 15:03

## 2024-03-07 RX ADMIN — Medication 6 MILLIGRAM(S): at 05:34

## 2024-03-07 RX ADMIN — Medication 0.5 MILLIGRAM(S): at 05:33

## 2024-03-07 RX ADMIN — CARVEDILOL PHOSPHATE 3.12 MILLIGRAM(S): 80 CAPSULE, EXTENDED RELEASE ORAL at 17:34

## 2024-03-07 RX ADMIN — QUETIAPINE FUMARATE 300 MILLIGRAM(S): 200 TABLET, FILM COATED ORAL at 21:04

## 2024-03-07 RX ADMIN — Medication 500 MILLIGRAM(S): at 12:29

## 2024-03-07 RX ADMIN — GABAPENTIN 300 MILLIGRAM(S): 400 CAPSULE ORAL at 12:26

## 2024-03-07 RX ADMIN — SODIUM CHLORIDE 1 GRAM(S): 9 INJECTION INTRAMUSCULAR; INTRAVENOUS; SUBCUTANEOUS at 12:28

## 2024-03-07 RX ADMIN — Medication 100 MILLIGRAM(S): at 12:27

## 2024-03-07 NOTE — PROGRESS NOTE ADULT - ASSESSMENT
73F with PMHX Asthma, RA, CAD, HTN, Mood Disorder, MDD, Anxiety presents to [facility] ER c/o Cough and SOB/DAVENPORT admitted for Acute Hypoxic Respiratory Failure 2/2 Acute Asthma Exacerbation and COVID19.  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 Follow Cortisol   If drops further , can consider Samsca

## 2024-03-07 NOTE — PROGRESS NOTE ADULT - SUBJECTIVE AND OBJECTIVE BOX
[facility] Division of Hospital Medicine    SUBJECTIVE / OVERNIGHT EVENTS:  No events  Continues on oxygen    Patient denies chest pain, abd pain, N/V, fever, chills, dysuria or any other complaints. All remainder ROS negative.     MEDICATIONS  (STANDING):  ALPRAZolam 0.5 milliGRAM(s) Oral two times a day  ascorbic acid 500 milliGRAM(s) Oral daily  carvedilol 3.125 milliGRAM(s) Oral every 12 hours  dexAMETHasone  Injectable 6 milliGRAM(s) IV Push daily  enoxaparin Injectable 40 milliGRAM(s) SubCutaneous every 24 hours  gabapentin 300 milliGRAM(s) Oral daily  gabapentin 400 milliGRAM(s) Oral at bedtime  hydrocodone/homatropine Syrup 5 milliLiter(s) Oral every 6 hours  oxybutynin XL 10 milliGRAM(s) Oral daily  QUEtiapine 300 milliGRAM(s) Oral at bedtime  remdesivir  IVPB   IV Intermittent   remdesivir  IVPB 100 milliGRAM(s) IV Intermittent every 24 hours  sodium chloride 1 Gram(s) Oral every 8 hours  thiamine 100 milliGRAM(s) Oral daily  traZODone 100 milliGRAM(s) Oral at bedtime  urea Oral Powder 15 Gram(s) Oral two times a day  venlafaxine XR. 225 milliGRAM(s) Oral daily    MEDICATIONS  (PRN):  acetaminophen     Tablet .. 650 milliGRAM(s) Oral every 6 hours PRN Temp greater or equal to 38C (100.4F), Mild Pain (1 - 3)  albuterol    90 MICROgram(s) HFA Inhaler 2 Puff(s) Inhalation every 4 hours PRN Shortness of Breath and/or Wheezing  aluminum hydroxide/magnesium hydroxide/simethicone Suspension 30 milliLiter(s) Oral every 4 hours PRN Dyspepsia  benzonatate 100 milliGRAM(s) Oral three times a day PRN Cough  melatonin 3 milliGRAM(s) Oral at bedtime PRN Insomnia  ondansetron Injectable 4 milliGRAM(s) IV Push every 8 hours PRN Nausea and/or Vomiting  zolpidem 5 milliGRAM(s) Oral at bedtime PRN Insomnia  zolpidem 5 milliGRAM(s) Oral at bedtime PRN Insomnia        I&O's Summary    06 Mar 2024 07:01  -  07 Mar 2024 07:00  --------------------------------------------------------  IN: 0 mL / OUT: 1150 mL / NET: -1150 mL        PHYSICAL EXAM:  Vital Signs Last 24 Hrs  T(C): 36.4 (07 Mar 2024 10:11), Max: 37.1 (06 Mar 2024 20:15)  T(F): 97.5 (07 Mar 2024 10:11), Max: 98.7 (06 Mar 2024 20:15)  HR: 83 (07 Mar 2024 10:11) (80 - 93)  BP: 116/80 (07 Mar 2024 10:11) (116/80 - 168/90)  BP(mean): --  RR: 18 (07 Mar 2024 10:11) (18 - 18)  SpO2: 98% (07 Mar 2024 10:11) (97% - 98%)    Parameters below as of 07 Mar 2024 10:11  Patient On (Oxygen Delivery Method): nasal cannula  O2 Flow (L/min): 2          CONSTITUTIONAL: NAD, appears stated age  ENMT: Moist oral mucosa, no pharyngeal injection or exudates; normal dentition  RESPIRATORY: Normal respiratory effort; ronchi to auscultation bilaterally  CARDIOVASCULAR: Regular rate and rhythm, normal S1 and S2, no murmur/rub/gallop; Peripheral pulses are 2+ bilaterally  ABDOMEN: Nontender to palpation, normoactive bowel sounds, no rebound/guarding;   MUSCLOSKELETAL:  No clubbing or cyanosis of digits; no joint swelling or tenderness to palpation  PSYCH: A+O to person, place, and time; affect appropriate  NEUROLOGY: CN 2-12 are intact and symmetric; no gross sensory deficits;   SKIN: No rashes; no palpable lesions    LABS:                        12.9   7.68  )-----------( 262      ( 07 Mar 2024 06:08 )             37.7     03-07    131<L>  |  93<L>  |  17.2  ----------------------------<  99  4.0   |  27.0  |  0.84    Ca    8.6      07 Mar 2024 06:08  Mg     2.0     03-07    TPro  6.0<L>  /  Alb  3.3  /  TBili  <0.2<L>  /  DBili  x   /  AST  17  /  ALT  21  /  AlkPhos  77  03-07    PT/INR - ( 07 Mar 2024 06:08 )   PT: 11.3 sec;   INR: 1.02 ratio               Urinalysis Basic - ( 07 Mar 2024 06:08 )    Color: x / Appearance: x / SG: x / pH: x  Gluc: 99 mg/dL / Ketone: x  / Bili: x / Urobili: x   Blood: x / Protein: x / Nitrite: x   Leuk Esterase: x / RBC: x / WBC x   Sq Epi: x / Non Sq Epi: x / Bacteria: x        CAPILLARY BLOOD GLUCOSE            RADIOLOGY & ADDITIONAL TESTS:  Results Reviewed:   Imaging Personally Reviewed:  Electrocardiogram Personally Reviewed:

## 2024-03-07 NOTE — PROGRESS NOTE ADULT - ASSESSMENT
73F with PMHX Asthma, RA, CAD, HTN, Mood Disorder, MDD, Anxiety presents to [facility] ER c/o Cough and SOB/DAVENPORT admitted for Acute Hypoxic Respiratory Failure 2/2 Acute Asthma Exacerbation and COVID19.  patient started on NC and remdesivir and course is now complicated     PLAN:  Acute Hypoxic Respiratory Failure 2/2 COVID19  -RVP +COVID  -CTA Chest no PE +r hemidiaphragm elevated  c/w remdesivir day #3 of 5 and decadron   Continues on NC. Wean as tolerated    hyponatremia - could be secondaryt o effexor  - will consult renal  - salt tabs were initiated. Na improving     HTN  -Carvedilol 3.125mg BID    OAB  -Oxybutynin 10mg q24    RA, Chronic Pain  -Gabapentin 300mg qAM + Gabapentin 400mg qPM  -Oxycodone 10mg TID PRN (does not take daily)    Mood Disorder, MDD, Anxiety, Insomnia  -Venlafaxine 225mg q24  -Rexulti 2mg q24 -> need to bring med in from home and order when available  -Seroquel 300mg qHS  -Trazodone 100mg qHS  -Alprazolam 0.5mg BID PRN  -Zolpidem 10mg PRN QHS    Possible DC within 48h

## 2024-03-07 NOTE — PROGRESS NOTE ADULT - SUBJECTIVE AND OBJECTIVE BOX
NEPHROLOGY INTERVAL HPI/OVERNIGHT EVENTS:    feels better   Refuses oral urea   Meds noted     MEDICATIONS  (STANDING):  ALPRAZolam 0.5 milliGRAM(s) Oral two times a day  ascorbic acid 500 milliGRAM(s) Oral daily  carvedilol 3.125 milliGRAM(s) Oral every 12 hours  dexAMETHasone  Injectable 6 milliGRAM(s) IV Push daily  enoxaparin Injectable 40 milliGRAM(s) SubCutaneous every 24 hours  gabapentin 300 milliGRAM(s) Oral daily  gabapentin 400 milliGRAM(s) Oral at bedtime  hydrocodone/homatropine Syrup 5 milliLiter(s) Oral every 6 hours  oxybutynin XL 10 milliGRAM(s) Oral daily  QUEtiapine 300 milliGRAM(s) Oral at bedtime  remdesivir  IVPB   IV Intermittent   remdesivir  IVPB 100 milliGRAM(s) IV Intermittent every 24 hours  sodium chloride 1 Gram(s) Oral every 8 hours  thiamine 100 milliGRAM(s) Oral daily  traZODone 100 milliGRAM(s) Oral at bedtime  urea Oral Powder 15 Gram(s) Oral two times a day  venlafaxine XR. 225 milliGRAM(s) Oral daily    MEDICATIONS  (PRN):  acetaminophen     Tablet .. 650 milliGRAM(s) Oral every 6 hours PRN Temp greater or equal to 38C (100.4F), Mild Pain (1 - 3)  albuterol    90 MICROgram(s) HFA Inhaler 2 Puff(s) Inhalation every 4 hours PRN Shortness of Breath and/or Wheezing  aluminum hydroxide/magnesium hydroxide/simethicone Suspension 30 milliLiter(s) Oral every 4 hours PRN Dyspepsia  benzonatate 100 milliGRAM(s) Oral three times a day PRN Cough  melatonin 3 milliGRAM(s) Oral at bedtime PRN Insomnia  ondansetron Injectable 4 milliGRAM(s) IV Push every 8 hours PRN Nausea and/or Vomiting  zolpidem 5 milliGRAM(s) Oral at bedtime PRN Insomnia  zolpidem 5 milliGRAM(s) Oral at bedtime PRN Insomnia      Allergies    No Known Allergies    Intolerances          Vital Signs Last 24 Hrs  T(C): 36.4 (07 Mar 2024 10:11), Max: 37.1 (06 Mar 2024 20:15)  T(F): 97.5 (07 Mar 2024 10:11), Max: 98.7 (06 Mar 2024 20:15)  HR: 83 (07 Mar 2024 10:11) (80 - 93)  BP: 116/80 (07 Mar 2024 10:11) (116/80 - 168/90)  BP(mean): --  RR: 18 (07 Mar 2024 10:11) (18 - 18)  SpO2: 98% (07 Mar 2024 10:11) (97% - 98%)    Parameters below as of 07 Mar 2024 10:11  Patient On (Oxygen Delivery Method): nasal cannula  O2 Flow (L/min): 2    Daily     Daily   I&O's Detail    06 Mar 2024 07:01  -  07 Mar 2024 07:00  --------------------------------------------------------  IN:  Total IN: 0 mL    OUT:    Voided (mL): 1150 mL  Total OUT: 1150 mL    Total NET: -1150 mL        I&O's Summary    06 Mar 2024 07:01  -  07 Mar 2024 07:00  --------------------------------------------------------  IN: 0 mL / OUT: 1150 mL / NET: -1150 mL        PHYSICAL EXAM:      GENERAL: NAD,   HEAD:  Atraumatic, dry MM   EYNECK: Supple, No JVD, Normal thyroid  NERVOUS SYSTEM:  Alert & Oriented X3,   CHEST/LUNG: Clear / EAE . No wheeze   HEART: Regular rate and rhythm; No murmurs, rubs, or gallops  ABDOMEN: Soft, Nontender, Nondistended; Bowel sounds present  EXTREMITIES: No edema   LABS:                        12.9   7.68  )-----------( 262      ( 07 Mar 2024 06:08 )             37.7     03-07    131<L>  |  93<L>  |  17.2  ----------------------------<  99  4.0   |  27.0  |  0.84    Ca    8.6      07 Mar 2024 06:08  Mg     2.0     03-07    TPro  6.0<L>  /  Alb  3.3  /  TBili  <0.2<L>  /  DBili  x   /  AST  17  /  ALT  21  /  AlkPhos  77  03-07    PT/INR - ( 07 Mar 2024 06:08 )   PT: 11.3 sec;   INR: 1.02 ratio           Urinalysis Basic - ( 07 Mar 2024 06:08 )    Color: x / Appearance: x / SG: x / pH: x  Gluc: 99 mg/dL / Ketone: x  / Bili: x / Urobili: x   Blood: x / Protein: x / Nitrite: x   Leuk Esterase: x / RBC: x / WBC x   Sq Epi: x / Non Sq Epi: x / Bacteria: x      Magnesium: 2.0 mg/dL (03-07 @ 06:08)  Uric Acid: 3.5 mg/dL (03.07.24 @ 06:08)   Free Thyroxine, Serum: 0.7 ng/dL (03.07.24 @ 06:08)   Thyroid Stimulating Hormone, Serum: 3.26 uIU/mL      RADIOLOGY & ADDITIONAL TESTS:

## 2024-03-08 LAB
ALBUMIN SERPL ELPH-MCNC: 3.4 G/DL — SIGNIFICANT CHANGE UP (ref 3.3–5.2)
ALP SERPL-CCNC: 75 U/L — SIGNIFICANT CHANGE UP (ref 40–120)
ALT FLD-CCNC: 21 U/L — SIGNIFICANT CHANGE UP
ANION GAP SERPL CALC-SCNC: 8 MMOL/L — SIGNIFICANT CHANGE UP (ref 5–17)
AST SERPL-CCNC: 14 U/L — SIGNIFICANT CHANGE UP
BASOPHILS # BLD AUTO: 0.04 K/UL — SIGNIFICANT CHANGE UP (ref 0–0.2)
BASOPHILS NFR BLD AUTO: 0.5 % — SIGNIFICANT CHANGE UP (ref 0–2)
BILIRUB DIRECT SERPL-MCNC: <0.1 MG/DL — SIGNIFICANT CHANGE UP (ref 0–0.3)
BILIRUB INDIRECT FLD-MCNC: SIGNIFICANT CHANGE UP MG/DL (ref 0.2–1)
BILIRUB SERPL-MCNC: <0.2 MG/DL — LOW (ref 0.4–2)
BUN SERPL-MCNC: 19.8 MG/DL — SIGNIFICANT CHANGE UP (ref 8–20)
CALCIUM SERPL-MCNC: 8.9 MG/DL — SIGNIFICANT CHANGE UP (ref 8.4–10.5)
CHLORIDE SERPL-SCNC: 98 MMOL/L — SIGNIFICANT CHANGE UP (ref 96–108)
CO2 SERPL-SCNC: 30 MMOL/L — HIGH (ref 22–29)
CREAT ?TM UR-MCNC: 30 MG/DL — SIGNIFICANT CHANGE UP
CREAT SERPL-MCNC: 0.7 MG/DL — SIGNIFICANT CHANGE UP (ref 0.5–1.3)
CRP SERPL-MCNC: 6 MG/L — HIGH
D DIMER BLD IA.RAPID-MCNC: <150 NG/ML DDU — SIGNIFICANT CHANGE UP
EGFR: 91 ML/MIN/1.73M2 — SIGNIFICANT CHANGE UP
EOSINOPHIL # BLD AUTO: 0.08 K/UL — SIGNIFICANT CHANGE UP (ref 0–0.5)
EOSINOPHIL NFR BLD AUTO: 1 % — SIGNIFICANT CHANGE UP (ref 0–6)
GLUCOSE SERPL-MCNC: 94 MG/DL — SIGNIFICANT CHANGE UP (ref 70–99)
HCT VFR BLD CALC: 38.4 % — SIGNIFICANT CHANGE UP (ref 34.5–45)
HGB BLD-MCNC: 13 G/DL — SIGNIFICANT CHANGE UP (ref 11.5–15.5)
IMM GRANULOCYTES NFR BLD AUTO: 1 % — HIGH (ref 0–0.9)
INR BLD: 1.01 RATIO — SIGNIFICANT CHANGE UP (ref 0.85–1.18)
LYMPHOCYTES # BLD AUTO: 2.05 K/UL — SIGNIFICANT CHANGE UP (ref 1–3.3)
LYMPHOCYTES # BLD AUTO: 25.3 % — SIGNIFICANT CHANGE UP (ref 13–44)
MAGNESIUM SERPL-MCNC: 2.1 MG/DL — SIGNIFICANT CHANGE UP (ref 1.6–2.6)
MCHC RBC-ENTMCNC: 30.6 PG — SIGNIFICANT CHANGE UP (ref 27–34)
MCHC RBC-ENTMCNC: 33.9 GM/DL — SIGNIFICANT CHANGE UP (ref 32–36)
MCV RBC AUTO: 90.4 FL — SIGNIFICANT CHANGE UP (ref 80–100)
MONOCYTES # BLD AUTO: 1.35 K/UL — HIGH (ref 0–0.9)
MONOCYTES NFR BLD AUTO: 16.7 % — HIGH (ref 2–14)
NEUTROPHILS # BLD AUTO: 4.49 K/UL — SIGNIFICANT CHANGE UP (ref 1.8–7.4)
NEUTROPHILS NFR BLD AUTO: 55.5 % — SIGNIFICANT CHANGE UP (ref 43–77)
OSMOLALITY UR: 365 MOSM/KG — SIGNIFICANT CHANGE UP (ref 300–1000)
PHOSPHATE SERPL-MCNC: 3.8 MG/DL — SIGNIFICANT CHANGE UP (ref 2.4–4.7)
PLATELET # BLD AUTO: 286 K/UL — SIGNIFICANT CHANGE UP (ref 150–400)
POTASSIUM SERPL-MCNC: 4.5 MMOL/L — SIGNIFICANT CHANGE UP (ref 3.5–5.3)
POTASSIUM SERPL-SCNC: 4.5 MMOL/L — SIGNIFICANT CHANGE UP (ref 3.5–5.3)
PROT SERPL-MCNC: 6.1 G/DL — LOW (ref 6.6–8.7)
PROTHROM AB SERPL-ACNC: 11.2 SEC — SIGNIFICANT CHANGE UP (ref 9.5–13)
RBC # BLD: 4.25 M/UL — SIGNIFICANT CHANGE UP (ref 3.8–5.2)
RBC # FLD: 12.9 % — SIGNIFICANT CHANGE UP (ref 10.3–14.5)
SODIUM SERPL-SCNC: 136 MMOL/L — SIGNIFICANT CHANGE UP (ref 135–145)
SODIUM UR-SCNC: 87 MMOL/L — SIGNIFICANT CHANGE UP
WBC # BLD: 8.09 K/UL — SIGNIFICANT CHANGE UP (ref 3.8–10.5)
WBC # FLD AUTO: 8.09 K/UL — SIGNIFICANT CHANGE UP (ref 3.8–10.5)

## 2024-03-08 PROCEDURE — 99233 SBSQ HOSP IP/OBS HIGH 50: CPT

## 2024-03-08 RX ORDER — ALPRAZOLAM 0.25 MG
1 TABLET ORAL DAILY
Refills: 0 | Status: DISCONTINUED | OUTPATIENT
Start: 2024-03-08 | End: 2024-03-08

## 2024-03-08 RX ORDER — ALPRAZOLAM 0.25 MG
1 TABLET ORAL DAILY
Refills: 0 | Status: DISCONTINUED | OUTPATIENT
Start: 2024-03-08 | End: 2024-03-11

## 2024-03-08 RX ADMIN — CARVEDILOL PHOSPHATE 3.12 MILLIGRAM(S): 80 CAPSULE, EXTENDED RELEASE ORAL at 06:24

## 2024-03-08 RX ADMIN — Medication 500 MILLIGRAM(S): at 12:37

## 2024-03-08 RX ADMIN — Medication 100 MILLIGRAM(S): at 12:38

## 2024-03-08 RX ADMIN — Medication 10 MILLIGRAM(S): at 12:37

## 2024-03-08 RX ADMIN — SODIUM CHLORIDE 1 GRAM(S): 9 INJECTION INTRAMUSCULAR; INTRAVENOUS; SUBCUTANEOUS at 14:28

## 2024-03-08 RX ADMIN — CARVEDILOL PHOSPHATE 3.12 MILLIGRAM(S): 80 CAPSULE, EXTENDED RELEASE ORAL at 17:32

## 2024-03-08 RX ADMIN — Medication 1 MILLIGRAM(S): at 16:06

## 2024-03-08 RX ADMIN — ENOXAPARIN SODIUM 40 MILLIGRAM(S): 100 INJECTION SUBCUTANEOUS at 06:25

## 2024-03-08 RX ADMIN — Medication 100 MILLIGRAM(S): at 21:23

## 2024-03-08 RX ADMIN — Medication 600 MILLIGRAM(S): at 17:32

## 2024-03-08 RX ADMIN — Medication 225 MILLIGRAM(S): at 12:37

## 2024-03-08 RX ADMIN — SODIUM CHLORIDE 1 GRAM(S): 9 INJECTION INTRAMUSCULAR; INTRAVENOUS; SUBCUTANEOUS at 21:23

## 2024-03-08 RX ADMIN — REMDESIVIR 200 MILLIGRAM(S): 5 INJECTION INTRAVENOUS at 06:22

## 2024-03-08 RX ADMIN — QUETIAPINE FUMARATE 300 MILLIGRAM(S): 200 TABLET, FILM COATED ORAL at 21:23

## 2024-03-08 RX ADMIN — GABAPENTIN 300 MILLIGRAM(S): 400 CAPSULE ORAL at 12:37

## 2024-03-08 RX ADMIN — Medication 6 MILLIGRAM(S): at 06:24

## 2024-03-08 RX ADMIN — GABAPENTIN 400 MILLIGRAM(S): 400 CAPSULE ORAL at 21:30

## 2024-03-08 RX ADMIN — Medication 0.5 MILLIGRAM(S): at 06:23

## 2024-03-08 RX ADMIN — SODIUM CHLORIDE 1 GRAM(S): 9 INJECTION INTRAMUSCULAR; INTRAVENOUS; SUBCUTANEOUS at 06:24

## 2024-03-08 NOTE — PROGRESS NOTE ADULT - ASSESSMENT
73F with PMHX Asthma, RA, CAD, HTN, Mood Disorder, MDD, Anxiety presents to [facility] ER c/o Cough and SOB/DAVENPORT admitted for Acute Hypoxic Respiratory Failure 2/2 Acute Asthma Exacerbation and COVID19.  patient started on NC and remdesivir and course is now complicated     PLAN:  Acute Hypoxic Respiratory Failure 2/2 COVID19  -RVP +COVID  -CTA Chest no PE +r hemidiaphragm elevated  c/w remdesivir day #3 of 5 and decadron   Continues on NC. Wean as tolerated    hyponatremia - could be secondary to effexor  - Nephrology consulted  - salt tabs were initiated. Na improving   - 136 today    HTN  -Carvedilol 3.125mg BID    OAB  -Oxybutynin 10mg q24    RA, Chronic Pain  -Gabapentin 300mg qAM + Gabapentin 400mg qPM  -Oxycodone 10mg TID PRN (does not take daily)    Mood Disorder, MDD, Anxiety, Insomnia  -Venlafaxine 225mg q24  -Rexulti 2mg q24 -> need to bring med in from home and order when available  -Seroquel 300mg qHS  -Trazodone 100mg qHS  -Alprazolam 0.5mg BID PRN  -Zolpidem 10mg PRN QHS    Possible DC within 48h   73F with PMHX Asthma, RA, CAD, HTN, Mood Disorder, MDD, Anxiety presents to [facility] ER c/o Cough and SOB/DAVENPORT admitted for Acute Hypoxic Respiratory Failure 2/2 Acute Asthma Exacerbation and COVID19.  patient started on NC and remdesivir and course is now complicated     PLAN:  Acute Hypoxic Respiratory Failure 2/2 COVID19  -RVP +COVID  -CTA Chest no PE +r hemidiaphragm elevated  -C/w remdesivir day #4 of 5 and decadron   -Continues on NC. Wean as tolerated    hyponatremia - could be secondary to effexor  - Nephrology consulted  - salt tabs were initiated.   - Na improving 136 today    HTN  -Carvedilol 3.125mg BID    OAB  -Oxybutynin 10mg q24    RA, Chronic Pain  -Gabapentin 300mg qAM + Gabapentin 400mg qPM  -Oxycodone 10mg TID PRN (does not take daily)    Mood Disorder, MDD, Anxiety, Insomnia  -Venlafaxine 225mg q24  -Rexulti 2mg q24 -> need to bring med in from home and order when available  -Seroquel 300mg qHS  -Trazodone 100mg qHS  -Alprazolam 0.5mg BID PRN > will order both at the same time  -Zolpidem 10mg PRN QHS    Possible DC within 48h if weaned off O2

## 2024-03-08 NOTE — PROGRESS NOTE ADULT - SUBJECTIVE AND OBJECTIVE BOX
Patient is a 73y old  Female who presents with a chief complaint of Acute Hypoxic Resp Failure 2/2 Asthma Exacerbation and COVID (07 Mar 2024 14:21)    SUBJECTIVE:  BRIEF HOSPITAL COURSE: 73F with PMHX Asthma, RA, CAD, HTN, Mood Disorder, MDD, Anxiety presents to [facility] ER c/o Cough and SOB/DAVENPORT admitted for Acute Hypoxic Respiratory Failure 2/2 Acute Asthma Exacerbation and COVID19.  patient started on NC and remdesivir and steroids.    OVERNIGHT EVENTS: No acute events reported overnight.    TODAY:    MEDICATIONS  (STANDING):  ALPRAZolam 0.5 milliGRAM(s) Oral two times a day  ascorbic acid 500 milliGRAM(s) Oral daily  carvedilol 3.125 milliGRAM(s) Oral every 12 hours  dexAMETHasone  Injectable 6 milliGRAM(s) IV Push daily  enoxaparin Injectable 40 milliGRAM(s) SubCutaneous every 24 hours  gabapentin 300 milliGRAM(s) Oral daily  gabapentin 400 milliGRAM(s) Oral at bedtime  oxybutynin XL 10 milliGRAM(s) Oral daily  QUEtiapine 300 milliGRAM(s) Oral at bedtime  remdesivir  IVPB   IV Intermittent   remdesivir  IVPB 100 milliGRAM(s) IV Intermittent every 24 hours  sodium chloride 1 Gram(s) Oral every 8 hours  thiamine 100 milliGRAM(s) Oral daily  traZODone 100 milliGRAM(s) Oral at bedtime  venlafaxine XR. 225 milliGRAM(s) Oral daily    MEDICATIONS  (PRN):  acetaminophen     Tablet .. 650 milliGRAM(s) Oral every 6 hours PRN Temp greater or equal to 38C (100.4F), Mild Pain (1 - 3)  albuterol    90 MICROgram(s) HFA Inhaler 2 Puff(s) Inhalation every 4 hours PRN Shortness of Breath and/or Wheezing  ALPRAZolam 0.25 milliGRAM(s) Oral daily PRN Anxiety  aluminum hydroxide/magnesium hydroxide/simethicone Suspension 30 milliLiter(s) Oral every 4 hours PRN Dyspepsia  benzonatate 100 milliGRAM(s) Oral three times a day PRN Cough  melatonin 3 milliGRAM(s) Oral at bedtime PRN Insomnia  ondansetron Injectable 4 milliGRAM(s) IV Push every 8 hours PRN Nausea and/or Vomiting  zolpidem 5 milliGRAM(s) Oral at bedtime PRN Insomnia  zolpidem 5 milliGRAM(s) Oral at bedtime PRN Insomnia      Allergies    No Known Allergies    Intolerances        REVIEW OF SYSTEMS: Review of systems is otherwise negative unless mentioned in HPI above.    OBJECTIVE:  Vital Signs Last 24 Hrs  T(C): 36.4 (08 Mar 2024 04:50), Max: 37.1 (07 Mar 2024 17:28)  T(F): 97.6 (08 Mar 2024 04:50), Max: 98.7 (07 Mar 2024 17:28)  HR: 87 (08 Mar 2024 04:50) (83 - 99)  BP: 113/72 (08 Mar 2024 04:50) (113/72 - 141/89)  BP(mean): --  RR: 18 (08 Mar 2024 04:50) (18 - 18)  SpO2: 96% (08 Mar 2024 04:50) (96% - 98%)    Parameters below as of 08 Mar 2024 04:50  Patient On (Oxygen Delivery Method): nasal cannula  O2 Flow (L/min): 2      PHYSICAL EXAM:     LABS:                        13.0   8.09  )-----------( 286      ( 08 Mar 2024 05:15 )             38.4     03-08    136  |  98  |  19.8  ----------------------------<  94  4.5   |  30.0<H>  |  0.70    Ca    8.9      08 Mar 2024 05:15  Phos  3.8     03-08  Mg     2.1     03-08    TPro  6.1<L>  /  Alb  3.4  /  TBili  <0.2<L>  /  DBili  <0.1  /  AST  14  /  ALT  21  /  AlkPhos  75  03-08    PT/INR - ( 08 Mar 2024 05:15 )   PT: 11.2 sec;   INR: 1.01 ratio           Urinalysis Basic - ( 08 Mar 2024 05:15 )    Color: x / Appearance: x / SG: x / pH: x  Gluc: 94 mg/dL / Ketone: x  / Bili: x / Urobili: x   Blood: x / Protein: x / Nitrite: x   Leuk Esterase: x / RBC: x / WBC x   Sq Epi: x / Non Sq Epi: x / Bacteria: x      CAPILLARY BLOOD GLUCOSE          RADIOLOGY & ADDITIONAL TESTS:    Imaging Personally Reviewed:  [x ] YES  [ ] NO    Consultant(s) Notes Reviewed:  [x ] YES  [ ] NO    Care Discussed with Consultants/Other Providers [x ] YES  [ ] NO    Plan of Care discussed with Housestaff [x ]YES [ ] NO Patient is a 73y old  Female who presents with a chief complaint of Acute Hypoxic Resp Failure 2/2 Asthma Exacerbation and COVID (07 Mar 2024 14:21)    SUBJECTIVE:  BRIEF HOSPITAL COURSE: 73F with PMHX Asthma, RA, CAD, HTN, Mood Disorder, MDD, Anxiety presents to [facility] ER c/o Cough and SOB/DAVENPORT admitted for Acute Hypoxic Respiratory Failure 2/2 Acute Asthma Exacerbation and COVID19.  patient started on NC and remdesivir and steroids.    OVERNIGHT EVENTS: No acute events reported overnight.    TODAY: Seen and examined bedside. Feels the same as yesterday. On 2LNC. Still complaining of dry cough. Feels congested but can't bring sputum up. Is not short of breath on rest but feels SOB on activity and after she coughs. Requests both of her Xanax pills to be given at the same today.    MEDICATIONS  (STANDING):  ALPRAZolam 0.5 milliGRAM(s) Oral two times a day  ascorbic acid 500 milliGRAM(s) Oral daily  carvedilol 3.125 milliGRAM(s) Oral every 12 hours  dexAMETHasone  Injectable 6 milliGRAM(s) IV Push daily  enoxaparin Injectable 40 milliGRAM(s) SubCutaneous every 24 hours  gabapentin 300 milliGRAM(s) Oral daily  gabapentin 400 milliGRAM(s) Oral at bedtime  oxybutynin XL 10 milliGRAM(s) Oral daily  QUEtiapine 300 milliGRAM(s) Oral at bedtime  remdesivir  IVPB   IV Intermittent   remdesivir  IVPB 100 milliGRAM(s) IV Intermittent every 24 hours  sodium chloride 1 Gram(s) Oral every 8 hours  thiamine 100 milliGRAM(s) Oral daily  traZODone 100 milliGRAM(s) Oral at bedtime  venlafaxine XR. 225 milliGRAM(s) Oral daily    MEDICATIONS  (PRN):  acetaminophen     Tablet .. 650 milliGRAM(s) Oral every 6 hours PRN Temp greater or equal to 38C (100.4F), Mild Pain (1 - 3)  albuterol    90 MICROgram(s) HFA Inhaler 2 Puff(s) Inhalation every 4 hours PRN Shortness of Breath and/or Wheezing  ALPRAZolam 0.25 milliGRAM(s) Oral daily PRN Anxiety  aluminum hydroxide/magnesium hydroxide/simethicone Suspension 30 milliLiter(s) Oral every 4 hours PRN Dyspepsia  benzonatate 100 milliGRAM(s) Oral three times a day PRN Cough  melatonin 3 milliGRAM(s) Oral at bedtime PRN Insomnia  ondansetron Injectable 4 milliGRAM(s) IV Push every 8 hours PRN Nausea and/or Vomiting  zolpidem 5 milliGRAM(s) Oral at bedtime PRN Insomnia  zolpidem 5 milliGRAM(s) Oral at bedtime PRN Insomnia      Allergies    No Known Allergies    Intolerances        REVIEW OF SYSTEMS: Review of systems is otherwise negative unless mentioned in HPI above.    OBJECTIVE:  Vital Signs Last 24 Hrs  T(C): 36.4 (08 Mar 2024 04:50), Max: 37.1 (07 Mar 2024 17:28)  T(F): 97.6 (08 Mar 2024 04:50), Max: 98.7 (07 Mar 2024 17:28)  HR: 87 (08 Mar 2024 04:50) (83 - 99)  BP: 113/72 (08 Mar 2024 04:50) (113/72 - 141/89)  BP(mean): --  RR: 18 (08 Mar 2024 04:50) (18 - 18)  SpO2: 96% (08 Mar 2024 04:50) (96% - 98%)    Parameters below as of 08 Mar 2024 04:50  Patient On (Oxygen Delivery Method): nasal cannula  O2 Flow (L/min): 2      PHYSICAL EXAM:   ONSTITUTIONAL: NAD, lying on bed, NC in place  ENMT: Moist oral mucosa, no pharyngeal injection or exudates; normal dentition  RESPIRATORY: Normal respiratory effort; rhonchi to auscultation bilaterally  CARDIOVASCULAR: Regular rate and rhythm, normal S1 and S2, no murmur/rub/gallop; Peripheral pulses are 2+ bilaterally  ABDOMEN: Nontender to palpation, normoactive bowel sounds, no rebound/guarding;   MUSCLOSKELETAL:  No clubbing or cyanosis of digits; no joint swelling or tenderness to palpation  PSYCH: A+O to person, place, and time; affect appropriate  NEUROLOGY: CN 2-12 are intact and symmetric; no gross sensory deficits;   SKIN: No rashes; no palpable lesions    LABS:                        13.0   8.09  )-----------( 286      ( 08 Mar 2024 05:15 )             38.4     03-08    136  |  98  |  19.8  ----------------------------<  94  4.5   |  30.0<H>  |  0.70    Ca    8.9      08 Mar 2024 05:15  Phos  3.8     03-08  Mg     2.1     03-08    TPro  6.1<L>  /  Alb  3.4  /  TBili  <0.2<L>  /  DBili  <0.1  /  AST  14  /  ALT  21  /  AlkPhos  75  03-08    PT/INR - ( 08 Mar 2024 05:15 )   PT: 11.2 sec;   INR: 1.01 ratio           Urinalysis Basic - ( 08 Mar 2024 05:15 )    Color: x / Appearance: x / SG: x / pH: x  Gluc: 94 mg/dL / Ketone: x  / Bili: x / Urobili: x   Blood: x / Protein: x / Nitrite: x   Leuk Esterase: x / RBC: x / WBC x   Sq Epi: x / Non Sq Epi: x / Bacteria: x      CAPILLARY BLOOD GLUCOSE          RADIOLOGY & ADDITIONAL TESTS:    Imaging Personally Reviewed:  [x ] YES  [ ] NO    Consultant(s) Notes Reviewed:  [x ] YES  [ ] NO    Care Discussed with Consultants/Other Providers [x ] YES  [ ] NO    Plan of Care discussed with Housestaff [x ]YES [ ] NO

## 2024-03-08 NOTE — PROGRESS NOTE ADULT - ASSESSMENT
The patient is a 73y Female presents to the ED complaining of shortness of breath. She has a PMHx of asthma, CAD, HTN, and RA presents to the ED with SOB. Patient states that she developed a cough, headache and chills x6 days ago, saw her PCP on Friday (3 days ago) who prescribed her prednisone, albuterol for presumed bronchitis secondary to a viral URI. Patient notes that meds have not relieved her symptoms. Has tried OTC tylenol without relief as well. Chills resolved but cough persisted. Today noted increasing SOB on exertion. Denies any chest pain, dizziness, blurry vision, LE swelling, weakness, constipation, diarrhea, dysuria. Follows with Dr. Ervin in [place], last stress test Jan 2024. No known allergies.    Ms Frost is a known pt of Dr Mazariegos and is seen at [facility] for spontaneous ecchymosis, Her spontaneous ecchymosis is likely due to her severe Vit C def /Scurvy ,Von Willebrand panel, hepatitis panel, Factor XIII and PT/INR wnl.  Pt is currently not on any NSAIDs, Asa or Plavix Pt admits to lack of proper diet Last seen in the office on 2/24 with symptoms of fatigue.      Acute Hypoxic Resp Failure 2/2 Asthma Exacerbation and COVID  + SARS COVID 2  cont with IV antibiotics per medicine  Cont with Neb Tx per medicine  O2 NC 2L  CT angio neg PE  CXR right basilar atelectasis      hyponatremia - could be secondary to effexor  Na 136 normalized  Fluid rest 1.2L per neph  Nephrology following  per medicine salt tabs started     DVT ppx

## 2024-03-08 NOTE — PROGRESS NOTE ADULT - SUBJECTIVE AND OBJECTIVE BOX
The patient is a 73y Female presents to the ED complaining of shortness of breath. She has a PMHx of asthma, CAD, HTN, and RA presents to the ED with SOB. Patient states that she developed a cough, headache and chills x6 days ago, saw her PCP on Friday (3 days ago) who prescribed her prednisone, albuterol for presumed bronchitis secondary to a viral URI. Patient notes that meds have not relieved her symptoms. Has tried OTC tylenol without relief as well. Chills resolved but cough persisted. Today noted increasing SOB on exertion. Denies any chest pain, dizziness, blurry vision, LE swelling, weakness, constipation, diarrhea, dysuria. Follows with Dr. Ervin in [place], last stress test Jan 2024. No known allergies.    Ms Ramirez is a known pt of Dr Mazariegos and is seen at [facility] for spontaneous ecchymosis, Her spontaneous ecchymosis was likely due to her severe Vit C def /Scurvy in past. Von Willebrand panel, hepatitis panel, Factor XIII and PT/INR wnl.  Pt is currently not on any NSAIDs, Asa or Plavix Pt admits to lack of proper diet Last seen in the office on 2/24 with symptoms of fatigue.    3/8/2024  Pt feeling better.  Offers no compaints.  Afebrile.  Cont on 2L NC.  Denies CP, SOB.  Denies increased bleeding/bruising.      MEDICATIONS  (STANDING):  ALPRAZolam 0.5 milliGRAM(s) Oral two times a day  ascorbic acid 500 milliGRAM(s) Oral daily  carvedilol 3.125 milliGRAM(s) Oral every 12 hours  dexAMETHasone  Injectable 6 milliGRAM(s) IV Push daily  enoxaparin Injectable 40 milliGRAM(s) SubCutaneous every 24 hours  gabapentin 300 milliGRAM(s) Oral daily  gabapentin 400 milliGRAM(s) Oral at bedtime  oxybutynin XL 10 milliGRAM(s) Oral daily  QUEtiapine 300 milliGRAM(s) Oral at bedtime  remdesivir  IVPB   IV Intermittent   remdesivir  IVPB 100 milliGRAM(s) IV Intermittent every 24 hours  sodium chloride 1 Gram(s) Oral every 8 hours  thiamine 100 milliGRAM(s) Oral daily  traZODone 100 milliGRAM(s) Oral at bedtime  venlafaxine XR. 225 milliGRAM(s) Oral daily    MEDICATIONS  (PRN):  acetaminophen     Tablet .. 650 milliGRAM(s) Oral every 6 hours PRN Temp greater or equal to 38C (100.4F), Mild Pain (1 - 3)  albuterol    90 MICROgram(s) HFA Inhaler 2 Puff(s) Inhalation every 4 hours PRN Shortness of Breath and/or Wheezing  ALPRAZolam 0.25 milliGRAM(s) Oral daily PRN Anxiety  aluminum hydroxide/magnesium hydroxide/simethicone Suspension 30 milliLiter(s) Oral every 4 hours PRN Dyspepsia  benzonatate 100 milliGRAM(s) Oral three times a day PRN Cough  melatonin 3 milliGRAM(s) Oral at bedtime PRN Insomnia  ondansetron Injectable 4 milliGRAM(s) IV Push every 8 hours PRN Nausea and/or Vomiting  zolpidem 5 milliGRAM(s) Oral at bedtime PRN Insomnia  zolpidem 5 milliGRAM(s) Oral at bedtime PRN Insomnia        Vital Signs Last 24 Hrs  T(C): 36.4 (08 Mar 2024 04:50), Max: 37.1 (07 Mar 2024 17:28)  T(F): 97.6 (08 Mar 2024 04:50), Max: 98.7 (07 Mar 2024 17:28)  HR: 87 (08 Mar 2024 04:50) (83 - 99)  BP: 113/72 (08 Mar 2024 04:50) (113/72 - 141/89)  BP(mean): --  RR: 18 (08 Mar 2024 04:50) (18 - 18)  SpO2: 96% (08 Mar 2024 04:50) (96% - 98%)    Parameters below as of 08 Mar 2024 04:50  Patient On (Oxygen Delivery Method): nasal cannula  O2 Flow (L/min): 2                          13.0   8.09  )-----------( 286      ( 08 Mar 2024 05:15 )             38.4                  12.3   11.40 )-----------( 274      ( 06 Mar 2024 02:46 )             36.5                     03-08    136  |  98  |  19.8  ----------------------------<  94  4.5   |  30.0<H>  |  0.70    Ca    8.9      08 Mar 2024 05:15  Phos  3.8     03-08  Mg     2.1     03-08    TPro  6.1<L>  /  Alb  3.4  /  TBili  <0.2<L>  /  DBili  <0.1  /  AST  14  /  ALT  21  /  AlkPhos  75  03-08        03-06    126<L>  |  87<L>  |  16.1  ----------------------------<  106<H>  4.8   |  27.0  |  0.69    Ca    8.9      06 Mar 2024 12:27  Mg     2.2     03-06        TPro  6.9  /  Alb  3.8  /  TBili  <0.2<L>  /  DBili  x   /  AST  24  /  ALT  28  /  AlkPhos  91  03-06  MSK: moving all extremities, no motor weakness, full ROM x4  Ext: palpable distal pulses, good capillary refill, no clubbing/cyanosis/edema  Psych: appropriate, cooperative  Neuro: CN II-XII grossly intact, no focal deficits  Skin: Warm/Dry. No rashes.    Constitutional: Well-appearing, NAD, VSS  Head: NC/AT  Eyes: PERRL, EOMI, anicteric sclera, conjunctiva WNL  ENT: Normal Pharynx, MMM, No tonsillar exudate/erythema  Neck: Supple, Non-tender  Cardio: RRR, s1/s2, no appreciable murmurs/rubs/gallops  Resp: +Wheezing on expiration  Abd: Soft, Non-tender, Non-distended, no rebound/guarding/rigidity

## 2024-03-09 LAB
ANION GAP SERPL CALC-SCNC: 13 MMOL/L — SIGNIFICANT CHANGE UP (ref 5–17)
BUN SERPL-MCNC: 17.2 MG/DL — SIGNIFICANT CHANGE UP (ref 8–20)
CALCIUM SERPL-MCNC: 8.7 MG/DL — SIGNIFICANT CHANGE UP (ref 8.4–10.5)
CHLORIDE SERPL-SCNC: 97 MMOL/L — SIGNIFICANT CHANGE UP (ref 96–108)
CO2 SERPL-SCNC: 27 MMOL/L — SIGNIFICANT CHANGE UP (ref 22–29)
CREAT SERPL-MCNC: 0.65 MG/DL — SIGNIFICANT CHANGE UP (ref 0.5–1.3)
EGFR: 93 ML/MIN/1.73M2 — SIGNIFICANT CHANGE UP
GLUCOSE SERPL-MCNC: 104 MG/DL — HIGH (ref 70–99)
HCT VFR BLD CALC: 39.5 % — SIGNIFICANT CHANGE UP (ref 34.5–45)
HGB BLD-MCNC: 13.1 G/DL — SIGNIFICANT CHANGE UP (ref 11.5–15.5)
INR BLD: 1.06 RATIO — SIGNIFICANT CHANGE UP (ref 0.85–1.18)
MAGNESIUM SERPL-MCNC: 2 MG/DL — SIGNIFICANT CHANGE UP (ref 1.6–2.6)
MCHC RBC-ENTMCNC: 30.5 PG — SIGNIFICANT CHANGE UP (ref 27–34)
MCHC RBC-ENTMCNC: 33.2 GM/DL — SIGNIFICANT CHANGE UP (ref 32–36)
MCV RBC AUTO: 91.9 FL — SIGNIFICANT CHANGE UP (ref 80–100)
PLATELET # BLD AUTO: 280 K/UL — SIGNIFICANT CHANGE UP (ref 150–400)
POTASSIUM SERPL-MCNC: 4 MMOL/L — SIGNIFICANT CHANGE UP (ref 3.5–5.3)
POTASSIUM SERPL-SCNC: 4 MMOL/L — SIGNIFICANT CHANGE UP (ref 3.5–5.3)
PROTHROM AB SERPL-ACNC: 11.7 SEC — SIGNIFICANT CHANGE UP (ref 9.5–13)
RBC # BLD: 4.3 M/UL — SIGNIFICANT CHANGE UP (ref 3.8–5.2)
RBC # FLD: 13.2 % — SIGNIFICANT CHANGE UP (ref 10.3–14.5)
SODIUM SERPL-SCNC: 136 MMOL/L — SIGNIFICANT CHANGE UP (ref 135–145)
WBC # BLD: 9.61 K/UL — SIGNIFICANT CHANGE UP (ref 3.8–10.5)
WBC # FLD AUTO: 9.61 K/UL — SIGNIFICANT CHANGE UP (ref 3.8–10.5)

## 2024-03-09 PROCEDURE — 99232 SBSQ HOSP IP/OBS MODERATE 35: CPT

## 2024-03-09 RX ADMIN — Medication 6 MILLIGRAM(S): at 05:17

## 2024-03-09 RX ADMIN — ZOLPIDEM TARTRATE 5 MILLIGRAM(S): 10 TABLET ORAL at 00:13

## 2024-03-09 RX ADMIN — Medication 600 MILLIGRAM(S): at 17:16

## 2024-03-09 RX ADMIN — SODIUM CHLORIDE 1 GRAM(S): 9 INJECTION INTRAMUSCULAR; INTRAVENOUS; SUBCUTANEOUS at 05:18

## 2024-03-09 RX ADMIN — QUETIAPINE FUMARATE 300 MILLIGRAM(S): 200 TABLET, FILM COATED ORAL at 21:27

## 2024-03-09 RX ADMIN — Medication 1 MILLIGRAM(S): at 23:34

## 2024-03-09 RX ADMIN — Medication 225 MILLIGRAM(S): at 11:07

## 2024-03-09 RX ADMIN — SODIUM CHLORIDE 1 GRAM(S): 9 INJECTION INTRAMUSCULAR; INTRAVENOUS; SUBCUTANEOUS at 14:04

## 2024-03-09 RX ADMIN — CARVEDILOL PHOSPHATE 3.12 MILLIGRAM(S): 80 CAPSULE, EXTENDED RELEASE ORAL at 05:18

## 2024-03-09 RX ADMIN — GABAPENTIN 400 MILLIGRAM(S): 400 CAPSULE ORAL at 21:27

## 2024-03-09 RX ADMIN — Medication 100 MILLIGRAM(S): at 14:04

## 2024-03-09 RX ADMIN — Medication 100 MILLIGRAM(S): at 21:27

## 2024-03-09 RX ADMIN — Medication 100 MILLIGRAM(S): at 11:06

## 2024-03-09 RX ADMIN — CARVEDILOL PHOSPHATE 3.12 MILLIGRAM(S): 80 CAPSULE, EXTENDED RELEASE ORAL at 17:17

## 2024-03-09 RX ADMIN — ENOXAPARIN SODIUM 40 MILLIGRAM(S): 100 INJECTION SUBCUTANEOUS at 05:18

## 2024-03-09 RX ADMIN — Medication 10 MILLIGRAM(S): at 11:07

## 2024-03-09 RX ADMIN — Medication 600 MILLIGRAM(S): at 05:18

## 2024-03-09 RX ADMIN — SODIUM CHLORIDE 1 GRAM(S): 9 INJECTION INTRAMUSCULAR; INTRAVENOUS; SUBCUTANEOUS at 21:27

## 2024-03-09 RX ADMIN — GABAPENTIN 300 MILLIGRAM(S): 400 CAPSULE ORAL at 11:06

## 2024-03-09 RX ADMIN — REMDESIVIR 200 MILLIGRAM(S): 5 INJECTION INTRAVENOUS at 05:18

## 2024-03-09 RX ADMIN — Medication 500 MILLIGRAM(S): at 11:06

## 2024-03-09 NOTE — PROGRESS NOTE ADULT - SUBJECTIVE AND OBJECTIVE BOX
Patient is a 73y old  Female who presents with a chief complaint of Acute Hypoxic Resp Failure 2/2 Asthma Exacerbation and COVID (08 Mar 2024 08:18)      Patient seen and examined at bedside. No overnight events reported. comfortable. in no acute distress     ALLERGIES:  No Known Allergies    MEDICATIONS  (STANDING):  ascorbic acid 500 milliGRAM(s) Oral daily  carvedilol 3.125 milliGRAM(s) Oral every 12 hours  dexAMETHasone  Injectable 6 milliGRAM(s) IV Push daily  enoxaparin Injectable 40 milliGRAM(s) SubCutaneous every 24 hours  gabapentin 300 milliGRAM(s) Oral daily  gabapentin 400 milliGRAM(s) Oral at bedtime  guaiFENesin  milliGRAM(s) Oral every 12 hours  oxybutynin XL 10 milliGRAM(s) Oral daily  QUEtiapine 300 milliGRAM(s) Oral at bedtime  sodium chloride 1 Gram(s) Oral every 8 hours  thiamine 100 milliGRAM(s) Oral daily  traZODone 100 milliGRAM(s) Oral at bedtime  venlafaxine XR. 225 milliGRAM(s) Oral daily    MEDICATIONS  (PRN):  acetaminophen     Tablet .. 650 milliGRAM(s) Oral every 6 hours PRN Temp greater or equal to 38C (100.4F), Mild Pain (1 - 3)  albuterol    90 MICROgram(s) HFA Inhaler 2 Puff(s) Inhalation every 4 hours PRN Shortness of Breath and/or Wheezing  ALPRAZolam 0.25 milliGRAM(s) Oral daily PRN Anxiety  ALPRAZolam 1 milliGRAM(s) Oral daily PRN Anxiety  aluminum hydroxide/magnesium hydroxide/simethicone Suspension 30 milliLiter(s) Oral every 4 hours PRN Dyspepsia  benzonatate 100 milliGRAM(s) Oral three times a day PRN Cough  melatonin 3 milliGRAM(s) Oral at bedtime PRN Insomnia  ondansetron Injectable 4 milliGRAM(s) IV Push every 8 hours PRN Nausea and/or Vomiting  zolpidem 5 milliGRAM(s) Oral at bedtime PRN Insomnia  zolpidem 5 milliGRAM(s) Oral at bedtime PRN Insomnia    Vital Signs Last 24 Hrs  T(F): 98.6 (09 Mar 2024 08:16), Max: 98.6 (09 Mar 2024 08:16)  HR: 86 (09 Mar 2024 08:16) (83 - 99)  BP: 138/90 (09 Mar 2024 08:16) (119/79 - 160/89)  RR: 19 (09 Mar 2024 08:16) (17 - 19)  SpO2: 92% (09 Mar 2024 08:16) (92% - 97%)  I&O's Summary    08 Mar 2024 07:01  -  09 Mar 2024 07:00  --------------------------------------------------------  IN: 1960 mL / OUT: 1700 mL / NET: 260 mL    09 Mar 2024 06:01  -  09 Mar 2024 15:52  --------------------------------------------------------  IN: 240 mL / OUT: 1000 mL / NET: -760 mL      PHYSICAL EXAM:  General: NAD, A/O x 3  ENT: MMM, no thrush  Neck: Supple, No JVD  Lungs: Clear to auscultation bilaterally, good air entry, non-labored breathing  Cardio: RRR, S1/S2, No murmur  Abdomen: Soft, Nontender, Nondistended; Bowel sounds present  Extremities: No calf tenderness, No pitting edema    LABS:                        13.1   9.61  )-----------( 280      ( 09 Mar 2024 06:27 )             39.5     03-09    136  |  97  |  17.2  ----------------------------<  104  4.0   |  27.0  |  0.65    Ca    8.7      09 Mar 2024 06:27  Phos  3.8     03-08  Mg     2.0     03-09    TPro  6.1  /  Alb  3.4  /  TBili  <0.2  /  DBili  <0.1  /  AST  14  /  ALT  21  /  AlkPhos  75  03-08          PT/INR - ( 09 Mar 2024 06:27 )   PT: 11.7 sec;   INR: 1.06 ratio       TSH 3.26   TSH with FT4 reflex --  Total T3 --        Urinalysis Basic - ( 09 Mar 2024 06:27 )    Color: x / Appearance: x / SG: x / pH: x  Gluc: 104 mg/dL / Ketone: x  / Bili: x / Urobili: x   Blood: x / Protein: x / Nitrite: x   Leuk Esterase: x / RBC: x / WBC x   Sq Epi: x / Non Sq Epi: x / Bacteria: x          RADIOLOGY & ADDITIONAL TESTS:    Care Discussed with Consultants/Other Providers:

## 2024-03-09 NOTE — PROGRESS NOTE ADULT - ASSESSMENT
73F with PMHX Asthma, RA, CAD, HTN, Mood Disorder, MDD, Anxiety presents to [facility] ER c/o Cough and SOB/DAVENPORT admitted for Acute Hypoxic Respiratory Failure 2/2 Acute Asthma Exacerbation and COVID19.     Acute Hypoxic Respiratory Failure 2/2 COVID19 / possible covid pnemonia   -RVP +COVID  -CTA Chest no PE +r hemidiaphragm elevated  -C/w remdesivir day #4 of 5 and decadron   -Continues on NC. Wean as tolerated    hyponatremia / hyponatremia   - possible secondary to effexor  - Nephrology consulted  - c.w salt tabs    HTN  -Carvedilol 3.125mg BID    OAB  -Oxybutynin 10mg q24    RA, Chronic Pain  -Gabapentin 300mg qAM + Gabapentin 400mg qPM  -Oxycodone 10mg TID PRN (does not take daily)    Mood Disorder, MDD, Anxiety, Insomnia  -c/w home meds     Possible DC home in am. home o2eval.

## 2024-03-10 LAB
ALBUMIN SERPL ELPH-MCNC: 3.4 G/DL — SIGNIFICANT CHANGE UP (ref 3.3–5.2)
ALP SERPL-CCNC: 73 U/L — SIGNIFICANT CHANGE UP (ref 40–120)
ALT FLD-CCNC: 16 U/L — SIGNIFICANT CHANGE UP
ANION GAP SERPL CALC-SCNC: 11 MMOL/L — SIGNIFICANT CHANGE UP (ref 5–17)
AST SERPL-CCNC: 13 U/L — SIGNIFICANT CHANGE UP
BILIRUB SERPL-MCNC: <0.2 MG/DL — LOW (ref 0.4–2)
BUN SERPL-MCNC: 15.5 MG/DL — SIGNIFICANT CHANGE UP (ref 8–20)
CALCIUM SERPL-MCNC: 8.7 MG/DL — SIGNIFICANT CHANGE UP (ref 8.4–10.5)
CHLORIDE SERPL-SCNC: 98 MMOL/L — SIGNIFICANT CHANGE UP (ref 96–108)
CO2 SERPL-SCNC: 27 MMOL/L — SIGNIFICANT CHANGE UP (ref 22–29)
CREAT SERPL-MCNC: 0.61 MG/DL — SIGNIFICANT CHANGE UP (ref 0.5–1.3)
EGFR: 94 ML/MIN/1.73M2 — SIGNIFICANT CHANGE UP
GLUCOSE SERPL-MCNC: 132 MG/DL — HIGH (ref 70–99)
HCT VFR BLD CALC: 39.9 % — SIGNIFICANT CHANGE UP (ref 34.5–45)
HGB BLD-MCNC: 13.5 G/DL — SIGNIFICANT CHANGE UP (ref 11.5–15.5)
INR BLD: 1.07 RATIO — SIGNIFICANT CHANGE UP (ref 0.85–1.18)
MAGNESIUM SERPL-MCNC: 2 MG/DL — SIGNIFICANT CHANGE UP (ref 1.6–2.6)
MCHC RBC-ENTMCNC: 30.5 PG — SIGNIFICANT CHANGE UP (ref 27–34)
MCHC RBC-ENTMCNC: 33.8 GM/DL — SIGNIFICANT CHANGE UP (ref 32–36)
MCV RBC AUTO: 90.1 FL — SIGNIFICANT CHANGE UP (ref 80–100)
PLATELET # BLD AUTO: 314 K/UL — SIGNIFICANT CHANGE UP (ref 150–400)
POTASSIUM SERPL-MCNC: 4.1 MMOL/L — SIGNIFICANT CHANGE UP (ref 3.5–5.3)
POTASSIUM SERPL-SCNC: 4.1 MMOL/L — SIGNIFICANT CHANGE UP (ref 3.5–5.3)
PROT SERPL-MCNC: 6.3 G/DL — LOW (ref 6.6–8.7)
PROTHROM AB SERPL-ACNC: 11.9 SEC — SIGNIFICANT CHANGE UP (ref 9.5–13)
RBC # BLD: 4.43 M/UL — SIGNIFICANT CHANGE UP (ref 3.8–5.2)
RBC # FLD: 13 % — SIGNIFICANT CHANGE UP (ref 10.3–14.5)
SODIUM SERPL-SCNC: 136 MMOL/L — SIGNIFICANT CHANGE UP (ref 135–145)
WBC # BLD: 10.41 K/UL — SIGNIFICANT CHANGE UP (ref 3.8–10.5)
WBC # FLD AUTO: 10.41 K/UL — SIGNIFICANT CHANGE UP (ref 3.8–10.5)

## 2024-03-10 PROCEDURE — 99232 SBSQ HOSP IP/OBS MODERATE 35: CPT | Mod: GC

## 2024-03-10 RX ADMIN — Medication 1 MILLIGRAM(S): at 11:39

## 2024-03-10 RX ADMIN — QUETIAPINE FUMARATE 300 MILLIGRAM(S): 200 TABLET, FILM COATED ORAL at 21:27

## 2024-03-10 RX ADMIN — Medication 500 MILLIGRAM(S): at 11:40

## 2024-03-10 RX ADMIN — Medication 6 MILLIGRAM(S): at 05:13

## 2024-03-10 RX ADMIN — Medication 1 MILLIGRAM(S): at 23:01

## 2024-03-10 RX ADMIN — Medication 225 MILLIGRAM(S): at 11:39

## 2024-03-10 RX ADMIN — SODIUM CHLORIDE 1 GRAM(S): 9 INJECTION INTRAMUSCULAR; INTRAVENOUS; SUBCUTANEOUS at 21:28

## 2024-03-10 RX ADMIN — Medication 650 MILLIGRAM(S): at 10:04

## 2024-03-10 RX ADMIN — Medication 600 MILLIGRAM(S): at 05:14

## 2024-03-10 RX ADMIN — Medication 600 MILLIGRAM(S): at 17:49

## 2024-03-10 RX ADMIN — Medication 100 MILLIGRAM(S): at 21:28

## 2024-03-10 RX ADMIN — SODIUM CHLORIDE 1 GRAM(S): 9 INJECTION INTRAMUSCULAR; INTRAVENOUS; SUBCUTANEOUS at 13:09

## 2024-03-10 RX ADMIN — ENOXAPARIN SODIUM 40 MILLIGRAM(S): 100 INJECTION SUBCUTANEOUS at 05:13

## 2024-03-10 RX ADMIN — CARVEDILOL PHOSPHATE 3.12 MILLIGRAM(S): 80 CAPSULE, EXTENDED RELEASE ORAL at 05:14

## 2024-03-10 RX ADMIN — SODIUM CHLORIDE 1 GRAM(S): 9 INJECTION INTRAMUSCULAR; INTRAVENOUS; SUBCUTANEOUS at 05:14

## 2024-03-10 RX ADMIN — GABAPENTIN 300 MILLIGRAM(S): 400 CAPSULE ORAL at 11:40

## 2024-03-10 RX ADMIN — Medication 650 MILLIGRAM(S): at 11:00

## 2024-03-10 RX ADMIN — Medication 10 MILLIGRAM(S): at 11:40

## 2024-03-10 RX ADMIN — CARVEDILOL PHOSPHATE 3.12 MILLIGRAM(S): 80 CAPSULE, EXTENDED RELEASE ORAL at 17:49

## 2024-03-10 RX ADMIN — Medication 100 MILLIGRAM(S): at 10:04

## 2024-03-10 RX ADMIN — Medication 100 MILLIGRAM(S): at 11:40

## 2024-03-10 RX ADMIN — Medication 100 MILLIGRAM(S): at 17:49

## 2024-03-10 RX ADMIN — GABAPENTIN 400 MILLIGRAM(S): 400 CAPSULE ORAL at 21:28

## 2024-03-10 RX ADMIN — ZOLPIDEM TARTRATE 5 MILLIGRAM(S): 10 TABLET ORAL at 23:01

## 2024-03-10 NOTE — HOME OXYGEN EVALUATION NOTE - NSHOMEOXYEVALO2_O2AMBULATING_GEN_ALL_CORE
-- DO NOT REPLY / DO NOT REPLY ALL --  -- Message is from the [facility]--    General Patient Message      Reason for Call: Patient mother is calling to inform the doctor that patient is coughing and may need to be seen again. Please call to advise this evening    Caller Information       Type Contact Phone    02/24/2020 03:57 PM Phone (Incoming) JOELLE PRADO (Mother) [phone]          Alternative phone number: [phone]    Turnaround time given to caller:   \"This message will be sent to [state Provider's name]. The clinical team will fulfill your request as soon as they review your message.\"}    
92
85

## 2024-03-10 NOTE — PROGRESS NOTE ADULT - ATTENDING COMMENTS
73F with PMHX Asthma, RA, CAD, HTN, Mood Disorder, MDD, Anxiety presents to [facility] ER c/o Cough and SOB/DAVENPORT admitted for Acute Hypoxic Respiratory Failure 2/2 Acute Asthma Exacerbation and COVID19.      NC weaned to 2l  Cont dex/rem  Add mucinex  incentive spirometer    D/C over weekend
patient seen and eval. agree with above   feeling better  desated to 70s on room air   needs home o2 arranged   sw/ cm for dc planning   likely dc home in am once home o2 arranged

## 2024-03-10 NOTE — PROGRESS NOTE ADULT - SUBJECTIVE AND OBJECTIVE BOX
Patient is a 73y old  Female who presents with a chief complaint of Acute Hypoxic Resp Failure 2/2 Asthma Exacerbation and COVID (09 Mar 2024 15:51)    SUBJECTIVE:  BRIEF HOSPITAL COURSE: 73F with PMHX Asthma, RA, CAD, HTN, Mood Disorder, MDD, Anxiety presents to [facility] ER c/o Cough and SOB/DAVENPORT admitted for Acute Hypoxic Respiratory Failure 2/2 Acute Asthma Exacerbation and COVID19.  patient started on NC and remdesivir and steroids.    OVERNIGHT EVENTS: .ovn  TODAY:  MEDICATIONS  (STANDING):  ascorbic acid 500 milliGRAM(s) Oral daily  carvedilol 3.125 milliGRAM(s) Oral every 12 hours  dexAMETHasone  Injectable 6 milliGRAM(s) IV Push daily  enoxaparin Injectable 40 milliGRAM(s) SubCutaneous every 24 hours  gabapentin 300 milliGRAM(s) Oral daily  gabapentin 400 milliGRAM(s) Oral at bedtime  guaiFENesin  milliGRAM(s) Oral every 12 hours  oxybutynin XL 10 milliGRAM(s) Oral daily  QUEtiapine 300 milliGRAM(s) Oral at bedtime  sodium chloride 1 Gram(s) Oral every 8 hours  thiamine 100 milliGRAM(s) Oral daily  traZODone 100 milliGRAM(s) Oral at bedtime  venlafaxine XR. 225 milliGRAM(s) Oral daily    MEDICATIONS  (PRN):  acetaminophen     Tablet .. 650 milliGRAM(s) Oral every 6 hours PRN Temp greater or equal to 38C (100.4F), Mild Pain (1 - 3)  albuterol    90 MICROgram(s) HFA Inhaler 2 Puff(s) Inhalation every 4 hours PRN Shortness of Breath and/or Wheezing  ALPRAZolam 1 milliGRAM(s) Oral daily PRN Anxiety  ALPRAZolam 0.25 milliGRAM(s) Oral daily PRN Anxiety  aluminum hydroxide/magnesium hydroxide/simethicone Suspension 30 milliLiter(s) Oral every 4 hours PRN Dyspepsia  benzonatate 100 milliGRAM(s) Oral three times a day PRN Cough  melatonin 3 milliGRAM(s) Oral at bedtime PRN Insomnia  ondansetron Injectable 4 milliGRAM(s) IV Push every 8 hours PRN Nausea and/or Vomiting  zolpidem 5 milliGRAM(s) Oral at bedtime PRN Insomnia  zolpidem 5 milliGRAM(s) Oral at bedtime PRN Insomnia      Allergies    No Known Allergies    Intolerances        REVIEW OF SYSTEMS:  OBJECTIVE:  Vital Signs Last 24 Hrs  T(C): 36.6 (10 Mar 2024 05:13), Max: 37.4 (09 Mar 2024 17:29)  T(F): 97.9 (10 Mar 2024 05:13), Max: 99.4 (09 Mar 2024 17:29)  HR: 89 (10 Mar 2024 05:13) (86 - 108)  BP: 124/84 (10 Mar 2024 05:13) (124/84 - 161/101)  BP(mean): --  RR: 18 (10 Mar 2024 05:13) (18 - 19)  SpO2: 92% (10 Mar 2024 05:13) (92% - 96%)    Parameters below as of 10 Mar 2024 05:13  Patient On (Oxygen Delivery Method): nasal cannula  O2 Flow (L/min): 1      PHYSICAL EXAM:    LABS:                        13.5   10.41 )-----------( 314      ( 10 Mar 2024 06:40 )             39.9     03-10    136  |  98  |  15.5  ----------------------------<  132<H>  4.1   |  27.0  |  0.61    Ca    8.7      10 Mar 2024 06:40  Mg     2.0     03-10    TPro  6.3<L>  /  Alb  3.4  /  TBili  <0.2<L>  /  DBili  x   /  AST  13  /  ALT  16  /  AlkPhos  73  03-10    PT/INR - ( 10 Mar 2024 06:40 )   PT: 11.9 sec;   INR: 1.07 ratio           Urinalysis Basic - ( 10 Mar 2024 06:40 )    Color: x / Appearance: x / SG: x / pH: x  Gluc: 132 mg/dL / Ketone: x  / Bili: x / Urobili: x   Blood: x / Protein: x / Nitrite: x   Leuk Esterase: x / RBC: x / WBC x   Sq Epi: x / Non Sq Epi: x / Bacteria: x      CAPILLARY BLOOD GLUCOSE          RADIOLOGY & ADDITIONAL TESTS:    Imaging Personally Reviewed:  [x ] YES  [ ] NO    Consultant(s) Notes Reviewed:  [x ] YES  [ ] NO    Care Discussed with Consultants/Other Providers [x ] YES  [ ] NO    Plan of Care discussed with Housestaff [x ]YES [ ] NO Patient is a 73y old  Female who presents with a chief complaint of Acute Hypoxic Resp Failure 2/2 Asthma Exacerbation and COVID (09 Mar 2024 15:51)    SUBJECTIVE:  BRIEF HOSPITAL COURSE: 73F with PMHX Asthma, RA, CAD, HTN, Mood Disorder, MDD, Anxiety presents to [facility] ER c/o Cough and SOB/DAVENPORT admitted for Acute Hypoxic Respiratory Failure 2/2 Acute Asthma Exacerbation and COVID19.  patient started on NC and remdesivir and steroids.    OVERNIGHT EVENTS: No acute events reported overnight.    TODAY:   MEDICATIONS  (STANDING):  ascorbic acid 500 milliGRAM(s) Oral daily  carvedilol 3.125 milliGRAM(s) Oral every 12 hours  dexAMETHasone  Injectable 6 milliGRAM(s) IV Push daily  enoxaparin Injectable 40 milliGRAM(s) SubCutaneous every 24 hours  gabapentin 300 milliGRAM(s) Oral daily  gabapentin 400 milliGRAM(s) Oral at bedtime  guaiFENesin  milliGRAM(s) Oral every 12 hours  oxybutynin XL 10 milliGRAM(s) Oral daily  QUEtiapine 300 milliGRAM(s) Oral at bedtime  sodium chloride 1 Gram(s) Oral every 8 hours  thiamine 100 milliGRAM(s) Oral daily  traZODone 100 milliGRAM(s) Oral at bedtime  venlafaxine XR. 225 milliGRAM(s) Oral daily    MEDICATIONS  (PRN):  acetaminophen     Tablet .. 650 milliGRAM(s) Oral every 6 hours PRN Temp greater or equal to 38C (100.4F), Mild Pain (1 - 3)  albuterol    90 MICROgram(s) HFA Inhaler 2 Puff(s) Inhalation every 4 hours PRN Shortness of Breath and/or Wheezing  ALPRAZolam 1 milliGRAM(s) Oral daily PRN Anxiety  ALPRAZolam 0.25 milliGRAM(s) Oral daily PRN Anxiety  aluminum hydroxide/magnesium hydroxide/simethicone Suspension 30 milliLiter(s) Oral every 4 hours PRN Dyspepsia  benzonatate 100 milliGRAM(s) Oral three times a day PRN Cough  melatonin 3 milliGRAM(s) Oral at bedtime PRN Insomnia  ondansetron Injectable 4 milliGRAM(s) IV Push every 8 hours PRN Nausea and/or Vomiting  zolpidem 5 milliGRAM(s) Oral at bedtime PRN Insomnia  zolpidem 5 milliGRAM(s) Oral at bedtime PRN Insomnia      Allergies    No Known Allergies    Intolerances        REVIEW OF SYSTEMS:  OBJECTIVE:  Vital Signs Last 24 Hrs  T(C): 36.6 (10 Mar 2024 05:13), Max: 37.4 (09 Mar 2024 17:29)  T(F): 97.9 (10 Mar 2024 05:13), Max: 99.4 (09 Mar 2024 17:29)  HR: 89 (10 Mar 2024 05:13) (86 - 108)  BP: 124/84 (10 Mar 2024 05:13) (124/84 - 161/101)  BP(mean): --  RR: 18 (10 Mar 2024 05:13) (18 - 19)  SpO2: 92% (10 Mar 2024 05:13) (92% - 96%)    Parameters below as of 10 Mar 2024 05:13  Patient On (Oxygen Delivery Method): nasal cannula  O2 Flow (L/min): 1      PHYSICAL EXAM:    LABS:                        13.5   10.41 )-----------( 314      ( 10 Mar 2024 06:40 )             39.9     03-10    136  |  98  |  15.5  ----------------------------<  132<H>  4.1   |  27.0  |  0.61    Ca    8.7      10 Mar 2024 06:40  Mg     2.0     03-10    TPro  6.3<L>  /  Alb  3.4  /  TBili  <0.2<L>  /  DBili  x   /  AST  13  /  ALT  16  /  AlkPhos  73  03-10    PT/INR - ( 10 Mar 2024 06:40 )   PT: 11.9 sec;   INR: 1.07 ratio           Urinalysis Basic - ( 10 Mar 2024 06:40 )    Color: x / Appearance: x / SG: x / pH: x  Gluc: 132 mg/dL / Ketone: x  / Bili: x / Urobili: x   Blood: x / Protein: x / Nitrite: x   Leuk Esterase: x / RBC: x / WBC x   Sq Epi: x / Non Sq Epi: x / Bacteria: x      CAPILLARY BLOOD GLUCOSE          RADIOLOGY & ADDITIONAL TESTS:    Imaging Personally Reviewed:  [x ] YES  [ ] NO    Consultant(s) Notes Reviewed:  [x ] YES  [ ] NO    Care Discussed with Consultants/Other Providers [x ] YES  [ ] NO    Plan of Care discussed with Housestaff [x ]YES [ ] NO Patient is a 73y old  Female who presents with a chief complaint of Acute Hypoxic Resp Failure 2/2 Asthma Exacerbation and COVID (09 Mar 2024 15:51)    SUBJECTIVE:  BRIEF HOSPITAL COURSE: 73F with PMHX Asthma, RA, CAD, HTN, Mood Disorder, MDD, Anxiety presents to [facility] ER c/o Cough and SOB/DAVENPORT admitted for Acute Hypoxic Respiratory Failure 2/2 Acute Asthma Exacerbation and COVID19.  patient started on NC and remdesivir and steroids.    OVERNIGHT EVENTS: No acute events reported overnight.    TODAY: Seen and examined bedside. On room air now. Says she still feels the same. Still having cough and feeling short of breath after ambulation. No other complaints and ROS is otherwise negative. Encouraged use of incentive spirometry.  MEDICATIONS  (STANDING):  ascorbic acid 500 milliGRAM(s) Oral daily  carvedilol 3.125 milliGRAM(s) Oral every 12 hours  dexAMETHasone  Injectable 6 milliGRAM(s) IV Push daily  enoxaparin Injectable 40 milliGRAM(s) SubCutaneous every 24 hours  gabapentin 300 milliGRAM(s) Oral daily  gabapentin 400 milliGRAM(s) Oral at bedtime  guaiFENesin  milliGRAM(s) Oral every 12 hours  oxybutynin XL 10 milliGRAM(s) Oral daily  QUEtiapine 300 milliGRAM(s) Oral at bedtime  sodium chloride 1 Gram(s) Oral every 8 hours  thiamine 100 milliGRAM(s) Oral daily  traZODone 100 milliGRAM(s) Oral at bedtime  venlafaxine XR. 225 milliGRAM(s) Oral daily    MEDICATIONS  (PRN):  acetaminophen     Tablet .. 650 milliGRAM(s) Oral every 6 hours PRN Temp greater or equal to 38C (100.4F), Mild Pain (1 - 3)  albuterol    90 MICROgram(s) HFA Inhaler 2 Puff(s) Inhalation every 4 hours PRN Shortness of Breath and/or Wheezing  ALPRAZolam 1 milliGRAM(s) Oral daily PRN Anxiety  ALPRAZolam 0.25 milliGRAM(s) Oral daily PRN Anxiety  aluminum hydroxide/magnesium hydroxide/simethicone Suspension 30 milliLiter(s) Oral every 4 hours PRN Dyspepsia  benzonatate 100 milliGRAM(s) Oral three times a day PRN Cough  melatonin 3 milliGRAM(s) Oral at bedtime PRN Insomnia  ondansetron Injectable 4 milliGRAM(s) IV Push every 8 hours PRN Nausea and/or Vomiting  zolpidem 5 milliGRAM(s) Oral at bedtime PRN Insomnia  zolpidem 5 milliGRAM(s) Oral at bedtime PRN Insomnia      Allergies    No Known Allergies    Intolerances        REVIEW OF SYSTEMS: Review of systems is otherwise negative unless mentioned in HPI above.    OBJECTIVE:  Vital Signs Last 24 Hrs  T(C): 36.6 (10 Mar 2024 05:13), Max: 37.4 (09 Mar 2024 17:29)  T(F): 97.9 (10 Mar 2024 05:13), Max: 99.4 (09 Mar 2024 17:29)  HR: 89 (10 Mar 2024 05:13) (86 - 108)  BP: 124/84 (10 Mar 2024 05:13) (124/84 - 161/101)  BP(mean): --  RR: 18 (10 Mar 2024 05:13) (18 - 19)  SpO2: 92% (10 Mar 2024 05:13) (92% - 96%)    Parameters below as of 10 Mar 2024 05:13  Patient On (Oxygen Delivery Method): nasal cannula  O2 Flow (L/min): 1      PHYSICAL EXAM:  CONSTITUTIONAL: NAD, lying on bed, NC in place  ENMT: Moist oral mucosa, no pharyngeal injection or exudates; normal dentition  RESPIRATORY: Normal respiratory effort; CTAB  CARDIOVASCULAR: Regular rate and rhythm, normal S1 and S2, no murmur/rub/gallop; Peripheral pulses are 2+ bilaterally  ABDOMEN: Nontender to palpation, normoactive bowel sounds, no rebound/guarding;   MUSCLOSKELETAL:  No clubbing or cyanosis of digits; no joint swelling or tenderness to palpation  PSYCH: A+O to person, place, and time; affect appropriate  NEUROLOGY: CN 2-12 are intact and symmetric; no gross sensory deficits;   SKIN: No rashes; no palpable lesions    LABS:                        13.5   10.41 )-----------( 314      ( 10 Mar 2024 06:40 )             39.9     03-10    136  |  98  |  15.5  ----------------------------<  132<H>  4.1   |  27.0  |  0.61    Ca    8.7      10 Mar 2024 06:40  Mg     2.0     03-10    TPro  6.3<L>  /  Alb  3.4  /  TBili  <0.2<L>  /  DBili  x   /  AST  13  /  ALT  16  /  AlkPhos  73  03-10    PT/INR - ( 10 Mar 2024 06:40 )   PT: 11.9 sec;   INR: 1.07 ratio           Urinalysis Basic - ( 10 Mar 2024 06:40 )    Color: x / Appearance: x / SG: x / pH: x  Gluc: 132 mg/dL / Ketone: x  / Bili: x / Urobili: x   Blood: x / Protein: x / Nitrite: x   Leuk Esterase: x / RBC: x / WBC x   Sq Epi: x / Non Sq Epi: x / Bacteria: x      CAPILLARY BLOOD GLUCOSE          RADIOLOGY & ADDITIONAL TESTS:    Imaging Personally Reviewed:  [x ] YES  [ ] NO    Consultant(s) Notes Reviewed:  [x ] YES  [ ] NO    Care Discussed with Consultants/Other Providers [x ] YES  [ ] NO    Plan of Care discussed with Housestaff [x ]YES [ ] NO

## 2024-03-10 NOTE — PROGRESS NOTE ADULT - ASSESSMENT
73F with PMHX Asthma, RA, CAD, HTN, Mood Disorder, MDD, Anxiety presents to [facility] ER c/o Cough and SOB/DAVENPORT admitted for Acute Hypoxic Respiratory Failure 2/2 Acute Asthma Exacerbation and COVID19.     Acute Hypoxic Respiratory Failure 2/2 COVID19 / possible covid pnemonia   -RVP +COVID  -CTA Chest no PE +r hemidiaphragm elevated  -S/p Remdesivir  -C/w decadron  -Continues on NC. Wean as tolerated    hyponatremia / hyponatremia   - possible secondary to effexor  - Nephrology consulted  - c.w salt tabs    HTN  -Carvedilol 3.125mg BID    OAB  -Oxybutynin 10mg q24    RA, Chronic Pain  -Gabapentin 300mg qAM + Gabapentin 400mg qPM  -Oxycodone 10mg TID PRN (does not take daily)    Mood Disorder, MDD, Anxiety, Insomnia  -c/w home meds      73F with PMHX Asthma, RA, CAD, HTN, Mood Disorder, MDD, Anxiety presents to [facility] ER c/o Cough and SOB/DAVENPORT admitted for Acute Hypoxic Respiratory Failure 2/2 Acute Asthma Exacerbation and COVID19.     Acute Hypoxic Respiratory Failure 2/2 COVID19 / possible covid pnemonia   -RVP +COVID  -CTA Chest no PE +r hemidiaphragm elevated  -S/p Remdesivir  -C/w decadron  -Continues on NC. Wean as tolerated    hyponatremia / hyponatremia   - possible secondary to effexor  - Nephrology consulted  - c.w salt tabs    HTN  -Carvedilol 3.125mg BID    OAB  -Oxybutynin 10mg q24    RA, Chronic Pain  -Gabapentin 300mg qAM + Gabapentin 400mg qPM  -Oxycodone 10mg TID PRN (does not take daily)    Mood Disorder, MDD, Anxiety, Insomnia  -c/w home meds     DISPO: Possibly later today pending home O2 eval.

## 2024-03-11 ENCOUNTER — TRANSCRIPTION ENCOUNTER (OUTPATIENT)
Age: 74
End: 2024-03-11

## 2024-03-11 VITALS
HEART RATE: 100 BPM | SYSTOLIC BLOOD PRESSURE: 123 MMHG | DIASTOLIC BLOOD PRESSURE: 80 MMHG | OXYGEN SATURATION: 96 % | TEMPERATURE: 98 F | RESPIRATION RATE: 19 BRPM

## 2024-03-11 LAB
ANION GAP SERPL CALC-SCNC: 9 MMOL/L — SIGNIFICANT CHANGE UP (ref 5–17)
BUN SERPL-MCNC: 20.1 MG/DL — HIGH (ref 8–20)
CALCIUM SERPL-MCNC: 9.1 MG/DL — SIGNIFICANT CHANGE UP (ref 8.4–10.5)
CHLORIDE SERPL-SCNC: 98 MMOL/L — SIGNIFICANT CHANGE UP (ref 96–108)
CO2 SERPL-SCNC: 30 MMOL/L — HIGH (ref 22–29)
CREAT SERPL-MCNC: 0.79 MG/DL — SIGNIFICANT CHANGE UP (ref 0.5–1.3)
EGFR: 79 ML/MIN/1.73M2 — SIGNIFICANT CHANGE UP
GLUCOSE SERPL-MCNC: 117 MG/DL — HIGH (ref 70–99)
INR BLD: 0.98 RATIO — SIGNIFICANT CHANGE UP (ref 0.85–1.18)
POTASSIUM SERPL-MCNC: 4.9 MMOL/L — SIGNIFICANT CHANGE UP (ref 3.5–5.3)
POTASSIUM SERPL-SCNC: 4.9 MMOL/L — SIGNIFICANT CHANGE UP (ref 3.5–5.3)
PROTHROM AB SERPL-ACNC: 10.9 SEC — SIGNIFICANT CHANGE UP (ref 9.5–13)
SODIUM SERPL-SCNC: 137 MMOL/L — SIGNIFICANT CHANGE UP (ref 135–145)

## 2024-03-11 PROCEDURE — 85027 COMPLETE CBC AUTOMATED: CPT

## 2024-03-11 PROCEDURE — 36415 COLL VENOUS BLD VENIPUNCTURE: CPT

## 2024-03-11 PROCEDURE — 84439 ASSAY OF FREE THYROXINE: CPT

## 2024-03-11 PROCEDURE — 94640 AIRWAY INHALATION TREATMENT: CPT

## 2024-03-11 PROCEDURE — 99232 SBSQ HOSP IP/OBS MODERATE 35: CPT | Mod: GC

## 2024-03-11 PROCEDURE — 87637 SARSCOV2&INF A&B&RSV AMP PRB: CPT

## 2024-03-11 PROCEDURE — 85610 PROTHROMBIN TIME: CPT

## 2024-03-11 PROCEDURE — 86140 C-REACTIVE PROTEIN: CPT

## 2024-03-11 PROCEDURE — 93005 ELECTROCARDIOGRAM TRACING: CPT

## 2024-03-11 PROCEDURE — 84100 ASSAY OF PHOSPHORUS: CPT

## 2024-03-11 PROCEDURE — 82533 TOTAL CORTISOL: CPT

## 2024-03-11 PROCEDURE — 84550 ASSAY OF BLOOD/URIC ACID: CPT

## 2024-03-11 PROCEDURE — 83935 ASSAY OF URINE OSMOLALITY: CPT

## 2024-03-11 PROCEDURE — 82565 ASSAY OF CREATININE: CPT

## 2024-03-11 PROCEDURE — 85025 COMPLETE CBC W/AUTO DIFF WBC: CPT

## 2024-03-11 PROCEDURE — 84300 ASSAY OF URINE SODIUM: CPT

## 2024-03-11 PROCEDURE — 82570 ASSAY OF URINE CREATININE: CPT

## 2024-03-11 PROCEDURE — 82248 BILIRUBIN DIRECT: CPT

## 2024-03-11 PROCEDURE — 84443 ASSAY THYROID STIM HORMONE: CPT

## 2024-03-11 PROCEDURE — 71275 CT ANGIOGRAPHY CHEST: CPT | Mod: MC

## 2024-03-11 PROCEDURE — 80048 BASIC METABOLIC PNL TOTAL CA: CPT

## 2024-03-11 PROCEDURE — 80053 COMPREHEN METABOLIC PANEL: CPT

## 2024-03-11 PROCEDURE — 96375 TX/PRO/DX INJ NEW DRUG ADDON: CPT

## 2024-03-11 PROCEDURE — 83735 ASSAY OF MAGNESIUM: CPT

## 2024-03-11 PROCEDURE — 85379 FIBRIN DEGRADATION QUANT: CPT

## 2024-03-11 PROCEDURE — 96374 THER/PROPH/DIAG INJ IV PUSH: CPT

## 2024-03-11 PROCEDURE — 80076 HEPATIC FUNCTION PANEL: CPT

## 2024-03-11 PROCEDURE — 71046 X-RAY EXAM CHEST 2 VIEWS: CPT

## 2024-03-11 PROCEDURE — 99285 EMERGENCY DEPT VISIT HI MDM: CPT

## 2024-03-11 PROCEDURE — 97116 GAIT TRAINING THERAPY: CPT

## 2024-03-11 PROCEDURE — 97530 THERAPEUTIC ACTIVITIES: CPT

## 2024-03-11 RX ORDER — BREXPIPRAZOLE 0.25 MG/1
2 TABLET ORAL AT BEDTIME
Refills: 0 | Status: DISCONTINUED | OUTPATIENT
Start: 2024-03-11 | End: 2024-03-11

## 2024-03-11 RX ADMIN — Medication 10 MILLIGRAM(S): at 11:07

## 2024-03-11 RX ADMIN — Medication 100 MILLIGRAM(S): at 11:08

## 2024-03-11 RX ADMIN — Medication 500 MILLIGRAM(S): at 11:08

## 2024-03-11 RX ADMIN — GABAPENTIN 300 MILLIGRAM(S): 400 CAPSULE ORAL at 11:08

## 2024-03-11 RX ADMIN — Medication 100 MILLIGRAM(S): at 11:07

## 2024-03-11 RX ADMIN — SODIUM CHLORIDE 1 GRAM(S): 9 INJECTION INTRAMUSCULAR; INTRAVENOUS; SUBCUTANEOUS at 05:55

## 2024-03-11 RX ADMIN — ENOXAPARIN SODIUM 40 MILLIGRAM(S): 100 INJECTION SUBCUTANEOUS at 05:56

## 2024-03-11 RX ADMIN — Medication 1 MILLIGRAM(S): at 11:07

## 2024-03-11 RX ADMIN — Medication 225 MILLIGRAM(S): at 11:08

## 2024-03-11 RX ADMIN — Medication 100 MILLIGRAM(S): at 18:09

## 2024-03-11 RX ADMIN — CARVEDILOL PHOSPHATE 3.12 MILLIGRAM(S): 80 CAPSULE, EXTENDED RELEASE ORAL at 05:55

## 2024-03-11 RX ADMIN — Medication 6 MILLIGRAM(S): at 05:54

## 2024-03-11 RX ADMIN — Medication 0.25 MILLIGRAM(S): at 18:09

## 2024-03-11 RX ADMIN — Medication 600 MILLIGRAM(S): at 05:55

## 2024-03-11 RX ADMIN — CARVEDILOL PHOSPHATE 3.12 MILLIGRAM(S): 80 CAPSULE, EXTENDED RELEASE ORAL at 18:09

## 2024-03-11 NOTE — DISCHARGE NOTE PROVIDER - NSDCMRMEDTOKEN_GEN_ALL_CORE_FT
ALPRAZolam 0.5 mg oral tablet: 1 tab(s) orally 2 times a day  verified in iSTOP  benzonatate 100 mg oral capsule: 1 cap(s) orally every 8 hours  carvedilol 3.125 mg oral tablet: 1 tab(s) orally 2 times a day  gabapentin 300 mg oral capsule: 1 cap(s) orally once a day  gabapentin 400 mg oral capsule: 1 cap(s) orally once a day (at bedtime)  guaiFENesin 100 mg/5 mL oral liquid: 5 milliliter(s) orally 2 times a day  oxyBUTYnin 10 mg/24 hr oral tablet, extended release: 1 tab(s) orally once a day  oxycodone-acetaminophen 10 mg-325 mg oral tablet: 1 tab(s) orally 3 times a day as needed for Severe Pain  QUEtiapine 300 mg oral tablet: 1 tab(s) orally once a day (at bedtime)  Rexulti 2 mg oral tablet: 1 tab(s) orally once a day  traZODone 100 mg oral tablet: 1 tab(s) orally once a day  venlafaxine 225 mg oral tablet, extended release: 1 tab(s) orally once a day  Ventolin HFA 90 mcg/inh inhalation aerosol: 1 puff(s) inhaled once a day as needed for bronchospasm  zolpidem 10 mg oral tablet: 1 tab(s) orally once a day (at bedtime)  verified in iSTOP

## 2024-03-11 NOTE — PROGRESS NOTE ADULT - REASON FOR ADMISSION
Acute Hypoxic Resp Failure 2/2 Asthma Exacerbation and COVID

## 2024-03-11 NOTE — DISCHARGE NOTE PROVIDER - NSDCCPCAREPLAN_GEN_ALL_CORE_FT
PRINCIPAL DISCHARGE DIAGNOSIS  Diagnosis: Acute respiratory failure with hypoxia  Assessment and Plan of Treatment: Acute Hypoxic Respiratory Failure 2/2 Acute Asthma Exacerbation and COVID19  RVP +COVID, CTA Chest no PE +r hemidiaphragm elevated  Remdesivir- completed course and decadron day 7 course completed  To use home oxygen while ambulating and as needed      SECONDARY DISCHARGE DIAGNOSES  Diagnosis: Hyponatremia  Assessment and Plan of Treatment: To f/u with nephrology OP

## 2024-03-11 NOTE — DISCHARGE NOTE NURSING/CASE MANAGEMENT/SOCIAL WORK - PATIENT PORTAL LINK FT
You can access the FollowMyHealth Patient Portal offered by [facility] by registering at the following website: [email or web address]. By joining [employer]’s FollowMyHealth portal, you will also be able to view your health information using other applications (apps) compatible with our system.

## 2024-03-11 NOTE — PROGRESS NOTE ADULT - SUBJECTIVE AND OBJECTIVE BOX
Patient is a 73y old  Female who presents with a chief complaint of Acute Hypoxic Resp Failure 2/2 Asthma Exacerbation and COVID (10 Mar 2024 08:04)    INTERVAL HPI/OVERNIGHT EVENTS: Pt evaluated at bedside, no acute events over night. Reports persistent cough, and SOB on exertion cough rx changed to standing. Home o2 eval with desaturation while walking. No fevers, chills, pedal edema or chest pain. Remainder ROS unremarkable.     MEDICATIONS  (STANDING):  ascorbic acid 500 milliGRAM(s) Oral daily  benzonatate 100 milliGRAM(s) Oral once  benzonatate 100 milliGRAM(s) Oral every 8 hours  carvedilol 3.125 milliGRAM(s) Oral every 12 hours  dexAMETHasone  Injectable 6 milliGRAM(s) IV Push daily  enoxaparin Injectable 40 milliGRAM(s) SubCutaneous every 24 hours  gabapentin 400 milliGRAM(s) Oral at bedtime  gabapentin 300 milliGRAM(s) Oral daily  guaiFENesin Oral Liquid (Sugar-Free) 100 milliGRAM(s) Oral two times a day  oxybutynin XL 10 milliGRAM(s) Oral daily  QUEtiapine 300 milliGRAM(s) Oral at bedtime  sodium chloride 1 Gram(s) Oral every 8 hours  thiamine 100 milliGRAM(s) Oral daily  traZODone 100 milliGRAM(s) Oral at bedtime  venlafaxine XR. 225 milliGRAM(s) Oral daily    MEDICATIONS  (PRN):  acetaminophen     Tablet .. 650 milliGRAM(s) Oral every 6 hours PRN Temp greater or equal to 38C (100.4F), Mild Pain (1 - 3)  albuterol    90 MICROgram(s) HFA Inhaler 2 Puff(s) Inhalation every 4 hours PRN Shortness of Breath and/or Wheezing  ALPRAZolam 0.25 milliGRAM(s) Oral daily PRN Anxiety  ALPRAZolam 1 milliGRAM(s) Oral daily PRN Anxiety  aluminum hydroxide/magnesium hydroxide/simethicone Suspension 30 milliLiter(s) Oral every 4 hours PRN Dyspepsia  melatonin 3 milliGRAM(s) Oral at bedtime PRN Insomnia  ondansetron Injectable 4 milliGRAM(s) IV Push every 8 hours PRN Nausea and/or Vomiting  zolpidem 5 milliGRAM(s) Oral at bedtime PRN Insomnia  zolpidem 5 milliGRAM(s) Oral at bedtime PRN Insomnia      Allergies    No Known Allergies    Intolerances        Vital Signs Last 24 Hrs  T(C): 37.1 (11 Mar 2024 10:27), Max: 37.3 (10 Mar 2024 17:10)  T(F): 98.8 (11 Mar 2024 10:27), Max: 99.2 (10 Mar 2024 17:10)  HR: 69 (11 Mar 2024 10:27) (69 - 100)  BP: 136/89 (11 Mar 2024 10:27) (109/78 - 158/90)  BP(mean): --  RR: 20 (11 Mar 2024 10:27) (18 - 22)  SpO2: 92% (11 Mar 2024 10:27) (75% - 97%)    Parameters below as of 11 Mar 2024 10:27  Patient On (Oxygen Delivery Method): room air    PHYSICAL EXAM  GENERAL: Awake, comfortable at rest  HEAD:  Atraumatic, Normocephalic  EYES: Conjunctiva and sclera clear  NECK: Supple  NERVOUS SYSTEM:  Alert & Oriented X3, No gross focal deficits  CHEST/LUNG: Clear to auscultation bilaterally  HEART: Regular rate and rhythm  ABDOMEN: Soft, Nontender, Nondistended; Bowel sounds present  EXTREMITIES:  No clubbing, cyanosis, or edema  SKIN: No rashes or lesions    LABS:                        13.5   10.41 )-----------( 314      ( 10 Mar 2024 06:40 )             39.9     03-11    137  |  98  |  20.1<H>  ----------------------------<  117<H>  4.9   |  30.0<H>  |  0.79    Ca    9.1      11 Mar 2024 06:19  Mg     2.0     03-10    TPro  6.3<L>  /  Alb  3.4  /  TBili  <0.2<L>  /  DBili  x   /  AST  13  /  ALT  16  /  AlkPhos  73  03-10    PT/INR - ( 11 Mar 2024 06:19 )   PT: 10.9 sec;   INR: 0.98 ratio           Urinalysis Basic - ( 11 Mar 2024 06:19 )    Color: x / Appearance: x / SG: x / pH: x  Gluc: 117 mg/dL / Ketone: x  / Bili: x / Urobili: x   Blood: x / Protein: x / Nitrite: x   Leuk Esterase: x / RBC: x / WBC x   Sq Epi: x / Non Sq Epi: x / Bacteria: x      CAPILLARY BLOOD GLUCOSE

## 2024-03-11 NOTE — PROGRESS NOTE ADULT - ASSESSMENT
73F with PMHX Asthma, RA, CAD, HTN, Mood Disorder, MDD, Anxiety presents to [facility] ER c/o Cough and SOB/DAVENPORT admitted for Acute Hypoxic Respiratory Failure 2/2 Acute Asthma Exacerbation and COVID19.     Acute Hypoxic Respiratory Failure 2/2 COVID19 / possible covid pneumonia   -RVP +COVID  -CTA Chest no PE +r hemidiaphragm elevated  -S/p Remdesivir- completed course  -Decadron day 7 course completed  -NC while ambulating/ sleeping    Hyponatremia / hyponatremia   - Resolved  - Possible secondary to effexor  - Nephrology consulted  - C.w salt tabs    HTN  -Carvedilol 3.125mg BID    OAB  -Oxybutynin 10mg q24    RA, Chronic Pain  -Gabapentin 300mg qAM + Gabapentin 400mg qPM  -Oxycodone 10mg TID PRN (does not take daily)    Mood Disorder, MDD, Anxiety, Insomnia  -c/w home meds     DISPO: Medically optimized depending home oxygen authorisation

## 2024-03-11 NOTE — DISCHARGE NOTE NURSING/CASE MANAGEMENT/SOCIAL WORK - NSDCPEFALRISK_GEN_ALL_CORE
For information on Fall & Injury Prevention, visit: [email or web address] OR  [email or web address] OR  https://www.cdc.gov/steadi/patient.html

## 2024-03-11 NOTE — DISCHARGE NOTE PROVIDER - HOSPITAL COURSE
73F with PMHX Asthma, RA, CAD, HTN, Mood Disorder, MDD, Anxiety presents to [facility] ER c/o Cough and SOB/DAVENPORT admitted for Acute Hypoxic Respiratory Failure 2/2 Acute Asthma Exacerbation and COVID19. RVP +COVID, CTA Chest no PE +r hemidiaphragm elevated. Remdesivir- completed course and decadron day 7 course completed. Pt noted be desaturating while ambulating, requiring home O2. Course complicated by hyponatremia, nephrology consulted, other home meds to be continued. Medically optimized and ready for dc with home O2.

## 2024-03-11 NOTE — PROGRESS NOTE ADULT - PROVIDER SPECIALTY LIST ADULT
Nephrology
Internal Medicine
Heme/Onc
Hospitalist
Internal Medicine
Hospitalist
Internal Medicine
Internal Medicine

## 2024-03-11 NOTE — DISCHARGE NOTE PROVIDER - NSDCFUSCHEDAPPT_GEN_ALL_CORE_FT
Billy Ervin  [facility]  CARDIOLOGY [address]  Scheduled Appointment: 03/20/2024    [facility]  [facility] [address]  Scheduled Appointment: 06/04/2024    Maicol Pederson  [facility] [address]  Scheduled Appointment: 06/04/2024

## 2024-03-11 NOTE — DISCHARGE NOTE PROVIDER - ATTENDING DISCHARGE PHYSICAL EXAMINATION:
T(C): 37.1 (03-11-24 @ 10:27), Max: 37.3 (03-10-24 @ 17:10)  HR: 69 (03-11-24 @ 10:27) (69 - 100)  BP: 136/89 (03-11-24 @ 10:27) (109/78 - 158/90)  RR: 20 (03-11-24 @ 10:27) (18 - 20)  SpO2: 92% (03-11-24 @ 10:27) (92% - 97%)    CONSTITUTIONAL: Well groomed, no apparent distress  EYES: PERRLA and symmetric, EOMI, No conjunctival or scleral injection, non-icteric  ENMT: Oral mucosa with moist membranes. Normal dentition; no pharyngeal injection or exudates  NECK: Supple, symmetric and without tracheal deviation   RESP: No respiratory distress, no use of accessory muscles; CTA b/l, no WRR  CV: RRR, +S1S2, no MRG no peripheral edema  GI: Soft, NT, ND, no rebound, no guarding  MSK: Normal gait  SKIN: No rashes or ulcers noted  NEURO: CN II-XII intact; normal reflexes in upper and lower extremities, sensation intact in upper and lower extremities b/l to light touch   PSYCH: Appropriate insight/judgment; A+O x 3, mood and affect appropriate, recent/remote memory intact

## 2024-03-11 NOTE — DISCHARGE NOTE PROVIDER - CARE PROVIDER_API CALL
Nemesio Haider  Nephrology  [address] [place] [address]  Phone: [phone]  Fax: [phone]  Follow Up Time: 1 week

## 2024-03-20 ENCOUNTER — NON-APPOINTMENT (OUTPATIENT)
Age: 74
End: 2024-03-20

## 2024-03-20 ENCOUNTER — APPOINTMENT (OUTPATIENT)
Dept: CARDIOLOGY | Facility: CLINIC | Age: 74
End: 2024-03-20
Payer: MEDICARE

## 2024-03-20 VITALS
HEIGHT: 60 IN | HEART RATE: 109 BPM | SYSTOLIC BLOOD PRESSURE: 96 MMHG | BODY MASS INDEX: 25.91 KG/M2 | RESPIRATION RATE: 20 BRPM | WEIGHT: 132 LBS | DIASTOLIC BLOOD PRESSURE: 65 MMHG | OXYGEN SATURATION: 88 %

## 2024-03-20 PROCEDURE — 93000 ELECTROCARDIOGRAM COMPLETE: CPT

## 2024-03-20 PROCEDURE — G2211 COMPLEX E/M VISIT ADD ON: CPT

## 2024-03-20 PROCEDURE — 99214 OFFICE O/P EST MOD 30 MIN: CPT

## 2024-03-20 RX ORDER — VALSARTAN 80 MG/1
80 TABLET, COATED ORAL DAILY
Qty: 90 | Refills: 1 | Status: DISCONTINUED | COMMUNITY
Start: 2023-11-29 | End: 2024-03-20

## 2024-03-20 NOTE — PHYSICAL EXAM
[General Appearance - In No Acute Distress] : no acute distress [Normal Conjunctiva] : the conjunctiva exhibited no abnormalities [Normal Oral Mucosa] : normal oral mucosa [Normal Oropharynx] : normal oropharynx [Auscultation Breath Sounds / Voice Sounds] : lungs were clear to auscultation bilaterally [Normal Rate] : normal [Rhythm Regular] : regular [Normal S1] : normal S1 [Normal S2] : normal S2 [No Murmur] : no murmurs heard [Abdomen Soft] : soft [Abdomen Tenderness] : non-tender [Abnormal Walk] : normal gait [Nail Clubbing] : no clubbing of the fingernails [Cyanosis, Localized] : no localized cyanosis [Oriented To Time, Place, And Person] : oriented to person, place, and time [Skin Color & Pigmentation] : normal skin color and pigmentation [Affect] : the affect was normal [S3] : no S3 [FreeTextEntry1] : No JVD, no carotid bruits. [S4] : no S4

## 2024-03-20 NOTE — ASSESSMENT
[FreeTextEntry1] : Echocardiogram March 2, 2021 demonstrated left ventricle normal size and function with ejection fraction of 55 to 60%. Mild mitral, trace aortic and mild tricuspid regurgitation noted.  Echocardiogram November 1, 2021 demonstrated left ventricle normal in size with severely reduced function and an ejection fraction of 20 to 25%. Mild concentric LVH. Mid to apical cavity hypokinesis with basal preservation of function noted possibly consistent with stress-induced cardiomyopathy. No significant valve disease noted.  Echocardiogram December 13, 2021 demonstrated left ventricle normal in size and function with ejection fraction of 55 to 60%. Mild asymmetric LVH. Mild mitral, trace aortic and mild tricuspid regurgitation.  Nuclear stress test December 27, 2021 was a pharmacologic study which was tolerated well. Blood pressure response to infusion was normal. EKG showed no evidence of ischemia with infusion. Evaluation of nuclear imaging showed no evidence of ischemia or infarct and ejection fraction of 58%. Prominent liver noted in the right chest consistent with known elevated right hemidiaphragm.  Echocardiogram August 30, 2023 demonstrated left ventricle normal in size with mild to moderately reduced function and ejection fraction of 40 to 45%. Mild concentric LVH. Mild mitral, trace aortic and mild to moderate tricuspid regurgitation.  Nuclear stress test October 4, 2023 was a pharmacologic study which was tolerated well.  Blood pressure response to infusion was normal.  EKG showed no evidence of ischemia with infusion.  Evaluation of nuclear imaging showed no evidence of ischemia infarct and ejection fraction of 58%.  EKG: Sinus rhythm with no significant ST or T wave changes.  Poor R-wave progression.  PVC noted.  73-year-old female with a past medical history of CAD s/p PCI, prior severe CMP with improved EF, hypertension presenting for f/u.  Patient presents back having just been in the hospital with a COVID infection.  She seems to be recovering from that although her oxygen level is a bit low today.  She does have oxygen at home but forgot to bring it with her today.  I have encouraged her to wear it during the day as well as overnight.  She I have asked her to get a pulse ox so she can monitor her levels and try to keep her pulse ox above 95%.  She also needs to follow-up with pulmonary in the very near future and she will make an appointment.  I will continue her off ARB at this time given the hyponatremia and low blood pressures.  We will consider this again at follow-up.  She will continue on carvedilol alone.  There is no evidence of acute heart failure and her exam is benign.

## 2024-03-20 NOTE — HISTORY OF PRESENT ILLNESS
[FreeTextEntry1] : Patient presents back to the office today unfortunately have been back in the hospital last week where she presented with COVID again.  She had been dealing with symptoms of shortness of breath, cough and chills for few days.  She had seen her primary and was told she had COVID but was on the downswing and was given prednisone and albuterol.  A few days later she was finishing dinner when she suddenly developed severe chest discomfort and shortness of breath and was taken to the ER.  She was treated there for COVID for a week before being sent home.  While there she was noted to be hyponatremic and her valsartan was stopped.  She was sent home with oxygen.  She has been wearing the oxygen when she sleeps and sometimes during the day.  She is using Trelegy and also albuterol as needed which she takes about once a day.  She is post to follow-up with her pulmonologist but has not yet done so.  She is overall feeling better.  She is not having more chest discomfort or chills.  She reports her shortness of breath has significantly improved although she still has some shortness of breath when she exerts herself.  Patient denies palpitations, orthopnea, presyncope, syncope.

## 2024-03-20 NOTE — DISCUSSION/SUMMARY
[EKG obtained to assist in diagnosis and management of assessed problem(s)] : EKG obtained to assist in diagnosis and management of assessed problem(s) [FreeTextEntry1] : 1.  No additional cardiac testing at this time. 2.  Continue off Valsartan now given low BP and hyponatremia.  Continue Carvedilol. 3.  Continue atorvastatin at 80 mg daily for CAD. 4.  Continue off all antiplatelet agents given her severe bruising and sores. 5.  Monitor BP at home, keep a log and bring to f/u. 6.  Encourage her to wear O2 and get a pulse ox to monitor her oxygen sat.  F/u with pulmonary. 7.  Follow-up with hematology. 8.  Follow up here in two months.

## 2024-04-09 ENCOUNTER — APPOINTMENT (OUTPATIENT)
Dept: PULMONOLOGY | Facility: CLINIC | Age: 74
End: 2024-04-09
Payer: MEDICARE

## 2024-04-09 VITALS
OXYGEN SATURATION: 91 % | WEIGHT: 133 LBS | BODY MASS INDEX: 26.11 KG/M2 | HEART RATE: 81 BPM | RESPIRATION RATE: 16 BRPM | HEIGHT: 60 IN | SYSTOLIC BLOOD PRESSURE: 120 MMHG | DIASTOLIC BLOOD PRESSURE: 71 MMHG

## 2024-04-09 DIAGNOSIS — Z09 ENCOUNTER FOR FOLLOW-UP EXAMINATION AFTER COMPLETED TREATMENT FOR CONDITIONS OTHER THAN MALIGNANT NEOPLASM: ICD-10-CM

## 2024-04-09 PROCEDURE — 99215 OFFICE O/P EST HI 40 MIN: CPT | Mod: 25

## 2024-04-09 PROCEDURE — 94010 BREATHING CAPACITY TEST: CPT

## 2024-04-09 NOTE — CONSULT LETTER
[Dear  ___] : Dear  [unfilled], [Consult Letter:] : I had the pleasure of evaluating your patient, [unfilled]. [Please see my note below.] : Please see my note below. [Consult Closing:] : Thank you very much for allowing me to participate in the care of this patient.  If you have any questions, please do not hesitate to contact me. [Sincerely,] : Sincerely, [FreeTextEntry3] : Maicol Pederson MD FCCP Pulmonary/Critical Care/Sleep Medicine Department of Internal Medicine  [facility]

## 2024-04-09 NOTE — DISCUSSION/SUMMARY
[FreeTextEntry1] : 73-year-old female with a history of rheumatoid arthritis, chest wall restriction secondary to chronic right hemidiaphragmatic paralysis with evisceration seen today status post hospitalization for hypoxic respiratory failure secondary to an asthmatic exacerbation and COVID-19.  Patient has had an improvement but remains mildly hypoxic.  There is a slight decrease in forced vital capacity and FEV1 when compared to baseline but the patient does have a moderate degree of restriction from the above.  I have encouraged her to continue using her Trelegy, as needed albuterol and O2 as needed.

## 2024-04-09 NOTE — HISTORY OF PRESENT ILLNESS
[Never] : never [TextBox_4] : Hospital Course: Discharge Date 11-Mar-2024 Admission Date 05-Mar-2024 00:27 Reason for Admission Acute Hypoxic Resp Failure 2/2 Asthma Exacerbation and COVID Hospital Course  73F with PMHX Asthma, RA, CAD, HTN, Mood Disorder, MDD, Anxiety presents to [facility] ER c/o Cough and SOB/DAVENPORT admitted for Acute Hypoxic Respiratory Failure 2/2 Acute Asthma Exacerbation and COVID19. RVP +COVID, CTA Chest no PE +r hemidiaphragm elevated. Remdesivir- completed course and decadron day 7 course completed. Pt noted be desaturating while ambulating, requiring home O2. Course complicated by hyponatremia, nephrology consulted, other home meds to be continued. Medically optimized and ready for dc with home O2.  Since discharge patient has had improvement in overall exercise tolerance.  She denies any complaints of cough wheeze or sputum production.  She is rarely using her O2 and when seen today she was without her O2 with saturation of 88-91.

## 2024-04-09 NOTE — END OF VISIT
[FreeTextEntry3] : Hospitalization review, PACS reviewed with patient [Time Spent: ___ minutes] : I have spent [unfilled] minutes of time on the encounter.

## 2024-04-09 NOTE — ED ADULT TRIAGE NOTE - NSWEIGHTCALCTOOLDRUG_GEN_A_CORE
[FreeTextEntry1] : Benign breast cancer surveillance examination.  The patient will return in 3 to 4 months for reexam, or sooner as needed.  Her next right diagnostic mammogram will be due in July.
 used

## 2024-04-09 NOTE — RESULTS/DATA
[TextEntry] :  ACC: [id number] EXAM: CT ANGIO CHEST PULM ART [facility] ORDERED BY: LALO SINHA PROCEDURE DATE: 03/05/2024 INTERPRETATION: CLINICAL INFORMATION: Persistent cough. COMPARISON: CT chest 4/1/2021 CONTRAST/COMPLICATIONS: IV Contrast: Omnipaque 350 56 cc administered Oral Contrast: NONE Complications: None reported at time of study completion  PROCEDURE: CT Angiography of the Chest. Sagittal and coronal reformats were performed as well as 3D (MIP) reconstructions. FINDINGS: LUNGS AND AIRWAYS: Patent central airways. Continued elevated right hemidiaphragm with adjacent right basilar atelectasis and right middle lobe atelectasis. Recommend clinical correlation to assess for superimposed infection. Mild subsegmental atelectasis in the lingula. PLEURA: No pleural effusion. MEDIASTINUM AND ALE: No lymphadenopathy. VESSELS: No pulmonary embolism. 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 HEART: Mild cardiomegaly. No pericardial effusion. Coronary artery calcification and/or stenting. CHEST WALL AND LOWER NECK: Within normal limits. VISUALIZED UPPER ABDOMEN: Gastric postoperative changes. BONES: Degenerative changes. Mild compression deformity of the superior endplate of T12 and L1, likely related to Schmorl's node.  IMPRESSION: No pulmonary embolism. Continued elevated right hemidiaphragm with adjacent right basilar atelectasis and right middle lobe atelectasis. Recommend clinical correlation to assess for superimposed infection. --- End of Report -- KIMMY DICKEY MD; Attending Radiologist This document has been electronically signed. Mar 5 2024 2:53AM

## 2024-05-13 ENCOUNTER — TRANSCRIPTION ENCOUNTER (OUTPATIENT)
Age: 74
End: 2024-05-13

## 2024-05-14 ENCOUNTER — OUTPATIENT (OUTPATIENT)
Dept: OUTPATIENT SERVICES | Facility: HOSPITAL | Age: 74
LOS: 1 days | End: 2024-05-14
Payer: MEDICARE

## 2024-05-14 DIAGNOSIS — Z90.710 ACQUIRED ABSENCE OF BOTH CERVIX AND UTERUS: Chronic | ICD-10-CM

## 2024-05-14 DIAGNOSIS — Z98.891 HISTORY OF UTERINE SCAR FROM PREVIOUS SURGERY: Chronic | ICD-10-CM

## 2024-05-14 DIAGNOSIS — M46.1 SACROILIITIS, NOT ELSEWHERE CLASSIFIED: ICD-10-CM

## 2024-05-14 PROCEDURE — G0260: CPT | Mod: 50

## 2024-05-14 PROCEDURE — 76000 FLUOROSCOPY <1 HR PHYS/QHP: CPT

## 2024-06-04 ENCOUNTER — APPOINTMENT (OUTPATIENT)
Dept: PULMONOLOGY | Facility: CLINIC | Age: 74
End: 2024-06-04
Payer: MEDICARE

## 2024-06-04 VITALS — HEIGHT: 60 IN | WEIGHT: 134 LBS | BODY MASS INDEX: 26.31 KG/M2

## 2024-06-04 VITALS
WEIGHT: 134 LBS | DIASTOLIC BLOOD PRESSURE: 77 MMHG | HEIGHT: 60 IN | RESPIRATION RATE: 16 BRPM | OXYGEN SATURATION: 96 % | BODY MASS INDEX: 26.31 KG/M2 | HEART RATE: 61 BPM | SYSTOLIC BLOOD PRESSURE: 130 MMHG

## 2024-06-04 DIAGNOSIS — J98.6 DISORDERS OF DIAPHRAGM: ICD-10-CM

## 2024-06-04 DIAGNOSIS — J98.19 OTHER PULMONARY COLLAPSE: ICD-10-CM

## 2024-06-04 DIAGNOSIS — J45.909 UNSPECIFIED ASTHMA, UNCOMPLICATED: ICD-10-CM

## 2024-06-04 DIAGNOSIS — R06.09 OTHER FORMS OF DYSPNEA: ICD-10-CM

## 2024-06-04 PROCEDURE — 99214 OFFICE O/P EST MOD 30 MIN: CPT | Mod: 25

## 2024-06-04 PROCEDURE — 94010 BREATHING CAPACITY TEST: CPT

## 2024-06-04 RX ORDER — ALBUTEROL SULFATE 2.5 MG/3ML
(2.5 MG/3ML) SOLUTION RESPIRATORY (INHALATION)
Qty: 1 | Refills: 5 | Status: ACTIVE | COMMUNITY
Start: 2024-06-04 | End: 1900-01-01

## 2024-06-04 RX ORDER — ALBUTEROL SULFATE 90 UG/1
108 (90 BASE) INHALANT RESPIRATORY (INHALATION)
Qty: 1 | Refills: 5 | Status: ACTIVE | COMMUNITY
Start: 2024-06-04 | End: 1900-01-01

## 2024-06-04 RX ORDER — FLUTICASONE FUROATE, UMECLIDINIUM BROMIDE AND VILANTEROL TRIFENATATE 200; 62.5; 25 UG/1; UG/1; UG/1
200-62.5-25 POWDER RESPIRATORY (INHALATION)
Qty: 1 | Refills: 5 | Status: ACTIVE | COMMUNITY
Start: 2023-09-20 | End: 1900-01-01

## 2024-06-04 NOTE — HISTORY OF PRESENT ILLNESS
[Wheezing] : denies wheezing [Cough] : denies coughing [Shortness Of Breath] : worsened shortness of breath [Chest Tightness Or Heavy Pressure] : denies chest tightness [Feelings Of Weakness On Exertion] : denies reduced exercise tolerance [Cough at Night] : not awakened at night with cough [Wheezing at Night] : not awakened at night because of wheezing or trouble breathing [de-identified] : RA, CAD, hypertension, mood disorder, MDD, status post COVID-19 in March 2024, diaphragmatic paralysis, severe vitamin C deficiency [de-identified] : better but not yet to baseline [de-identified] : still using O2 PRN

## 2024-06-04 NOTE — DISCUSSION/SUMMARY
[FreeTextEntry1] : 73-year-old female with a history of rheumatoid arthritis, chest wall restriction secondary to chronic right hemidiaphragmatic paralysis with evisceration, severe persistent asthma seen today in follow-up.  Patient is hypoxemia has or is resolving.  I would continue complaints of dyspnea may be on the basis of deconditioning as well as possible anemia from myelodysplastic syndrome.  She has been instructed to continue her current drug regimen and has been given renewals of her drug nebulizer as well as short acting bronchodilators which she has run out of times the last several months.  I encouraged her to use this on a as needed basis and wean O2 as tolerated

## 2024-06-04 NOTE — CONSULT LETTER
[Dear  ___] : Dear  [unfilled], [Consult Letter:] : I had the pleasure of evaluating your patient, [unfilled]. [Please see my note below.] : Please see my note below. [Consult Closing:] : Thank you very much for allowing me to participate in the care of this patient.  If you have any questions, please do not hesitate to contact me. [Sincerely,] : Sincerely, [FreeTextEntry3] : Maicol Pederson MD FCCP Pulmonary/Critical Care/Sleep Medicine Department of Internal Medicine  [facility]

## 2024-06-04 NOTE — END OF VISIT
[Time Spent: ___ minutes] : I have spent [unfilled] minutes of time on the encounter. [FreeTextEntry3] : Hospitalization review, PACS reviewed with patient

## 2024-06-04 NOTE — RESULTS/DATA
[TextEntry] :  ACC: [id number] EXAM: CT ANGIO CHEST PULM ART [facility] ORDERED BY: LALO SINHA PROCEDURE DATE: 03/05/2024 INTERPRETATION: CLINICAL INFORMATION: Persistent cough. COMPARISON: CT chest 4/1/2021 CONTRAST/COMPLICATIONS: IV Contrast: Omnipaque 350 56 cc administered Oral Contrast: NONE Complications: None reported at time of study completion  PROCEDURE: CT Angiography of the Chest. Sagittal and coronal reformats were performed as well as 3D (MIP) reconstructions. FINDINGS: LUNGS AND AIRWAYS: Patent central airways. Continued elevated right hemidiaphragm with adjacent right basilar atelectasis and right middle lobe atelectasis. Recommend clinical correlation to assess for superimposed infection. Mild subsegmental atelectasis in the lingula. PLEURA: No pleural effusion. MEDIASTINUM AND ALE: No lymphadenopathy. VESSELS: No pulmonary embolism. 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 HEART: Mild cardiomegaly. No pericardial effusion. Coronary artery calcification and/or stenting. CHEST WALL AND LOWER NECK: Within normal limits. VISUALIZED UPPER ABDOMEN: Gastric postoperative changes. BONES: Degenerative changes. Mild compression deformity of the superior endplate of T12 and L1, likely related to Schmorl's node.  IMPRESSION: No pulmonary embolism. Continued elevated right hemidiaphragm with adjacent right basilar atelectasis and right middle lobe atelectasis. Recommend clinical correlation to assess for superimposed infection. --- End of Report -- KIMMY DICKEY MD; Attending Radiologist This document has been electronically signed. Mar 5 2024 2:53AM

## 2024-06-11 NOTE — INPATIENT CERTIFICATION FOR MEDICARE PATIENTS - CURRENT MEDICAL NEEDS AND CARE PLANS
Papo Gibbs  tolerated treatment well with no complications.      Papo Gibbs is aware of future appt on Friday Jun 28, 2024 3:30 PM.    AVS declined by Papo Gibbs.  
Possible Home

## 2024-06-13 ENCOUNTER — APPOINTMENT (OUTPATIENT)
Dept: PULMONOLOGY | Facility: CLINIC | Age: 74
End: 2024-06-13

## 2024-06-20 NOTE — PHYSICAL EXAM
[General Appearance - In No Acute Distress] : no acute distress [Normal Conjunctiva] : the conjunctiva exhibited no abnormalities [Normal Oral Mucosa] : normal oral mucosa [Normal Oropharynx] : normal oropharynx [Auscultation Breath Sounds / Voice Sounds] : lungs were clear to auscultation bilaterally [Normal Rate] : normal [Rhythm Regular] : regular [Normal S1] : normal S1 [Normal S2] : normal S2 [No Murmur] : no murmurs heard [Abdomen Soft] : soft [Abdomen Tenderness] : non-tender [Abnormal Walk] : normal gait [Nail Clubbing] : no clubbing of the fingernails [Cyanosis, Localized] : no localized cyanosis [Skin Color & Pigmentation] : normal skin color and pigmentation [Oriented To Time, Place, And Person] : oriented to person, place, and time [Affect] : the affect was normal [FreeTextEntry1] : No JVD, no carotid bruits. [S3] : no S3 [S4] : no S4 no dermatitis, no environmental allergies, no food allergies, no immunosuppressive disorder, and no pruritus.

## 2024-06-24 ENCOUNTER — APPOINTMENT (OUTPATIENT)
Dept: CARDIOLOGY | Facility: CLINIC | Age: 74
End: 2024-06-24
Payer: MEDICARE

## 2024-06-24 ENCOUNTER — TRANSCRIPTION ENCOUNTER (OUTPATIENT)
Age: 74
End: 2024-06-24

## 2024-06-24 ENCOUNTER — NON-APPOINTMENT (OUTPATIENT)
Age: 74
End: 2024-06-24

## 2024-06-24 VITALS
HEIGHT: 60 IN | WEIGHT: 138 LBS | HEART RATE: 94 BPM | DIASTOLIC BLOOD PRESSURE: 89 MMHG | RESPIRATION RATE: 16 BRPM | BODY MASS INDEX: 27.09 KG/M2 | SYSTOLIC BLOOD PRESSURE: 132 MMHG

## 2024-06-24 DIAGNOSIS — E78.5 HYPERLIPIDEMIA, UNSPECIFIED: ICD-10-CM

## 2024-06-24 DIAGNOSIS — I42.9 CARDIOMYOPATHY, UNSPECIFIED: ICD-10-CM

## 2024-06-24 DIAGNOSIS — I25.10 ATHEROSCLEROTIC HEART DISEASE OF NATIVE CORONARY ARTERY W/OUT ANGINA PECTORIS: ICD-10-CM

## 2024-06-24 DIAGNOSIS — I10 ESSENTIAL (PRIMARY) HYPERTENSION: ICD-10-CM

## 2024-06-24 PROCEDURE — G2211 COMPLEX E/M VISIT ADD ON: CPT

## 2024-06-24 PROCEDURE — 99214 OFFICE O/P EST MOD 30 MIN: CPT

## 2024-06-24 PROCEDURE — 93000 ELECTROCARDIOGRAM COMPLETE: CPT

## 2024-06-24 RX ORDER — CLOTRIMAZOLE AND BETAMETHASONE DIPROPIONATE 10; .5 MG/G; MG/G
1-0.05 CREAM TOPICAL
Qty: 90 | Refills: 0 | Status: ACTIVE | COMMUNITY
Start: 2024-06-13

## 2024-06-24 RX ORDER — GABAPENTIN 100 MG/1
100 CAPSULE ORAL
Refills: 0 | Status: DISCONTINUED | COMMUNITY
End: 2024-06-24

## 2024-06-24 RX ORDER — OXYBUTYNIN CHLORIDE 10 MG/1
10 TABLET, EXTENDED RELEASE ORAL
Qty: 30 | Refills: 0 | Status: ACTIVE | COMMUNITY
Start: 2024-05-27

## 2024-06-24 RX ORDER — CARVEDILOL 3.12 MG/1
3.12 TABLET, FILM COATED ORAL TWICE DAILY
Qty: 180 | Refills: 1 | Status: ACTIVE | COMMUNITY
Start: 2023-09-06 | End: 1900-01-01

## 2024-06-24 RX ORDER — OXYCODONE AND ACETAMINOPHEN 10; 325 MG/1; MG/1
10-325 TABLET ORAL
Qty: 90 | Refills: 0 | Status: ACTIVE | COMMUNITY
Start: 2024-06-13

## 2024-06-24 RX ORDER — MOMETASONE FUROATE 1 MG/ML
0.1 SOLUTION TOPICAL
Qty: 60 | Refills: 0 | Status: ACTIVE | COMMUNITY
Start: 2024-04-09

## 2024-06-24 RX ORDER — IBUPROFEN 800 MG/1
800 TABLET, FILM COATED ORAL
Qty: 90 | Refills: 0 | Status: DISCONTINUED | COMMUNITY
Start: 2022-08-04 | End: 2024-06-24

## 2024-06-24 RX ORDER — BEMPEDOIC ACID 180 MG/1
180 TABLET, FILM COATED ORAL
Qty: 90 | Refills: 1 | Status: DISCONTINUED | COMMUNITY
Start: 2023-12-07 | End: 2024-06-24

## 2024-06-25 ENCOUNTER — OUTPATIENT (OUTPATIENT)
Dept: OUTPATIENT SERVICES | Facility: HOSPITAL | Age: 74
LOS: 1 days | End: 2024-06-25
Payer: MEDICARE

## 2024-06-25 DIAGNOSIS — M54.16 RADICULOPATHY, LUMBAR REGION: ICD-10-CM

## 2024-06-25 DIAGNOSIS — Z98.891 HISTORY OF UTERINE SCAR FROM PREVIOUS SURGERY: Chronic | ICD-10-CM

## 2024-06-25 DIAGNOSIS — Z90.710 ACQUIRED ABSENCE OF BOTH CERVIX AND UTERUS: Chronic | ICD-10-CM

## 2024-06-25 PROCEDURE — 77003 FLUOROGUIDE FOR SPINE INJECT: CPT | Mod: PN

## 2024-06-25 PROCEDURE — 62323 NJX INTERLAMINAR LMBR/SAC: CPT

## 2024-09-17 ENCOUNTER — INPATIENT (INPATIENT)
Facility: HOSPITAL | Age: 74
LOS: 1 days | Discharge: HOME | DRG: 282 | End: 2024-09-19
Attending: STUDENT IN AN ORGANIZED HEALTH CARE EDUCATION/TRAINING PROGRAM | Admitting: STUDENT IN AN ORGANIZED HEALTH CARE EDUCATION/TRAINING PROGRAM
Payer: MEDICARE

## 2024-09-17 ENCOUNTER — NON-APPOINTMENT (OUTPATIENT)
Age: 74
End: 2024-09-17

## 2024-09-17 VITALS
WEIGHT: 138.67 LBS | HEIGHT: 64 IN | SYSTOLIC BLOOD PRESSURE: 114 MMHG | OXYGEN SATURATION: 92 % | RESPIRATION RATE: 18 BRPM | HEART RATE: 89 BPM | TEMPERATURE: 98 F | DIASTOLIC BLOOD PRESSURE: 79 MMHG

## 2024-09-17 DIAGNOSIS — I21.4 NON-ST ELEVATION (NSTEMI) MYOCARDIAL INFARCTION: ICD-10-CM

## 2024-09-17 DIAGNOSIS — Z98.891 HISTORY OF UTERINE SCAR FROM PREVIOUS SURGERY: Chronic | ICD-10-CM

## 2024-09-17 DIAGNOSIS — Z90.710 ACQUIRED ABSENCE OF BOTH CERVIX AND UTERUS: Chronic | ICD-10-CM

## 2024-09-17 LAB
ALBUMIN SERPL ELPH-MCNC: 3.8 G/DL — SIGNIFICANT CHANGE UP (ref 3.3–5.2)
ALP SERPL-CCNC: 87 U/L — SIGNIFICANT CHANGE UP (ref 40–120)
ALT FLD-CCNC: 15 U/L — SIGNIFICANT CHANGE UP
ANION GAP SERPL CALC-SCNC: 11 MMOL/L — SIGNIFICANT CHANGE UP (ref 5–17)
APTT BLD: 30.2 SEC — SIGNIFICANT CHANGE UP (ref 24.5–35.6)
AST SERPL-CCNC: 20 U/L — SIGNIFICANT CHANGE UP
BASOPHILS # BLD AUTO: 0.02 K/UL — SIGNIFICANT CHANGE UP (ref 0–0.2)
BASOPHILS NFR BLD AUTO: 0.2 % — SIGNIFICANT CHANGE UP (ref 0–2)
BILIRUB SERPL-MCNC: 0.3 MG/DL — LOW (ref 0.4–2)
BUN SERPL-MCNC: 23.8 MG/DL — HIGH (ref 8–20)
CALCIUM SERPL-MCNC: 9.4 MG/DL — SIGNIFICANT CHANGE UP (ref 8.4–10.5)
CHLORIDE SERPL-SCNC: 93 MMOL/L — LOW (ref 96–108)
CO2 SERPL-SCNC: 26 MMOL/L — SIGNIFICANT CHANGE UP (ref 22–29)
CREAT SERPL-MCNC: 0.89 MG/DL — SIGNIFICANT CHANGE UP (ref 0.5–1.3)
EGFR: 68 ML/MIN/1.73M2 — SIGNIFICANT CHANGE UP
EOSINOPHIL # BLD AUTO: 0.12 K/UL — SIGNIFICANT CHANGE UP (ref 0–0.5)
EOSINOPHIL NFR BLD AUTO: 1.4 % — SIGNIFICANT CHANGE UP (ref 0–6)
GLUCOSE SERPL-MCNC: 112 MG/DL — HIGH (ref 70–99)
HCT VFR BLD CALC: 33.9 % — LOW (ref 34.5–45)
HGB BLD-MCNC: 11.4 G/DL — LOW (ref 11.5–15.5)
IMM GRANULOCYTES NFR BLD AUTO: 0.4 % — SIGNIFICANT CHANGE UP (ref 0–0.9)
INR BLD: 1.01 RATIO — SIGNIFICANT CHANGE UP (ref 0.85–1.18)
LIDOCAIN IGE QN: 11 U/L — LOW (ref 22–51)
LYMPHOCYTES # BLD AUTO: 1.61 K/UL — SIGNIFICANT CHANGE UP (ref 1–3.3)
LYMPHOCYTES # BLD AUTO: 19.4 % — SIGNIFICANT CHANGE UP (ref 13–44)
MCHC RBC-ENTMCNC: 31 PG — SIGNIFICANT CHANGE UP (ref 27–34)
MCHC RBC-ENTMCNC: 33.6 GM/DL — SIGNIFICANT CHANGE UP (ref 32–36)
MCV RBC AUTO: 92.1 FL — SIGNIFICANT CHANGE UP (ref 80–100)
MONOCYTES # BLD AUTO: 1.07 K/UL — HIGH (ref 0–0.9)
MONOCYTES NFR BLD AUTO: 12.9 % — SIGNIFICANT CHANGE UP (ref 2–14)
NEUTROPHILS # BLD AUTO: 5.45 K/UL — SIGNIFICANT CHANGE UP (ref 1.8–7.4)
NEUTROPHILS NFR BLD AUTO: 65.7 % — SIGNIFICANT CHANGE UP (ref 43–77)
PLATELET # BLD AUTO: 318 K/UL — SIGNIFICANT CHANGE UP (ref 150–400)
POTASSIUM SERPL-MCNC: 4.9 MMOL/L — SIGNIFICANT CHANGE UP (ref 3.5–5.3)
POTASSIUM SERPL-SCNC: 4.9 MMOL/L — SIGNIFICANT CHANGE UP (ref 3.5–5.3)
PROT SERPL-MCNC: 7.1 G/DL — SIGNIFICANT CHANGE UP (ref 6.6–8.7)
PROTHROM AB SERPL-ACNC: 11.2 SEC — SIGNIFICANT CHANGE UP (ref 9.5–13)
RBC # BLD: 3.68 M/UL — LOW (ref 3.8–5.2)
RBC # FLD: 12.3 % — SIGNIFICANT CHANGE UP (ref 10.3–14.5)
SODIUM SERPL-SCNC: 130 MMOL/L — LOW (ref 135–145)
TROPONIN T, HIGH SENSITIVITY RESULT: 69 NG/L — HIGH (ref 0–51)
WBC # BLD: 8.3 K/UL — SIGNIFICANT CHANGE UP (ref 3.8–10.5)
WBC # FLD AUTO: 8.3 K/UL — SIGNIFICANT CHANGE UP (ref 3.8–10.5)

## 2024-09-17 PROCEDURE — 93010 ELECTROCARDIOGRAM REPORT: CPT

## 2024-09-17 PROCEDURE — 93571 IV DOP VEL&/PRESS C FLO 1ST: CPT | Mod: 26,52,LD

## 2024-09-17 PROCEDURE — 71045 X-RAY EXAM CHEST 1 VIEW: CPT | Mod: 26

## 2024-09-17 PROCEDURE — 99223 1ST HOSP IP/OBS HIGH 75: CPT

## 2024-09-17 PROCEDURE — 99152 MOD SED SAME PHYS/QHP 5/>YRS: CPT

## 2024-09-17 PROCEDURE — 93458 L HRT ARTERY/VENTRICLE ANGIO: CPT | Mod: 26

## 2024-09-17 PROCEDURE — 99285 EMERGENCY DEPT VISIT HI MDM: CPT | Mod: GC

## 2024-09-17 RX ORDER — ZOLPIDEM TARTRATE 5 MG/1
5 TABLET, FILM COATED ORAL AT BEDTIME
Refills: 0 | Status: DISCONTINUED | OUTPATIENT
Start: 2024-09-17 | End: 2024-09-19

## 2024-09-17 RX ORDER — VENLAFAXINE HYDROCHLORIDE 150 MG/1
225 CAPSULE, EXTENDED RELEASE ORAL DAILY
Refills: 0 | Status: DISCONTINUED | OUTPATIENT
Start: 2024-09-17 | End: 2024-09-19

## 2024-09-17 RX ORDER — SODIUM CHLORIDE 9 MG/ML
250 INJECTION INTRAMUSCULAR; INTRAVENOUS; SUBCUTANEOUS ONCE
Refills: 0 | Status: DISCONTINUED | OUTPATIENT
Start: 2024-09-17 | End: 2024-09-19

## 2024-09-17 RX ORDER — ACETAMINOPHEN 325 MG/1
650 TABLET ORAL EVERY 6 HOURS
Refills: 0 | Status: DISCONTINUED | OUTPATIENT
Start: 2024-09-17 | End: 2024-09-19

## 2024-09-17 RX ORDER — ONDANSETRON 2 MG/ML
4 INJECTION, SOLUTION INTRAMUSCULAR; INTRAVENOUS ONCE
Refills: 0 | Status: COMPLETED | OUTPATIENT
Start: 2024-09-17 | End: 2024-09-17

## 2024-09-17 RX ORDER — MAGNESIUM, ALUMINUM HYDROXIDE 200-225/5
30 SUSPENSION, ORAL (FINAL DOSE FORM) ORAL EVERY 4 HOURS
Refills: 0 | Status: DISCONTINUED | OUTPATIENT
Start: 2024-09-17 | End: 2024-09-19

## 2024-09-17 RX ORDER — ALPRAZOLAM 0.25 MG
0.5 TABLET ORAL
Refills: 0 | Status: DISCONTINUED | OUTPATIENT
Start: 2024-09-17 | End: 2024-09-19

## 2024-09-17 RX ORDER — TRAZODONE HCL 50 MG
50 TABLET ORAL AT BEDTIME
Refills: 0 | Status: DISCONTINUED | OUTPATIENT
Start: 2024-09-17 | End: 2024-09-19

## 2024-09-17 RX ORDER — ASPIRIN 81 MG
81 TABLET, DELAYED RELEASE (ENTERIC COATED) ORAL DAILY
Refills: 0 | Status: DISCONTINUED | OUTPATIENT
Start: 2024-09-18 | End: 2024-09-19

## 2024-09-17 RX ORDER — CARVEDILOL 6.25 MG/1
3.12 TABLET ORAL EVERY 12 HOURS
Refills: 0 | Status: DISCONTINUED | OUTPATIENT
Start: 2024-09-17 | End: 2024-09-19

## 2024-09-17 RX ORDER — ONDANSETRON 2 MG/ML
4 INJECTION, SOLUTION INTRAMUSCULAR; INTRAVENOUS EVERY 8 HOURS
Refills: 0 | Status: DISCONTINUED | OUTPATIENT
Start: 2024-09-17 | End: 2024-09-19

## 2024-09-17 RX ORDER — FLUTICASONE PROPIONATE AND SALMETEROL 250; 50 UG/1; UG/1
1 POWDER RESPIRATORY (INHALATION)
Refills: 0 | Status: DISCONTINUED | OUTPATIENT
Start: 2024-09-17 | End: 2024-09-19

## 2024-09-17 RX ORDER — HEPARIN SODIUM,BOVINE 1000/ML
VIAL (ML) INJECTION
Qty: 25000 | Refills: 0 | Status: DISCONTINUED | OUTPATIENT
Start: 2024-09-17 | End: 2024-09-17

## 2024-09-17 RX ORDER — HEPARIN SODIUM,BOVINE 1000/ML
3800 VIAL (ML) INJECTION EVERY 6 HOURS
Refills: 0 | Status: DISCONTINUED | OUTPATIENT
Start: 2024-09-17 | End: 2024-09-17

## 2024-09-17 RX ORDER — PANTOPRAZOLE SODIUM 40 MG
40 TABLET, DELAYED RELEASE (ENTERIC COATED) ORAL ONCE
Refills: 0 | Status: COMPLETED | OUTPATIENT
Start: 2024-09-17 | End: 2024-09-17

## 2024-09-17 RX ORDER — ASPIRIN 81 MG
324 TABLET, DELAYED RELEASE (ENTERIC COATED) ORAL ONCE
Refills: 0 | Status: COMPLETED | OUTPATIENT
Start: 2024-09-17 | End: 2024-09-17

## 2024-09-17 RX ORDER — TIOTROPIUM BROMIDE INHALATION SPRAY 3.12 UG/1
2 SPRAY, METERED RESPIRATORY (INHALATION) DAILY
Refills: 0 | Status: DISCONTINUED | OUTPATIENT
Start: 2024-09-17 | End: 2024-09-19

## 2024-09-17 RX ORDER — HEPARIN SODIUM,BOVINE 1000/ML
3800 VIAL (ML) INJECTION ONCE
Refills: 0 | Status: COMPLETED | OUTPATIENT
Start: 2024-09-17 | End: 2024-09-17

## 2024-09-17 RX ORDER — VALSARTAN 40 MG/1
80 TABLET ORAL DAILY
Refills: 0 | Status: DISCONTINUED | OUTPATIENT
Start: 2024-09-17 | End: 2024-09-19

## 2024-09-17 RX ORDER — GABAPENTIN 100 MG
600 CAPSULE ORAL
Refills: 0 | Status: DISCONTINUED | OUTPATIENT
Start: 2024-09-17 | End: 2024-09-19

## 2024-09-17 RX ADMIN — Medication 750 UNIT(S)/HR: at 15:20

## 2024-09-17 RX ADMIN — Medication 3800 UNIT(S): at 15:19

## 2024-09-17 RX ADMIN — Medication 40 MILLIGRAM(S): at 11:55

## 2024-09-17 RX ADMIN — Medication 324 MILLIGRAM(S): at 13:10

## 2024-09-17 RX ADMIN — FLUTICASONE PROPIONATE AND SALMETEROL 1 DOSE(S): 250; 50 POWDER RESPIRATORY (INHALATION) at 23:55

## 2024-09-17 RX ADMIN — Medication 20 MILLIGRAM(S): at 15:19

## 2024-09-17 RX ADMIN — ONDANSETRON 4 MILLIGRAM(S): 2 INJECTION, SOLUTION INTRAMUSCULAR; INTRAVENOUS at 11:55

## 2024-09-17 RX ADMIN — Medication 300 MILLIGRAM(S): at 23:52

## 2024-09-17 RX ADMIN — Medication 50 MILLIGRAM(S): at 23:09

## 2024-09-17 RX ADMIN — Medication 30 MILLILITER(S): at 23:09

## 2024-09-17 NOTE — H&P ADULT - NSHPPHYSICALEXAM_GEN_ALL_CORE
GENERAL: pt examined bedside, laying comfortably in bed in NAD  HEENT: NC/AT, moist oral mucosa, clear conjunctiva, sclera nonicteric  RESPIRATORY: Normal respiratory effort; CTA b/l, no wheezing, rhonchi, rales  CARDIOVASCULAR: RRR, normal S1 and S2, no murmur/rub/gallop  ABDOMEN: soft, NT/ND, normoactive bowel sounds, no rebound/guarding  MSK: No joint deformities, edema, erythema  EXTREMITIES: No cynaosis, no clubbing, no lower extremity edema; Peripheral pulses are 2+ bilaterally  PSYCH: affect appropriate and cooperative  NEUROLOGY: A+O to person, place, and time, no focal neurologic deficits appreciated  SKIN: No rashes or no palpable lesions GENERAL: pt examined bedside, laying comfortably in bed in NAD  HEENT: NC/AT, moist oral mucosa, clear conjunctiva, sclera nonicteric  RESPIRATORY: Normal respiratory effort, no wheezing, rhonchi, rales  CARDIOVASCULAR: RRR, normal S1 and S2, no murmur/rub/gallop  ABDOMEN: soft, NT/ND, normoactive bowel sounds, no rebound/guarding  MSK: No joint deformities, edema, erythema  EXTREMITIES: No cynaosis, no clubbing, no lower extremity edema  PSYCH: affect appropriate and cooperative  NEUROLOGY: A+O to person, place, and time, no focal neurologic deficits appreciated  SKIN: No rashes or no palpable lesions

## 2024-09-17 NOTE — H&P ADULT - ASSESSMENT
73 y/o F w/ CAD s/p PCI x 1 stent, HFimprovedEF, HTN, HLD, anxiety/depression, insomnia presented to ED c/o substernal pressure like CP that started 3 days ago w/ associated epigastric pain and nausea.  VS stable.  Benign physical exam.  Initial w/u significant for mild anemia, Na 130, trop 69 but EKG w/ no acute ischemic chagnes and CXR negative.  Cardio consulted.  Pt started on heparin gtt.  Will admitt for NSTEMI and will go for [facility] today.       Type II MI, NSTEMI  - substernal cp w/ associated epigastric pain and nausea   - Trop 69 but EKG w/ no acute ischemic changes  - Stat trop and EKG ordered to reassess  - Stat BNP ordered   - CXR w/ no pulm vascular congestion   - Started on heparin gtt   - Lipid panel and A1c   - ASA and statin   - Resume ARB and carvedilol s/p cath  - Keep NPO for [facility] today  - Monitor on telemetry   - Cardio consulted and recs noted       HFimprovedEF  - Clinically euvolemic at this time   - CXR w/ no pulm vascular congestion appreciated  - Will order BNP and TTE  - Monitor daily weight, strict I/O's and fluid restrict  - Maintain k>4 and mg>2  - Monitor on telemetry   - Cardio following       Euvolemic hyponatremia hypochloremia   - Etiology unclear but could be due to gabapentin   - Will check UA, urine studies and urine/serum osm and uric acid   - Repeat CMP in AM and monitor I/O's      CAD s/p prior PCI w/ 1 stent   HTN / HLD  - Resume home meds post cath      Anxiety/depression  - c/w venlafaxine and primidone  - prn alprazolam 0.5mg bid      COPD  - Not on baseline O2  - Therapeutic interchange of Trelegy ordered   - Not in acute exacerbation       Essential tremor  - c/w primidone       Chronic pain   - c/w gabapentin       Insomnia  - On Ambien prn  - c/w trazadone qhs        VTE ppx: on heparin gtt    Dispo: acute.  Pending [facility].  Anticipate d/c in 1-3 days.    75 y/o F w/ CAD s/p PCI x 1 stent, HFimprovedEF, HTN, HLD, copd (not on O2), anxiety/depression, insomnia presented to ED c/o substernal pressure like CP that started 3 days ago w/ associated epigastric pain and nausea.  VS stable.  Benign physical exam.  Initial w/u significant for mild anemia, Na 130, trop 69 but EKG w/ no acute ischemic chagnes and CXR negative.  Cardio consulted.  Pt started on heparin gtt.  Will admitt for NSTEMI and will go for [facility] today.       Type II MI, NSTEMI  - substernal cp w/ associated epigastric pain and nausea   - Trop 69 but EKG w/ no acute ischemic changes  - Stat trop and EKG ordered to reassess  - Stat BNP ordered   - CXR w/ no pulm vascular congestion   - Started on heparin gtt   - Lipid panel and A1c   - ASA and statin   - Resume ARB and carvedilol s/p cath  - Keep NPO for C today  - Monitor on telemetry   - Cardio consulted and recs noted       HFimprovedEF  - Clinically euvolemic at this time   - CXR w/ no pulm vascular congestion appreciated  - Will order BNP and TTE  - Monitor daily weight, strict I/O's and fluid restrict  - Maintain k>4 and mg>2  - Monitor on telemetry   - Cardio following       Euvolemic hyponatremia hypochloremia   - Etiology unclear but could be due to gabapentin or quetiapine   - Will check UA, urine studies and urine/serum osm and uric acid   - Repeat CMP in AM and monitor I/O's      CAD s/p prior PCI w/ 1 stent   HTN / HLD  - Resume home meds post cath      Anxiety/depression  - c/w venlafaxine, quetiapine and primidone  - prn alprazolam 0.5mg bid      COPD  - Not on baseline O2  - Therapeutic interchange of Trelegy ordered   - Not in acute exacerbation       Essential tremor  - c/w primidone       Chronic pain   - c/w gabapentin       Insomnia  - On Ambien prn  - c/w trazadone qhs        VTE ppx: on heparin gtt    Dispo: acute.  Pending C.  Anticipate d/c in 1-3 days.

## 2024-09-17 NOTE — ED ADULT NURSE NOTE - OBJECTIVE STATEMENT
A&O x4, family at bedside, reports constant chest pain/ epigastric pain that "hurts in the shape of a V". +Nausea, + slight SOB. Reports h/o one stent, unsure when. pt admits to taking  Protonix, last dose  PTA. Breathing is spontaneous even and unlabored. +TTp to epigastric. Denies radiating pain, burping, vomiting. Bed is in lowest position and side rails up. Safety precautions maintained.

## 2024-09-17 NOTE — PATIENT PROFILE ADULT - NSPRESCRALCFREQ_GEN_A_NUR
Records received, to [facility] for review and will then send to scan. Note. Was to schedule follow up to see Dr. Sunita Riggs in 1-2 months (not scheduled). Reminder message sent to patient. Never

## 2024-09-17 NOTE — CONSULT NOTE ADULT - ASSESSMENT
74-year-old female with a past medical history of CAD s/p PCI, prior severe CMP with improved EF, hypertension, patient presents to the ED with complains of left sided chest pain for the past couple of days, she reports worsening of symptoms with exertion associated with nausea/ shortness of breath and radiation to the left arm.  In the ED patient was noted to have elevated troponin level with non ischemic findings on the EKG.       NSTEMI     plan   Heparin drip, keep PTT with therapeutic range   asa + statin   telemetry   echocardiogram   [facility]   Further recs post testing.

## 2024-09-17 NOTE — CONSULT NOTE ADULT - SUBJECTIVE AND OBJECTIVE BOX
CHIEF COMPLAINT:    HPI: 74-year-old female with a past medical history of CAD s/p PCI, prior severe CMP with improved EF, hypertension     PAST MEDICAL & SURGICAL HISTORY:  NICM (nonischemic cardiomyopathy)      HTN (hypertension)      Rheumatoid arthritis      Chronic back pain      HLD (hyperlipidemia)      Chronic obstructive asthma      Cellulitis      S/P       S/P hysterectomy          Allergies    No Known Allergies    Intolerances        MEDICATIONS  (STANDING):  heparin   Injectable 3800 Unit(s) IV Push once  heparin  Infusion.  Unit(s)/Hr (7.5 mL/Hr) IV Continuous <Continuous>    MEDICATIONS  (PRN):  heparin   Injectable 3800 Unit(s) IV Push every 6 hours PRN For aPTT less than 40      FAMILY HISTORY:  Family history of diabetes mellitus (DM)    FH: CAD (coronary artery disease)        ***No family history of premature coronary artery disease or sudden cardiac death    SOCIAL HISTORY:  Smoking-  Alcohol-  Illicit Drug use-    REVIEW OF SYSTEMS:  Constitutional: [ ] fever, [ ]weight loss,  [ ]fatigue  Eyes: [ ] visual changes  Respiratory: [ ]shortness of breath;  [ ] cough, [ ]wheezing, [ ]chills, [ ]hemoptysis  Cardiovascular: [ ] chest pain, [ ]palpitations, [ ]dizziness,  [ ]leg swelling [ ]syncope  Gastrointestinal: [ ] abdominal pain, [ ]nausea, [ ]vomiting,  [ ]diarrhea   Genitourinary: [ ] dysuria, [ ] hematuria  Neurologic: [ ] headaches [ ] tremors  [ ] weakness [ ] lightheadedness  Skin: [ ] itching, [ ]burning, [ ] rashes  Endocrine: [ ] heat or cold intolerance  Musculoskeletal: [ ] joint pain or swelling; [ ] muscle, back, or extremity pain  Psychiatric: [ ] depression, [ ]anxiety, [ ]mood swings, or [ ]difficulty sleeping  Hematologic: [ ] easy bruising, [ ] bleeding gums     [ x] All others negative	  [ ] Unable to obtain    Vital Signs Last 24 Hrs  T(C): 36.8 (17 Sep 2024 10:16), Max: 36.8 (17 Sep 2024 10:16)  T(F): 98.3 (17 Sep 2024 10:16), Max: 98.3 (17 Sep 2024 10:16)  HR: 89 (17 Sep 2024 10:16) (89 - 89)  BP: 114/79 (17 Sep 2024 10:16) (114/79 - 114/79)  BP(mean): --  RR: 18 (17 Sep 2024 10:16) (18 - 18)  SpO2: 92% (17 Sep 2024 10:16) (92% - 92%)    Parameters below as of 17 Sep 2024 10:16  Patient On (Oxygen Delivery Method): room air      I&O's Summary      PHYSICAL EXAM:  General: No acute distress  HEENT: EOMI  Neck:  No JVD  Lungs: Clear to auscultation bilaterally; No rales or wheezing  Heart: Regular rate and rhythm; No murmurs, rubs, or gallops  Abdomen: soft, non tender, non distended   Extremities: warm, no edema   Nervous system:  Alert & Oriented X3  Psychiatric: Normal affect  Skin: No rashes or lesions    LABS:      130[L]  |  93[L]  |  23.8[H]  ----------------------------<  112[H]  4.9   |  26.0  |  0.89    Ca    9.4      17 Sep 2024 11:20    TPro  7.1  /  Alb  3.8  /  TBili  0.3[L]  /  DBili  x   /  AST  20  /  ALT  15  /  AlkPhos  87      Creatinine Trend: 0.89<--                        11.4   8.30  )-----------( 318      ( 17 Sep 2024 11:20 )             33.9         Lipid Panel:   Cardiac Enzymes:           RADIOLOGY:    ECG [my interpretation]:    TELEMETRY:      Echocardiogram 2023 demonstrated left ventricle normal in size with mild to moderately reduced function and ejection fraction of 40 to 45%. Mild concentric LVH. Mild mitral, trace aortic and mild to moderate tricuspid regurgitation.    Nuclear stress test 2023 was a pharmacologic study which was tolerated well. Blood pressure response to infusion was normal. EKG showed no evidence of ischemia with infusion. Evaluation of nuclear imaging showed no evidence of ischemia infarct and ejection fraction of 58%.    CATHETERIZATION: CHIEF COMPLAINT:    HPI: 74-year-old female with a past medical history of CAD s/p PCI, prior severe CMP with improved EF, hypertension, patient presents to the ED with complains of left sided chest pain for the past couple of days, she reports worsening of symptoms with exertion associated with nausea/ shortness of breath and radiation to the left arm.  In the ED patient was noted to have elevated troponin level with non ischemic findings on the EKG.     PAST MEDICAL & SURGICAL HISTORY:  NICM (nonischemic cardiomyopathy)      HTN (hypertension)      Rheumatoid arthritis      Chronic back pain      HLD (hyperlipidemia)      Chronic obstructive asthma      Cellulitis      S/P       S/P hysterectomy          Allergies    No Known Allergies    Intolerances        MEDICATIONS  (STANDING):  heparin   Injectable 3800 Unit(s) IV Push once  heparin  Infusion.  Unit(s)/Hr (7.5 mL/Hr) IV Continuous <Continuous>    MEDICATIONS  (PRN):  heparin   Injectable 3800 Unit(s) IV Push every 6 hours PRN For aPTT less than 40      FAMILY HISTORY:  Family history of diabetes mellitus (DM)    FH: CAD (coronary artery disease)        ***No family history of premature coronary artery disease or sudden cardiac death    SOCIAL HISTORY:  Smoking-  Alcohol-  Illicit Drug use-    REVIEW OF SYSTEMS:  Constitutional: [ ] fever, [ ]weight loss,  [ ]fatigue  Eyes: [ ] visual changes  Respiratory: [ ]shortness of breath;  [ ] cough, [ ]wheezing, [ ]chills, [ ]hemoptysis  Cardiovascular: [ ] chest pain, [ ]palpitations, [ ]dizziness,  [ ]leg swelling [ ]syncope  Gastrointestinal: [ ] abdominal pain, [ ]nausea, [ ]vomiting,  [ ]diarrhea   Genitourinary: [ ] dysuria, [ ] hematuria  Neurologic: [ ] headaches [ ] tremors  [ ] weakness [ ] lightheadedness  Skin: [ ] itching, [ ]burning, [ ] rashes  Endocrine: [ ] heat or cold intolerance  Musculoskeletal: [ ] joint pain or swelling; [ ] muscle, back, or extremity pain  Psychiatric: [ ] depression, [ ]anxiety, [ ]mood swings, or [ ]difficulty sleeping  Hematologic: [ ] easy bruising, [ ] bleeding gums     [ x] All others negative	  [ ] Unable to obtain    Vital Signs Last 24 Hrs  T(C): 36.8 (17 Sep 2024 10:16), Max: 36.8 (17 Sep 2024 10:16)  T(F): 98.3 (17 Sep 2024 10:16), Max: 98.3 (17 Sep 2024 10:16)  HR: 89 (17 Sep 2024 10:16) (89 - 89)  BP: 114/79 (17 Sep 2024 10:16) (114/79 - 114/79)  BP(mean): --  RR: 18 (17 Sep 2024 10:16) (18 - 18)  SpO2: 92% (17 Sep 2024 10:16) (92% - 92%)    Parameters below as of 17 Sep 2024 10:16  Patient On (Oxygen Delivery Method): room air      I&O's Summary      PHYSICAL EXAM:  General: No acute distress  HEENT: EOMI  Neck:  No JVD  Lungs: Clear to auscultation bilaterally; No rales or wheezing  Heart: Regular rate and rhythm; No murmurs, rubs, or gallops  Abdomen: soft, non tender, non distended   Extremities: warm, no edema   Nervous system:  Alert & Oriented X3  Psychiatric: Normal affect  Skin: No rashes or lesions    LABS:      130[L]  |  93[L]  |  23.8[H]  ----------------------------<  112[H]  4.9   |  26.0  |  0.89    Ca    9.4      17 Sep 2024 11:20    TPro  7.1  /  Alb  3.8  /  TBili  0.3[L]  /  DBili  x   /  AST  20  /  ALT  15  /  AlkPhos  87      Creatinine Trend: 0.89<--                        11.4   8.30  )-----------( 318      ( 17 Sep 2024 11:20 )             33.9         Lipid Panel:   Cardiac Enzymes:           RADIOLOGY:    ECG [24]: NSR, poor R wave progression. LAFB    TELEMETRY: SR      Echocardiogram 2023 demonstrated left ventricle normal in size with mild to moderately reduced function and ejection fraction of 40 to 45%. Mild concentric LVH. Mild mitral, trace aortic and mild to moderate tricuspid regurgitation.    Nuclear stress test 2023 was a pharmacologic study which was tolerated well. Blood pressure response to infusion was normal. EKG showed no evidence of ischemia with infusion. Evaluation of nuclear imaging showed no evidence of ischemia infarct and ejection fraction of 58%.    CATHETERIZATION:

## 2024-09-17 NOTE — DISCHARGE NOTE PROVIDER - NSDCCPCAREPLAN_GEN_ALL_CORE_FT
PRINCIPAL DISCHARGE DIAGNOSIS  Diagnosis: NSTEMI (non-ST elevation myocardial infarction)  Assessment and Plan of Treatment:       SECONDARY DISCHARGE DIAGNOSES  Diagnosis: CAD (coronary artery disease)  Assessment and Plan of Treatment: Coronary artery disease is a condition where the arteries the supply the heart muscle get clogges with fatty deposits & puts you at risk for a heart attack  Call your doctor if you have any new pain, pressure, or discomfort in the center of your chest, pain, tingling or discomfort in arms, back, neck, jaw, or stomach, shortness of breath, nausea, vomiting, burping or heartburn, sweating, cold and clammy skin, racing or abnormal heartbeat for more than 10 minutes or if they keep coming & going.  Call 911 and do not tr to get to hospital by care  You can help yourself with lefestyle changes (quitting smoking if you smoke), eat lots of fruits & vegetables & low fat dairy products, not a lot of meat & fatty foods, walk or some form of physical activity most days of the week, lose weight if you are overweight  Take your cardiac medication as prescribed to lower cholesterol, to lower blood pressure, aspirin to prevent blood clots, and diabetes control  Make sure to keep appointments with doctor for cardiac follow up care       PRINCIPAL DISCHARGE DIAGNOSIS  Diagnosis: NSTEMI (non-ST elevation myocardial infarction)  Assessment and Plan of Treatment: - Please take all medications as prescribed  - Follow up with Cardiology as an outpatient      SECONDARY DISCHARGE DIAGNOSES  Diagnosis: CAD (coronary artery disease)  Assessment and Plan of Treatment: Coronary artery disease is a condition where the arteries the supply the heart muscle get clogges with fatty deposits & puts you at risk for a heart attack  Call your doctor if you have any new pain, pressure, or discomfort in the center of your chest, pain, tingling or discomfort in arms, back, neck, jaw, or stomach, shortness of breath, nausea, vomiting, burping or heartburn, sweating, cold and clammy skin, racing or abnormal heartbeat for more than 10 minutes or if they keep coming & going.  Call 911 and do not tr to get to hospital by care  You can help yourself with lefestyle changes (quitting smoking if you smoke), eat lots of fruits & vegetables & low fat dairy products, not a lot of meat & fatty foods, walk or some form of physical activity most days of the week, lose weight if you are overweight  Take your cardiac medication as prescribed to lower cholesterol, to lower blood pressure, aspirin to prevent blood clots, and diabetes control  Make sure to keep appointments with doctor for cardiac follow up care

## 2024-09-17 NOTE — ED ADULT NURSE NOTE - NS ED NURSE DISCH DISPOSITION
Patient cancelled his hand clinic appointment x 2.  Importance discussed with patient on several occasions of following up with them.  Will send letter.    Please mail.    Please also call him and inform him that he needs to reschedule his hand clinic appointment.   Admitted

## 2024-09-17 NOTE — DISCHARGE NOTE PROVIDER - NSDCFUSCHEDAPPT_GEN_ALL_CORE_FT
Billy Ervin  [facility]  CARDIOLOGY [address]  Scheduled Appointment: 09/26/2024    [facility]  PULED [address]  Scheduled Appointment: 10/02/2024    Maicol Pederson  [facility] [address]  Scheduled Appointment: 10/02/2024    Matt Hoover  [facility]  NEUROLOGY [address]  Scheduled Appointment: 12/16/2024

## 2024-09-17 NOTE — ED PROVIDER NOTE - PHYSICAL EXAMINATION
Const: Pt is well appearing. Awake, alert and oriented to person, place, & time. In no acute distress.   HEENT:  Oropharynx clear without exudates or erythema. Moist mucous membranes  Eyes: PERRLA. No scleral icterus. EOMI.  Cardiac: Regular rate and regular rhythm. +S1/S2. No murmurs, rubs or gallops. Peripheral pulses 2+ and symmetric. No LE edema.  Resp: Speaking in full sentences, breath sounds equal and clear bilaterally. No wheezes, rales or rhonchi.  Abd: Soft, epigastric tenderness, non-distended.  No guarding or rebound.  MSK: Spine midline and non-tender. No CVAT.  Skin: Skin tear to right calf   Lymph: No cervical lymphadenopathy.  Neuro: Moves all extremities symmetrically. No motor or sensory deficits

## 2024-09-17 NOTE — ED PROVIDER NOTE - CLINICAL SUMMARY MEDICAL DECISION MAKING FREE TEXT BOX
74-year-old female patient presents to the ED complaining of chest pain x 3 days, substernal/epigastric burning, not relieved Protonix.  No shortness of breath, no dyspnea on exertion.  Vital signs stable.  Epigastric tenderness to palpation on exam.  EKG shows no signs of acute ischemia, no changes from EKG 6 months ago.  Will check labs, chest x-ray, treat pain, reevaluate 74-year-old female patient presents to the ED complaining of chest pain x 3 days, substernal/epigastric burning, not relieved Protonix.  No shortness of breath, no dyspnea on exertion.  Vital signs stable.  Epigastric tenderness to palpation on exam.  EKG shows no signs of acute ischemia, no changes from EKG 6 months ago.  Will check labs, chest x-ray, treat pain, reevaluate    Elevated troponin, heparin started. Seen by Dr. Gifford, admit, cath later today, Hemodynamically stable at this time

## 2024-09-17 NOTE — H&P ADULT - HISTORY OF PRESENT ILLNESS
75 y/o F w/ CAD s/p PCI x 1 stent, HFimprovedEF, HTN, HLD presented to ED c/o substernal pressure like CP that started 3 days ago.  Pt does not remember what she was doing when it started but has been worsening over the past few days.  She reports associated epigastric pain and nausea.  She denies palpitations radiation, sob, diaphoresis.  She tried taking protonix w/ no improvement but noted its exacerbated by deep inspiration.  Pt also denies cough, trauma, heaving lifting, etoh, drug use, fevers, chills and recent travel.

## 2024-09-17 NOTE — ED ADULT TRIAGE NOTE - CHIEF COMPLAINT QUOTE
pt c/o of CP x3 days. pt states she does have 1 stent but unsure of when was done. pt describes CP has "extreme heart burn and stabbing" pt admits to taking  Protonix, last dose  PTA. denies sob or difficulty breathing.

## 2024-09-17 NOTE — PROGRESS NOTE ADULT - SUBJECTIVE AND OBJECTIVE BOX
[facility]                                              INTERVENTIONAL CARDIOLOGY AT [place] [address]                                             Telephone: [phone]. Fax:[phone]                                                       INTERVENTIONAL CARDIOLOGY CONSULTATION NOTE                                                                                             History obtained by: Patient and medical record  Community Cardiologist: Dr. Billy Ervin  Reason for Consultation: Evaluation for cardiac catheterization  Available pt records reviewed: Yes [ x ] No [  ]    Chief complaint:    Patient is a 74y old  Female who presents with a chief complaint of NSTEMI (17 Sep 2024 15:57)      HPI:  75 y/o F w/ CAD s/p PCI x 1 stent, HFimprovedEF, HTN, HLD presented to ED c/o substernal pressure like CP that started 3 days ago.  Pt does not remember what she was doing when it started but has been worsening over the past few days.  She reports associated epigastric pain and nausea.  She denies palpitations radiation, sob, diaphoresis.  She tried taking protonix w/ no improvement but noted its exacerbated by deep inspiration.  Pt also denies cough, trauma, heaving lifting, etoh, drug use, fevers, chills and recent travel.  EKG NSR LAFB. Trop x1 69. Patient seen by cardiology team and recommended for LHC. Patient presents to [facility] for LHC to further assess coronary arteries    Heart Failure:        Systolic/Diastolic/Combined: n/a       NYHA Class (within 2 weeks): n/a      PAST MEDICAL HISTORY  No pertinent past medical history    NICM (nonischemic cardiomyopathy)    HTN (hypertension)    Rheumatoid arthritis    Chronic back pain    HLD (hyperlipidemia)    Chronic obstructive asthma    Cellulitis    Associated Risk Factors:        Frailty Assessment: (none/mild/mod/severe): none       Cerebrovascular Disease: N/A       Chronic Lung Disease: N/A       Peripheral Arterial Disease: N/A       Chronic Kidney Disease (if yes, what is GFR): N/A       Uncontrolled Diabetes (if yes, what is HgbA1C or FBS): N/A       Poorly Controlled Hypertension (if yes, what is SBP): N/A       Morbid Obesity (if yes, what is BMI): N/A       History of Recent Ventricular Arrhythmia: N/A       Inability to Ambulate Safely: N/A       Need for Therapeutic Anticoagulation: N/A       Antiplatelet or Contrast Allergy: N/A      PAST SURGICAL HISTORY  No significant past surgical history    S/P     S/P hysterectomy      FAMILY HISTORY:  Family history of diabetes mellitus (DM)    FH: CAD (coronary artery disease)    Family History of Premature Cardiovascular Disease:  Yes [  ] No [ X ]    HOME MEDICATIONS:  ALPRAZolam 0.5 mg oral tablet: 1 tab(s) orally 2 times a day  verified in iSTOP (17 Sep 2024 16:44)  carvedilol 3.125 mg oral tablet: 1 tab(s) orally 2 times a day (17 Sep 2024 16:44)  gabapentin 600 mg oral tablet: 1 tab(s) orally 2 times a day (17 Sep 2024 16:40)  QUEtiapine 300 mg oral tablet: 1 tab(s) orally once a day (at bedtime) (17 Sep 2024 16:44)  traZODone 50 mg oral tablet: orally once a day (at bedtime) (17 Sep 2024 16:44)  Trelegy Ellipta 200 mcg-62.5 mcg-25 mcg/inh inhalation powder: 1 puff(s) inhaled once a day (17 Sep 2024 16:44)  valsartan 80 mg oral capsule: 1 tab(s) orally once a day (17 Sep 2024 16:44)  venlafaxine 225 mg oral tablet, extended release: 1 tab(s) orally once a day (17 Sep 2024 16:44)  zolpidem 10 mg oral tablet: 1 tab(s) orally once a day (at bedtime)  verified in iSTOP (17 Sep 2024 16:44)    CURRENT CARDIAC MEDICATIONS:  carvedilol 3.125 milliGRAM(s) Oral every 12 hours  valsartan 80 milliGRAM(s) Oral daily    ALLERGIES:   No Known Allergies      REVIEW OF SYMPTOMS:   CONSTITUTIONAL: no fever, no chills, no weight loss, no weight gain, no fatigue   CARDIOVASCULAR: complain of chest pain radiating to left arm; [place]; reports indigestion time pain; denies palpitations; denies dizziness; denies passing out  RESPIRATORY: no Shortness of breath, no cough, no wheezing  : No dysuria, no hematuria   GI: No dark color stool, no nausea, no diarrhea, no constipation, no abdominal pain   NEURO: No headache, no slurred speech   ALL OTHER REVIEW OF SYSTEMS ARE NEGATIVE.    VITAL SIGNS:  T(C): 37.1 (24 @ 17:07), Max: 37.6 (24 @ 16:01)  T(F): 98.7 (24 @ 17:07), Max: 99.7 (24 @ 16:01)  HR: 74 (24 @ 17:07) (74 - 99)  BP: 109/70 (24 @ 17:07) (100/61 - 114/79)  RR: 18 (24 @ 17:07) (16 - 18)  SpO2: 98% (24 @ 17:07) (92% - 100%)    INTAKE AND OUTPUT:       PHYSICAL EXAM:  Constitutional: Comfortable . No acute distress.   HEENT: Atraumatic and normocephalic , neck is supple . no JVD. No carotid bruit.  CNS: A&Ox3. No focal deficits.   Respiratory: Posterior breath sounds diminished  Cardiovascular: RRR normal s1 s2. No murmur. No rubs or gallop.  Gastrointestinal: Soft, non-tender. +Bowel sounds.   Extremities: b/l upper extremities acyanotic; motor/sensory function intact; 2+ b/l radial pulses; b/l LE acyanotic; warm to touch; motor/sensory function intact; 1+ b/l DP pulses   Psychiatric: Calm . no agitation.   Skin: Warm and dry, no ulcers on extremities     LABS:                      11.4   8.30  )-----------( 318      ( 17 Sep 2024 11:20 )             33.9         130[L]  |  93[L]  |  23.8[H]  ----------------------------<  112[H]  4.9   |  26.0  |  0.89    Ca    9.4      17 Sep 2024 11:20    TPro  7.1  /  Alb  3.8  /  TBili  0.3[L]  /  DBili  x   /  AST  20  /  ALT  15  /  AlkPhos  87  -    PT/INR - ( 17 Sep 2024 13:06 )   PT: 11.2 sec;   INR: 1.01 ratio         PTT - ( 17 Sep 2024 13:06 )  PTT:30.2 sec  Urinalysis Basic - ( 17 Sep 2024 11:20 )    Color: x / Appearance: x / SG: x / pH: x  Gluc: 112 mg/dL / Ketone: x  / Bili: x / Urobili: x   Blood: x / Protein: x / Nitrite: x   Leuk Esterase: x / RBC: x / WBC x   Sq Epi: x / Non Sq Epi: x / Bacteria: x    ECG: NSR LAFB  Prior ECG: Yes [X  ] No [  ]    CARDIAC TESTING   ECHO: < from: TTE Echo Complete w/Doppler (19 @ 10:46) >  Summary:   1. Left ventricular ejection fraction, by visual estimation, is 55 to   60%.   2. Normal global left ventricular systolic function.   3. Normal left ventricular internal cavity size.   4. There is mild concentric left ventricular hypertrophy.   5. Normal left atrial size.   6. Mild thickening of the anterior and posterior mitral valve leaflets.   7. Trace mitral valve regurgitation.   8. There is no evidence of pericardial effusion.    < end of copied text >

## 2024-09-17 NOTE — ED PROVIDER NOTE - OBJECTIVE STATEMENT
74-year-old female patient with history of hypertension, coronary artery disease, 1 cardiac stent presents to the ED complaining of chest pain.  Patient reports for the past few days she has been having constant substernal/epigastric burning/chest pain with associated nausea, no vomiting or diarrhea.  Patient has been taking Protonix without relief of her symptoms.  She denies any associated shortness of breath, fever, cough, dyspnea on exertion.  No history of PE or DVT.  Patient does not smoke.   Follows with [facility] 74-year-old female patient with history of hypertension, coronary artery disease, 1 cardiac stent presents to the ED complaining of chest pain.  Patient reports for the past few days she has been having constant substernal/epigastric burning/chest pain with associated nausea, no vomiting or diarrhea.  Patient has been taking Protonix without relief of her symptoms.  She denies any associated shortness of breath, fever, cough, dyspnea on exertion.  No history of PE or DVT.  Patient does not smoke.   Follows with [facility]

## 2024-09-17 NOTE — PROGRESS NOTE ADULT - SUBJECTIVE AND OBJECTIVE BOX
Department of Cardiology                                                                  [facility]/[place] [address] [place] [address]                                                            Telephone: [phone]. Fax:[phone]                                                    Post- Procedure Note: Left Heart Cardiac Catheterization       Narrative:   Patient now s/p left heart catheterization via RRA  approach with Dr. Arnold, , tolerated procedure well without complications. Arrived to recovery room NAD and hemodynamically stable, distal pulse +, neurovascular intact   Noted Right wrist small hematoma adjacent to RRB          PAST MEDICAL & SURGICAL HISTORY:  NICM (nonischemic cardiomyopathy)      HTN (hypertension)      Rheumatoid arthritis      Chronic back pain      HLD (hyperlipidemia)      Chronic obstructive asthma      Cellulitis      S/P       S/P hysterectomy        Home Medications:  ALPRAZolam 0.5 mg oral tablet: 1 tab(s) orally 2 times a day  verified in iSTOP (17 Sep 2024 18:00)  carvedilol 3.125 mg oral tablet: 1 tab(s) orally 2 times a day (17 Sep 2024 18:00)  gabapentin 600 mg oral tablet: 1 tab(s) orally 2 times a day (17 Sep 2024 16:40)  QUEtiapine 300 mg oral tablet: 1 tab(s) orally once a day (at bedtime) (17 Sep 2024 18:00)  traZODone 50 mg oral tablet: orally once a day (at bedtime) (17 Sep 2024 18:00)  Trelegy Ellipta 200 mcg-62.5 mcg-25 mcg/inh inhalation powder: 1 puff(s) inhaled once a day (17 Sep 2024 18:00)  valsartan 80 mg oral capsule: 1 tab(s) orally once a day (17 Sep 2024 18:00)  venlafaxine 225 mg oral tablet, extended release: 1 tab(s) orally once a day (17 Sep 2024 16:44)  zolpidem 10 mg oral tablet: 1 tab(s) orally once a day (at bedtime)  verified in iSTOP (17 Sep 2024 16:44)        No Known Allergies      Objective:  Vital Signs Last 24 Hrs  T(C): 36.7 (17 Sep 2024 17:40), Max: 37.6 (17 Sep 2024 16:01)  T(F): 98.1 (17 Sep 2024 17:40), Max: 99.7 (17 Sep 2024 16:01)  HR: 57 (17 Sep 2024 17:40) (57 - 99)  BP: 115/85 (17 Sep 2024 17:40) (100/61 - 115/85)  BP(mean): 81 (17 Sep 2024 17:40) (81 - 81)  RR: 18 (17 Sep 2024 17:40) (16 - 18)  SpO2: 94% (17 Sep 2024 17:40) (92% - 100%)    Parameters below as of 17 Sep 2024 17:40  Patient On (Oxygen Delivery Method): room air        GENERAL: Pt lying comfortably, NAD.  ENMT: PERRL, +EOMI.  NECK: soft, Supple, No JVD,   CHEST/LUNG: Clear to auscultatation bilaterally; No wheezing.  HEART: S1S2+, Regular rate and rhythm; No murmurs.  ABDOMEN: Soft, Nontender, Nondistended; Bowel sounds present.  MUSCULOSKELETAL: Normal range of motion.  SKIN: No rashes or lesions.  NEURO: AAOX3, no focal deficits, no motor r sensory loss.  PSYCH: normal mood.  Procedure site: RRA  no signs of bleeding or hematoma, neurovascular intact   VASCULAR:   Radial +2 R/+2 L  Femoral +2 R/+2 L  PT +2 R/+2 L  DP +2 R/+2 L                          11.4   8.30  )-----------( 318      ( 17 Sep 2024 11:20 )             33.9     09-17    130[L]  |  93[L]  |  23.8[H]  ----------------------------<  112[H]  4.9   |  26.0  |  0.89    Ca    9.4      17 Sep 2024 11:20    TPro  7.1  /  Alb  3.8  /  TBili  0.3[L]  /  DBili  x   /  AST  20  /  ALT  15  /  AlkPhos  87  09-17    PT/INR - ( 17 Sep 2024 13:06 )   PT: 11.2 sec;   INR: 1.01 ratio         PTT - ( 17 Sep 2024 13:06 )  PTT:30.2 sec

## 2024-09-17 NOTE — DISCHARGE NOTE PROVIDER - NSDCFUADDINST_GEN_ALL_CORE_FT
Restricted use with no heavy lifting of affected arm for 48 hours.  No submerging the arm in water for 48 hours.  You may start showering today.  Call your doctor for any bleeding, swelling, loss of sensation in the hand or fingers, or fingers turning blue.  If heavy bleeding or large lumps form, hold pressure at the spot and come to the Emergency Room.

## 2024-09-17 NOTE — PATIENT PROFILE ADULT - FALL HARM RISK - HARM RISK INTERVENTIONS

## 2024-09-17 NOTE — DISCHARGE NOTE PROVIDER - HOSPITAL COURSE
75 y/o F with PMH of CAD s/p PCI, HFimprovedEF, HTN, HLD, COPD admitted for NSTEMI. The patient was started on heparin drip and Cardiology was consulted. Patient underwent LHC with nonobstructive CAD. The patient is hemodynamically stable for discharge with appropriate follow up.    75 y/o F with PMH of CAD s/p PCI, HFimprovedEF, HTN, HLD, COPD admitted for initial concerns of NSTEMI. The patient was started on heparin drip and Cardiology was consulted. Patient underwent LHC with nonobstructive CAD. The patient is hemodynamically stable for discharge with appropriate follow up.     Addendum: per cardiology "Now s/p LHC.  mLAD stenosis noted not significant by FFR.  No intervention. CP mostly atypical and given LHC findings likely noncardiac. Pericarditis less likely as symptoms not consistent with this, echo with normal BiV function and no pericardial effusion.  Would try Morphine as takes at home, ?MSK (sternal costochonrdritis) pain vs GI.    NSTEMI was ruled out.

## 2024-09-17 NOTE — DISCHARGE NOTE PROVIDER - NSDCMRMEDTOKEN_GEN_ALL_CORE_FT
ALPRAZolam 0.5 mg oral tablet: 1 tab(s) orally 2 times a day  verified in iSTOP  carvedilol 3.125 mg oral tablet: 1 tab(s) orally 2 times a day  gabapentin 600 mg oral tablet: 1 tab(s) orally 2 times a day  QUEtiapine 300 mg oral tablet: 1 tab(s) orally once a day (at bedtime)  traZODone 50 mg oral tablet: orally once a day (at bedtime)  Trelegy Ellipta 200 mcg-62.5 mcg-25 mcg/inh inhalation powder: 1 puff(s) inhaled once a day  valsartan 80 mg oral capsule: 1 tab(s) orally once a day  venlafaxine 225 mg oral tablet, extended release: 1 tab(s) orally once a day  Ventolin HFA 90 mcg/inh inhalation aerosol: 1 puff(s) inhaled once a day as needed for bronchospasm  zolpidem 10 mg oral tablet: 1 tab(s) orally once a day (at bedtime)  verified in iSTOP   ALPRAZolam 0.5 mg oral tablet: 1 tab(s) orally 2 times a day  verified in iSTOP  aspirin 81 mg oral tablet, chewable: 1 tab(s) orally once a day  atorvastatin 40 mg oral tablet: 1 tab(s) orally once a day (at bedtime)  carvedilol 3.125 mg oral tablet: 1 tab(s) orally 2 times a day  famotidine 20 mg oral tablet: 1 tab(s) orally once a day  gabapentin 600 mg oral tablet: 1 tab(s) orally 2 times a day  QUEtiapine 300 mg oral tablet: 1 tab(s) orally once a day (at bedtime)  traZODone 50 mg oral tablet: orally once a day (at bedtime)  Trelegy Ellipta 200 mcg-62.5 mcg-25 mcg/inh inhalation powder: 1 puff(s) inhaled once a day  valsartan 80 mg oral capsule: 1 tab(s) orally once a day  venlafaxine 225 mg oral tablet, extended release: 1 tab(s) orally once a day  Ventolin HFA 90 mcg/inh inhalation aerosol: 1 puff(s) inhaled once a day as needed for bronchospasm  zolpidem 10 mg oral tablet: 1 tab(s) orally once a day (at bedtime)  verified in iSTOP

## 2024-09-17 NOTE — DISCHARGE NOTE PROVIDER - ATTENDING DISCHARGE PHYSICAL EXAMINATION:
Vital Signs Last 24 Hrs  T(F): 98.2 (19 Sep 2024 08:39), Max: 98.3 (18 Sep 2024 15:53)  HR: 67 (19 Sep 2024 08:39) (60 - 85)  BP: 117/60 (19 Sep 2024 08:39) (113/71 - 118/72)  RR: 18 (19 Sep 2024 08:39) (16 - 20)  SpO2: 93% (19 Sep 2024 08:39) (93% - 98%)    Physical Exam:  Constitutional: alert and oriented, in no acute distress   Neck: Soft and supple  Respiratory: Clear to auscultation bilaterally, no wheezes or crackles  Cardiovascular: Regular rate and rhythm, no murmurs, gallops, rubs  Gastrointestinal: Soft, non-tender to palpation, +bs  Vascular: 2+ peripheral pulses  Neurological: A/O x 3, no focal neurological deficits  Musculoskeletal: 5/5 strength b/l upper and lower extremities, no lower extremity edema bilaterally

## 2024-09-17 NOTE — H&P ADULT - NSHPLABSRESULTS_GEN_ALL_CORE
11.4   8.30  )-----------( 318      ( 17 Sep 2024 11:20 )             33.9         09-17    130[L]  |  93[L]  |  23.8[H]  ----------------------------<  112[H]  4.9   |  26.0  |  0.89    Ca    9.4      17 Sep 2024 11:20    TPro  7.1  /  Alb  3.8  /  TBili  0.3[L]  /  DBili  x   /  AST  20  /  ALT  15  /  AlkPhos  87  09-17      Troponin T, High Sensitivity (09.17.24 @ 11:20)    Troponin T, High Sensitivity Result: 69: *

## 2024-09-17 NOTE — ED PROVIDER NOTE - ATTENDING CONTRIBUTION TO CARE
Writer called and spoke with patient's daughter, conveyed MD information. No questions.     I personally saw the patient with the resident, and completed the key components of the history and physical exam. I then discussed the management plan with the resident.    74-year-old female with past medical history CAD with 1 stent presents for 3 days of substernal/epigastric burning chest pain, not relieved with Protonix.  Denies shortness of breath.  EKG unchanged from prior.    NAD, well-appearing, RRR, lungs CTA B/L, ABD soft, nontender, nondistended, no lower extremity edema, 2+ symmetrical distal pulses.    Labs significant for troponin of 69, discussed with cardiology who recommends heparin drip, patient will go to the Cath Lab later today.

## 2024-09-18 ENCOUNTER — RESULT REVIEW (OUTPATIENT)
Age: 74
End: 2024-09-18

## 2024-09-18 ENCOUNTER — TRANSCRIPTION ENCOUNTER (OUTPATIENT)
Age: 74
End: 2024-09-18

## 2024-09-18 LAB
ALBUMIN SERPL ELPH-MCNC: 3.4 G/DL — SIGNIFICANT CHANGE UP (ref 3.3–5.2)
ALP SERPL-CCNC: 78 U/L — SIGNIFICANT CHANGE UP (ref 40–120)
ALT FLD-CCNC: 13 U/L — SIGNIFICANT CHANGE UP
AMPHET UR-MCNC: NEGATIVE — SIGNIFICANT CHANGE UP
ANION GAP SERPL CALC-SCNC: 10 MMOL/L — SIGNIFICANT CHANGE UP (ref 5–17)
APPEARANCE UR: CLEAR — SIGNIFICANT CHANGE UP
APTT BLD: 102.7 SEC — HIGH (ref 24.5–35.6)
AST SERPL-CCNC: 18 U/L — SIGNIFICANT CHANGE UP
BACTERIA # UR AUTO: NEGATIVE /HPF — SIGNIFICANT CHANGE UP
BARBITURATES UR SCN-MCNC: POSITIVE
BASOPHILS # BLD AUTO: 0.04 K/UL — SIGNIFICANT CHANGE UP (ref 0–0.2)
BASOPHILS NFR BLD AUTO: 0.7 % — SIGNIFICANT CHANGE UP (ref 0–2)
BENZODIAZ UR-MCNC: POSITIVE
BILIRUB SERPL-MCNC: <0.2 MG/DL — LOW (ref 0.4–2)
BILIRUB UR-MCNC: NEGATIVE — SIGNIFICANT CHANGE UP
BUN SERPL-MCNC: 22.8 MG/DL — HIGH (ref 8–20)
CALCIUM SERPL-MCNC: 8.7 MG/DL — SIGNIFICANT CHANGE UP (ref 8.4–10.5)
CAST: 2 /LPF — SIGNIFICANT CHANGE UP (ref 0–4)
CHLORIDE SERPL-SCNC: 92 MMOL/L — LOW (ref 96–108)
CO2 SERPL-SCNC: 27 MMOL/L — SIGNIFICANT CHANGE UP (ref 22–29)
COCAINE METAB.OTHER UR-MCNC: NEGATIVE — SIGNIFICANT CHANGE UP
COLOR SPEC: YELLOW — SIGNIFICANT CHANGE UP
CREAT ?TM UR-MCNC: 32 MG/DL — SIGNIFICANT CHANGE UP
CREAT SERPL-MCNC: 0.95 MG/DL — SIGNIFICANT CHANGE UP (ref 0.5–1.3)
DIFF PNL FLD: NEGATIVE — SIGNIFICANT CHANGE UP
EGFR: 63 ML/MIN/1.73M2 — SIGNIFICANT CHANGE UP
EOSINOPHIL # BLD AUTO: 0.16 K/UL — SIGNIFICANT CHANGE UP (ref 0–0.5)
EOSINOPHIL NFR BLD AUTO: 2.7 % — SIGNIFICANT CHANGE UP (ref 0–6)
ETHANOL SERPL-MCNC: <10 MG/DL — SIGNIFICANT CHANGE UP (ref 0–9)
FENTANYL UR QL SCN: POSITIVE
GLUCOSE SERPL-MCNC: 105 MG/DL — HIGH (ref 70–99)
GLUCOSE UR QL: NEGATIVE MG/DL — SIGNIFICANT CHANGE UP
HCT VFR BLD CALC: 33.7 % — LOW (ref 34.5–45)
HCT VFR BLD CALC: 36.9 % — SIGNIFICANT CHANGE UP (ref 34.5–45)
HGB BLD-MCNC: 10.9 G/DL — LOW (ref 11.5–15.5)
HGB BLD-MCNC: 12.1 G/DL — SIGNIFICANT CHANGE UP (ref 11.5–15.5)
IMM GRANULOCYTES NFR BLD AUTO: 0.8 % — SIGNIFICANT CHANGE UP (ref 0–0.9)
KETONES UR-MCNC: NEGATIVE MG/DL — SIGNIFICANT CHANGE UP
LEUKOCYTE ESTERASE UR-ACNC: NEGATIVE — SIGNIFICANT CHANGE UP
LYMPHOCYTES # BLD AUTO: 1.45 K/UL — SIGNIFICANT CHANGE UP (ref 1–3.3)
LYMPHOCYTES # BLD AUTO: 24.2 % — SIGNIFICANT CHANGE UP (ref 13–44)
MAGNESIUM SERPL-MCNC: 2.1 MG/DL — SIGNIFICANT CHANGE UP (ref 1.6–2.6)
MCHC RBC-ENTMCNC: 29.9 PG — SIGNIFICANT CHANGE UP (ref 27–34)
MCHC RBC-ENTMCNC: 30 PG — SIGNIFICANT CHANGE UP (ref 27–34)
MCHC RBC-ENTMCNC: 32.3 GM/DL — SIGNIFICANT CHANGE UP (ref 32–36)
MCHC RBC-ENTMCNC: 32.8 GM/DL — SIGNIFICANT CHANGE UP (ref 32–36)
MCV RBC AUTO: 91.6 FL — SIGNIFICANT CHANGE UP (ref 80–100)
MCV RBC AUTO: 92.6 FL — SIGNIFICANT CHANGE UP (ref 80–100)
METHADONE UR-MCNC: NEGATIVE — SIGNIFICANT CHANGE UP
MONOCYTES # BLD AUTO: 0.98 K/UL — HIGH (ref 0–0.9)
MONOCYTES NFR BLD AUTO: 16.4 % — HIGH (ref 2–14)
NEUTROPHILS # BLD AUTO: 3.31 K/UL — SIGNIFICANT CHANGE UP (ref 1.8–7.4)
NEUTROPHILS NFR BLD AUTO: 55.2 % — SIGNIFICANT CHANGE UP (ref 43–77)
NITRITE UR-MCNC: NEGATIVE — SIGNIFICANT CHANGE UP
NT-PROBNP SERPL-SCNC: 231 PG/ML — SIGNIFICANT CHANGE UP (ref 0–300)
OPIATES UR-MCNC: POSITIVE
OSMOLALITY SERPL: 285 MOSMOL/KG — SIGNIFICANT CHANGE UP (ref 280–301)
OSMOLALITY UR: 285 MOSM/KG — LOW (ref 300–1000)
PCP SPEC-MCNC: SIGNIFICANT CHANGE UP
PCP SPEC-MCNC: SIGNIFICANT CHANGE UP
PCP UR-MCNC: NEGATIVE — SIGNIFICANT CHANGE UP
PH UR: 6 — SIGNIFICANT CHANGE UP (ref 5–8)
PHOSPHATE SERPL-MCNC: 4 MG/DL — SIGNIFICANT CHANGE UP (ref 2.4–4.7)
PLATELET # BLD AUTO: 286 K/UL — SIGNIFICANT CHANGE UP (ref 150–400)
PLATELET # BLD AUTO: 324 K/UL — SIGNIFICANT CHANGE UP (ref 150–400)
POTASSIUM SERPL-MCNC: 4.6 MMOL/L — SIGNIFICANT CHANGE UP (ref 3.5–5.3)
POTASSIUM SERPL-SCNC: 4.6 MMOL/L — SIGNIFICANT CHANGE UP (ref 3.5–5.3)
PROT SERPL-MCNC: 6.3 G/DL — LOW (ref 6.6–8.7)
PROT UR-MCNC: NEGATIVE MG/DL — SIGNIFICANT CHANGE UP
RBC # BLD: 3.64 M/UL — LOW (ref 3.8–5.2)
RBC # BLD: 4.03 M/UL — SIGNIFICANT CHANGE UP (ref 3.8–5.2)
RBC # FLD: 12.4 % — SIGNIFICANT CHANGE UP (ref 10.3–14.5)
RBC # FLD: 12.4 % — SIGNIFICANT CHANGE UP (ref 10.3–14.5)
RBC CASTS # UR COMP ASSIST: 0 /HPF — SIGNIFICANT CHANGE UP (ref 0–4)
SODIUM SERPL-SCNC: 128 MMOL/L — LOW (ref 135–145)
SODIUM UR-SCNC: <30 MMOL/L — SIGNIFICANT CHANGE UP
SP GR SPEC: >1.03 — HIGH (ref 1–1.03)
SQUAMOUS # UR AUTO: 0 /HPF — SIGNIFICANT CHANGE UP (ref 0–5)
THC UR QL: NEGATIVE — SIGNIFICANT CHANGE UP
TROPONIN T, HIGH SENSITIVITY RESULT: 63 NG/L — HIGH (ref 0–51)
URATE SERPL-MCNC: 5.6 MG/DL — SIGNIFICANT CHANGE UP (ref 2.4–5.7)
UROBILINOGEN FLD QL: 1 MG/DL — SIGNIFICANT CHANGE UP (ref 0.2–1)
WBC # BLD: 5.99 K/UL — SIGNIFICANT CHANGE UP (ref 3.8–10.5)
WBC # BLD: 7.42 K/UL — SIGNIFICANT CHANGE UP (ref 3.8–10.5)
WBC # FLD AUTO: 5.99 K/UL — SIGNIFICANT CHANGE UP (ref 3.8–10.5)
WBC # FLD AUTO: 7.42 K/UL — SIGNIFICANT CHANGE UP (ref 3.8–10.5)
WBC UR QL: 0 /HPF — SIGNIFICANT CHANGE UP (ref 0–5)

## 2024-09-18 PROCEDURE — 99232 SBSQ HOSP IP/OBS MODERATE 35: CPT

## 2024-09-18 PROCEDURE — 99233 SBSQ HOSP IP/OBS HIGH 50: CPT

## 2024-09-18 PROCEDURE — 93010 ELECTROCARDIOGRAM REPORT: CPT

## 2024-09-18 PROCEDURE — 93931 UPPER EXTREMITY STUDY: CPT | Mod: 26,RT

## 2024-09-18 RX ORDER — ACETAMINOPHEN 325 MG/1
1000 TABLET ORAL ONCE
Refills: 0 | Status: COMPLETED | OUTPATIENT
Start: 2024-09-18 | End: 2024-09-18

## 2024-09-18 RX ADMIN — VALSARTAN 80 MILLIGRAM(S): 40 TABLET ORAL at 05:54

## 2024-09-18 RX ADMIN — FLUTICASONE PROPIONATE AND SALMETEROL 1 DOSE(S): 250; 50 POWDER RESPIRATORY (INHALATION) at 18:24

## 2024-09-18 RX ADMIN — Medication 15 MILLIGRAM(S): at 15:13

## 2024-09-18 RX ADMIN — Medication 15 MILLIGRAM(S): at 14:13

## 2024-09-18 RX ADMIN — Medication 81 MILLIGRAM(S): at 13:29

## 2024-09-18 RX ADMIN — TIOTROPIUM BROMIDE INHALATION SPRAY 2 PUFF(S): 3.12 SPRAY, METERED RESPIRATORY (INHALATION) at 10:24

## 2024-09-18 RX ADMIN — Medication 600 MILLIGRAM(S): at 05:54

## 2024-09-18 RX ADMIN — Medication 40 MILLIGRAM(S): at 20:58

## 2024-09-18 RX ADMIN — ACETAMINOPHEN 1000 MILLIGRAM(S): 325 TABLET ORAL at 01:22

## 2024-09-18 RX ADMIN — ACETAMINOPHEN 400 MILLIGRAM(S): 325 TABLET ORAL at 00:52

## 2024-09-18 RX ADMIN — Medication 600 MILLIGRAM(S): at 20:57

## 2024-09-18 RX ADMIN — Medication 300 MILLIGRAM(S): at 20:56

## 2024-09-18 RX ADMIN — Medication 50 MILLIGRAM(S): at 20:57

## 2024-09-18 RX ADMIN — CARVEDILOL 3.12 MILLIGRAM(S): 6.25 TABLET ORAL at 05:55

## 2024-09-18 RX ADMIN — FLUTICASONE PROPIONATE AND SALMETEROL 1 DOSE(S): 250; 50 POWDER RESPIRATORY (INHALATION) at 10:25

## 2024-09-18 RX ADMIN — ZOLPIDEM TARTRATE 5 MILLIGRAM(S): 5 TABLET, FILM COATED ORAL at 01:16

## 2024-09-18 RX ADMIN — VENLAFAXINE HYDROCHLORIDE 225 MILLIGRAM(S): 150 CAPSULE, EXTENDED RELEASE ORAL at 13:29

## 2024-09-18 RX ADMIN — Medication 0.5 MILLIGRAM(S): at 15:42

## 2024-09-18 RX ADMIN — CARVEDILOL 3.12 MILLIGRAM(S): 6.25 TABLET ORAL at 18:22

## 2024-09-18 RX ADMIN — Medication 5 MILLIGRAM(S): at 20:57

## 2024-09-18 NOTE — PROGRESS NOTE ADULT - SUBJECTIVE AND OBJECTIVE BOX
TULIO SUGGS  [id number]      Chief Complaint:  CP/CAD    Interval History:  Patient still with some CP post cath.  Pain is constant, no agg/rel factors.  No SOB, palps.    Tele:  No events      acetaminophen     Tablet .. 650 milliGRAM(s) Oral every 6 hours PRN  albuterol    90 MICROgram(s) HFA Inhaler 2 Puff(s) Inhalation four times a day PRN  ALPRAZolam 0.5 milliGRAM(s) Oral two times a day PRN  aluminum hydroxide/magnesium hydroxide/simethicone Suspension 30 milliLiter(s) Oral every 4 hours PRN  aspirin  chewable 81 milliGRAM(s) Oral daily  carvedilol 3.125 milliGRAM(s) Oral every 12 hours  fluticasone propionate/ salmeterol 100-50 MICROgram(s) Diskus 1 Dose(s) Inhalation two times a day  gabapentin 600 milliGRAM(s) Oral two times a day  melatonin 3 milliGRAM(s) Oral at bedtime PRN  ondansetron Injectable 4 milliGRAM(s) IV Push every 8 hours PRN  QUEtiapine 300 milliGRAM(s) Oral at bedtime  sodium chloride 0.9% Bolus 250 milliLiter(s) IV Bolus once  tiotropium 2.5 MICROgram(s) Inhaler 2 Puff(s) Inhalation daily  traZODone 50 milliGRAM(s) Oral at bedtime  valsartan 80 milliGRAM(s) Oral daily  venlafaxine XR. 225 milliGRAM(s) Oral daily  zolpidem 5 milliGRAM(s) Oral at bedtime PRN  zolpidem 5 milliGRAM(s) Oral at bedtime PRN          Physical Exam:  T(C): 36.8 (09-18-24 @ 09:18), Max: 37.6 (09-17-24 @ 16:01)  HR: 69 (09-18-24 @ 09:18) (57 - 99)  BP: 110/63 (09-18-24 @ 09:18) (94/60 - 140/56)  RR: 18 (09-18-24 @ 09:18) (16 - 18)  SpO2: 91% (09-18-24 @ 09:18) (91% - 100%)  General: Comfortable in NAD  Neck: No JVD  CVS: nl s1s2, no s3  Pulm: CTA b/l  Abd: soft, non-tender  Ext: No c/c/e  Neuro A&O x3  Psych: Normal affect      Labs:   18 Sep 2024 05:26    128    |  92     |  22.8   ----------------------------<  105    4.6     |  27.0   |  0.95     Ca    8.7        18 Sep 2024 05:26  Phos  4.0       18 Sep 2024 05:26  Mg     2.1       18 Sep 2024 05:26    TPro  6.3    /  Alb  3.4    /  TBili  <0.2   /  DBili  x      /  AST  18     /  ALT  13     /  AlkPhos  78     18 Sep 2024 05:26                          10.9   5.99  )-----------( 286      ( 18 Sep 2024 05:26 )             33.7     PT/INR - ( 17 Sep 2024 13:06 )   PT: 11.2 sec;   INR: 1.01 ratio         PTT - ( 18 Sep 2024 00:07 )  PTT:102.7 sec      Echocardiogram August 30, 2023 demonstrated left ventricle normal in size with mild to moderately reduced function and ejection fraction of 40 to 45%. Mild concentric LVH. Mild mitral, trace aortic and mild to moderate tricuspid regurgitation.    Nuclear stress test October 4, 2023 was a pharmacologic study which was tolerated well. Blood pressure response to infusion was normal. EKG showed no evidence of ischemia with infusion. Evaluation of nuclear imaging showed no evidence of ischemia infarct and ejection fraction of 58%.      Assessment:  74-year-old female with a past medical history of CAD s/p PCI, prior severe CMP with improved EF, hypertension, patient presents to the ED with complains of left sided chest pain for the past couple of days, she reports worsening of symptoms with exertion associated with nausea/ shortness of breath and radiation to the left arm.  In the ED patient was noted to have elevated troponin level with non ischemic findings on the EKG.   -Now s/p LHC.  mLAD stenosis noted not significant by FFR.  No intervention.  -CP mostly atypical and given LHC findings likely noncardiac.  Pericarditis less likely.  F/u echo.  If effusion can add Colchicine.  Would try Morphine as takes at home, ?MSK pain.    Plan:  1. Morphine for pain.  2. F/u echo.  3. Add ASA 81mg QD for CAD.  Had been stopped 2/2 bruising but patient agreeable to restart.  4. Continue other current CV meds at current doses.  Consider increasing Coreg if more concerning anginal type CP.  5. If CP better and echo unremarkable can dc with OP f/u as scheduled next week.    D/w Dr. Sanches.

## 2024-09-18 NOTE — CHART NOTE - NSCHARTNOTEFT_GEN_A_CORE
Right radial site dressing removed. Site without active bleeding, +right forearm hematoma distal to cath site noted. +ecchymosis; patient reports tenderness at right radial site. Right hand is swollen. RUE/ Right hand remains acyanotic; warm to touch; motor/sensory function intact. 2+ right radial pulse. Fingertips warm    PLAN:  -Please monitor neurovascular checks; right wrist and pulse checks closely  -Please notify cardiology ACP if any change in color/temperature/sensation of RUE/ Right hand  -Plan for RUE arterial duplex r/o pseudo aneurysm

## 2024-09-18 NOTE — DISCHARGE NOTE NURSING/CASE MANAGEMENT/SOCIAL WORK - PATIENT PORTAL LINK FT
You can access the FollowMyHealth Patient Portal offered by [facility] by registering at the following website: [email or web address]. By joining [employer]’s FollowMyHealth portal, you will also be able to view your health information using other applications (apps) compatible with our system.

## 2024-09-18 NOTE — PROGRESS NOTE ADULT - SUBJECTIVE AND OBJECTIVE BOX
Chief complaint: NSTEMI    Patient seen and examined at bedside. No acute overnight events reported. Patient states she is having some chest discomfort, will restart home pain medications. No fever, chills, cough, nausea or vomiting.     Vital Signs Last 24 Hrs  T(F): 98.2 (18 Sep 2024 09:18), Max: 99.7 (17 Sep 2024 16:01)  HR: 69 (18 Sep 2024 09:18) (57 - 99)  BP: 110/63 (18 Sep 2024 09:18) (94/60 - 140/56)  RR: 18 (18 Sep 2024 09:18) (16 - 18)  SpO2: 91% (18 Sep 2024 09:18) (91% - 100%)    Physical Exam:  Constitutional: alert and oriented, in no acute distress   Neck: Soft and supple  Respiratory: Clear to auscultation bilaterally  Cardiovascular: Regular rate and rhyhtm  Gastrointestinal: Soft, non-tender to palpation, +bs  Vascular: 2+ peripheral pulses  Neurological: A/O x 3  Musculoskeletal:  no lower extremity edema bilaterally    Labs:                        10.9   5.99  )-----------( 286      ( 18 Sep 2024 05:26 )             33.7   09-18    128[L]  |  92[L]  |  22.8[H]  ----------------------------<  105[H]  4.6   |  27.0  |  0.95    Ca    8.7      18 Sep 2024 05:26  Phos  4.0     09-18  Mg     2.1     09-18    TPro  6.3[L]  /  Alb  3.4  /  TBili  <0.2[L]  /  DBili  x   /  AST  18  /  ALT  13  /  AlkPhos  78  09-18

## 2024-09-18 NOTE — PROGRESS NOTE ADULT - ASSESSMENT
73 y/o F w/ CAD s/p PCI x 1 stent, HFimprovedEF, HTN, HLD, copd (not on O2), anxiety/depression, insomnia presented to ED c/o substernal pressure like CP that started 3 days ago w/ associated epigastric pain and nausea.  VS stable.  Benign physical exam.  Initial w/u significant for mild anemia, Na 130, trop 69 but EKG w/ no acute ischemic chagnes and CXR negative.  Cardio consulted.  Pt started on heparin gtt and underwent LHC.     Type II MI, NSTEMI  - Trop 69 but EKG w/ no acute ischemic changes  - CXR w/ no pulm vascular congestion   - s/p LHC   - Heparin drip discontinued  - Aspirin 81mg daily  - Start Lipitor 40mg nightly  - Cardiology recs appreciated  - Coreg 3.125mg q12h  - Continue Valsartan 80mg daily  - Repeat TTE    HFimprovedEF  - Clinically euvolemic at this time   - CXR w/ no pulm vascular congestion appreciated  - Will order BNP and TTE  - Monitor daily weight, strict I/O's and fluid restrict  - Maintain k>4 and mg>2  - Monitor on telemetry   - Cardio following     CAD s/p prior PCI w/ 1 stent   HTN / HLD  - Aspirin 81mg daily  - Lipitor 40mg nightly     Anxiety/depression  - c/w venlafaxine, quetiapine and primidone  - prn alprazolam 0.5mg bid    COPD  - Not on baseline O2  - Therapeutic interchange of Trelegy ordered   - Not in acute exacerbation     Essential tremor  - c/w primidone     Chronic pain   - c/w gabapentin     Insomnia  - On Ambien prn  - c/w trazadone qhs    Dispo: DC 1-2 days pending repeat TTE  
75 y/o F w/ CAD s/p PCI x 1 stent, HFimprovedEF, HTN, HLD presented to ED c/o substernal pressure like CP that started 3 days ago.  Pt does not remember what she was doing when it started but has been worsening over the past few days.  She reports associated epigastric pain and nausea.  She denies palpitations radiation, sob, diaphoresis.  She tried taking protonix w/ no improvement but noted its exacerbated by deep inspiration.  Pt also denies cough, trauma, heaving lifting, etoh, drug use, fevers, chills and recent travel.  EKG NSR LAFB. Trop x1 69. Patient seen by cardiology team and recommended for LHC. Patient presents to [facility] for LHC to further assess coronary arteries      Cardiac Cath Risk Assessments:  ASA: 3  Mallampati: 2  Bleeding Risk: 2.5%  Creatinine: 0.89  GFR: 68    PLAN:  -Procedure and risks explained to patient  -Consent obtained by attending interventional cardiologist  -EKG and labs reviewed  -patient received aspirin 324mg Pre procedure  -0.9% NS 250cc bolus ordered to prevent ELIDA    Risks, benefits, and alternatives reviewed.  Risks including but not limited to MI, death, stroke, bleeding, infection, vessel injury, hematoma, renal failure, allergic reaction, urgent open heart surgery, restenosis and stent thrombosis were reviewed.  All questions answered.  Patient is agreeable to proceed.   Pt. assessed, appropriate for sedation, pt. educated regarding the plan for Versed/Fentanyl as needed.    
    73 y/o F w/ CAD s/p PCI x 1 stent, HF improvedEF, HTN, HLD presented to ED c/o substernal pressure like CP that started 3 days ago.  Pt does not remember what she was doing when it started but has been worsening over the past few days.  She reports associated epigastric pain and nausea.  She denies palpitations radiation, sob, diaphoresis.  She tried taking protonix w/ no improvement but noted its exacerbated by deep inspiration.  Pt also denies cough, trauma, heaving lifting, etoh, drug use, fevers, chills and recent travel.  EKG NSR LAFB. Trop x1 69. Patient seen by cardiology team and recommended for LHC. Patient presented to [facility] CCL on 09/17/24 for LHC to further assess coronary arteries  Patient now s/p left heart catheterization via RRA  approach with Dr. Arnold, , tolerated procedure WELL  RUE warm, perfusing, no bleeding or hematoma, distal pulses 2+   Noted Right wrist small hematoma adjacent to RRB upon arrival to     Intraprocedurally:    Patient received IV sedation fentanyl 75mcg and versed 2mg  IV Heparin: 10,000 units  Omnipaque: 110 ml    Prelim cath report as below:  S/P Diagnostic LHC VIA RRA  MID LAD: 50% STENOSIS, IFR NEGATIVE (0.94)  prevIOUS   STENT IN CIRCUMFLEX  PATENT       Official cath report pending    3 S/P Diagnostic LHC/ Non obstructive CAD      -ADMIT due to: / Pt is already in-patient DUE TO CP  -post cardiac cath management per protocol  -Right radial  precautions  -sMALL RIGHT RADIAL HEMATOMA, Manual compression done and 2nd RRB applied   -bedrest x 2 hours post procedure while RRB in place  -Remove RRB in 2 hours at 21: 30 pm   -NS 0.9% 250ml/hr x 1 bolus: post procedure ELIDA ppx   -continue current medical therapy including  -C/W aspirin daily  -patient' home medication regimen reviewed, patient not on statin, cardiology to start Statin if no contra indications  -C/W Coreg, valsartan and rest of the hoem medications  -follow up outpt in 2 weeks with Cardiologist DR Aziza LEON  Cardiology following during hospitalization  -Lifestyle modifications discussed to reduce cardiovascular risk factors including weight reduction, smoking cessation, medication compliance, and routine follow up with Cardiologist to track your BMI, cholesterol, and glucose levels.   - Patient is not a candidate for cardiac rehab sec to diagnostic cath   -Further Management per Hospitalist /Cardiology  -Transfer pt to tele unit once criteria met  -Discussed with DR Arnold

## 2024-09-18 NOTE — DISCHARGE NOTE NURSING/CASE MANAGEMENT/SOCIAL WORK - NSDCFUADDAPPT_GEN_ALL_CORE_FT
Please see below for post hospital follow up information     1. [facility] Meds To Bed: [phone]  declined     2. Home Care:  [facility]    3. Transitional Care Services: [phone]    4, A. Primary Care Appointment:  patient has prior appt set up    4, B. Specialty Appointment:     patient has prior appt set up    5. STAR Education Packet Provided  yellow folder

## 2024-09-19 VITALS
SYSTOLIC BLOOD PRESSURE: 133 MMHG | DIASTOLIC BLOOD PRESSURE: 78 MMHG | RESPIRATION RATE: 19 BRPM | OXYGEN SATURATION: 97 % | HEART RATE: 67 BPM | TEMPERATURE: 98 F

## 2024-09-19 LAB
ANION GAP SERPL CALC-SCNC: 8 MMOL/L — SIGNIFICANT CHANGE UP (ref 5–17)
ANISOCYTOSIS BLD QL: SLIGHT — SIGNIFICANT CHANGE UP
BASOPHILS # BLD AUTO: 0.05 K/UL — SIGNIFICANT CHANGE UP (ref 0–0.2)
BASOPHILS NFR BLD AUTO: 0.9 % — SIGNIFICANT CHANGE UP (ref 0–2)
BUN SERPL-MCNC: 18.7 MG/DL — SIGNIFICANT CHANGE UP (ref 8–20)
CALCIUM SERPL-MCNC: 8.8 MG/DL — SIGNIFICANT CHANGE UP (ref 8.4–10.5)
CHLORIDE SERPL-SCNC: 93 MMOL/L — LOW (ref 96–108)
CO2 SERPL-SCNC: 29 MMOL/L — SIGNIFICANT CHANGE UP (ref 22–29)
CREAT SERPL-MCNC: 0.7 MG/DL — SIGNIFICANT CHANGE UP (ref 0.5–1.3)
EGFR: 91 ML/MIN/1.73M2 — SIGNIFICANT CHANGE UP
EOSINOPHIL # BLD AUTO: 0.05 K/UL — SIGNIFICANT CHANGE UP (ref 0–0.5)
EOSINOPHIL NFR BLD AUTO: 0.9 % — SIGNIFICANT CHANGE UP (ref 0–6)
GIANT PLATELETS BLD QL SMEAR: PRESENT — SIGNIFICANT CHANGE UP
GLUCOSE SERPL-MCNC: 91 MG/DL — SIGNIFICANT CHANGE UP (ref 70–99)
HCT VFR BLD CALC: 32.5 % — LOW (ref 34.5–45)
HGB BLD-MCNC: 10.4 G/DL — LOW (ref 11.5–15.5)
LYMPHOCYTES # BLD AUTO: 1.04 K/UL — SIGNIFICANT CHANGE UP (ref 1–3.3)
LYMPHOCYTES # BLD AUTO: 19.3 % — SIGNIFICANT CHANGE UP (ref 13–44)
MACROCYTES BLD QL: SLIGHT — SIGNIFICANT CHANGE UP
MANUAL SMEAR VERIFICATION: SIGNIFICANT CHANGE UP
MCHC RBC-ENTMCNC: 29.9 PG — SIGNIFICANT CHANGE UP (ref 27–34)
MCHC RBC-ENTMCNC: 32 GM/DL — SIGNIFICANT CHANGE UP (ref 32–36)
MCV RBC AUTO: 93.4 FL — SIGNIFICANT CHANGE UP (ref 80–100)
MONOCYTES # BLD AUTO: 0.66 K/UL — SIGNIFICANT CHANGE UP (ref 0–0.9)
MONOCYTES NFR BLD AUTO: 12.3 % — SIGNIFICANT CHANGE UP (ref 2–14)
NEUTROPHILS # BLD AUTO: 3.5 K/UL — SIGNIFICANT CHANGE UP (ref 1.8–7.4)
NEUTROPHILS NFR BLD AUTO: 64.9 % — SIGNIFICANT CHANGE UP (ref 43–77)
PLAT MORPH BLD: NORMAL — SIGNIFICANT CHANGE UP
PLATELET # BLD AUTO: 274 K/UL — SIGNIFICANT CHANGE UP (ref 150–400)
POIKILOCYTOSIS BLD QL AUTO: SLIGHT — SIGNIFICANT CHANGE UP
POLYCHROMASIA BLD QL SMEAR: SLIGHT — SIGNIFICANT CHANGE UP
POTASSIUM SERPL-MCNC: 4.8 MMOL/L — SIGNIFICANT CHANGE UP (ref 3.5–5.3)
POTASSIUM SERPL-SCNC: 4.8 MMOL/L — SIGNIFICANT CHANGE UP (ref 3.5–5.3)
RBC # BLD: 3.48 M/UL — LOW (ref 3.8–5.2)
RBC # FLD: 12.4 % — SIGNIFICANT CHANGE UP (ref 10.3–14.5)
RBC BLD AUTO: ABNORMAL
SMUDGE CELLS # BLD: PRESENT — SIGNIFICANT CHANGE UP
SODIUM SERPL-SCNC: 130 MMOL/L — LOW (ref 135–145)
VARIANT LYMPHS # BLD: 1.7 % — SIGNIFICANT CHANGE UP (ref 0–6)
WBC # BLD: 5.39 K/UL — SIGNIFICANT CHANGE UP (ref 3.8–10.5)
WBC # FLD AUTO: 5.39 K/UL — SIGNIFICANT CHANGE UP (ref 3.8–10.5)

## 2024-09-19 PROCEDURE — 99239 HOSP IP/OBS DSCHRG MGMT >30: CPT

## 2024-09-19 PROCEDURE — 99232 SBSQ HOSP IP/OBS MODERATE 35: CPT

## 2024-09-19 RX ORDER — ASPIRIN 81 MG
1 TABLET, DELAYED RELEASE (ENTERIC COATED) ORAL
Qty: 30 | Refills: 0
Start: 2024-09-19 | End: 2024-10-18

## 2024-09-19 RX ORDER — FAMOTIDINE 10 MG/ML
1 INJECTION INTRAVENOUS
Qty: 30 | Refills: 0
Start: 2024-09-19 | End: 2024-10-18

## 2024-09-19 RX ORDER — FAMOTIDINE 10 MG/ML
20 INJECTION INTRAVENOUS DAILY
Refills: 0 | Status: DISCONTINUED | OUTPATIENT
Start: 2024-09-19 | End: 2024-09-19

## 2024-09-19 RX ADMIN — Medication 600 MILLIGRAM(S): at 06:27

## 2024-09-19 RX ADMIN — FLUTICASONE PROPIONATE AND SALMETEROL 1 DOSE(S): 250; 50 POWDER RESPIRATORY (INHALATION) at 10:56

## 2024-09-19 RX ADMIN — VALSARTAN 80 MILLIGRAM(S): 40 TABLET ORAL at 06:27

## 2024-09-19 RX ADMIN — Medication 600 MILLIGRAM(S): at 17:19

## 2024-09-19 RX ADMIN — CARVEDILOL 3.12 MILLIGRAM(S): 6.25 TABLET ORAL at 17:19

## 2024-09-19 RX ADMIN — TIOTROPIUM BROMIDE INHALATION SPRAY 2 PUFF(S): 3.12 SPRAY, METERED RESPIRATORY (INHALATION) at 10:57

## 2024-09-19 RX ADMIN — Medication 30 MILLILITER(S): at 04:54

## 2024-09-19 RX ADMIN — ZOLPIDEM TARTRATE 5 MILLIGRAM(S): 5 TABLET, FILM COATED ORAL at 00:01

## 2024-09-19 RX ADMIN — CARVEDILOL 3.12 MILLIGRAM(S): 6.25 TABLET ORAL at 06:29

## 2024-09-19 RX ADMIN — Medication 0.5 MILLIGRAM(S): at 04:58

## 2024-09-19 RX ADMIN — FAMOTIDINE 20 MILLIGRAM(S): 10 INJECTION INTRAVENOUS at 15:18

## 2024-09-19 RX ADMIN — Medication 81 MILLIGRAM(S): at 10:54

## 2024-09-19 RX ADMIN — Medication 0.5 MILLIGRAM(S): at 15:11

## 2024-09-19 RX ADMIN — VENLAFAXINE HYDROCHLORIDE 225 MILLIGRAM(S): 150 CAPSULE, EXTENDED RELEASE ORAL at 10:54

## 2024-09-19 NOTE — PROGRESS NOTE ADULT - SUBJECTIVE AND OBJECTIVE BOX
TULIO SUGGS  [id number]        Chief Complaint:  CP/CAD    Interval History:  Patient still with some CP post cath.  Pain is constant, no agg/rel factors.  No SOB, palps.    Tele:  No events        acetaminophen     Tablet .. 650 milliGRAM(s) Oral every 6 hours PRN  albuterol    90 MICROgram(s) HFA Inhaler 2 Puff(s) Inhalation four times a day PRN  ALPRAZolam 0.5 milliGRAM(s) Oral two times a day PRN  aluminum hydroxide/magnesium hydroxide/simethicone Suspension 30 milliLiter(s) Oral every 4 hours PRN  aspirin  chewable 81 milliGRAM(s) Oral daily  atorvastatin 40 milliGRAM(s) Oral at bedtime  bisacodyl 5 milliGRAM(s) Oral at bedtime  carvedilol 3.125 milliGRAM(s) Oral every 12 hours  fluticasone propionate/ salmeterol 100-50 MICROgram(s) Diskus 1 Dose(s) Inhalation two times a day  gabapentin 600 milliGRAM(s) Oral two times a day  melatonin 3 milliGRAM(s) Oral at bedtime PRN  ondansetron Injectable 4 milliGRAM(s) IV Push every 8 hours PRN  QUEtiapine 300 milliGRAM(s) Oral at bedtime  sodium chloride 0.9% Bolus 250 milliLiter(s) IV Bolus once  tiotropium 2.5 MICROgram(s) Inhaler 2 Puff(s) Inhalation daily  traZODone 50 milliGRAM(s) Oral at bedtime  valsartan 80 milliGRAM(s) Oral daily  venlafaxine XR. 225 milliGRAM(s) Oral daily  zolpidem 5 milliGRAM(s) Oral at bedtime PRN  zolpidem 5 milliGRAM(s) Oral at bedtime PRN          Physical Exam:  T(C): 36.8 (09-19-24 @ 08:39), Max: 36.8 (09-18-24 @ 09:18)  HR: 67 (09-19-24 @ 08:39) (60 - 85)  BP: 117/60 (09-19-24 @ 08:39) (110/63 - 118/72)  RR: 18 (09-19-24 @ 08:39) (16 - 20)  SpO2: 93% (09-19-24 @ 08:39) (91% - 98%)  Wt(kg): --  General: Comfortable in NAD  HEENT: MMM, sclera anicteric  Resp: CTA bilaterally  CVS: nl s1s2, rrr, no s3/JVD  Abd: soft, NT, ND, BS+  Ext: No c/c/e  Neuro A&O x3  Psych: Normal affect    I&O's Summary    18 Sep 2024 07:01  -  19 Sep 2024 07:00  --------------------------------------------------------  IN: 840 mL / OUT: 1800 mL / NET: -960 mL    19 Sep 2024 07:01  -  19 Sep 2024 08:42  --------------------------------------------------------  IN: 0 mL / OUT: 450 mL / NET: -450 mL          Labs:   19 Sep 2024 05:56    130    |  93     |  18.7   ----------------------------<  91     4.8     |  29.0   |  0.70     Ca    8.8        19 Sep 2024 05:56  Phos  4.0       18 Sep 2024 05:26  Mg     2.1       18 Sep 2024 05:26    TPro  6.3    /  Alb  3.4    /  TBili  <0.2   /  DBili  x      /  AST  18     /  ALT  13     /  AlkPhos  78     18 Sep 2024 05:26                          10.4   5.39  )-----------( 274      ( 19 Sep 2024 04:00 )             32.5     PT/INR - ( 17 Sep 2024 13:06 )   PT: 11.2 sec;   INR: 1.01 ratio         PTT - ( 18 Sep 2024 00:07 )  PTT:102.7 sec      Echocardiogram August 30, 2023 demonstrated left ventricle normal in size with mild to moderately reduced function and ejection fraction of 40 to 45%. Mild concentric LVH. Mild mitral, trace aortic and mild to moderate tricuspid regurgitation.    Nuclear stress test October 4, 2023 was a pharmacologic study which was tolerated well. Blood pressure response to infusion was normal. EKG showed no evidence of ischemia with infusion. Evaluation of nuclear imaging showed no evidence of ischemia infarct and ejection fraction of 58%.    ECHO 9/2024:    1. Left ventricular cavity is normal in size. Left ventricular wall thickness is mildly increased. Left ventricular systolic function is normal with an ejection fraction of 56 % by Mace's method of disks.   2. There is mild (grade 1) left ventricular diastolic dysfunction.   3. Normal right ventricular cavity size and normal right ventricular systolic function.   4. Trileaflet aortic valve with normal systolic excursion. Fibrocalcific aortic valve sclerosis without stenosis.   5. Trace aortic regurgitation.   6. There is mild calcification of the mitral valve annulus.   7. Structurally normal mitral valve with normal leaflet excursion.   8. Mild mitral regurgitation.   9. Mild tricuspid regurgitation.  10. Aortic root at the sinuses of Valsalva is normal in size, measuring 2.70 cm (indexed 1.62 cm/m²). Ascending aorta is normal in size, measuring 2.60 cm (indexed 1.56 cm/m²).  11. Left atrium is normal in size.  12. The right atrium is normal in size.  13. Estimated pulmonaryartery systolic pressure is 33 mmHg.      Assessment:  74-year-old female with a past medical history of CAD s/p PCI, prior severe CMP with improved EF, hypertension, patient presents to the ED with complains of left sided chest pain for the past couple of days, she reports worsening of symptoms with exertion associated with nausea/ shortness of breath and radiation to the left arm.  In the ED patient was noted to have elevated troponin level with non ischemic findings on the EKG.   -Now s/p LHC.  mLAD stenosis noted not significant by FFR.  No intervention.  -CP mostly atypical and given LHC findings likely noncardiac.  Pericarditis less likely, echo with normal BiV function and no pericardial effusion.  F/u echo.  Would try Morphine as takes at home, ?MSK pain.    Plan: (TO BE SEEN)   1.   2.   3. Continue ASA 81mg QD for CAD.  Had been stopped 2/2 bruising but patient agreeable to restart now  4. Continue other current CV meds at current doses.    5.  Alvin Owens MD TULIO SUGGS  [id number]        Chief Complaint:  CP/CAD    Interval History:  Patient still with some CP post cath but improved.  Pain is constant, no agg/rel factors.  No SOB, palps.    Tele:  No events, SR         acetaminophen     Tablet .. 650 milliGRAM(s) Oral every 6 hours PRN  albuterol    90 MICROgram(s) HFA Inhaler 2 Puff(s) Inhalation four times a day PRN  ALPRAZolam 0.5 milliGRAM(s) Oral two times a day PRN  aluminum hydroxide/magnesium hydroxide/simethicone Suspension 30 milliLiter(s) Oral every 4 hours PRN  aspirin  chewable 81 milliGRAM(s) Oral daily  atorvastatin 40 milliGRAM(s) Oral at bedtime  bisacodyl 5 milliGRAM(s) Oral at bedtime  carvedilol 3.125 milliGRAM(s) Oral every 12 hours  fluticasone propionate/ salmeterol 100-50 MICROgram(s) Diskus 1 Dose(s) Inhalation two times a day  gabapentin 600 milliGRAM(s) Oral two times a day  melatonin 3 milliGRAM(s) Oral at bedtime PRN  ondansetron Injectable 4 milliGRAM(s) IV Push every 8 hours PRN  QUEtiapine 300 milliGRAM(s) Oral at bedtime  sodium chloride 0.9% Bolus 250 milliLiter(s) IV Bolus once  tiotropium 2.5 MICROgram(s) Inhaler 2 Puff(s) Inhalation daily  traZODone 50 milliGRAM(s) Oral at bedtime  valsartan 80 milliGRAM(s) Oral daily  venlafaxine XR. 225 milliGRAM(s) Oral daily  zolpidem 5 milliGRAM(s) Oral at bedtime PRN  zolpidem 5 milliGRAM(s) Oral at bedtime PRN          Physical Exam:  T(C): 36.8 (09-19-24 @ 08:39), Max: 36.8 (09-18-24 @ 09:18)  HR: 67 (09-19-24 @ 08:39) (60 - 85)  BP: 117/60 (09-19-24 @ 08:39) (110/63 - 118/72)  RR: 18 (09-19-24 @ 08:39) (16 - 20)  SpO2: 93% (09-19-24 @ 08:39) (91% - 98%)  Wt(kg): --  General: Comfortable in NAD  HEENT: MMM, sclera anicteric  Resp: CTA bilaterally  CVS: nl s1s2, rrr, no s3/JVD  Abd: soft, NT, ND, BS+  Ext: No c/c/e  Neuro A&O x3  Psych: Normal affect    I&O's Summary    18 Sep 2024 07:01  -  19 Sep 2024 07:00  --------------------------------------------------------  IN: 840 mL / OUT: 1800 mL / NET: -960 mL    19 Sep 2024 07:01  -  19 Sep 2024 08:42  --------------------------------------------------------  IN: 0 mL / OUT: 450 mL / NET: -450 mL          Labs:   19 Sep 2024 05:56    130    |  93     |  18.7   ----------------------------<  91     4.8     |  29.0   |  0.70     Ca    8.8        19 Sep 2024 05:56  Phos  4.0       18 Sep 2024 05:26  Mg     2.1       18 Sep 2024 05:26    TPro  6.3    /  Alb  3.4    /  TBili  <0.2   /  DBili  x      /  AST  18     /  ALT  13     /  AlkPhos  78     18 Sep 2024 05:26                          10.4   5.39  )-----------( 274      ( 19 Sep 2024 04:00 )             32.5     PT/INR - ( 17 Sep 2024 13:06 )   PT: 11.2 sec;   INR: 1.01 ratio         PTT - ( 18 Sep 2024 00:07 )  PTT:102.7 sec      Echocardiogram August 30, 2023 demonstrated left ventricle normal in size with mild to moderately reduced function and ejection fraction of 40 to 45%. Mild concentric LVH. Mild mitral, trace aortic and mild to moderate tricuspid regurgitation.    Nuclear stress test October 4, 2023 was a pharmacologic study which was tolerated well. Blood pressure response to infusion was normal. EKG showed no evidence of ischemia with infusion. Evaluation of nuclear imaging showed no evidence of ischemia infarct and ejection fraction of 58%.    ECHO 9/2024:    1. Left ventricular cavity is normal in size. Left ventricular wall thickness is mildly increased. Left ventricular systolic function is normal with an ejection fraction of 56 % by Mace's method of disks.   2. There is mild (grade 1) left ventricular diastolic dysfunction.   3. Normal right ventricular cavity size and normal right ventricular systolic function.   4. Trileaflet aortic valve with normal systolic excursion. Fibrocalcific aortic valve sclerosis without stenosis.   5. Trace aortic regurgitation.   6. There is mild calcification of the mitral valve annulus.   7. Structurally normal mitral valve with normal leaflet excursion.   8. Mild mitral regurgitation.   9. Mild tricuspid regurgitation.  10. Aortic root at the sinuses of Valsalva is normal in size, measuring 2.70 cm (indexed 1.62 cm/m²). Ascending aorta is normal in size, measuring 2.60 cm (indexed 1.56 cm/m²).  11. Left atrium is normal in size.  12. The right atrium is normal in size.  13. Estimated pulmonaryartery systolic pressure is 33 mmHg.      Assessment:  74-year-old female with a past medical history of CAD s/p PCI, prior severe CMP with improved EF, hypertension, patient presents to the ED with complains of left sided chest pain for the past couple of days, she reports worsening of symptoms with exertion associated with nausea/ shortness of breath and radiation to the left arm.  In the ED patient was noted to have elevated troponin level with non ischemic findings on the EKG.   -Now s/p LHC.  mLAD stenosis noted not significant by FFR.  No intervention.  -CP mostly atypical and given LHC findings likely noncardiac.  Pericarditis less likely as symptoms not consistent with this, echo with normal BiV function and no pericardial effusion.  Would try Morphine as takes at home, ?MSK (sternal costochonrdritis) pain vs GI.    Plan:   1. CV stable for discharge  2. Office follow up with Dr Ervin 1-2 weeks  3. Continue ASA 81mg QD for CAD.  Had been stopped 2/2 bruising but patient agreeable to restart now  4. Continue other current CV meds at current doses.    5. Thank you!  Alvin Owens MD

## 2024-09-19 NOTE — CHART NOTE - NSCHARTNOTEFT_GEN_A_CORE
Right radial site dressing removed. Site without active bleeding, +right forearm hematoma distal to cath site noted. +ecchymosis; patient reports mild tenderness at right radial site. Right hand mild swelling. RUE/ Right hand remains acyanotic; warm to touch; motor/sensory function intact. 2+ right radial pulse. Fingertips warm.   VSS     PLAN:  -Please monitor neurovascular checks; right wrist and pulse checks closely  -Please notify cardiology ACP if any change in color/temperature/sensation of RUE/ Right hand  -Ultrasound Left wrist results Partially thrombosed pseudoaneurysm just superficial to the distal right   radial artery at the wrist. Thrombosed component measures 0.5 x 0.2 x 0.5   cm, with small color Doppler flow restricted to the pseudoaneurysm neck   which is 1.3 mm in diameter and 1.2 mm in length. Surrounding soft tissue   edema is noted. Correlate clinically.  Additional hematoma is noted deep to the distal right radial artery   measuring 1.8 x 0.4 x 1.4 cm.  Case discussed with Dr Clayton montes from standpoint to [place] home. Right radial site dressing removed. Site without active bleeding, +right forearm hematoma distal to cath site noted. +ecchymosis; patient reports mild tenderness at right radial site. Right hand mild swelling. RUE/ Right hand remains acyanotic; warm to touch; motor/sensory function intact. 2+ right radial pulse. Fingertips warm.   VSS     PLAN:  -Please monitor neurovascular checks; right wrist and pulse checks closely  -Please notify cardiology ACP if any change in color/temperature/sensation of RUE/ Right hand  -Ultrasound Left wrist results Partially thrombosed pseudoaneurysm just superficial to the distal right   radial artery at the wrist. Thrombosed component measures 0.5 x 0.2 x 0.5   cm, with small color Doppler flow restricted to the pseudoaneurysm neck   which is 1.3 mm in diameter and 1.2 mm in length. Surrounding soft tissue   edema is noted. Correlate clinically.  Additional hematoma is noted deep to the distal right radial artery   measuring 1.8 x 0.4 x 1.4 cm.  Case discussed with Dr Clayton montes from standpoint to [place] home by medicine .

## 2024-09-20 ENCOUNTER — NON-APPOINTMENT (OUTPATIENT)
Age: 74
End: 2024-09-20

## 2024-09-21 ENCOUNTER — INPATIENT (INPATIENT)
Facility: HOSPITAL | Age: 74
LOS: 17 days | Discharge: NURSING, REHAB OR LONG-TERM CARE | DRG: 872 | End: 2024-10-09
Attending: STUDENT IN AN ORGANIZED HEALTH CARE EDUCATION/TRAINING PROGRAM | Admitting: INTERNAL MEDICINE
Payer: MEDICARE

## 2024-09-21 VITALS
DIASTOLIC BLOOD PRESSURE: 57 MMHG | SYSTOLIC BLOOD PRESSURE: 118 MMHG | HEART RATE: 159 BPM | TEMPERATURE: 100 F | HEIGHT: 64 IN | RESPIRATION RATE: 20 BRPM | OXYGEN SATURATION: 88 %

## 2024-09-21 DIAGNOSIS — Z90.710 ACQUIRED ABSENCE OF BOTH CERVIX AND UTERUS: Chronic | ICD-10-CM

## 2024-09-21 DIAGNOSIS — Z98.891 HISTORY OF UTERINE SCAR FROM PREVIOUS SURGERY: Chronic | ICD-10-CM

## 2024-09-21 LAB
ALBUMIN SERPL ELPH-MCNC: 3.7 G/DL — SIGNIFICANT CHANGE UP (ref 3.3–5.2)
ALP SERPL-CCNC: 85 U/L — SIGNIFICANT CHANGE UP (ref 40–120)
ALT FLD-CCNC: 12 U/L — SIGNIFICANT CHANGE UP
ANION GAP SERPL CALC-SCNC: 13 MMOL/L — SIGNIFICANT CHANGE UP (ref 5–17)
APPEARANCE UR: CLEAR — SIGNIFICANT CHANGE UP
APTT BLD: 32.7 SEC — SIGNIFICANT CHANGE UP (ref 24.5–35.6)
AST SERPL-CCNC: 22 U/L — SIGNIFICANT CHANGE UP
BACTERIA # UR AUTO: NEGATIVE /HPF — SIGNIFICANT CHANGE UP
BASOPHILS # BLD AUTO: 0.03 K/UL — SIGNIFICANT CHANGE UP (ref 0–0.2)
BASOPHILS NFR BLD AUTO: 0.2 % — SIGNIFICANT CHANGE UP (ref 0–2)
BILIRUB SERPL-MCNC: 0.5 MG/DL — SIGNIFICANT CHANGE UP (ref 0.4–2)
BILIRUB UR-MCNC: NEGATIVE — SIGNIFICANT CHANGE UP
BUN SERPL-MCNC: 31.9 MG/DL — HIGH (ref 8–20)
CALCIUM SERPL-MCNC: 9 MG/DL — SIGNIFICANT CHANGE UP (ref 8.4–10.5)
CAST: 4 /LPF — SIGNIFICANT CHANGE UP (ref 0–4)
CHLORIDE SERPL-SCNC: 84 MMOL/L — LOW (ref 96–108)
CO2 SERPL-SCNC: 24 MMOL/L — SIGNIFICANT CHANGE UP (ref 22–29)
COLOR SPEC: YELLOW — SIGNIFICANT CHANGE UP
CREAT SERPL-MCNC: 1.74 MG/DL — HIGH (ref 0.5–1.3)
DIFF PNL FLD: NEGATIVE — SIGNIFICANT CHANGE UP
EGFR: 30 ML/MIN/1.73M2 — LOW
EOSINOPHIL # BLD AUTO: 0.06 K/UL — SIGNIFICANT CHANGE UP (ref 0–0.5)
EOSINOPHIL NFR BLD AUTO: 0.5 % — SIGNIFICANT CHANGE UP (ref 0–6)
GLUCOSE SERPL-MCNC: 129 MG/DL — HIGH (ref 70–99)
GLUCOSE UR QL: NEGATIVE MG/DL — SIGNIFICANT CHANGE UP
HCT VFR BLD CALC: 35.5 % — SIGNIFICANT CHANGE UP (ref 34.5–45)
HGB BLD-MCNC: 11.7 G/DL — SIGNIFICANT CHANGE UP (ref 11.5–15.5)
IMM GRANULOCYTES NFR BLD AUTO: 0.5 % — SIGNIFICANT CHANGE UP (ref 0–0.9)
INR BLD: 0.99 RATIO — SIGNIFICANT CHANGE UP (ref 0.85–1.18)
KETONES UR-MCNC: NEGATIVE MG/DL — SIGNIFICANT CHANGE UP
LACTATE BLDV-MCNC: 1.7 MMOL/L — SIGNIFICANT CHANGE UP (ref 0.5–2)
LEUKOCYTE ESTERASE UR-ACNC: NEGATIVE — SIGNIFICANT CHANGE UP
LYMPHOCYTES # BLD AUTO: 0.95 K/UL — LOW (ref 1–3.3)
LYMPHOCYTES # BLD AUTO: 7.7 % — LOW (ref 13–44)
MCHC RBC-ENTMCNC: 29.9 PG — SIGNIFICANT CHANGE UP (ref 27–34)
MCHC RBC-ENTMCNC: 33 GM/DL — SIGNIFICANT CHANGE UP (ref 32–36)
MCV RBC AUTO: 90.8 FL — SIGNIFICANT CHANGE UP (ref 80–100)
MONOCYTES # BLD AUTO: 1.29 K/UL — HIGH (ref 0–0.9)
MONOCYTES NFR BLD AUTO: 10.5 % — SIGNIFICANT CHANGE UP (ref 2–14)
NEUTROPHILS # BLD AUTO: 9.89 K/UL — HIGH (ref 1.8–7.4)
NEUTROPHILS NFR BLD AUTO: 80.6 % — HIGH (ref 43–77)
NITRITE UR-MCNC: NEGATIVE — SIGNIFICANT CHANGE UP
PH UR: 6 — SIGNIFICANT CHANGE UP (ref 5–8)
PLATELET # BLD AUTO: 308 K/UL — SIGNIFICANT CHANGE UP (ref 150–400)
POTASSIUM SERPL-MCNC: 5.3 MMOL/L — SIGNIFICANT CHANGE UP (ref 3.5–5.3)
POTASSIUM SERPL-SCNC: 5.3 MMOL/L — SIGNIFICANT CHANGE UP (ref 3.5–5.3)
PROT SERPL-MCNC: 7 G/DL — SIGNIFICANT CHANGE UP (ref 6.6–8.7)
PROT UR-MCNC: 30 MG/DL
PROTHROM AB SERPL-ACNC: 11 SEC — SIGNIFICANT CHANGE UP (ref 9.5–13)
RBC # BLD: 3.91 M/UL — SIGNIFICANT CHANGE UP (ref 3.8–5.2)
RBC # FLD: 12 % — SIGNIFICANT CHANGE UP (ref 10.3–14.5)
RBC CASTS # UR COMP ASSIST: 1 /HPF — SIGNIFICANT CHANGE UP (ref 0–4)
SODIUM SERPL-SCNC: 121 MMOL/L — LOW (ref 135–145)
SP GR SPEC: 1.03 — SIGNIFICANT CHANGE UP (ref 1–1.03)
SQUAMOUS # UR AUTO: 6 /HPF — HIGH (ref 0–5)
TROPONIN T, HIGH SENSITIVITY RESULT: 81 NG/L — HIGH (ref 0–51)
UROBILINOGEN FLD QL: 1 MG/DL — SIGNIFICANT CHANGE UP (ref 0.2–1)
WBC # BLD: 12.28 K/UL — HIGH (ref 3.8–10.5)
WBC # FLD AUTO: 12.28 K/UL — HIGH (ref 3.8–10.5)
WBC UR QL: 1 /HPF — SIGNIFICANT CHANGE UP (ref 0–5)

## 2024-09-21 PROCEDURE — 0042T: CPT | Mod: MC

## 2024-09-21 PROCEDURE — 71045 X-RAY EXAM CHEST 1 VIEW: CPT | Mod: 26

## 2024-09-21 PROCEDURE — 70496 CT ANGIOGRAPHY HEAD: CPT | Mod: 26,MC

## 2024-09-21 PROCEDURE — 31500 INSERT EMERGENCY AIRWAY: CPT | Mod: GC

## 2024-09-21 PROCEDURE — 93010 ELECTROCARDIOGRAM REPORT: CPT

## 2024-09-21 PROCEDURE — 70450 CT HEAD/BRAIN W/O DYE: CPT | Mod: 26,XU,MC

## 2024-09-21 PROCEDURE — 70498 CT ANGIOGRAPHY NECK: CPT | Mod: 26,MC

## 2024-09-21 PROCEDURE — 99291 CRITICAL CARE FIRST HOUR: CPT | Mod: 25,GC

## 2024-09-21 RX ORDER — SODIUM CHLORIDE IRRIG SOLUTION 0.9 %
1000 SOLUTION, IRRIGATION IRRIGATION ONCE
Refills: 0 | Status: COMPLETED | OUTPATIENT
Start: 2024-09-21 | End: 2024-09-21

## 2024-09-21 RX ORDER — CEFEPIME 2 G/1
2000 INJECTION, POWDER, FOR SOLUTION INTRAVENOUS ONCE
Refills: 0 | Status: DISCONTINUED | OUTPATIENT
Start: 2024-09-21 | End: 2024-09-21

## 2024-09-21 RX ORDER — ONDANSETRON HCL/PF 4 MG/2 ML
4 VIAL (ML) INJECTION ONCE
Refills: 0 | Status: COMPLETED | OUTPATIENT
Start: 2024-09-21 | End: 2024-09-21

## 2024-09-21 RX ORDER — CEFEPIME 2 G/1
1000 INJECTION, POWDER, FOR SOLUTION INTRAVENOUS ONCE
Refills: 0 | Status: COMPLETED | OUTPATIENT
Start: 2024-09-21 | End: 2024-09-21

## 2024-09-21 RX ORDER — ACETAMINOPHEN 325 MG
1000 TABLET ORAL ONCE
Refills: 0 | Status: COMPLETED | OUTPATIENT
Start: 2024-09-21 | End: 2024-09-21

## 2024-09-21 RX ADMIN — Medication 1000 MILLILITER(S): at 22:59

## 2024-09-21 RX ADMIN — Medication 400 MILLIGRAM(S): at 22:18

## 2024-09-21 RX ADMIN — Medication 1000 MILLILITER(S): at 22:10

## 2024-09-21 RX ADMIN — CEFEPIME 1000 MILLIGRAM(S): 2 INJECTION, POWDER, FOR SOLUTION INTRAVENOUS at 22:15

## 2024-09-21 NOTE — ED ADULT NURSE NOTE - NSFALLRISKINTERV_ED_ALL_ED
Assistance OOB with selected safe patient handling equipment if applicable/Assistance with ambulation/Communicate fall risk and risk factors to all staff, patient, and family/Monitor gait and stability/Provide visual cue: yellow wristband, yellow gown, etc/Reinforce activity limits and safety measures with patient and family/Call bell, personal items and telephone in reach/Instruct patient to call for assistance before getting out of bed/chair/stretcher/Non-slip footwear applied when patient is off stretcher/[place] to call system/Physically safe environment - no spills, clutter or unnecessary equipment/Purposeful Proactive Rounding/Room/bathroom lighting operational, light cord in reach

## 2024-09-21 NOTE — ED CLERICAL - NS ED CARE COORDINATION INFORMATION
This patient is enrolled in a readmission reduction program and has active care navigation. This patient can be followed up by the care navigation team within 24 hours. To arrange close follow-up or to obtain additional clinical information about this patient, please call the contact number above.     Please speak with the [place] ED Case Manager for assistance with discharge planning

## 2024-09-21 NOTE — ED ADULT NURSE NOTE - OBJECTIVE STATEMENT
Pt in no apparent distress at this time. Airway patent, breathing spontaneous and nonlabored. Pt A&Ox3 resting in stretcher. Pt c/o       , abdominal pain, abnormal gait with weakness.. recent cardiac cath this week, pt with episode of confusion in triage.   at bedside. various bruising, skin tears, and scabs to upper/lower extremities, code stroke called, pt moved to , placed on monitor  Pt in no apparent distress at this time. Airway patent, breathing spontaneous and nonlabored. Pt A&Ox3 resting in stretcher. Pt c/o       , abdominal pain, abnormal gait with weakness.. recent cardiac cath this week, pt with episode of confusion in triage. also noted to be twitching.  at bedside. various bruising, skin tears, and scabs to upper/lower extremities, code stroke called, pt moved to , placed on monitor

## 2024-09-21 NOTE — ED ADULT NURSE NOTE - CHIEF COMPLAINT QUOTE
Pt. c.o persistent epigastric discomfort with nausea. DC 2 days ago after admission for NSTEMI. Pt. became acutely confused during triage, not making sense. Code Stroke activated. FS 86.

## 2024-09-21 NOTE — ED PROVIDER NOTE - CLINICAL SUMMARY MEDICAL DECISION MAKING FREE TEXT BOX
Patient made code stroke given the acute change in mental status with last known well at 8:50 PM.  NIH stroke scale of 1 for aphasia.  Case discussed with Dr. queen, not a tenecteplase candidate given the low NIH score.  However here, patient also found to have tachycardia and fever, concern for underlying sepsis.  Unclear etiology.  Will start broad-spectrum antibiotics, give IV fluids however hold on full 30 cc/kg given concern for volume overload state with her history of cardiac disease.  Plan on lab work, imaging, medications and reassessment.

## 2024-09-21 NOTE — ED PROVIDER NOTE - PROGRESS NOTE DETAILS
Patient stroke imaging negative.  Found to have MADHURI, leukocytosis, and hyponatremia.  Patient with elevated troponin though also has MADHURI, likely contributing to this.  Will check repeat troponin.  ECG per my independent interpretation shows a rate of 96, normal sinus rhythm with rate 96, no ST segment elevation consistent with ischemia.  Pending CT imaging, plan for admission.  Demian Todd MD. Case discussed with hospitalist Dr. Guerra, as patient is a STAR readmission patient, will place in observation for continued management.  Management to be done by medical team while in observation status.  Demian Todd MD. Repeat sodium 120.  Because of this, case discussed with Dr. Guerra and will admit to medicine instead.  Demian Todd MD. WW: Pt admitted to CDU, Rapid response called. Pt found to be hypoxic, tachypneic, GCS 3. ABG showing ph <7.1, hypercapnia. Decision made to intubate, successful intubation with 7.5 ET tube/DL, placed on vent. Priority CT ordered by admitting team.

## 2024-09-21 NOTE — ED PROVIDER NOTE - PHYSICAL EXAMINATION
Constitutional: Awake, Alert, unsteady on feet  HEAD: Normocephalic, atraumatic.   EYES: PERRL, EOM intact, conjunctiva and sclera are clear bilaterally.  ENT: External ears normal. No rhinorrhea, no tracheal deviation   NECK: Supple, non-tender  CARDIOVASCULAR: tachycardic rate and rhythm.  RESPIRATORY: Normal respiratory effort; breath sounds CTAB, no wheezes, rhonchi, or rales. Speaking in full sentences. No accessory muscle use. 88% on RA.  ABDOMEN: Soft; epigastric TTP, non-distended. No rebound or guarding.   MSK:  no lower extremity edema, no deformities  SKIN: Warm, dry  NEURO: A&O x3. Sensory and motor functions are grossly intact. Speech with aphasia. see nihss for detailed neuro exam.  PSYCH: Appearance and judgement seem appropriate for gender and age.

## 2024-09-21 NOTE — ED ADULT TRIAGE NOTE - CHIEF COMPLAINT QUOTE
Pt. c.o persistent generalized abdominal pain with nausea. States she was just admitted here for same issue DC 3 days ago. Pt. c.o persistent epigastric discomfort with nausea. DC 2 days ago after admission for NSTEMI. Pt. c.o persistent epigastric discomfort with nausea. DC 2 days ago after admission for NSTEMI. Pt. became acutely confused during triage, not making sense. Code Stroke activated. FS 86.

## 2024-09-21 NOTE — ED PROVIDER NOTE - OBJECTIVE STATEMENT
Patient with a past medical history of coronary artery disease, hypertension, hyperlipidemia, recent admission for NSTEMI is presenting with concern for abdominal pain as well as nausea.  Patient in the waiting room also had an episode of altered mental status and confusion where she was having some aphasia.   states over the course the day today she was not feeling well and that she has been having trouble ambulating compared to her baseline recently.  The abdominal pain started earlier today, associated with nausea.  Denies any chest pain or shortness of breath.  No reported fevers.  No coughing.

## 2024-09-21 NOTE — ED PROVIDER NOTE - CARE PLAN
1 Principal Discharge DX:	Sepsis  Secondary Diagnosis:	Acute renal failure  Secondary Diagnosis:	Hyponatremia  Secondary Diagnosis:	Aphasia

## 2024-09-22 DIAGNOSIS — R09.89 OTHER SPECIFIED SYMPTOMS AND SIGNS INVOLVING THE CIRCULATORY AND RESPIRATORY SYSTEMS: ICD-10-CM

## 2024-09-22 DIAGNOSIS — A41.9 SEPSIS, UNSPECIFIED ORGANISM: ICD-10-CM

## 2024-09-22 LAB
ALBUMIN SERPL ELPH-MCNC: 3.6 G/DL — SIGNIFICANT CHANGE UP (ref 3.3–5.2)
ALBUMIN SERPL ELPH-MCNC: 3.6 G/DL — SIGNIFICANT CHANGE UP (ref 3.3–5.2)
ALP SERPL-CCNC: 93 U/L — SIGNIFICANT CHANGE UP (ref 40–120)
ALP SERPL-CCNC: 93 U/L — SIGNIFICANT CHANGE UP (ref 40–120)
ALT FLD-CCNC: 12 U/L — SIGNIFICANT CHANGE UP
ALT FLD-CCNC: 12 U/L — SIGNIFICANT CHANGE UP
ANION GAP SERPL CALC-SCNC: 14 MMOL/L — SIGNIFICANT CHANGE UP (ref 5–17)
ANION GAP SERPL CALC-SCNC: 14 MMOL/L — SIGNIFICANT CHANGE UP (ref 5–17)
ANION GAP SERPL CALC-SCNC: 15 MMOL/L — SIGNIFICANT CHANGE UP (ref 5–17)
ANION GAP SERPL CALC-SCNC: 15 MMOL/L — SIGNIFICANT CHANGE UP (ref 5–17)
APPEARANCE UR: CLEAR — SIGNIFICANT CHANGE UP
AST SERPL-CCNC: 20 U/L — SIGNIFICANT CHANGE UP
AST SERPL-CCNC: 20 U/L — SIGNIFICANT CHANGE UP
BACTERIA # UR AUTO: NEGATIVE /HPF — SIGNIFICANT CHANGE UP
BASOPHILS # BLD AUTO: 0.06 K/UL — SIGNIFICANT CHANGE UP (ref 0–0.2)
BASOPHILS NFR BLD AUTO: 0.4 % — SIGNIFICANT CHANGE UP (ref 0–2)
BILIRUB DIRECT SERPL-MCNC: 0.1 MG/DL — SIGNIFICANT CHANGE UP (ref 0–0.3)
BILIRUB INDIRECT FLD-MCNC: 0.2 MG/DL — SIGNIFICANT CHANGE UP (ref 0.2–1)
BILIRUB SERPL-MCNC: 0.3 MG/DL — LOW (ref 0.4–2)
BILIRUB SERPL-MCNC: 0.3 MG/DL — LOW (ref 0.4–2)
BILIRUB UR-MCNC: NEGATIVE — SIGNIFICANT CHANGE UP
BUN SERPL-MCNC: 26.2 MG/DL — HIGH (ref 8–20)
BUN SERPL-MCNC: 27.5 MG/DL — HIGH (ref 8–20)
BUN SERPL-MCNC: 29.4 MG/DL — HIGH (ref 8–20)
BUN SERPL-MCNC: 30.3 MG/DL — HIGH (ref 8–20)
CALCIUM SERPL-MCNC: 8.7 MG/DL — SIGNIFICANT CHANGE UP (ref 8.4–10.5)
CALCIUM SERPL-MCNC: 8.8 MG/DL — SIGNIFICANT CHANGE UP (ref 8.4–10.5)
CALCIUM SERPL-MCNC: 8.8 MG/DL — SIGNIFICANT CHANGE UP (ref 8.4–10.5)
CALCIUM SERPL-MCNC: 9 MG/DL — SIGNIFICANT CHANGE UP (ref 8.4–10.5)
CAST: 0 /LPF — SIGNIFICANT CHANGE UP (ref 0–4)
CHLORIDE SERPL-SCNC: 83 MMOL/L — LOW (ref 96–108)
CHLORIDE SERPL-SCNC: 83 MMOL/L — LOW (ref 96–108)
CHLORIDE SERPL-SCNC: 84 MMOL/L — LOW (ref 96–108)
CHLORIDE SERPL-SCNC: 88 MMOL/L — LOW (ref 96–108)
CO2 SERPL-SCNC: 21 MMOL/L — LOW (ref 22–29)
CO2 SERPL-SCNC: 22 MMOL/L — SIGNIFICANT CHANGE UP (ref 22–29)
CO2 SERPL-SCNC: 22 MMOL/L — SIGNIFICANT CHANGE UP (ref 22–29)
CO2 SERPL-SCNC: 25 MMOL/L — SIGNIFICANT CHANGE UP (ref 22–29)
COLOR SPEC: YELLOW — SIGNIFICANT CHANGE UP
CREAT ?TM UR-MCNC: 44 MG/DL — SIGNIFICANT CHANGE UP
CREAT SERPL-MCNC: 1.16 MG/DL — SIGNIFICANT CHANGE UP (ref 0.5–1.3)
CREAT SERPL-MCNC: 1.17 MG/DL — SIGNIFICANT CHANGE UP (ref 0.5–1.3)
CREAT SERPL-MCNC: 1.29 MG/DL — SIGNIFICANT CHANGE UP (ref 0.5–1.3)
CREAT SERPL-MCNC: 1.32 MG/DL — HIGH (ref 0.5–1.3)
DIFF PNL FLD: ABNORMAL
EGFR: 42 ML/MIN/1.73M2 — LOW
EGFR: 44 ML/MIN/1.73M2 — LOW
EGFR: 49 ML/MIN/1.73M2 — LOW
EGFR: 49 ML/MIN/1.73M2 — LOW
EOSINOPHIL # BLD AUTO: 0.01 K/UL — SIGNIFICANT CHANGE UP (ref 0–0.5)
EOSINOPHIL NFR BLD AUTO: 0.1 % — SIGNIFICANT CHANGE UP (ref 0–6)
FLUAV AG NPH QL: SIGNIFICANT CHANGE UP
FLUBV AG NPH QL: SIGNIFICANT CHANGE UP
GLUCOSE SERPL-MCNC: 117 MG/DL — HIGH (ref 70–99)
GLUCOSE SERPL-MCNC: 142 MG/DL — HIGH (ref 70–99)
GLUCOSE SERPL-MCNC: 144 MG/DL — HIGH (ref 70–99)
GLUCOSE SERPL-MCNC: 152 MG/DL — HIGH (ref 70–99)
GLUCOSE UR QL: NEGATIVE MG/DL — SIGNIFICANT CHANGE UP
HCT VFR BLD CALC: 37.2 % — SIGNIFICANT CHANGE UP (ref 34.5–45)
HCT VFR BLD CALC: 37.2 % — SIGNIFICANT CHANGE UP (ref 34.5–45)
HGB BLD-MCNC: 12.5 G/DL — SIGNIFICANT CHANGE UP (ref 11.5–15.5)
HGB BLD-MCNC: 12.5 G/DL — SIGNIFICANT CHANGE UP (ref 11.5–15.5)
IMM GRANULOCYTES NFR BLD AUTO: 1.7 % — HIGH (ref 0–0.9)
KETONES UR-MCNC: NEGATIVE MG/DL — SIGNIFICANT CHANGE UP
LEUKOCYTE ESTERASE UR-ACNC: NEGATIVE — SIGNIFICANT CHANGE UP
LYMPHOCYTES # BLD AUTO: 0.84 K/UL — LOW (ref 1–3.3)
LYMPHOCYTES # BLD AUTO: 5.6 % — LOW (ref 13–44)
MAGNESIUM SERPL-MCNC: 1.8 MG/DL — SIGNIFICANT CHANGE UP (ref 1.6–2.6)
MCHC RBC-ENTMCNC: 30 PG — SIGNIFICANT CHANGE UP (ref 27–34)
MCHC RBC-ENTMCNC: 30.2 PG — SIGNIFICANT CHANGE UP (ref 27–34)
MCHC RBC-ENTMCNC: 33.6 GM/DL — SIGNIFICANT CHANGE UP (ref 32–36)
MCHC RBC-ENTMCNC: 33.6 GM/DL — SIGNIFICANT CHANGE UP (ref 32–36)
MCV RBC AUTO: 89.2 FL — SIGNIFICANT CHANGE UP (ref 80–100)
MCV RBC AUTO: 89.9 FL — SIGNIFICANT CHANGE UP (ref 80–100)
MONOCYTES # BLD AUTO: 1.61 K/UL — HIGH (ref 0–0.9)
MONOCYTES NFR BLD AUTO: 10.7 % — SIGNIFICANT CHANGE UP (ref 2–14)
NEUTROPHILS # BLD AUTO: 12.22 K/UL — HIGH (ref 1.8–7.4)
NEUTROPHILS NFR BLD AUTO: 81.5 % — HIGH (ref 43–77)
NITRITE UR-MCNC: NEGATIVE — SIGNIFICANT CHANGE UP
OSMOLALITY SERPL: 271 MOSMOL/KG — LOW (ref 280–301)
OSMOLALITY UR: 372 MOSM/KG — SIGNIFICANT CHANGE UP (ref 300–1000)
OSMOLALITY UR: 391 MOSM/KG — SIGNIFICANT CHANGE UP (ref 300–1000)
PH UR: 6 — SIGNIFICANT CHANGE UP (ref 5–8)
PHOSPHATE SERPL-MCNC: 4.5 MG/DL — SIGNIFICANT CHANGE UP (ref 2.4–4.7)
PLATELET # BLD AUTO: 294 K/UL — SIGNIFICANT CHANGE UP (ref 150–400)
PLATELET # BLD AUTO: 295 K/UL — SIGNIFICANT CHANGE UP (ref 150–400)
POTASSIUM SERPL-MCNC: 4.6 MMOL/L — SIGNIFICANT CHANGE UP (ref 3.5–5.3)
POTASSIUM SERPL-MCNC: 4.7 MMOL/L — SIGNIFICANT CHANGE UP (ref 3.5–5.3)
POTASSIUM SERPL-MCNC: 4.8 MMOL/L — SIGNIFICANT CHANGE UP (ref 3.5–5.3)
POTASSIUM SERPL-MCNC: 5.3 MMOL/L — SIGNIFICANT CHANGE UP (ref 3.5–5.3)
POTASSIUM SERPL-SCNC: 4.6 MMOL/L — SIGNIFICANT CHANGE UP (ref 3.5–5.3)
POTASSIUM SERPL-SCNC: 4.7 MMOL/L — SIGNIFICANT CHANGE UP (ref 3.5–5.3)
POTASSIUM SERPL-SCNC: 4.8 MMOL/L — SIGNIFICANT CHANGE UP (ref 3.5–5.3)
POTASSIUM SERPL-SCNC: 5.3 MMOL/L — SIGNIFICANT CHANGE UP (ref 3.5–5.3)
POTASSIUM UR-SCNC: 33 MMOL/L — SIGNIFICANT CHANGE UP
PROT ?TM UR-MCNC: 16 MG/DL — HIGH (ref 0–12)
PROT SERPL-MCNC: 7.2 G/DL — SIGNIFICANT CHANGE UP (ref 6.6–8.7)
PROT SERPL-MCNC: 7.2 G/DL — SIGNIFICANT CHANGE UP (ref 6.6–8.7)
PROT UR-MCNC: 30 MG/DL
PROT/CREAT UR-RTO: 0.4 RATIO — HIGH
RBC # BLD: 4.14 M/UL — SIGNIFICANT CHANGE UP (ref 3.8–5.2)
RBC # BLD: 4.17 M/UL — SIGNIFICANT CHANGE UP (ref 3.8–5.2)
RBC # FLD: 11.9 % — SIGNIFICANT CHANGE UP (ref 10.3–14.5)
RBC # FLD: 11.9 % — SIGNIFICANT CHANGE UP (ref 10.3–14.5)
RBC CASTS # UR COMP ASSIST: 3 /HPF — SIGNIFICANT CHANGE UP (ref 0–4)
RSV RNA NPH QL NAA+NON-PROBE: SIGNIFICANT CHANGE UP
SARS-COV-2 RNA SPEC QL NAA+PROBE: SIGNIFICANT CHANGE UP
SODIUM SERPL-SCNC: 120 MMOL/L — CRITICAL LOW (ref 135–145)
SODIUM SERPL-SCNC: 120 MMOL/L — CRITICAL LOW (ref 135–145)
SODIUM SERPL-SCNC: 122 MMOL/L — LOW (ref 135–145)
SODIUM SERPL-SCNC: 124 MMOL/L — LOW (ref 135–145)
SODIUM UR-SCNC: <30 MMOL/L — SIGNIFICANT CHANGE UP
SP GR SPEC: >1.03 — HIGH (ref 1–1.03)
SQUAMOUS # UR AUTO: 0 /HPF — SIGNIFICANT CHANGE UP (ref 0–5)
TROPONIN T, HIGH SENSITIVITY RESULT: 71 NG/L — HIGH (ref 0–51)
UROBILINOGEN FLD QL: 0.2 MG/DL — SIGNIFICANT CHANGE UP (ref 0.2–1)
UUN UR-MCNC: 351 MG/DL — SIGNIFICANT CHANGE UP
WBC # BLD: 12.42 K/UL — HIGH (ref 3.8–10.5)
WBC # BLD: 14.99 K/UL — HIGH (ref 3.8–10.5)
WBC # FLD AUTO: 12.42 K/UL — HIGH (ref 3.8–10.5)
WBC # FLD AUTO: 14.99 K/UL — HIGH (ref 3.8–10.5)
WBC UR QL: 0 /HPF — SIGNIFICANT CHANGE UP (ref 0–5)

## 2024-09-22 PROCEDURE — 74177 CT ABD & PELVIS W/CONTRAST: CPT | Mod: 26,MC

## 2024-09-22 PROCEDURE — 93970 EXTREMITY STUDY: CPT | Mod: 26

## 2024-09-22 PROCEDURE — 71275 CT ANGIOGRAPHY CHEST: CPT | Mod: 26,MC

## 2024-09-22 PROCEDURE — 76700 US EXAM ABDOM COMPLETE: CPT | Mod: 26

## 2024-09-22 PROCEDURE — 99223 1ST HOSP IP/OBS HIGH 75: CPT

## 2024-09-22 RX ORDER — FLUTICASONE PROPION/SALMETEROL 100-50 MCG
1 BLISTER, WITH INHALATION DEVICE INHALATION
Refills: 0 | Status: DISCONTINUED | OUTPATIENT
Start: 2024-09-22 | End: 2024-09-25

## 2024-09-22 RX ORDER — ONDANSETRON HCL/PF 4 MG/2 ML
4 VIAL (ML) INJECTION
Refills: 0 | Status: DISCONTINUED | OUTPATIENT
Start: 2024-09-22 | End: 2024-10-09

## 2024-09-22 RX ORDER — VALSARTAN 40 MG/1
1 TABLET ORAL
Refills: 0 | DISCHARGE

## 2024-09-22 RX ORDER — QUETIAPINE FUMARATE 50 MG/1
300 TABLET, FILM COATED ORAL AT BEDTIME
Refills: 0 | Status: DISCONTINUED | OUTPATIENT
Start: 2024-09-22 | End: 2024-09-23

## 2024-09-22 RX ORDER — GABAPENTIN 100 MG
1 CAPSULE ORAL
Refills: 0 | DISCHARGE

## 2024-09-22 RX ORDER — ALBUTEROL 90 MCG
2 AEROSOL (GRAM) INHALATION EVERY 6 HOURS
Refills: 0 | Status: DISCONTINUED | OUTPATIENT
Start: 2024-09-22 | End: 2024-09-22

## 2024-09-22 RX ORDER — AZITHROMYCIN 250 MG/1
TABLET, FILM COATED ORAL
Refills: 0 | Status: DISCONTINUED | OUTPATIENT
Start: 2024-09-22 | End: 2024-09-22

## 2024-09-22 RX ORDER — GABAPENTIN 800 MG/1
300 TABLET, FILM COATED ORAL DAILY
Refills: 0 | Status: DISCONTINUED | OUTPATIENT
Start: 2024-09-22 | End: 2024-10-09

## 2024-09-22 RX ORDER — FLUTICASONE FUROATE, UMECLIDINIUM BROMIDE AND VILANTEROL TRIFENATATE 200; 62.5; 25 UG/1; UG/1; UG/1
1 POWDER RESPIRATORY (INHALATION)
Refills: 0 | DISCHARGE

## 2024-09-22 RX ORDER — PIPERACILLIN SODIUM AND TAZOBACTAM SODIUM 12; 1.5 G/60ML; G/60ML
3.38 INJECTION, POWDER, LYOPHILIZED, FOR SOLUTION INTRAVENOUS EVERY 8 HOURS
Refills: 0 | Status: COMPLETED | OUTPATIENT
Start: 2024-09-22 | End: 2024-09-29

## 2024-09-22 RX ORDER — ONDANSETRON HCL/PF 4 MG/2 ML
4 VIAL (ML) INJECTION EVERY 8 HOURS
Refills: 0 | Status: DISCONTINUED | OUTPATIENT
Start: 2024-09-22 | End: 2024-09-22

## 2024-09-22 RX ORDER — ALPRAZOLAM 0.5 MG/1
0.5 TABLET ORAL
Refills: 0 | Status: DISCONTINUED | OUTPATIENT
Start: 2024-09-22 | End: 2024-09-23

## 2024-09-22 RX ORDER — TRAZODONE HCL 50 MG
0 TABLET ORAL
Refills: 0 | DISCHARGE

## 2024-09-22 RX ORDER — SODIUM CHLORIDE 5 G/100ML
1000 INJECTION, SOLUTION INTRAVENOUS
Refills: 0 | Status: DISCONTINUED | OUTPATIENT
Start: 2024-09-22 | End: 2024-09-23

## 2024-09-22 RX ORDER — SODIUM ZIRCONIUM CYCLOSILICATE 10 G/10G
5 POWDER, FOR SUSPENSION ORAL ONCE
Refills: 0 | Status: COMPLETED | OUTPATIENT
Start: 2024-09-22 | End: 2024-09-22

## 2024-09-22 RX ORDER — CARVEDILOL 3.125 MG
3.12 TABLET ORAL EVERY 12 HOURS
Refills: 0 | Status: DISCONTINUED | OUTPATIENT
Start: 2024-09-22 | End: 2024-10-09

## 2024-09-22 RX ORDER — PANTOPRAZOLE SODIUM 40 MG/1
40 TABLET, DELAYED RELEASE ORAL DAILY
Refills: 0 | Status: DISCONTINUED | OUTPATIENT
Start: 2024-09-22 | End: 2024-10-09

## 2024-09-22 RX ORDER — CEFTRIAXONE SODIUM 1 G
1000 VIAL (EA) INJECTION EVERY 24 HOURS
Refills: 0 | Status: DISCONTINUED | OUTPATIENT
Start: 2024-09-22 | End: 2024-09-22

## 2024-09-22 RX ORDER — PIPERACILLIN SODIUM AND TAZOBACTAM SODIUM 12; 1.5 G/60ML; G/60ML
3.38 INJECTION, POWDER, LYOPHILIZED, FOR SOLUTION INTRAVENOUS ONCE
Refills: 0 | Status: COMPLETED | OUTPATIENT
Start: 2024-09-22 | End: 2024-09-22

## 2024-09-22 RX ORDER — AZITHROMYCIN 250 MG/1
500 TABLET, FILM COATED ORAL ONCE
Refills: 0 | Status: COMPLETED | OUTPATIENT
Start: 2024-09-22 | End: 2024-09-22

## 2024-09-22 RX ORDER — FAMOTIDINE 40 MG
20 TABLET ORAL DAILY
Refills: 0 | Status: DISCONTINUED | OUTPATIENT
Start: 2024-09-22 | End: 2024-09-22

## 2024-09-22 RX ORDER — ZOLPIDEM TARTRATE 5 MG
5 TABLET ORAL AT BEDTIME
Refills: 0 | Status: DISCONTINUED | OUTPATIENT
Start: 2024-09-22 | End: 2024-09-23

## 2024-09-22 RX ORDER — IPRATROPIUM BROMIDE AND ALBUTEROL SULFATE .5; 3 MG/3ML; MG/3ML
3 SOLUTION RESPIRATORY (INHALATION) EVERY 6 HOURS
Refills: 0 | Status: DISCONTINUED | OUTPATIENT
Start: 2024-09-22 | End: 2024-09-23

## 2024-09-22 RX ORDER — ATORVASTATIN CALCIUM 10 MG/1
40 TABLET, FILM COATED ORAL AT BEDTIME
Refills: 0 | Status: DISCONTINUED | OUTPATIENT
Start: 2024-09-22 | End: 2024-10-09

## 2024-09-22 RX ORDER — SODIUM CHLORIDE 0.9 % (FLUSH) 0.9 %
1000 SYRINGE (ML) INJECTION ONCE
Refills: 0 | Status: COMPLETED | OUTPATIENT
Start: 2024-09-22 | End: 2024-09-22

## 2024-09-22 RX ORDER — ASPIRIN 325 MG
81 TABLET ORAL DAILY
Refills: 0 | Status: DISCONTINUED | OUTPATIENT
Start: 2024-09-22 | End: 2024-10-09

## 2024-09-22 RX ORDER — SODIUM CHLORIDE IRRIG SOLUTION 0.9 %
500 SOLUTION, IRRIGATION IRRIGATION ONCE
Refills: 0 | Status: COMPLETED | OUTPATIENT
Start: 2024-09-22 | End: 2024-09-22

## 2024-09-22 RX ORDER — TIOTROPIUM BROMIDE INHALATION SPRAY 3.12 UG/1
2 SPRAY, METERED RESPIRATORY (INHALATION) DAILY
Refills: 0 | Status: DISCONTINUED | OUTPATIENT
Start: 2024-09-22 | End: 2024-10-09

## 2024-09-22 RX ORDER — VENLAFAXINE HCL 75 MG
225 TABLET ORAL DAILY
Refills: 0 | Status: DISCONTINUED | OUTPATIENT
Start: 2024-09-22 | End: 2024-10-09

## 2024-09-22 RX ORDER — ACETAMINOPHEN 325 MG
1000 TABLET ORAL ONCE
Refills: 0 | Status: COMPLETED | OUTPATIENT
Start: 2024-09-22 | End: 2024-09-22

## 2024-09-22 RX ADMIN — ALPRAZOLAM 0.5 MILLIGRAM(S): 0.5 TABLET ORAL at 06:12

## 2024-09-22 RX ADMIN — Medication 1000 MILLIGRAM(S): at 06:11

## 2024-09-22 RX ADMIN — Medication 3.12 MILLIGRAM(S): at 17:31

## 2024-09-22 RX ADMIN — Medication 3.12 MILLIGRAM(S): at 06:12

## 2024-09-22 RX ADMIN — GABAPENTIN 300 MILLIGRAM(S): 800 TABLET, FILM COATED ORAL at 06:12

## 2024-09-22 RX ADMIN — Medication 4 MILLIGRAM(S): at 07:19

## 2024-09-22 RX ADMIN — QUETIAPINE FUMARATE 300 MILLIGRAM(S): 50 TABLET, FILM COATED ORAL at 21:14

## 2024-09-22 RX ADMIN — Medication 1000 MILLILITER(S): at 23:55

## 2024-09-22 RX ADMIN — IPRATROPIUM BROMIDE AND ALBUTEROL SULFATE 3 MILLILITER(S): .5; 3 SOLUTION RESPIRATORY (INHALATION) at 13:39

## 2024-09-22 RX ADMIN — Medication 1000 MILLILITER(S): at 00:21

## 2024-09-22 RX ADMIN — PANTOPRAZOLE SODIUM 40 MILLIGRAM(S): 40 TABLET, DELAYED RELEASE ORAL at 09:15

## 2024-09-22 RX ADMIN — Medication 5000 UNIT(S): at 21:14

## 2024-09-22 RX ADMIN — SODIUM ZIRCONIUM CYCLOSILICATE 5 GRAM(S): 10 POWDER, FOR SUSPENSION ORAL at 09:17

## 2024-09-22 RX ADMIN — Medication 2 PUFF(S): at 07:59

## 2024-09-22 RX ADMIN — Medication 50 MILLIGRAM(S): at 21:14

## 2024-09-22 RX ADMIN — IPRATROPIUM BROMIDE AND ALBUTEROL SULFATE 3 MILLILITER(S): .5; 3 SOLUTION RESPIRATORY (INHALATION) at 19:34

## 2024-09-22 RX ADMIN — AZITHROMYCIN 255 MILLIGRAM(S): 250 TABLET, FILM COATED ORAL at 06:12

## 2024-09-22 RX ADMIN — Medication 4 MILLIGRAM(S): at 13:26

## 2024-09-22 RX ADMIN — ATORVASTATIN CALCIUM 40 MILLIGRAM(S): 10 TABLET, FILM COATED ORAL at 21:14

## 2024-09-22 RX ADMIN — Medication 81 MILLIGRAM(S): at 09:14

## 2024-09-22 RX ADMIN — TIOTROPIUM BROMIDE INHALATION SPRAY 2 PUFF(S): 3.12 SPRAY, METERED RESPIRATORY (INHALATION) at 07:58

## 2024-09-22 RX ADMIN — IPRATROPIUM BROMIDE AND ALBUTEROL SULFATE 3 MILLILITER(S): .5; 3 SOLUTION RESPIRATORY (INHALATION) at 09:15

## 2024-09-22 RX ADMIN — Medication 225 MILLIGRAM(S): at 09:15

## 2024-09-22 RX ADMIN — Medication 4 MILLIGRAM(S): at 13:32

## 2024-09-22 RX ADMIN — Medication 5 MILLIGRAM(S): at 21:19

## 2024-09-22 RX ADMIN — PIPERACILLIN SODIUM AND TAZOBACTAM SODIUM 25 GRAM(S): 12; 1.5 INJECTION, POWDER, LYOPHILIZED, FOR SOLUTION INTRAVENOUS at 21:19

## 2024-09-22 RX ADMIN — PIPERACILLIN SODIUM AND TAZOBACTAM SODIUM 25 GRAM(S): 12; 1.5 INJECTION, POWDER, LYOPHILIZED, FOR SOLUTION INTRAVENOUS at 13:26

## 2024-09-22 RX ADMIN — Medication 4 MILLIGRAM(S): at 00:20

## 2024-09-22 RX ADMIN — ALPRAZOLAM 0.5 MILLIGRAM(S): 0.5 TABLET ORAL at 17:31

## 2024-09-22 RX ADMIN — Medication 1 DOSE(S): at 07:58

## 2024-09-22 RX ADMIN — Medication 4 MILLIGRAM(S): at 21:13

## 2024-09-22 RX ADMIN — Medication 5000 UNIT(S): at 06:11

## 2024-09-22 RX ADMIN — Medication 400 MILLIGRAM(S): at 04:29

## 2024-09-22 RX ADMIN — Medication 1 DOSE(S): at 19:34

## 2024-09-22 RX ADMIN — Medication 5000 UNIT(S): at 13:26

## 2024-09-22 RX ADMIN — SODIUM CHLORIDE 50 MILLILITER(S): 5 INJECTION, SOLUTION INTRAVENOUS at 14:38

## 2024-09-22 RX ADMIN — PIPERACILLIN SODIUM AND TAZOBACTAM SODIUM 200 GRAM(S): 12; 1.5 INJECTION, POWDER, LYOPHILIZED, FOR SOLUTION INTRAVENOUS at 09:15

## 2024-09-22 RX ADMIN — Medication 20 MILLIGRAM(S): at 06:12

## 2024-09-22 NOTE — PROVIDER CONTACT NOTE (OTHER) - ASSESSMENT
Patient alert and oriented x4 4, forgetful at times.
Patient alert, but confused, easily reorientable.

## 2024-09-22 NOTE — H&P ADULT - HISTORY OF PRESENT ILLNESS
74 F with PMHX CAD , HFpEF, HTN, HLD, COPD, recent admission for CP  s/p LHC discharged home 9/19  returned to the Hospital  yesterday evening with abdominal pain , nausea vomiting started yesterday c/o upper abdominal pain similar to prior presentation   Medical records reviewed she had  LHC with nonobstructive CAD. Pt also c/o fever at home and febrile now , still c/o epigastric right upper abdominal and lower abd pain on off , + dyspnea asking for oxygen ( she is on prn oxygen at home ) Episode of expressive aphasia which since resolved. CTH/CTA Head/Neck negative. Pt slow to respond otherwise without focal deficits.In [facility] ED blood work with hyponatremia , leukocytosis and MADHURI  . Appears dehydrated , no appetite since yesterday   1.5L NSS bolus given in ED. CT Chest/Abd/Pelvis WO +atelectasis otherwise negative. repeat Na 120

## 2024-09-22 NOTE — CONSULT NOTE ADULT - SUBJECTIVE AND OBJECTIVE BOX
Hospitalist Medicine - Observation Consult    CC: Abd Pain  HPI/Hospital Course:  74F with PMHX CAD s/p PCI, HFimprovedEF, HTN, HLD, COPD, recent NSTEMI s/p [facility] discharged yesterday returns to [facility] ER c/o upper abdominal pain similar to prior presentation. [facility] with nonobstructive CAD. Pt noted to be mildly hypoxic improved on O2 via NC and found to have fever. Episode of expressive aphasia which since resolved. CTH/CTA Head/Neck negative. Pt slow to respond otherwise without focal deficits. Labwork with leukocytosis, hyponatremia, and ARF compared to prior labs. Appears dehydrated on exam and hemoconcentrated on labwork. 1.5L NSS bolus given in ED. CT Chest/Abd/Pelvis WO +atelectasis otherwise negative.    ROS negative unless mentioned.    PMHX: CAD s/p PCI, HFimprovedEF, HTN, HLD, COPD, recent NSTEMI s/p [facility]  PSHX: [facility]  Social Hx: denies etoh abuse  FamHx: Denies fam hx HTN    Vital Signs Last 24 Hrs  T(C): 38.7 (22 Sep 2024 03:49), Max: 38.7 (22 Sep 2024 03:49)  T(F): 101.6 (22 Sep 2024 03:49), Max: 101.6 (22 Sep 2024 03:49)  HR: 114 (22 Sep 2024 03:49) (87 - 159)  BP: 105/71 (22 Sep 2024 03:49) (105/71 - 128/66)  BP(mean): 75 (22 Sep 2024 01:00) (75 - 79)  RR: 17 (22 Sep 2024 03:49) (17 - 20)  SpO2: 94% (22 Sep 2024 03:49) (88% - 100%)    Parameters below as of 22 Sep 2024 03:49  Patient On (Oxygen Delivery Method): nasal cannula  O2 Flow (L/min): 4    Constitutional: NAD, VSS  Head: NC/AT  Eyes: PERRL, EOMI, anicteric sclera, conjunctiva WNL  ENT: Normal Pharynx, MMM, No tonsillar exudate/erythema  Neck: Supple, Non-tender  Chest: Non-tender, no rashes  Cardio: RRR, s1/s2, no appreciable murmurs/rubs/gallops  Resp: BS CTA bilaterally, no wheezing/rhonchi/rales  Abd: Soft, Non-tender, Non-distended, no rebound/guarding/rigidity  : not examined  Rectal: not examined  MSK: moving all extremities, no motor weakness, full ROM x4  Ext: palpable distal pulses, good capillary refill, no clubbing/cyanosis/edema  Psych: appropriate, cooperative  Neuro: CN II-XII grossly intact, no focal deficits  Skin: Warm/Dry. No rashes.

## 2024-09-22 NOTE — H&P ADULT - NSHPLABSRESULTS_GEN_ALL_CORE
12.5   12.42 )-----------( 295      ( 22 Sep 2024 05:09 )             37.2       09-22    120[LL]  |  83[L]  |  30.3[H]  ----------------------------<  117[H]  5.3   |  22.0  |  1.32[H]    Ca    8.8      22 Sep 2024 05:09    TPro  7.0  /  Alb  3.7  /  TBili  0.5  /  DBili  x   /  AST  22  /  ALT  12  /  AlkPhos  85  09-21      < from: CT Abdomen and Pelvis w/ IV Cont (09.22.24 @ 03:23) >    IMPRESSION:  1. Limited evaluation of the segmental and subsegmental pulmonary  arteries secondary to motion artifact. No pulmonary embolism in the main   or lobar pulmonary arteries.    2. Re demonstrated elevated right hemidiaphragm with complete atelectasis   of the right middle lobe and partial atelectasis of the right lower lobe.    3. No bowel obstruction or inflammation.        --- End of Report ---          < end of copied text >

## 2024-09-22 NOTE — PROVIDER CONTACT NOTE (OTHER) - ACTION/TREATMENT ORDERED:
answered. Stated will given message to provider for a callback.  answered. Stated will given message to provider for a callback.  Spoke to Dr Mack at [phone], Continue with current treatment, repeat BMP at 0400 and said he will see patient in the morning.

## 2024-09-22 NOTE — CONSULT NOTE ADULT - ASSESSMENT
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

## 2024-09-22 NOTE — H&P ADULT - ASSESSMENT
75 yo F with CAD , COPD on prn oxygen , HTN and recent admission for CP presented to the ED with c/o abdominal pain , nausea vomiting and fever   Labs with hyponatremia , MADHURI , and leukocytosis       1- Fever   abdominal pain ,nausea vomiting   US of abd r/o GB pathology   check LFTS   start iv zosyn empirical iv abx   add protonix iv   NPO   zofran prn   monitor lytes . fever   blood cx are send result P     2-Hyponatremia   s/p IVF lactate 1.5 liter   urine lytes reviewed   nephrology consult requested     3- MADHURI, prerenal azotemia   possible dehydration recent LHC , ELIDA   nephrology consulted   add IVF   hold valsartan , avoid nephrotoxic medication     4-CAD , mild h/o previous PCI   s/p LHC on 9/17   MID LAD: 50% STENOSIS, IFR NEGATIVE (0.94)  prevIOUS   STENT IN CIRCUMFLEX  PATENT       dispo in few days home if improves   work up in progress

## 2024-09-22 NOTE — PATIENT PROFILE ADULT - FALL HARM RISK - HARM RISK INTERVENTIONS

## 2024-09-22 NOTE — CONSULT NOTE ADULT - SUBJECTIVE AND OBJECTIVE BOX
Patient is a 74y old  Female who presents with a chief complaint of Observation for ARF and Hyponatremia (22 Sep 2024 06:00)      HPI:  74 F with PMHX CAD , HFpEF, HTN, HLD, COPD, recent admission for CP  s/p LHC discharged home   returned to the Hospital  yesterday evening with abdominal pain , nausea vomiting started yesterday c/o upper abdominal pain similar to prior presentation   Medical records reviewed she had  [facility] with nonobstructive CAD. Pt also c/o fever at home and febrile now , still c/o epigastric right upper abdominal and lower abd pain on off , + dyspnea asking for oxygen ( she is on prn oxygen at home ) Episode of expressive aphasia which since resolved. CTH/CTA Head/Neck negative. Pt slow to respond otherwise without focal deficits.In [facility] blood work with hyponatremia , leukocytosis and MADHURI  . Appears dehydrated , no appetite since yesterday   1.5L NSS bolus given in ED. CT Chest/Abd/Pelvis WO +atelectasis otherwise negative. repeat Na 120    (22 Sep 2024 08:23)    Interim noted   Valsartan on hold  SBP 90 - 100 in ER   Initiated Zosyn   + Effexor / Trazadone / Neurontin   Na on recent discharge was 130  Creat 1.7 here --> after IVF down to 1.2   CT cheast / abdomen OK  Leg doppler , No DVT       PAST MEDICAL & SURGICAL HISTORY:  NICM (nonischemic cardiomyopathy)      HTN (hypertension)      Rheumatoid arthritis      Chronic back pain      HLD (hyperlipidemia)      Chronic obstructive asthma      Cellulitis      S/P       S/P hysterectomy          FAMILY HISTORY:  Family history of diabetes mellitus (DM)    FH: CAD (coronary artery disease)        Social History:    MEDICATIONS  (STANDING):  albuterol/ipratropium for Nebulization 3 milliLiter(s) Nebulizer every 6 hours  ALPRAZolam 0.5 milliGRAM(s) Oral two times a day  aspirin  chewable 81 milliGRAM(s) Oral daily  atorvastatin 40 milliGRAM(s) Oral at bedtime  carvedilol 3.125 milliGRAM(s) Oral every 12 hours  fluticasone propionate/ salmeterol 100-50 MICROgram(s) Diskus 1 Dose(s) Inhalation two times a day  gabapentin 300 milliGRAM(s) Oral daily  heparin   Injectable 5000 Unit(s) SubCutaneous every 8 hours  pantoprazole  Injectable 40 milliGRAM(s) IV Push daily  piperacillin/tazobactam IVPB.- 3.375 Gram(s) IV Intermittent once  piperacillin/tazobactam IVPB.. 3.375 Gram(s) IV Intermittent every 8 hours  QUEtiapine 300 milliGRAM(s) Oral at bedtime  tiotropium 2.5 MICROgram(s) Inhaler 2 Puff(s) Inhalation daily  traZODone 50 milliGRAM(s) Oral at bedtime  venlafaxine  milliGRAM(s) Oral daily    MEDICATIONS  (PRN):  ondansetron Injectable 4 milliGRAM(s) IV Push four times a day PRN Nausea and/or Vomiting  zolpidem 5 milliGRAM(s) Oral at bedtime PRN Insomnia  zolpidem 5 milliGRAM(s) Oral at bedtime PRN Insomnia      Allergies    No Known Allergies    Intolerances          Vital Signs Last 24 Hrs  T(C): 36.8 (22 Sep 2024 12:18), Max: 38.7 (22 Sep 2024 03:49)  T(F): 98.2 (22 Sep 2024 12:18), Max: 101.6 (22 Sep 2024 03:49)  HR: 89 (22 Sep 2024 12:18) (87 - 159)  BP: 93/63 (22 Sep 2024 12:18) (93/63 - 128/66)  BP(mean): 75 (22 Sep 2024 01:00) (75 - 79)  RR: 18 (22 Sep 2024 12:18) (17 - 20)  SpO2: 100% (22 Sep 2024 12:18) (88% - 100%)    Parameters below as of 22 Sep 2024 12:18  Patient On (Oxygen Delivery Method): nasal cannula  O2 Flow (L/min): 2    Daily Height in cm: 162.56 (21 Sep 2024 20:45)    Daily   I&O's Detail    I&O's Summary      PHYSICAL EXAM:    GENERAL: NAD,  HEAD:  Atraumatic, No edema   NECK: Supple, No JVD, Normal thyroid  NERVOUS SYSTEM:  Alert & Oriented X3,   CHEST/LUNG: Clear to percussion bilaterally; No rales, rhonchi, wheezing, or rubs  HEART: Regular rate and rhythm; No murmurs, rubs, or gallops  ABDOMEN: Soft, Nontender, Nondistended; Bowel sounds present  EXTREMITIES:  2+ Peripheral Pulses, No clubbing, cyanosis, or edema        LABS:                        12.5   14.99 )-----------( 294      ( 22 Sep 2024 08:37 )             37.2         120[LL]  |  84[L]  |  29.4[H]  ----------------------------<  142[H]  4.8   |  22.0  |  1.29    Ca    8.7      22 Sep 2024 08:37  Phos  4.5       Mg     1.8         Osmolality, Random Urine: 391 mosm/kg (24 @ 08:47)   Sodium, Random Urine: <30: Reference Ranges have NOT bee    TPro  7.2  /  Alb  3.6  /  TBili  0.3[L]  /  DBili  0.1  /  AST  20  /  ALT  12  /  AlkPhos  93      PT/INR - ( 21 Sep 2024 21:30 )   PT: 11.0 sec;   INR: 0.99 ratio         PTT - ( 21 Sep 2024 21:30 )  PTT:32.7 sec  Urinalysis Basic - ( 22 Sep 2024 08:37 )    Color: x / Appearance: x / SG: x / pH: x  Gluc: 142 mg/dL / Ketone: x  / Bili: x / Urobili: x   Blood: x / Protein: x / Nitrite: x   Leuk Esterase: x / RBC: x / WBC x   Sq Epi: x / Non Sq Epi: x / Bacteria: x      Magnesium: 1.8 mg/dL ( @ 08:37)  Phosphorus: 4.5 mg/dL ( @ 08:37)          < from: CT Abdomen and Pelvis w/ IV Cont (24 @ 03:23) >    ACC: [id number] EXAM:  CT ABDOMEN AND PELVIS IC   ORDERED BY: BRENTON LINARES     ACC: [id number] EXAM:  CT ANGIO CHEST PULM ART WAWIC   ORDERED BY: BRENTON LINARES     PROCEDURE DATE:  2024          INTERPRETATION:  CLINICAL INFORMATION: Recent hospitalization with   hypoxia and fever. Sepsis. Abdominal pain.    COMPARISON: CT pulmonary angiogram from 3/5/2024.    CONTRAST/COMPLICATIONS:  IV Contrast: Omnipaque 350  62 cc administered   38 cc discarded  Oral Contrast: NONE  Complications: None reported at time of study completion    PROCEDURE:  CT Angiography of the Chest was performed followed by portal venous phase   imaging of the Abdomen and Pelvis.  Sagittal and coronal reformats were performed as well as 3D (MIP)   reconstructions.    FINDINGS:  CHEST:  LUNGS AND LARGE AIRWAYS: Patent central airways. Redemonstrated elevated   right hemidiaphragm. No pulmonary nodules, masses or consolidation.   Redemonstrated complete atelectasis of the right middle lobe.   Redemonstrated partial atelectasis of the right lower lobe. Left basilar   subsegmental atelectasis. Left upper lobe linear atelectasis. Biapical   scarring.  PLEURA: No pleural effusion.  VESSELS: Limited evaluation of the segmental and subsegmental pulmonary   arteries secondary to motion artifact. No pulmonary embolism in the main   or lobar pulmonary arteries. Aberrant right subclavian artery.  HEART: Heart size is normal. No pericardial effusion.  MEDIASTINUM AND ALE: No lymphadenopathy.  CHEST WALL AND LOWER NECK: Old fracture of the inferior tip of the right   scapula. Moderate to severe bilateral glenohumeral osteoarthritis.    ABDOMEN AND PELVIS:  LIVER: Within normal limits.  BILE DUCTS: Normal caliber.  GALLBLADDER: Nonspecific distention.  SPLEEN: Within normal limits.  PANCREAS: Within normal limits.  ADRENALS: Within normal limits.  KIDNEYS/URETERS: Right renal cyst. Right renal subcentimeter hypodense   lesion too small for accurate characterization. No hydronephrosis. Mild   nonspecific bilateral perinephric fat stranding.    BLADDER: Within normal limits.  REPRODUCTIVE ORGANS: Hysterectomy. No adnexal masses.    BOWEL: No bowel obstruction or inflammation. Colonic diverticulosis   without diverticulitis. Appendix is normal.  PERITONEUM/RETROPERITONEUM: Within normal limits.  VESSELS: Within normal limits.  LYMPH NODES: No lymphadenopathy.  ABDOMINAL WALL: Anterior lower abdominal/pelvic wall scar.  BONES: 6 lumbar type vertebral bodies. L5-S1 posterior spinal fusion. L5   and L6 laminectomies. Degenerative changes of the spine. Age   indeterminate, but likely chronic, superior endplate compression fracture   deformities of the L1 and L2 vertebral bodies.    IMPRESSION:  1. Limited evaluation of the segmental and subsegmental pulmonary  arteries secondary to motion artifact. No pulmonary embolism in the main   or lobar pulmonary arteries.  2. Redemonstrated elevated right hemidiaphragm with complete atelectasis   of the right middle lobe and partial atelectasis of the right lower lobe.  3. No bowel obstruction or inflammation.        --- End of Report ---            LIONEL CRUZ MD; Attending Radiologist  This document has been electronically signed. Sep 22 2024  3:50AM    < end of copied text >  < from: US Duplex Venous Lower Ext Complete, Bilateral (24 @ 12:04) >  ACC: [id number] EXAM:  US DPLX LWR EXT VEINS COMPL BI   ORDERED BY: TUCKER CLEMENTE     PROCEDURE DATE:  2024          INTERPRETATION:  CLINICAL INFORMATION: Fever. Leg pain    COMPARISON: None available.    TECHNIQUE: Duplex sonography ofthe BILATERAL LOWER extremity veins with   color and spectral Doppler, with and without compression.    FINDINGS:    RIGHT:  Normal compressibility of the RIGHT common femoral, femoral and popliteal   veins.  Doppler examination shows normal spontaneous and phasic flow.  No RIGHT calf vein thrombosis is detected.    LEFT:  Normal compressibility of the LEFT common femoral, femoral and popliteal   veins.  Doppler examination shows normal spontaneous and phasic flow.  No LEFT calf vein thrombosis is detected.    IMPRESSION:  No evidence of deep venous thrombosis in either lower extremity.            --- End of Report ---    < end of copied text >  RADIOLOGY & ADDITIONAL TESTS:   Patient is a 74y old  Female who presents with a chief complaint of Observation for ARF and Hyponatremia (22 Sep 2024 06:00)      HPI:  74 F with PMHX CAD , HFpEF, HTN, HLD, COPD, recent admission for CP  s/p LHC discharged home   returned to the Hospital  yesterday evening with abdominal pain , nausea vomiting started yesterday c/o upper abdominal pain similar to prior presentation   Medical records reviewed she had  [facility] with nonobstructive CAD. Pt also c/o fever at home and febrile now , still c/o epigastric right upper abdominal and lower abd pain on off , + dyspnea asking for oxygen ( she is on prn oxygen at home ) Episode of expressive aphasia which since resolved. CTH/CTA Head/Neck negative. Pt slow to respond otherwise without focal deficits.In [facility] blood work with hyponatremia , leukocytosis and MADHURI  . Appears dehydrated , no appetite since yesterday   1.5L NSS bolus given in ED. CT Chest/Abd/Pelvis WO +atelectasis otherwise negative. repeat Na 120    (22 Sep 2024 08:23)    Interim noted   Valsartan on hold  SBP 90 - 100 in ER   Initiated Zosyn   + Effexor / Trazadone / Neurontin   Na on recent discharge was 130  Creat 1.7 here --> after IVF down to 1.2   CT cheast / abdomen OK  Leg doppler , No DVT   Known h/o chronic hyponatremia - dating back to 2024  Feeling was , she had SIADH , related to chronic Psych meds       PAST MEDICAL & SURGICAL HISTORY:  NICM (nonischemic cardiomyopathy)      HTN (hypertension)      Rheumatoid arthritis      Chronic back pain      HLD (hyperlipidemia)      Chronic obstructive asthma      Cellulitis      S/P       S/P hysterectomy          FAMILY HISTORY:  Family history of diabetes mellitus (DM)    FH: CAD (coronary artery disease)        Social History:    MEDICATIONS  (STANDING):  albuterol/ipratropium for Nebulization 3 milliLiter(s) Nebulizer every 6 hours  ALPRAZolam 0.5 milliGRAM(s) Oral two times a day  aspirin  chewable 81 milliGRAM(s) Oral daily  atorvastatin 40 milliGRAM(s) Oral at bedtime  carvedilol 3.125 milliGRAM(s) Oral every 12 hours  fluticasone propionate/ salmeterol 100-50 MICROgram(s) Diskus 1 Dose(s) Inhalation two times a day  gabapentin 300 milliGRAM(s) Oral daily  heparin   Injectable 5000 Unit(s) SubCutaneous every 8 hours  pantoprazole  Injectable 40 milliGRAM(s) IV Push daily  piperacillin/tazobactam IVPB.- 3.375 Gram(s) IV Intermittent once  piperacillin/tazobactam IVPB.. 3.375 Gram(s) IV Intermittent every 8 hours  QUEtiapine 300 milliGRAM(s) Oral at bedtime  tiotropium 2.5 MICROgram(s) Inhaler 2 Puff(s) Inhalation daily  traZODone 50 milliGRAM(s) Oral at bedtime  venlafaxine  milliGRAM(s) Oral daily    MEDICATIONS  (PRN):  ondansetron Injectable 4 milliGRAM(s) IV Push four times a day PRN Nausea and/or Vomiting  zolpidem 5 milliGRAM(s) Oral at bedtime PRN Insomnia  zolpidem 5 milliGRAM(s) Oral at bedtime PRN Insomnia      Allergies    No Known Allergies    Intolerances          Vital Signs Last 24 Hrs  T(C): 36.8 (22 Sep 2024 12:18), Max: 38.7 (22 Sep 2024 03:49)  T(F): 98.2 (22 Sep 2024 12:18), Max: 101.6 (22 Sep 2024 03:49)  HR: 89 (22 Sep 2024 12:18) (87 - 159)  BP: 93/63 (22 Sep 2024 12:18) (93/63 - 128/66)  BP(mean): 75 (22 Sep 2024 01:00) (75 - 79)  RR: 18 (22 Sep 2024 12:18) (17 - 20)  SpO2: 100% (22 Sep 2024 12:18) (88% - 100%)    Parameters below as of 22 Sep 2024 12:18  Patient On (Oxygen Delivery Method): nasal cannula  O2 Flow (L/min): 2    Daily Height in cm: 162.56 (21 Sep 2024 20:45)    Daily   I&O's Detail    I&O's Summary      PHYSICAL EXAM:    GENERAL: NAD,  HEAD:  Atraumatic, No edema   NECK: Supple, No JVD, Normal thyroid  NERVOUS SYSTEM:  Alert & Oriented X3,   CHEST/LUNG: Clear / EAE . No wheeze   HEART: Regular rate and rhythm; No Gallop or rub   ABDOMEN: Soft, Nontender, No CVAT   EXTREMITIES:  Mild edema         LABS:                        12.5   14.99 )-----------( 294      ( 22 Sep 2024 08:37 )             37.2         120[LL]  |  84[L]  |  29.4[H]  ----------------------------<  142[H]  4.8   |  22.0  |  1.29    Ca    8.7      22 Sep 2024 08:37  Phos  4.5       Mg     1.8         Osmolality, Random Urine: 391 mosm/kg (24 @ 08:47)   Sodium, Random Urine: <30: Reference Ranges have NOT bee    TPro  7.2  /  Alb  3.6  /  TBili  0.3[L]  /  DBili  0.1  /  AST  20  /  ALT  12  /  AlkPhos  93      PT/INR - ( 21 Sep 2024 21:30 )   PT: 11.0 sec;   INR: 0.99 ratio         PTT - ( 21 Sep 2024 21:30 )  PTT:32.7 sec  Urinalysis Basic - ( 22 Sep 2024 08:37 )    Color: x / Appearance: x / SG: x / pH: x  Gluc: 142 mg/dL / Ketone: x  / Bili: x / Urobili: x   Blood: x / Protein: x / Nitrite: x   Leuk Esterase: x / RBC: x / WBC x   Sq Epi: x / Non Sq Epi: x / Bacteria: x      Magnesium: 1.8 mg/dL ( @ 08:37)  Phosphorus: 4.5 mg/dL ( @ 08:37)          < from: CT Abdomen and Pelvis w/ IV Cont (24 @ 03:23) >    ACC: [id number] EXAM:  CT ABDOMEN AND PELVIS IC   ORDERED BY: BRENTON LINARES     ACC: [id number] EXAM:  CT ANGIO CHEST PULM ART WAWIC   ORDERED BY: BRENTON LINARES     PROCEDURE DATE:  2024          INTERPRETATION:  CLINICAL INFORMATION: Recent hospitalization with   hypoxia and fever. Sepsis. Abdominal pain.    COMPARISON: CT pulmonary angiogram from 3/5/2024.    CONTRAST/COMPLICATIONS:  IV Contrast: Omnipaque 350  62 cc administered   38 cc discarded  Oral Contrast: NONE  Complications: None reported at time of study completion    PROCEDURE:  CT Angiography of the Chest was performed followed by portal venous phase   imaging of the Abdomen and Pelvis.  Sagittal and coronal reformats were performed as well as 3D (MIP)   reconstructions.    FINDINGS:  CHEST:  LUNGS AND LARGE AIRWAYS: Patent central airways. Redemonstrated elevated   right hemidiaphragm. No pulmonary nodules, masses or consolidation.   Redemonstrated complete atelectasis of the right middle lobe.   Redemonstrated partial atelectasis of the right lower lobe. Left basilar   subsegmental atelectasis. Left upper lobe linear atelectasis. Biapical   scarring.  PLEURA: No pleural effusion.  VESSELS: Limited evaluation of the segmental and subsegmental pulmonary   arteries secondary to motion artifact. No pulmonary embolism in the main   or lobar pulmonary arteries. Aberrant right subclavian artery.  HEART: Heart size is normal. No pericardial effusion.  MEDIASTINUM AND ALE: No lymphadenopathy.  CHEST WALL AND LOWER NECK: Old fracture of the inferior tip of the right   scapula. Moderate to severe bilateral glenohumeral osteoarthritis.    ABDOMEN AND PELVIS:  LIVER: Within normal limits.  BILE DUCTS: Normal caliber.  GALLBLADDER: Nonspecific distention.  SPLEEN: Within normal limits.  PANCREAS: Within normal limits.  ADRENALS: Within normal limits.  KIDNEYS/URETERS: Right renal cyst. Right renal subcentimeter hypodense   lesion too small for accurate characterization. No hydronephrosis. Mild   nonspecific bilateral perinephric fat stranding.    BLADDER: Within normal limits.  REPRODUCTIVE ORGANS: Hysterectomy. No adnexal masses.    BOWEL: No bowel obstruction or inflammation. Colonic diverticulosis   without diverticulitis. Appendix is normal.  PERITONEUM/RETROPERITONEUM: Within normal limits.  VESSELS: Within normal limits.  LYMPH NODES: No lymphadenopathy.  ABDOMINAL WALL: Anterior lower abdominal/pelvic wall scar.  BONES: 6 lumbar type vertebral bodies. L5-S1 posterior spinal fusion. L5   and L6 laminectomies. Degenerative changes of the spine. Age   indeterminate, but likely chronic, superior endplate compression fracture   deformities of the L1 and L2 vertebral bodies.    IMPRESSION:  1. Limited evaluation of the segmental and subsegmental pulmonary  arteries secondary to motion artifact. No pulmonary embolism in the main   or lobar pulmonary arteries.  2. Redemonstrated elevated right hemidiaphragm with complete atelectasis   of the right middle lobe and partial atelectasis of the right lower lobe.  3. No bowel obstruction or inflammation.        --- End of Report ---            LIONEL CRUZ MD; Attending Radiologist  This document has been electronically signed. Sep 22 2024  3:50AM    < end of copied text >  < from: US Duplex Venous Lower Ext Complete, Bilateral (24 @ 12:04) >  ACC: [id number] EXAM:  US DPLX LWR EXT VEINS COMPL BI   ORDERED BY: TUCKER CLEMENTE     PROCEDURE DATE:  2024          INTERPRETATION:  CLINICAL INFORMATION: Fever. Leg pain    COMPARISON: None available.    TECHNIQUE: Duplex sonography ofthe BILATERAL LOWER extremity veins with   color and spectral Doppler, with and without compression.    FINDINGS:    RIGHT:  Normal compressibility of the RIGHT common femoral, femoral and popliteal   veins.  Doppler examination shows normal spontaneous and phasic flow.  No RIGHT calf vein thrombosis is detected.    LEFT:  Normal compressibility of the LEFT common femoral, femoral and popliteal   veins.  Doppler examination shows normal spontaneous and phasic flow.  No LEFT calf vein thrombosis is detected.    IMPRESSION:  No evidence of deep venous thrombosis in either lower extremity.            --- End of Report ---    < end of copied text >  < from: CT Brain Perfusion Maps Stroke (24 @ 21:29) >  ACC: [id number] EXAM:  CT ANGIO BRAIN STROKE PROTC IC   ORDERED BY: BRENTON LINARES     ACC: [id number] EXAM:  CT BRAIN PERFUSION MAPS STROKE   ORDERED BY: BRENTON LINARES     ACC: [id number] EXAM:  CT ANGIO NECK STROKE PROTCL IC   ORDERED BY: BRENTON LINARES     PROCEDURE DATE:  2024          INTERPRETATION:  CLINICAL INFORMATION: Stroke Code    COMPARISON: None.    CONTRAST:  IV Contrast: Omnipaque 350  50 cc administered (accession [id number]), 70   cc administered (accession [id number])  0 cc discarded (accession   [id number]), 30 cc discarded (accession [id number])  Complications: None reported at time of study completion    TECHNIQUE: CT perfusion and CTA of the head and neck were performed   following the intravenous administration of IV contrast.MIP   reconstructions were performed on a separate workstation and reviewed.   RAPID software was utilized for perfusion analysis.    FINDINGS:    CT PERFUSION:    TECHNICAL LIMITATIONS: None.    CBF<30%/CORE INFARCTION: 0 mL  TMAX>6s/UNDERPERFUSED TISSUE: 0 mL  MISMATCH VOLUME/TISSUE AT RISK: 0 mL  MISMATCH RATIO: None.    MISCELLANEOUS: None.      CTA NECK:    AORTIC ARCH AND VISUALIZED GREAT VESSELS: No ascites or aortic arch with   aberrant right subclavian artery    RIGHT:  COMMON CAROTID ARTERY: No significant stenosis to the carotid bifurcation.  INTERNAL CAROTID ARTERY: No significant stenosis based on NASCET criteria.  VERTEBRAL ARTERY: Normal in course and caliber to the intracranial   circulation.    LEFT:  COMMON CAROTID ARTERY: No significant stenosis to the carotid bifurcation.  INTERNAL CAROTID ARTERY: No significant stenosis based on NASCET criteria.  VERTEBRAL ARTERY: Normal in course and caliber to the intracranial   circulation.    VISUALIZED LUNGS: Partially right lower lung consolidation and/or   elevation of the right hemidiaphragm, similar to prior CT chest of   3/5/2024. Scattered subsegmental atelectasis.    MISCELLANEOUS: None.    CAROTID STENOSIS REFERENCE: Percent (%) stenosis is expressed in terms of   NASCET Criteria. (NASCET = 100x1-(N/D)). N=greatest narrowing. D=normal   distal diameter - MILD = <50% stenosis. - MODERATE = 50-69% stenosis. -   SEVERE = 70-89% stenosis. - HAIRLINE/CRITICAL = 90-99% stenosis. -   OCCLUDED = 100% stenosis.      CTA HEAD:    INTERNAL CAROTID ARTERIES: Bilateral petrous, precavernous, cavernous,   and supraclinoid regions are patent without significant stenosis.    [place] OF MEDEIROS: No aneurysm identified. Tiny aneurysms can be beyond   the resolution of CTA technique.    ANTERIOR CEREBRAL ARTERIES: No significant stenosis or occlusion.  MIDDLE CEREBRAL ARTERIES: No significant stenosis or occlusion.  POSTERIOR CEREBRAL ARTERIES: No significant stenosis or occlusion.    DISTAL VERTEBRAL / BASILAR ARTERIES: No significant stenosis or   occlusion. Hypoplastic, in keeping with bilateral fetal origin PCAs.    VENOUS STRUCTURES: Visualized superficial and deep venous structures   appear patent.    MISCELLANEOUS: No other vascular abnormality is seen.      IMPRESSION:    CT PERFUSION:  Technical limitations: None.    Core infarction: 0 ml  Penumbra / tissue at risk for active ischemia: 0 ml    CTA NECK:  No evidence of significant stenosis or occlusion.    CTA HEAD:  No large vessel occlusion, significant stenosis or vascular abnormality   identified.        --- End of Report ---            PHIL KERNS MD; Attending Radiologist  This docum    < end of copied text >  RADIOLOGY & ADDITIONAL TESTS:

## 2024-09-22 NOTE — H&P ADULT - NSHPPHYSICALEXAM_GEN_ALL_CORE
Vital Signs Last 24 Hrs  T(C): 38.4 (22 Sep 2024 07:33), Max: 38.7 (22 Sep 2024 03:49)  T(F): 101.1 (22 Sep 2024 07:33), Max: 101.6 (22 Sep 2024 03:49)  HR: 106 (22 Sep 2024 07:33) (87 - 159)  BP: 103/64 (22 Sep 2024 07:33) (103/64 - 128/66)  BP(mean): 75 (22 Sep 2024 01:00) (75 - 79)  RR: 18 (22 Sep 2024 07:33) (17 - 20)  SpO2: 100% (22 Sep 2024 07:33) (88% - 100%)    Parameters below as of 22 Sep 2024 07:33  Patient On (Oxygen Delivery Method): room air

## 2024-09-22 NOTE — CONSULT NOTE ADULT - ASSESSMENT
ASSESSMENT:  74F with PMHX CAD s/p PCI, HFimprovedEF, HTN, HLD, COPD, recent NSTEMI s/p LHC discharged yesterday returns to [facility] ER c/o upper abdominal pain similar to prior presentation admitted for Fevers, ARF, Hyponatremia, and Leukocytosis.    PLAN:  Fevers, Leukocytosis  -CT Chest/Abd/Pelvis no obvious source of infection  -UA negative  -COVID/RSV/Flu negative  -Check BCX  -Check RVP r/o viral infection  -Ofirmev PRN Fever  -Empiric IV ABX with Ceftriaxone/Azithromycin pending infection workup above    ARF  -Likely combination of ELIDA from recent LH and reinitiation of ARB on discharge  -Hold Valsartan 80mg q24  -Check Urine Studies  -CT ABDPEL no obstruction  -Renal Restriction Diet  -Renal Dose Meds  -Trend BMP    Hyponatremia  -Acute change <48 hours given prior labs  -1.5L IVFB NSS given in ED  -Repeat stat BMP/Electrolytes  -Monitor rate of correction  -If no improvement likely admit to inpatient and obtain Nephro evaluation    Episode of Expressive Aphasia  -has since resolved. no focal deficits  -CT Head negative  -Check MRI Brain    COPD  -no acute exacerbation  -O2 via nC for now likely related to chronic elevated hemidiaphrgam and atelectasis  -Duoneb q6  -Hold Steroids  -Trelegy Ellipta -> Substitute Symbicort/Spiriva  -Incentive Spirometry    Elevated Troponin  -2/2 recent [facility]. No need to trend cardiac enzymes. No further workup.    Neuropathy  -Decrease Gabapentin 600mg BID to 300mg qHS with ARF pending resolution of kidney impairment    Schizophrenia, Mood Disorder  -Venlafaxine 225mg q24  -Trazodone 50mg qHS  -Quetiapine 300mg qHS  -Zolpidem PRN    CAD, HTN, HLD  -ASA 81mg q24  -Atorvastatin 40mg q24  -Carvedilol 3.125mg BID  -Hold ARB with ARF     VTE PPX: SCDs/SQH 5000q8  DISPO: Place in OBS. Medicine to follow with consultation.

## 2024-09-23 LAB
ALBUMIN SERPL ELPH-MCNC: 3.4 G/DL — SIGNIFICANT CHANGE UP (ref 3.3–5.2)
ALBUMIN SERPL ELPH-MCNC: 3.4 G/DL — SIGNIFICANT CHANGE UP (ref 3.3–5.2)
ALP SERPL-CCNC: 74 U/L — SIGNIFICANT CHANGE UP (ref 40–120)
ALP SERPL-CCNC: 88 U/L — SIGNIFICANT CHANGE UP (ref 40–120)
ALT FLD-CCNC: 11 U/L — SIGNIFICANT CHANGE UP
ALT FLD-CCNC: 11 U/L — SIGNIFICANT CHANGE UP
ANION GAP SERPL CALC-SCNC: 12 MMOL/L — SIGNIFICANT CHANGE UP (ref 5–17)
ANION GAP SERPL CALC-SCNC: 16 MMOL/L — SIGNIFICANT CHANGE UP (ref 5–17)
AST SERPL-CCNC: 17 U/L — SIGNIFICANT CHANGE UP
AST SERPL-CCNC: 22 U/L — SIGNIFICANT CHANGE UP
BASOPHILS # BLD AUTO: 0 K/UL — SIGNIFICANT CHANGE UP (ref 0–0.2)
BASOPHILS NFR BLD AUTO: 0 % — SIGNIFICANT CHANGE UP (ref 0–2)
BILIRUB DIRECT SERPL-MCNC: 0.1 MG/DL — SIGNIFICANT CHANGE UP (ref 0–0.3)
BILIRUB INDIRECT FLD-MCNC: 0.2 MG/DL — SIGNIFICANT CHANGE UP (ref 0.2–1)
BILIRUB SERPL-MCNC: 0.2 MG/DL — LOW (ref 0.4–2)
BILIRUB SERPL-MCNC: 0.3 MG/DL — LOW (ref 0.4–2)
BUN SERPL-MCNC: 28.3 MG/DL — HIGH (ref 8–20)
BUN SERPL-MCNC: 29.6 MG/DL — HIGH (ref 8–20)
CALCIUM SERPL-MCNC: 8.9 MG/DL — SIGNIFICANT CHANGE UP (ref 8.4–10.5)
CALCIUM SERPL-MCNC: 9.3 MG/DL — SIGNIFICANT CHANGE UP (ref 8.4–10.5)
CHLORIDE SERPL-SCNC: 89 MMOL/L — LOW (ref 96–108)
CHLORIDE SERPL-SCNC: 90 MMOL/L — LOW (ref 96–108)
CK SERPL-CCNC: 175 U/L — HIGH (ref 25–170)
CO2 SERPL-SCNC: 22 MMOL/L — SIGNIFICANT CHANGE UP (ref 22–29)
CO2 SERPL-SCNC: 25 MMOL/L — SIGNIFICANT CHANGE UP (ref 22–29)
CREAT SERPL-MCNC: 0.93 MG/DL — SIGNIFICANT CHANGE UP (ref 0.5–1.3)
CREAT SERPL-MCNC: 1.17 MG/DL — SIGNIFICANT CHANGE UP (ref 0.5–1.3)
CULTURE RESULTS: NO GROWTH — SIGNIFICANT CHANGE UP
EGFR: 49 ML/MIN/1.73M2 — LOW
EGFR: 64 ML/MIN/1.73M2 — SIGNIFICANT CHANGE UP
EOSINOPHIL # BLD AUTO: 0 K/UL — SIGNIFICANT CHANGE UP (ref 0–0.5)
EOSINOPHIL NFR BLD AUTO: 0 % — SIGNIFICANT CHANGE UP (ref 0–6)
GAS PNL BLDA: SIGNIFICANT CHANGE UP
GAS PNL BLDA: SIGNIFICANT CHANGE UP
GLUCOSE BLDC GLUCOMTR-MCNC: 130 MG/DL — HIGH (ref 70–99)
GLUCOSE SERPL-MCNC: 116 MG/DL — HIGH (ref 70–99)
GLUCOSE SERPL-MCNC: 117 MG/DL — HIGH (ref 70–99)
HCT VFR BLD CALC: 36.1 % — SIGNIFICANT CHANGE UP (ref 34.5–45)
HGB BLD-MCNC: 11.9 G/DL — SIGNIFICANT CHANGE UP (ref 11.5–15.5)
LEGIONELLA AG UR QL: NEGATIVE — SIGNIFICANT CHANGE UP
LIDOCAIN IGE QN: 11 U/L — LOW (ref 22–51)
LYMPHOCYTES # BLD AUTO: 13.1 % — SIGNIFICANT CHANGE UP (ref 13–44)
LYMPHOCYTES # BLD AUTO: 2.48 K/UL — SIGNIFICANT CHANGE UP (ref 1–3.3)
MAGNESIUM SERPL-MCNC: 2.4 MG/DL — SIGNIFICANT CHANGE UP (ref 1.6–2.6)
MCHC RBC-ENTMCNC: 29.8 PG — SIGNIFICANT CHANGE UP (ref 27–34)
MCHC RBC-ENTMCNC: 33 GM/DL — SIGNIFICANT CHANGE UP (ref 32–36)
MCV RBC AUTO: 90.5 FL — SIGNIFICANT CHANGE UP (ref 80–100)
MONOCYTES # BLD AUTO: 2.14 K/UL — HIGH (ref 0–0.9)
MONOCYTES NFR BLD AUTO: 11.3 % — SIGNIFICANT CHANGE UP (ref 2–14)
NEUTROPHILS # BLD AUTO: 14 K/UL — HIGH (ref 1.8–7.4)
NEUTROPHILS NFR BLD AUTO: 73.9 % — SIGNIFICANT CHANGE UP (ref 43–77)
PLATELET # BLD AUTO: 356 K/UL — SIGNIFICANT CHANGE UP (ref 150–400)
POTASSIUM SERPL-MCNC: 4.3 MMOL/L — SIGNIFICANT CHANGE UP (ref 3.5–5.3)
POTASSIUM SERPL-MCNC: 4.4 MMOL/L — SIGNIFICANT CHANGE UP (ref 3.5–5.3)
POTASSIUM SERPL-SCNC: 4.3 MMOL/L — SIGNIFICANT CHANGE UP (ref 3.5–5.3)
POTASSIUM SERPL-SCNC: 4.4 MMOL/L — SIGNIFICANT CHANGE UP (ref 3.5–5.3)
PROT SERPL-MCNC: 6.8 G/DL — SIGNIFICANT CHANGE UP (ref 6.6–8.7)
PROT SERPL-MCNC: 7.2 G/DL — SIGNIFICANT CHANGE UP (ref 6.6–8.7)
RBC # BLD: 3.99 M/UL — SIGNIFICANT CHANGE UP (ref 3.8–5.2)
RBC # FLD: 11.9 % — SIGNIFICANT CHANGE UP (ref 10.3–14.5)
S PNEUM AG UR QL: NEGATIVE — SIGNIFICANT CHANGE UP
SODIUM SERPL-SCNC: 126 MMOL/L — LOW (ref 135–145)
SODIUM SERPL-SCNC: 127 MMOL/L — LOW (ref 135–145)
SPECIMEN SOURCE: SIGNIFICANT CHANGE UP
TROPONIN T, HIGH SENSITIVITY RESULT: 41 NG/L — SIGNIFICANT CHANGE UP (ref 0–51)
WBC # BLD: 18.94 K/UL — HIGH (ref 3.8–10.5)
WBC # FLD AUTO: 18.94 K/UL — HIGH (ref 3.8–10.5)

## 2024-09-23 PROCEDURE — 99232 SBSQ HOSP IP/OBS MODERATE 35: CPT

## 2024-09-23 PROCEDURE — 70450 CT HEAD/BRAIN W/O DYE: CPT | Mod: 26

## 2024-09-23 PROCEDURE — 71045 X-RAY EXAM CHEST 1 VIEW: CPT | Mod: 26

## 2024-09-23 PROCEDURE — 99291 CRITICAL CARE FIRST HOUR: CPT | Mod: FS,25

## 2024-09-23 RX ORDER — CHLORHEXIDINE GLUCONATE ORAL RINSE 1.2 MG/ML
15 SOLUTION DENTAL EVERY 12 HOURS
Refills: 0 | Status: DISCONTINUED | OUTPATIENT
Start: 2024-09-23 | End: 2024-09-24

## 2024-09-23 RX ORDER — SODIUM CHLORIDE 5 G/100ML
1000 INJECTION, SOLUTION INTRAVENOUS
Refills: 0 | Status: DISCONTINUED | OUTPATIENT
Start: 2024-09-23 | End: 2024-09-23

## 2024-09-23 RX ORDER — METHYLPREDNISOLONE ACETATE 80 MG/ML
40 INJECTION, SUSPENSION INTRA-ARTICULAR; INTRALESIONAL; INTRAMUSCULAR; SOFT TISSUE EVERY 8 HOURS
Refills: 0 | Status: DISCONTINUED | OUTPATIENT
Start: 2024-09-23 | End: 2024-09-24

## 2024-09-23 RX ORDER — BISACODYL 5 MG/1
5 TABLET, COATED ORAL EVERY 12 HOURS
Refills: 0 | Status: DISCONTINUED | OUTPATIENT
Start: 2024-09-23 | End: 2024-10-09

## 2024-09-23 RX ORDER — FUROSEMIDE 10 MG/ML
80 INJECTION INTRAVENOUS ONCE
Refills: 0 | Status: COMPLETED | OUTPATIENT
Start: 2024-09-23 | End: 2024-09-23

## 2024-09-23 RX ORDER — IPRATROPIUM BROMIDE AND ALBUTEROL SULFATE .5; 3 MG/3ML; MG/3ML
3 SOLUTION RESPIRATORY (INHALATION) EVERY 6 HOURS
Refills: 0 | Status: DISCONTINUED | OUTPATIENT
Start: 2024-09-23 | End: 2024-10-09

## 2024-09-23 RX ORDER — PROPOFOL 10 MG/ML
10 INJECTION, EMULSION INTRAVENOUS
Qty: 1000 | Refills: 0 | Status: DISCONTINUED | OUTPATIENT
Start: 2024-09-23 | End: 2024-09-24

## 2024-09-23 RX ORDER — ACETAMINOPHEN 325 MG
1000 TABLET ORAL ONCE
Refills: 0 | Status: COMPLETED | OUTPATIENT
Start: 2024-09-23 | End: 2024-09-23

## 2024-09-23 RX ORDER — ALPRAZOLAM 0.5 MG/1
0.25 TABLET ORAL
Refills: 0 | Status: DISCONTINUED | OUTPATIENT
Start: 2024-09-23 | End: 2024-09-23

## 2024-09-23 RX ORDER — ACETAMINOPHEN 325 MG
650 TABLET ORAL EVERY 6 HOURS
Refills: 0 | Status: DISCONTINUED | OUTPATIENT
Start: 2024-09-23 | End: 2024-09-23

## 2024-09-23 RX ADMIN — PIPERACILLIN SODIUM AND TAZOBACTAM SODIUM 25 GRAM(S): 12; 1.5 INJECTION, POWDER, LYOPHILIZED, FOR SOLUTION INTRAVENOUS at 05:33

## 2024-09-23 RX ADMIN — Medication 3.12 MILLIGRAM(S): at 05:28

## 2024-09-23 RX ADMIN — FUROSEMIDE 80 MILLIGRAM(S): 10 INJECTION INTRAVENOUS at 21:50

## 2024-09-23 RX ADMIN — IPRATROPIUM BROMIDE AND ALBUTEROL SULFATE 3 MILLILITER(S): .5; 3 SOLUTION RESPIRATORY (INHALATION) at 21:16

## 2024-09-23 RX ADMIN — SODIUM CHLORIDE 50 MILLILITER(S): 5 INJECTION, SOLUTION INTRAVENOUS at 18:45

## 2024-09-23 RX ADMIN — Medication 650 MILLIGRAM(S): at 20:40

## 2024-09-23 RX ADMIN — PANTOPRAZOLE SODIUM 40 MILLIGRAM(S): 40 TABLET, DELAYED RELEASE ORAL at 12:41

## 2024-09-23 RX ADMIN — TIOTROPIUM BROMIDE INHALATION SPRAY 2 PUFF(S): 3.12 SPRAY, METERED RESPIRATORY (INHALATION) at 08:28

## 2024-09-23 RX ADMIN — Medication 225 MILLIGRAM(S): at 12:41

## 2024-09-23 RX ADMIN — Medication 5000 UNIT(S): at 05:33

## 2024-09-23 RX ADMIN — PIPERACILLIN SODIUM AND TAZOBACTAM SODIUM 25 GRAM(S): 12; 1.5 INJECTION, POWDER, LYOPHILIZED, FOR SOLUTION INTRAVENOUS at 15:25

## 2024-09-23 RX ADMIN — Medication 1000 MILLIGRAM(S): at 13:30

## 2024-09-23 RX ADMIN — Medication 650 MILLIGRAM(S): at 16:30

## 2024-09-23 RX ADMIN — Medication 3.12 MILLIGRAM(S): at 18:13

## 2024-09-23 RX ADMIN — GABAPENTIN 300 MILLIGRAM(S): 800 TABLET, FILM COATED ORAL at 12:41

## 2024-09-23 RX ADMIN — ALPRAZOLAM 0.25 MILLIGRAM(S): 0.5 TABLET ORAL at 20:57

## 2024-09-23 RX ADMIN — ALPRAZOLAM 0.5 MILLIGRAM(S): 0.5 TABLET ORAL at 18:13

## 2024-09-23 RX ADMIN — Medication 4 MILLIGRAM(S): at 07:51

## 2024-09-23 RX ADMIN — Medication 650 MILLIGRAM(S): at 15:26

## 2024-09-23 RX ADMIN — ALPRAZOLAM 0.5 MILLIGRAM(S): 0.5 TABLET ORAL at 05:28

## 2024-09-23 RX ADMIN — IPRATROPIUM BROMIDE AND ALBUTEROL SULFATE 3 MILLILITER(S): .5; 3 SOLUTION RESPIRATORY (INHALATION) at 08:27

## 2024-09-23 RX ADMIN — Medication 400 MILLIGRAM(S): at 12:34

## 2024-09-23 RX ADMIN — Medication 5000 UNIT(S): at 15:25

## 2024-09-23 RX ADMIN — Medication 81 MILLIGRAM(S): at 12:41

## 2024-09-23 RX ADMIN — Medication 1 DOSE(S): at 08:28

## 2024-09-23 NOTE — PROGRESS NOTE ADULT - ASSESSMENT
73 yo F with CAD , COPD on prn oxygen , HTN and recent admission for CP presented to the ED with c/o abdominal pain , nausea vomiting and fever   Labs with hyponatremia , MADHURI , and leukocytosis       1- Fever   abdominal pain ,nausea vomiting   US of abd r/o GB pathology   Monitor LFTS   Continue iv zosyn empirical iv abx   Continue Protonix, Zofran.      2-Hyponatremia, MADHURI - Low Na due to GI losses?  Cr, Na improved.  Nephrology following.  Advance diet.     3- Contstipation - add laxatives.    4-CAD , mild h/o previous PCI   s/p LHC on 9/17   MID LAD: 50% STENOSIS, IFR NEGATIVE (0.94)  prevIOUS   STENT IN CIRCUMFLEX  PATENT   Pt asymptoamtic.          dispo in few days home if improves   work up in progress      73 yo F with CAD , COPD on prn oxygen , HTN and recent admission for CP presented to the ED with c/o abdominal pain , nausea vomiting and fever   Labs with hyponatremia , MADHURI , and leukocytosis     < from: US Abdomen Complete (US Abdomen Complete .) (09.22.24 @ 12:04) >  Gallbladder distended with sludge. No sonographic evidence of acute   cholecystitis.      < end of copied text >      1- Fever   abdominal pain ,nausea vomiting   US of abd shows distended gallbladder with sludge, no evidence of acute cholecystitis.  LFTs wnl.  If fever persists, or pt develops clinical s/s, would consider GI input.    Monitor LFTS   Continue iv zosyn empirical iv abx   Continue Protonix, Zofran.      2-Hyponatremia, MADHURI - Low Na due to GI losses?  Cr, Na improved.  Nephrology following.  Advance diet.     3- Contstipation - add laxatives.    4-CAD , mild h/o previous PCI   s/p LHC on 9/17   MID LAD: 50% STENOSIS, IFR NEGATIVE (0.94)  prevIOUS   STENT IN CIRCUMFLEX  PATENT   Pt asymptoamtic.          dispo in few days home if improves   work up in progress   Plan of care d/w daughter Traci

## 2024-09-23 NOTE — CONSULT NOTE ADULT - NS PANP COMMENT GEN_ALL_CORE FT
74F PMH CAD s/p PCI, HFpEF, COPD on PRN home O2, recent LHC with non-obstructive CAD who presented 9/22/24 with fevers, N/V. Had transient aphasia in the ED, and had negative stroke work-up. Her sodium was found to be 121. Was given NS with no improvement, and subsequently was given 2% infusion, nephrology following. RRT was called overnight for obtunded mental status, ABG showed 7.05/110/86/30, hypercarbic respiratory failure with likely CO2 narcosis. Decision was made to intubate the patient. Post-intubation ABG showed 7.46/32/80. Notably pt has severely elevated R hemidiaphragm. Was started empirically on Zosyn for fever of 101.1F. Starting SoluMedrol 40mg IV Q8H; C/w Q6H nebs. Goal to extubate in the morning. GI + DVT PPx. Admit pt to ICU.       Dale Wynn MD, [facility]  , Pulmonary & Critical Care Medicine  [facility]  Pulmonary and Sleep Medicine at [place]  [address]  [place], [place] [address]  T: [phone]  F: [phone]

## 2024-09-23 NOTE — ED ADULT NURSE REASSESSMENT NOTE - NS ED NURSE REASSESS COMMENT FT1
Propofol pulled during rapid for post tube sedation. Full closed vial removed from pyxis and handed to Magalis Llamas (Assistant director of nursing) who then spiked and started drip. Order placed and managed by rapid team.

## 2024-09-23 NOTE — PROGRESS NOTE ADULT - SUBJECTIVE AND OBJECTIVE BOX
Patient: TULIO SUGGS [id number] 74y Female                            Hospitalist Attending Note    Reports some nausea, could only take some po intake.  Last BM ~ 5 days ago.  No additional complaints.     ____________________PHYSICAL EXAM:  GENERAL:  NAD alert and oriented person, place.   HEENT: NCAT  CARDIOVASCULAR:  S1, S2  LUNGS: CTAB  ABDOMEN:  soft, (-) tenderness, (-) distension, (+) bowel sounds, (-) guarding, (-) rebound (-) rigidity  EXTREMITIES:  no cyanosis / clubbing / edema.   ____________________     VITALS:  Vital Signs Last 24 Hrs  T(C): 36.8 (23 Sep 2024 11:21), Max: 37.4 (23 Sep 2024 04:47)  T(F): 98.3 (23 Sep 2024 11:21), Max: 99.4 (23 Sep 2024 04:47)  HR: 98 (23 Sep 2024 11:21) (98 - 113)  BP: 99/64 (23 Sep 2024 11:21) (74/47 - 113/74)  BP(mean): 75 (23 Sep 2024 11:21) (56 - 80)  RR: 18 (23 Sep 2024 11:21) (18 - 20)  SpO2: 94% (23 Sep 2024 11:21) (94% - 97%)    Parameters below as of 23 Sep 2024 11:21  Patient On (Oxygen Delivery Method): nasal cannula  O2 Flow (L/min): 2   Daily     Daily   CAPILLARY BLOOD GLUCOSE        I&O's Summary      HISTORY:  PAST MEDICAL & SURGICAL HISTORY:  NICM (nonischemic cardiomyopathy)      HTN (hypertension)      Rheumatoid arthritis      Chronic back pain      HLD (hyperlipidemia)      Chronic obstructive asthma      Cellulitis      S/P       S/P hysterectomy      Allergies    No Known Allergies    Intolerances       LABS:                        12.5   14.99 )-----------( 294      ( 22 Sep 2024 08:37 )             37.2       Culture - Blood (collected 21 Sep 2024 22:00)  Source: .Blood Blood-Peripheral  Preliminary Report (23 Sep 2024 06:00):    No growth at 24 hours    Culture - Urine (collected 21 Sep 2024 21:55)  Source: Clean Catch Clean Catch (Midstream)  Final Report (23 Sep 2024 09:31):    No growth        127[L]  |  90[L]  |  29.6[H]  ----------------------------<  116[H]  4.3   |  22.0  |  1.17    Ca    8.9      23 Sep 2024 05:55  Phos  4.5       Mg     1.8         TPro  6.8  /  Alb  3.4  /  TBili  0.2[L]  /  DBili  0.1  /  AST  17  /  ALT  11  /  AlkPhos  74      PT/INR - ( 21 Sep 2024 21:30 )   PT: 11.0 sec;   INR: 0.99 ratio         PTT - ( 21 Sep 2024 21:30 )  PTT:32.7 sec  LIVER FUNCTIONS - ( 23 Sep 2024 05:55 )  Alb: 3.4 g/dL / Pro: 6.8 g/dL / ALK PHOS: 74 U/L / ALT: 11 U/L / AST: 17 U/L / GGT: x           Urinalysis Basic - ( 23 Sep 2024 05:55 )    Color: x / Appearance: x / SG: x / pH: x  Gluc: 116 mg/dL / Ketone: x  / Bili: x / Urobili: x   Blood: x / Protein: x / Nitrite: x   Leuk Esterase: x / RBC: x / WBC x   Sq Epi: x / Non Sq Epi: x / Bacteria: x          Culture - Blood (collected 21 Sep 2024 22:00)  Source: .Blood Blood-Peripheral  Preliminary Report (23 Sep 2024 06:00):    No growth at 24 hours    Culture - Urine (collected 21 Sep 2024 21:55)  Source: Clean Catch Clean Catch (Midstream)  Final Report (23 Sep 2024 09:31):    No growth          MEDICATIONS:  MEDICATIONS  (STANDING):  albuterol/ipratropium for Nebulization 3 milliLiter(s) Nebulizer every 6 hours  ALPRAZolam 0.5 milliGRAM(s) Oral two times a day  aspirin  chewable 81 milliGRAM(s) Oral daily  atorvastatin 40 milliGRAM(s) Oral at bedtime  carvedilol 3.125 milliGRAM(s) Oral every 12 hours  fluticasone propionate/ salmeterol 100-50 MICROgram(s) Diskus 1 Dose(s) Inhalation two times a day  gabapentin 300 milliGRAM(s) Oral daily  heparin   Injectable 5000 Unit(s) SubCutaneous every 8 hours  pantoprazole  Injectable 40 milliGRAM(s) IV Push daily  piperacillin/tazobactam IVPB.. 3.375 Gram(s) IV Intermittent every 8 hours  QUEtiapine 300 milliGRAM(s) Oral at bedtime  sodium chloride 2% . 1000 milliLiter(s) (50 mL/Hr) IV Continuous <Continuous>  tiotropium 2.5 MICROgram(s) Inhaler 2 Puff(s) Inhalation daily  traZODone 50 milliGRAM(s) Oral at bedtime  venlafaxine  milliGRAM(s) Oral daily    MEDICATIONS  (PRN):  acetaminophen     Tablet .. 650 milliGRAM(s) Oral every 6 hours PRN Mild Pain (1 - 3)  ondansetron Injectable 4 milliGRAM(s) IV Push four times a day PRN Nausea and/or Vomiting  zolpidem 5 milliGRAM(s) Oral at bedtime PRN Insomnia  zolpidem 5 milliGRAM(s) Oral at bedtime PRN Insomnia

## 2024-09-23 NOTE — PROGRESS NOTE ADULT - ASSESSMENT
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

## 2024-09-23 NOTE — PROGRESS NOTE ADULT - SUBJECTIVE AND OBJECTIVE BOX
NEPHROLOGY INTERVAL HPI/OVERNIGHT EVENTS:    Feels a little better   Completed 2 % NaCl infusion     MEDICATIONS  (STANDING):  albuterol/ipratropium for Nebulization 3 milliLiter(s) Nebulizer every 6 hours  ALPRAZolam 0.5 milliGRAM(s) Oral two times a day  aspirin  chewable 81 milliGRAM(s) Oral daily  atorvastatin 40 milliGRAM(s) Oral at bedtime  carvedilol 3.125 milliGRAM(s) Oral every 12 hours  fluticasone propionate/ salmeterol 100-50 MICROgram(s) Diskus 1 Dose(s) Inhalation two times a day  gabapentin 300 milliGRAM(s) Oral daily  heparin   Injectable 5000 Unit(s) SubCutaneous every 8 hours  pantoprazole  Injectable 40 milliGRAM(s) IV Push daily  piperacillin/tazobactam IVPB.. 3.375 Gram(s) IV Intermittent every 8 hours  QUEtiapine 300 milliGRAM(s) Oral at bedtime  sodium chloride 2% . 1000 milliLiter(s) (50 mL/Hr) IV Continuous <Continuous>  tiotropium 2.5 MICROgram(s) Inhaler 2 Puff(s) Inhalation daily  traZODone 50 milliGRAM(s) Oral at bedtime  venlafaxine  milliGRAM(s) Oral daily    MEDICATIONS  (PRN):  acetaminophen     Tablet .. 650 milliGRAM(s) Oral every 6 hours PRN Mild Pain (1 - 3)  ondansetron Injectable 4 milliGRAM(s) IV Push four times a day PRN Nausea and/or Vomiting  zolpidem 5 milliGRAM(s) Oral at bedtime PRN Insomnia  zolpidem 5 milliGRAM(s) Oral at bedtime PRN Insomnia      Allergies    No Known Allergies    Intolerances          REVIEW OF SYSTEMS:          Vital Signs Last 24 Hrs  T(C): 36.8 (23 Sep 2024 11:21), Max: 37.4 (23 Sep 2024 04:47)  T(F): 98.3 (23 Sep 2024 11:21), Max: 99.4 (23 Sep 2024 04:47)  HR: 98 (23 Sep 2024 11:21) (98 - 113)  BP: 99/64 (23 Sep 2024 11:21) (74/47 - 113/74)  BP(mean): 75 (23 Sep 2024 11:21) (56 - 80)  RR: 18 (23 Sep 2024 11:21) (18 - 20)  SpO2: 94% (23 Sep 2024 11:21) (94% - 97%)    Parameters below as of 23 Sep 2024 11:21  Patient On (Oxygen Delivery Method): nasal cannula  O2 Flow (L/min): 2    Daily     Daily   I&O's Detail    I&O's Summary      PHYSICAL EXAM:  GENERAL: NAD,  HEAD:  Atraumatic, No edema   NECK: Supple, No JVD, Normal thyroid  NERVOUS SYSTEM:  Alert & Oriented X3,   CHEST/LUNG: Clear / EAE . No wheeze   HEART: Regular rate and rhythm; No Gallop or rub   ABDOMEN: Soft, Nontender, No CVAT   EXTREMITIES:  Mild edema   LABS:                        12.5   14.99 )-----------( 294      ( 22 Sep 2024 08:37 )             37.2     09-23    127[L]  |  90[L]  |  29.6[H]  ----------------------------<  116[H]  4.3   |  22.0  |  1.17    Ca    8.9      23 Sep 2024 05:55  Phos  4.5     09-22  Mg     1.8     09-22    TPro  6.8  /  Alb  3.4  /  TBili  0.2[L]  /  DBili  0.1  /  AST  17  /  ALT  11  /  AlkPhos  74  09-23    PT/INR - ( 21 Sep 2024 21:30 )   PT: 11.0 sec;   INR: 0.99 ratio         PTT - ( 21 Sep 2024 21:30 )  PTT:32.7 sec  Urinalysis Basic - ( 23 Sep 2024 05:55 )    Color: x / Appearance: x / SG: x / pH: x  Gluc: 116 mg/dL / Ketone: x  / Bili: x / Urobili: x   Blood: x / Protein: x / Nitrite: x   Leuk Esterase: x / RBC: x / WBC x   Sq Epi: x / Non Sq Epi: x / Bacteria: x              RADIOLOGY & ADDITIONAL TESTS:

## 2024-09-23 NOTE — CONSULT NOTE ADULT - SUBJECTIVE AND OBJECTIVE BOX
Patient is a 74y old  Female who presents with a chief complaint of Vomiting (23 Sep 2024 15:44)      BRIEF HOSPITAL COURSE: 73 y/o F with a h/o CAD (s/p PCI), HFpEF, HTN, HLD, COPD (on home O2 PRN), recent admission for chest pain s/p [facility] with nonobstructive CAD- discharged home , now readmitted on  with complaints of fever, abdominal pain, nausea, and vomiting x 1 day. As per report she was noted to be dyspneic and requesting nasal O2 and also had transient expressive aphasia and underwent stroke workup which was unrevealing. Found to be hyponatremic (Na 121), appeared hypovolemic and was ultimately started on 2% saline with improvement.         Events last 24 hours: ***        PAST MEDICAL & SURGICAL HISTORY:  NICM (nonischemic cardiomyopathy)      HTN (hypertension)      Rheumatoid arthritis      Chronic back pain      HLD (hyperlipidemia)      Chronic obstructive asthma      Cellulitis      S/P       S/P hysterectomy          Review of Systems:  CONSTITUTIONAL: No fever, chills, or fatigue  EYES: No eye pain, visual disturbances, or discharge  ENMT:  No difficulty hearing, tinnitus, vertigo; No sinus or throat pain  NECK: No pain or stiffness  RESPIRATORY: No cough, wheezing, chills or hemoptysis; No shortness of breath  CARDIOVASCULAR: No chest pain, palpitations, dizziness, or leg swelling  GASTROINTESTINAL: No abdominal or epigastric pain. No nausea, vomiting, or hematemesis; No diarrhea or constipation. No melena or hematochezia.  GENITOURINARY: No dysuria, frequency, hematuria, or incontinence  NEUROLOGICAL: No headaches, memory loss, loss of strength, numbness, or tremors  SKIN: No itching, burning, rashes, or lesions   MUSCULOSKELETAL: No joint pain or swelling; No muscle, back, or extremity pain  PSYCHIATRIC: No depression, anxiety, mood swings, or difficulty sleeping      Medications:  piperacillin/tazobactam IVPB.. 3.375 Gram(s) IV Intermittent every 8 hours    carvedilol 3.125 milliGRAM(s) Oral every 12 hours    albuterol/ipratropium for Nebulization 3 milliLiter(s) Nebulizer every 6 hours  fluticasone propionate/ salmeterol 100-50 MICROgram(s) Diskus 1 Dose(s) Inhalation two times a day  tiotropium 2.5 MICROgram(s) Inhaler 2 Puff(s) Inhalation daily    acetaminophen     Tablet .. 650 milliGRAM(s) Oral every 6 hours PRN  ALPRAZolam 0.25 milliGRAM(s) Oral two times a day PRN  ALPRAZolam 0.5 milliGRAM(s) Oral two times a day  gabapentin 300 milliGRAM(s) Oral daily  ondansetron Injectable 4 milliGRAM(s) IV Push four times a day PRN  propofol Infusion 10 MICROgram(s)/kG/Min IV Continuous <Continuous>  QUEtiapine 300 milliGRAM(s) Oral at bedtime  traZODone 50 milliGRAM(s) Oral at bedtime  venlafaxine  milliGRAM(s) Oral daily  zolpidem 5 milliGRAM(s) Oral at bedtime PRN  zolpidem 5 milliGRAM(s) Oral at bedtime PRN      aspirin  chewable 81 milliGRAM(s) Oral daily  heparin   Injectable 5000 Unit(s) SubCutaneous every 8 hours    bisacodyl 5 milliGRAM(s) Oral every 12 hours PRN  pantoprazole  Injectable 40 milliGRAM(s) IV Push daily  polyethylene glycol 3350 17 Gram(s) Oral daily      atorvastatin 40 milliGRAM(s) Oral at bedtime        chlorhexidine 0.12% Liquid 15 milliLiter(s) Oral Mucosa every 12 hours        Mode: AC/ CMV (Assist Control/ Continuous Mandatory Ventilation)  RR (machine): 22  TV (machine): 400  FiO2: 40  PEEP: 5  MAP: 11  PIP: 27      ICU Vital Signs Last 24 Hrs  T(C): 36.8 (23 Sep 2024 19:46), Max: 37.4 (23 Sep 2024 04:47)  T(F): 98.3 (23 Sep 2024 19:46), Max: 99.4 (23 Sep 2024 04:47)  HR: 103 (23 Sep 2024 22:38) (88 - 113)  BP: 147/80 (23 Sep 2024 19:46) (74/47 - 147/80)  BP(mean): 102 (23 Sep 2024 19:46) (56 - 102)  ABP: --  ABP(mean): --  RR: 19 (23 Sep 2024 19:46) (18 - 20)  SpO2: 100% (23 Sep 2024 22:38) (94% - 100%)    O2 Parameters below as of 23 Sep 2024 19:46  Patient On (Oxygen Delivery Method): nasal cannula  O2 Flow (L/min): 2          ABG - ( 23 Sep 2024 21:31 )  pH, Arterial: 7.050 pH, Blood: x     /  pCO2: 110   /  pO2: 86    / HCO3: 30    / Base Excess: -0.1  /  SaO2: 94.4                I&O's Detail        LABS:                        11.9   18.94 )-----------( 356      ( 23 Sep 2024 21:33 )             36.1         126[L]  |  89[L]  |  28.3[H]  ----------------------------<  117[H]  4.4   |  25.0  |  0.93    Ca    9.3      23 Sep 2024 21:33  Phos  4.5       Mg     2.4         TPro  7.2  /  Alb  3.4  /  TBili  0.3[L]  /  DBili  x   /  AST  22  /  ALT  11  /  AlkPhos  88  0923      CARDIAC MARKERS ( 23 Sep 2024 21:33 )  x     / x     / x     / x     / 13.8 ng/mL      CAPILLARY BLOOD GLUCOSE      POCT Blood Glucose.: 130 mg/dL (23 Sep 2024 21:22)      Urinalysis Basic - ( 23 Sep 2024 21:33 )    Color: x / Appearance: x / SG: x / pH: x  Gluc: 117 mg/dL / Ketone: x  / Bili: x / Urobili: x   Blood: x / Protein: x / Nitrite: x   Leuk Esterase: x / RBC: x / WBC x   Sq Epi: x / Non Sq Epi: x / Bacteria: x      CULTURES:  Culture Results:   No growth at 24 hours (24 @ 22:00)  Culture Results:   No growth (24 @ 21:55)        Physical Examination:    General: No acute distress.  Alert, oriented, interactive, nonfocal    HEENT: Pupils equal, reactive to light.  Symmetric.    PULM: Clear to auscultation bilaterally, no significant sputum production    CVS: Regular rate and rhythm, no murmurs, rubs, or gallops    ABD: Soft, nondistended, nontender, normoactive bowel sounds, no masses    EXT: No edema, nontender    SKIN: Warm and well perfused, no rashes noted.    NEURO: A&Ox3, strength 5/5 all extremities, cranial nerves grossly intact, no focal deficits        RADIOLOGY:     < from: CT Head No Cont (24 @ 22:14) >  FINDINGS:    VENTRICLES AND SULCI: Age appropriate involutional changes.  INTRA-AXIAL: No mass effect, acute hemorrhage, or midline shift.  A few   patchy hypodensities in the periventricular/deep white matter are   consistent with mild microvascular-type changes.  EXTRA-AXIAL: No mass or fluid collection. Basal cisterns are normal in   appearance.    VISUALIZED SINUSES:  Trace fluid versus mucosal thickening in the   anterior right ethmoid air cells.  Trace mucosal thickening in the right   maxillary sinus.  TYMPANOMASTOID CAVITIES:  Nonspecific opacification of a few left mastoid   air cells.  VISUALIZED ORBITS: Bilateral lens replacement.  CALVARIUM: Intact.    MISCELLANEOUS: Endotracheal tube is partiallyvisualized and oral cavity.    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      IMPRESSION:  No CT evidence of acute intracranial pathology.    < end of copied text >        < from: US Abdomen Complete (US Abdomen Complete .) (24 @ 12:04) >  FINDINGS:  Liver: Within normal limits.  Bile ducts: Normal caliber. Common bile duct measures 4 mm.  Gallbladder: Distended with layering sludge. No wall thickening. Negative   sonographic Barreto sign.  Pancreas: Poorly visualized.  Spleen: 7.0 cm. Within normal limits.  Right kidney: 9.3 cm. No hydronephrosis.  Left kidney: 9.9 cm. No hydronephrosis.  Ascites: None.  Aorta and IVC: Visualized portions are within normal limits.    IMPRESSION:  Gallbladder distended with sludge. No sonographic evidence of acute   cholecystitis.    < end of copied text >        < from: CT Abdomen and Pelvis w/ IV Cont (24 @ 03:23) >  FINDINGS:  CHEST:  LUNGS AND LARGE AIRWAYS: Patent central airways. Redemonstrated elevated   right hemidiaphragm. No pulmonary nodules, masses or consolidation.   Redemonstrated complete atelectasis of the right middle lobe.   Redemonstrated partial atelectasis of the right lower lobe. Left basilar   subsegmental atelectasis. Left upper lobe linear atelectasis. Biapical   scarring.  PLEURA: No pleural effusion.  VESSELS: Limited evaluation of the segmental and subsegmental pulmonary   arteries secondary to motion artifact. No pulmonary embolism in the main   or lobar pulmonary arteries. Aberrant right subclavian artery.  HEART: Heart size is normal. No pericardial effusion.  MEDIASTINUM AND ALE: No lymphadenopathy.  CHEST WALL AND LOWER NECK: Old fracture of the inferior tip of the right   scapula. Moderate to severe bilateral glenohumeral osteoarthritis.    ABDOMEN AND PELVIS:  LIVER: Within normal limits.  BILE DUCTS: Normal caliber.  GALLBLADDER: Nonspecific distention.  SPLEEN: Within normal limits.  PANCREAS: Within normal limits.  ADRENALS: Within normal limits.  KIDNEYS/URETERS: Right renal cyst. Right renal subcentimeter hypodense   lesion too small for accurate characterization. No hydronephrosis. Mild   nonspecific bilateral perinephric fat stranding.    BLADDER: Within normal limits.  REPRODUCTIVE ORGANS: Hysterectomy. No adnexal masses.    BOWEL: No bowel obstruction or inflammation. Colonic diverticulosis   without diverticulitis. Appendix is normal.  PERITONEUM/RETROPERITONEUM: Within normal limits.  VESSELS: Within normal limits.  LYMPH NODES: No lymphadenopathy.  ABDOMINAL WALL: Anterior lower abdominal/pelvic wall scar.  BONES: 6 lumbar type vertebral bodies. L5-S1 posterior spinal fusion. L5   and L6 laminectomies. Degenerative changes of the spine. Age   indeterminate, but likely chronic, superior endplate compression fracture   deformities of the L1 and L2 vertebral bodies.    IMPRESSION:  1. Limited evaluation of the segmental and subsegmental pulmonary  arteries secondary to motion artifact. No pulmonary embolism in the main   or lobar pulmonary arteries.  2. Redemonstrated elevated right hemidiaphragm with complete atelectasis   of the right middle lobe and partial atelectasis of the right lower lobe.  3. No bowel obstruction or inflammation.    < end of copied text >          CRITICAL CARE TIME SPENT: ***  Time spent evaluating/treating patient with medical issues that pose a high risk for life threatening deterioration and/or end-organ damage, reviewing data/labs/imaging, discussing case with multidisciplinary team, discussing plan/goals of care with patient/family. Non-inclusive of procedure time. The date of entry of this note reflects the date of services rendered.   Patient is a 74y old  Female who presents with a chief complaint of Vomiting (23 Sep 2024 15:44)      BRIEF HOSPITAL COURSE: 75 y/o F with a h/o CAD (s/p PCI), HFpEF, HTN, HLD, COPD (on home O2 PRN), recent admission for chest pain s/p [facility] with nonobstructive CAD- discharged home , now readmitted on  with complaints of fever, abdominal pain, nausea, and vomiting x 1 day. As per report she was noted to be dyspneic and requesting nasal O2 and also had transient expressive aphasia and underwent stroke workup which was unrevealing. Found to be hyponatremic (Na 121), appeared hypovolemic and was ultimately started on 2% saline with improvement. Abdominal US revealed distended gallbladder with stones and sludge, however no evidence of acute cholecystitis.        Events last 24 hours: MICU eval requested during Rapid Response as patient became obtunded with agonal breathing pattern. ABG revealed severe hypercapnia/respiratory acidosis. Emergently intubated. CT brain negative for acute pathology.        PAST MEDICAL & SURGICAL HISTORY:  NICM (nonischemic cardiomyopathy)      HTN (hypertension)      Rheumatoid arthritis      Chronic back pain      HLD (hyperlipidemia)      Chronic obstructive asthma      Cellulitis      S/P       S/P hysterectomy          Review of Systems:  Unable to obtain secondary to altered mentation.          Medications:  piperacillin/tazobactam IVPB.. 3.375 Gram(s) IV Intermittent every 8 hours    carvedilol 3.125 milliGRAM(s) Oral every 12 hours    albuterol/ipratropium for Nebulization 3 milliLiter(s) Nebulizer every 6 hours  fluticasone propionate/ salmeterol 100-50 MICROgram(s) Diskus 1 Dose(s) Inhalation two times a day  tiotropium 2.5 MICROgram(s) Inhaler 2 Puff(s) Inhalation daily    acetaminophen     Tablet .. 650 milliGRAM(s) Oral every 6 hours PRN  ALPRAZolam 0.25 milliGRAM(s) Oral two times a day PRN  ALPRAZolam 0.5 milliGRAM(s) Oral two times a day  gabapentin 300 milliGRAM(s) Oral daily  ondansetron Injectable 4 milliGRAM(s) IV Push four times a day PRN  propofol Infusion 10 MICROgram(s)/kG/Min IV Continuous <Continuous>  QUEtiapine 300 milliGRAM(s) Oral at bedtime  traZODone 50 milliGRAM(s) Oral at bedtime  venlafaxine  milliGRAM(s) Oral daily  zolpidem 5 milliGRAM(s) Oral at bedtime PRN  zolpidem 5 milliGRAM(s) Oral at bedtime PRN      aspirin  chewable 81 milliGRAM(s) Oral daily  heparin   Injectable 5000 Unit(s) SubCutaneous every 8 hours    bisacodyl 5 milliGRAM(s) Oral every 12 hours PRN  pantoprazole  Injectable 40 milliGRAM(s) IV Push daily  polyethylene glycol 3350 17 Gram(s) Oral daily      atorvastatin 40 milliGRAM(s) Oral at bedtime        chlorhexidine 0.12% Liquid 15 milliLiter(s) Oral Mucosa every 12 hours        Mode: AC/ CMV (Assist Control/ Continuous Mandatory Ventilation)  RR (machine): 22  TV (machine): 400  FiO2: 40  PEEP: 5  MAP: 11  PIP: 27      ICU Vital Signs Last 24 Hrs  T(C): 36.8 (23 Sep 2024 19:46), Max: 37.4 (23 Sep 2024 04:47)  T(F): 98.3 (23 Sep 2024 19:46), Max: 99.4 (23 Sep 2024 04:47)  HR: 103 (23 Sep 2024 22:38) (88 - 113)  BP: 147/80 (23 Sep 2024 19:46) (74/47 - 147/80)  BP(mean): 102 (23 Sep 2024 19:46) (56 - 102)  ABP: --  ABP(mean): --  RR: 19 (23 Sep 2024 19:46) (18 - 20)  SpO2: 100% (23 Sep 2024 22:38) (94% - 100%)    O2 Parameters below as of 23 Sep 2024 19:46  Patient On (Oxygen Delivery Method): nasal cannula  O2 Flow (L/min): 2          ABG - ( 23 Sep 2024 21:31 )  pH, Arterial: 7.050 pH, Blood: x     /  pCO2: 110   /  pO2: 86    / HCO3: 30    / Base Excess: -0.1  /  SaO2: 94.4                I&O's Detail        LABS:                        11.9   18.94 )-----------( 356      ( 23 Sep 2024 21:33 )             36.1         126[L]  |  89[L]  |  28.3[H]  ----------------------------<  117[H]  4.4   |  25.0  |  0.93    Ca    9.3      23 Sep 2024 21:33  Phos  4.5       Mg     2.4         TPro  7.2  /  Alb  3.4  /  TBili  0.3[L]  /  DBili  x   /  AST  22  /  ALT  11  /  AlkPhos  88        CARDIAC MARKERS ( 23 Sep 2024 21:33 )  x     / x     / x     / x     / 13.8 ng/mL      CAPILLARY BLOOD GLUCOSE      POCT Blood Glucose.: 130 mg/dL (23 Sep 2024 21:22)      Urinalysis Basic - ( 23 Sep 2024 21:33 )    Color: x / Appearance: x / SG: x / pH: x  Gluc: 117 mg/dL / Ketone: x  / Bili: x / Urobili: x   Blood: x / Protein: x / Nitrite: x   Leuk Esterase: x / RBC: x / WBC x   Sq Epi: x / Non Sq Epi: x / Bacteria: x      CULTURES:  Culture Results:   No growth at 24 hours (24 @ 22:00)  Culture Results:   No growth (24 @ 21:55)        Physical Examination:    General: obtunded, now sedated and intubated    HEENT: Pupils equal, reactive to light.  Symmetric.    PULM: diminished breath sounds bilaterally, some scattered rales on left side, no significant sputum production    CVS: tachycardic, reg rhythm, no murmurs, rubs, or gallops    ABD: Soft, nondistended, nontender, normoactive bowel sounds, no masses    EXT: No edema, nontender    SKIN: Warm and well perfused, no rashes noted.    NEURO: sedated, not following commands, moves all extremities        RADIOLOGY:     < from: CT Head No Cont (24 @ 22:14) >  FINDINGS:    VENTRICLES AND SULCI: Age appropriate involutional changes.  INTRA-AXIAL: No mass effect, acute hemorrhage, or midline shift.  A few   patchy hypodensities in the periventricular/deep white matter are   consistent with mild microvascular-type changes.  EXTRA-AXIAL: No mass or fluid collection. Basal cisterns are normal in   appearance.    VISUALIZED SINUSES:  Trace fluid versus mucosal thickening in the   anterior right ethmoid air cells.  Trace mucosal thickening in the right   maxillary sinus.  TYMPANOMASTOID CAVITIES:  Nonspecific opacification of a few left mastoid   air cells.  VISUALIZED ORBITS: Bilateral lens replacement.  CALVARIUM: Intact.    MISCELLANEOUS: Endotracheal tube is partiallyvisualized and oral cavity.    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      IMPRESSION:  No CT evidence of acute intracranial pathology.    < end of copied text >        < from: US Abdomen Complete (US Abdomen Complete .) (24 @ 12:04) >  FINDINGS:  Liver: Within normal limits.  Bile ducts: Normal caliber. Common bile duct measures 4 mm.  Gallbladder: Distended with layering sludge. No wall thickening. Negative   sonographic Barreto sign.  Pancreas: Poorly visualized.  Spleen: 7.0 cm. Within normal limits.  Right kidney: 9.3 cm. No hydronephrosis.  Left kidney: 9.9 cm. No hydronephrosis.  Ascites: None.  Aorta and IVC: Visualized portions are within normal limits.    IMPRESSION:  Gallbladder distended with sludge. No sonographic evidence of acute   cholecystitis.    < end of copied text >        < from: CT Abdomen and Pelvis w/ IV Cont (24 @ 03:23) >  FINDINGS:  CHEST:  LUNGS AND LARGE AIRWAYS: Patent central airways. Redemonstrated elevated   right hemidiaphragm. No pulmonary nodules, masses or consolidation.   Redemonstrated complete atelectasis of the right middle lobe.   Redemonstrated partial atelectasis of the right lower lobe. Left basilar   subsegmental atelectasis. Left upper lobe linear atelectasis. Biapical   scarring.  PLEURA: No pleural effusion.  VESSELS: Limited evaluation of the segmental and subsegmental pulmonary   arteries secondary to motion artifact. No pulmonary embolism in the main   or lobar pulmonary arteries. Aberrant right subclavian artery.  HEART: Heart size is normal. No pericardial effusion.  MEDIASTINUM AND ALE: No lymphadenopathy.  CHEST WALL AND LOWER NECK: Old fracture of the inferior tip of the right   scapula. Moderate to severe bilateral glenohumeral osteoarthritis.    ABDOMEN AND PELVIS:  LIVER: Within normal limits.  BILE DUCTS: Normal caliber.  GALLBLADDER: Nonspecific distention.  SPLEEN: Within normal limits.  PANCREAS: Within normal limits.  ADRENALS: Within normal limits.  KIDNEYS/URETERS: Right renal cyst. Right renal subcentimeter hypodense   lesion too small for accurate characterization. No hydronephrosis. Mild   nonspecific bilateral perinephric fat stranding.    BLADDER: Within normal limits.  REPRODUCTIVE ORGANS: Hysterectomy. No adnexal masses.    BOWEL: No bowel obstruction or inflammation. Colonic diverticulosis   without diverticulitis. Appendix is normal.  PERITONEUM/RETROPERITONEUM: Within normal limits.  VESSELS: Within normal limits.  LYMPH NODES: No lymphadenopathy.  ABDOMINAL WALL: Anterior lower abdominal/pelvic wall scar.  BONES: 6 lumbar type vertebral bodies. L5-S1 posterior spinal fusion. L5   and L6 laminectomies. Degenerative changes of the spine. Age   indeterminate, but likely chronic, superior endplate compression fracture   deformities of the L1 and L2 vertebral bodies.    IMPRESSION:  1. Limited evaluation of the segmental and subsegmental pulmonary  arteries secondary to motion artifact. No pulmonary embolism in the main   or lobar pulmonary arteries.  2. Redemonstrated elevated right hemidiaphragm with complete atelectasis   of the right middle lobe and partial atelectasis of the right lower lobe.  3. No bowel obstruction or inflammation.    < end of copied text >          CRITICAL CARE TIME SPENT: 74 mins  Time spent evaluating/treating patient with medical issues that pose a high risk for life threatening deterioration and/or end-organ damage, reviewing data/labs/imaging, discussing case with multidisciplinary team, discussing plan/goals of care with patient/family. Non-inclusive of procedure time. The date of entry of this note reflects the date of services rendered.

## 2024-09-23 NOTE — RAPID RESPONSE TEAM SUMMARY - NSMEDICATIONSRRT_GEN_ALL_CORE
Lasix 80 IVP  Etomidate 20 IVP  Succino 100 IVP  Fentanyl 100 IVP  Propofol infusion post intubation 
no

## 2024-09-23 NOTE — RAPID RESPONSE TEAM SUMMARY - NSSITUATIONBACKGROUNDRRT_GEN_ALL_CORE
RRT called for patient noted to have change in mental and respiratory status (unresponsive and shallow breathing). Patient noted to have history of COPD. Stat ABG obtained revealing hypercapnic respiratory failure with Ph 7.0 and markedly elevated CO2 levels. Patient with inability to maintain airway and thus intubated at the bedside. Vitals at the bedside /102 , RR 30, O2 94% on 5L NC, Temp 99.1, . Patient intubated without issue. Post intubation vitals WNL. Patient to be transferred to ICU care after STAT CTH to assess change in MS.

## 2024-09-24 ENCOUNTER — NON-APPOINTMENT (OUTPATIENT)
Age: 74
End: 2024-09-24

## 2024-09-24 LAB
ALBUMIN SERPL ELPH-MCNC: 3.3 G/DL — SIGNIFICANT CHANGE UP (ref 3.3–5.2)
ALP SERPL-CCNC: 77 U/L — SIGNIFICANT CHANGE UP (ref 40–120)
ALT FLD-CCNC: 10 U/L — SIGNIFICANT CHANGE UP
ANION GAP SERPL CALC-SCNC: 14 MMOL/L — SIGNIFICANT CHANGE UP (ref 5–17)
ANION GAP SERPL CALC-SCNC: 16 MMOL/L — SIGNIFICANT CHANGE UP (ref 5–17)
AST SERPL-CCNC: 19 U/L — SIGNIFICANT CHANGE UP
BILIRUB SERPL-MCNC: 0.3 MG/DL — LOW (ref 0.4–2)
BUN SERPL-MCNC: 29.1 MG/DL — HIGH (ref 8–20)
BUN SERPL-MCNC: 29.1 MG/DL — HIGH (ref 8–20)
CALCIUM SERPL-MCNC: 8.6 MG/DL — SIGNIFICANT CHANGE UP (ref 8.4–10.5)
CALCIUM SERPL-MCNC: 9.3 MG/DL — SIGNIFICANT CHANGE UP (ref 8.4–10.5)
CHLORIDE SERPL-SCNC: 87 MMOL/L — LOW (ref 96–108)
CHLORIDE SERPL-SCNC: 88 MMOL/L — LOW (ref 96–108)
CO2 SERPL-SCNC: 23 MMOL/L — SIGNIFICANT CHANGE UP (ref 22–29)
CO2 SERPL-SCNC: 24 MMOL/L — SIGNIFICANT CHANGE UP (ref 22–29)
CREAT SERPL-MCNC: 0.89 MG/DL — SIGNIFICANT CHANGE UP (ref 0.5–1.3)
CREAT SERPL-MCNC: 0.98 MG/DL — SIGNIFICANT CHANGE UP (ref 0.5–1.3)
EGFR: 61 ML/MIN/1.73M2 — SIGNIFICANT CHANGE UP
EGFR: 68 ML/MIN/1.73M2 — SIGNIFICANT CHANGE UP
GLUCOSE SERPL-MCNC: 107 MG/DL — HIGH (ref 70–99)
GLUCOSE SERPL-MCNC: 119 MG/DL — HIGH (ref 70–99)
HCT VFR BLD CALC: 31.2 % — LOW (ref 34.5–45)
HGB BLD-MCNC: 11.2 G/DL — LOW (ref 11.5–15.5)
MAGNESIUM SERPL-MCNC: 1.9 MG/DL — SIGNIFICANT CHANGE UP (ref 1.6–2.6)
MCHC RBC-ENTMCNC: 31.1 PG — SIGNIFICANT CHANGE UP (ref 27–34)
MCHC RBC-ENTMCNC: 35.9 GM/DL — SIGNIFICANT CHANGE UP (ref 32–36)
MCV RBC AUTO: 86.7 FL — SIGNIFICANT CHANGE UP (ref 80–100)
MRSA PCR RESULT.: SIGNIFICANT CHANGE UP
PLATELET # BLD AUTO: 313 K/UL — SIGNIFICANT CHANGE UP (ref 150–400)
POTASSIUM SERPL-MCNC: 3.7 MMOL/L — SIGNIFICANT CHANGE UP (ref 3.5–5.3)
POTASSIUM SERPL-MCNC: 4.4 MMOL/L — SIGNIFICANT CHANGE UP (ref 3.5–5.3)
POTASSIUM SERPL-SCNC: 3.7 MMOL/L — SIGNIFICANT CHANGE UP (ref 3.5–5.3)
POTASSIUM SERPL-SCNC: 4.4 MMOL/L — SIGNIFICANT CHANGE UP (ref 3.5–5.3)
PROT SERPL-MCNC: 6.7 G/DL — SIGNIFICANT CHANGE UP (ref 6.6–8.7)
RBC # BLD: 3.6 M/UL — LOW (ref 3.8–5.2)
RBC # FLD: 11.9 % — SIGNIFICANT CHANGE UP (ref 10.3–14.5)
S AUREUS DNA NOSE QL NAA+PROBE: SIGNIFICANT CHANGE UP
SODIUM SERPL-SCNC: 125 MMOL/L — LOW (ref 135–145)
SODIUM SERPL-SCNC: 127 MMOL/L — LOW (ref 135–145)
WBC # BLD: 11.07 K/UL — HIGH (ref 3.8–10.5)
WBC # FLD AUTO: 11.07 K/UL — HIGH (ref 3.8–10.5)

## 2024-09-24 PROCEDURE — 93010 ELECTROCARDIOGRAM REPORT: CPT

## 2024-09-24 PROCEDURE — 99233 SBSQ HOSP IP/OBS HIGH 50: CPT

## 2024-09-24 RX ORDER — MAGNESIUM SULFATE 500 MG/ML
1 VIAL (ML) INJECTION ONCE
Refills: 0 | Status: COMPLETED | OUTPATIENT
Start: 2024-09-24 | End: 2024-09-24

## 2024-09-24 RX ORDER — BISMUTH SUBSALICYLATE 262 MG
15 TABLET,CHEWABLE ORAL ONCE
Refills: 0 | Status: COMPLETED | OUTPATIENT
Start: 2024-09-24 | End: 2024-09-25

## 2024-09-24 RX ORDER — CHLORHEXIDINE GLUCONATE ORAL RINSE 1.2 MG/ML
15 SOLUTION DENTAL EVERY 12 HOURS
Refills: 0 | Status: DISCONTINUED | OUTPATIENT
Start: 2024-09-24 | End: 2024-09-24

## 2024-09-24 RX ORDER — ACETAMINOPHEN 325 MG
650 TABLET ORAL EVERY 6 HOURS
Refills: 0 | Status: DISCONTINUED | OUTPATIENT
Start: 2024-09-24 | End: 2024-10-09

## 2024-09-24 RX ORDER — SENNOSIDES 8.6 MG
2 TABLET ORAL AT BEDTIME
Refills: 0 | Status: DISCONTINUED | OUTPATIENT
Start: 2024-09-24 | End: 2024-10-09

## 2024-09-24 RX ORDER — ZOLPIDEM TARTRATE 5 MG
5 TABLET ORAL AT BEDTIME
Refills: 0 | Status: DISCONTINUED | OUTPATIENT
Start: 2024-09-24 | End: 2024-10-01

## 2024-09-24 RX ORDER — CHLORHEXIDINE GLUCONATE ORAL RINSE 1.2 MG/ML
1 SOLUTION DENTAL
Refills: 0 | Status: DISCONTINUED | OUTPATIENT
Start: 2024-09-24 | End: 2024-09-24

## 2024-09-24 RX ORDER — METHYLPREDNISOLONE ACETATE 80 MG/ML
40 INJECTION, SUSPENSION INTRA-ARTICULAR; INTRALESIONAL; INTRAMUSCULAR; SOFT TISSUE
Refills: 0 | Status: COMPLETED | OUTPATIENT
Start: 2024-09-24 | End: 2024-09-25

## 2024-09-24 RX ADMIN — Medication 4 MILLIGRAM(S): at 09:49

## 2024-09-24 RX ADMIN — CHLORHEXIDINE GLUCONATE ORAL RINSE 1 APPLICATION(S): 1.2 SOLUTION DENTAL at 05:46

## 2024-09-24 RX ADMIN — Medication 17 GRAM(S): at 12:55

## 2024-09-24 RX ADMIN — Medication 2 TABLET(S): at 21:22

## 2024-09-24 RX ADMIN — IPRATROPIUM BROMIDE AND ALBUTEROL SULFATE 3 MILLILITER(S): .5; 3 SOLUTION RESPIRATORY (INHALATION) at 15:08

## 2024-09-24 RX ADMIN — IPRATROPIUM BROMIDE AND ALBUTEROL SULFATE 3 MILLILITER(S): .5; 3 SOLUTION RESPIRATORY (INHALATION) at 03:38

## 2024-09-24 RX ADMIN — Medication 1 DOSE(S): at 08:25

## 2024-09-24 RX ADMIN — Medication 81 MILLIGRAM(S): at 12:55

## 2024-09-24 RX ADMIN — Medication 40 MILLIEQUIVALENT(S): at 09:50

## 2024-09-24 RX ADMIN — PIPERACILLIN SODIUM AND TAZOBACTAM SODIUM 25 GRAM(S): 12; 1.5 INJECTION, POWDER, LYOPHILIZED, FOR SOLUTION INTRAVENOUS at 07:41

## 2024-09-24 RX ADMIN — Medication 5000 UNIT(S): at 17:17

## 2024-09-24 RX ADMIN — METHYLPREDNISOLONE ACETATE 40 MILLIGRAM(S): 80 INJECTION, SUSPENSION INTRA-ARTICULAR; INTRALESIONAL; INTRAMUSCULAR; SOFT TISSUE at 01:50

## 2024-09-24 RX ADMIN — GABAPENTIN 300 MILLIGRAM(S): 800 TABLET, FILM COATED ORAL at 12:55

## 2024-09-24 RX ADMIN — CHLORHEXIDINE GLUCONATE ORAL RINSE 15 MILLILITER(S): 1.2 SOLUTION DENTAL at 05:46

## 2024-09-24 RX ADMIN — Medication 1 DOSE(S): at 21:04

## 2024-09-24 RX ADMIN — Medication 5 MILLIGRAM(S): at 23:22

## 2024-09-24 RX ADMIN — Medication 650 MILLIGRAM(S): at 17:17

## 2024-09-24 RX ADMIN — Medication 100 GRAM(S): at 09:49

## 2024-09-24 RX ADMIN — Medication 3.12 MILLIGRAM(S): at 17:20

## 2024-09-24 RX ADMIN — IPRATROPIUM BROMIDE AND ALBUTEROL SULFATE 3 MILLILITER(S): .5; 3 SOLUTION RESPIRATORY (INHALATION) at 21:04

## 2024-09-24 RX ADMIN — Medication 40 MILLIEQUIVALENT(S): at 23:22

## 2024-09-24 RX ADMIN — IPRATROPIUM BROMIDE AND ALBUTEROL SULFATE 3 MILLILITER(S): .5; 3 SOLUTION RESPIRATORY (INHALATION) at 08:24

## 2024-09-24 RX ADMIN — PROPOFOL 3.28 MICROGRAM(S)/KG/MIN: 10 INJECTION, EMULSION INTRAVENOUS at 01:51

## 2024-09-24 RX ADMIN — METHYLPREDNISOLONE ACETATE 40 MILLIGRAM(S): 80 INJECTION, SUSPENSION INTRA-ARTICULAR; INTRALESIONAL; INTRAMUSCULAR; SOFT TISSUE at 17:21

## 2024-09-24 RX ADMIN — PANTOPRAZOLE SODIUM 40 MILLIGRAM(S): 40 TABLET, DELAYED RELEASE ORAL at 12:55

## 2024-09-24 RX ADMIN — ATORVASTATIN CALCIUM 40 MILLIGRAM(S): 10 TABLET, FILM COATED ORAL at 21:22

## 2024-09-24 RX ADMIN — METHYLPREDNISOLONE ACETATE 40 MILLIGRAM(S): 80 INJECTION, SUSPENSION INTRA-ARTICULAR; INTRALESIONAL; INTRAMUSCULAR; SOFT TISSUE at 09:49

## 2024-09-24 RX ADMIN — Medication 225 MILLIGRAM(S): at 12:55

## 2024-09-24 RX ADMIN — PIPERACILLIN SODIUM AND TAZOBACTAM SODIUM 25 GRAM(S): 12; 1.5 INJECTION, POWDER, LYOPHILIZED, FOR SOLUTION INTRAVENOUS at 00:06

## 2024-09-24 RX ADMIN — Medication 5000 UNIT(S): at 00:06

## 2024-09-24 RX ADMIN — Medication 5000 UNIT(S): at 07:41

## 2024-09-24 RX ADMIN — PIPERACILLIN SODIUM AND TAZOBACTAM SODIUM 25 GRAM(S): 12; 1.5 INJECTION, POWDER, LYOPHILIZED, FOR SOLUTION INTRAVENOUS at 17:17

## 2024-09-24 NOTE — DIETITIAN INITIAL EVALUATION ADULT - ADD RECOMMEND
Continue current diet as tolerated  Monitor po intake / need for oral nutrition supplementation  Monitor nutrition related labs, weight trends and BMs

## 2024-09-24 NOTE — DIETITIAN INITIAL EVALUATION ADULT - ORAL INTAKE PTA/DIET HISTORY
Pt unavailable at time of attempted visit earlier today. Chart and events reviewed. Previously intubated, extubated now on Regular diet. Current weight 148 lbs, no further weight hx per chart review. RD to follow up at more appropriate time as feasible to obtain nutrition hx and provide appropriate education.  Doxycycline Pregnancy And Lactation Text: This medication is Pregnancy Category D and not consider safe during pregnancy. It is also excreted in breast milk but is considered safe for shorter treatment courses.

## 2024-09-24 NOTE — PROGRESS NOTE ADULT - ASSESSMENT
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 ARF   improving   s/p IVF   avoid ARB / ACE inh , nephrotoxic meds     5- leukocytosis   GB sludge with distention , no cholecytstitis   cont empirical iv zosyn complete course 5 days   wbc is trending down   urine cx , blood cx are neg     6-Constipation   cont miralax , dulcolax prn   add senna at qhs     7- CAD   nonobstructive disease recent [facility]   medical treatment asa , coreg       Dvt prophyaxis ;ovenox sq daily   OOB   PT     d/w  at the bedside

## 2024-09-24 NOTE — DIETITIAN INITIAL EVALUATION ADULT - OTHER INFO
75 y/o female with PMH COPD (4L home O2 PRN), HFpEF, HTN, and CAD with recent admission for CP; pt had LHC which showed nonobstructive CAD and was d/c home 9/19. Pt returned to ED with fever, RUQ/epigastric abd pain, and nausea/vomiting. In ED she was noted to be dyspneic and requesting nasal O2 and also had transient expressive aphasia and underwent stroke workup which was unrevealing. Found to be hyponatremic (Na 121), appeared hypovolemic and was ultimately started on 2% saline with improvement. Abdominal US revealed distended gallbladder with stones and sludge, however no evidence of acute cholecystitis. S/p RRT 9/23 as patient became obtunded with agonal breathing pattern. ABG revealed severe hypercapnia/respiratory acidosis. Emergently intubated. CT brain negative for acute pathology. Repeat ABG post-intubation was improved. Pt extubated.

## 2024-09-24 NOTE — PROGRESS NOTE ADULT - ASSESSMENT
Improved MADHURI   No hydro noted on CT   Clinical volume depletion on arrival   ARB held   Urine Na < 30     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   Presented with  some clinical volume depletion   CT chest and brain w/o focal lesions   S/P 2 % NaCl   Received Lasix in the early am   Watch labs   Will follow

## 2024-09-24 NOTE — PROGRESS NOTE ADULT - SUBJECTIVE AND OBJECTIVE BOX
NEPHROLOGY INTERVAL HPI/OVERNIGHT EVENTS:    Interim noted   Developed obtundation , was transiently intubated   S/P 80 mg IV Lasix   UO 1.8 L +   Meds noted     MEDICATIONS  (STANDING):  albuterol/ipratropium for Nebulization 3 milliLiter(s) Nebulizer every 6 hours  aspirin  chewable 81 milliGRAM(s) Oral daily  atorvastatin 40 milliGRAM(s) Oral at bedtime  carvedilol 3.125 milliGRAM(s) Oral every 12 hours  chlorhexidine 2% Cloths 1 Application(s) Topical <User Schedule>  fluticasone propionate/ salmeterol 100-50 MICROgram(s) Diskus 1 Dose(s) Inhalation two times a day  gabapentin 300 milliGRAM(s) Oral daily  heparin   Injectable 5000 Unit(s) SubCutaneous every 8 hours  methylPREDNISolone sodium succinate Injectable 40 milliGRAM(s) IV Push two times a day  pantoprazole  Injectable 40 milliGRAM(s) IV Push daily  piperacillin/tazobactam IVPB.. 3.375 Gram(s) IV Intermittent every 8 hours  polyethylene glycol 3350 17 Gram(s) Oral daily  potassium chloride   Powder 40 milliEquivalent(s) Oral once  senna 2 Tablet(s) Oral at bedtime  tiotropium 2.5 MICROgram(s) Inhaler 2 Puff(s) Inhalation daily  venlafaxine  milliGRAM(s) Oral daily    MEDICATIONS  (PRN):  bisacodyl 5 milliGRAM(s) Oral every 12 hours PRN Constipation  ondansetron Injectable 4 milliGRAM(s) IV Push four times a day PRN Nausea and/or Vomiting      Allergies    No Known Allergies    Vital Signs Last 24 Hrs  T(C): 37.1 (24 Sep 2024 13:00), Max: 38 (24 Sep 2024 04:00)  T(F): 98.8 (24 Sep 2024 13:00), Max: 100.4 (24 Sep 2024 04:00)  HR: 88 (24 Sep 2024 13:00) (87 - 107)  BP: 135/74 (24 Sep 2024 13:00) (94/70 - 154/99)  BP(mean): 90 (24 Sep 2024 13:00) (74 - 113)  RR: 22 (24 Sep 2024 13:00) (16 - 30)  SpO2: 100% (24 Sep 2024 13:00) (93% - 100%)    Parameters below as of 24 Sep 2024 12:00  Patient On (Oxygen Delivery Method): nasal cannula  O2 Flow (L/min): 5    Daily     Daily Weight in k.1 (24 Sep 2024 03:19)  I&O's Detail    23 Sep 2024 07:01  -  24 Sep 2024 07:00  --------------------------------------------------------  IN:    Propofol: 98.4 mL  Total IN: 98.4 mL    OUT:    Indwelling Catheter - Urethral (mL): 1850 mL  Total OUT: 1850 mL    Total NET: -1751.6 mL      24 Sep 2024 07:  -  24 Sep 2024 14:49  --------------------------------------------------------  IN:    IV PiggyBack: 100 mL    IV PiggyBack: 100 mL    Oral Fluid: 125 mL  Total IN: 325 mL    OUT:    Indwelling Catheter - Urethral (mL): 200 mL  Total OUT: 200 mL    Total NET: 125 mL        I&O's Summary    23 Sep 2024 07:  -  24 Sep 2024 07:00  --------------------------------------------------------  IN: 98.4 mL / OUT: 1850 mL / NET: -1751.6 mL    24 Sep 2024 07:  -  24 Sep 2024 14:49  --------------------------------------------------------  IN: 325 mL / OUT: 200 mL / NET: 125 mL        PHYSICAL EXAM:        PHYSICAL EXAM:  GENERAL: NAD,  HEAD:  Atraumatic, No edema   NECK: Supple, No JVD, Normal thyroid  NERVOUS SYSTEM:  Alert & Oriented X3,   CHEST/LUNG: Clear / EAE . No wheeze   HEART: Regular rate and rhythm; No Gallop or rub   ABDOMEN: Soft, Nontender, No CVAT   EXTREMITIES:  Mild edema     LABS:                        11.2   11.07 )-----------( 313      ( 24 Sep 2024 03:30 )             31.2         127[L]  |  88[L]  |  29.1[H]  ----------------------------<  119[H]  3.7   |  23.0  |  0.98    Ca    8.6      24 Sep 2024 03:30  Mg     1.9         TPro  6.7  /  Alb  3.3  /  TBili  0.3[L]  /  DBili  x   /  AST  19  /  ALT  10  /  AlkPhos  77        Urinalysis Basic - ( 24 Sep 2024 03:30 )    Color: x / Appearance: x / SG: x / pH: x  Gluc: 119 mg/dL / Ketone: x  / Bili: x / Urobili: x   Blood: x / Protein: x / Nitrite: x   Leuk Esterase: x / RBC: x / WBC x   Sq Epi: x / Non Sq Epi: x / Bacteria: x      Magnesium: 1.9 mg/dL ( @ 03:30)  Magnesium: 2.4 mg/dL ( @ 21:33)    ABG - ( 23 Sep 2024 23:04 )  pH, Arterial: 7.460 pH, Blood: x     /  pCO2: 32    /  pO2: 80    / HCO3: 23    / Base Excess: -1.0  /  SaO2: 99.3                  RADIOLOGY & ADDITIONAL TESTS:

## 2024-09-24 NOTE — CHART NOTE - NSCHARTNOTEFT_GEN_A_CORE
MICU Transfer Note    Transfer from: MICU    Transfer to: (  ) Medicine    (X) Telemetry     (   ) RCU        (    ) Palliative         (   ) Stroke Unit          (   ) __________________      MICU COURSE: 73 y/o female with PMH COPD (4L home O2 PRN), HFpEF, HTN, and CAD with recent admission for CP; pt had LHC which showed nonobstructive CAD and was d/c home 9/19. Pt returned to ED with fever, RUQ/epigastric abd pain, and nausea/vomiting. In ED she was noted to be dyspneic and requesting nasal O2 and also had transient expressive aphasia and underwent stroke workup which was unrevealing. Found to be hyponatremic (Na 121), appeared hypovolemic and was ultimately started on 2% saline with improvement. Abdominal US revealed distended gallbladder with stones and sludge, however no evidence of acute cholecystitis.    MICU eval requested during Rapid Response overnight 9/23 as patient became obtunded with agonal breathing pattern. ABG revealed severe hypercapnia/respiratory acidosis. Emergently intubated and pt upgraded to MICU. CT brain negative for acute pathology. Repeat ABG post-intubation was improved with pH 7.46 and CO2 32. Pt successfully extubated 9/24 at approx 06:00 and O2 sat 100% on 5L nasal cannula. Has remained hemodynamically stable. Safe for transfer out of ICU. Discussed with patient and  at bedside.          ASSESSMENT & PLAN:     Assessment: 73 y/o F with a h/o CAD (s/p PCI), HFpEF, HTN, HLD, COPD (on home O2 PRN), recent admission for chest pain s/p LHC with nonobstructive CAD - discharged home 9/19, with:    # Acute hypercapnic respiratory failure  # Hyponatremia  # MADHURI  # Constipation    PLAN:    Neuro:   - post-extubation A/O x 4  - neuro checks Q4H   - caution with benzos  - Delirium precautions  - Optimize sleep/wake cycle     CV:   # HFpEF    - s/p lasix 80mg IVP; output 1850mL overnight  - c/w carvedilol 3.125mg Q12H  - Maintain MAP > 65  - Continue hemodynamic monitoring  - TTE performed 9/18 EF 56%    #CAD  - LHC done 9/17 showing nonobstructive disease  - c/w ASA 81mg QD    #HLD  - c/w atorvastatin 40mg QHS    Pulm:   # hypercapneic respiratory acidosis  - intubated overnight; post-intubation ABG improved  - c/w methylprednisolone  - extubated this AM  - currently on 5L NC  - Pulmonary toilet, IS, OOBTC  - Maintain sPO2 >92%    # COPD  - c/w duoneb and advair    GI/Nutrition:   - regular diet  - Protonix ppx  - Bowel regimen  - Monitor bowel movements    /Renal:   # MADHURI  - pre-renal  - Monitor kidney fxn  - Monitor UOP  - Replete electrolytes PRN (Mg >2, Phos >3, K >4)  - d/c Ceja     ID:  # leukocytosis  - Monitor fever curve  - Monitor for leukocytosis  - WBC 11.07; down from yesterday  - blood and urine cx negative for growth  - c/w zosyn    Endo:  # hyponatremia  - s/p 2% HTS  - received 80mg IV lasix during RRT; follow labs closely to avoid possible rapid correction with diuresis  - Na 127; allow self-correction  - nephrology following    Heme/DVT Prophylaxis:  - heparin SQ Q8H  - SCDs  - Monitor H/H    Skin:  - Repositioning for DTI prevention while in bed    Lines:  - 2 Peripheral IV's      For Followup:  Potassium; supplemented with 40 mEq x 2        Vital Signs Last 24 Hrs  T(C): 37.1 (24 Sep 2024 13:00), Max: 38 (24 Sep 2024 04:00)  T(F): 98.8 (24 Sep 2024 13:00), Max: 100.4 (24 Sep 2024 04:00)  HR: 88 (24 Sep 2024 13:00) (87 - 107)  BP: 135/74 (24 Sep 2024 13:00) (94/70 - 154/99)  BP(mean): 90 (24 Sep 2024 13:00) (74 - 113)  RR: 22 (24 Sep 2024 13:00) (16 - 30)  SpO2: 100% (24 Sep 2024 13:00) (93% - 100%)    Parameters below as of 24 Sep 2024 12:00  Patient On (Oxygen Delivery Method): nasal cannula  O2 Flow (L/min): 5    I&O's Summary    23 Sep 2024 07:01  -  24 Sep 2024 07:00  --------------------------------------------------------  IN: 98.4 mL / OUT: 1850 mL / NET: -1751.6 mL    24 Sep 2024 07:01  -  24 Sep 2024 14:36  --------------------------------------------------------  IN: 325 mL / OUT: 200 mL / NET: 125 mL        MEDICATIONS  (STANDING):  albuterol/ipratropium for Nebulization 3 milliLiter(s) Nebulizer every 6 hours  aspirin  chewable 81 milliGRAM(s) Oral daily  atorvastatin 40 milliGRAM(s) Oral at bedtime  carvedilol 3.125 milliGRAM(s) Oral every 12 hours  chlorhexidine 2% Cloths 1 Application(s) Topical <User Schedule>  fluticasone propionate/ salmeterol 100-50 MICROgram(s) Diskus 1 Dose(s) Inhalation two times a day  gabapentin 300 milliGRAM(s) Oral daily  heparin   Injectable 5000 Unit(s) SubCutaneous every 8 hours  methylPREDNISolone sodium succinate Injectable 40 milliGRAM(s) IV Push every 8 hours  pantoprazole  Injectable 40 milliGRAM(s) IV Push daily  piperacillin/tazobactam IVPB.. 3.375 Gram(s) IV Intermittent every 8 hours  polyethylene glycol 3350 17 Gram(s) Oral daily  potassium chloride   Powder 40 milliEquivalent(s) Oral once  tiotropium 2.5 MICROgram(s) Inhaler 2 Puff(s) Inhalation daily  venlafaxine  milliGRAM(s) Oral daily    MEDICATIONS  (PRN):  bisacodyl 5 milliGRAM(s) Oral every 12 hours PRN Constipation  ondansetron Injectable 4 milliGRAM(s) IV Push four times a day PRN Nausea and/or Vomiting        LABS                                            11.2                  Neurophils% (auto):   x      (09-24 @ 03:30):    11.07)-----------(313          Lymphocytes% (auto):  x                                             31.2                   Eosinphils% (auto):   x        Manual%: Neutrophils x    ; Lymphocytes x    ; Eosinophils x    ; Bands%: x    ; Blasts x                                    127    |  88     |  29.1                Calcium: 8.6   / iCa: x      (09-24 @ 03:30)    ----------------------------<  119       Magnesium: 1.9                              3.7     |  23.0   |  0.98             Phosphorous: x        TPro  6.7    /  Alb  3.3    /  TBili  0.3    /  DBili  x      /  AST  19     /  ALT  10     /  AlkPhos  77     24 Sep 2024 03:30

## 2024-09-24 NOTE — DIETITIAN INITIAL EVALUATION ADULT - PERTINENT LABORATORY DATA
09-24    127[L]  |  88[L]  |  29.1[H]  ----------------------------<  119[H]  3.7   |  23.0  |  0.98    Ca    8.6      24 Sep 2024 03:30  Mg     1.9     09-24    TPro  6.7  /  Alb  3.3  /  TBili  0.3[L]  /  DBili  x   /  AST  19  /  ALT  10  /  AlkPhos  77  09-24  POCT Blood Glucose.: 130 mg/dL (09-23-24 @ 21:22)

## 2024-09-24 NOTE — PROGRESS NOTE ADULT - ASSESSMENT
75 y/o F with a h/o CAD (s/p PCI), HFpEF, HTN, HLD, COPD (on home O2 PRN), recent admission for chest pain s/p LHC with nonobstructive CAD - discharged home 9/19, with:    # Acute hypercapnic respiratory failure  # Hyponatremia  # MADHURI  # Constipation    PLAN:    Neuro:   - post-extubation A/O x 4  - neuro checks Q4H   - Delirium precautions  - Optimize sleep/wake cycle     CV:   # HFpEF  - s/p lasix 80mg IVP  - c/w carvedilol 3.125mg Q12H  - Maintain MAP > 65  - Continue hemodynamic monitoring  - TTE?    #CAD  - s/p nonobstructive LHC last week  - c/w ASA 81mg QD    Pulm:   # hypercapneic respiratory acidosis  - Maintain sPO2 >92%  - extubated this AM  - Non-invasive ventilation?  - Wean FiO2  - Serial ABG  - CXR?  - Pulmonary toilet, IS, OOBTC    GI/Nutrition:   #  - Diet?  - Tube feeds?  - Protonix ppx?  - Bowel regimen  - Monitor bowel movements    /Renal:   #  - Monitor kidney fxn  - Monitor UOP  - Replete electrolytes PRN (Mg >2, Phos >3, K >4)  - Ceja for strict I&O?  -Voiding spontaneously?      ID:  #  - Monitor fever curve  - Monitor for leukocytosis  - Antibiotics? Dates?  - Cultures?    Endo:  #  - Monitor blood glucose  - Maintain euglycemia, 140-180  - ISS?  - Insulin gtt?  - Thyroid function/meds?    Heme/DVT Prophylaxis:  #  - SCDs  - Monitor H/H  - Chemical prophylaxis? Lovenox/SQH?    Skin:  - Repositioning for DTI prevention while in bed    Ortho:  #   - Weight Bearing Status:  - Appreciate ortho recs    ENT:  #  - Appreciate ENT recs    Vascular:  #  - Neurovascular checks  - Appreciate vascular recs    Lines:  - Peripheral IV's  - Arterial Line?  - Central Line? TLC/Cordis/Shiley?    Dispo:  - Care per SICU    CODE STATUS:  75 y/o F with a h/o CAD (s/p PCI), HFpEF, HTN, HLD, COPD (on home O2 PRN), recent admission for chest pain s/p LHC with nonobstructive CAD - discharged home 9/19, with:    # Acute hypercapnic respiratory failure  # Hyponatremia  # MADHURI  # Constipation    PLAN:    Neuro:   - post-extubation A/O x 4  - neuro checks Q4H   - caution with benzos  - Delirium precautions  - Optimize sleep/wake cycle     CV:   # HFpEF    - s/p lasix 80mg IVP; output 1850mL overnight  - c/w carvedilol 3.125mg Q12H  - Maintain MAP > 65  - Continue hemodynamic monitoring  - TTE performed 9/18 EF 56%    #CAD  - LHC done 9/17 showing nonobstructive disease  - c/w ASA 81mg QD    #HLD  - c/w atorvastatin 40mg QHS    Pulm:   # hypercapneic respiratory acidosis  - intubated overnight; post-intubation ABG improved  - c/w methylprednisolone  - extubated this AM  - currently on 5L NC  - Pulmonary toilet, IS, OOBTC  - Maintain sPO2 >92%    # COPD  - c/w duoneb and advair    GI/Nutrition:   - regular diet  - Protonix ppx  - Bowel regimen  - Monitor bowel movements    /Renal:   # MADHURI  - pre-renal  - Monitor kidney fxn  - Monitor UOP  - Replete electrolytes PRN (Mg >2, Phos >3, K >4)  - d/c Brenna     ID:  # leukocytosis  - Monitor fever curve  - Monitor for leukocytosis  - WBC 11.07; down from yesterday  - blood and urine cx negative for growth  - c/w zosyn    Endo:  # hyponatremia  - s/p 2% HTS  - received 80mg IV lasix during RRT; follow labs closely to avoid possible rapid correction with diuresis  - Na 127; allow self-correction  - nephrology following    Heme/DVT Prophylaxis:  - heparin SQ Q8H  - SCDs  - Monitor H/H    Skin:  - Repositioning for DTI prevention while in bed    Lines:  - 2 Peripheral IV's    Dispo:  - Care per MICU; likely downgrade to medicine    CODE STATUS: FULL

## 2024-09-24 NOTE — PROGRESS NOTE ADULT - SUBJECTIVE AND OBJECTIVE BOX
73 y/o female with PMH COPD (4L home O2 PRN), HFpEF, HTN, and CAD with recent admission for CP; pt had LHC which showed nonobstructive CAD and was d/c home 9/19. Pt returned to ED with fever, RUQ/epigastric abd pain, and nausea/vomiting. In ED she was noted to be dyspneic and requesting nasal O2 and also had transient expressive aphasia and underwent stroke workup which was unrevealing. Found to be hyponatremic (Na 121), appeared hypovolemic and was ultimately started on 2% saline with improvement. Abdominal US revealed distended gallbladder with stones and sludge, however no evidence of acute cholecystitis.    OVERNIGHT: MICU eval requested during Rapid Response as patient became obtunded with agonal breathing pattern. ABG revealed severe hypercapnia/respiratory acidosis. Emergently intubated. CT brain negative for acute pathology. Repeat ABG post-intubation was improved with pH 7.46 and CO2 32.      REVIEW OF SYSTEMS  General:      PHYSICAL EXAM 73 y/o female with PMH COPD (4L home O2 PRN), HFpEF, HTN, and CAD with recent admission for CP; pt had LHC which showed nonobstructive CAD and was d/c home 9/19. Pt returned to ED with fever, RUQ/epigastric abd pain, and nausea/vomiting. In ED she was noted to be dyspneic and requesting nasal O2 and also had transient expressive aphasia and underwent stroke workup which was unrevealing. Found to be hyponatremic (Na 121), appeared hypovolemic and was ultimately started on 2% saline with improvement. Abdominal US revealed distended gallbladder with stones and sludge, however no evidence of acute cholecystitis.    OVERNIGHT: MICU eval requested during Rapid Response as patient became obtunded with agonal breathing pattern. ABG revealed severe hypercapnia/respiratory acidosis. Emergently intubated. CT brain negative for acute pathology. Repeat ABG post-intubation was improved with pH 7.46 and CO2 32.      REVIEW OF SYSTEMS  GENERAL: adult female resting comfortably in bed  HEENT: denies vision changes, dysphagia, dizziness or lightheadedness, H/A, photophobia, phonophobia  CARDIAC: denies leg edema, CP, orthopnea  PULMONARY: denies SOB, cough, sputum production  GASTROINTESTINAL: + mild nausea, constipation x approx 5 days; no vomiting, diarrhea, abdominal pain, bright red blood per rectum, melena  MUSCULOSKELETAL: denies MSK and joint pain  NEUROLOGICAL: denies numbness or weakness        PHYSICAL EXAM  GENERAL - adult female lying in bed NAD  EYES - pupils equally round and reactive to light, extraocular movements intact  ENMT - intact, mucous membranes moist, no apparent injury  HEAD/NECK - neck supple, no apparent injury, normocephalic atraumatic  RESPIRATORY/THORAX - diminished bilateral breath sounds; fine crackles  CARDIOVASCULAR - rhythm and rate regular, S1 S2 present; no MRG, 2+ equal pulses, no JVD  GASTROINTESTINAL - + distention, RUQ tenderness to palpation, abdomen firm; no masses palpable, no organomegaly, positive bowel sounds x 4  MUSCULOSKELETAL - neg B/L edema; ROM intact, normal strength bilaterally  NEUROLOGICAL - A/O x 4, intact motor responses and reflexes bilaterally  PSYCHOLOGICAL - appropriate mood and behavior

## 2024-09-24 NOTE — PROGRESS NOTE ADULT - SUBJECTIVE AND OBJECTIVE BOX
Acceptance Note   Cahrt reviewed   Patient is seen for hypercapnic resp failure   s/p RRT last night intubated      Hospital Course   75 y/o female with PMH COPD (4L home O2 PRN), HFpEF, HTN, and CAD with recent admission for CP; pt had LHC which showed nonobstructive CAD and was d/c home 9/19. Pt returned to ED with fever, RUQ/epigastric abd pain, and nausea/vomiting. In ED she was noted to be dyspneic and requesting nasal O2 and also had transient expressive aphasia and underwent stroke workup which was unrevealing. Found to be hyponatremic (Na 121), appeared hypovolemic and was ultimately started on 2% saline with improvement. Abdominal US revealed distended gallbladder with stones and sludge, however no evidence of acute cholecystitis.Pt is on empirical iv zosyn   Overnight 9/23 had RRT for shallow breathing and being unresponsive , MICU team consulted ; ABG revealed severe hypercapnia/respiratory acidosis. Emergently intubated. CT brain negative for acute pathology. Repeat ABG post-intubation was improved with pH 7.46 and CO2 32.  This am Pt is extubated , currently awake alert oriented tp place self , time    at the bedside reports that she is forgetful last few days   Pt denies SOB , no CP , no abdominal pain         ALLERGIES:  No Known Allergies    MEDICATIONS  (STANDING):  albuterol/ipratropium for Nebulization 3 milliLiter(s) Nebulizer every 6 hours  aspirin  chewable 81 milliGRAM(s) Oral daily  atorvastatin 40 milliGRAM(s) Oral at bedtime  carvedilol 3.125 milliGRAM(s) Oral every 12 hours  chlorhexidine 2% Cloths 1 Application(s) Topical <User Schedule>  fluticasone propionate/ salmeterol 100-50 MICROgram(s) Diskus 1 Dose(s) Inhalation two times a day  gabapentin 300 milliGRAM(s) Oral daily  heparin   Injectable 5000 Unit(s) SubCutaneous every 8 hours  methylPREDNISolone sodium succinate Injectable 40 milliGRAM(s) IV Push every 8 hours  pantoprazole  Injectable 40 milliGRAM(s) IV Push daily  piperacillin/tazobactam IVPB.. 3.375 Gram(s) IV Intermittent every 8 hours  polyethylene glycol 3350 17 Gram(s) Oral daily  potassium chloride   Powder 40 milliEquivalent(s) Oral once  tiotropium 2.5 MICROgram(s) Inhaler 2 Puff(s) Inhalation daily  venlafaxine  milliGRAM(s) Oral daily    MEDICATIONS  (PRN):  bisacodyl 5 milliGRAM(s) Oral every 12 hours PRN Constipation  ondansetron Injectable 4 milliGRAM(s) IV Push four times a day PRN Nausea and/or Vomiting    Vital Signs Last 24 Hrs  T(F): 98.8 (24 Sep 2024 13:00), Max: 100.4 (24 Sep 2024 04:00)  HR: 88 (24 Sep 2024 13:00) (87 - 107)  BP: 135/74 (24 Sep 2024 13:00) (94/70 - 154/99)  RR: 22 (24 Sep 2024 13:00) (16 - 30)  SpO2: 100% (24 Sep 2024 13:00) (93% - 100%)  I&O's Summary    23 Sep 2024 07:01  -  24 Sep 2024 07:00  --------------------------------------------------------  IN: 98.4 mL / OUT: 1850 mL / NET: -1751.6 mL    24 Sep 2024 07:01  -  24 Sep 2024 14:29  --------------------------------------------------------  IN: 325 mL / OUT: 200 mL / NET: 125 mL        PHYSICAL EXAM:  General: awake alert, no resp distress  Neck: supple . no JVD   Lungs: CTA anteriorly   Cardio: RRR, S1/S2, No murmur  Abdomen: Soft, Nontender, Nondistended; Bowel sounds present  Extremities: No calf tenderness, No pitting edema  Skin : warm ., normal color       LABS:                        11.2   11.07 )-----------( 313      ( 24 Sep 2024 03:30 )             31.2     09-24    127  |  88  |  29.1  ----------------------------<  119  3.7   |  23.0  |  0.98    Ca    8.6      24 Sep 2024 03:30  Phos  4.5     09-22  Mg     1.9     09-24    TPro  6.7  /  Alb  3.3  /  TBili  0.3  /  DBili  x   /  AST  19  /  ALT  10  /  AlkPhos  77  09-24      Lipase: 11 U/L (09-23-24 @ 05:55)  Lipase: 23 U/L (09-21-24 @ 21:30)      PT/INR - ( 21 Sep 2024 21:30 )   PT: 11.0 sec;   INR: 0.99 ratio         PTT - ( 21 Sep 2024 21:30 )  PTT:32.7 sec      CARDIAC MARKERS ( 23 Sep 2024 21:33 )  x     / x     / x     / x     / 13.8 ng/mL          22:00 - VBG - pH:       | pCO2:       | pO2:       | Lactate: 1.70     ABG - ( 23 Sep 2024 23:04 )  pH, Arterial: 7.460 pH, Blood: x     /  pCO2: 32    /  pO2: 80    / HCO3: 23    / Base Excess: -1.0  /  SaO2: 99.3                    POCT Blood Glucose.: 130 mg/dL (23 Sep 2024 21:22)      Urinalysis Basic - ( 24 Sep 2024 03:30 )    Color: x / Appearance: x / SG: x / pH: x  Gluc: 119 mg/dL / Ketone: x  / Bili: x / Urobili: x   Blood: x / Protein: x / Nitrite: x   Leuk Esterase: x / RBC: x / WBC x   Sq Epi: x / Non Sq Epi: x / Bacteria: x        Culture - Blood (collected 21 Sep 2024 22:00)  Source: .Blood Blood-Peripheral  Preliminary Report (24 Sep 2024 06:01):    No growth at 48 Hours    Culture - Urine (collected 21 Sep 2024 21:55)  Source: Clean Catch Clean Catch (Midstream)  Final Report (23 Sep 2024 09:31):    No growth        RADIOLOGY & ADDITIONAL TESTS:    < from: Xray Chest 1 View- PORTABLE-Urgent (Xray Chest 1 View- PORTABLE-Urgent .) (09.23.24 @ 23:48) >    IMPRESSION:    No acute findings and no change    --- End of Report ---            AKILA AGUILAR DO; Attending Radiologist  This document has been electronically signed. Sep24 2024  1:34PM    < end of copied text >  < from: CT Head No Cont (09.23.24 @ 22:14) >  MISCELLANEOUS: Endotracheal tube is partiallyvisualized and oral cavity.    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      IMPRESSION:  No CT evidence of acute intracranial pathology.

## 2024-09-24 NOTE — DIETITIAN INITIAL EVALUATION ADULT - PERTINENT MEDS FT
MEDICATIONS  (STANDING):  methylPREDNISolone sodium succinate Injectable 40 milliGRAM(s) IV Push every 8 hours  pantoprazole  Injectable 40 milliGRAM(s) IV Push daily  polyethylene glycol 3350 17 Gram(s) Oral daily    MEDICATIONS  (PRN):  bisacodyl 5 milliGRAM(s) Oral every 12 hours PRN Constipation  ondansetron Injectable 4 milliGRAM(s) IV Push four times a day PRN Nausea and/or Vomiting

## 2024-09-25 DIAGNOSIS — J96.21 ACUTE AND CHRONIC RESPIRATORY FAILURE WITH HYPOXIA: ICD-10-CM

## 2024-09-25 LAB
ANION GAP SERPL CALC-SCNC: 13 MMOL/L — SIGNIFICANT CHANGE UP (ref 5–17)
ANION GAP SERPL CALC-SCNC: 15 MMOL/L — SIGNIFICANT CHANGE UP (ref 5–17)
BUN SERPL-MCNC: 32.1 MG/DL — HIGH (ref 8–20)
BUN SERPL-MCNC: 34.7 MG/DL — HIGH (ref 8–20)
CALCIUM SERPL-MCNC: 8.8 MG/DL — SIGNIFICANT CHANGE UP (ref 8.4–10.5)
CALCIUM SERPL-MCNC: 8.9 MG/DL — SIGNIFICANT CHANGE UP (ref 8.4–10.5)
CHLORIDE SERPL-SCNC: 89 MMOL/L — LOW (ref 96–108)
CHLORIDE SERPL-SCNC: 90 MMOL/L — LOW (ref 96–108)
CO2 SERPL-SCNC: 22 MMOL/L — SIGNIFICANT CHANGE UP (ref 22–29)
CO2 SERPL-SCNC: 25 MMOL/L — SIGNIFICANT CHANGE UP (ref 22–29)
CREAT SERPL-MCNC: 0.93 MG/DL — SIGNIFICANT CHANGE UP (ref 0.5–1.3)
CREAT SERPL-MCNC: 1.08 MG/DL — SIGNIFICANT CHANGE UP (ref 0.5–1.3)
EGFR: 54 ML/MIN/1.73M2 — LOW
EGFR: 64 ML/MIN/1.73M2 — SIGNIFICANT CHANGE UP
GLUCOSE SERPL-MCNC: 114 MG/DL — HIGH (ref 70–99)
GLUCOSE SERPL-MCNC: 78 MG/DL — SIGNIFICANT CHANGE UP (ref 70–99)
HCT VFR BLD CALC: 35.1 % — SIGNIFICANT CHANGE UP (ref 34.5–45)
HGB BLD-MCNC: 11.6 G/DL — SIGNIFICANT CHANGE UP (ref 11.5–15.5)
MAGNESIUM SERPL-MCNC: 2.4 MG/DL — SIGNIFICANT CHANGE UP (ref 1.6–2.6)
MAGNESIUM SERPL-MCNC: 2.5 MG/DL — SIGNIFICANT CHANGE UP (ref 1.8–2.6)
MCHC RBC-ENTMCNC: 30 PG — SIGNIFICANT CHANGE UP (ref 27–34)
MCHC RBC-ENTMCNC: 33 GM/DL — SIGNIFICANT CHANGE UP (ref 32–36)
MCV RBC AUTO: 90.7 FL — SIGNIFICANT CHANGE UP (ref 80–100)
PHOSPHATE SERPL-MCNC: 3 MG/DL — SIGNIFICANT CHANGE UP (ref 2.4–4.7)
PLATELET # BLD AUTO: 355 K/UL — SIGNIFICANT CHANGE UP (ref 150–400)
POTASSIUM SERPL-MCNC: 4.8 MMOL/L — SIGNIFICANT CHANGE UP (ref 3.5–5.3)
POTASSIUM SERPL-MCNC: 5.4 MMOL/L — HIGH (ref 3.5–5.3)
POTASSIUM SERPL-SCNC: 4.8 MMOL/L — SIGNIFICANT CHANGE UP (ref 3.5–5.3)
POTASSIUM SERPL-SCNC: 5.4 MMOL/L — HIGH (ref 3.5–5.3)
RBC # BLD: 3.87 M/UL — SIGNIFICANT CHANGE UP (ref 3.8–5.2)
RBC # FLD: 12.1 % — SIGNIFICANT CHANGE UP (ref 10.3–14.5)
SODIUM SERPL-SCNC: 126 MMOL/L — LOW (ref 135–145)
SODIUM SERPL-SCNC: 126 MMOL/L — LOW (ref 135–145)
WBC # BLD: 14.45 K/UL — HIGH (ref 3.8–10.5)
WBC # FLD AUTO: 14.45 K/UL — HIGH (ref 3.8–10.5)

## 2024-09-25 PROCEDURE — 99233 SBSQ HOSP IP/OBS HIGH 50: CPT

## 2024-09-25 PROCEDURE — 99223 1ST HOSP IP/OBS HIGH 75: CPT

## 2024-09-25 PROCEDURE — 70551 MRI BRAIN STEM W/O DYE: CPT | Mod: 26

## 2024-09-25 RX ORDER — UREA 40 G
15 VIAL (EA) INTRAVENOUS DAILY
Refills: 0 | Status: DISCONTINUED | OUTPATIENT
Start: 2024-09-25 | End: 2024-09-28

## 2024-09-25 RX ORDER — SODIUM CHLORIDE 0.9 % (FLUSH) 0.9 %
1000 SYRINGE (ML) INJECTION
Refills: 0 | Status: DISCONTINUED | OUTPATIENT
Start: 2024-09-25 | End: 2024-09-25

## 2024-09-25 RX ORDER — SODIUM CHLORIDE 0.9 % (FLUSH) 0.9 %
1 SYRINGE (ML) INJECTION THREE TIMES A DAY
Refills: 0 | Status: DISCONTINUED | OUTPATIENT
Start: 2024-09-25 | End: 2024-10-02

## 2024-09-25 RX ORDER — FLUTICASONE PROPION/SALMETEROL 100-50 MCG
1 BLISTER, WITH INHALATION DEVICE INHALATION
Refills: 0 | Status: DISCONTINUED | OUTPATIENT
Start: 2024-09-25 | End: 2024-10-09

## 2024-09-25 RX ORDER — METHYLPREDNISOLONE ACETATE 80 MG/ML
40 INJECTION, SUSPENSION INTRA-ARTICULAR; INTRALESIONAL; INTRAMUSCULAR; SOFT TISSUE DAILY
Refills: 0 | Status: DISCONTINUED | OUTPATIENT
Start: 2024-09-25 | End: 2024-09-26

## 2024-09-25 RX ADMIN — Medication 5 MILLIGRAM(S): at 21:20

## 2024-09-25 RX ADMIN — Medication 15 GRAM(S): at 22:33

## 2024-09-25 RX ADMIN — Medication 15 MILLILITER(S): at 22:33

## 2024-09-25 RX ADMIN — Medication 5000 UNIT(S): at 00:05

## 2024-09-25 RX ADMIN — Medication 650 MILLIGRAM(S): at 09:50

## 2024-09-25 RX ADMIN — Medication 81 MILLIGRAM(S): at 14:14

## 2024-09-25 RX ADMIN — Medication 2 TABLET(S): at 21:21

## 2024-09-25 RX ADMIN — Medication 1 DOSE(S): at 09:41

## 2024-09-25 RX ADMIN — Medication 1 GRAM(S): at 14:14

## 2024-09-25 RX ADMIN — Medication 4 MILLIGRAM(S): at 22:33

## 2024-09-25 RX ADMIN — GABAPENTIN 300 MILLIGRAM(S): 800 TABLET, FILM COATED ORAL at 14:15

## 2024-09-25 RX ADMIN — Medication 650 MILLIGRAM(S): at 02:44

## 2024-09-25 RX ADMIN — Medication 225 MILLIGRAM(S): at 14:13

## 2024-09-25 RX ADMIN — PIPERACILLIN SODIUM AND TAZOBACTAM SODIUM 25 GRAM(S): 12; 1.5 INJECTION, POWDER, LYOPHILIZED, FOR SOLUTION INTRAVENOUS at 08:27

## 2024-09-25 RX ADMIN — PANTOPRAZOLE SODIUM 40 MILLIGRAM(S): 40 TABLET, DELAYED RELEASE ORAL at 14:14

## 2024-09-25 RX ADMIN — IPRATROPIUM BROMIDE AND ALBUTEROL SULFATE 3 MILLILITER(S): .5; 3 SOLUTION RESPIRATORY (INHALATION) at 14:46

## 2024-09-25 RX ADMIN — BISACODYL 5 MILLIGRAM(S): 5 TABLET, COATED ORAL at 21:21

## 2024-09-25 RX ADMIN — ATORVASTATIN CALCIUM 40 MILLIGRAM(S): 10 TABLET, FILM COATED ORAL at 21:21

## 2024-09-25 RX ADMIN — Medication 3.12 MILLIGRAM(S): at 18:21

## 2024-09-25 RX ADMIN — Medication 5000 UNIT(S): at 08:27

## 2024-09-25 RX ADMIN — Medication 650 MILLIGRAM(S): at 01:44

## 2024-09-25 RX ADMIN — Medication 50 MILLILITER(S): at 08:27

## 2024-09-25 RX ADMIN — Medication 17 GRAM(S): at 14:15

## 2024-09-25 RX ADMIN — METHYLPREDNISOLONE ACETATE 40 MILLIGRAM(S): 80 INJECTION, SUSPENSION INTRA-ARTICULAR; INTRALESIONAL; INTRAMUSCULAR; SOFT TISSUE at 05:19

## 2024-09-25 RX ADMIN — PIPERACILLIN SODIUM AND TAZOBACTAM SODIUM 25 GRAM(S): 12; 1.5 INJECTION, POWDER, LYOPHILIZED, FOR SOLUTION INTRAVENOUS at 15:20

## 2024-09-25 RX ADMIN — Medication 5000 UNIT(S): at 15:21

## 2024-09-25 RX ADMIN — TIOTROPIUM BROMIDE INHALATION SPRAY 2 PUFF(S): 3.12 SPRAY, METERED RESPIRATORY (INHALATION) at 09:41

## 2024-09-25 RX ADMIN — IPRATROPIUM BROMIDE AND ALBUTEROL SULFATE 3 MILLILITER(S): .5; 3 SOLUTION RESPIRATORY (INHALATION) at 09:39

## 2024-09-25 RX ADMIN — Medication 3.12 MILLIGRAM(S): at 05:19

## 2024-09-25 RX ADMIN — IPRATROPIUM BROMIDE AND ALBUTEROL SULFATE 3 MILLILITER(S): .5; 3 SOLUTION RESPIRATORY (INHALATION) at 21:10

## 2024-09-25 RX ADMIN — PIPERACILLIN SODIUM AND TAZOBACTAM SODIUM 25 GRAM(S): 12; 1.5 INJECTION, POWDER, LYOPHILIZED, FOR SOLUTION INTRAVENOUS at 00:05

## 2024-09-25 RX ADMIN — Medication 1 GRAM(S): at 21:21

## 2024-09-25 RX ADMIN — Medication 650 MILLIGRAM(S): at 21:20

## 2024-09-25 NOTE — CONSULT NOTE ADULT - SUBJECTIVE AND OBJECTIVE BOX
PULMONARY CONSULT NOTE      [id number]    Patient is a 74y old  Female who presents with a chief complaint of Sepsis     (24 Sep 2024 13:30)  74 F with PMHX CAD , HFpEF, HTN, HLD, COPD, recent admission for CP  s/p LHC discharged home   returned to the Hospital  yesterday evening with abdominal pain , nausea vomiting started yesterday c/o upper abdominal pain similar to prior presentation   Medical records reviewed she had  LHC with nonobstructive CAD. Pt also c/o fever at home and febrile now , still c/o epigastric right upper abdominal and lower abd pain on off , + dyspnea asking for oxygen ( she is on prn oxygen at home ) Episode of expressive aphasia which since resolved. CTH/CTA Head/Neck negative. Pt slow to respond otherwise without focal deficits.In [facility] blood work with hyponatremia , leukocytosis and MADHURI  . Appears dehydrated , no appetite since yesterday   1.5L NSS bolus given in ED. CT Chest/Abd/Pelvis WO +atelectasis otherwise negative. repeat Na 120     MICU eval requested during Rapid Response as patient became obtunded with agonal breathing pattern. ABG revealed severe hypercapnia/respiratory acidosis. Emergently intubated. CT brain negative for acute pathology.      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   Currently with some clinical volume depletion   CT chest and brain w/o focal lesions   Resume gentle 2 % NaCL x 10 hours   No need for rapid correction   Goal elevation  of 6-8 / 24 h       HISTORY OF PRESENT ILLNESS:    MEDICATIONS  (STANDING):  albuterol/ipratropium for Nebulization 3 milliLiter(s) Nebulizer every 6 hours  aspirin  chewable 81 milliGRAM(s) Oral daily  atorvastatin 40 milliGRAM(s) Oral at bedtime  bismuth subsalicylate Liquid 15 milliLiter(s) Oral once  carvedilol 3.125 milliGRAM(s) Oral every 12 hours  fluticasone propionate/ salmeterol 100-50 MICROgram(s) Diskus 1 Dose(s) Inhalation two times a day  gabapentin 300 milliGRAM(s) Oral daily  heparin   Injectable 5000 Unit(s) SubCutaneous every 8 hours  methylPREDNISolone sodium succinate Injectable 40 milliGRAM(s) IV Push daily  pantoprazole  Injectable 40 milliGRAM(s) IV Push daily  piperacillin/tazobactam IVPB.. 3.375 Gram(s) IV Intermittent every 8 hours  polyethylene glycol 3350 17 Gram(s) Oral daily  senna 2 Tablet(s) Oral at bedtime  sodium chloride 1 Gram(s) Oral three times a day  tiotropium 2.5 MICROgram(s) Inhaler 2 Puff(s) Inhalation daily  urea Oral Powder 15 Gram(s) Oral daily  venlafaxine  milliGRAM(s) Oral daily  zolpidem 5 milliGRAM(s) Oral at bedtime      MEDICATIONS  (PRN):  acetaminophen     Tablet .. 650 milliGRAM(s) Oral every 6 hours PRN Temp greater or equal to 38C (100.4F), Mild Pain (1 - 3), Moderate Pain (4 - 6)  bisacodyl 5 milliGRAM(s) Oral every 12 hours PRN Constipation  ondansetron Injectable 4 milliGRAM(s) IV Push four times a day PRN Nausea and/or Vomiting      Allergies    No Known Allergies    Intolerances        PAST MEDICAL & SURGICAL HISTORY:  NICM (nonischemic cardiomyopathy)      HTN (hypertension)      Rheumatoid arthritis      Chronic back pain      HLD (hyperlipidemia)      Chronic obstructive asthma      Cellulitis      S/P       S/P hysterectomy          FAMILY HISTORY:  Family history of diabetes mellitus (DM)    FH: CAD (coronary artery disease)        SOCIAL HISTORY  Smoking History:     REVIEW OF SYSTEMS:    CONSTITUTIONAL:  No fevers, chills, sweats    HEENT:  Eyes:  No diplopia or blurred vision. ENT:  No earache, sore throat or runny nose.    CARDIOVASCULAR:  No pressure, squeezing, tightness, or heaviness about the chest; no palpitations.    RESPIRATORY:  No cough, shortness of breath, PND or orthopnea. Mild SOBOE    GASTROINTESTINAL:  No abdominal pain, nausea, vomiting or diarrhea.    GENITOURINARY:  No dysuria, frequency or urgency.    NEUROLOGIC:  No paresthesias, fasciculations, seizures or weakness.    PSYCHIATRIC:  No disorder of thought or mood.    Vital Signs Last 24 Hrs  T(C): 36.4 (25 Sep 2024 09:30), Max: 37.1 (24 Sep 2024 17:00)  T(F): 97.5 (25 Sep 2024 09:30), Max: 98.8 (24 Sep 2024 17:00)  HR: 85 (25 Sep 2024 09:30) (62 - 91)  BP: 118/75 (25 Sep 2024 09:30) (117/66 - 150/78)  BP(mean): 96 (24 Sep 2024 18:00) (83 - 96)  RR: 18 (25 Sep 2024 09:30) (18 - 27)  SpO2: 100% (25 Sep 2024 09:30) (92% - 100%)    Parameters below as of 25 Sep 2024 14:50  Patient On (Oxygen Delivery Method): nasal cannula, 3L        PHYSICAL EXAMINATION:    GENERAL: The patient is a well-developed, well-nourished _____in no apparent distress.     HEENT: Head is normocephalic and atraumatic. Extraocular muscles are intact. Mucous membranes are moist.     NECK: Supple.     LUNGS: Clear to auscultation without wheezing, rales, or rhonchi. Respirations unlabored    HEART: Regular rate and rhythm without murmur.    ABDOMEN: Soft, nontender, and nondistended.  No hepatosplenomegaly is noted.    EXTREMITIES: Without any cyanosis, clubbing, rash, lesions or edema.    NEUROLOGIC: Grossly intact.      LABS:                        11.6   14.45 )-----------( 355      ( 25 Sep 2024 04:30 )             35.1     09-25    126[L]  |  89[L]  |  32.1[H]  ----------------------------<  114[H]  4.8   |  25.0  |  1.08    Ca    8.9      25 Sep 2024 04:30  Mg     2.5     09-25    TPro  6.7  /  Alb  3.3  /  TBili  0.3[L]  /  DBili  x   /  AST  19  /  ALT  10  /  AlkPhos  77  09-24      Urinalysis Basic - ( 25 Sep 2024 04:30 )    Color: x / Appearance: x / SG: x / pH: x  Gluc: 114 mg/dL / Ketone: x  / Bili: x / Urobili: x   Blood: x / Protein: x / Nitrite: x   Leuk Esterase: x / RBC: x / WBC x   Sq Epi: x / Non Sq Epi: x / Bacteria: x      ABG - ( 23 Sep 2024 23:04 )  pH, Arterial: 7.460 pH, Blood: x     /  pCO2: 32    /  pO2: 80    / HCO3: 23    / Base Excess: -1.0  /  SaO2: 99.3              CARDIAC MARKERS ( 23 Sep 2024 21:33 )  x     / x     / x     / x     / 13.8 ng/mL                MICROBIOLOGY:    RADIOLOGY & ADDITIONAL STUDIES: PULMONARY CONSULT NOTE      TULIO SUGGSGLADIS-[id number]    Patient is a 74y old  Female who presents with a chief complaint of Sepsis     (24 Sep 2024 13:30)  74 F with PMHX CAD , HFpEF, HTN, HLD, COPD, recent admission for CP  s/p LHC discharged home   returned to the Hospital  yesterday evening with abdominal pain , nausea vomiting started yesterday c/o upper abdominal pain similar to prior presentation   Medical records reviewed she had  LHC with nonobstructive CAD. Pt also c/o fever at home and febrile now , still c/o epigastric right upper abdominal and lower abd pain on off , + dyspnea asking for oxygen ( she is on prn oxygen at home ) Episode of expressive aphasia which since resolved. CTH/CTA Head/Neck negative. Pt slow to respond otherwise without focal deficits.In [facility] blood work with hyponatremia , leukocytosis and MADHURI  . Appears dehydrated , no appetite since yesterday   1.5L NSS bolus given in ED. CT Chest/Abd/Pelvis WO +atelectasis otherwise negative. repeat Na 120     MICU eval requested during Rapid Response as patient became obtunded with agonal breathing pattern. ABG revealed severe hypercapnia/respiratory acidosis. Emergently intubated. CT brain negative for acute pathology.      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   Currently with some clinical volume depletion   CT chest and brain w/o focal lesions   Resume gentle 2 % NaCL x 10 hours   No need for rapid correction   Goal elevation  of 6-8 / 24 h       HISTORY OF PRESENT ILLNESS:  alert, NAD  no cp[ or sob  hx asthma - severe persistent- on Trelegy 200   concomitant R kendal paralysis- never had sleep study  has home 02    MEDICATIONS  (STANDING):  albuterol/ipratropium for Nebulization 3 milliLiter(s) Nebulizer every 6 hours  aspirin  chewable 81 milliGRAM(s) Oral daily  atorvastatin 40 milliGRAM(s) Oral at bedtime  bismuth subsalicylate Liquid 15 milliLiter(s) Oral once  carvedilol 3.125 milliGRAM(s) Oral every 12 hours  fluticasone propionate/ salmeterol 100-50 MICROgram(s) Diskus 1 Dose(s) Inhalation two times a day  gabapentin 300 milliGRAM(s) Oral daily  heparin   Injectable 5000 Unit(s) SubCutaneous every 8 hours  methylPREDNISolone sodium succinate Injectable 40 milliGRAM(s) IV Push daily  pantoprazole  Injectable 40 milliGRAM(s) IV Push daily  piperacillin/tazobactam IVPB.. 3.375 Gram(s) IV Intermittent every 8 hours  polyethylene glycol 3350 17 Gram(s) Oral daily  senna 2 Tablet(s) Oral at bedtime  sodium chloride 1 Gram(s) Oral three times a day  tiotropium 2.5 MICROgram(s) Inhaler 2 Puff(s) Inhalation daily  urea Oral Powder 15 Gram(s) Oral daily  venlafaxine  milliGRAM(s) Oral daily  zolpidem 5 milliGRAM(s) Oral at bedtime      MEDICATIONS  (PRN):  acetaminophen     Tablet .. 650 milliGRAM(s) Oral every 6 hours PRN Temp greater or equal to 38C (100.4F), Mild Pain (1 - 3), Moderate Pain (4 - 6)  bisacodyl 5 milliGRAM(s) Oral every 12 hours PRN Constipation  ondansetron Injectable 4 milliGRAM(s) IV Push four times a day PRN Nausea and/or Vomiting      Allergies    No Known Allergies    Intolerances        PAST MEDICAL & SURGICAL HISTORY:  NICM (nonischemic cardiomyopathy)      HTN (hypertension)      Rheumatoid arthritis      Chronic back pain      HLD (hyperlipidemia)      Chronic obstructive asthma      Cellulitis      S/P       S/P hysterectomy          FAMILY HISTORY:  Family history of diabetes mellitus (DM)    FH: CAD (coronary artery disease)        SOCIAL HISTORY  Smoking History:     REVIEW OF SYSTEMS:    CONSTITUTIONAL:  No fevers, chills, sweats    HEENT:  Eyes:  No diplopia or blurred vision. ENT:  No earache, sore throat or runny nose.    CARDIOVASCULAR:  No pressure, squeezing, tightness, or heaviness about the chest; no palpitations.    RESPIRATORY:  see HPI    GASTROINTESTINAL:  No abdominal pain, nausea, vomiting or diarrhea.    GENITOURINARY:  No dysuria, frequency or urgency.    NEUROLOGIC:  No paresthesias, fasciculations, seizures or weakness.    PSYCHIATRIC:  No disorder of thought or mood.    Vital Signs Last 24 Hrs  T(C): 36.4 (25 Sep 2024 09:30), Max: 37.1 (24 Sep 2024 17:00)  T(F): 97.5 (25 Sep 2024 09:30), Max: 98.8 (24 Sep 2024 17:00)  HR: 85 (25 Sep 2024 09:30) (62 - 91)  BP: 118/75 (25 Sep 2024 09:30) (117/66 - 150/78)  BP(mean): 96 (24 Sep 2024 18:00) (83 - 96)  RR: 18 (25 Sep 2024 09:30) (18 - 27)  SpO2: 100% (25 Sep 2024 09:30) (92% - 100%)    Parameters below as of 25 Sep 2024 14:50  Patient On (Oxygen Delivery Method): nasal cannula, 3L        PHYSICAL EXAMINATION:    GENERAL: The patient is a well-developed, well-nourished _in no apparent distress.     HEENT: Head is normocephalic and atraumatic. Extraocular muscles are intact. Mucous membranes are moist.     NECK: Supple.     LUNGS: mod air entry decr R . Respirations unlabored    HEART: Regular rate and rhythm without murmur.    ABDOMEN: Soft, nontender, and nondistended.  No hepatosplenomegaly is noted.    EXTREMITIES: Without any cyanosis, clubbing, rash, lesions or edema.    NEUROLOGIC: Grossly intact.      LABS:                        11.6   14.45 )-----------( 355      ( 25 Sep 2024 04:30 )             35.1     09-25    126[L]  |  89[L]  |  32.1[H]  ----------------------------<  114[H]  4.8   |  25.0  |  1.08    Ca    8.9      25 Sep 2024 04:30  Mg     2.5     09-25    TPro  6.7  /  Alb  3.3  /  TBili  0.3[L]  /  DBili  x   /  AST  19  /  ALT  10  /  AlkPhos  77  09-24      Urinalysis Basic - ( 25 Sep 2024 04:30 )    Color: x / Appearance: x / SG: x / pH: x  Gluc: 114 mg/dL / Ketone: x  / Bili: x / Urobili: x   Blood: x / Protein: x / Nitrite: x   Leuk Esterase: x / RBC: x / WBC x   Sq Epi: x / Non Sq Epi: x / Bacteria: x      ABG - ( 23 Sep 2024 23:04 )  pH, Arterial: 7.460 pH, Blood: x     /  pCO2: 32    /  pO2: 80    / HCO3: 23    / Base Excess: -1.0  /  SaO2: 99.3              CARDIAC MARKERS ( 23 Sep 2024 21:33 )  x     / x     / x     / x     / 13.8 ng/mL                MICROBIOLOGY:    RADIOLOGY & ADDITIONAL STUDIES:  CXR/CT /office notes reviewed

## 2024-09-25 NOTE — CONSULT NOTE ADULT - ASSESSMENT
Acute hypoxemic hypercapnic resp failure  CTA no PE or sig infiltrate , no Eos  Resolving, needs follow up ABG  Underlying severe persistent asthma, complicated by Diaphragmatic paralysis  Normal serum C02- argues against chronicity, c/w acute event - appears multifactorial, RVP and CT chest neg, MRI head neg      ABG  Mobilize, keep HOB elevated  change prednisone 40 mg in AM and taper by 10 mg every 3 days till 10 mg daily  add Budesonide nebs  Needs home nebulizer  Restart Trelegy 200 as out pt  asthma profile, ? role of biologic as out pt  Empiric BiPAP for Diaphragm dysfunction, if not hypercapnic, needs sleep study as out pt Acute hypoxemic hypercapnic resp failure  CTA no PE or sig infiltrate , no Eos  Resolving, needs follow up ABG  Underlying severe persistent asthma, complicated by Diaphragmatic paralysis  Normal serum C02- argues against chronicity, c/w acute event - appears multifactorial, RVP and CT chest neg, MRI head neg  Hyponatremia, renal input noted  Echo normal LV, recent LHC, CAD- med mgmt, LVEDP 16    ABG  Mobilize, keep HOB elevated  Finish abx course  change prednisone 40 mg in AM and taper by 10 mg every 3 days till 10 mg daily  add Budesonide nebs  Needs home nebulizer  Restart Trelegy 200 as out pt  asthma profile, ? role of biologic as out pt  Empiric BiPAP for Diaphragm dysfunction, if not hypercapnic, needs sleep study as out pt Acute hypoxemic hypercapnic resp failure  CTA no PE or sig infiltrate , no Eos  Resolving, needs follow up ABG  Underlying severe persistent asthma, complicated by Diaphragmatic paralysis  Normal serum C02- argues against chronicity, c/w acute event - appears multifactorial, RVP and CT chest neg, MRI head neg  Hyponatremia, renal input noted  Echo normal LV, recent LHC, CAD- med mgmt, LVEDP 16    ABG in AM , off NIV  Mobilize, keep HOB elevated  Finish abx course  change prednisone 40 mg in AM and taper by 10 mg every 3 days till 10 mg daily  Increase Advair disc 500/50  Needs home nebulizer  Restart Trelegy 200 as out pt  asthma profile, ? role of biologic as out pt  Empiric BiPAP for Diaphragm dysfunction, if not hypercapnic, needs sleep study as out pt

## 2024-09-25 NOTE — PROGRESS NOTE ADULT - ASSESSMENT
73 y/o F with a h/o CAD (s/p PCI), HFpEF, HTN, HLD, COPD (on home O2 PRN), recent admission for chest pain s/p C with nonobstructive CAD- discharged home 9/19, now readmitted on 9/22 with complaints abd pain nausea vomiting , hyponatremia, and acute hypercapnic respiratory failure    #Acute on chronic  hypercapnic respiratory failure 2/2 to COPD exacerbation?    Or restrictive lung disease due to elevated hemodiaphgragm and benzo use?   - s/p intubation   - extubated repeat ABG improved Co2 and PH   - cont to monitor , oxygen as needed keep pulse ox over 92%   - switch iv steroid 40mg bid to qd    - pulmonary following     #Acute metabolic encephalopathy   resolved   - likely due to resp failure / hypercapnia     # Chronic hyponatremia due to SIADH   - h/o SIADH in the past   - s/p 2% saline infusion   - Nephrology following   - PO fluid restriction enforced  - add PO Urea  - cont NaCl tabs  - follow labs    #Prerenal azotemia . ARF   - improving   - s/p IVF   - avoid ARB / ACE inh , nephrotoxic meds     # leukocytosis   - GB sludge with distention , no cholecytstitis   - cont empirical iv zosyn complete course 5 days   - wbc is trending down   - urine cx , blood cx are neg     # Constipation  -cont miralax , dulcolax prn   - add senna at qhs     # CAD   - nonobstructive disease recent [facility]   - medical treatment asa , coreg     DVT: heparin ppx   Dispo: medically active

## 2024-09-25 NOTE — PROGRESS NOTE ADULT - ASSESSMENT
Chronic hyponatremia due to SIADH  Acute hypovolemic hyponatremia (U Na+ < 30)  - PO fluid restriction enforced  - add PO Urea  - cont NaCl tabs  - follow labs

## 2024-09-25 NOTE — PROGRESS NOTE ADULT - ATTENDING COMMENTS
Agree with above   Patient seen and examined together with medical residents   Vital signs,  labs and imaging  reviewed   Assesment and plan discussed    75 y/o F with a h/o CAD (s/p PCI), HFpEF, HTN, HLD, COPD (on home O2 PRN), recent admission for chest pain s/p [facility] with nonobstructive CAD- discharged home 9/19, now readmitted on 9/22 with complaints abd pain nausea vomiting , hyponatremia, and acute hypercapnic respiratory failure    #Resp failure 2/2 benzo use?  #COPD exac  S/p vent support in ICU, extubated 9/24, satting well on NC  Inh therapy  Solumedrol 40 noq q2  Supplemental O2  Pulm consulted    Dispo- taper steroids, conv IV to PO abx, Pulm cl
73 yo female with COPD, asthma, elevated hemidiaphragm, never smoker, CAD, chronic diastolic CHF, presented to the ED with abd pain, found to have hyponatremia, constipation.  While in the hospital patient developed acute on chronic hypercapnic respiratory failure requiring intubation on 9/23, now extubated.    Suspect acute on chronic hypercapnic respiratory failure due to restrictive lung disease due to elevated hemidiaphragm and benzo use.

## 2024-09-25 NOTE — PROGRESS NOTE ADULT - SUBJECTIVE AND OBJECTIVE BOX
SUBJECTIVE:    Chief Complaint: Patient is a 74y old  Female who presents with a chief complaint of Sepsis     (24 Sep 2024 13:30)      Hospital Course:     INTERVAL HPI/OVERNIGHT EVENTS: Patient seen and examined at bedside at AM. No clinically acute event overnight.   Denies any chest pain, palpitations, SOB, leg edema, N/V/D, or any other complaints.     MEDICATIONS  (STANDING):  albuterol/ipratropium for Nebulization 3 milliLiter(s) Nebulizer every 6 hours  aspirin  chewable 81 milliGRAM(s) Oral daily  atorvastatin 40 milliGRAM(s) Oral at bedtime  bismuth subsalicylate Liquid 15 milliLiter(s) Oral once  carvedilol 3.125 milliGRAM(s) Oral every 12 hours  fluticasone propionate/ salmeterol 100-50 MICROgram(s) Diskus 1 Dose(s) Inhalation two times a day  gabapentin 300 milliGRAM(s) Oral daily  heparin   Injectable 5000 Unit(s) SubCutaneous every 8 hours  methylPREDNISolone sodium succinate Injectable 40 milliGRAM(s) IV Push daily  pantoprazole  Injectable 40 milliGRAM(s) IV Push daily  piperacillin/tazobactam IVPB.. 3.375 Gram(s) IV Intermittent every 8 hours  polyethylene glycol 3350 17 Gram(s) Oral daily  senna 2 Tablet(s) Oral at bedtime  sodium chloride 1 Gram(s) Oral three times a day  tiotropium 2.5 MICROgram(s) Inhaler 2 Puff(s) Inhalation daily  urea Oral Powder 15 Gram(s) Oral daily  venlafaxine  milliGRAM(s) Oral daily  zolpidem 5 milliGRAM(s) Oral at bedtime    MEDICATIONS  (PRN):  acetaminophen     Tablet .. 650 milliGRAM(s) Oral every 6 hours PRN Temp greater or equal to 38C (100.4F), Mild Pain (1 - 3), Moderate Pain (4 - 6)  bisacodyl 5 milliGRAM(s) Oral every 12 hours PRN Constipation  ondansetron Injectable 4 milliGRAM(s) IV Push four times a day PRN Nausea and/or Vomiting      Allergies    No Known Allergies    Intolerances        REVIEW OF SYSTEMS:  All other review of systems negative, except as noted in HPI    OBJECTIVE:    Vital Signs Last 24 Hrs  T(C): 36.4 (25 Sep 2024 09:30), Max: 37.1 (24 Sep 2024 17:00)  T(F): 97.5 (25 Sep 2024 09:30), Max: 98.8 (24 Sep 2024 17:00)  HR: 85 (25 Sep 2024 09:30) (62 - 91)  BP: 118/75 (25 Sep 2024 09:30) (111/64 - 150/78)  BP(mean): 96 (24 Sep 2024 18:00) (77 - 96)  RR: 18 (25 Sep 2024 09:30) (18 - 27)  SpO2: 100% (25 Sep 2024 09:30) (92% - 100%)    Parameters below as of 25 Sep 2024 09:30  Patient On (Oxygen Delivery Method): nasal cannula  O2 Flow (L/min): 3      PHYSICAL EXAM:      Lab/ Imaging:    LABS:                        11.6   14.45 )-----------( 355      ( 25 Sep 2024 04:30 )             35.1     09-25    126[L]  |  89[L]  |  32.1[H]  ----------------------------<  114[H]  4.8   |  25.0  |  1.08    Ca    8.9      25 Sep 2024 04:30  Mg     2.5     09-25    TPro  6.7  /  Alb  3.3  /  TBili  0.3[L]  /  DBili  x   /  AST  19  /  ALT  10  /  AlkPhos  77  09-24      Urinalysis Basic - ( 25 Sep 2024 04:30 )    Color: x / Appearance: x / SG: x / pH: x  Gluc: 114 mg/dL / Ketone: x  / Bili: x / Urobili: x   Blood: x / Protein: x / Nitrite: x   Leuk Esterase: x / RBC: x / WBC x   Sq Epi: x / Non Sq Epi: x / Bacteria: x      CAPILLARY BLOOD GLUCOSE           SUBJECTIVE:    Chief Complaint: Patient is a 74y old  Female who presents with a chief complaint of Sepsis     (24 Sep 2024 13:30)      Hospital Course:   73 y/o female with PMH COPD (4L home O2 PRN), HFpEF, HTN, and CAD with recent admission for CP; pt had LHC which showed nonobstructive CAD and was d/c home 9/19. Pt returned to ED with fever, RUQ/epigastric abd pain, and nausea/vomiting. In ED she was noted to be dyspneic and requesting nasal O2 and also had transient expressive aphasia and underwent stroke workup which was unrevealing. Found to be hyponatremic (Na 121), appeared hypovolemic and was ultimately started on 2% saline with improvement. Abdominal US revealed distended gallbladder with stones and sludge, however no evidence of acute cholecystitis.Pt is on empirical iv zosyn     INTERVAL HPI/OVERNIGHT EVENTS: Patient seen and examined at bedside at AM. No clinically acute event overnight.   Denies any chest pain, palpitations, SOB, leg edema, N/V/D, or any other complaints.     MEDICATIONS  (STANDING):  albuterol/ipratropium for Nebulization 3 milliLiter(s) Nebulizer every 6 hours  aspirin  chewable 81 milliGRAM(s) Oral daily  atorvastatin 40 milliGRAM(s) Oral at bedtime  bismuth subsalicylate Liquid 15 milliLiter(s) Oral once  carvedilol 3.125 milliGRAM(s) Oral every 12 hours  fluticasone propionate/ salmeterol 100-50 MICROgram(s) Diskus 1 Dose(s) Inhalation two times a day  gabapentin 300 milliGRAM(s) Oral daily  heparin   Injectable 5000 Unit(s) SubCutaneous every 8 hours  methylPREDNISolone sodium succinate Injectable 40 milliGRAM(s) IV Push daily  pantoprazole  Injectable 40 milliGRAM(s) IV Push daily  piperacillin/tazobactam IVPB.. 3.375 Gram(s) IV Intermittent every 8 hours  polyethylene glycol 3350 17 Gram(s) Oral daily  senna 2 Tablet(s) Oral at bedtime  sodium chloride 1 Gram(s) Oral three times a day  tiotropium 2.5 MICROgram(s) Inhaler 2 Puff(s) Inhalation daily  urea Oral Powder 15 Gram(s) Oral daily  venlafaxine  milliGRAM(s) Oral daily  zolpidem 5 milliGRAM(s) Oral at bedtime    MEDICATIONS  (PRN):  acetaminophen     Tablet .. 650 milliGRAM(s) Oral every 6 hours PRN Temp greater or equal to 38C (100.4F), Mild Pain (1 - 3), Moderate Pain (4 - 6)  bisacodyl 5 milliGRAM(s) Oral every 12 hours PRN Constipation  ondansetron Injectable 4 milliGRAM(s) IV Push four times a day PRN Nausea and/or Vomiting      Allergies    No Known Allergies    Intolerances        REVIEW OF SYSTEMS:  All other review of systems negative, except as noted in HPI    OBJECTIVE:    Vital Signs Last 24 Hrs  T(C): 36.4 (25 Sep 2024 09:30), Max: 37.1 (24 Sep 2024 17:00)  T(F): 97.5 (25 Sep 2024 09:30), Max: 98.8 (24 Sep 2024 17:00)  HR: 85 (25 Sep 2024 09:30) (62 - 91)  BP: 118/75 (25 Sep 2024 09:30) (111/64 - 150/78)  BP(mean): 96 (24 Sep 2024 18:00) (77 - 96)  RR: 18 (25 Sep 2024 09:30) (18 - 27)  SpO2: 100% (25 Sep 2024 09:30) (92% - 100%)    Parameters below as of 25 Sep 2024 09:30  Patient On (Oxygen Delivery Method): nasal cannula  O2 Flow (L/min): 3      PHYSICAL EXAM:      Lab/ Imaging:    LABS:                        11.6   14.45 )-----------( 355      ( 25 Sep 2024 04:30 )             35.1     09-25    126[L]  |  89[L]  |  32.1[H]  ----------------------------<  114[H]  4.8   |  25.0  |  1.08    Ca    8.9      25 Sep 2024 04:30  Mg     2.5     09-25    TPro  6.7  /  Alb  3.3  /  TBili  0.3[L]  /  DBili  x   /  AST  19  /  ALT  10  /  AlkPhos  77  09-24      Urinalysis Basic - ( 25 Sep 2024 04:30 )    Color: x / Appearance: x / SG: x / pH: x  Gluc: 114 mg/dL / Ketone: x  / Bili: x / Urobili: x   Blood: x / Protein: x / Nitrite: x   Leuk Esterase: x / RBC: x / WBC x   Sq Epi: x / Non Sq Epi: x / Bacteria: x      CAPILLARY BLOOD GLUCOSE           SUBJECTIVE:    Chief Complaint: Patient is a 74y old  Female who presents with a chief complaint of Sepsis     (24 Sep 2024 13:30)      Hospital Course:   73 y/o female with PMH COPD (4L home O2 PRN), HFpEF, HTN, and CAD with recent admission for CP; pt had LHC which showed nonobstructive CAD and was d/c home 9/19. Pt returned to ED with fever, RUQ/epigastric abd pain, and nausea/vomiting. In ED she was noted to be dyspneic and requesting nasal O2 and also had transient expressive aphasia and underwent stroke workup which was unrevealing. Found to be hyponatremic (Na 121), appeared hypovolemic and was ultimately started on 2% saline with improvement. Abdominal US revealed distended gallbladder with stones and sludge, however no evidence of acute cholecystitis.Pt is on empirical iv zosyn. Overnight 9/23 had RRT for shallow breathing and being unresponsive , MICU team consulted ; ABG revealed severe hypercapnia/respiratory acidosis. Emergently intubated. CT brain negative for acute pathology. Repeat ABG post-intubation was improved with pH 7.46 and CO2 32. 9/24 Pt is extubated , currently awake alert oriented to place self , time, but  at the bedside reports that she is forgetful last few days. Pt downgrade to medicine floor.     INTERVAL HPI/OVERNIGHT EVENTS: Patient seen and examined at bedside at AM. No clinically acute event overnight.   Denies any chest pain, palpitations, SOB, leg edema, N/V/D, or any other complaints.     MEDICATIONS  (STANDING):  albuterol/ipratropium for Nebulization 3 milliLiter(s) Nebulizer every 6 hours  aspirin  chewable 81 milliGRAM(s) Oral daily  atorvastatin 40 milliGRAM(s) Oral at bedtime  bismuth subsalicylate Liquid 15 milliLiter(s) Oral once  carvedilol 3.125 milliGRAM(s) Oral every 12 hours  fluticasone propionate/ salmeterol 100-50 MICROgram(s) Diskus 1 Dose(s) Inhalation two times a day  gabapentin 300 milliGRAM(s) Oral daily  heparin   Injectable 5000 Unit(s) SubCutaneous every 8 hours  methylPREDNISolone sodium succinate Injectable 40 milliGRAM(s) IV Push daily  pantoprazole  Injectable 40 milliGRAM(s) IV Push daily  piperacillin/tazobactam IVPB.. 3.375 Gram(s) IV Intermittent every 8 hours  polyethylene glycol 3350 17 Gram(s) Oral daily  senna 2 Tablet(s) Oral at bedtime  sodium chloride 1 Gram(s) Oral three times a day  tiotropium 2.5 MICROgram(s) Inhaler 2 Puff(s) Inhalation daily  urea Oral Powder 15 Gram(s) Oral daily  venlafaxine  milliGRAM(s) Oral daily  zolpidem 5 milliGRAM(s) Oral at bedtime    MEDICATIONS  (PRN):  acetaminophen     Tablet .. 650 milliGRAM(s) Oral every 6 hours PRN Temp greater or equal to 38C (100.4F), Mild Pain (1 - 3), Moderate Pain (4 - 6)  bisacodyl 5 milliGRAM(s) Oral every 12 hours PRN Constipation  ondansetron Injectable 4 milliGRAM(s) IV Push four times a day PRN Nausea and/or Vomiting      Allergies    No Known Allergies    Intolerances        REVIEW OF SYSTEMS:  All other review of systems negative, except as noted in HPI    OBJECTIVE:    Vital Signs Last 24 Hrs  T(C): 36.4 (25 Sep 2024 09:30), Max: 37.1 (24 Sep 2024 17:00)  T(F): 97.5 (25 Sep 2024 09:30), Max: 98.8 (24 Sep 2024 17:00)  HR: 85 (25 Sep 2024 09:30) (62 - 91)  BP: 118/75 (25 Sep 2024 09:30) (111/64 - 150/78)  BP(mean): 96 (24 Sep 2024 18:00) (77 - 96)  RR: 18 (25 Sep 2024 09:30) (18 - 27)  SpO2: 100% (25 Sep 2024 09:30) (92% - 100%)    Parameters below as of 25 Sep 2024 09:30  Patient On (Oxygen Delivery Method): nasal cannula  O2 Flow (L/min): 3    PHYSICAL EXAM:  GENERAL: NAD,  HEAD:  Atraumatic, No edema   NECK: Supple, No JVD, Normal thyroid  NERVOUS SYSTEM:  Alert & Oriented X3,   CHEST/LUNG: Clear / EAE . No wheeze   HEART: Regular rate and rhythm; No Gallop or rub   ABDOMEN: Soft, Nontender, No CVAT   EXTREMITIES:  Mild edema         Lab/ Imaging:    LABS:                        11.6   14.45 )-----------( 355      ( 25 Sep 2024 04:30 )             35.1     09-25    126[L]  |  89[L]  |  32.1[H]  ----------------------------<  114[H]  4.8   |  25.0  |  1.08    Ca    8.9      25 Sep 2024 04:30  Mg     2.5     09-25    TPro  6.7  /  Alb  3.3  /  TBili  0.3[L]  /  DBili  x   /  AST  19  /  ALT  10  /  AlkPhos  77  09-24      Urinalysis Basic - ( 25 Sep 2024 04:30 )    Color: x / Appearance: x / SG: x / pH: x  Gluc: 114 mg/dL / Ketone: x  / Bili: x / Urobili: x   Blood: x / Protein: x / Nitrite: x   Leuk Esterase: x / RBC: x / WBC x   Sq Epi: x / Non Sq Epi: x / Bacteria: x      CAPILLARY BLOOD GLUCOSE

## 2024-09-25 NOTE — PROGRESS NOTE ADULT - SUBJECTIVE AND OBJECTIVE BOX
NEPHROLOGY INTERVAL HPI/OVERNIGHT EVENTS:  pt comfortable  no new problems noted    MEDICATIONS  (STANDING):  albuterol/ipratropium for Nebulization 3 milliLiter(s) Nebulizer every 6 hours  aspirin  chewable 81 milliGRAM(s) Oral daily  atorvastatin 40 milliGRAM(s) Oral at bedtime  bismuth subsalicylate Liquid 15 milliLiter(s) Oral once  carvedilol 3.125 milliGRAM(s) Oral every 12 hours  fluticasone propionate/ salmeterol 100-50 MICROgram(s) Diskus 1 Dose(s) Inhalation two times a day  gabapentin 300 milliGRAM(s) Oral daily  heparin   Injectable 5000 Unit(s) SubCutaneous every 8 hours  pantoprazole  Injectable 40 milliGRAM(s) IV Push daily  piperacillin/tazobactam IVPB.. 3.375 Gram(s) IV Intermittent every 8 hours  polyethylene glycol 3350 17 Gram(s) Oral daily  senna 2 Tablet(s) Oral at bedtime  sodium chloride 1 Gram(s) Oral three times a day  tiotropium 2.5 MICROgram(s) Inhaler 2 Puff(s) Inhalation daily  venlafaxine  milliGRAM(s) Oral daily  zolpidem 5 milliGRAM(s) Oral at bedtime    MEDICATIONS  (PRN):  acetaminophen     Tablet .. 650 milliGRAM(s) Oral every 6 hours PRN Temp greater or equal to 38C (100.4F), Mild Pain (1 - 3), Moderate Pain (4 - 6)  bisacodyl 5 milliGRAM(s) Oral every 12 hours PRN Constipation  ondansetron Injectable 4 milliGRAM(s) IV Push four times a day PRN Nausea and/or Vomiting      Allergies    No Known Allergies          Vital Signs Last 24 Hrs  T(C): 36.4 (25 Sep 2024 09:30), Max: 37.1 (24 Sep 2024 17:00)  T(F): 97.5 (25 Sep 2024 09:30), Max: 98.8 (24 Sep 2024 17:00)  HR: 85 (25 Sep 2024 09:30) (62 - 91)  BP: 118/75 (25 Sep 2024 09:30) (111/64 - 150/78)  BP(mean): 96 (24 Sep 2024 18:00) (77 - 102)  RR: 18 (25 Sep 2024 09:30) (18 - 27)  SpO2: 100% (25 Sep 2024 09:30) (92% - 100%)    Parameters below as of 25 Sep 2024 09:30  Patient On (Oxygen Delivery Method): nasal cannula  O2 Flow (L/min): 3      PHYSICAL EXAM:  GENERAL: Debilitated  HEAD:  No periorbital edema  NECK: Supple, No JVD  NERVOUS SYSTEM: Alert & Oriented X3  CHEST/LUNG: Clear bilaterally  HEART: Regular rate and rhythm; No rub  ABDOMEN: Soft, Nontender, +BS  EXTREMITIES: Tr dependent edema    LABS:                        11.6   14.45 )-----------( 355      ( 25 Sep 2024 04:30 )             35.1     09-25    126[L]  |  89[L]  |  32.1[H]  ----------------------------<  114[H]  4.8   |  25.0  |  1.08    Sodium: 137 mmol/L (03.11.24)    Ca    8.9      25 Sep 2024 04:30  Mg     2.5     09-25    TPro  6.7  /  Alb  3.3  /  TBili  0.3[L]  /  DBili  x   /  AST  19  /  ALT  10  /  AlkPhos  77  09-24      Urinalysis Basic - ( 25 Sep 2024 04:30 )    Color: x / Appearance: x / SG: x / pH: x  Gluc: 114 mg/dL / Ketone: x  / Bili: x / Urobili: x   Blood: x / Protein: x / Nitrite: x   Leuk Esterase: x / RBC: x / WBC x   Sq Epi: x / Non Sq Epi: x / Bacteria: x      Magnesium: 2.5 mg/dL (09-25 @ 04:30)      Sodium, Random Urine: <30  Osmolality, Random Urine: 391 mosm/kg (09.22.24)      RADIOLOGY & ADDITIONAL TESTS:

## 2024-09-26 ENCOUNTER — APPOINTMENT (OUTPATIENT)
Dept: CARDIOLOGY | Facility: CLINIC | Age: 74
End: 2024-09-26

## 2024-09-26 LAB
ALBUMIN SERPL ELPH-MCNC: 3.3 G/DL — SIGNIFICANT CHANGE UP (ref 3.3–5.2)
ALP SERPL-CCNC: 78 U/L — SIGNIFICANT CHANGE UP (ref 40–120)
ALT FLD-CCNC: 15 U/L — SIGNIFICANT CHANGE UP
ANION GAP SERPL CALC-SCNC: 13 MMOL/L — SIGNIFICANT CHANGE UP (ref 5–17)
AST SERPL-CCNC: 28 U/L — SIGNIFICANT CHANGE UP
BASE EXCESS BLDA CALC-SCNC: 0.1 MMOL/L — SIGNIFICANT CHANGE UP (ref -2–3)
BASE EXCESS BLDA CALC-SCNC: 0.5 MMOL/L — SIGNIFICANT CHANGE UP (ref -2–3)
BASOPHILS # BLD AUTO: 0.05 K/UL — SIGNIFICANT CHANGE UP (ref 0–0.2)
BASOPHILS NFR BLD AUTO: 0.4 % — SIGNIFICANT CHANGE UP (ref 0–2)
BILIRUB SERPL-MCNC: 0.2 MG/DL — LOW (ref 0.4–2)
BLOOD GAS COMMENTS ARTERIAL: SIGNIFICANT CHANGE UP
BLOOD GAS COMMENTS ARTERIAL: SIGNIFICANT CHANGE UP
BUN SERPL-MCNC: 48.8 MG/DL — HIGH (ref 8–20)
CALCIUM SERPL-MCNC: 8.5 MG/DL — SIGNIFICANT CHANGE UP (ref 8.4–10.5)
CHLORIDE SERPL-SCNC: 90 MMOL/L — LOW (ref 96–108)
CO2 SERPL-SCNC: 23 MMOL/L — SIGNIFICANT CHANGE UP (ref 22–29)
CREAT SERPL-MCNC: 0.84 MG/DL — SIGNIFICANT CHANGE UP (ref 0.5–1.3)
EGFR: 73 ML/MIN/1.73M2 — SIGNIFICANT CHANGE UP
EOSINOPHIL # BLD AUTO: 0.09 K/UL — SIGNIFICANT CHANGE UP (ref 0–0.5)
EOSINOPHIL NFR BLD AUTO: 0.6 % — SIGNIFICANT CHANGE UP (ref 0–6)
GAS PNL BLDA: SIGNIFICANT CHANGE UP
GLUCOSE BLDC GLUCOMTR-MCNC: 139 MG/DL — HIGH (ref 70–99)
GLUCOSE BLDC GLUCOMTR-MCNC: 144 MG/DL — HIGH (ref 70–99)
GLUCOSE BLDC GLUCOMTR-MCNC: 65 MG/DL — LOW (ref 70–99)
GLUCOSE BLDC GLUCOMTR-MCNC: 65 MG/DL — LOW (ref 70–99)
GLUCOSE BLDC GLUCOMTR-MCNC: 66 MG/DL — LOW (ref 70–99)
GLUCOSE BLDC GLUCOMTR-MCNC: 68 MG/DL — LOW (ref 70–99)
GLUCOSE BLDC GLUCOMTR-MCNC: 70 MG/DL — SIGNIFICANT CHANGE UP (ref 70–99)
GLUCOSE SERPL-MCNC: 56 MG/DL — LOW (ref 70–99)
HCO3 BLDA-SCNC: 27 MMOL/L — SIGNIFICANT CHANGE UP (ref 21–28)
HCO3 BLDA-SCNC: 28 MMOL/L — SIGNIFICANT CHANGE UP (ref 21–28)
HCT VFR BLD CALC: 36.7 % — SIGNIFICANT CHANGE UP (ref 34.5–45)
HGB BLD-MCNC: 11.6 G/DL — SIGNIFICANT CHANGE UP (ref 11.5–15.5)
HOROWITZ INDEX BLDA+IHG-RTO: 0.4 — SIGNIFICANT CHANGE UP
HOROWITZ INDEX BLDA+IHG-RTO: SIGNIFICANT CHANGE UP
IMM GRANULOCYTES NFR BLD AUTO: 0.9 % — SIGNIFICANT CHANGE UP (ref 0–0.9)
LYMPHOCYTES # BLD AUTO: 1.4 K/UL — SIGNIFICANT CHANGE UP (ref 1–3.3)
LYMPHOCYTES # BLD AUTO: 10 % — LOW (ref 13–44)
MAGNESIUM SERPL-MCNC: 2.3 MG/DL — SIGNIFICANT CHANGE UP (ref 1.6–2.6)
MCHC RBC-ENTMCNC: 29.1 PG — SIGNIFICANT CHANGE UP (ref 27–34)
MCHC RBC-ENTMCNC: 31.6 GM/DL — LOW (ref 32–36)
MCV RBC AUTO: 92 FL — SIGNIFICANT CHANGE UP (ref 80–100)
MONOCYTES # BLD AUTO: 2.85 K/UL — HIGH (ref 0–0.9)
MONOCYTES NFR BLD AUTO: 20.3 % — HIGH (ref 2–14)
NEUTROPHILS # BLD AUTO: 9.56 K/UL — HIGH (ref 1.8–7.4)
NEUTROPHILS NFR BLD AUTO: 67.8 % — SIGNIFICANT CHANGE UP (ref 43–77)
PCO2 BLDA: 57 MMHG — HIGH (ref 32–45)
PCO2 BLDA: 73 MMHG — CRITICAL HIGH (ref 32–45)
PH BLDA: 7.2 — CRITICAL LOW (ref 7.35–7.45)
PH BLDA: 7.28 — LOW (ref 7.35–7.45)
PHOSPHATE SERPL-MCNC: 2.8 MG/DL — SIGNIFICANT CHANGE UP (ref 2.4–4.7)
PLATELET # BLD AUTO: 402 K/UL — HIGH (ref 150–400)
PO2 BLDA: 110 MMHG — HIGH (ref 83–108)
PO2 BLDA: 117 MMHG — HIGH (ref 83–108)
POTASSIUM SERPL-MCNC: 4.6 MMOL/L — SIGNIFICANT CHANGE UP (ref 3.5–5.3)
POTASSIUM SERPL-SCNC: 4.6 MMOL/L — SIGNIFICANT CHANGE UP (ref 3.5–5.3)
PROT SERPL-MCNC: 6.6 G/DL — SIGNIFICANT CHANGE UP (ref 6.6–8.7)
RBC # BLD: 3.99 M/UL — SIGNIFICANT CHANGE UP (ref 3.8–5.2)
RBC # FLD: 11.9 % — SIGNIFICANT CHANGE UP (ref 10.3–14.5)
SAO2 % BLDA: 99.3 % — HIGH (ref 94–98)
SAO2 % BLDA: 99.6 % — HIGH (ref 94–98)
SODIUM SERPL-SCNC: 126 MMOL/L — LOW (ref 135–145)
WBC # BLD: 14.07 K/UL — HIGH (ref 3.8–10.5)
WBC # FLD AUTO: 14.07 K/UL — HIGH (ref 3.8–10.5)

## 2024-09-26 PROCEDURE — 99233 SBSQ HOSP IP/OBS HIGH 50: CPT

## 2024-09-26 PROCEDURE — 99291 CRITICAL CARE FIRST HOUR: CPT

## 2024-09-26 PROCEDURE — 71045 X-RAY EXAM CHEST 1 VIEW: CPT | Mod: 26

## 2024-09-26 RX ORDER — PREDNISONE 5 MG/1
40 TABLET ORAL DAILY
Refills: 0 | Status: DISCONTINUED | OUTPATIENT
Start: 2024-09-26 | End: 2024-09-26

## 2024-09-26 RX ORDER — ALCOHOL ANTISEPTIC PADS
25 PADS, MEDICATED (EA) TOPICAL ONCE
Refills: 0 | Status: DISCONTINUED | OUTPATIENT
Start: 2024-09-26 | End: 2024-10-09

## 2024-09-26 RX ORDER — GLUCAGON INJECTION, SOLUTION 0.5 MG/.1ML
1 INJECTION, SOLUTION SUBCUTANEOUS ONCE
Refills: 0 | Status: DISCONTINUED | OUTPATIENT
Start: 2024-09-26 | End: 2024-10-09

## 2024-09-26 RX ORDER — SODIUM CHLORIDE IRRIG SOLUTION 0.9 %
1000 SOLUTION, IRRIGATION IRRIGATION
Refills: 0 | Status: DISCONTINUED | OUTPATIENT
Start: 2024-09-26 | End: 2024-10-09

## 2024-09-26 RX ORDER — ACETAMINOPHEN 325 MG
650 TABLET ORAL ONCE
Refills: 0 | Status: COMPLETED | OUTPATIENT
Start: 2024-09-26 | End: 2024-09-26

## 2024-09-26 RX ORDER — ALCOHOL ANTISEPTIC PADS
15 PADS, MEDICATED (EA) TOPICAL ONCE
Refills: 0 | Status: COMPLETED | OUTPATIENT
Start: 2024-09-26 | End: 2024-09-26

## 2024-09-26 RX ORDER — METHYLPREDNISOLONE ACETATE 80 MG/ML
40 INJECTION, SUSPENSION INTRA-ARTICULAR; INTRALESIONAL; INTRAMUSCULAR; SOFT TISSUE EVERY 8 HOURS
Refills: 0 | Status: DISCONTINUED | OUTPATIENT
Start: 2024-09-26 | End: 2024-09-30

## 2024-09-26 RX ORDER — ALCOHOL ANTISEPTIC PADS
15 PADS, MEDICATED (EA) TOPICAL ONCE
Refills: 0 | Status: DISCONTINUED | OUTPATIENT
Start: 2024-09-26 | End: 2024-10-09

## 2024-09-26 RX ORDER — ALCOHOL ANTISEPTIC PADS
12.5 PADS, MEDICATED (EA) TOPICAL ONCE
Refills: 0 | Status: COMPLETED | OUTPATIENT
Start: 2024-09-26 | End: 2024-09-26

## 2024-09-26 RX ORDER — ALCOHOL ANTISEPTIC PADS
12.5 PADS, MEDICATED (EA) TOPICAL ONCE
Refills: 0 | Status: DISCONTINUED | OUTPATIENT
Start: 2024-09-26 | End: 2024-10-09

## 2024-09-26 RX ADMIN — Medication 15 GRAM(S): at 17:18

## 2024-09-26 RX ADMIN — Medication 5000 UNIT(S): at 08:44

## 2024-09-26 RX ADMIN — Medication 5000 UNIT(S): at 00:53

## 2024-09-26 RX ADMIN — IPRATROPIUM BROMIDE AND ALBUTEROL SULFATE 3 MILLILITER(S): .5; 3 SOLUTION RESPIRATORY (INHALATION) at 14:58

## 2024-09-26 RX ADMIN — Medication 650 MILLIGRAM(S): at 00:52

## 2024-09-26 RX ADMIN — Medication 5 MILLIGRAM(S): at 22:07

## 2024-09-26 RX ADMIN — PIPERACILLIN SODIUM AND TAZOBACTAM SODIUM 25 GRAM(S): 12; 1.5 INJECTION, POWDER, LYOPHILIZED, FOR SOLUTION INTRAVENOUS at 17:04

## 2024-09-26 RX ADMIN — Medication 650 MILLIGRAM(S): at 04:49

## 2024-09-26 RX ADMIN — Medication 650 MILLIGRAM(S): at 06:41

## 2024-09-26 RX ADMIN — Medication 5000 UNIT(S): at 17:04

## 2024-09-26 RX ADMIN — METHYLPREDNISOLONE ACETATE 40 MILLIGRAM(S): 80 INJECTION, SUSPENSION INTRA-ARTICULAR; INTRALESIONAL; INTRAMUSCULAR; SOFT TISSUE at 14:07

## 2024-09-26 RX ADMIN — Medication 3.12 MILLIGRAM(S): at 17:18

## 2024-09-26 RX ADMIN — IPRATROPIUM BROMIDE AND ALBUTEROL SULFATE 3 MILLILITER(S): .5; 3 SOLUTION RESPIRATORY (INHALATION) at 09:42

## 2024-09-26 RX ADMIN — Medication 12.5 GRAM(S): at 09:50

## 2024-09-26 RX ADMIN — Medication 4 MILLIGRAM(S): at 20:53

## 2024-09-26 RX ADMIN — PIPERACILLIN SODIUM AND TAZOBACTAM SODIUM 25 GRAM(S): 12; 1.5 INJECTION, POWDER, LYOPHILIZED, FOR SOLUTION INTRAVENOUS at 08:44

## 2024-09-26 RX ADMIN — Medication 1 GRAM(S): at 05:38

## 2024-09-26 RX ADMIN — ATORVASTATIN CALCIUM 40 MILLIGRAM(S): 10 TABLET, FILM COATED ORAL at 22:07

## 2024-09-26 RX ADMIN — IPRATROPIUM BROMIDE AND ALBUTEROL SULFATE 3 MILLILITER(S): .5; 3 SOLUTION RESPIRATORY (INHALATION) at 21:04

## 2024-09-26 RX ADMIN — IPRATROPIUM BROMIDE AND ALBUTEROL SULFATE 3 MILLILITER(S): .5; 3 SOLUTION RESPIRATORY (INHALATION) at 04:13

## 2024-09-26 RX ADMIN — METHYLPREDNISOLONE ACETATE 40 MILLIGRAM(S): 80 INJECTION, SUSPENSION INTRA-ARTICULAR; INTRALESIONAL; INTRAMUSCULAR; SOFT TISSUE at 22:07

## 2024-09-26 RX ADMIN — Medication 1 GRAM(S): at 22:07

## 2024-09-26 RX ADMIN — PANTOPRAZOLE SODIUM 40 MILLIGRAM(S): 40 TABLET, DELAYED RELEASE ORAL at 14:07

## 2024-09-26 RX ADMIN — METHYLPREDNISOLONE ACETATE 40 MILLIGRAM(S): 80 INJECTION, SUSPENSION INTRA-ARTICULAR; INTRALESIONAL; INTRAMUSCULAR; SOFT TISSUE at 05:38

## 2024-09-26 RX ADMIN — Medication 3.12 MILLIGRAM(S): at 05:38

## 2024-09-26 RX ADMIN — PIPERACILLIN SODIUM AND TAZOBACTAM SODIUM 25 GRAM(S): 12; 1.5 INJECTION, POWDER, LYOPHILIZED, FOR SOLUTION INTRAVENOUS at 00:53

## 2024-09-26 RX ADMIN — Medication 15 GRAM(S): at 18:00

## 2024-09-26 NOTE — PROGRESS NOTE ADULT - SUBJECTIVE AND OBJECTIVE BOX
TULIO SUGGS  74y  Female      Patient is a 74y old  Female who presents with a chief complaint of Sepsis     (24 Sep 2024 13:30)      INTERVAL HPI/OVERNIGHT EVENTS:  On Bipap this AM  Hypoglycemic episodes      REVIEW OF SYSTEMS:  +SOB, +Fatigue    T(C): 36.2 (09-26-24 @ 10:08), Max: 36.6 (09-26-24 @ 00:00)  HR: 92 (09-26-24 @ 10:08) (92 - 98)  BP: 154/85 (09-26-24 @ 10:08) (133/74 - 154/85)  RR: 18 (09-26-24 @ 10:08) (18 - 18)  SpO2: 95% (09-26-24 @ 10:08) (95% - 99%)  Wt(kg): --Vital Signs Last 24 Hrs  T(C): 36.2 (26 Sep 2024 10:08), Max: 36.6 (26 Sep 2024 00:00)  T(F): 97.1 (26 Sep 2024 10:08), Max: 97.9 (26 Sep 2024 00:00)  HR: 92 (26 Sep 2024 10:08) (92 - 98)  BP: 154/85 (26 Sep 2024 10:08) (133/74 - 154/85)  BP(mean): --  RR: 18 (26 Sep 2024 10:08) (18 - 18)  SpO2: 95% (26 Sep 2024 10:08) (95% - 99%)    Parameters below as of 26 Sep 2024 10:08  Patient On (Oxygen Delivery Method): BiPAP/CPAP        PHYSICAL EXAM:  GENERAL: NAD, on Bipap  HEAD:  Atraumatic, No edema   NECK: Supple, No JVD, Normal thyroid  NERVOUS SYSTEM:  Alert & Oriented X3,   CHEST/LUNG: Clear / EAE . No wheeze   HEART: Regular rate and rhythm; No Gallop or rub   ABDOMEN: Soft, Nontender, No CVAT   EXTREMITIES:  Mild edema     LABS:                        11.6   14.07 )-----------( 402      ( 26 Sep 2024 04:50 )             36.7     09-26    126[L]  |  90[L]  |  48.8[H]  ----------------------------<  56[L]  4.6   |  23.0  |  0.84    Ca    8.5      26 Sep 2024 04:50  Phos  2.8     09-26  Mg     2.3     09-26    TPro  6.6  /  Alb  3.3  /  TBili  0.2[L]  /  DBili  x   /  AST  28  /  ALT  15  /  AlkPhos  78  09-26      Urinalysis Basic - ( 26 Sep 2024 04:50 )    Color: x / Appearance: x / SG: x / pH: x  Gluc: 56 mg/dL / Ketone: x  / Bili: x / Urobili: x   Blood: x / Protein: x / Nitrite: x   Leuk Esterase: x / RBC: x / WBC x   Sq Epi: x / Non Sq Epi: x / Bacteria: x      CAPILLARY BLOOD GLUCOSE      POCT Blood Glucose.: 139 mg/dL (26 Sep 2024 10:12)  POCT Blood Glucose.: 65 mg/dL (26 Sep 2024 08:29)  POCT Blood Glucose.: 68 mg/dL (26 Sep 2024 08:27)      ABG - ( 26 Sep 2024 05:52 )  pH, Arterial: 7.280 pH, Blood: x     /  pCO2: 57    /  pO2: 110   / HCO3: 27    / Base Excess: 0.1   /  SaO2: 99.6              Urinalysis Basic - ( 26 Sep 2024 04:50 )    Color: x / Appearance: x / SG: x / pH: x  Gluc: 56 mg/dL / Ketone: x  / Bili: x / Urobili: x   Blood: x / Protein: x / Nitrite: x   Leuk Esterase: x / RBC: x / WBC x   Sq Epi: x / Non Sq Epi: x / Bacteria: x        RADIOLOGY & ADDITIONAL TESTS:    Imaging Personally Reviewed:  [ ] YES  [ ] NO    HEALTH ISSUES - PROBLEM Dx:  Suspected deep vein thrombosis (DVT)    Acute on chronic respiratory failure with hypoxia and hypercapnia         TULIO SUGGS  74y  Female      Patient is a 74y old  Female who presents with a chief complaint of Sepsis     (24 Sep 2024 13:30)      INTERVAL HPI/OVERNIGHT EVENTS:  On Bipap this AM  Hypoglycemic episodes    Addendum  Patient re assessed in PM. Still on Bipap, mentation appears improved. Patient aaox 4 reports she does not wish to wear bipap. Explained that off bipap it is expected she may decline rather quickly, become acidotic/hypercapneic which may rq her to be intubated/reuscitated. She reports she is okay with full resucitation including compressions/shocks/intubation but still does not want Bipap. Discussed w Dr Hawkins, respiratory, RN. Will take off Bipap, check CXR, and ABG this evening. Upgrade to step down. Offered to discuss with her  she stated she does not wish for us to speak to him right now that she is clear in her decision.       REVIEW OF SYSTEMS:  +SOB, +Fatigue    T(C): 36.2 (09-26-24 @ 10:08), Max: 36.6 (09-26-24 @ 00:00)  HR: 92 (09-26-24 @ 10:08) (92 - 98)  BP: 154/85 (09-26-24 @ 10:08) (133/74 - 154/85)  RR: 18 (09-26-24 @ 10:08) (18 - 18)  SpO2: 95% (09-26-24 @ 10:08) (95% - 99%)  Wt(kg): --Vital Signs Last 24 Hrs  T(C): 36.2 (26 Sep 2024 10:08), Max: 36.6 (26 Sep 2024 00:00)  T(F): 97.1 (26 Sep 2024 10:08), Max: 97.9 (26 Sep 2024 00:00)  HR: 92 (26 Sep 2024 10:08) (92 - 98)  BP: 154/85 (26 Sep 2024 10:08) (133/74 - 154/85)  BP(mean): --  RR: 18 (26 Sep 2024 10:08) (18 - 18)  SpO2: 95% (26 Sep 2024 10:08) (95% - 99%)    Parameters below as of 26 Sep 2024 10:08  Patient On (Oxygen Delivery Method): BiPAP/CPAP        PHYSICAL EXAM:  GENERAL: NAD, on Bipap  HEAD:  Atraumatic, No edema   NECK: Supple, No JVD, Normal thyroid  NERVOUS SYSTEM:  Alert & Oriented X3,   CHEST/LUNG: Clear / EAE . No wheeze   HEART: Regular rate and rhythm; No Gallop or rub   ABDOMEN: Soft, Nontender, No CVAT   EXTREMITIES:  Mild edema     LABS:                        11.6   14.07 )-----------( 402      ( 26 Sep 2024 04:50 )             36.7     09-26    126[L]  |  90[L]  |  48.8[H]  ----------------------------<  56[L]  4.6   |  23.0  |  0.84    Ca    8.5      26 Sep 2024 04:50  Phos  2.8     09-26  Mg     2.3     09-26    TPro  6.6  /  Alb  3.3  /  TBili  0.2[L]  /  DBili  x   /  AST  28  /  ALT  15  /  AlkPhos  78  09-26      Urinalysis Basic - ( 26 Sep 2024 04:50 )    Color: x / Appearance: x / SG: x / pH: x  Gluc: 56 mg/dL / Ketone: x  / Bili: x / Urobili: x   Blood: x / Protein: x / Nitrite: x   Leuk Esterase: x / RBC: x / WBC x   Sq Epi: x / Non Sq Epi: x / Bacteria: x      CAPILLARY BLOOD GLUCOSE      POCT Blood Glucose.: 139 mg/dL (26 Sep 2024 10:12)  POCT Blood Glucose.: 65 mg/dL (26 Sep 2024 08:29)  POCT Blood Glucose.: 68 mg/dL (26 Sep 2024 08:27)      ABG - ( 26 Sep 2024 05:52 )  pH, Arterial: 7.280 pH, Blood: x     /  pCO2: 57    /  pO2: 110   / HCO3: 27    / Base Excess: 0.1   /  SaO2: 99.6              Urinalysis Basic - ( 26 Sep 2024 04:50 )    Color: x / Appearance: x / SG: x / pH: x  Gluc: 56 mg/dL / Ketone: x  / Bili: x / Urobili: x   Blood: x / Protein: x / Nitrite: x   Leuk Esterase: x / RBC: x / WBC x   Sq Epi: x / Non Sq Epi: x / Bacteria: x        RADIOLOGY & ADDITIONAL TESTS:    Imaging Personally Reviewed:  [ ] YES  [ ] NO    HEALTH ISSUES - PROBLEM Dx:  Suspected deep vein thrombosis (DVT)    Acute on chronic respiratory failure with hypoxia and hypercapnia

## 2024-09-26 NOTE — PROGRESS NOTE ADULT - SUBJECTIVE AND OBJECTIVE BOX
NEPHROLOGY INTERVAL HPI/OVERNIGHT EVENTS:  pt stable overnight but refused nocturnal BiPAP    MEDICATIONS  (STANDING):  albuterol/ipratropium for Nebulization 3 milliLiter(s) Nebulizer every 6 hours  aspirin  chewable 81 milliGRAM(s) Oral daily  atorvastatin 40 milliGRAM(s) Oral at bedtime  carvedilol 3.125 milliGRAM(s) Oral every 12 hours  fluticasone propionate/ salmeterol 500-50 MICROgram(s) Diskus 1 Dose(s) Inhalation two times a day  gabapentin 300 milliGRAM(s) Oral daily  heparin   Injectable 5000 Unit(s) SubCutaneous every 8 hours  pantoprazole  Injectable 40 milliGRAM(s) IV Push daily  piperacillin/tazobactam IVPB.. 3.375 Gram(s) IV Intermittent every 8 hours  polyethylene glycol 3350 17 Gram(s) Oral daily  predniSONE   Tablet 40 milliGRAM(s) Oral daily  senna 2 Tablet(s) Oral at bedtime  sodium chloride 1 Gram(s) Oral three times a day  tiotropium 2.5 MICROgram(s) Inhaler 2 Puff(s) Inhalation daily  urea Oral Powder 15 Gram(s) Oral daily  venlafaxine  milliGRAM(s) Oral daily  zolpidem 5 milliGRAM(s) Oral at bedtime    MEDICATIONS  (PRN):  acetaminophen     Tablet .. 650 milliGRAM(s) Oral every 6 hours PRN Temp greater or equal to 38C (100.4F), Mild Pain (1 - 3), Moderate Pain (4 - 6)  bisacodyl 5 milliGRAM(s) Oral every 12 hours PRN Constipation  ondansetron Injectable 4 milliGRAM(s) IV Push four times a day PRN Nausea and/or Vomiting      Allergies    No Known Allergies        Vital Signs Last 24 Hrs  T(C): 36.6 (26 Sep 2024 04:27), Max: 36.6 (26 Sep 2024 00:00)  T(F): 97.8 (26 Sep 2024 04:27), Max: 97.9 (26 Sep 2024 00:00)  HR: 92 (26 Sep 2024 04:27) (85 - 98)  BP: 135/84 (26 Sep 2024 04:27) (118/75 - 139/82)  BP(mean): --  RR: 18 (26 Sep 2024 04:27) (18 - 18)  SpO2: 99% (26 Sep 2024 04:27) (96% - 100%)    Parameters below as of 26 Sep 2024 04:27  Patient On (Oxygen Delivery Method): nasal cannula  O2 Flow (L/min): 4      PHYSICAL EXAM:  GENERAL: Frail, torpid  HEAD: Moist MMs  NECK: Supple, No JVD  NERVOUS SYSTEM: Alert & Oriented X3  CHEST/LUNG: Clear bilaterally  HEART: Regular rate and rhythm; No rub  ABDOMEN: Soft, Nontender, +BS  EXTREMITIES: Tr dependent edema    LABS:                        11.6   14.07 )-----------( 402      ( 26 Sep 2024 04:50 )             36.7     09-26    126[L]  |  90[L]  |  48.8[H]  ----------------------------<  56[L]  4.6   |  23.0  |  0.84        Ca    8.5      26 Sep 2024 04:50  Phos  2.8     09-26  Mg     2.3     09-26    TPro  6.6  /  Alb  3.3  /  TBili  0.2[L]  /  DBili  x   /  AST  28  /  ALT  15  /  AlkPhos  78  09-26      Urinalysis Basic - ( 26 Sep 2024 04:50 )    Color: x / Appearance: x / SG: x / pH: x  Gluc: 56 mg/dL / Ketone: x  / Bili: x / Urobili: x   Blood: x / Protein: x / Nitrite: x   Leuk Esterase: x / RBC: x / WBC x   Sq Epi: x / Non Sq Epi: x / Bacteria: x      Magnesium: 2.3 mg/dL (09-26 @ 04:50)  Phosphorus: 2.8 mg/dL (09-26 @ 04:50)  Magnesium: 2.4 mg/dL (09-25 @ 14:20)  Phosphorus: 3.0 mg/dL (09-25 @ 14:20)      RADIOLOGY & ADDITIONAL TESTS:  < from: CT Head No Cont (09.23.24 @ 22:14) >    ACC: [id number] EXAM:  CT BRAIN   ORDERED BY: DALY CLEMONS     PROCEDURE DATE:  09/23/2024          INTERPRETATION:  CLINICAL INFORMATION: Altered mental status.   Unresponsive with shallow breathing.  Status post rapid response and   intubation.    COMPARISON: None.    CONTRAST:  IV Contrast: NONE  Complications: None reported at time of study completion    TECHNIQUE:  Serial axial images were obtained from the skull base to the   vertex using multi-slice helical technique. Sagittal and coronal   reformats were obtained.    FINDINGS:    VENTRICLES AND SULCI: Age appropriate involutional changes.  INTRA-AXIAL: No mass effect, acute hemorrhage, or midline shift.  A few   patchy hypodensities in the periventricular/deep white matter are   consistent with mild microvascular-type changes.  EXTRA-AXIAL: No mass or fluid collection. Basal cisterns are normal in   appearance.    VISUALIZED SINUSES:  Trace fluid versus mucosal thickening in the   anterior right ethmoid air cells.  Trace mucosal thickening in the right   maxillary sinus.  TYMPANOMASTOID CAVITIES:  Nonspecific opacification of a few left mastoid   air cells.  VISUALIZED ORBITS: Bilateral lens replacement.  CALVARIUM: Intact.    MISCELLANEOUS: Endotracheal tube is partiallyvisualized and oral cavity.    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      IMPRESSION:  No CT evidence of acute intracranial pathology.    < end of copied text >   NEPHROLOGY INTERVAL HPI/OVERNIGHT EVENTS:  pt stable overnight but initially refused nocturnal BiPAP--> now attempting  no cp, acute sob, n/v/d    MEDICATIONS  (STANDING):  albuterol/ipratropium for Nebulization 3 milliLiter(s) Nebulizer every 6 hours  aspirin  chewable 81 milliGRAM(s) Oral daily  atorvastatin 40 milliGRAM(s) Oral at bedtime  carvedilol 3.125 milliGRAM(s) Oral every 12 hours  fluticasone propionate/ salmeterol 500-50 MICROgram(s) Diskus 1 Dose(s) Inhalation two times a day  gabapentin 300 milliGRAM(s) Oral daily  heparin   Injectable 5000 Unit(s) SubCutaneous every 8 hours  pantoprazole  Injectable 40 milliGRAM(s) IV Push daily  piperacillin/tazobactam IVPB.. 3.375 Gram(s) IV Intermittent every 8 hours  polyethylene glycol 3350 17 Gram(s) Oral daily  predniSONE   Tablet 40 milliGRAM(s) Oral daily  senna 2 Tablet(s) Oral at bedtime  sodium chloride 1 Gram(s) Oral three times a day  tiotropium 2.5 MICROgram(s) Inhaler 2 Puff(s) Inhalation daily  urea Oral Powder 15 Gram(s) Oral daily  venlafaxine  milliGRAM(s) Oral daily  zolpidem 5 milliGRAM(s) Oral at bedtime    MEDICATIONS  (PRN):  acetaminophen     Tablet .. 650 milliGRAM(s) Oral every 6 hours PRN Temp greater or equal to 38C (100.4F), Mild Pain (1 - 3), Moderate Pain (4 - 6)  bisacodyl 5 milliGRAM(s) Oral every 12 hours PRN Constipation  ondansetron Injectable 4 milliGRAM(s) IV Push four times a day PRN Nausea and/or Vomiting      Allergies    No Known Allergies        Vital Signs Last 24 Hrs  T(C): 36.6 (26 Sep 2024 04:27), Max: 36.6 (26 Sep 2024 00:00)  T(F): 97.8 (26 Sep 2024 04:27), Max: 97.9 (26 Sep 2024 00:00)  HR: 92 (26 Sep 2024 04:27) (85 - 98)  BP: 135/84 (26 Sep 2024 04:27) (118/75 - 139/82)  BP(mean): --  RR: 18 (26 Sep 2024 04:27) (18 - 18)  SpO2: 99% (26 Sep 2024 04:27) (96% - 100%)    Parameters below as of 26 Sep 2024 04:27  Patient On (Oxygen Delivery Method): nasal cannula  O2 Flow (L/min): 4      PHYSICAL EXAM:  GENERAL: Frail, torpid  HEAD: BiPAP mask  NECK: Supple, No JVD  NERVOUS SYSTEM: Alert & Oriented X3  CHEST/LUNG: Clear bilaterally  HEART: Regular rate and rhythm; No rub  ABDOMEN: Soft, Nontender, +BS  EXTREMITIES: Tr dependent edema    LABS:                        11.6   14.07 )-----------( 402      ( 26 Sep 2024 04:50 )             36.7     09-26    126[L]  |  90[L]  |  48.8[H]  ----------------------------<  56[L]  4.6   |  23.0  |  0.84        Ca    8.5      26 Sep 2024 04:50  Phos  2.8     09-26  Mg     2.3     09-26    TPro  6.6  /  Alb  3.3  /  TBili  0.2[L]  /  DBili  x   /  AST  28  /  ALT  15  /  AlkPhos  78  09-26      Urinalysis Basic - ( 26 Sep 2024 04:50 )    Color: x / Appearance: x / SG: x / pH: x  Gluc: 56 mg/dL / Ketone: x  / Bili: x / Urobili: x   Blood: x / Protein: x / Nitrite: x   Leuk Esterase: x / RBC: x / WBC x   Sq Epi: x / Non Sq Epi: x / Bacteria: x      Magnesium: 2.3 mg/dL (09-26 @ 04:50)  Phosphorus: 2.8 mg/dL (09-26 @ 04:50)  Magnesium: 2.4 mg/dL (09-25 @ 14:20)  Phosphorus: 3.0 mg/dL (09-25 @ 14:20)      RADIOLOGY & ADDITIONAL TESTS:  < from: CT Head No Cont (09.23.24 @ 22:14) >    ACC: [id number] EXAM:  CT BRAIN   ORDERED BY: DALY CLEMONS     PROCEDURE DATE:  09/23/2024          INTERPRETATION:  CLINICAL INFORMATION: Altered mental status.   Unresponsive with shallow breathing.  Status post rapid response and   intubation.    COMPARISON: None.    CONTRAST:  IV Contrast: NONE  Complications: None reported at time of study completion    TECHNIQUE:  Serial axial images were obtained from the skull base to the   vertex using multi-slice helical technique. Sagittal and coronal   reformats were obtained.    FINDINGS:    VENTRICLES AND SULCI: Age appropriate involutional changes.  INTRA-AXIAL: No mass effect, acute hemorrhage, or midline shift.  A few   patchy hypodensities in the periventricular/deep white matter are   consistent with mild microvascular-type changes.  EXTRA-AXIAL: No mass or fluid collection. Basal cisterns are normal in   appearance.    VISUALIZED SINUSES:  Trace fluid versus mucosal thickening in the   anterior right ethmoid air cells.  Trace mucosal thickening in the right   maxillary sinus.  TYMPANOMASTOID CAVITIES:  Nonspecific opacification of a few left mastoid   air cells.  VISUALIZED ORBITS: Bilateral lens replacement.  CALVARIUM: Intact.    MISCELLANEOUS: Endotracheal tube is partiallyvisualized and oral cavity.    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      IMPRESSION:  No CT evidence of acute intracranial pathology.    < end of copied text >

## 2024-09-26 NOTE — PROGRESS NOTE ADULT - ASSESSMENT
Acute hypoxemic hypercapnic resp failure  CTA no PE or sig infiltrate , no Eos  Underlying severe persistent asthma, complicated by Diaphragmatic paralysis  Refusing to Keep on BIPAP  Hyponatremia, renal input noted  Echo normal LV, recent LHC, CAD- med mgmt, LVEDP 16    Needs family discussion , re end of life care as she is at high risk for reintubation without NIV  change to medrol, continue nebs  Finish abx  Repeat CXR  Prognosis guarded, critical Acute hypoxemic hypercapnic resp failure  CTA no PE or sig infiltrate , no Eos  Underlying severe persistent asthma, complicated by Diaphragmatic paralysis  Refusing to Keep on BIPAP  Hyponatremia, renal input noted  Echo normal LV, recent LHC, CAD- med mgmt, LVEDP 16    Needs family discussion , re end of life care as she is at high risk for reintubation without NIV  She wishes DNR/I currently with me  change to medrol, continue nebs  Finish abx  Repeat CXR  Prognosis guarded, critical Acute hypoxemic hypercapnic resp failure  CTA no PE or sig infiltrate , no Eos  Underlying severe persistent asthma, complicated by Diaphragmatic paralysis  Refusing to Keep on BIPAP  Hyponatremia, renal input noted  Echo normal LV, recent LHC, CAD- med mgmt, LVEDP 16    Needs family discussion , re end of life care as she is at high risk for reintubation without NIV  She wishes DNR/I currently with me- I contacted medical team  ABG improving on NIV  change to medrol, continue nebs  Finish abx  Repeat CXR  Prognosis guarded, critical

## 2024-09-26 NOTE — CHART NOTE - NSCHARTNOTEFT_GEN_A_CORE
patient refused nocturnal bipap even after being informed and educated. during the shift patients WOB increased although patient still refused I encouraged her and she finally agreed. morning abg was drawn prior to placing patient on bipap. critical values were reported to Resident covering the floor. will follow up with post abg in one hour after being on bipap.

## 2024-09-26 NOTE — PROGRESS NOTE ADULT - SUBJECTIVE AND OBJECTIVE BOX
PULMONARY PROGRESS NOTE      [id number]    Patient is a 74y old  Female who presents with a chief complaint of Sepsis     (24 Sep 2024 13:30)      INTERVAL HPI/OVERNIGHT EVENTS:  on bipap currently  has been refusing, wants mask off  no cp, denies any problems breathing  MEDICATIONS  (STANDING):  albuterol/ipratropium for Nebulization 3 milliLiter(s) Nebulizer every 6 hours  aspirin  chewable 81 milliGRAM(s) Oral daily  atorvastatin 40 milliGRAM(s) Oral at bedtime  carvedilol 3.125 milliGRAM(s) Oral every 12 hours  dextrose 5%. 1000 milliLiter(s) (100 mL/Hr) IV Continuous <Continuous>  dextrose 5%. 1000 milliLiter(s) (50 mL/Hr) IV Continuous <Continuous>  dextrose 50% Injectable 25 Gram(s) IV Push once  dextrose 50% Injectable 25 Gram(s) IV Push once  dextrose 50% Injectable 12.5 Gram(s) IV Push once  dextrose Oral Gel 15 Gram(s) Oral once  fluticasone propionate/ salmeterol 500-50 MICROgram(s) Diskus 1 Dose(s) Inhalation two times a day  gabapentin 300 milliGRAM(s) Oral daily  glucagon  Injectable 1 milliGRAM(s) IntraMuscular once  heparin   Injectable 5000 Unit(s) SubCutaneous every 8 hours  pantoprazole  Injectable 40 milliGRAM(s) IV Push daily  piperacillin/tazobactam IVPB.. 3.375 Gram(s) IV Intermittent every 8 hours  polyethylene glycol 3350 17 Gram(s) Oral daily  predniSONE   Tablet 40 milliGRAM(s) Oral daily  senna 2 Tablet(s) Oral at bedtime  sodium chloride 1 Gram(s) Oral three times a day  tiotropium 2.5 MICROgram(s) Inhaler 2 Puff(s) Inhalation daily  urea Oral Powder 15 Gram(s) Oral daily  venlafaxine  milliGRAM(s) Oral daily  zolpidem 5 milliGRAM(s) Oral at bedtime      MEDICATIONS  (PRN):  acetaminophen     Tablet .. 650 milliGRAM(s) Oral every 6 hours PRN Temp greater or equal to 38C (100.4F), Mild Pain (1 - 3), Moderate Pain (4 - 6)  bisacodyl 5 milliGRAM(s) Oral every 12 hours PRN Constipation  ondansetron Injectable 4 milliGRAM(s) IV Push four times a day PRN Nausea and/or Vomiting      Allergies    No Known Allergies    Intolerances        PAST MEDICAL & SURGICAL HISTORY:  NICM (nonischemic cardiomyopathy)      HTN (hypertension)      Rheumatoid arthritis      Chronic back pain      HLD (hyperlipidemia)      Chronic obstructive asthma      Cellulitis      S/P       S/P hysterectomy          SOCIAL HISTORY  Smoking History:       REVIEW OF SYSTEMS:    CONSTITUTIONAL:  No distress    HEENT:  Eyes:  No diplopia or blurred vision. ENT:  No earache, sore throat or runny nose.    CARDIOVASCULAR:  No pressure, squeezing, tightness, heaviness or aching about the chest; no palpitations.    RESPIRATORY: see hpi  GASTROINTESTINAL:  No nausea, vomiting or diarrhea.    GENITOURINARY:  No dysuria, frequency or urgency.    NEUROLOGIC:  No paresthesias, fasciculations, seizures or weakness.    PSYCHIATRIC:  No disorder of thought or mood.    Vital Signs Last 24 Hrs  T(C): 36.2 (26 Sep 2024 10:08), Max: 36.6 (26 Sep 2024 00:00)  T(F): 97.1 (26 Sep 2024 10:08), Max: 97.9 (26 Sep 2024 00:00)  HR: 92 (26 Sep 2024 10:08) (92 - 98)  BP: 154/85 (26 Sep 2024 10:08) (133/74 - 154/85)  BP(mean): --  RR: 18 (26 Sep 2024 10:08) (18 - 18)  SpO2: 95% (26 Sep 2024 10:08) (95% - 99%)    Parameters below as of 26 Sep 2024 10:08  Patient On (Oxygen Delivery Method): BiPAP/CPAP        PHYSICAL EXAMINATION:    GENERAL: The patient is awake and alert in no apparent distress.     HEENT: Head is normocephalic and atraumatic. Extraocular muscles are intact. Mucous membranes are moist.    NECK: Supple.    LUNGS: mod air entry bilat ; respirations unlabored    HEART: Regular rate and rhythm without murmur.    ABDOMEN: Soft, nontender, and nondistended.      EXTREMITIES: Without any cyanosis, clubbing, rash, lesions or edema.    NEUROLOGIC: Grossly intact.    LABS:                        11.6   14.07 )-----------( 402      ( 26 Sep 2024 04:50 )             36.7         126[L]  |  90[L]  |  48.8[H]  ----------------------------<  56[L]  4.6   |  23.0  |  0.84    Ca    8.5      26 Sep 2024 04:50  Phos  2.8       Mg     2.3         TPro  6.6  /  Alb  3.3  /  TBili  0.2[L]  /  DBili  x   /  AST  28  /  ALT  15  /  AlkPhos  78        Urinalysis Basic - ( 26 Sep 2024 04:50 )    Color: x / Appearance: x / SG: x / pH: x  Gluc: 56 mg/dL / Ketone: x  / Bili: x / Urobili: x   Blood: x / Protein: x / Nitrite: x   Leuk Esterase: x / RBC: x / WBC x   Sq Epi: x / Non Sq Epi: x / Bacteria: x      ABG - ( 26 Sep 2024 05:52 )  pH, Arterial: 7.280 pH, Blood: x     /  pCO2: 57    /  pO2: 110   / HCO3: 27    / Base Excess: 0.1   /  SaO2: 99.6                            MICROBIOLOGY:    RADIOLOGY & ADDITIONAL STUDIES:  cxr reviewed

## 2024-09-26 NOTE — PROGRESS NOTE ADULT - ASSESSMENT
73 y/o F with a h/o CAD (s/p PCI), HFpEF, HTN, HLD, COPD (on home O2 PRN), recent admission for chest pain s/p C with nonobstructive CAD- discharged home 9/19, now readmitted on 9/22 with complaints abd pain nausea vomiting , hyponatremia, and acute hypercapnic respiratory failure    #Acute on chronic  hypercapnic respiratory failure 2/2 to COPD exacerbation?    Or restrictive lung disease due to elevated hemodiaphgragm and benzo use?   - s/p intubation. on NC, ABG noted, back on bipap  - extubated repeat ABG improved Co2 and PH   - cont to monitor , oxygen as needed keep pulse ox over 92%   - switch iv steroid 40mg to PO Pred   - pulmonary following     #Acute metabolic encephalopathy   resolved   - likely due to resp failure / hypercapnia     # Chronic hyponatremia due to SIADH   - h/o SIADH in the past   - s/p 2% saline infusion   - Nephrology following   - PO fluid restriction enforced  - add PO Urea  - cont NaCl tabs  - follow labs    #Prerenal azotemia . ARF   - improving   - s/p IVF   - avoid ARB / ACE inh , nephrotoxic meds     # leukocytosis   - GB sludge with distention , no cholecytstitis   - cont empirical iv zosyn complete course 5 days   - wbc is trending down   - urine cx , blood cx are neg     # Constipation  -cont miralax , dulcolax prn   - add senna at qhs     # CAD   - nonobstructive disease recent [facility]   - medical treatment asa , coreg     DVT: heparin ppx   Dispo: medically active    73 y/o F with a h/o CAD (s/p PCI), HFpEF, HTN, HLD, COPD (on home O2 PRN), recent admission for chest pain s/p C with nonobstructive CAD- discharged home 9/19, now readmitted on 9/22 with complaints abd pain nausea vomiting , hyponatremia, and acute hypercapnic respiratory failure    #Acute on chronic  hypercapnic respiratory failure 2/2 to COPD exacerbation?    Or restrictive lung disease due to elevated hemodiaphgragm and benzo use?   - s/p intubation. on NC, ABG noted, back on bipap  - extubated repeat ABG improved Co2 and PH   - cont to monitor , oxygen as needed keep pulse ox over 92%   - switch iv steroid 40mg to PO Pred   - pulmonary following   ADDENDUM  Take off bipap, check CXR  ABG this evening  GOC discussed- full code    #Acute metabolic encephalopathy   resolved   - likely due to resp failure / hypercapnia     # Chronic hyponatremia due to SIADH   - h/o SIADH in the past   - s/p 2% saline infusion   - Nephrology following   - PO fluid restriction enforced  - add PO Urea  - cont NaCl tabs  - follow labs    #Prerenal azotemia . ARF   - improving   - s/p IVF   - avoid ARB / ACE inh , nephrotoxic meds     # leukocytosis   - GB sludge with distention , no cholecytstitis   - cont empirical iv zosyn complete course 5 days   - wbc is trending down   - urine cx , blood cx are neg     # Constipation  -cont miralax , dulcolax prn   - add senna at qhs     # CAD   - nonobstructive disease recent [facility]   - medical treatment asa , coreg     DVT: heparin ppx   Dispo: medically active

## 2024-09-26 NOTE — PROGRESS NOTE ADULT - TIME BILLING
greater than 50% of time spent reviewing labs, notes, orders and radiographs, coordinating care  discussed with pt at bedside, medical team

## 2024-09-26 NOTE — PROGRESS NOTE ADULT - ASSESSMENT
Chronic hyponatremia due to SIADH  Acute hypovolemic hyponatremia (U Na+ < 30)==> resolved  - PO fluid restriction enforced again  - cont PO Urea and NaCl tabs  - follow labs  - may need to consider Tolvaptan trial

## 2024-09-27 LAB
ALBUMIN SERPL ELPH-MCNC: 3.1 G/DL — LOW (ref 3.3–5.2)
ALBUMIN SERPL ELPH-MCNC: 3.6 G/DL — SIGNIFICANT CHANGE UP (ref 3.3–5.2)
ALP SERPL-CCNC: 73 U/L — SIGNIFICANT CHANGE UP (ref 40–120)
ALP SERPL-CCNC: 77 U/L — SIGNIFICANT CHANGE UP (ref 40–120)
ALT FLD-CCNC: 16 U/L — SIGNIFICANT CHANGE UP
ALT FLD-CCNC: 17 U/L — SIGNIFICANT CHANGE UP
ANION GAP SERPL CALC-SCNC: 15 MMOL/L — SIGNIFICANT CHANGE UP (ref 5–17)
ANION GAP SERPL CALC-SCNC: 15 MMOL/L — SIGNIFICANT CHANGE UP (ref 5–17)
ANISOCYTOSIS BLD QL: SLIGHT — SIGNIFICANT CHANGE UP
AST SERPL-CCNC: 29 U/L — SIGNIFICANT CHANGE UP
AST SERPL-CCNC: 32 U/L — HIGH
BASOPHILS # BLD AUTO: 0 K/UL — SIGNIFICANT CHANGE UP (ref 0–0.2)
BASOPHILS # BLD AUTO: 0 K/UL — SIGNIFICANT CHANGE UP (ref 0–0.2)
BASOPHILS NFR BLD AUTO: 0 % — SIGNIFICANT CHANGE UP (ref 0–2)
BASOPHILS NFR BLD AUTO: 0 % — SIGNIFICANT CHANGE UP (ref 0–2)
BILIRUB SERPL-MCNC: 0.2 MG/DL — LOW (ref 0.4–2)
BILIRUB SERPL-MCNC: 0.2 MG/DL — LOW (ref 0.4–2)
BUN SERPL-MCNC: 25.1 MG/DL — HIGH (ref 8–20)
BUN SERPL-MCNC: 26.2 MG/DL — HIGH (ref 8–20)
C ALBICANS IGE QN: <0.1 KUA/L — SIGNIFICANT CHANGE UP
CALCIUM SERPL-MCNC: 8.3 MG/DL — LOW (ref 8.4–10.5)
CALCIUM SERPL-MCNC: 8.8 MG/DL — SIGNIFICANT CHANGE UP (ref 8.4–10.5)
CHLORIDE SERPL-SCNC: 88 MMOL/L — LOW (ref 96–108)
CHLORIDE SERPL-SCNC: 89 MMOL/L — LOW (ref 96–108)
CLADOSPORIUM IGE QN: 0 — SIGNIFICANT CHANGE UP (ref 0–0)
CLADOSPORIUM IGE QN: <0.1 KUA/L — SIGNIFICANT CHANGE UP
CO2 SERPL-SCNC: 20 MMOL/L — LOW (ref 22–29)
CO2 SERPL-SCNC: 25 MMOL/L — SIGNIFICANT CHANGE UP (ref 22–29)
CREAT SERPL-MCNC: 0.69 MG/DL — SIGNIFICANT CHANGE UP (ref 0.5–1.3)
CREAT SERPL-MCNC: 0.75 MG/DL — SIGNIFICANT CHANGE UP (ref 0.5–1.3)
CULTURE RESULTS: SIGNIFICANT CHANGE UP
D PTERONYSS IGE QN: <0.1 KUA/L — SIGNIFICANT CHANGE UP
DACRYOCYTES BLD QL SMEAR: SLIGHT — SIGNIFICANT CHANGE UP
DEPRECATED A ALTERNATA IGE RAST QL: <0.1 KUA/L — SIGNIFICANT CHANGE UP
DEPRECATED A FUMIGATUS IGE RAST QL: 0 — SIGNIFICANT CHANGE UP (ref 0–0)
DEPRECATED ALTERNARIA IGE RAST QL: 0 — SIGNIFICANT CHANGE UP (ref 0–0)
DEPRECATED C ALBICANS IGE RAST QL: 0 — SIGNIFICANT CHANGE UP
DEPRECATED M RACEMOSUS IGE RAST QL: 0 — SIGNIFICANT CHANGE UP
DEPRECATED ROACH IGE RAST QL: 0 — SIGNIFICANT CHANGE UP (ref 0–0)
DUST ALLERG MIX2 IGE RAST: 0 — SIGNIFICANT CHANGE UP (ref 0–0)
EGFR: 83 ML/MIN/1.73M2 — SIGNIFICANT CHANGE UP
EGFR: 91 ML/MIN/1.73M2 — SIGNIFICANT CHANGE UP
EOSINOPHIL # BLD AUTO: 0 K/UL — SIGNIFICANT CHANGE UP (ref 0–0.5)
EOSINOPHIL # BLD AUTO: 0.17 K/UL — SIGNIFICANT CHANGE UP (ref 0–0.5)
EOSINOPHIL NFR BLD AUTO: 0 % — SIGNIFICANT CHANGE UP (ref 0–6)
EOSINOPHIL NFR BLD AUTO: 1.7 % — SIGNIFICANT CHANGE UP (ref 0–6)
GAS PNL BLDA: SIGNIFICANT CHANGE UP
GAS PNL BLDA: SIGNIFICANT CHANGE UP
GIANT PLATELETS BLD QL SMEAR: PRESENT — SIGNIFICANT CHANGE UP
GIANT PLATELETS BLD QL SMEAR: PRESENT — SIGNIFICANT CHANGE UP
GLUCOSE BLDC GLUCOMTR-MCNC: 133 MG/DL — HIGH (ref 70–99)
GLUCOSE BLDC GLUCOMTR-MCNC: 187 MG/DL — HIGH (ref 70–99)
GLUCOSE BLDC GLUCOMTR-MCNC: 198 MG/DL — HIGH (ref 70–99)
GLUCOSE SERPL-MCNC: 168 MG/DL — HIGH (ref 70–99)
GLUCOSE SERPL-MCNC: 199 MG/DL — HIGH (ref 70–99)
HCT VFR BLD CALC: 35.7 % — SIGNIFICANT CHANGE UP (ref 34.5–45)
HCT VFR BLD CALC: 38.3 % — SIGNIFICANT CHANGE UP (ref 34.5–45)
HGB BLD-MCNC: 11.7 G/DL — SIGNIFICANT CHANGE UP (ref 11.5–15.5)
HGB BLD-MCNC: 12.9 G/DL — SIGNIFICANT CHANGE UP (ref 11.5–15.5)
IGE SERPL-ACNC: 16 KU/L — SIGNIFICANT CHANGE UP
LYMPHOCYTES # BLD AUTO: 0.7 K/UL — LOW (ref 1–3.3)
LYMPHOCYTES # BLD AUTO: 0.75 K/UL — LOW (ref 1–3.3)
LYMPHOCYTES # BLD AUTO: 7 % — LOW (ref 13–44)
LYMPHOCYTES # BLD AUTO: 7 % — LOW (ref 13–44)
MAGNESIUM SERPL-MCNC: 2 MG/DL — SIGNIFICANT CHANGE UP (ref 1.8–2.6)
MAGNESIUM SERPL-MCNC: 2.1 MG/DL — SIGNIFICANT CHANGE UP (ref 1.6–2.6)
MANUAL SMEAR VERIFICATION: SIGNIFICANT CHANGE UP
MANUAL SMEAR VERIFICATION: SIGNIFICANT CHANGE UP
MCHC RBC-ENTMCNC: 29.5 PG — SIGNIFICANT CHANGE UP (ref 27–34)
MCHC RBC-ENTMCNC: 29.8 PG — SIGNIFICANT CHANGE UP (ref 27–34)
MCHC RBC-ENTMCNC: 32.8 GM/DL — SIGNIFICANT CHANGE UP (ref 32–36)
MCHC RBC-ENTMCNC: 33.7 GM/DL — SIGNIFICANT CHANGE UP (ref 32–36)
MCV RBC AUTO: 87.4 FL — SIGNIFICANT CHANGE UP (ref 80–100)
MCV RBC AUTO: 91.1 FL — SIGNIFICANT CHANGE UP (ref 80–100)
MOLD ALLERG MIX A IGE QL: <0.1 KUA/L — SIGNIFICANT CHANGE UP
MOLD ALLERG MIX6 IGG QL: <0.1 KUA/L — SIGNIFICANT CHANGE UP
MONOCYTES # BLD AUTO: 1.3 K/UL — HIGH (ref 0–0.9)
MONOCYTES # BLD AUTO: 1.57 K/UL — HIGH (ref 0–0.9)
MONOCYTES NFR BLD AUTO: 12.2 % — SIGNIFICANT CHANGE UP (ref 2–14)
MONOCYTES NFR BLD AUTO: 15.6 % — HIGH (ref 2–14)
MYELOCYTES NFR BLD: 1.7 % — HIGH (ref 0–0)
NEUTROPHILS # BLD AUTO: 7.26 K/UL — SIGNIFICANT CHANGE UP (ref 1.8–7.4)
NEUTROPHILS # BLD AUTO: 8.36 K/UL — HIGH (ref 1.8–7.4)
NEUTROPHILS NFR BLD AUTO: 72.2 % — SIGNIFICANT CHANGE UP (ref 43–77)
NEUTROPHILS NFR BLD AUTO: 76.5 % — SIGNIFICANT CHANGE UP (ref 43–77)
NEUTS BAND # BLD: 1.7 % — SIGNIFICANT CHANGE UP (ref 0–8)
OVALOCYTES BLD QL SMEAR: SLIGHT — SIGNIFICANT CHANGE UP
PHOSPHATE SERPL-MCNC: 2.3 MG/DL — LOW (ref 2.4–4.7)
PHOSPHATE SERPL-MCNC: 3.7 MG/DL — SIGNIFICANT CHANGE UP (ref 2.4–4.7)
PLAT MORPH BLD: ABNORMAL
PLAT MORPH BLD: NORMAL — SIGNIFICANT CHANGE UP
PLATELET # BLD AUTO: 454 K/UL — HIGH (ref 150–400)
PLATELET # BLD AUTO: 472 K/UL — HIGH (ref 150–400)
POLYCHROMASIA BLD QL SMEAR: SIGNIFICANT CHANGE UP
POTASSIUM SERPL-MCNC: 4.4 MMOL/L — SIGNIFICANT CHANGE UP (ref 3.5–5.3)
POTASSIUM SERPL-MCNC: 4.8 MMOL/L — SIGNIFICANT CHANGE UP (ref 3.5–5.3)
POTASSIUM SERPL-SCNC: 4.4 MMOL/L — SIGNIFICANT CHANGE UP (ref 3.5–5.3)
POTASSIUM SERPL-SCNC: 4.8 MMOL/L — SIGNIFICANT CHANGE UP (ref 3.5–5.3)
PROT SERPL-MCNC: 6.4 G/DL — LOW (ref 6.6–8.7)
PROT SERPL-MCNC: 6.9 G/DL — SIGNIFICANT CHANGE UP (ref 6.6–8.7)
RBC # BLD: 3.92 M/UL — SIGNIFICANT CHANGE UP (ref 3.8–5.2)
RBC # BLD: 4.38 M/UL — SIGNIFICANT CHANGE UP (ref 3.8–5.2)
RBC # FLD: 11.8 % — SIGNIFICANT CHANGE UP (ref 10.3–14.5)
RBC # FLD: 12 % — SIGNIFICANT CHANGE UP (ref 10.3–14.5)
RBC BLD AUTO: ABNORMAL
RBC BLD AUTO: NORMAL — SIGNIFICANT CHANGE UP
ROACH IGE QN: <0.1 KUA/L — SIGNIFICANT CHANGE UP
SMUDGE CELLS # BLD: PRESENT — SIGNIFICANT CHANGE UP
SODIUM SERPL-SCNC: 123 MMOL/L — LOW (ref 135–145)
SODIUM SERPL-SCNC: 129 MMOL/L — LOW (ref 135–145)
SPECIMEN SOURCE: SIGNIFICANT CHANGE UP
VARIANT LYMPHS # BLD: 0.9 % — SIGNIFICANT CHANGE UP (ref 0–6)
VARIANT LYMPHS # BLD: 3.5 % — SIGNIFICANT CHANGE UP (ref 0–6)
WBC # BLD: 10.06 K/UL — SIGNIFICANT CHANGE UP (ref 3.8–10.5)
WBC # BLD: 10.69 K/UL — HIGH (ref 3.8–10.5)
WBC # FLD AUTO: 10.06 K/UL — SIGNIFICANT CHANGE UP (ref 3.8–10.5)
WBC # FLD AUTO: 10.69 K/UL — HIGH (ref 3.8–10.5)

## 2024-09-27 PROCEDURE — 71045 X-RAY EXAM CHEST 1 VIEW: CPT | Mod: 26

## 2024-09-27 PROCEDURE — 99232 SBSQ HOSP IP/OBS MODERATE 35: CPT

## 2024-09-27 PROCEDURE — 99223 1ST HOSP IP/OBS HIGH 75: CPT

## 2024-09-27 PROCEDURE — 99233 SBSQ HOSP IP/OBS HIGH 50: CPT

## 2024-09-27 RX ORDER — ALPRAZOLAM 0.5 MG/1
0.25 TABLET ORAL ONCE
Refills: 0 | Status: DISCONTINUED | OUTPATIENT
Start: 2024-09-27 | End: 2024-09-27

## 2024-09-27 RX ORDER — ALPRAZOLAM 0.5 MG/1
0.5 TABLET ORAL
Refills: 0 | Status: DISCONTINUED | OUTPATIENT
Start: 2024-09-27 | End: 2024-09-28

## 2024-09-27 RX ORDER — CHLORHEXIDINE GLUCONATE ORAL RINSE 1.2 MG/ML
1 SOLUTION DENTAL DAILY
Refills: 0 | Status: DISCONTINUED | OUTPATIENT
Start: 2024-09-27 | End: 2024-10-09

## 2024-09-27 RX ADMIN — IPRATROPIUM BROMIDE AND ALBUTEROL SULFATE 3 MILLILITER(S): .5; 3 SOLUTION RESPIRATORY (INHALATION) at 10:44

## 2024-09-27 RX ADMIN — PANTOPRAZOLE SODIUM 40 MILLIGRAM(S): 40 TABLET, DELAYED RELEASE ORAL at 12:18

## 2024-09-27 RX ADMIN — IPRATROPIUM BROMIDE AND ALBUTEROL SULFATE 3 MILLILITER(S): .5; 3 SOLUTION RESPIRATORY (INHALATION) at 03:26

## 2024-09-27 RX ADMIN — Medication 5000 UNIT(S): at 08:29

## 2024-09-27 RX ADMIN — TIOTROPIUM BROMIDE INHALATION SPRAY 2 PUFF(S): 3.12 SPRAY, METERED RESPIRATORY (INHALATION) at 11:41

## 2024-09-27 RX ADMIN — ALPRAZOLAM 0.25 MILLIGRAM(S): 0.5 TABLET ORAL at 10:11

## 2024-09-27 RX ADMIN — Medication 4 MILLIGRAM(S): at 08:28

## 2024-09-27 RX ADMIN — Medication 4 MILLIGRAM(S): at 01:06

## 2024-09-27 RX ADMIN — Medication 81 MILLIGRAM(S): at 12:18

## 2024-09-27 RX ADMIN — PIPERACILLIN SODIUM AND TAZOBACTAM SODIUM 25 GRAM(S): 12; 1.5 INJECTION, POWDER, LYOPHILIZED, FOR SOLUTION INTRAVENOUS at 01:05

## 2024-09-27 RX ADMIN — Medication 225 MILLIGRAM(S): at 12:20

## 2024-09-27 RX ADMIN — PIPERACILLIN SODIUM AND TAZOBACTAM SODIUM 25 GRAM(S): 12; 1.5 INJECTION, POWDER, LYOPHILIZED, FOR SOLUTION INTRAVENOUS at 17:09

## 2024-09-27 RX ADMIN — METHYLPREDNISOLONE ACETATE 40 MILLIGRAM(S): 80 INJECTION, SUSPENSION INTRA-ARTICULAR; INTRALESIONAL; INTRAMUSCULAR; SOFT TISSUE at 21:23

## 2024-09-27 RX ADMIN — Medication 5000 UNIT(S): at 17:09

## 2024-09-27 RX ADMIN — Medication 5000 UNIT(S): at 01:06

## 2024-09-27 RX ADMIN — METHYLPREDNISOLONE ACETATE 40 MILLIGRAM(S): 80 INJECTION, SUSPENSION INTRA-ARTICULAR; INTRALESIONAL; INTRAMUSCULAR; SOFT TISSUE at 05:54

## 2024-09-27 RX ADMIN — PIPERACILLIN SODIUM AND TAZOBACTAM SODIUM 25 GRAM(S): 12; 1.5 INJECTION, POWDER, LYOPHILIZED, FOR SOLUTION INTRAVENOUS at 08:29

## 2024-09-27 RX ADMIN — GABAPENTIN 300 MILLIGRAM(S): 800 TABLET, FILM COATED ORAL at 12:20

## 2024-09-27 RX ADMIN — Medication 1 GRAM(S): at 14:31

## 2024-09-27 RX ADMIN — Medication 3.12 MILLIGRAM(S): at 05:54

## 2024-09-27 RX ADMIN — ALPRAZOLAM 0.5 MILLIGRAM(S): 0.5 TABLET ORAL at 17:30

## 2024-09-27 RX ADMIN — IPRATROPIUM BROMIDE AND ALBUTEROL SULFATE 3 MILLILITER(S): .5; 3 SOLUTION RESPIRATORY (INHALATION) at 20:45

## 2024-09-27 RX ADMIN — Medication 1 DOSE(S): at 20:45

## 2024-09-27 RX ADMIN — Medication 3.12 MILLIGRAM(S): at 17:30

## 2024-09-27 RX ADMIN — IPRATROPIUM BROMIDE AND ALBUTEROL SULFATE 3 MILLILITER(S): .5; 3 SOLUTION RESPIRATORY (INHALATION) at 15:59

## 2024-09-27 RX ADMIN — METHYLPREDNISOLONE ACETATE 40 MILLIGRAM(S): 80 INJECTION, SUSPENSION INTRA-ARTICULAR; INTRALESIONAL; INTRAMUSCULAR; SOFT TISSUE at 14:31

## 2024-09-27 NOTE — PROGRESS NOTE ADULT - ASSESSMENT
CAD , HFpEF, HTN, HLD, COPD, recent admission for CP  s/p LHC discharged home 9/19  returned to the Hospital with abdominal pain , nausea vomiting    Chronic hyponatremia due to SIADH  Serum Na 126--> 129  Acute hypovolemic hyponatremia (U Na+ < 30)==> improving at appropriate rate  - PO fluid restriction enforced again  - cont PO Urea and NaCl tabs  - Avoid over correction of serum Na by more than 6- 8 mEq /L per 24 hrs  - may need to consider Tolvaptan trial    Monitor labs will follow

## 2024-09-27 NOTE — CHART NOTE - NSCHARTNOTEFT_GEN_A_CORE
RAPID RESPONSE FOLLOW UP NOTE    Patient seen and examined at bedside. RR called @ 21:50 for change in mental status. Pt reassessed around 23:50 Patient is now alert and conversant, A/Ox4. Patient states that she is feeling well and breathing comfortably. Tolerating AVAPS. Denies headaches, dizziness, chest pain, palpitations, abdominal pain, n/v/d or any other complaints. Repeat ABG scheduled for midnight. Labs significant for hyponatremia.    Vital Signs Last 24 Hrs  T(F): 99.5   HR: 109  BP: 96/65  RR: 21  SpO2:     Parameters below as of 27 Sep 2024 22:00  Patient On (Oxygen Delivery Method): BiPAP/CPAP    O2 Concentration (%): 40    PHYSICAL EXAM:  GENERAL: Pt lying in bed comfortably in NAD  CHEST/LUNG: Clear to auscultation bilaterally; No rales, rhonchi or wheezing. Unlabored respirations  HEART: Mild tachycardia, regular rhythm, S1, S2   ABDOMEN: Bowel sounds present; Soft, Nontender, Nondistended. No guarding or rigidity    EXTREMITIES:  2+ Peripheral Pulses, brisk capillary refill <2 seconds. No clubbing, cyanosis, or edema   NERVOUS SYSTEM:  Alert & Oriented X3, speech clear. Answers questions appropriately. No deficits   SKIN: Warm, dry        LABS:                        11.7   10.69 )-----------( 454      ( 27 Sep 2024 22:27 )             35.7       09-27    123[L]  |  88[L]  |  25.1[H]  ----------------------------<  199[H]  4.8   |  20.0[L]  |  0.75    Ca    8.3[L]      27 Sep 2024 22:27  Phos  3.7     09-27  Mg     2.1     09-27    TPro  6.4[L]  /  Alb  3.1[L]  /  TBili  0.2[L]  /  DBili  x   /  AST  32[H]  /  ALT  17  /  AlkPhos  73  09-27      ABG - ( 27 Sep 2024 21:33 )  pH, Arterial: 7.050 pH, Blood: x     /  pCO2: 108   /  pO2: 85    / HCO3: 30    / Base Excess: -0.6  /  SaO2: 96.6        IMAGING:      ASSESSMENT/ PLAN:  Patient is a 73 y/o F with a h/o CAD (s/p PCI), HFpEF, HTN, HLD, COPD admitted for acute on chronic hypercapnic respiratory failure 2/2 COPD exacerbation s/p RRT for AMS 2/2 hypercapnic respiratory failure/ Respiratory acidosis.     - Repeat ABG pending  - Continue AVAPS until ABG results  - Continue Step down level of care RAPID RESPONSE FOLLOW UP NOTE    Patient seen and examined at bedside. RR called @ 21:50 for change in mental status. Pt reassessed around 23:50 Patient is now alert and conversant, A/Ox4. Patient states that she is feeling well and breathing comfortably. Tolerating AVAPS. Denies headaches, dizziness, chest pain, palpitations, abdominal pain, n/v/d or any other complaints. Repeat ABG scheduled for midnight. Labs significant for hyponatremia. Repeat ABG showed significant improvement if pH and pCO2.     Vital Signs Last 24 Hrs  T(F): 99.5   HR: 109  BP: 96/65  RR: 21  SpO2:     Parameters below as of 27 Sep 2024 22:00  Patient On (Oxygen Delivery Method): BiPAP/CPAP    O2 Concentration (%): 40    PHYSICAL EXAM:  GENERAL: Pt lying in bed comfortably in NAD  CHEST/LUNG: Clear to auscultation bilaterally; No rales, rhonchi or wheezing. Unlabored respirations  HEART: Mild tachycardia, regular rhythm, S1, S2   ABDOMEN: Bowel sounds present; Soft, Nontender, Nondistended. No guarding or rigidity    EXTREMITIES:  2+ Peripheral Pulses, brisk capillary refill <2 seconds. No clubbing, cyanosis, or edema   NERVOUS SYSTEM:  Alert & Oriented X3, speech clear. Answers questions appropriately. No deficits   SKIN: Warm, dry        LABS:                        11.7   10.69 )-----------( 454      ( 27 Sep 2024 22:27 )             35.7       09-27    123[L]  |  88[L]  |  25.1[H]  ----------------------------<  199[H]  4.8   |  20.0[L]  |  0.75    Ca    8.3[L]      27 Sep 2024 22:27  Phos  3.7     09-27  Mg     2.1     09-27    TPro  6.4[L]  /  Alb  3.1[L]  /  TBili  0.2[L]  /  DBili  x   /  AST  32[H]  /  ALT  17  /  AlkPhos  73  09-27      ABG - ( 27 Sep 2024 21:33 )  pH, Arterial: 7.050 pH, Blood: x     /  pCO2: 108   /  pO2: 85    / HCO3: 30    / Base Excess: -0.6  /  SaO2: 96.6      ABG - ( 28 Sep 2024 00:00 )  pH, Arterial: 7.440 pH, Blood: x     /  pCO2: 37    /  pO2: 100   / HCO3: 25    / Base Excess: 0.9   /  SaO2: 100.0         ASSESSMENT/ PLAN:  Patient is a 73 y/o F with a h/o CAD (s/p PCI), HFpEF, HTN, HLD, COPD admitted for acute on chronic hypercapnic respiratory failure 2/2 COPD exacerbation s/p RRT for AMS 2/2 hypercapnic respiratory failure/ Respiratory acidosis. With 2 hours of AVAPS patient's respiratory acidosis and hypercapnia have resolved. Discussed case with ICU team who recommended continuing AVAPS and decreasing TV to 500mL.     - Continue AVAPS, decrease TV to 500mL  - Continue Step down level of care RAPID RESPONSE FOLLOW UP NOTE    Patient seen and examined at bedside. RR called @ 21:50 for change in mental status. Pt reassessed around 23:50 Patient is now alert and conversant, A/Ox4. Patient states that she is feeling well and breathing comfortably. Tolerating AVAPS. Denies headaches, dizziness, chest pain, palpitations, abdominal pain, n/v/d or any other complaints. Repeat ABG scheduled for midnight. Labs significant for hyponatremia, patient has chronic hyponatremia due to SIADH. She is being treated with urea and NaCl tabs as well as fluid restrictions, nephrology following. Repeat ABG showed significant improvement if pH and pCO2.     Vital Signs Last 24 Hrs  T(F): 99.5   HR: 109  BP: 96/65  RR: 21  SpO2:     Parameters below as of 27 Sep 2024 22:00  Patient On (Oxygen Delivery Method): BiPAP/CPAP    O2 Concentration (%): 40    PHYSICAL EXAM:  GENERAL: Pt lying in bed comfortably in NAD  CHEST/LUNG: Clear to auscultation bilaterally; No rales, rhonchi or wheezing. Unlabored respirations  HEART: Mild tachycardia, regular rhythm, S1, S2   ABDOMEN: Bowel sounds present; Soft, Nontender, Nondistended. No guarding or rigidity    EXTREMITIES:  2+ Peripheral Pulses, brisk capillary refill <2 seconds. No clubbing, cyanosis, or edema   NERVOUS SYSTEM:  Alert & Oriented X3, speech clear. Answers questions appropriately. No deficits   SKIN: Warm, dry        LABS:                        11.7   10.69 )-----------( 454      ( 27 Sep 2024 22:27 )             35.7       09-27    123[L]  |  88[L]  |  25.1[H]  ----------------------------<  199[H]  4.8   |  20.0[L]  |  0.75    Ca    8.3[L]      27 Sep 2024 22:27  Phos  3.7     09-27  Mg     2.1     09-27    TPro  6.4[L]  /  Alb  3.1[L]  /  TBili  0.2[L]  /  DBili  x   /  AST  32[H]  /  ALT  17  /  AlkPhos  73  09-27      ABG - ( 27 Sep 2024 21:33 )  pH, Arterial: 7.050 pH, Blood: x     /  pCO2: 108   /  pO2: 85    / HCO3: 30    / Base Excess: -0.6  /  SaO2: 96.6      ABG - ( 28 Sep 2024 00:00 )  pH, Arterial: 7.440 pH, Blood: x     /  pCO2: 37    /  pO2: 100   / HCO3: 25    / Base Excess: 0.9   /  SaO2: 100.0         ASSESSMENT/ PLAN:  Patient is a 75 y/o F with a h/o CAD (s/p PCI), HFpEF, HTN, HLD, COPD admitted for acute on chronic hypercapnic respiratory failure 2/2 COPD exacerbation s/p RRT for AMS 2/2 hypercapnic respiratory failure/ Respiratory acidosis. With 2 hours of AVAPS patient's respiratory acidosis and hypercapnia have resolved. Discussed case with ICU team who recommended continuing AVAPS and decreasing TV to 500mL.     - Continue AVAPS, decrease TV to 500mL  - Continue fluid restriction  - cont PO Urea and NaCl tabs  - Repeat BMP in AM  - Continue Step down level of care RAPID RESPONSE FOLLOW UP NOTE    Patient seen and examined at bedside. RR called @ 21:50 for change in mental status. Pt reassessed around 23:50 Patient is now alert and conversant, A/Ox4. Patient states that she is feeling well and breathing comfortably. Tolerating AVAPS. Denies headaches, dizziness, chest pain, palpitations, abdominal pain, n/v/d or any other complaints. Repeat ABG scheduled for midnight. Labs significant for hyponatremia, patient has chronic hyponatremia due to SIADH. She is being treated with urea and NaCl tabs as well as fluid restrictions, nephrology following. Repeat ABG showed significant improvement if pH and pCO2.     Vital Signs Last 24 Hrs  T(F): 99.5   HR: 109  BP: 96/65  RR: 21  SpO2: 95%    Parameters below as of 27 Sep 2024 22:00  Patient On (Oxygen Delivery Method): BiPAP/CPAP    O2 Concentration (%): 40    PHYSICAL EXAM:  GENERAL: Pt lying in bed comfortably in NAD  CHEST/LUNG: Clear to auscultation bilaterally; No rales, rhonchi or wheezing. Unlabored respirations  HEART: Mild tachycardia, regular rhythm, S1, S2   ABDOMEN: Bowel sounds present; Soft, Nontender, Nondistended. No guarding or rigidity    EXTREMITIES:  2+ Peripheral Pulses, brisk capillary refill <2 seconds. No clubbing, cyanosis, or edema   NERVOUS SYSTEM:  Alert & Oriented X3, speech clear. Answers questions appropriately. No deficits   SKIN: Warm, dry        LABS:                        11.7   10.69 )-----------( 454      ( 27 Sep 2024 22:27 )             35.7       09-27    123[L]  |  88[L]  |  25.1[H]  ----------------------------<  199[H]  4.8   |  20.0[L]  |  0.75    Ca    8.3[L]      27 Sep 2024 22:27  Phos  3.7     09-27  Mg     2.1     09-27    TPro  6.4[L]  /  Alb  3.1[L]  /  TBili  0.2[L]  /  DBili  x   /  AST  32[H]  /  ALT  17  /  AlkPhos  73  09-27      ABG - ( 27 Sep 2024 21:33 )  pH, Arterial: 7.050 pH, Blood: x     /  pCO2: 108   /  pO2: 85    / HCO3: 30    / Base Excess: -0.6  /  SaO2: 96.6      ABG - ( 28 Sep 2024 00:00 )  pH, Arterial: 7.440 pH, Blood: x     /  pCO2: 37    /  pO2: 100   / HCO3: 25    / Base Excess: 0.9   /  SaO2: 100.0         ASSESSMENT/ PLAN:  Patient is a 73 y/o F with a h/o CAD (s/p PCI), HFpEF, HTN, HLD, COPD admitted for acute on chronic hypercapnic respiratory failure 2/2 COPD exacerbation s/p RRT for AMS 2/2 hypercapnic respiratory failure/ Respiratory acidosis. With 2 hours of AVAPS patient's respiratory acidosis and hypercapnia have resolved. Discussed case with ICU team who recommended continuing AVAPS and decreasing TV to 500mL.     - Continue AVAPS, decrease TV to 500mL  - Continue fluid restriction  - cont PO Urea and NaCl tabs  - Repeat BMP in AM  - Xanax discontinued  - Continue Step down level of care

## 2024-09-27 NOTE — CONSULT NOTE ADULT - SUBJECTIVE AND OBJECTIVE BOX
Date of entry of this note is equal to the date of services rendered    74 F with PMHX CAD , HFpEF, HTN, HLD, COPD (on O2 prn), recent admission for NSTEMI (s/p LHC without intervention), discharged  presents to the ED on  with fever, right upper abdominal pain, lower abdominal pain, nausea, vomiting, and dyspnea. Initially abdmitted to the floor with hyponatremia and prerenal MADHURI. Was consulted  during rapid response as patient was obtunded with agonal breathing. ABG showed severe hypercapnia/respiratory acidosis. Was emergently intubated. Extubated on . On , patient refused bipap. ABG demonstrated respiratory acidosis (pH 7.2, CO2 73, O2 117, HCO3 28). One hour after on bipap, pH 7.28, CO2 57. Condition since improved afterwards      21:00: RRT called for increased lethargy, poor mentation, and abdominal breathing. As per nurse, pt was given 0.5mg Xanax (usual dose 1mg). Was noted to be AOx3 prior to this. ABG with respiratory acidosis with pH 7.050, CO2 108, O2 85, HCO3 30. Was started on AVAPS. ICU consulted.    PAST MEDICAL & SURGICAL HISTORY:  NICM (nonischemic cardiomyopathy)      HTN (hypertension)      Rheumatoid arthritis      Chronic back pain      HLD (hyperlipidemia)      Chronic obstructive asthma      Cellulitis      S/P       S/P hysterectomy          Review of Systems:  Negative unless stated      Medications:  piperacillin/tazobactam IVPB.. 3.375 Gram(s) IV Intermittent every 8 hours    carvedilol 3.125 milliGRAM(s) Oral every 12 hours  urea Oral Powder 15 Gram(s) Oral daily    albuterol/ipratropium for Nebulization 3 milliLiter(s) Nebulizer every 6 hours  fluticasone propionate/ salmeterol 500-50 MICROgram(s) Diskus 1 Dose(s) Inhalation two times a day  tiotropium 2.5 MICROgram(s) Inhaler 2 Puff(s) Inhalation daily    acetaminophen     Tablet .. 650 milliGRAM(s) Oral every 6 hours PRN  ALPRAZolam 0.5 milliGRAM(s) Oral two times a day  gabapentin 300 milliGRAM(s) Oral daily  ondansetron Injectable 4 milliGRAM(s) IV Push four times a day PRN  venlafaxine  milliGRAM(s) Oral daily  zolpidem 5 milliGRAM(s) Oral at bedtime      aspirin  chewable 81 milliGRAM(s) Oral daily  heparin   Injectable 5000 Unit(s) SubCutaneous every 8 hours    bisacodyl 5 milliGRAM(s) Oral every 12 hours PRN  pantoprazole  Injectable 40 milliGRAM(s) IV Push daily  polyethylene glycol 3350 17 Gram(s) Oral daily  senna 2 Tablet(s) Oral at bedtime      atorvastatin 40 milliGRAM(s) Oral at bedtime  dextrose 50% Injectable 25 Gram(s) IV Push once  dextrose 50% Injectable 12.5 Gram(s) IV Push once  dextrose 50% Injectable 25 Gram(s) IV Push once  dextrose Oral Gel 15 Gram(s) Oral once  glucagon  Injectable 1 milliGRAM(s) IntraMuscular once  methylPREDNISolone sodium succinate Injectable 40 milliGRAM(s) IV Push every 8 hours    dextrose 5%. 1000 milliLiter(s) IV Continuous <Continuous>  dextrose 5%. 1000 milliLiter(s) IV Continuous <Continuous>  sodium chloride 1 Gram(s) Oral three times a day      chlorhexidine 2% Cloths 1 Application(s) Topical daily            ICU Vital Signs Last 24 Hrs  T(C): 37.5 (27 Sep 2024 22:00), Max: 37.5 (27 Sep 2024 22:00)  T(F): 99.5 (27 Sep 2024 22:00), Max: 99.5 (27 Sep 2024 22:00)  HR: 107 (27 Sep 2024 22:00) (77 - 115)  BP: 111/62 (27 Sep 2024 22:00) (111/62 - 147/90)  BP(mean): 78 (27 Sep 2024 22:00) (70 - 111)  ABP: --  ABP(mean): --  RR: 20 (27 Sep 2024 22:00) (19 - 32)  SpO2: 95% (27 Sep 2024 22:00) (95% - 100%)    O2 Parameters below as of 27 Sep 2024 22:00  Patient On (Oxygen Delivery Method): BiPAP/CPAP    O2 Concentration (%): 40        ABG - ( 27 Sep 2024 21:33 )  pH, Arterial: 7.050 pH, Blood: x     /  pCO2: 108   /  pO2: 85    / HCO3: 30    / Base Excess: -0.6  /  SaO2: 96.6                I&O's Detail    26 Sep 2024 07:01  -  27 Sep 2024 07:00  --------------------------------------------------------  IN:    Oral Fluid: 200 mL  Total IN: 200 mL    OUT:    Voided (mL): 700 mL  Total OUT: 700 mL    Total NET: -500 mL      27 Sep 2024 07:01  -  27 Sep 2024 23:49  --------------------------------------------------------  IN:    IV PiggyBack: 100 mL  Total IN: 100 mL    OUT:    Voided (mL): 100 mL  Total OUT: 100 mL    Total NET: 0 mL            LABS:                        11.7   10.69 )-----------( 454      ( 27 Sep 2024 22:27 )             35.7         123[L]  |  88[L]  |  25.1[H]  ----------------------------<  199[H]  4.8   |  20.0[L]  |  0.75    Ca    8.3[L]      27 Sep 2024 22:27  Phos  3.7       Mg     2.1         TPro  6.4[L]  /  Alb  3.1[L]  /  TBili  0.2[L]  /  DBili  x   /  AST  32[H]  /  ALT  17  /  AlkPhos  73            CAPILLARY BLOOD GLUCOSE      POCT Blood Glucose.: 198 mg/dL (27 Sep 2024 21:32)      Urinalysis Basic - ( 27 Sep 2024 22:27 )    Color: x / Appearance: x / SG: x / pH: x  Gluc: 199 mg/dL / Ketone: x  / Bili: x / Urobili: x   Blood: x / Protein: x / Nitrite: x   Leuk Esterase: x / RBC: x / WBC x   Sq Epi: x / Non Sq Epi: x / Bacteria: x      CULTURES:  Culture Results:   No growth at 5 days ( @ 22:00)  Culture Results:   No growth ( @ 21:55)      Physical Examination:  General: Responsive.   Eyes: PERRL, symmetric  Resp: tachypneic, abdominal breathing  CV: tachycardic.  Neuro: Responsive to painful stimuli. Follows commands. Able to answer some questions.      Date of entry of this note is equal to the date of services rendered    74 F with PMHX CAD , HFpEF, HTN, HLD, COPD (on O2 prn), recent admission for NSTEMI (s/p LHC without intervention), discharged  presents to the ED on  with fever, right upper abdominal pain, lower abdominal pain, nausea, vomiting, and dyspnea. Initially abdmitted to the floor with hyponatremia and prerenal MADHURI. MICU consulted  during rapid response as patient was obtunded with agonal breathing. ABG showed severe hypercapnia/respiratory acidosis. Was emergently intubated and brought into MICU. Extubated on . On , patient refused bipap on the floor. ABG demonstrated respiratory acidosis (pH 7.2, CO2 73, O2 117, HCO3 28). One hour after on bipap, pH 7.28, CO2 57. Condition since improved afterwards      21:00: RRT called for increased lethargy, poor mentation, and abdominal breathing. As per nurse, pt was given 0.5mg Xanax (usual dose 1mg). Was noted to be AOx3 prior to this. ABG with respiratory acidosis with pH 7.050, CO2 108, O2 85, HCO3 30. Was started on AVAPS. ICU consulted.      PAST MEDICAL & SURGICAL HISTORY:  NICM (nonischemic cardiomyopathy)      HTN (hypertension)      Rheumatoid arthritis      Chronic back pain      HLD (hyperlipidemia)      Chronic obstructive asthma      Cellulitis      S/P       S/P hysterectomy          Review of Systems:  Negative unless stated      Medications:  piperacillin/tazobactam IVPB.. 3.375 Gram(s) IV Intermittent every 8 hours    carvedilol 3.125 milliGRAM(s) Oral every 12 hours  urea Oral Powder 15 Gram(s) Oral daily    albuterol/ipratropium for Nebulization 3 milliLiter(s) Nebulizer every 6 hours  fluticasone propionate/ salmeterol 500-50 MICROgram(s) Diskus 1 Dose(s) Inhalation two times a day  tiotropium 2.5 MICROgram(s) Inhaler 2 Puff(s) Inhalation daily    acetaminophen     Tablet .. 650 milliGRAM(s) Oral every 6 hours PRN  ALPRAZolam 0.5 milliGRAM(s) Oral two times a day  gabapentin 300 milliGRAM(s) Oral daily  ondansetron Injectable 4 milliGRAM(s) IV Push four times a day PRN  venlafaxine  milliGRAM(s) Oral daily  zolpidem 5 milliGRAM(s) Oral at bedtime      aspirin  chewable 81 milliGRAM(s) Oral daily  heparin   Injectable 5000 Unit(s) SubCutaneous every 8 hours    bisacodyl 5 milliGRAM(s) Oral every 12 hours PRN  pantoprazole  Injectable 40 milliGRAM(s) IV Push daily  polyethylene glycol 3350 17 Gram(s) Oral daily  senna 2 Tablet(s) Oral at bedtime      atorvastatin 40 milliGRAM(s) Oral at bedtime  dextrose 50% Injectable 25 Gram(s) IV Push once  dextrose 50% Injectable 12.5 Gram(s) IV Push once  dextrose 50% Injectable 25 Gram(s) IV Push once  dextrose Oral Gel 15 Gram(s) Oral once  glucagon  Injectable 1 milliGRAM(s) IntraMuscular once  methylPREDNISolone sodium succinate Injectable 40 milliGRAM(s) IV Push every 8 hours    dextrose 5%. 1000 milliLiter(s) IV Continuous <Continuous>  dextrose 5%. 1000 milliLiter(s) IV Continuous <Continuous>  sodium chloride 1 Gram(s) Oral three times a day      chlorhexidine 2% Cloths 1 Application(s) Topical daily            ICU Vital Signs Last 24 Hrs  T(C): 37.5 (27 Sep 2024 22:00), Max: 37.5 (27 Sep 2024 22:00)  T(F): 99.5 (27 Sep 2024 22:00), Max: 99.5 (27 Sep 2024 22:00)  HR: 107 (27 Sep 2024 22:00) (77 - 115)  BP: 111/62 (27 Sep 2024 22:00) (111/62 - 147/90)  BP(mean): 78 (27 Sep 2024 22:00) (70 - 111)  ABP: --  ABP(mean): --  RR: 20 (27 Sep 2024 22:00) (19 - 32)  SpO2: 95% (27 Sep 2024 22:00) (95% - 100%)    O2 Parameters below as of 27 Sep 2024 22:00  Patient On (Oxygen Delivery Method): BiPAP/CPAP    O2 Concentration (%): 40        ABG - ( 27 Sep 2024 21:33 )  pH, Arterial: 7.050 pH, Blood: x     /  pCO2: 108   /  pO2: 85    / HCO3: 30    / Base Excess: -0.6  /  SaO2: 96.6                I&O's Detail    26 Sep 2024 07:01  -  27 Sep 2024 07:00  --------------------------------------------------------  IN:    Oral Fluid: 200 mL  Total IN: 200 mL    OUT:    Voided (mL): 700 mL  Total OUT: 700 mL    Total NET: -500 mL      27 Sep 2024 07:01  -  27 Sep 2024 23:49  --------------------------------------------------------  IN:    IV PiggyBack: 100 mL  Total IN: 100 mL    OUT:    Voided (mL): 100 mL  Total OUT: 100 mL    Total NET: 0 mL            LABS:                        11.7   10.69 )-----------( 454      ( 27 Sep 2024 22:27 )             35.7         123[L]  |  88[L]  |  25.1[H]  ----------------------------<  199[H]  4.8   |  20.0[L]  |  0.75    Ca    8.3[L]      27 Sep 2024 22:27  Phos  3.7       Mg     2.1         TPro  6.4[L]  /  Alb  3.1[L]  /  TBili  0.2[L]  /  DBili  x   /  AST  32[H]  /  ALT  17  /  AlkPhos  73            CAPILLARY BLOOD GLUCOSE      POCT Blood Glucose.: 198 mg/dL (27 Sep 2024 21:32)      Urinalysis Basic - ( 27 Sep 2024 22:27 )    Color: x / Appearance: x / SG: x / pH: x  Gluc: 199 mg/dL / Ketone: x  / Bili: x / Urobili: x   Blood: x / Protein: x / Nitrite: x   Leuk Esterase: x / RBC: x / WBC x   Sq Epi: x / Non Sq Epi: x / Bacteria: x      CULTURES:  Culture Results:   No growth at 5 days ( @ 22:00)  Culture Results:   No growth ( @ 21:55)      Physical Examination:  General: Responsive.   Eyes: PERRL, symmetric  Resp: tachypneic, abdominal breathing  CV: tachycardic.  Neuro: Responsive to painful stimuli. Follows commands. Able to answer some questions.

## 2024-09-27 NOTE — CONSULT NOTE ADULT - SUBJECTIVE AND OBJECTIVE BOX
HPI:  74 F with PMHX CAD , HFpEF, HTN, HLD, COPD, recent admission for CP  s/p LHC discharged home   returned to the Hospital  yesterday evening with abdominal pain , nausea vomiting started yesterday c/o upper abdominal pain similar to prior presentation   Medical records reviewed she had  C with nonobstructive CAD. Pt also c/o fever at home and febrile now , still c/o epigastric right upper abdominal and lower abd pain on off , + dyspnea asking for oxygen ( she is on prn oxygen at home ) Episode of expressive aphasia which since resolved. CTH/CTA Head/Neck negative. Pt slow to respond otherwise without focal deficits.In [facility] ED blood work with hyponatremia , leukocytosis and MADHURI  . Appears dehydrated , no appetite since yesterday   1.5L NSS bolus given in ED. CT Chest/Abd/Pelvis WO +atelectasis otherwise negative. repeat Na 120    (22 Sep 2024 08:23)      PERTINENT PMH REVIEWED: Yes     PAST MEDICAL & SURGICAL HISTORY:  NICM (nonischemic cardiomyopathy)      HTN (hypertension)      Rheumatoid arthritis      Chronic back pain      HLD (hyperlipidemia)      Chronic obstructive asthma      Cellulitis      S/P       S/P hysterectomy          SOCIAL HISTORY:                      Substance history: no                     Admitted from:  home                      Advent/spirituality: Gnosticism                    Cultural concerns:    Baseline ADLs (prior to admission):  Independent/ Dependent                        Surrogate/HCP/Guardian: spouse Bj     FAMILY HISTORY:  Family history of diabetes mellitus (DM)    FH: CAD (coronary artery disease)        Allergies    No Known Allergies    Intolerances        http://npcrc.org/files/news/[email or web address]_[email or web address]    Present Symptoms:   Dyspnea:  yes when talking  Nausea/Vomiting:  No    Anxiety/ Agitation    No    Fatigue: Yes   Loss of appetite:  No  Constipation:  Not reported   Yes    Pain:  No              Location            Character            Duration            Factors            Severity            Effect    Pain AD Score:  [email or web address] (press ctrl + left click to view)    Review of Systems: Reviewed    All other ROS negative    MEDICATIONS  (STANDING):  albuterol/ipratropium for Nebulization 3 milliLiter(s) Nebulizer every 6 hours  aspirin  chewable 81 milliGRAM(s) Oral daily  atorvastatin 40 milliGRAM(s) Oral at bedtime  carvedilol 3.125 milliGRAM(s) Oral every 12 hours  dextrose 5%. 1000 milliLiter(s) (50 mL/Hr) IV Continuous <Continuous>  dextrose 5%. 1000 milliLiter(s) (100 mL/Hr) IV Continuous <Continuous>  dextrose 50% Injectable 12.5 Gram(s) IV Push once  dextrose 50% Injectable 25 Gram(s) IV Push once  dextrose 50% Injectable 25 Gram(s) IV Push once  dextrose Oral Gel 15 Gram(s) Oral once  fluticasone propionate/ salmeterol 500-50 MICROgram(s) Diskus 1 Dose(s) Inhalation two times a day  gabapentin 300 milliGRAM(s) Oral daily  glucagon  Injectable 1 milliGRAM(s) IntraMuscular once  heparin   Injectable 5000 Unit(s) SubCutaneous every 8 hours  methylPREDNISolone sodium succinate Injectable 40 milliGRAM(s) IV Push every 8 hours  pantoprazole  Injectable 40 milliGRAM(s) IV Push daily  piperacillin/tazobactam IVPB.. 3.375 Gram(s) IV Intermittent every 8 hours  polyethylene glycol 3350 17 Gram(s) Oral daily  senna 2 Tablet(s) Oral at bedtime  sodium chloride 1 Gram(s) Oral three times a day  tiotropium 2.5 MICROgram(s) Inhaler 2 Puff(s) Inhalation daily  urea Oral Powder 15 Gram(s) Oral daily  venlafaxine  milliGRAM(s) Oral daily  zolpidem 5 milliGRAM(s) Oral at bedtime    MEDICATIONS  (PRN):  acetaminophen     Tablet .. 650 milliGRAM(s) Oral every 6 hours PRN Temp greater or equal to 38C (100.4F), Mild Pain (1 - 3), Moderate Pain (4 - 6)  ALPRAZolam 0.25 milliGRAM(s) Oral once PRN anxiety  bisacodyl 5 milliGRAM(s) Oral every 12 hours PRN Constipation  ondansetron Injectable 4 milliGRAM(s) IV Push four times a day PRN Nausea and/or Vomiting      PHYSICAL EXAM:    Vital Signs Last 24 Hrs  T(C): 36.6 (27 Sep 2024 07:38), Max: 36.9 (26 Sep 2024 20:49)  T(F): 97.9 (27 Sep 2024 07:38), Max: 98.5 (26 Sep 2024 20:49)  HR: 115 (27 Sep 2024 08:00) (77 - 115)  BP: 130/66 (27 Sep 2024 08:00) (122/79 - 159/88)  BP(mean): 77 (27 Sep 2024 08:00) (77 - 108)  RR: 20 (27 Sep 2024 08:00) (18 - 26)  SpO2: 95% (27 Sep 2024 06:00) (91% - 100%)    Parameters below as of 27 Sep 2024 08:00  Patient On (Oxygen Delivery Method): nasal cannula  O2 Flow (L/min): 4    Karnofsky 30  %  General:  Elderly woman awake NAD  HEENT: NCAT     Lungs: comfortable  CV: RR  GI: soft NTND  MSK: weakness  Neuro: Aox4 no focal deficits  Skin: warm dry  Psych:  calm coopearative    LABS:                        12.9   10.06 )-----------( 472      ( 27 Sep 2024 05:32 )             38.3     09-    129[L]  |  89[L]  |  26.2[H]  ----------------------------<  168[H]  4.4   |  25.0  |  0.69    Ca    8.8      27 Sep 2024 05:32  Phos  2.3       Mg     2.0         TPro  6.9  /  Alb  3.6  /  TBili  0.2[L]  /  DBili  x   /  AST  29  /  ALT  16  /  AlkPhos  77  0927      Urinalysis Basic - ( 27 Sep 2024 05:32 )    Color: x / Appearance: x / SG: x / pH: x  Gluc: 168 mg/dL / Ketone: x  / Bili: x / Urobili: x   Blood: x / Protein: x / Nitrite: x   Leuk Esterase: x / RBC: x / WBC x   Sq Epi: x / Non Sq Epi: x / Bacteria: x      I&O's Summary    26 Sep 2024 07:01  -  27 Sep 2024 07:00  --------------------------------------------------------  IN: 200 mL / OUT: 700 mL / NET: -500 mL        RADIOLOGY & ADDITIONAL STUDIES:  Imaging reviewed   < from: Xray Chest 1 View- PORTABLE-Urgent (Xray Chest 1 View- PORTABLE-Urgent .) (24 @ 17:02) >    ACC: [id number] EXAM:  XR CHEST PORTABLE URGENT 1V   ORDERED BY: NIDHI PEREYRA     PROCEDURE DATE:  2024          INTERPRETATION:  AP erect chest on 2024 at 4:24 PM. Patient   has abnormal chest sounds. Elevated right hemidiaphragm again noted.   Heart magnified by technique.    Endotracheal tube and nasogastric tube present on  had been   removed.    There are persistently slightly increased markings in the retrocardiac   area similar to prior.    IMPRESSION: Persistent slight increased markings retrocardiac area. Tubes   removed.    --- End of Report ---            PHIL NICHOLE MD; Attending Radiologist  This document has been electronically signed. Sep 26 2024  5:15PM    < end of copied text >      < from: CT Angio Chest PE Protocol w/ IV Cont (24 @ 03:23) >  deformities of the L1 and L2 vertebral bodies.    IMPRESSION:  1. Limited evaluation of the segmental and subsegmental pulmonary  arteries secondary to motion artifact. No pulmonary embolism in the main   or lobar pulmonary arteries.  2. Redemonstrated elevated right hemidiaphragm with complete atelectasis   of the right middle lobe and partial atelectasis of the right lower lobe.  3. No bowel obstruction or inflammation.        --- End of Report ---    < end of copied text >          ADVANCE DIRECTIVES/TREATMENT PREFERENCES:  Currently Full code

## 2024-09-27 NOTE — PROGRESS NOTE ADULT - ASSESSMENT
75 y/o F with a h/o CAD (s/p PCI), HFpEF, HTN, HLD, COPD (on home O2 PRN), recent admission for chest pain s/p LHC with nonobstructive CAD- discharged home 9/19, now readmitted on 9/22 with complaints abd pain nausea vomiting , hyponatremia, and acute hypercapnic respiratory failure    Acute on chronic hypercapnic respiratory failure 2/2 to COPD exacerbation  - baseline 4L NC  - currently on 6L NC  - improved abg  - cont to monitor   - cont po steroids   - pulmonary following     History of CAd s/p PCI  HFpEF  - cont home meds aspirin, carvedilol, statin    Hyponatremia  History of chronic SIADH  Hyperkalemia - resolved  - monitor      - h/o SIADH in the past     DVT: heparin ppx   Dispo: medically active   GOC - full code but patient does not want NIV

## 2024-09-27 NOTE — CONSULT NOTE ADULT - ASSESSMENT
74 F with PMHX CAD , HFpEF, HTN, HLD, COPD (on O2 prn), recent admission for NSTEMI (s/p [facility] without intervention), discharged 9/19 presents to the ED on 09/22 with fever, right upper abdominal pain, lower abdominal pain, nausea, vomiting, and dyspnea. Initially abdmitted to the floor with hyponatremia and prerenal MADHURI. Was consulted 09/23 during rapid response as patient was obtunded with agonal breathing. ABG showed severe hypercapnia/respiratory acidosis. Was emergently intubated. Extubated on 09/24. On 09/26, patient refused bipap. ABG demonstrated respiratory acidosis (pH 7.2, CO2 73, O2 117, HCO3 28). One hour after on bipap, pH 7.28, CO2 57. Condition since improved afterwards     09/27 21:00: RRT called for increased lethargy, poor mentation, and abdominal breathing. As per nurse, pt was given 0.5mg Xanax (usual dose 1mg). Was noted to be AOx3 prior to this. ABG with respiratory acidosis with pH 7.050, CO2 108, O2 85, HCO3 30. Was started on AVAPS. ICU consulted.    Acute on chronic hypercapnic respiratory failure   AMS   74 F with PMHX CAD , HFpEF, HTN, HLD, COPD (on O2 prn), recent admission for NSTEMI (s/p [facility] without intervention), discharged 9/19 presents to the ED on 09/22 with fever, right upper abdominal pain, lower abdominal pain, nausea, vomiting, and dyspnea. Initially abdmitted to the floor with hyponatremia and prerenal MADHURI. MICU consulted 09/23 during rapid response as patient was obtunded with agonal breathing. ABG showed severe hypercapnia/respiratory acidosis. Was emergently intubated and brought into MICU. Extubated on 09/24. On 09/26, patient refused bipap on the floor. ABG demonstrated respiratory acidosis (pH 7.2, CO2 73, O2 117, HCO3 28). One hour after on bipap, pH 7.28, CO2 57. Condition since improved afterwards     09/27 21:00: RRT called for increased lethargy, poor mentation, and abdominal breathing. As per nurse, pt was given 0.5mg Xanax (usual dose 1mg). Was noted to be AOx3 prior to this. ABG with respiratory acidosis with pH 7.050, CO2 108, O2 85, HCO3 30. Was started on AVAPS. ICU consulted.    Acute on chronic hypercapnic respiratory failure   AMS   74 F with PMHX CAD , HFpEF, HTN, HLD, COPD (on O2 prn), recent admission for NSTEMI (s/p [facility] without intervention), discharged 9/19 presents to the ED on 09/22 with fever, right upper abdominal pain, lower abdominal pain, nausea, vomiting, and dyspnea. Initially abdmitted to the floor with hyponatremia and prerenal MADHURI. MICU consulted 09/23 during rapid response as patient was obtunded with agonal breathing. ABG showed severe hypercapnia/respiratory acidosis. Was emergently intubated and brought into MICU. Extubated on 09/24. On 09/26, patient refused bipap on the floor. ABG demonstrated respiratory acidosis (pH 7.2, CO2 73, O2 117, HCO3 28). One hour after on bipap, pH 7.28, CO2 57. Condition since improved afterwards     09/27 21:00: RRT called for increased lethargy, poor mentation, and abdominal breathing. As per nurse, pt was given 0.5mg Xanax (usual dose 1mg). Was noted to be AOx3 prior to this. ABG with respiratory acidosis with pH 7.050, CO2 108, O2 85, HCO3 30. Was started on AVAPS. ICU consulted.    Acute on chronic hypercapnic respiratory failure   AMS    - 2 hours post AVAPs ABG improved. Mentation improved.  - Continue with AVAPs tonight. Decreased TV to 500. Can wean to NC as tolerated in the AM.  - Maintain O2> 92%.  - Continue with duonebs and advair.   - Continue with methylprednisolone.  - Pulmonology following.  - ICU level of care not required at this time. Please re-consult if condition worsens.       Case discussed with MICU attending, Dr. Yakov Flowers MD.    Time spent on this patient encounter, which includes documenting this note in the electronic medical record, was 53 minutes including assessing the presenting problems with associated risks, reviewing the medical record to prepare for the encounter, and meeting face to face with patient to obtain additional history. I have also performed an appropriate physical exam, made interventions listed and ordered and interpreted appropriate diagnostic studies as documented. To improve communication and patient safety, I have coordinated care with the multidisciplinary team including the bedside nurse, appropriate attending of record and consultants as needed.

## 2024-09-27 NOTE — PROGRESS NOTE ADULT - SUBJECTIVE AND OBJECTIVE BOX
PULMONARY PROGRESS NOTE      TULIO SUGGS  MRN-[id number]    Patient is a 74y old  Female who presents with a chief complaint of resp failure (27 Sep 2024 13:35)        INTERVAL HPI/OVERNIGHT EVENTS:  Pt seen and examined at the bedside. Denies worsening cough or fevers or chills.     MEDICATIONS  (STANDING):  albuterol/ipratropium for Nebulization 3 milliLiter(s) Nebulizer every 6 hours  ALPRAZolam 0.5 milliGRAM(s) Oral two times a day  aspirin  chewable 81 milliGRAM(s) Oral daily  atorvastatin 40 milliGRAM(s) Oral at bedtime  carvedilol 3.125 milliGRAM(s) Oral every 12 hours  chlorhexidine 2% Cloths 1 Application(s) Topical daily  dextrose 5%. 1000 milliLiter(s) (100 mL/Hr) IV Continuous <Continuous>  dextrose 5%. 1000 milliLiter(s) (50 mL/Hr) IV Continuous <Continuous>  dextrose 50% Injectable 12.5 Gram(s) IV Push once  dextrose 50% Injectable 25 Gram(s) IV Push once  dextrose 50% Injectable 25 Gram(s) IV Push once  dextrose Oral Gel 15 Gram(s) Oral once  fluticasone propionate/ salmeterol 500-50 MICROgram(s) Diskus 1 Dose(s) Inhalation two times a day  gabapentin 300 milliGRAM(s) Oral daily  glucagon  Injectable 1 milliGRAM(s) IntraMuscular once  heparin   Injectable 5000 Unit(s) SubCutaneous every 8 hours  methylPREDNISolone sodium succinate Injectable 40 milliGRAM(s) IV Push every 8 hours  pantoprazole  Injectable 40 milliGRAM(s) IV Push daily  piperacillin/tazobactam IVPB.. 3.375 Gram(s) IV Intermittent every 8 hours  polyethylene glycol 3350 17 Gram(s) Oral daily  senna 2 Tablet(s) Oral at bedtime  sodium chloride 1 Gram(s) Oral three times a day  tiotropium 2.5 MICROgram(s) Inhaler 2 Puff(s) Inhalation daily  urea Oral Powder 15 Gram(s) Oral daily  venlafaxine  milliGRAM(s) Oral daily  zolpidem 5 milliGRAM(s) Oral at bedtime    MEDICATIONS  (PRN):  acetaminophen     Tablet .. 650 milliGRAM(s) Oral every 6 hours PRN Temp greater or equal to 38C (100.4F), Mild Pain (1 - 3), Moderate Pain (4 - 6)  bisacodyl 5 milliGRAM(s) Oral every 12 hours PRN Constipation  ondansetron Injectable 4 milliGRAM(s) IV Push four times a day PRN Nausea and/or Vomiting    Allergies    No Known Allergies    Intolerances      PAST MEDICAL & SURGICAL HISTORY:  NICM (nonischemic cardiomyopathy)      HTN (hypertension)      Rheumatoid arthritis      Chronic back pain      HLD (hyperlipidemia)      Chronic obstructive asthma      Cellulitis      S/P       S/P hysterectomy            REVIEW OF SYSTEMS:  Negative except as noted in HPI      Vital Signs Last 24 Hrs  T(C): 36.6 (27 Sep 2024 07:38), Max: 36.9 (26 Sep 2024 20:49)  T(F): 97.9 (27 Sep 2024 07:38), Max: 98.5 (26 Sep 2024 20:49)  HR: 108 (27 Sep 2024 14:00) (77 - 115)  BP: 124/50 (27 Sep 2024 14:00) (117/96 - 159/88)  BP(mean): 70 (27 Sep 2024 14:00) (70 - 108)  RR: 26 (27 Sep 2024 14:00) (18 - 26)  SpO2: 95% (27 Sep 2024 14:00) (91% - 100%)    Parameters below as of 27 Sep 2024 14:00  Patient On (Oxygen Delivery Method): nasal cannula  O2 Flow (L/min): 4        PHYSICAL EXAMINATION:  GEN: In no apparent distress  HEENT: NC/AT  NECK: Supple, non-tender  CV: +S1, S2  RESPIRATORY: CTA B/L, good inspiratory effort, non-labored breathing  ABDOMEN: Soft, non-tender  EXTREMITIES: No pedal edema B/L  SKIN: No open wounds  PSYCH: Normal affect      LABS:                        12.9   10.06 )-----------( 472      ( 27 Sep 2024 05:32 )             38.3     09-27    129[L]  |  89[L]  |  26.2[H]  ----------------------------<  168[H]  4.4   |  25.0  |  0.69    Ca    8.8      27 Sep 2024 05:32  Phos  2.3     -27  Mg     2.0     09-27    TPro  6.9  /  Alb  3.6  /  TBili  0.2[L]  /  DBili  x   /  AST  29  /  ALT  16  /  AlkPhos  77        Urinalysis Basic - ( 27 Sep 2024 05:32 )    Color: x / Appearance: x / SG: x / pH: x  Gluc: 168 mg/dL / Ketone: x  / Bili: x / Urobili: x   Blood: x / Protein: x / Nitrite: x   Leuk Esterase: x / RBC: x / WBC x   Sq Epi: x / Non Sq Epi: x / Bacteria: x      ABG - ( 27 Sep 2024 04:29 )  pH, Arterial: 7.410 pH, Blood: x     /  pCO2: 41    /  pO2: 155   / HCO3: 26    / Base Excess: 1.4   /  SaO2: 100.0                           MICROBIOLOGY:  Legionella pneumophila Antigen, Urine (24 @ 13:55)    Legionella Antigen, Urine: Negative: Positive Testing method: Immunochromatographic Assay.  Presumptive detection of L. pneumophila serogroup 1 antigen in urine,  suggesting recent or current infection. Order "Culture -Legionella" as  recommended to confirm infection.  Negative Testing method: Immunochromatographic Assay.  L. pneumophila serogroup 1 antigen in urine NOT detected, suggesting NO  recent or current infection. Infection due to Legionella cannot be ruled  out: other serogroups and species may cause disease, antigen may not be  present in urine in early infection, or the level of antigens in urine  may be below the detection limit of the test. Order "Culture -Legionella"  is recommended for uncommon cases of suspected Legionella pneumonia due  to organisms other than L. pneumophila serogroup 1.        RADIOLOGY & ADDITIONAL STUDIES:  < from: Xray Chest 1 View- PORTABLE-Urgent (Xray Chest 1 View- PORTABLE-Urgent .) (24 @ 17:02) >  IMPRESSION: Persistent slight increased markings retrocardiac area. Tubes   removed.    --- End of Report ---            PHIL NICHOLE MD; Attending Radiologist  This document has been electronically signed. Sep 26 2024  5:15PM    < end of copied text >      ECHO:  < from: TTE W or WO Ultrasound Enhancing Agent (24 @ 11:47) >  _______________________________________________________________________________________     CONCLUSIONS:      1. Left ventricular cavity is normal in size. Left ventricular wall thickness is mildly increased. Left ventricular systolic function is normal with an ejection fraction of 56 % by Mace's method of disks.   2. There is mild (grade 1) left ventricular diastolic dysfunction.   3. Normal right ventricular cavity size and normal right ventricular systolic function.   4. Trileaflet aortic valve with normal systolic excursion. Fibrocalcific aortic valve sclerosis without stenosis.   5. Trace aortic regurgitation.   6. There is mild calcification of the mitral valve annulus.   7. Structurally normal mitral valve with normal leaflet excursion.   8. Mild mitral regurgitation.   9. Mild tricuspid regurgitation.  10. Aortic root at the sinuses of Valsalva is normal in size, measuring 2.70 cm (indexed 1.62 cm/m²). Ascending aorta is normal in size, measuring 2.60 cm (indexed 1.56 cm/m²).  11. Left atrium is normal in size.  12. The right atrium is normal in size.  13. Estimated pulmonaryartery systolic pressure is 33 mmHg.    ________________________________________________________________________________________    < end of copied text >

## 2024-09-27 NOTE — PROGRESS NOTE ADULT - SUBJECTIVE AND OBJECTIVE BOX
Blue Garcia MD  [facility]  Contact via Teams or text/call at [phone]    Patient is a 74y old  Female who presents with a chief complaint of Resp Distress (26 Sep 2024 12:29)    Feels better.      Patient seen and examined at bedside. No overnight events reported.     ALLERGIES:  No Known Allergies    MEDICATIONS  (STANDING):  albuterol/ipratropium for Nebulization 3 milliLiter(s) Nebulizer every 6 hours  ALPRAZolam 0.5 milliGRAM(s) Oral two times a day  aspirin  chewable 81 milliGRAM(s) Oral daily  atorvastatin 40 milliGRAM(s) Oral at bedtime  carvedilol 3.125 milliGRAM(s) Oral every 12 hours  chlorhexidine 2% Cloths 1 Application(s) Topical daily  dextrose 5%. 1000 milliLiter(s) (100 mL/Hr) IV Continuous <Continuous>  dextrose 5%. 1000 milliLiter(s) (50 mL/Hr) IV Continuous <Continuous>  dextrose 50% Injectable 12.5 Gram(s) IV Push once  dextrose 50% Injectable 25 Gram(s) IV Push once  dextrose 50% Injectable 25 Gram(s) IV Push once  dextrose Oral Gel 15 Gram(s) Oral once  fluticasone propionate/ salmeterol 500-50 MICROgram(s) Diskus 1 Dose(s) Inhalation two times a day  gabapentin 300 milliGRAM(s) Oral daily  glucagon  Injectable 1 milliGRAM(s) IntraMuscular once  heparin   Injectable 5000 Unit(s) SubCutaneous every 8 hours  methylPREDNISolone sodium succinate Injectable 40 milliGRAM(s) IV Push every 8 hours  pantoprazole  Injectable 40 milliGRAM(s) IV Push daily  piperacillin/tazobactam IVPB.. 3.375 Gram(s) IV Intermittent every 8 hours  polyethylene glycol 3350 17 Gram(s) Oral daily  senna 2 Tablet(s) Oral at bedtime  sodium chloride 1 Gram(s) Oral three times a day  tiotropium 2.5 MICROgram(s) Inhaler 2 Puff(s) Inhalation daily  urea Oral Powder 15 Gram(s) Oral daily  venlafaxine  milliGRAM(s) Oral daily  zolpidem 5 milliGRAM(s) Oral at bedtime    MEDICATIONS  (PRN):  acetaminophen     Tablet .. 650 milliGRAM(s) Oral every 6 hours PRN Temp greater or equal to 38C (100.4F), Mild Pain (1 - 3), Moderate Pain (4 - 6)  bisacodyl 5 milliGRAM(s) Oral every 12 hours PRN Constipation  ondansetron Injectable 4 milliGRAM(s) IV Push four times a day PRN Nausea and/or Vomiting    Vital Signs Last 24 Hrs  T(F): 97.9 (27 Sep 2024 07:38), Max: 98.5 (26 Sep 2024 20:49)  HR: 111 (27 Sep 2024 12:00) (77 - 115)  BP: 130/93 (27 Sep 2024 12:00) (117/96 - 159/88)  RR: 25 (27 Sep 2024 12:00) (18 - 26)  SpO2: 96% (27 Sep 2024 12:00) (91% - 100%)  I&O's Summary    26 Sep 2024 07:01  -  27 Sep 2024 07:00  --------------------------------------------------------  IN: 200 mL / OUT: 700 mL / NET: -500 mL    27 Sep 2024 07:01  -  27 Sep 2024 13:36  --------------------------------------------------------  IN: 100 mL / OUT: 0 mL / NET: 100 mL    PHYSICAL EXAM:  General: NAD, A/O x 3  ENT: No gross hearing impairment, Moist mucous membranes, no thrush  Neck: Supple, No JVD  Lungs: diminished bilaterally, good air entry, non-labored breathing  Cardio: RRR, S1/S2, No murmur  Abdomen: Soft, Nontender, Nondistended; Bowel sounds present  Extremities: No calf tenderness, No cyanosis, No pitting edema  Psych: Appropriate mood and affect    LABS:                        12.9   10.06 )-----------( 472      ( 27 Sep 2024 05:32 )             38.3     09-27    129  |  89  |  26.2  ----------------------------<  168  4.4   |  25.0  |  0.69    Ca    8.8      27 Sep 2024 05:32  Phos  2.3     09-27  Mg     2.0     09-27    TPro  6.9  /  Alb  3.6  /  TBili  0.2  /  DBili  x   /  AST  29  /  ALT  16  /  AlkPhos  77  09-27    ABG - ( 27 Sep 2024 04:29 )  pH, Arterial: 7.410 pH, Blood: x     /  pCO2: 41    /  pO2: 155   / HCO3: 26    / Base Excess: 1.4   /  SaO2: 100.0     POCT Blood Glucose.: 133 mg/dL (27 Sep 2024 02:12)  POCT Blood Glucose.: 144 mg/dL (26 Sep 2024 20:07)  POCT Blood Glucose.: 70 mg/dL (26 Sep 2024 17:43)  POCT Blood Glucose.: 66 mg/dL (26 Sep 2024 17:06)  POCT Blood Glucose.: 65 mg/dL (26 Sep 2024 17:00)    Urinalysis Basic - ( 27 Sep 2024 05:32 )    Color: x / Appearance: x / SG: x / pH: x  Gluc: 168 mg/dL / Ketone: x  / Bili: x / Urobili: x   Blood: x / Protein: x / Nitrite: x   Leuk Esterase: x / RBC: x / WBC x   Sq Epi: x / Non Sq Epi: x / Bacteria: x    Culture - Blood (collected 21 Sep 2024 22:00)  Source: .Blood Blood-Peripheral  Final Report (27 Sep 2024 06:00):    No growth at 5 days    Culture - Urine (collected 21 Sep 2024 21:55)  Source: Clean Catch Clean Catch (Midstream)  Final Report (23 Sep 2024 09:31):    No growth    RADIOLOGY & ADDITIONAL TESTS:    Care Discussed with Consultants/Other Providers:

## 2024-09-27 NOTE — RAPID RESPONSE TEAM SUMMARY - NSADDTLFINDINGSRRT_GEN_ALL_CORE
ROS  Unable to obtain due to mental status    Vital Signs  HR: 112  BP: 127/84  RR: 30  SpO2: 96% (6L)    Physical Exam   RESP: Tachypneic, abdominal breathing  CV: Mild tachycardia, regular rhythm; no peripheral edema  GI: Distended, non-tender  SKIN: Cool, pale gums  NEURO: Minimally responsive to painful stimuli

## 2024-09-27 NOTE — CHART NOTE - NSCHARTNOTEFT_GEN_A_CORE
Search Terms: Umm Simpson, 1950Search Date: 09/27/2024 10:47:51 AM   Reference #: [id number]    Patient Demographic Information (PDI)       PDI	First Name	Last Name	Birth Date	Gender	Street Address	City	State	Zip Code  A	Umm Simpson	1950	Female	[address]	[address]    Prescription Information      PDI Filter:    PDI	Current Rx	Drug Type	Rx Written	Rx Dispensed	Drug	Quantity	Days Supply	Prescriber Name	Prescriber NICOLE #	Payment Method	Dispenser  A	Y		09/04/2024	09/07/2024	zolpidem tartrate 10 mg tablet	30	30	HernandesIlan mckenna	[id number]	Medicare	[id number]  A	Y	B	09/04/2024	09/07/2024	alprazolam 0.5 mg tablet	60	30	HernandesIlan mckenna	[id number]	Medicare	[id number]  A	Y	O	08/22/2024	08/29/2024	morphine sulfate ir 15 mg tab	60	30	Rashmi Solano M, PA	[id number]	Medicare	[id number]  A	N	O	08/09/2024	08/17/2024	morphine sulfate ir 15 mg tab	14	7	Rashmi Solano M PA	[id number]	Medicare	[id number]  A	N	B	08/05/2024	08/06/2024	alprazolam 0.5 mg tablet	60	30	HernandesIlan mckenna	[id number]	Medicare	[id number]  A	N		08/05/2024	08/06/2024	zolpidem tartrate 10 mg tablet	30	30	HernandesIlan mckenna	[id number]	Medicare	[id number]  A	N	O	07/22/2024	07/29/2024	oxycodone-acetaminophen  mg tab	90	30	Rashmi Taylor	[id number]	Medicare	[id number]  A	N		06/13/2024	07/03/2024	zolpidem tartrate 10 mg tablet	30	30	HernandesIlan mckenna	[id number]	Medicare	[id number]  A	N	B	06/13/2024	07/01/2024	alprazolam 0.5 mg tablet	60	30	HernandesIlan mckenna	[id number]	Medicare	[id number]  A	N	O	06/13/2024	06/18/2024	oxycodone-acetaminophen  mg tab	90	30	Rashmi Solano M, PA	[id number]	[id number]  A	N		05/16/2024	06/06/2024	zolpidem tartrate 10 mg tablet	30	30	Hernandes, Ilan	[id number]	Medicare	[id number]  A	N	B	05/16/2024	05/25/2024	alprazolam 0.5 mg tablet	60	30	Hernandes, Ilan	[id number]	Medicare	[id number]  A	N	O	04/30/2024	05/21/2024	oxycodone-acetaminophen  mg tab	90	30	Rashmi Solano M, PA	[id number]	[id number]  A	N		04/18/2024	04/29/2024	zolpidem tartrate 10 mg tablet	30	30	Hernandes, Ilan	[id number]	Medicare	[id number]  A	N	B	04/18/2024	04/26/2024	alprazolam 0.5 mg tablet	60	30	[id number]	Medicare	[id number]  A	N	O	04/18/2024	04/24/2024	oxycodone-acetaminophen  mg tab	90	30	Rashmi Solano M, PA	[id number]	[id number]  A	N	B	03/28/2024	03/29/2024	alprazolam 0.5 mg tablet	60	30	HernandesIlan mckenna	[id number]	Medicare	[id number]  A	N		03/28/2024	03/29/2024	zolpidem tartrate 10 mg tablet	30	30	HernandesIlan	[id number]	Medicare	[id number]  A	N	O	02/27/2024	02/29/2024	oxycodone-acetaminophen  mg tab	90	30	Rashmi Taylor	[id number]	[id number]  A	N	B	02/15/2024	02/23/2024	alprazolam 0.5 mg tablet	60	30	HernandesIlan	[id number]	Medicare	[id number]  A	N		02/15/2024	02/23/2024	zolpidem tartrate 10 mg tablet	30	30	HernandesIlan mckenna	[id number]	Medicare	[id number]  A	N	B	01/25/2024	01/25/2024	alprazolam 0.5 mg tablet	60	30	Tunde Landaverde MD	[id number]	Medicare	[id number]  A	N		01/15/2024	01/23/2024	zolpidem tartrate 10 mg tablet	30	30	Tunde Landaverde MD	[id number]	Medicare	[id number]  A	N	O	12/26/2023	12/30/2023	oxycodone-acetaminophen  mg tab	90	30	Mikey Blackmon	[id number]	[id number]  A	N		12/20/2023	12/26/2023	zolpidem tartrate 10 mg tablet	30	30	HernandesIlan mckenna	[id number]	Medicare	[id number]  A	N	B	12/20/2023	12/26/2023	alprazolam 0.5 mg tablet	60	30	Ilan Hernandes	[id number]	Medicare	[id number]  A	N	O	12/04/2023	12/05/2023	oxycodone-acetaminophen  mg tab	21	7	Rashmi Taylor	[id number]	[id number]  A	N		11/24/2023	11/25/2023	zolpidem tartrate 10 mg tablet	30	30	Ilan Hernandes	[id number]	Medicare	[id number]  A	N	B	11/24/2023	11/25/2023	alprazolam 0.5 mg tablet	60	30	Ilan Hernandes	[id number]	Medicare	[id number]  A	N	O	11/10/2023	11/14/2023	oxycodone-acetaminophen  mg tab	21	7	Rashmi Taylor	[id number]	[id number]  A	N		10/25/2023	10/28/2023	zolpidem tartrate 10 mg tablet	30	30	Tunde Landaverde MD	[id number]	Medicare	[id number]  A	N	B	10/25/2023	10/28/2023	alprazolam 0.5 mg tablet	60	30	Tunde Landaverde MD	[id number]	Medicare	[id number]  A	N	O	10/18/2023	10/23/2023	oxycodone-acetaminophen  mg tab	21	7	Rashmi Taylor	[id number]	[id number]  A	N		10/02/2023	10/05/2023	zolpidem tartrate 5 mg tablet	30	30	Tunde Landaverde MD	[id number]	Medicare	[id number]  A	N	B	10/02/2023	10/05/2023	alprazolam 0.25 mg tablet	60	30	Tunde Landaverde MD	[id number]	Medicare	[id number]    * - Details of Drug Type : O = Opioid, B = Benzodiazepine, S = Stimulant    * - Drugs marked with an asterisk are compound drugs. If the compound drug is made up of more than one controlled substance, then each controlled substance will be a separate row in the table.

## 2024-09-27 NOTE — RAPID RESPONSE TEAM SUMMARY - NSSITUATIONBACKGROUNDRRT_GEN_ALL_CORE
RRT called for increased lethargy.  On arrival to RRT patient tachypneic, abdominal breathing, minimally responsive to painful stimuli. At baseline patient is A/Ox4 and was last seen at her baseline around 8pm. Blood glucose 198, SpO2 96% on 6L nc.  Patient is a 75 y/o F with a h/o CAD (s/p PCI), HFpEF, HTN, HLD, COPD admitted for acute on chronic hypercapnic respiratory failure 2/2 COPD exacerbation. Patient has been refusing bipap during this admission. Last ABG was performed 9/27 at 4:30 and showed pH 7.41/ pCO2 41/ pO2 155/ HCO3 26. During RRT pt placed back on bipap, CXR/ ABG and labs (CBC/CMP) ordered. Patient not taking enough tidal volume and bipap changed to avaps. ABG showed pH 7.05/ pCO2 108/ pO2 85/ HCO3 30. ICU consulted and recommended to keep patient on AVAPS and repeat ABG in 2 hours.  RRT called for increased lethargy.  On arrival to RRT patient tachypneic, abdominal breathing, minimally responsive to painful stimuli. At baseline patient is A/Ox4 and was last seen at her baseline around 8pm. Blood glucose 198, SpO2 96% on 6L nc.  Patient is a 73 y/o F with a h/o CAD (s/p PCI), HFpEF, HTN, HLD, COPD admitted for acute on chronic hypercapnic respiratory failure 2/2 COPD exacerbation. Of note the patient received Xanax 0.5mg around 18:00. Also, the patient has been refusing bipap during this admission. Last ABG was performed 9/27 at 4:30 and showed pH 7.41/ pCO2 41/ pO2 155/ HCO3 26. During RRT pt placed back on bipap, CXR/ ABG and labs (CBC/CMP) ordered. Patient not taking enough tidal volume and bipap changed to avaps. ABG showed pH 7.05/ pCO2 108/ pO2 85/ HCO3 30. CXR without acute findings compared to prior CXR. ICU consulted and recommended to keep patient on AVAPS and repeat ABG in 2 hours.

## 2024-09-27 NOTE — CONSULT NOTE ADULT - TIME BILLING
Time spent with review of chart documents, labs, imaging. Direct patient assessment,  formulation of care plan. Discussion with  Interdisciplinary  team
Labs/Imaging/Notes reviewed. Med rec confirmed. Orders placed. Case discussed multiple times with ED Providers overnight. Will follow along in consultation.
greater than 50% of time spent reviewing labs, notes, orders and radiographs, coordinating care  discussed with p[t, family, med team

## 2024-09-27 NOTE — PROGRESS NOTE ADULT - ASSESSMENT
Asthma  Severe R-hemidiaphragm elevation; diaphragm paralysis  Hyponatremia - improved; s/p ICU  Hypoxic respiratory failure    Recommendations:  - Unfortunately pt is refusing to use NIPPV; would keep at bedside should there be any acute worsening of symptoms  - Would complete 7d course of ABx; currently on Zosyn  - Can taper SoluMedrol to 4mg IV Q12H  - C/w PRN nebs  - Discuss GOC  - Monitor O2 requirements  - Will continue to follow      Dale Wynn MD, [facility]  , Pulmonary & Critical Care Medicine  [facility]  Pulmonary and Sleep Medicine at [place]  [address]  [place] [address]  T: [phone]  F: [phone] Asthma  Severe R-hemidiaphragm elevation; diaphragm paralysis  Hyponatremia - improved; s/p ICU  Hypoxic respiratory failure    Recommendations:  - Unfortunately pt is refusing to use NIPPV; would keep at bedside should there be any acute worsening of symptoms  - Would complete 7d course of ABx; currently on Zosyn  - Can taper SoluMedrol to 40mg IV Q12H  - C/w PRN nebs  - Discuss GOC  - Monitor O2 requirements  - Will continue to follow      Dale Wynn MD, [facility]  , Pulmonary & Critical Care Medicine  [facility]  Pulmonary and Sleep Medicine at [place]  [address]  [place], [place] [address]  T: [phone]  F: [phone]

## 2024-09-27 NOTE — PROGRESS NOTE ADULT - SUBJECTIVE AND OBJECTIVE BOX
NEPHROLOGY INTERVAL HPI/OVERNIGHT EVENTS:    Examined earlier  No distress    MEDICATIONS  (STANDING):  albuterol/ipratropium for Nebulization 3 milliLiter(s) Nebulizer every 6 hours  ALPRAZolam 0.5 milliGRAM(s) Oral two times a day  aspirin  chewable 81 milliGRAM(s) Oral daily  atorvastatin 40 milliGRAM(s) Oral at bedtime  carvedilol 3.125 milliGRAM(s) Oral every 12 hours  chlorhexidine 2% Cloths 1 Application(s) Topical daily  dextrose 5%. 1000 milliLiter(s) (100 mL/Hr) IV Continuous <Continuous>  dextrose 5%. 1000 milliLiter(s) (50 mL/Hr) IV Continuous <Continuous>  dextrose 50% Injectable 12.5 Gram(s) IV Push once  dextrose 50% Injectable 25 Gram(s) IV Push once  dextrose 50% Injectable 25 Gram(s) IV Push once  dextrose Oral Gel 15 Gram(s) Oral once  fluticasone propionate/ salmeterol 500-50 MICROgram(s) Diskus 1 Dose(s) Inhalation two times a day  gabapentin 300 milliGRAM(s) Oral daily  glucagon  Injectable 1 milliGRAM(s) IntraMuscular once  heparin   Injectable 5000 Unit(s) SubCutaneous every 8 hours  methylPREDNISolone sodium succinate Injectable 40 milliGRAM(s) IV Push every 8 hours  pantoprazole  Injectable 40 milliGRAM(s) IV Push daily  piperacillin/tazobactam IVPB.. 3.375 Gram(s) IV Intermittent every 8 hours  polyethylene glycol 3350 17 Gram(s) Oral daily  senna 2 Tablet(s) Oral at bedtime  sodium chloride 1 Gram(s) Oral three times a day  tiotropium 2.5 MICROgram(s) Inhaler 2 Puff(s) Inhalation daily  urea Oral Powder 15 Gram(s) Oral daily  venlafaxine  milliGRAM(s) Oral daily  zolpidem 5 milliGRAM(s) Oral at bedtime    MEDICATIONS  (PRN):  acetaminophen     Tablet .. 650 milliGRAM(s) Oral every 6 hours PRN Temp greater or equal to 38C (100.4F), Mild Pain (1 - 3), Moderate Pain (4 - 6)  bisacodyl 5 milliGRAM(s) Oral every 12 hours PRN Constipation  ondansetron Injectable 4 milliGRAM(s) IV Push four times a day PRN Nausea and/or Vomiting      Allergies    No Known Allergies    Intolerances        Vital Signs Last 24 Hrs  T(C): 36.6 (27 Sep 2024 07:38), Max: 36.9 (26 Sep 2024 20:49)  T(F): 97.9 (27 Sep 2024 07:38), Max: 98.5 (26 Sep 2024 20:49)  HR: 111 (27 Sep 2024 12:00) (77 - 115)  BP: 130/93 (27 Sep 2024 12:00) (117/96 - 159/88)  BP(mean): 104 (27 Sep 2024 12:00) (77 - 108)  RR: 25 (27 Sep 2024 12:00) (18 - 26)  SpO2: 96% (27 Sep 2024 12:00) (91% - 100%)    Parameters below as of 27 Sep 2024 12:00  Patient On (Oxygen Delivery Method): nasal cannula  O2 Flow (L/min): 4      PHYSICAL EXAM:  GENERAL: Frail, lethargic  HEAD: BiPAP mask  NECK: Supple, No JVD  NERVOUS SYSTEM: Alert & Oriented X3  CHEST/LUNG: Clear bilaterally  HEART: Regular rate and rhythm; No rub  ABDOMEN: Soft, Nontender, +BS  EXTREMITIES: Tr dependent edema    LABS:                        12.9   10.06 )-----------( 472      ( 27 Sep 2024 05:32 )             38.3     09-27    129[L]  |  89[L]  |  26.2[H]  ----------------------------<  168[H]  4.4   |  25.0  |  0.69    Ca    8.8      27 Sep 2024 05:32  Phos  2.3     09-27  Mg     2.0     09-27    TPro  6.9  /  Alb  3.6  /  TBili  0.2[L]  /  DBili  x   /  AST  29  /  ALT  16  /  AlkPhos  77  09-27      Urinalysis Basic - ( 27 Sep 2024 05:32 )    Color: x / Appearance: x / SG: x / pH: x  Gluc: 168 mg/dL / Ketone: x  / Bili: x / Urobili: x   Blood: x / Protein: x / Nitrite: x   Leuk Esterase: x / RBC: x / WBC x   Sq Epi: x / Non Sq Epi: x / Bacteria: x      Magnesium: 2.0 mg/dL (09-27 @ 05:32)  Phosphorus: 2.3 mg/dL (09-27 @ 05:32)    ABG - ( 27 Sep 2024 04:29 )  pH, Arterial: 7.410 pH, Blood: x     /  pCO2: 41    /  pO2: 155   / HCO3: 26    / Base Excess: 1.4   /  SaO2: 100.0                 RADIOLOGY & ADDITIONAL TESTS:

## 2024-09-27 NOTE — CONSULT NOTE ADULT - ASSESSMENT
74yr woman xh CAD/PCI, HFpEF, COPD on home O2 recent admission for CP with non obstructive CAD on [facility] readmitted with acute on chronic respiratory failure     Acute on Chronic Respiratory Failure  COPD  cont O2 support  via nasal canula  Patient does not want Bipap  Pulmonary following  storids    Chronic Hyponatremia  improved 129  Nephrology following    Encounter for Palliative Care  Palliative Care consulted to assist with goals of care.   Noted Dr. Bell's GOC 9/26 - patient wishes CPR and Intubation but No Bipap  Patient states her HCP is her   Patient affirms how she does not want the Bipap and would be agreeable to be placed on a ventilator  Further discussions curtailed as patient desaturates when talking.    Patient would need to understand if she is intubated, likely will be difficult for her to wean off given baseline chronic respiratory failure.    Plan for further GOC when patient little more stable.

## 2024-09-27 NOTE — CONSULT NOTE ADULT - NS PANP COMMENT GEN_ALL_CORE FT
74F with hx of CAD s/p PCI, HTN, HLD, COPD not on home O2, HFpEF, recent admission 9/17-9/19 for NSTEMI and underwent cardiac cath with nonobstructive CAD presented to the ED 9/21 for abdominal pain w/ episode of AMS 74F never smoker with hx of CAD s/p PCI, HTN, HLD, asthma, HFpEF, recent admission 9/17-9/19 for NSTEMI and underwent cardiac cath with nonobstructive CAD presented to the ED 9/21 for abdominal pain w/ episode of AMS/aphasia prompting stroke code with negative CT head/CT neck angio. Pt also found to be febrile on started on empiric zosyn.  CT chest angio negative for PE within limits w/ R elevated hemidiaphragm with associated RML and partial RLL atelectasis. Pt also noted with acute on chronic hyponatremia treated previously with 2%. Developed an episode of acute AMS 9/23 with ABG 7.05/110/86 and was emergently intubated. Repeat CT head neg for acute pathology. Pt treated with empiric steroids and diuresis and subsequently extubated 9/24 and downgraded to GMF. Pt was continued on empiric Bipap for diaphragm dysfunction but was poorly compliant. RRT called overnight as pt was noted to be altered and tachypneic with repeat ABG 7.05/108/85. Pt was started on Bipap but given poor volumes switched to AVAPs with improvement. Pt initially seen shortly following initiation of AVAPs and was lethargic but arousable to painful stimuli and following some commands. Given improvement, plan was to maintain on AVAPs and recheck ABG following 2 hrs. Pt had received xanax hours prior but has hx of chronic use. Pt subsequently re-evaluated and was awake and responsive, following commands. Repeat ABG 7.44/37/100.  Given improvement, pt does not require ICU level of care at this time. recommend to maintain NIV until AM and can transition to nasal cannula. can decrease AVAPs target volume to 450-500. Continue w/ empiric abx and reculture. continue with nebs/Advair and solumedrol. pulm f/u in AM. Avoid sedating medications. Ongoing goals of care discussion. Please call back if any change in clinical condition or concerns. 74F never smoker with hx of CAD s/p PCI, HTN, HLD, asthma, HFpEF, recent admission 9/17-9/19 for NSTEMI and underwent cardiac cath with nonobstructive CAD presented to the ED 9/21 for abdominal pain w/ episode of AMS/aphasia prompting stroke code with negative CT head/CT neck angio. Pt also found to be febrile on started on empiric zosyn.  CT chest angio negative for PE within limits w/ R elevated hemidiaphragm with associated RML and partial RLL atelectasis. Pt also noted with acute on chronic hyponatremia treated previously with 2%. Developed an episode of acute AMS 9/23 with ABG 7.05/110/86 and was emergently intubated. Repeat CT head neg for acute pathology. Pt treated with empiric steroids and diuresis and subsequently extubated 9/24 and downgraded to GMF. Pt was continued on empiric Bipap for diaphragm dysfunction but was poorly compliant. RRT called overnight as pt was noted to be altered and tachypneic with repeat ABG 7.05/108/85. Pt was started on Bipap but given poor volumes switched to AVAPs with improvement. Pt initially seen shortly following initiation of AVAPs and was lethargic but arousable to painful stimuli and following some commands. Given improvement, plan was to maintain on AVAPs and recheck ABG following 2 hrs. Pt had received xanax hours prior but has hx of chronic use. Pt subsequently re-evaluated and was awake and responsive, following commands. Repeat ABG 7.44/37/100.  Given improvement, pt does not require ICU level of care at this time. recommend to maintain NIV until AM and can transition to nasal cannula. can decrease AVAPs target volume to 450-500. Continue w/ empiric abx and reculture. continue with nebs/Advair and solumedrol. pulm f/u in AM. Avoid sedating medications. Nephrology f/u for hyponatremia. Ongoing goals of care discussion. Please call back if any change in clinical condition or concerns.

## 2024-09-27 NOTE — PROVIDER CONTACT NOTE (OTHER) - SITUATION
Pt attempted to be seen for PT eval however required upgrade in care - moved from 5T to 3T, will require new orders if/when appropriate. Please reconsult if/when needed.
Patient BPM at 2000, resulted with sodium of 124.
Patient BP 74/47, .

## 2024-09-28 LAB
ANION GAP SERPL CALC-SCNC: 16 MMOL/L — SIGNIFICANT CHANGE UP (ref 5–17)
BASE EXCESS BLDA CALC-SCNC: 0.9 MMOL/L — SIGNIFICANT CHANGE UP (ref -2–3)
BASE EXCESS BLDA CALC-SCNC: 2.2 MMOL/L — SIGNIFICANT CHANGE UP (ref -2–3)
BLD GP AB SCN SERPL QL: SIGNIFICANT CHANGE UP
BLOOD GAS COMMENTS ARTERIAL: SIGNIFICANT CHANGE UP
BLOOD GAS COMMENTS ARTERIAL: SIGNIFICANT CHANGE UP
BUN SERPL-MCNC: 24.8 MG/DL — HIGH (ref 8–20)
CALCIUM SERPL-MCNC: 8.6 MG/DL — SIGNIFICANT CHANGE UP (ref 8.4–10.5)
CHLORIDE SERPL-SCNC: 87 MMOL/L — LOW (ref 96–108)
CO2 SERPL-SCNC: 24 MMOL/L — SIGNIFICANT CHANGE UP (ref 22–29)
CREAT SERPL-MCNC: 0.86 MG/DL — SIGNIFICANT CHANGE UP (ref 0.5–1.3)
EGFR: 71 ML/MIN/1.73M2 — SIGNIFICANT CHANGE UP
GAS PNL BLDA: SIGNIFICANT CHANGE UP
GLUCOSE BLDC GLUCOMTR-MCNC: 113 MG/DL — HIGH (ref 70–99)
GLUCOSE BLDC GLUCOMTR-MCNC: 136 MG/DL — HIGH (ref 70–99)
GLUCOSE BLDC GLUCOMTR-MCNC: 158 MG/DL — HIGH (ref 70–99)
GLUCOSE BLDC GLUCOMTR-MCNC: 196 MG/DL — HIGH (ref 70–99)
GLUCOSE SERPL-MCNC: 130 MG/DL — HIGH (ref 70–99)
HCO3 BLDA-SCNC: 25 MMOL/L — SIGNIFICANT CHANGE UP (ref 21–28)
HCO3 BLDA-SCNC: 28 MMOL/L — SIGNIFICANT CHANGE UP (ref 21–28)
HOROWITZ INDEX BLDA+IHG-RTO: 40 — SIGNIFICANT CHANGE UP
HOROWITZ INDEX BLDA+IHG-RTO: 50 — SIGNIFICANT CHANGE UP
LACTATE BLDV-MCNC: 1.3 MMOL/L — SIGNIFICANT CHANGE UP (ref 0.5–2)
PCO2 BLDA: 37 MMHG — SIGNIFICANT CHANGE UP (ref 32–45)
PCO2 BLDA: 55 MMHG — HIGH (ref 32–45)
PH BLDA: 7.32 — LOW (ref 7.35–7.45)
PH BLDA: 7.44 — SIGNIFICANT CHANGE UP (ref 7.35–7.45)
PO2 BLDA: 100 MMHG — SIGNIFICANT CHANGE UP (ref 83–108)
PO2 BLDA: 92 MMHG — SIGNIFICANT CHANGE UP (ref 83–108)
POTASSIUM SERPL-MCNC: 3.9 MMOL/L — SIGNIFICANT CHANGE UP (ref 3.5–5.3)
POTASSIUM SERPL-SCNC: 3.9 MMOL/L — SIGNIFICANT CHANGE UP (ref 3.5–5.3)
SAO2 % BLDA: 100 % — HIGH (ref 94–98)
SAO2 % BLDA: 99.8 % — HIGH (ref 94–98)
SODIUM SERPL-SCNC: 127 MMOL/L — LOW (ref 135–145)

## 2024-09-28 PROCEDURE — 99233 SBSQ HOSP IP/OBS HIGH 50: CPT

## 2024-09-28 PROCEDURE — 71045 X-RAY EXAM CHEST 1 VIEW: CPT | Mod: 26

## 2024-09-28 RX ORDER — ACETAMINOPHEN 325 MG
1000 TABLET ORAL ONCE
Refills: 0 | Status: COMPLETED | OUTPATIENT
Start: 2024-09-28 | End: 2024-09-28

## 2024-09-28 RX ORDER — UREA 40 G
15 VIAL (EA) INTRAVENOUS
Refills: 0 | Status: DISCONTINUED | OUTPATIENT
Start: 2024-09-28 | End: 2024-10-02

## 2024-09-28 RX ORDER — ALPRAZOLAM 0.5 MG/1
0.25 TABLET ORAL ONCE
Refills: 0 | Status: DISCONTINUED | OUTPATIENT
Start: 2024-09-28 | End: 2024-09-28

## 2024-09-28 RX ORDER — ALPRAZOLAM 0.5 MG/1
0.5 TABLET ORAL
Refills: 0 | Status: DISCONTINUED | OUTPATIENT
Start: 2024-09-28 | End: 2024-10-01

## 2024-09-28 RX ADMIN — PIPERACILLIN SODIUM AND TAZOBACTAM SODIUM 25 GRAM(S): 12; 1.5 INJECTION, POWDER, LYOPHILIZED, FOR SOLUTION INTRAVENOUS at 08:51

## 2024-09-28 RX ADMIN — Medication 650 MILLIGRAM(S): at 07:24

## 2024-09-28 RX ADMIN — Medication 5 MILLIGRAM(S): at 21:52

## 2024-09-28 RX ADMIN — Medication 5000 UNIT(S): at 23:01

## 2024-09-28 RX ADMIN — ALPRAZOLAM 0.5 MILLIGRAM(S): 0.5 TABLET ORAL at 23:01

## 2024-09-28 RX ADMIN — ATORVASTATIN CALCIUM 40 MILLIGRAM(S): 10 TABLET, FILM COATED ORAL at 21:52

## 2024-09-28 RX ADMIN — CHLORHEXIDINE GLUCONATE ORAL RINSE 1 APPLICATION(S): 1.2 SOLUTION DENTAL at 12:02

## 2024-09-28 RX ADMIN — ALPRAZOLAM 0.25 MILLIGRAM(S): 0.5 TABLET ORAL at 08:52

## 2024-09-28 RX ADMIN — Medication 650 MILLIGRAM(S): at 06:24

## 2024-09-28 RX ADMIN — PANTOPRAZOLE SODIUM 40 MILLIGRAM(S): 40 TABLET, DELAYED RELEASE ORAL at 12:00

## 2024-09-28 RX ADMIN — METHYLPREDNISOLONE ACETATE 40 MILLIGRAM(S): 80 INJECTION, SUSPENSION INTRA-ARTICULAR; INTRALESIONAL; INTRAMUSCULAR; SOFT TISSUE at 14:25

## 2024-09-28 RX ADMIN — TIOTROPIUM BROMIDE INHALATION SPRAY 2 PUFF(S): 3.12 SPRAY, METERED RESPIRATORY (INHALATION) at 08:37

## 2024-09-28 RX ADMIN — Medication 4 MILLIGRAM(S): at 22:08

## 2024-09-28 RX ADMIN — PIPERACILLIN SODIUM AND TAZOBACTAM SODIUM 25 GRAM(S): 12; 1.5 INJECTION, POWDER, LYOPHILIZED, FOR SOLUTION INTRAVENOUS at 01:01

## 2024-09-28 RX ADMIN — Medication 400 MILLIGRAM(S): at 03:50

## 2024-09-28 RX ADMIN — Medication 225 MILLIGRAM(S): at 12:00

## 2024-09-28 RX ADMIN — Medication 1 GRAM(S): at 06:24

## 2024-09-28 RX ADMIN — IPRATROPIUM BROMIDE AND ALBUTEROL SULFATE 3 MILLILITER(S): .5; 3 SOLUTION RESPIRATORY (INHALATION) at 20:36

## 2024-09-28 RX ADMIN — Medication 1 DOSE(S): at 08:37

## 2024-09-28 RX ADMIN — PIPERACILLIN SODIUM AND TAZOBACTAM SODIUM 25 GRAM(S): 12; 1.5 INJECTION, POWDER, LYOPHILIZED, FOR SOLUTION INTRAVENOUS at 23:01

## 2024-09-28 RX ADMIN — Medication 1 GRAM(S): at 21:52

## 2024-09-28 RX ADMIN — Medication 3.12 MILLIGRAM(S): at 06:23

## 2024-09-28 RX ADMIN — METHYLPREDNISOLONE ACETATE 40 MILLIGRAM(S): 80 INJECTION, SUSPENSION INTRA-ARTICULAR; INTRALESIONAL; INTRAMUSCULAR; SOFT TISSUE at 06:23

## 2024-09-28 RX ADMIN — Medication 5000 UNIT(S): at 16:44

## 2024-09-28 RX ADMIN — Medication 650 MILLIGRAM(S): at 23:06

## 2024-09-28 RX ADMIN — Medication 5000 UNIT(S): at 08:55

## 2024-09-28 RX ADMIN — METHYLPREDNISOLONE ACETATE 40 MILLIGRAM(S): 80 INJECTION, SUSPENSION INTRA-ARTICULAR; INTRALESIONAL; INTRAMUSCULAR; SOFT TISSUE at 21:52

## 2024-09-28 RX ADMIN — Medication 1 GRAM(S): at 14:28

## 2024-09-28 RX ADMIN — Medication 1000 MILLIGRAM(S): at 03:50

## 2024-09-28 RX ADMIN — Medication 5000 UNIT(S): at 01:01

## 2024-09-28 RX ADMIN — GABAPENTIN 300 MILLIGRAM(S): 800 TABLET, FILM COATED ORAL at 12:00

## 2024-09-28 RX ADMIN — PIPERACILLIN SODIUM AND TAZOBACTAM SODIUM 25 GRAM(S): 12; 1.5 INJECTION, POWDER, LYOPHILIZED, FOR SOLUTION INTRAVENOUS at 16:44

## 2024-09-28 RX ADMIN — Medication 81 MILLIGRAM(S): at 12:00

## 2024-09-28 RX ADMIN — Medication 4 MILLIGRAM(S): at 14:25

## 2024-09-28 RX ADMIN — IPRATROPIUM BROMIDE AND ALBUTEROL SULFATE 3 MILLILITER(S): .5; 3 SOLUTION RESPIRATORY (INHALATION) at 08:37

## 2024-09-28 RX ADMIN — IPRATROPIUM BROMIDE AND ALBUTEROL SULFATE 3 MILLILITER(S): .5; 3 SOLUTION RESPIRATORY (INHALATION) at 02:45

## 2024-09-28 NOTE — PROGRESS NOTE ADULT - ASSESSMENT
75 y/o F with a h/o CAD (s/p PCI), HFpEF, HTN, HLD, COPD (on home O2 PRN), recent admission for chest pain s/p LHC with nonobstructive CAD- discharged home 9/19, now readmitted on 9/22 with complaints abd pain nausea vomiting , hyponatremia, and acute hypercapnic respiratory failure    Acute on chronic hypercapnic respiratory failure 2/2 to COPD exacerbation  - baseline 4L NC  - currently on 4-5L NC  - ABG this morning with improved CO2  - pulmonary following   - c/w IV steroids    History of CAD s/p PCI  HFpEF  - cont home meds aspirin, carvedilol, statin    Hyponatremia  History of chronic SIADH  Hyperkalemia - resolved  - monitor  - h/o SIADH in the past   - c/w fluid restriction  - nephro following  - c/w urea powder and salt tabs  - trend BMP    DVT: heparin ppx   Dispo: medically active   GOC - full code but patient does not want NIV     Unknown if ever smoked

## 2024-09-28 NOTE — CHART NOTE - NSCHARTNOTEFT_GEN_A_CORE
t seen sleeping in bed while on AVAPS.  at bedside, reports pt was awake and talking for a few moments but went back to sleep. ABG improving.  pH 7.32 from 7.1 and pCO2 55 from 78.  Would continue to monitor on AVAPS for now.     ABG - ( 28 Sep 2024 18:15 )  pH, Arterial: 7.320 pH, Blood: x     /  pCO2: 55    /  pO2: 92    / HCO3: 28    / Base Excess: 2.2   /  SaO2: 99.8

## 2024-09-28 NOTE — CONSULT NOTE ADULT - CONSULT REASON
MADHURI and hyponatremia
Respiratory failure
Unresponsiveness, acute hypercarbic respiratory failure
ARF, Hyponatremia, Fevers
hypoxemia
Acute hypercapnic respiratory failure
Goals of Care

## 2024-09-28 NOTE — RAPID RESPONSE TEAM SUMMARY - NSADDTLFINDINGSRRT_GEN_ALL_CORE
ABG - ( 28 Sep 2024 17:01 )  pH, Arterial: 7.180 pH, Blood: x     /  pCO2: 78    /  pO2: 256   / HCO3: 29    / Base Excess: 0.7   /  SaO2: 100.0            ABG - ( 28 Sep 2024 17:01 )  pH, Arterial: 7.180 pH, Blood: x     /  pCO2: 78    /  pO2: 256   / HCO3: 29    / Base Excess: 0.7   /  SaO2: 100.0       CXR: no significant from previous xray on 9/27

## 2024-09-28 NOTE — PROGRESS NOTE ADULT - SUBJECTIVE AND OBJECTIVE BOX
NEPHROLOGY INTERVAL HPI/OVERNIGHT EVENTS:    Examined earlier  No distress  Awake alert  denies HA CP no SOB    MEDICATIONS  (STANDING):  albuterol/ipratropium for Nebulization 3 milliLiter(s) Nebulizer every 6 hours  ALPRAZolam 0.25 milliGRAM(s) Oral once  aspirin  chewable 81 milliGRAM(s) Oral daily  atorvastatin 40 milliGRAM(s) Oral at bedtime  carvedilol 3.125 milliGRAM(s) Oral every 12 hours  chlorhexidine 2% Cloths 1 Application(s) Topical daily  dextrose 5%. 1000 milliLiter(s) (100 mL/Hr) IV Continuous <Continuous>  dextrose 5%. 1000 milliLiter(s) (50 mL/Hr) IV Continuous <Continuous>  dextrose 50% Injectable 25 Gram(s) IV Push once  dextrose 50% Injectable 25 Gram(s) IV Push once  dextrose 50% Injectable 12.5 Gram(s) IV Push once  dextrose Oral Gel 15 Gram(s) Oral once  fluticasone propionate/ salmeterol 500-50 MICROgram(s) Diskus 1 Dose(s) Inhalation two times a day  gabapentin 300 milliGRAM(s) Oral daily  glucagon  Injectable 1 milliGRAM(s) IntraMuscular once  heparin   Injectable 5000 Unit(s) SubCutaneous every 8 hours  methylPREDNISolone sodium succinate Injectable 40 milliGRAM(s) IV Push every 8 hours  pantoprazole  Injectable 40 milliGRAM(s) IV Push daily  piperacillin/tazobactam IVPB.. 3.375 Gram(s) IV Intermittent every 8 hours  polyethylene glycol 3350 17 Gram(s) Oral daily  senna 2 Tablet(s) Oral at bedtime  sodium chloride 1 Gram(s) Oral three times a day  tiotropium 2.5 MICROgram(s) Inhaler 2 Puff(s) Inhalation daily  urea Oral Powder 15 Gram(s) Oral daily  venlafaxine  milliGRAM(s) Oral daily  zolpidem 5 milliGRAM(s) Oral at bedtime    MEDICATIONS  (PRN):  acetaminophen     Tablet .. 650 milliGRAM(s) Oral every 6 hours PRN Temp greater or equal to 38C (100.4F), Mild Pain (1 - 3), Moderate Pain (4 - 6)  bisacodyl 5 milliGRAM(s) Oral every 12 hours PRN Constipation  ondansetron Injectable 4 milliGRAM(s) IV Push four times a day PRN Nausea and/or Vomiting      Allergies    No Known Allergies    Intolerances        Vital Signs Last 24 Hrs  T(C): 37.6 (28 Sep 2024 08:00), Max: 38 (28 Sep 2024 02:00)  T(F): 99.7 (28 Sep 2024 08:00), Max: 100.4 (28 Sep 2024 02:00)  HR: 95 (28 Sep 2024 08:40) (95 - 122)  BP: 104/71 (28 Sep 2024 08:00) (90/72 - 139/92)  BP(mean): 83 (28 Sep 2024 08:00) (70 - 111)  RR: 26 (28 Sep 2024 08:00) (16 - 35)  SpO2: 94% (28 Sep 2024 08:40) (94% - 100%)    Parameters below as of 28 Sep 2024 08:40  Patient On (Oxygen Delivery Method): nasal cannula      PHYSICAL EXAM:  GENERAL: Frail, lethargic  HEAD: BiPAP mask  NECK: Supple, No JVD  NERVOUS SYSTEM: Alert & Oriented X3  CHEST/LUNG: Clear bilaterally  HEART: Regular rate and rhythm; No rub  ABDOMEN: Soft, Nontender, +BS  EXTREMITIES: Tr dependent edema      LABS:                        11.7   10.69 )-----------( 454      ( 27 Sep 2024 22:27 )             35.7     09-28    127[L]  |  87[L]  |  24.8[H]  ----------------------------<  130[H]  3.9   |  24.0  |  0.86    Ca    8.6      28 Sep 2024 04:30  Phos  3.7     09-27  Mg     2.1     09-27    TPro  6.4[L]  /  Alb  3.1[L]  /  TBili  0.2[L]  /  DBili  x   /  AST  32[H]  /  ALT  17  /  AlkPhos  73  09-27      Urinalysis Basic - ( 28 Sep 2024 04:30 )    Color: x / Appearance: x / SG: x / pH: x  Gluc: 130 mg/dL / Ketone: x  / Bili: x / Urobili: x   Blood: x / Protein: x / Nitrite: x   Leuk Esterase: x / RBC: x / WBC x   Sq Epi: x / Non Sq Epi: x / Bacteria: x      Magnesium: 2.1 mg/dL (09-27 @ 22:27)  Phosphorus: 3.7 mg/dL (09-27 @ 22:27)    ABG - ( 28 Sep 2024 00:00 )  pH, Arterial: 7.440 pH, Blood: x     /  pCO2: 37    /  pO2: 100   / HCO3: 25    / Base Excess: 0.9   /  SaO2: 100.0                 RADIOLOGY & ADDITIONAL TESTS:

## 2024-09-28 NOTE — CONSULT NOTE ADULT - ASSESSMENT
Assessment:  75 yo F with a PMHX of CAD, HFpEF, HTN, HLD, COPD (on O2 prn), recent admission for NSTEMI (s/p C without intervention), discharged 9/19 presents to the ED on 09/22 with fever, right upper abdominal pain, lower abdominal pain, nausea, vomiting, and dyspnea. Initially abdmitted to the floor with hyponatremia and prerenal MADHURI. Patient has been sent to MICU for acute hypercarbic respiratory failure requiring intubation and subsequently extubated and downgraded to medical floor. Patient has been non compliant with bipap and developing continued hypercarbic respiratory failure. Patient was RR this afternoon with respiratory acidosis noted on ABG on 5L NC, now requiring AVAPs with improved mentation and WOB.    Follow up ABG on AVAPs and watch neuro mentation, improvement noted from CO2 narcosis. Patient does not meet MICU requirements at this time. Can reconsult as needed.    Acute hypercarbic respiratory failure    Plan:  -Improved mentation on AVAPs with command following, will hold off reintubation for now. Avoid benzos for deliriogenic effects and respiratory depression.  -Actively titrate AVAPs settings to improve CO2 narcosis, follow ABG.  -Actively maintain SO2>92 for adequate tissue oxygenation  -CXR ordered, no significant increased consolidation noted.  -Continue solu medrol steroids.  -Pulmonology following case      Time spent on this patient encounter, which includes documenting this note in the electronic medical record, was 50 minutes including assessing the presenting problems with associated risks, reviewing the medical record to prepare for the encounter, and meeting face to face with the patient to obtain additional history. I have also performed an appropriate physical exam, made interventions listed and ordered and interpreted appropriate diagnostic studies as documented. To improve  communication and patient safety, I have coordinated care with the multidisciplinary team including the bedside nurse, appropriate attending of record and consultants as needed.    Date of entry of this note is equal to the date of services rendered    Plan discussed with Dr Riggs

## 2024-09-28 NOTE — PROGRESS NOTE ADULT - SUBJECTIVE AND OBJECTIVE BOX
PULMONARY PROGRESS NOTE      TULIO SUGGS  MRN-[id number]    Patient is a 74y old  Female who presents with a chief complaint of resp failure (27 Sep 2024 13:35)        INTERVAL HPI/OVERNIGHT EVENTS:  Pt seen and examined at the bedside. Denies worsening cough or SOB.     MEDICATIONS  (STANDING):  albuterol/ipratropium for Nebulization 3 milliLiter(s) Nebulizer every 6 hours  aspirin  chewable 81 milliGRAM(s) Oral daily  atorvastatin 40 milliGRAM(s) Oral at bedtime  carvedilol 3.125 milliGRAM(s) Oral every 12 hours  chlorhexidine 2% Cloths 1 Application(s) Topical daily  dextrose 5%. 1000 milliLiter(s) (100 mL/Hr) IV Continuous <Continuous>  dextrose 5%. 1000 milliLiter(s) (50 mL/Hr) IV Continuous <Continuous>  dextrose 50% Injectable 25 Gram(s) IV Push once  dextrose 50% Injectable 12.5 Gram(s) IV Push once  dextrose 50% Injectable 25 Gram(s) IV Push once  dextrose Oral Gel 15 Gram(s) Oral once  fluticasone propionate/ salmeterol 500-50 MICROgram(s) Diskus 1 Dose(s) Inhalation two times a day  gabapentin 300 milliGRAM(s) Oral daily  glucagon  Injectable 1 milliGRAM(s) IntraMuscular once  heparin   Injectable 5000 Unit(s) SubCutaneous every 8 hours  methylPREDNISolone sodium succinate Injectable 40 milliGRAM(s) IV Push every 8 hours  pantoprazole  Injectable 40 milliGRAM(s) IV Push daily  piperacillin/tazobactam IVPB.. 3.375 Gram(s) IV Intermittent every 8 hours  polyethylene glycol 3350 17 Gram(s) Oral daily  senna 2 Tablet(s) Oral at bedtime  sodium chloride 1 Gram(s) Oral three times a day  tiotropium 2.5 MICROgram(s) Inhaler 2 Puff(s) Inhalation daily  urea Oral Powder 15 Gram(s) Oral two times a day  venlafaxine  milliGRAM(s) Oral daily  zolpidem 5 milliGRAM(s) Oral at bedtime    MEDICATIONS  (PRN):  acetaminophen     Tablet .. 650 milliGRAM(s) Oral every 6 hours PRN Temp greater or equal to 38C (100.4F), Mild Pain (1 - 3), Moderate Pain (4 - 6)  ALPRAZolam 0.5 milliGRAM(s) Oral two times a day PRN anxiety  bisacodyl 5 milliGRAM(s) Oral every 12 hours PRN Constipation  ondansetron Injectable 4 milliGRAM(s) IV Push four times a day PRN Nausea and/or Vomiting    Allergies    No Known Allergies    Intolerances      PAST MEDICAL & SURGICAL HISTORY:  NICM (nonischemic cardiomyopathy)      HTN (hypertension)      Rheumatoid arthritis      Chronic back pain      HLD (hyperlipidemia)      Chronic obstructive asthma      Cellulitis      S/P       S/P hysterectomy            REVIEW OF SYSTEMS:  Negative except as noted in HPI      Vital Signs Last 24 Hrs  T(C): 37.6 (28 Sep 2024 08:00), Max: 38 (28 Sep 2024 02:00)  T(F): 99.7 (28 Sep 2024 08:00), Max: 100.4 (28 Sep 2024 02:00)  HR: 95 (28 Sep 2024 14:00) (95 - 122)  BP: 99/66 (28 Sep 2024 14:00) (90/72 - 139/92)  BP(mean): 74 (28 Sep 2024 14:00) (72 - 111)  RR: 24 (28 Sep 2024 14:00) (16 - 35)  SpO2: 98% (28 Sep 2024 14:00) (94% - 100%)    Parameters below as of 28 Sep 2024 14:00  Patient On (Oxygen Delivery Method): nasal cannula  O2 Flow (L/min): 5        PHYSICAL EXAMINATION:  GEN: In no apparent distress  HEENT: NC/AT  NECK: Supple, non-tender  CV: +S1, S2, RRR  RESPIRATORY: CTA B/L, good inspiratory effort, non-labored breathing  ABDOMEN: Soft, non-tender  EXTREMITIES: No pedal edema B/L  SKIN: No open wounds  PSYCH: Normal affect      LABS:                        11.7   10.69 )-----------( 454      ( 27 Sep 2024 22:27 )             35.7     09-28    127[L]  |  87[L]  |  24.8[H]  ----------------------------<  130[H]  3.9   |  24.0  |  0.86    Ca    8.6      28 Sep 2024 04:30  Phos  3.7       Mg     2.1         TPro  6.4[L]  /  Alb  3.1[L]  /  TBili  0.2[L]  /  DBili  x   /  AST  32[H]  /  ALT  17  /  AlkPhos  73        Urinalysis Basic - ( 28 Sep 2024 04:30 )    Color: x / Appearance: x / SG: x / pH: x  Gluc: 130 mg/dL / Ketone: x  / Bili: x / Urobili: x   Blood: x / Protein: x / Nitrite: x   Leuk Esterase: x / RBC: x / WBC x   Sq Epi: x / Non Sq Epi: x / Bacteria: x      ABG - ( 28 Sep 2024 00:00 )  pH, Arterial: 7.440 pH, Blood: x     /  pCO2: 37    /  pO2: 100   / HCO3: 25    / Base Excess: 0.9   /  SaO2: 100.0                           MICROBIOLOGY:  Legionella pneumophila Antigen, Urine (24 @ 13:55)    Legionella Antigen, Urine: Negative: Positive Testing method: Immunochromatographic Assay.  Presumptive detection of L. pneumophila serogroup 1 antigen in urine,  suggesting recent or current infection. Order "Culture -Legionella" as  recommended to confirm infection.  Negative Testing method: Immunochromatographic Assay.  L. pneumophila serogroup 1 antigen in urine NOT detected, suggesting NO  recent or current infection. Infection due to Legionella cannot be ruled  out: other serogroups and species may cause disease, antigen may not be  present in urine in early infection, or the level of antigens in urine  may be below the detection limit of the test. Order "Culture -Legionella"  is recommended for uncommon cases of suspected Legionella pneumonia due  to organisms other than L. pneumophila serogroup 1.        RADIOLOGY & ADDITIONAL STUDIES:  < from: Xray Chest 1 View- PORTABLE-Urgent (Xray Chest 1 View- PORTABLE-Urgent .) (24 @ 22:06) >  IMPRESSION:  Unchanged left retrocardiac opacity. No new consolidation.    --- End of Report ---            AKILA TORRES DO; Attending Radiologist  This document has been electronicallysigned. Sep 28 2024 11:08AM    < end of copied text >      ECHO:  < from: TTE W or WO Ultrasound Enhancing Agent (24 @ 11:47) >  _______________________________________________________________________________________     CONCLUSIONS:      1. Left ventricular cavity is normal in size. Left ventricular wall thickness is mildly increased. Left ventricular systolic function is normal with an ejection fraction of 56 % by Mace's method of disks.   2. There is mild (grade 1) left ventricular diastolic dysfunction.   3. Normal right ventricular cavity size and normal right ventricular systolic function.   4. Trileaflet aortic valve with normal systolic excursion. Fibrocalcific aortic valve sclerosis without stenosis.   5. Trace aortic regurgitation.   6. There is mild calcification of the mitral valve annulus.   7. Structurally normal mitral valve with normal leaflet excursion.   8. Mild mitral regurgitation.   9. Mild tricuspid regurgitation.  10. Aortic root at the sinuses of Valsalva is normal in size, measuring 2.70 cm (indexed 1.62 cm/m²). Ascending aorta is normal in size, measuring 2.60 cm (indexed 1.56 cm/m²).  11. Left atrium is normal in size.  12. The right atrium is normal in size.  13. Estimated pulmonaryartery systolic pressure is 33 mmHg.    ________________________________________________________________________________________    < end of copied text >

## 2024-09-28 NOTE — CONSULT NOTE ADULT - CONSULT REQUESTED DATE/TIME
23-Sep-2024 21:30
28-Sep-2024 17:00
22-Sep-2024
25-Sep-2024 15:22
27-Sep-2024 16:14
27-Sep-2024 23:48
22-Sep-2024 06:00

## 2024-09-28 NOTE — PROGRESS NOTE ADULT - TIME BILLING
chart review, patient evaluation, documentation, coordination of care and discussion with nurses, consultants, ACP.

## 2024-09-28 NOTE — CHART NOTE - NSCHARTNOTEFT_GEN_A_CORE
Critical Care brief note:    acute hypoxic hypercapnic respiratory failure  Asthma  severe R hemidiaphragm elevation    patient was previously admitted to MICu for hypercapnic respiratory failure, needing NIV on 9/23, downgraded on 9/24.  Patient has been refusing NIV.  RRT today 9/28 for obtunded mental status.  pH 7/18, pco2 18,  started on AVAPS.  Patient then has clinical improvement, able to follow commands.      Will continue NIV, follow up abg in 1 hour , if moving to the right direction, can continue NIV throughout the night, and repeat another abg in the AM     otherwise, continue current management per primary team  pulm following    does not need ICU at this time   please call MICU back if there are any questions.

## 2024-09-28 NOTE — CHART NOTE - NSCHARTNOTEFT_GEN_A_CORE
Chart/Event Note  [facility] [id number] [id number]  TULIO SUGGS, 74y, Female  [id number]    Notified by RN that the above patient has temp of 100.4 (rectal).     Events/History of Present Illness  Patient is a 75 y/o F with a h/o CAD (s/p PCI), HFpEF, HTN, HLD, COPD admitted for acute on chronic hypercapnic respiratory failure 2/2 COPD exacerbation. Pt is s/p RRT overnight for AMS 2/2 hypercapnia/ respiratory acidosis which has since resolved. Now pt has temp of 100.4. On labs from RRT WBC was 10.69, CXR w/o acute changes.     Review of Systems:  Constitutional: No chills, or fatigue.  Neurologic: No headache, dizziness, vision/speech changes, numbness, or weakness.  Respiratory: No cough, wheezing, shortness of breath, or dyspnea.   Cardiovascular: No chest pain, pressure, or palpitations.   GI: No abdominal pain, nausea, vomiting, diarrhea, constipation.   : No dysuria, burning, frequency, incontinence, or retention.     T(C): 38 (09-28-24 @ 02:00), Max: 38 (09-28-24 @ 02:00)  HR: 103 (09-28-24 @ 03:17) (77 - 122)  BP: 100/62 (09-28-24 @ 02:00) (90/72 - 139/92)  RR: 22 (09-28-24 @ 02:00) (16 - 35)  SpO2: 96% (09-28-24 @ 02:00) (95% - 100%)                        11.7   10.69 )-----------( 454      ( 27 Sep 2024 22:27 )             35.7                        Physical Examination:  GENERAL: No acute distress noted during examination.   NERVOUS SYSTEM:  Alert & oriented X3 with appropriate concentration. Motor strength is 5/5 in both bilateral upper and lower extremities. No focal or lateralizing neurologic deficits noted.   HEAD: Atraumatic and normocephalic.  EYES: EOMI/PERRLA. Conjunctiva and sclera clear  ENMT: Moist mucous membranes.   NECK: Supple with no JVD.   CHEST: Clear to ascultation bilaterally. No rales, rhonchi, wheezing, or rubs heard. Symmetrical/bilateral chest wall rise.   HEART: Regular rate and rhythm. Normotensive.  ABDOMEN: Soft, nontender, and nondistended.   EXTREMITIES:  2+ peripheral pulses without clubbing, cyanosis, or edema.     Medical Decision Making/Assessment/Plan:  74y-year-old Female with a past medical history of ______ , admitted for ___, now with _____ .       1)   2)   3)   4) Will endorse the above diagnostics and findings to the day medicine team for review and follow up. Will additionally sign out to day medicine teams to follow with relevant specialties.     Verona Boudreaux NP  Department of Medicine Chart/Event Note  [facility] [id number]  TULIO SUGGS, 74y, Female  [id number]    Notified by RN that the above patient has temp of 100.4 (rectal).     Events/History of Present Illness  Patient is a 75 y/o F with a h/o CAD (s/p PCI), HFpEF, HTN, HLD, COPD admitted for acute on chronic hypercapnic respiratory failure 2/2 COPD exacerbation. Pt is s/p RRT overnight for AMS 2/2 hypercapnia/ respiratory acidosis which has since resolved. Now pt has temp of 100.4. On labs from RRT WBC was 10.69, CXR w/o acute changes. The pt is currently of Zosyn. Lactate, procal, U/A and RVMP ordered.    Review of Systems:  Constitutional: No chills, or fatigue.  Neurologic: No headache, dizziness, vision/speech changes, numbness, or weakness.  Respiratory: No cough, wheezing, shortness of breath, or dyspnea.   Cardiovascular: No chest pain, pressure, or palpitations.   GI: No abdominal pain, nausea, vomiting, diarrhea, constipation.   : No dysuria, burning, frequency, incontinence, or retention.     T(C): 38 (09-28-24 @ 02:00), Max: 38 (09-28-24 @ 02:00)  HR: 103 (09-28-24 @ 03:17) (77 - 122)  BP: 100/62 (09-28-24 @ 02:00) (90/72 - 139/92)  RR: 22 (09-28-24 @ 02:00) (16 - 35)  SpO2: 96% (09-28-24 @ 02:00) (95% - 100%)                        11.7   10.69 )-----------( 454      ( 27 Sep 2024 22:27 )             35.7      PHYSICAL EXAM:  GENERAL: Pt lying in bed comfortably in NAD  CHEST/LUNG: Clear to auscultation bilaterally; No rales, rhonchi or wheezing. Unlabored respirations  HEART: Mild tachycardia, regular rhythm, S1, S2   ABDOMEN: Bowel sounds present; Soft, Nontender, Nondistended. No guarding or rigidity    EXTREMITIES:  2+ Peripheral Pulses, brisk capillary refill <2 seconds. No clubbing, cyanosis, or edema   NERVOUS SYSTEM:  Alert & Oriented X3, speech clear. Answers questions appropriately. No deficits   SKIN: Warm, dry      Assessment/Plan:  75 y/o F with a h/o CAD (s/p PCI), HFpEF, HTN, HLD, COPD admitted for acute on chronic hypercapnic respiratory failure 2/2 COPD exacerbation now with temp of 100.4.    1) U/A   2) Lactate, procal, (WBC from earlier 10.69)  3) RVP    Verona Boudreaux NP  Department of Medicine Chart/Event Note  [facility] [id number] 01  TULIO SUGGS, 74y, Female  [id number]    Notified by RN that the above patient has temp of 100.4 (rectal).     Events/History of Present Illness  Patient is a 75 y/o F with a h/o CAD (s/p PCI), HFpEF, HTN, HLD, COPD admitted for acute on chronic hypercapnic respiratory failure 2/2 COPD exacerbation. Pt is s/p RRT overnight for AMS 2/2 hypercapnia/ respiratory acidosis which has since resolved. Now pt has temp of 100.4. On labs from RRT WBC was 10.69, CXR w/o acute changes. The pt is currently of Zosyn. Lactate, procal, U/A and RVMP ordered.    Review of Systems:  Constitutional: No chills, or fatigue.  Neurologic: No headache, dizziness, vision/speech changes, numbness, or weakness.  Respiratory: No cough, wheezing, shortness of breath, or dyspnea.   Cardiovascular: No chest pain, pressure, or palpitations.   GI: No abdominal pain, nausea, vomiting, diarrhea, constipation.   : No dysuria, burning, frequency, incontinence, or retention.     T(C): 38 (09-28-24 @ 02:00), Max: 38 (09-28-24 @ 02:00)  HR: 103 (09-28-24 @ 03:17) (77 - 122)  BP: 100/62 (09-28-24 @ 02:00) (90/72 - 139/92)  RR: 22 (09-28-24 @ 02:00) (16 - 35)  SpO2: 96% (09-28-24 @ 02:00) (95% - 100%)                        11.7   10.69 )-----------( 454      ( 27 Sep 2024 22:27 )             35.7      PHYSICAL EXAM:  GENERAL: Pt lying in bed comfortably in NAD  CHEST/LUNG: Clear to auscultation bilaterally; No rales, rhonchi or wheezing. Unlabored respirations  HEART: Mild tachycardia, regular rhythm, S1, S2   ABDOMEN: Bowel sounds present; Soft, Nontender, Nondistended. No guarding or rigidity    EXTREMITIES:  2+ Peripheral Pulses, brisk capillary refill <2 seconds. No clubbing, cyanosis, or edema   NERVOUS SYSTEM:  Alert & Oriented X3, speech clear. Answers questions appropriately. No deficits   SKIN: Warm, dry    **Addendum: lactate 1.3,     Assessment/Plan:  75 y/o F with a h/o CAD (s/p PCI), HFpEF, HTN, HLD, COPD admitted for acute on chronic hypercapnic respiratory failure 2/2 COPD exacerbation now with temp of 100.4.    1) U/A   2) Lactate, procal, (WBC from earlier 10.69)  3) RVP    Verona Boudreaux NP  Department of Medicine

## 2024-09-28 NOTE — CONSULT NOTE ADULT - SUBJECTIVE AND OBJECTIVE BOX
Patient is a 74y old  Female who presents with a chief complaint of resp failure (27 Sep 2024 13:35)      BRIEF HOSPITAL COURSE: 73 yo F with a PMHX of CAD, HFpEF, HTN, HLD, COPD (on O2 prn), recent admission for NSTEMI (s/p C without intervention), discharged  presents to the ED on  with fever, right upper abdominal pain, lower abdominal pain, nausea, vomiting, and dyspnea. Initially abdmitted to the floor with hyponatremia and prerenal MADHURI. MICU consulted  during rapid response as patient was obtunded with agonal breathing. ABG showed severe hypercapnia/respiratory acidosis. Was emergently intubated and brought into MICU. Extubated on . On , patient refused bipap on the floor. ABG demonstrated respiratory acidosis (pH 7.2, CO2 73, O2 117, HCO3 28) and has been treated for acute hypercarbic respiratory failure.    Events last 24 hours: Patient has been called for RRT twice with poor mentation and seen obtunded by medical floor staff and accessory muscle usage    PAST MEDICAL & SURGICAL HISTORY:  NICM (nonischemic cardiomyopathy)      HTN (hypertension)      Rheumatoid arthritis      Chronic back pain      HLD (hyperlipidemia)      Chronic obstructive asthma      Cellulitis      S/P       S/P hysterectomy        Allergies    No Known Allergies    Intolerances      FAMILY HISTORY:  Family history of diabetes mellitus (DM)    FH: CAD (coronary artery disease)      SOCIAL HISTORY:     Review of Systems:  CONSTITUTIONAL: No fever, chills, or fatigue.  EYES: No eye pain, visual disturbances, or discharge.  ENMT:  No difficulty hearing, tinnitus, or vertigo. No sinus or throat pain.  NECK: No pain or stiffness.  RESPIRATORY: No shortness of breath, cough, or wheezing.  CARDIOVASCULAR: No chest pain, palpitations, dizziness, or leg swelling.  GASTROINTESTINAL: No abdominal or epigastric pain. No nausea, vomiting,  diarrhea, or constipation. No hematemesis, melena, or hematochezia.  GENITOURINARY: No dysuria, frequency, hematuria, or incontinence.  NEUROLOGICAL: No headaches, memory loss, loss of strength, numbness, or tremors.  SKIN: No itching, burning, rashes, or lesions.  MUSCULOSKELETAL: No joint pain or swelling; No muscle, back, or extremity pain.  PSYCHIATRIC: No depression, anxiety, mood swings, or difficulty sleeping.    Medications:  piperacillin/tazobactam IVPB.. 3.375 Gram(s) IV Intermittent every 8 hours    carvedilol 3.125 milliGRAM(s) Oral every 12 hours  urea Oral Powder 15 Gram(s) Oral two times a day    albuterol/ipratropium for Nebulization 3 milliLiter(s) Nebulizer every 6 hours  fluticasone propionate/ salmeterol 500-50 MICROgram(s) Diskus 1 Dose(s) Inhalation two times a day  tiotropium 2.5 MICROgram(s) Inhaler 2 Puff(s) Inhalation daily    acetaminophen     Tablet .. 650 milliGRAM(s) Oral every 6 hours PRN  ALPRAZolam 0.5 milliGRAM(s) Oral two times a day PRN  gabapentin 300 milliGRAM(s) Oral daily  ondansetron Injectable 4 milliGRAM(s) IV Push four times a day PRN  venlafaxine  milliGRAM(s) Oral daily  zolpidem 5 milliGRAM(s) Oral at bedtime      aspirin  chewable 81 milliGRAM(s) Oral daily  heparin   Injectable 5000 Unit(s) SubCutaneous every 8 hours    bisacodyl 5 milliGRAM(s) Oral every 12 hours PRN  pantoprazole  Injectable 40 milliGRAM(s) IV Push daily  polyethylene glycol 3350 17 Gram(s) Oral daily  senna 2 Tablet(s) Oral at bedtime      atorvastatin 40 milliGRAM(s) Oral at bedtime  dextrose 50% Injectable 25 Gram(s) IV Push once  dextrose 50% Injectable 25 Gram(s) IV Push once  dextrose 50% Injectable 12.5 Gram(s) IV Push once  dextrose Oral Gel 15 Gram(s) Oral once  glucagon  Injectable 1 milliGRAM(s) IntraMuscular once  methylPREDNISolone sodium succinate Injectable 40 milliGRAM(s) IV Push every 8 hours    dextrose 5%. 1000 milliLiter(s) IV Continuous <Continuous>  dextrose 5%. 1000 milliLiter(s) IV Continuous <Continuous>  sodium chloride 1 Gram(s) Oral three times a day      chlorhexidine 2% Cloths 1 Application(s) Topical daily            ICU Vital Signs Last 24 Hrs  T(C): 37 (28 Sep 2024 15:29), Max: 38 (28 Sep 2024 02:00)  T(F): 98.6 (28 Sep 2024 15:29), Max: 100.4 (28 Sep 2024 02:00)  HR: 101 (28 Sep 2024 17:19) (95 - 122)  BP: 99/66 (28 Sep 2024 14:00) (90/72 - 137/95)  BP(mean): 74 (28 Sep 2024 14:00) (72 - 108)  ABP: --  ABP(mean): --  RR: 24 (28 Sep 2024 14:00) (16 - 35)  SpO2: 99% (28 Sep 2024 17:19) (94% - 100%)    O2 Parameters below as of 28 Sep 2024 14:  Patient On (Oxygen Delivery Method): nasal cannula  O2 Flow (L/min): 5        Vital Signs Last 24 Hrs  T(C): 37 (28 Sep 2024 15:29), Max: 38 (28 Sep 2024 02:00)  T(F): 98.6 (28 Sep 2024 15:29), Max: 100.4 (28 Sep 2024 02:00)  HR: 101 (28 Sep 2024 17:19) (95 - 122)  BP: 99/66 (28 Sep 2024 14:00) (90/72 - 137/95)  BP(mean): 74 (28 Sep 2024 14:00) (72 - 108)  RR: 24 (28 Sep 2024 14:00) (16 - 35)  SpO2: 99% (28 Sep 2024 17:19) (94% - 100%)    Parameters below as of 28 Sep 2024 14:  Patient On (Oxygen Delivery Method): nasal cannula  O2 Flow (L/min): 5      ABG - ( 28 Sep 2024 17:01 )  pH, Arterial: 7.180 pH, Blood: x     /  pCO2: 78    /  pO2: 256   / HCO3: 29    / Base Excess: 0.7   /  SaO2: 100.0               I&O's Detail    27 Sep 2024 07:01  -  28 Sep 2024 07:00  --------------------------------------------------------  IN:    IV PiggyBack: 100 mL    IV PiggyBack: 200 mL  Total IN: 300 mL    OUT:    Voided (mL): 100 mL  Total OUT: 100 mL    Total NET: 200 mL          LABS:                        11.7   10.69 )-----------( 454      ( 27 Sep 2024 22:27 )             35.7         127[L]  |  87[L]  |  24.8[H]  ----------------------------<  130[H]  3.9   |  24.0  |  0.86    Ca    8.6      28 Sep 2024 04:30  Phos  3.7       Mg     2.1         TPro  6.4[L]  /  Alb  3.1[L]  /  TBili  0.2[L]  /  DBili  x   /  AST  32[H]  /  ALT  17  /  AlkPhos  73            CAPILLARY BLOOD GLUCOSE      POCT Blood Glucose.: 196 mg/dL (28 Sep 2024 16:49)      Urinalysis Basic - ( 28 Sep 2024 04:30 )    Color: x / Appearance: x / SG: x / pH: x  Gluc: 130 mg/dL / Ketone: x  / Bili: x / Urobili: x   Blood: x / Protein: x / Nitrite: x   Leuk Esterase: x / RBC: x / WBC x   Sq Epi: x / Non Sq Epi: x / Bacteria: x        CULTURES:  Culture Results:   No growth at 5 days ( @ 22:00)  Culture Results:   No growth ( @ 21:55)      Physical Examination:    VITALS AT TIME OF EXAM:  HR:  BP:  RR:  SPO2:    General: Well appearing, lying in bed in NAD.    HEENT: Pupils equal, reactive to light. Symmetric. No scleral icterus or injection.    PULM: Clear to auscultation B/L. No wheezes, rales, or rhonchi appreciated. No significant sputum production or increased respiratory effort.    NECK: Supple, no lymphadenopathy, trachea midline.    CVS: Regular rate and rhythm, no murmurs appreciated, +s1/s2.    ABD: Soft, nondistended, nontender, normoactive bowel sounds.    EXT: No edema, nontender.    SKIN: Warm and well perfused, no rashes noted.    NEURO: Alert, oriented, interactive, nonfocal.    DEVICES:   LINES:  BARRY:    POCUS:    RADIOLOGY: ***   Patient is a 74y old  Female who presents with a chief complaint of resp failure (27 Sep 2024 13:35)      BRIEF HOSPITAL COURSE: 75 yo F with a PMHX of CAD, HFpEF, HTN, HLD, COPD (on O2 prn), recent admission for NSTEMI (s/p C without intervention), discharged  presents to the ED on  with fever, right upper abdominal pain, lower abdominal pain, nausea, vomiting, and dyspnea. Initially abdmitted to the floor with hyponatremia and prerenal MADHURI. MICU consulted  during rapid response as patient was obtunded with agonal breathing. ABG showed severe hypercapnia/respiratory acidosis. Was emergently intubated and brought into MICU. Extubated on . On , patient refused bipap on the floor. ABG demonstrated respiratory acidosis (pH 7.2, CO2 73, O2 117, HCO3 28) and has been treated for acute hypercarbic respiratory failure.    Events last 24 hours: Patient has been called for RRT with poor mentation and seen obtunded by medical floor staff and accessory muscle usage, found with respiratory acidosis on 5L NC and placed on bipap, MICU consulted for obtunded state and acute hypercarbic respiratory failure.      PAST MEDICAL & SURGICAL HISTORY:  NICM (nonischemic cardiomyopathy)      HTN (hypertension)      Rheumatoid arthritis      Chronic back pain      HLD (hyperlipidemia)      Chronic obstructive asthma      Cellulitis      S/P       S/P hysterectomy        Allergies    No Known Allergies    Intolerances      FAMILY HISTORY:  Family history of diabetes mellitus (DM)    FH: CAD (coronary artery disease)      SOCIAL HISTORY:     Review of Systems:  Negative unless stated above    Medications:  piperacillin/tazobactam IVPB.. 3.375 Gram(s) IV Intermittent every 8 hours    carvedilol 3.125 milliGRAM(s) Oral every 12 hours  urea Oral Powder 15 Gram(s) Oral two times a day    albuterol/ipratropium for Nebulization 3 milliLiter(s) Nebulizer every 6 hours  fluticasone propionate/ salmeterol 500-50 MICROgram(s) Diskus 1 Dose(s) Inhalation two times a day  tiotropium 2.5 MICROgram(s) Inhaler 2 Puff(s) Inhalation daily    acetaminophen     Tablet .. 650 milliGRAM(s) Oral every 6 hours PRN  ALPRAZolam 0.5 milliGRAM(s) Oral two times a day PRN  gabapentin 300 milliGRAM(s) Oral daily  ondansetron Injectable 4 milliGRAM(s) IV Push four times a day PRN  venlafaxine  milliGRAM(s) Oral daily  zolpidem 5 milliGRAM(s) Oral at bedtime      aspirin  chewable 81 milliGRAM(s) Oral daily  heparin   Injectable 5000 Unit(s) SubCutaneous every 8 hours    bisacodyl 5 milliGRAM(s) Oral every 12 hours PRN  pantoprazole  Injectable 40 milliGRAM(s) IV Push daily  polyethylene glycol 3350 17 Gram(s) Oral daily  senna 2 Tablet(s) Oral at bedtime      atorvastatin 40 milliGRAM(s) Oral at bedtime  dextrose 50% Injectable 25 Gram(s) IV Push once  dextrose 50% Injectable 25 Gram(s) IV Push once  dextrose 50% Injectable 12.5 Gram(s) IV Push once  dextrose Oral Gel 15 Gram(s) Oral once  glucagon  Injectable 1 milliGRAM(s) IntraMuscular once  methylPREDNISolone sodium succinate Injectable 40 milliGRAM(s) IV Push every 8 hours    dextrose 5%. 1000 milliLiter(s) IV Continuous <Continuous>  dextrose 5%. 1000 milliLiter(s) IV Continuous <Continuous>  sodium chloride 1 Gram(s) Oral three times a day      chlorhexidine 2% Cloths 1 Application(s) Topical daily            ICU Vital Signs Last 24 Hrs  T(C): 37 (28 Sep 2024 15:29), Max: 38 (28 Sep 2024 02:00)  T(F): 98.6 (28 Sep 2024 15:29), Max: 100.4 (28 Sep 2024 02:00)  HR: 101 (28 Sep 2024 17:19) (95 - 122)  BP: 99/66 (28 Sep 2024 14:00) (90/72 - 137/95)  BP(mean): 74 (28 Sep 2024 14:00) (72 - 108)  ABP: --  ABP(mean): --  RR: 24 (28 Sep 2024 14:00) (16 - 35)  SpO2: 99% (28 Sep 2024 17:19) (94% - 100%)    O2 Parameters below as of 28 Sep 2024 14:00  Patient On (Oxygen Delivery Method): nasal cannula  O2 Flow (L/min): 5        Vital Signs Last 24 Hrs  T(C): 37 (28 Sep 2024 15:29), Max: 38 (28 Sep 2024 02:00)  T(F): 98.6 (28 Sep 2024 15:29), Max: 100.4 (28 Sep 2024 02:00)  HR: 101 (28 Sep 2024 17:19) (95 - 122)  BP: 99/66 (28 Sep 2024 14:00) (90/72 - 137/95)  BP(mean): 74 (28 Sep 2024 14:00) (72 - 108)  RR: 24 (28 Sep 2024 14:00) (16 - 35)  SpO2: 99% (28 Sep 2024 17:19) (94% - 100%)    Parameters below as of 28 Sep 2024 14:00  Patient On (Oxygen Delivery Method): nasal cannula  O2 Flow (L/min): 5      ABG - ( 28 Sep 2024 17:01 )  pH, Arterial: 7.180 pH, Blood: x     /  pCO2: 78    /  pO2: 256   / HCO3: 29    / Base Excess: 0.7   /  SaO2: 100.0               I&O's Detail    27 Sep 2024 07:01  -  28 Sep 2024 07:00  --------------------------------------------------------  IN:    IV PiggyBack: 100 mL    IV PiggyBack: 200 mL  Total IN: 300 mL    OUT:    Voided (mL): 100 mL  Total OUT: 100 mL    Total NET: 200 mL          LABS:                        11.7   10.69 )-----------( 454      ( 27 Sep 2024 22:27 )             35.7     09-28    127[L]  |  87[L]  |  24.8[H]  ----------------------------<  130[H]  3.9   |  24.0  |  0.86    Ca    8.6      28 Sep 2024 04:30  Phos  3.7     09-  Mg     2.1     09-27    TPro  6.4[L]  /  Alb  3.1[L]  /  TBili  0.2[L]  /  DBili  x   /  AST  32[H]  /  ALT  17  /  AlkPhos  73  -          CAPILLARY BLOOD GLUCOSE      POCT Blood Glucose.: 196 mg/dL (28 Sep 2024 16:49)      Urinalysis Basic - ( 28 Sep 2024 04:30 )    Color: x / Appearance: x / SG: x / pH: x  Gluc: 130 mg/dL / Ketone: x  / Bili: x / Urobili: x   Blood: x / Protein: x / Nitrite: x   Leuk Esterase: x / RBC: x / WBC x   Sq Epi: x / Non Sq Epi: x / Bacteria: x        CULTURES:  Culture Results:   No growth at 5 days ( @ 22:00)  Culture Results:   No growth ( @ 21:55)      Physical Examination:    General: Ill appearing, afebrile, lying in bed lethargic.    HEENT: Pupils equal, reactive to light. Symmetric. No scleral icterus or injection.    PULM: Clear to auscultation B/L. No wheezes, rales, or rhonchi appreciated. No significant sputum production. Improved WOB on AVAPs    NECK: Supple, no lymphadenopathy, trachea midline.    CVS: Regular rate and rhythm, +s1/s2.    ABD: Soft, rounded, nontender, normoactive bowel sounds.    EXT: No edema, nontender.    SKIN: Warm and well perfused, no rashes noted.    NEURO: Lethargic but arousable to verbal command and able to follow commands.

## 2024-09-28 NOTE — PROGRESS NOTE ADULT - SUBJECTIVE AND OBJECTIVE BOX
[facility] Division of Medicine    SUBJECTIVE / OVERNIGHT EVENTS: Was a rapid response last night for acute mental status change. Found to have hypercapnia to 100. Placed on bipap and upgraded to step down. Pt seen at the bedside this AM. Endorses feeling better. Now on NC. Denies any new complaints. All other systems reviewed and are negative.    MEDICATIONS  (STANDING):  albuterol/ipratropium for Nebulization 3 milliLiter(s) Nebulizer every 6 hours  aspirin  chewable 81 milliGRAM(s) Oral daily  atorvastatin 40 milliGRAM(s) Oral at bedtime  carvedilol 3.125 milliGRAM(s) Oral every 12 hours  chlorhexidine 2% Cloths 1 Application(s) Topical daily  dextrose 5%. 1000 milliLiter(s) (100 mL/Hr) IV Continuous <Continuous>  dextrose 5%. 1000 milliLiter(s) (50 mL/Hr) IV Continuous <Continuous>  dextrose 50% Injectable 25 Gram(s) IV Push once  dextrose 50% Injectable 25 Gram(s) IV Push once  dextrose 50% Injectable 12.5 Gram(s) IV Push once  dextrose Oral Gel 15 Gram(s) Oral once  fluticasone propionate/ salmeterol 500-50 MICROgram(s) Diskus 1 Dose(s) Inhalation two times a day  gabapentin 300 milliGRAM(s) Oral daily  glucagon  Injectable 1 milliGRAM(s) IntraMuscular once  heparin   Injectable 5000 Unit(s) SubCutaneous every 8 hours  methylPREDNISolone sodium succinate Injectable 40 milliGRAM(s) IV Push every 8 hours  pantoprazole  Injectable 40 milliGRAM(s) IV Push daily  piperacillin/tazobactam IVPB.. 3.375 Gram(s) IV Intermittent every 8 hours  polyethylene glycol 3350 17 Gram(s) Oral daily  senna 2 Tablet(s) Oral at bedtime  sodium chloride 1 Gram(s) Oral three times a day  tiotropium 2.5 MICROgram(s) Inhaler 2 Puff(s) Inhalation daily  urea Oral Powder 15 Gram(s) Oral two times a day  venlafaxine  milliGRAM(s) Oral daily  zolpidem 5 milliGRAM(s) Oral at bedtime    MEDICATIONS  (PRN):  acetaminophen     Tablet .. 650 milliGRAM(s) Oral every 6 hours PRN Temp greater or equal to 38C (100.4F), Mild Pain (1 - 3), Moderate Pain (4 - 6)  ALPRAZolam 0.5 milliGRAM(s) Oral two times a day PRN anxiety  bisacodyl 5 milliGRAM(s) Oral every 12 hours PRN Constipation  ondansetron Injectable 4 milliGRAM(s) IV Push four times a day PRN Nausea and/or Vomiting      I&O's Summary    27 Sep 2024 07:01  -  28 Sep 2024 07:00  --------------------------------------------------------  IN: 300 mL / OUT: 100 mL / NET: 200 mL        acetaminophen     Tablet .. 650 milliGRAM(s) Oral every 6 hours PRN  albuterol/ipratropium for Nebulization 3 milliLiter(s) Nebulizer every 6 hours  ALPRAZolam 0.5 milliGRAM(s) Oral two times a day PRN  aspirin  chewable 81 milliGRAM(s) Oral daily  atorvastatin 40 milliGRAM(s) Oral at bedtime  bisacodyl 5 milliGRAM(s) Oral every 12 hours PRN  carvedilol 3.125 milliGRAM(s) Oral every 12 hours  chlorhexidine 2% Cloths 1 Application(s) Topical daily  dextrose 5%. 1000 milliLiter(s) IV Continuous <Continuous>  dextrose 5%. 1000 milliLiter(s) IV Continuous <Continuous>  dextrose 50% Injectable 25 Gram(s) IV Push once  dextrose 50% Injectable 12.5 Gram(s) IV Push once  dextrose 50% Injectable 25 Gram(s) IV Push once  dextrose Oral Gel 15 Gram(s) Oral once  fluticasone propionate/ salmeterol 500-50 MICROgram(s) Diskus 1 Dose(s) Inhalation two times a day  gabapentin 300 milliGRAM(s) Oral daily  glucagon  Injectable 1 milliGRAM(s) IntraMuscular once  heparin   Injectable 5000 Unit(s) SubCutaneous every 8 hours  methylPREDNISolone sodium succinate Injectable 40 milliGRAM(s) IV Push every 8 hours  ondansetron Injectable 4 milliGRAM(s) IV Push four times a day PRN  pantoprazole  Injectable 40 milliGRAM(s) IV Push daily  piperacillin/tazobactam IVPB.. 3.375 Gram(s) IV Intermittent every 8 hours  polyethylene glycol 3350 17 Gram(s) Oral daily  senna 2 Tablet(s) Oral at bedtime  sodium chloride 1 Gram(s) Oral three times a day  tiotropium 2.5 MICROgram(s) Inhaler 2 Puff(s) Inhalation daily  urea Oral Powder 15 Gram(s) Oral two times a day  venlafaxine  milliGRAM(s) Oral daily  zolpidem 5 milliGRAM(s) Oral at bedtime      PHYSICAL EXAM:  Vital Signs Last 24 Hrs  T(C): 37 (28 Sep 2024 15:29), Max: 38 (28 Sep 2024 02:00)  T(F): 98.6 (28 Sep 2024 15:29), Max: 100.4 (28 Sep 2024 02:00)  HR: 95 (28 Sep 2024 14:00) (95 - 122)  BP: 99/66 (28 Sep 2024 14:00) (90/72 - 139/92)  BP(mean): 74 (28 Sep 2024 14:00) (72 - 108)  RR: 24 (28 Sep 2024 14:00) (16 - 35)  SpO2: 98% (28 Sep 2024 14:00) (94% - 100%)    Parameters below as of 28 Sep 2024 14:00  Patient On (Oxygen Delivery Method): nasal cannula  O2 Flow (L/min): 5        CONSTITUTIONAL: no apparent distress  RESP: No respiratory distress, clear to auscultation bilaterally, no wheezes or rales  CV: RRR, +S1S2, no peripheral edema  GI: Soft, NT, ND, no rebound, no guarding  PSYCH: A+O x 3, mood and affect appropriate  NEURO: Cooperative, upper and lower motor function grossly intact bilaterally, sensation grossly intact throughout      LABS:                        11.7   10.69 )-----------( 454      ( 27 Sep 2024 22:27 )             35.7     09-28    127[L]  |  87[L]  |  24.8[H]  ----------------------------<  130[H]  3.9   |  24.0  |  0.86    Ca    8.6      28 Sep 2024 04:30  Phos  3.7     09-27  Mg     2.1     09-27    TPro  6.4[L]  /  Alb  3.1[L]  /  TBili  0.2[L]  /  DBili  x   /  AST  32[H]  /  ALT  17  /  AlkPhos  73  09-27          Urinalysis Basic - ( 28 Sep 2024 04:30 )    Color: x / Appearance: x / SG: x / pH: x  Gluc: 130 mg/dL / Ketone: x  / Bili: x / Urobili: x   Blood: x / Protein: x / Nitrite: x   Leuk Esterase: x / RBC: x / WBC x   Sq Epi: x / Non Sq Epi: x / Bacteria: x        CAPILLARY BLOOD GLUCOSE      POCT Blood Glucose.: 136 mg/dL (28 Sep 2024 06:35)  POCT Blood Glucose.: 158 mg/dL (28 Sep 2024 01:09)  POCT Blood Glucose.: 198 mg/dL (27 Sep 2024 21:32)      IMAGING:

## 2024-09-28 NOTE — RAPID RESPONSE TEAM SUMMARY - NSOTHERINTERVENTIONSRRT_GEN_ALL_CORE
Repeat ABG in 1 hr and reassess need for AVAPS.  If pt awake and abg reading improved, pt may have a break from AVAPs and then replace Bipap at nocturnal schedule.   Stressed with Pt's  and RN importance pt wearing nocturnal Bipap.  Repeat ABG in 1 hr and reassess need for AVAPS.  If pt awake and abg reading improved, pt may have a break from AVAPs and then replace Bipap at nocturnal schedule.   Stressed with Pt's  and RN importance pt wearing nocturnal Bipap.   Continue on solumedrol, duonebs, advair  Incentive spirometer

## 2024-09-28 NOTE — RAPID RESPONSE TEAM SUMMARY - NSSITUATIONBACKGROUNDRRT_GEN_ALL_CORE
RRT called by RN due to pt became unresponsive with increased work of breathing.  /81  RR 35 SPO2 not registering temp 37.4  RN had seen pt about an hour ago. At that time pt was A & O X 4, while on NC 4L . RN was unable to awaken pt and concerned about her breathing pattern.  Pt had similar episode last night, when she had not been wearing the nocturnal bipap ordered and became hypercapnic and unresponsive, requiring AVAPS. Mental status, breathing and abg reading improved by morning. Pt now was placed on AVAPS and abg performed. Concern for pt not protecting airway, MICU consulted. When MICU attending arrived at bedside, pt was able to be awoken with painful stimuli.  Slowly becoming more awake.  pH 7.18 pCO2 78. Pt now following commands    Alert, wearing   PERRLA  Heart - tachy S1S2  Chest-  RRT called by RN due to pt became unresponsive with increased work of breathing.  /81  RR 35 SPO2 not registering temp 37.4  RN had seen pt about an hour ago. At that time pt was A & O X 4, while on NC 4L . RN was unable to awaken pt and concerned about her breathing pattern.  Pt had similar episode last night, when she had not been wearing the nocturnal bipap ordered and became hypercapnic and unresponsive, requiring AVAPS. Mental status, breathing and abg reading improved by morning. Pt now was placed on AVAPS and abg performed. Concern for pt not protecting airway, MICU consulted. When MICU attending arrived at bedside, pt was able to be awoken with painful stimuli.  Slowly becoming more awake.  pH 7.18 pCO2 78. Pt now following commands. MICU recommends continuing to monitor pt and and repeat abg in 1  hr. No need for intubation at this time.     75 y/o F with a h/o CAD (s/p PCI), HFpEF, HTN, HLD, COPD (on home O2 PRN), recent admission for chest pain s/p C with nonobstructive CAD- discharged home 9/19, now readmitted on 9/22 with complaints abd pain nausea vomiting , hyponatremia, and acute hypercapnic respiratory failure    Vital Signs Last 24 Hrs  T(C): 37 (28 Sep 2024 15:29), Max: 38 (28 Sep 2024 02:00)  T(F): 98.6 (28 Sep 2024 15:29), Max: 100.4 (28 Sep 2024 02:00)  HR: 101 (28 Sep 2024 17:19) (95 - 122)  BP: 99/66 (28 Sep 2024 14:00) (90/72 - 137/95)  BP(mean): 74 (28 Sep 2024 14:00) (72 - 108)  RR: 24 (28 Sep 2024 14:00) (16 - 35)  SpO2: 99% (28 Sep 2024 17:19) (94% - 100%)    Parameters below as of 28 Sep 2024 14:00  Patient On (Oxygen Delivery Method): nasal cannula  O2 Flow (L/min): 5      Alert, wearing   PERRLA  Heart - tachy S1S2  Chest- tachypneic, now on AVAPS

## 2024-09-28 NOTE — PROGRESS NOTE ADULT - ASSESSMENT
CAD , HFpEF, HTN, HLD, COPD, recent admission for CP  s/p LHC discharged home 9/19  returned to the Hospital with abdominal pain , nausea vomiting    Chronic hyponatremia due to SIADH  Serum Na 126--> 129 --> 127  Acute hypovolemic hyponatremia (U Na+ < 30), repeat urine studies  - PO fluid restriction enforced again  - cont PO Urea and NaCl tabs --> will inc Urea powder to BID  - Avoid over correction of serum Na by more than 6- 8 mEq /L per 24 hrs  - may need to consider Tolvaptan trial    Monitor labs will follow

## 2024-09-28 NOTE — PROGRESS NOTE ADULT - ASSESSMENT
Asthma  Severe R-hemidiaphragm elevation; diaphragm paralysis  Hyponatremia - improved; s/p ICU  Hypoxic respiratory failure    Recommendations:  - Unfortunately pt is refusing to use NIPPV; would keep at bedside should there be any acute worsening of symptoms  - I reiterated to the patient the importance of using the BPAP machine at least QHS  - Would complete 7d course of ABx; currently on Zosyn  - Can taper SoluMedrol to 40mg IV Q12H - CO2 retention is not due to asthma, but rather her NIPPV non-compliance in combination with R-hemidiaphragm paralysis  - C/w PRN nebs  - Discuss GOC  - Monitor O2 requirements  - Will continue to follow      Dale Wynn MD, [facility]  , Pulmonary & Critical Care Medicine  [facility]  Pulmonary and Sleep Medicine at [place]  [address]  [place] [address]  T: [phone]  F: [phone]

## 2024-09-29 LAB
ANION GAP SERPL CALC-SCNC: 10 MMOL/L — SIGNIFICANT CHANGE UP (ref 5–17)
BASE EXCESS BLDA CALC-SCNC: 4.7 MMOL/L — HIGH (ref -2–3)
BUN SERPL-MCNC: 21.1 MG/DL — HIGH (ref 8–20)
CALCIUM SERPL-MCNC: 8.3 MG/DL — LOW (ref 8.4–10.5)
CAT DANDER IGE QN: SIGNIFICANT CHANGE UP
CHLORIDE SERPL-SCNC: 92 MMOL/L — LOW (ref 96–108)
CO2 SERPL-SCNC: 29 MMOL/L — SIGNIFICANT CHANGE UP (ref 22–29)
COMMON RAGWEED IGE QN: SIGNIFICANT CHANGE UP
CREAT SERPL-MCNC: 0.79 MG/DL — SIGNIFICANT CHANGE UP (ref 0.5–1.3)
D FARINAE IGE QN: SIGNIFICANT CHANGE UP
DEPRECATED CAT DANDER IGE RAST QL: SIGNIFICANT CHANGE UP (ref 0–0)
DEPRECATED COMMON RAGWEED IGE RAST QL: SIGNIFICANT CHANGE UP (ref 0–0)
DEPRECATED D FARINAE IGE RAST QL: SIGNIFICANT CHANGE UP (ref 0–0)
DEPRECATED TIMOTHY IGE RAST QL: SIGNIFICANT CHANGE UP (ref 0–0)
DOG DANDER IGE QN: SIGNIFICANT CHANGE UP
DOG DANDER IGE QN: SIGNIFICANT CHANGE UP (ref 0–0)
EGFR: 78 ML/MIN/1.73M2 — SIGNIFICANT CHANGE UP
GLUCOSE BLDC GLUCOMTR-MCNC: 104 MG/DL — HIGH (ref 70–99)
GLUCOSE BLDC GLUCOMTR-MCNC: 116 MG/DL — HIGH (ref 70–99)
GLUCOSE BLDC GLUCOMTR-MCNC: 143 MG/DL — HIGH (ref 70–99)
GLUCOSE BLDC GLUCOMTR-MCNC: 167 MG/DL — HIGH (ref 70–99)
GLUCOSE SERPL-MCNC: 110 MG/DL — HIGH (ref 70–99)
HCO3 BLDA-SCNC: 31 MMOL/L — HIGH (ref 21–28)
HCT VFR BLD CALC: 30.7 % — LOW (ref 34.5–45)
HGB BLD-MCNC: 10.3 G/DL — LOW (ref 11.5–15.5)
MAGNESIUM SERPL-MCNC: 2 MG/DL — SIGNIFICANT CHANGE UP (ref 1.6–2.6)
MCHC RBC-ENTMCNC: 29.6 PG — SIGNIFICANT CHANGE UP (ref 27–34)
MCHC RBC-ENTMCNC: 33.6 GM/DL — SIGNIFICANT CHANGE UP (ref 32–36)
MCV RBC AUTO: 88.2 FL — SIGNIFICANT CHANGE UP (ref 80–100)
PCO2 BLDA: 65 MMHG — HIGH (ref 32–45)
PH BLDA: 7.29 — LOW (ref 7.35–7.45)
PHOSPHATE SERPL-MCNC: 2.1 MG/DL — LOW (ref 2.4–4.7)
PLATELET # BLD AUTO: 422 K/UL — HIGH (ref 150–400)
PO2 BLDA: 202 MMHG — HIGH (ref 83–108)
POTASSIUM SERPL-MCNC: 3.9 MMOL/L — SIGNIFICANT CHANGE UP (ref 3.5–5.3)
POTASSIUM SERPL-SCNC: 3.9 MMOL/L — SIGNIFICANT CHANGE UP (ref 3.5–5.3)
PROCALCITONIN SERPL-MCNC: 0.4 NG/ML — HIGH (ref 0.02–0.1)
RBC # BLD: 3.48 M/UL — LOW (ref 3.8–5.2)
RBC # FLD: 12 % — SIGNIFICANT CHANGE UP (ref 10.3–14.5)
SAO2 % BLDA: 100 % — HIGH (ref 94–98)
SODIUM SERPL-SCNC: 131 MMOL/L — LOW (ref 135–145)
TIMOTHY IGE QN: SIGNIFICANT CHANGE UP
TOTAL IGE SMQN RAST: SIGNIFICANT CHANGE UP
TREE ALLERG MIX1 IGE QL: SIGNIFICANT CHANGE UP
TREE ALLERG MIX1 IGE RAST: SIGNIFICANT CHANGE UP (ref 0–0)
WBC # BLD: 12.05 K/UL — HIGH (ref 3.8–10.5)
WBC # FLD AUTO: 12.05 K/UL — HIGH (ref 3.8–10.5)

## 2024-09-29 PROCEDURE — 36415 COLL VENOUS BLD VENIPUNCTURE: CPT

## 2024-09-29 PROCEDURE — 84300 ASSAY OF URINE SODIUM: CPT

## 2024-09-29 PROCEDURE — 85025 COMPLETE CBC W/AUTO DIFF WBC: CPT

## 2024-09-29 PROCEDURE — 85730 THROMBOPLASTIN TIME PARTIAL: CPT

## 2024-09-29 PROCEDURE — 94640 AIRWAY INHALATION TREATMENT: CPT

## 2024-09-29 PROCEDURE — 71045 X-RAY EXAM CHEST 1 VIEW: CPT

## 2024-09-29 PROCEDURE — 96374 THER/PROPH/DIAG INJ IV PUSH: CPT

## 2024-09-29 PROCEDURE — 85610 PROTHROMBIN TIME: CPT

## 2024-09-29 PROCEDURE — 83935 ASSAY OF URINE OSMOLALITY: CPT

## 2024-09-29 PROCEDURE — 83930 ASSAY OF BLOOD OSMOLALITY: CPT

## 2024-09-29 PROCEDURE — 80307 DRUG TEST PRSMV CHEM ANLYZR: CPT

## 2024-09-29 PROCEDURE — 93458 L HRT ARTERY/VENTRICLE ANGIO: CPT

## 2024-09-29 PROCEDURE — C8929: CPT

## 2024-09-29 PROCEDURE — 81001 URINALYSIS AUTO W/SCOPE: CPT

## 2024-09-29 PROCEDURE — 93799 UNLISTED CV SVC/PROCEDURE: CPT

## 2024-09-29 PROCEDURE — 99285 EMERGENCY DEPT VISIT HI MDM: CPT | Mod: 25

## 2024-09-29 PROCEDURE — 84484 ASSAY OF TROPONIN QUANT: CPT

## 2024-09-29 PROCEDURE — 99232 SBSQ HOSP IP/OBS MODERATE 35: CPT

## 2024-09-29 PROCEDURE — 80053 COMPREHEN METABOLIC PANEL: CPT

## 2024-09-29 PROCEDURE — 80048 BASIC METABOLIC PNL TOTAL CA: CPT

## 2024-09-29 PROCEDURE — 84550 ASSAY OF BLOOD/URIC ACID: CPT

## 2024-09-29 PROCEDURE — 85027 COMPLETE CBC AUTOMATED: CPT

## 2024-09-29 PROCEDURE — 83880 ASSAY OF NATRIURETIC PEPTIDE: CPT

## 2024-09-29 PROCEDURE — 82570 ASSAY OF URINE CREATININE: CPT

## 2024-09-29 PROCEDURE — C1887: CPT

## 2024-09-29 PROCEDURE — 83690 ASSAY OF LIPASE: CPT

## 2024-09-29 PROCEDURE — 93005 ELECTROCARDIOGRAM TRACING: CPT

## 2024-09-29 PROCEDURE — 93931 UPPER EXTREMITY STUDY: CPT

## 2024-09-29 PROCEDURE — 83735 ASSAY OF MAGNESIUM: CPT

## 2024-09-29 PROCEDURE — 84100 ASSAY OF PHOSPHORUS: CPT

## 2024-09-29 PROCEDURE — C1894: CPT

## 2024-09-29 PROCEDURE — C1769: CPT

## 2024-09-29 RX ORDER — MAGNESIUM HYDROXIDE 400 MG/5ML
30 SUSPENSION, ORAL (FINAL DOSE FORM) ORAL DAILY
Refills: 0 | Status: DISCONTINUED | OUTPATIENT
Start: 2024-09-29 | End: 2024-10-09

## 2024-09-29 RX ORDER — ANTACID TABLETS 500 MG/1
1 TABLET, CHEWABLE ORAL ONCE
Refills: 0 | Status: COMPLETED | OUTPATIENT
Start: 2024-09-29 | End: 2024-09-29

## 2024-09-29 RX ORDER — MAG HYDROX/ALUMINUM HYD/SIMETH 200-200-20
30 SUSPENSION, ORAL (FINAL DOSE FORM) ORAL EVERY 4 HOURS
Refills: 0 | Status: DISCONTINUED | OUTPATIENT
Start: 2024-09-29 | End: 2024-10-09

## 2024-09-29 RX ADMIN — Medication 81 MILLIGRAM(S): at 12:23

## 2024-09-29 RX ADMIN — GABAPENTIN 300 MILLIGRAM(S): 800 TABLET, FILM COATED ORAL at 12:23

## 2024-09-29 RX ADMIN — Medication 1 DOSE(S): at 20:59

## 2024-09-29 RX ADMIN — IPRATROPIUM BROMIDE AND ALBUTEROL SULFATE 3 MILLILITER(S): .5; 3 SOLUTION RESPIRATORY (INHALATION) at 14:38

## 2024-09-29 RX ADMIN — Medication 5000 UNIT(S): at 23:45

## 2024-09-29 RX ADMIN — IPRATROPIUM BROMIDE AND ALBUTEROL SULFATE 3 MILLILITER(S): .5; 3 SOLUTION RESPIRATORY (INHALATION) at 03:06

## 2024-09-29 RX ADMIN — Medication 1 DOSE(S): at 08:42

## 2024-09-29 RX ADMIN — CHLORHEXIDINE GLUCONATE ORAL RINSE 1 APPLICATION(S): 1.2 SOLUTION DENTAL at 12:28

## 2024-09-29 RX ADMIN — Medication 650 MILLIGRAM(S): at 00:06

## 2024-09-29 RX ADMIN — ALPRAZOLAM 0.5 MILLIGRAM(S): 0.5 TABLET ORAL at 06:10

## 2024-09-29 RX ADMIN — Medication 3.12 MILLIGRAM(S): at 17:01

## 2024-09-29 RX ADMIN — Medication 4 MILLIGRAM(S): at 06:10

## 2024-09-29 RX ADMIN — Medication 17 GRAM(S): at 12:23

## 2024-09-29 RX ADMIN — Medication 3.12 MILLIGRAM(S): at 08:31

## 2024-09-29 RX ADMIN — PIPERACILLIN SODIUM AND TAZOBACTAM SODIUM 25 GRAM(S): 12; 1.5 INJECTION, POWDER, LYOPHILIZED, FOR SOLUTION INTRAVENOUS at 08:30

## 2024-09-29 RX ADMIN — Medication 15 GRAM(S): at 17:01

## 2024-09-29 RX ADMIN — Medication 5000 UNIT(S): at 17:01

## 2024-09-29 RX ADMIN — PIPERACILLIN SODIUM AND TAZOBACTAM SODIUM 25 GRAM(S): 12; 1.5 INJECTION, POWDER, LYOPHILIZED, FOR SOLUTION INTRAVENOUS at 16:58

## 2024-09-29 RX ADMIN — Medication 5000 UNIT(S): at 08:30

## 2024-09-29 RX ADMIN — Medication 1 GRAM(S): at 12:23

## 2024-09-29 RX ADMIN — METHYLPREDNISOLONE ACETATE 40 MILLIGRAM(S): 80 INJECTION, SUSPENSION INTRA-ARTICULAR; INTRALESIONAL; INTRAMUSCULAR; SOFT TISSUE at 06:07

## 2024-09-29 RX ADMIN — Medication 1 GRAM(S): at 06:07

## 2024-09-29 RX ADMIN — Medication 5 MILLIGRAM(S): at 21:39

## 2024-09-29 RX ADMIN — Medication 225 MILLIGRAM(S): at 12:23

## 2024-09-29 RX ADMIN — METHYLPREDNISOLONE ACETATE 40 MILLIGRAM(S): 80 INJECTION, SUSPENSION INTRA-ARTICULAR; INTRALESIONAL; INTRAMUSCULAR; SOFT TISSUE at 21:40

## 2024-09-29 RX ADMIN — ATORVASTATIN CALCIUM 40 MILLIGRAM(S): 10 TABLET, FILM COATED ORAL at 21:39

## 2024-09-29 RX ADMIN — IPRATROPIUM BROMIDE AND ALBUTEROL SULFATE 3 MILLILITER(S): .5; 3 SOLUTION RESPIRATORY (INHALATION) at 20:51

## 2024-09-29 RX ADMIN — ANTACID TABLETS 1 TABLET(S): 500 TABLET, CHEWABLE ORAL at 21:39

## 2024-09-29 RX ADMIN — PANTOPRAZOLE SODIUM 40 MILLIGRAM(S): 40 TABLET, DELAYED RELEASE ORAL at 12:23

## 2024-09-29 RX ADMIN — TIOTROPIUM BROMIDE INHALATION SPRAY 2 PUFF(S): 3.12 SPRAY, METERED RESPIRATORY (INHALATION) at 07:34

## 2024-09-29 RX ADMIN — Medication 15 GRAM(S): at 06:07

## 2024-09-29 RX ADMIN — IPRATROPIUM BROMIDE AND ALBUTEROL SULFATE 3 MILLILITER(S): .5; 3 SOLUTION RESPIRATORY (INHALATION) at 08:42

## 2024-09-29 RX ADMIN — Medication 1 GRAM(S): at 21:39

## 2024-09-29 RX ADMIN — METHYLPREDNISOLONE ACETATE 40 MILLIGRAM(S): 80 INJECTION, SUSPENSION INTRA-ARTICULAR; INTRALESIONAL; INTRAMUSCULAR; SOFT TISSUE at 12:23

## 2024-09-29 NOTE — PROGRESS NOTE ADULT - ASSESSMENT
73 yo F w h/o CAD , HFpEF, HTN, HLD, COPD, recent admission for CP  s/p LHC discharged home 9/19  returned to the Hospital with abdominal pain , nausea vomiting    Chronic hyponatremia due to SIADH  Serum Na 126--> 129 --> 127 --> 131  Acute hypovolemic hyponatremia (U Na+ < 30), repeat urine studies  - PO fluid restriction enforced again  - cont PO Urea and NaCl tabs --> will inc Urea powder to BID  - Avoid over correction of serum Na by more than 6- 8 mEq /L per 24 hrs  - may need to consider Tolvaptan trial    Monitor labs will follow

## 2024-09-29 NOTE — PROGRESS NOTE ADULT - SUBJECTIVE AND OBJECTIVE BOX
NEPHROLOGY INTERVAL HPI/OVERNIGHT EVENTS:    Examined earlier  No distress  Awake alert  denies HA CP no SOB    MEDICATIONS  (STANDING):  albuterol/ipratropium for Nebulization 3 milliLiter(s) Nebulizer every 6 hours  aspirin  chewable 81 milliGRAM(s) Oral daily  atorvastatin 40 milliGRAM(s) Oral at bedtime  carvedilol 3.125 milliGRAM(s) Oral every 12 hours  chlorhexidine 2% Cloths 1 Application(s) Topical daily  dextrose 5%. 1000 milliLiter(s) (100 mL/Hr) IV Continuous <Continuous>  dextrose 5%. 1000 milliLiter(s) (50 mL/Hr) IV Continuous <Continuous>  dextrose 50% Injectable 25 Gram(s) IV Push once  dextrose 50% Injectable 12.5 Gram(s) IV Push once  dextrose 50% Injectable 25 Gram(s) IV Push once  dextrose Oral Gel 15 Gram(s) Oral once  fluticasone propionate/ salmeterol 500-50 MICROgram(s) Diskus 1 Dose(s) Inhalation two times a day  gabapentin 300 milliGRAM(s) Oral daily  glucagon  Injectable 1 milliGRAM(s) IntraMuscular once  heparin   Injectable 5000 Unit(s) SubCutaneous every 8 hours  methylPREDNISolone sodium succinate Injectable 40 milliGRAM(s) IV Push every 8 hours  pantoprazole  Injectable 40 milliGRAM(s) IV Push daily  piperacillin/tazobactam IVPB.. 3.375 Gram(s) IV Intermittent every 8 hours  polyethylene glycol 3350 17 Gram(s) Oral daily  senna 2 Tablet(s) Oral at bedtime  sodium chloride 1 Gram(s) Oral three times a day  tiotropium 2.5 MICROgram(s) Inhaler 2 Puff(s) Inhalation daily  urea Oral Powder 15 Gram(s) Oral two times a day  venlafaxine  milliGRAM(s) Oral daily  zolpidem 5 milliGRAM(s) Oral at bedtime    MEDICATIONS  (PRN):  acetaminophen     Tablet .. 650 milliGRAM(s) Oral every 6 hours PRN Temp greater or equal to 38C (100.4F), Mild Pain (1 - 3), Moderate Pain (4 - 6)  ALPRAZolam 0.5 milliGRAM(s) Oral two times a day PRN anxiety  bisacodyl 5 milliGRAM(s) Oral every 12 hours PRN Constipation  ondansetron Injectable 4 milliGRAM(s) IV Push four times a day PRN Nausea and/or Vomiting      Allergies    No Known Allergies    Intolerances        Vital Signs Last 24 Hrs  T(C): 37.7 (29 Sep 2024 08:00), Max: 37.9 (28 Sep 2024 22:00)  T(F): 99.9 (29 Sep 2024 08:00), Max: 100.2 (28 Sep 2024 22:00)  HR: 101 (29 Sep 2024 08:00) (90 - 108)  BP: 128/78 (29 Sep 2024 08:00) (92/54 - 128/78)  BP(mean): 93 (29 Sep 2024 08:00) (67 - 93)  RR: 20 (29 Sep 2024 08:00) (17 - 26)  SpO2: 99% (29 Sep 2024 08:00) (96% - 100%)    Parameters below as of 29 Sep 2024 08:00  Patient On (Oxygen Delivery Method): nasal cannula  O2 Flow (L/min): 4    Daily     Daily     PHYSICAL EXAM:  GENERAL: Frail, lethargic  HEAD: BiPAP mask  NECK: Supple, No JVD  NERVOUS SYSTEM: Alert & Oriented X3  CHEST/LUNG: Clear bilaterally  HEART: Regular rate and rhythm; No rub  ABDOMEN: Soft, Nontender, +BS  EXTREMITIES: Tr dependent edema    LABS:                        10.3   12.05 )-----------( 422      ( 29 Sep 2024 03:56 )             30.7     09-29    131[L]  |  92[L]  |  21.1[H]  ----------------------------<  110[H]  3.9   |  29.0  |  0.79    Ca    8.3[L]      29 Sep 2024 03:56  Phos  2.1     09-29  Mg     2.0     09-29    TPro  6.4[L]  /  Alb  3.1[L]  /  TBili  0.2[L]  /  DBili  x   /  AST  32[H]  /  ALT  17  /  AlkPhos  73  09-27      Urinalysis Basic - ( 29 Sep 2024 03:56 )    Color: x / Appearance: x / SG: x / pH: x  Gluc: 110 mg/dL / Ketone: x  / Bili: x / Urobili: x   Blood: x / Protein: x / Nitrite: x   Leuk Esterase: x / RBC: x / WBC x   Sq Epi: x / Non Sq Epi: x / Bacteria: x      Magnesium: 2.0 mg/dL (09-29 @ 03:56)  Phosphorus: 2.1 mg/dL (09-29 @ 03:56)    ABG - ( 28 Sep 2024 18:15 )  pH, Arterial: 7.320 pH, Blood: x     /  pCO2: 55    /  pO2: 92    / HCO3: 28    / Base Excess: 2.2   /  SaO2: 99.8                  RADIOLOGY & ADDITIONAL TESTS:

## 2024-09-29 NOTE — PROGRESS NOTE ADULT - SUBJECTIVE AND OBJECTIVE BOX
[facility] Division of Medicine    SUBJECTIVE / OVERNIGHT EVENTS: Wore bipap overnight. Pt seen at the bedside this morning and was awake and alert. Endorses feeling ok. Denies any new complaints. All other systems reviewed and are negative.    MEDICATIONS  (STANDING):  albuterol/ipratropium for Nebulization 3 milliLiter(s) Nebulizer every 6 hours  aspirin  chewable 81 milliGRAM(s) Oral daily  atorvastatin 40 milliGRAM(s) Oral at bedtime  carvedilol 3.125 milliGRAM(s) Oral every 12 hours  chlorhexidine 2% Cloths 1 Application(s) Topical daily  dextrose 5%. 1000 milliLiter(s) (100 mL/Hr) IV Continuous <Continuous>  dextrose 5%. 1000 milliLiter(s) (50 mL/Hr) IV Continuous <Continuous>  dextrose 50% Injectable 25 Gram(s) IV Push once  dextrose 50% Injectable 12.5 Gram(s) IV Push once  dextrose 50% Injectable 25 Gram(s) IV Push once  dextrose Oral Gel 15 Gram(s) Oral once  fluticasone propionate/ salmeterol 500-50 MICROgram(s) Diskus 1 Dose(s) Inhalation two times a day  gabapentin 300 milliGRAM(s) Oral daily  glucagon  Injectable 1 milliGRAM(s) IntraMuscular once  heparin   Injectable 5000 Unit(s) SubCutaneous every 8 hours  methylPREDNISolone sodium succinate Injectable 40 milliGRAM(s) IV Push every 8 hours  pantoprazole  Injectable 40 milliGRAM(s) IV Push daily  piperacillin/tazobactam IVPB.. 3.375 Gram(s) IV Intermittent every 8 hours  polyethylene glycol 3350 17 Gram(s) Oral daily  senna 2 Tablet(s) Oral at bedtime  sodium chloride 1 Gram(s) Oral three times a day  tiotropium 2.5 MICROgram(s) Inhaler 2 Puff(s) Inhalation daily  urea Oral Powder 15 Gram(s) Oral two times a day  venlafaxine  milliGRAM(s) Oral daily  zolpidem 5 milliGRAM(s) Oral at bedtime    MEDICATIONS  (PRN):  acetaminophen     Tablet .. 650 milliGRAM(s) Oral every 6 hours PRN Temp greater or equal to 38C (100.4F), Mild Pain (1 - 3), Moderate Pain (4 - 6)  ALPRAZolam 0.5 milliGRAM(s) Oral two times a day PRN anxiety  bisacodyl 5 milliGRAM(s) Oral every 12 hours PRN Constipation  magnesium hydroxide Suspension 30 milliLiter(s) Oral daily PRN Constipation  ondansetron Injectable 4 milliGRAM(s) IV Push four times a day PRN Nausea and/or Vomiting      I&O's Summary    28 Sep 2024 07:01  -  29 Sep 2024 07:00  --------------------------------------------------------  IN: 350 mL / OUT: 150 mL / NET: 200 mL        acetaminophen     Tablet .. 650 milliGRAM(s) Oral every 6 hours PRN  albuterol/ipratropium for Nebulization 3 milliLiter(s) Nebulizer every 6 hours  ALPRAZolam 0.5 milliGRAM(s) Oral two times a day PRN  aspirin  chewable 81 milliGRAM(s) Oral daily  atorvastatin 40 milliGRAM(s) Oral at bedtime  bisacodyl 5 milliGRAM(s) Oral every 12 hours PRN  carvedilol 3.125 milliGRAM(s) Oral every 12 hours  chlorhexidine 2% Cloths 1 Application(s) Topical daily  dextrose 5%. 1000 milliLiter(s) IV Continuous <Continuous>  dextrose 5%. 1000 milliLiter(s) IV Continuous <Continuous>  dextrose 50% Injectable 25 Gram(s) IV Push once  dextrose 50% Injectable 12.5 Gram(s) IV Push once  dextrose 50% Injectable 25 Gram(s) IV Push once  dextrose Oral Gel 15 Gram(s) Oral once  fluticasone propionate/ salmeterol 500-50 MICROgram(s) Diskus 1 Dose(s) Inhalation two times a day  gabapentin 300 milliGRAM(s) Oral daily  glucagon  Injectable 1 milliGRAM(s) IntraMuscular once  heparin   Injectable 5000 Unit(s) SubCutaneous every 8 hours  magnesium hydroxide Suspension 30 milliLiter(s) Oral daily PRN  methylPREDNISolone sodium succinate Injectable 40 milliGRAM(s) IV Push every 8 hours  ondansetron Injectable 4 milliGRAM(s) IV Push four times a day PRN  pantoprazole  Injectable 40 milliGRAM(s) IV Push daily  piperacillin/tazobactam IVPB.. 3.375 Gram(s) IV Intermittent every 8 hours  polyethylene glycol 3350 17 Gram(s) Oral daily  senna 2 Tablet(s) Oral at bedtime  sodium chloride 1 Gram(s) Oral three times a day  tiotropium 2.5 MICROgram(s) Inhaler 2 Puff(s) Inhalation daily  urea Oral Powder 15 Gram(s) Oral two times a day  venlafaxine  milliGRAM(s) Oral daily  zolpidem 5 milliGRAM(s) Oral at bedtime      PHYSICAL EXAM:  Vital Signs Last 24 Hrs  T(C): 36.6 (29 Sep 2024 12:00), Max: 37.9 (28 Sep 2024 22:00)  T(F): 97.9 (29 Sep 2024 12:00), Max: 100.2 (28 Sep 2024 22:00)  HR: 92 (29 Sep 2024 14:00) (89 - 108)  BP: 111/58 (29 Sep 2024 14:00) (92/54 - 128/78)  BP(mean): 73 (29 Sep 2024 14:00) (63 - 93)  RR: 20 (29 Sep 2024 14:00) (17 - 26)  SpO2: 100% (29 Sep 2024 14:00) (96% - 100%)    Parameters below as of 29 Sep 2024 14:00  Patient On (Oxygen Delivery Method): nasal cannula  O2 Flow (L/min): 4        CONSTITUTIONAL: no apparent distress  RESP: No respiratory distress, clear to auscultation bilaterally, no wheezes or rales  CV: RRR, +S1S2, no peripheral edema  GI: Soft, NT, ND, no rebound, no guarding  PSYCH: A+O x 3, mood and affect appropriate  NEURO: Cooperative, upper and lower motor function grossly intact bilaterally, sensation grossly intact throughout      LABS:                        10.3   12.05 )-----------( 422      ( 29 Sep 2024 03:56 )             30.7     09-29    131[L]  |  92[L]  |  21.1[H]  ----------------------------<  110[H]  3.9   |  29.0  |  0.79    Ca    8.3[L]      29 Sep 2024 03:56  Phos  2.1     09-29  Mg     2.0     09-29    TPro  6.4[L]  /  Alb  3.1[L]  /  TBili  0.2[L]  /  DBili  x   /  AST  32[H]  /  ALT  17  /  AlkPhos  73  09-27          Urinalysis Basic - ( 29 Sep 2024 03:56 )    Color: x / Appearance: x / SG: x / pH: x  Gluc: 110 mg/dL / Ketone: x  / Bili: x / Urobili: x   Blood: x / Protein: x / Nitrite: x   Leuk Esterase: x / RBC: x / WBC x   Sq Epi: x / Non Sq Epi: x / Bacteria: x        CAPILLARY BLOOD GLUCOSE      POCT Blood Glucose.: 143 mg/dL (29 Sep 2024 12:18)  POCT Blood Glucose.: 104 mg/dL (29 Sep 2024 06:06)  POCT Blood Glucose.: 113 mg/dL (28 Sep 2024 23:00)  POCT Blood Glucose.: 196 mg/dL (28 Sep 2024 16:49)      IMAGING:

## 2024-09-29 NOTE — PROGRESS NOTE ADULT - ASSESSMENT
75 y/o F with a h/o CAD (s/p PCI), HFpEF, HTN, HLD, COPD (on home O2 PRN), recent admission for chest pain s/p C with nonobstructive CAD- discharged home 9/19, now readmitted on 9/22 with complaints abd pain nausea vomiting , hyponatremia, and acute hypercapnic respiratory failure    Acute on chronic hypercapnic respiratory failure 2/2 to COPD exacerbation  - baseline 4L NC  - currently on 4-5L NC  - pt accumulated CO2 rather quickly  - strongly encouraged to wear bipap at night  - pulmonary following   - c/w IV steroids and duonebs q6hr  - check AM ABG tomorrow, if stable will downgrade     History of CAD s/p PCI  HFpEF  - cont home meds aspirin, carvedilol, statin    Hyponatremia - improving  History of chronic SIADH  Hyperkalemia - resolved  - monitor  - h/o SIADH in the past   - c/w fluid restriction  - nephro following  - c/w urea powder and salt tabs  - trend BMP    DVT: heparin ppx   Dispo: medically active   GOC - full code     75 y/o F with a h/o CAD (s/p PCI), HFpEF, HTN, HLD, COPD (on home O2 PRN), recent admission for chest pain s/p C with nonobstructive CAD- discharged home 9/19, now readmitted on 9/22 with complaints abd pain nausea vomiting , hyponatremia, and acute hypercapnic respiratory failure    Acute on chronic hypercapnic respiratory failure 2/2 to COPD exacerbation  - baseline 4L NC  - currently on 4-5L NC  - pt accumulated CO2 rather quickly  - strongly encouraged to wear bipap at night  - pulmonary following   - c/w IV steroids and duonebs q6hr  - check AM ABG tomorrow, if stable will downgrade   - needs OOB to chair and incentive spirometry, discussed with RN    History of CAD s/p PCI  HFpEF  - cont home meds aspirin, carvedilol, statin    Hyponatremia - improving  History of chronic SIADH  Hyperkalemia - resolved  - monitor  - h/o SIADH in the past   - c/w fluid restriction  - nephro following  - c/w urea powder and salt tabs  - trend BMP    DVT: heparin ppx   Dispo: medically active   GOC - full code

## 2024-09-30 LAB
ANION GAP SERPL CALC-SCNC: 9 MMOL/L — SIGNIFICANT CHANGE UP (ref 5–17)
APPEARANCE UR: ABNORMAL
BACTERIA # UR AUTO: ABNORMAL /HPF
BASE EXCESS BLDA CALC-SCNC: 8.3 MMOL/L — HIGH (ref -2–3)
BASOPHILS # BLD AUTO: 0.11 K/UL — SIGNIFICANT CHANGE UP (ref 0–0.2)
BASOPHILS NFR BLD AUTO: 0.7 % — SIGNIFICANT CHANGE UP (ref 0–2)
BILIRUB UR-MCNC: NEGATIVE — SIGNIFICANT CHANGE UP
BLOOD GAS COMMENTS ARTERIAL: SIGNIFICANT CHANGE UP
BUN SERPL-MCNC: 37.3 MG/DL — HIGH (ref 8–20)
CALCIUM SERPL-MCNC: 8.6 MG/DL — SIGNIFICANT CHANGE UP (ref 8.4–10.5)
CHLORIDE SERPL-SCNC: 96 MMOL/L — SIGNIFICANT CHANGE UP (ref 96–108)
CO2 SERPL-SCNC: 29 MMOL/L — SIGNIFICANT CHANGE UP (ref 22–29)
COLOR SPEC: YELLOW — SIGNIFICANT CHANGE UP
CREAT SERPL-MCNC: 0.57 MG/DL — SIGNIFICANT CHANGE UP (ref 0.5–1.3)
DIFF PNL FLD: ABNORMAL
EGFR: 95 ML/MIN/1.73M2 — SIGNIFICANT CHANGE UP
EOSINOPHIL # BLD AUTO: 0 K/UL — SIGNIFICANT CHANGE UP (ref 0–0.5)
EOSINOPHIL NFR BLD AUTO: 0 % — SIGNIFICANT CHANGE UP (ref 0–6)
GAS PNL BLDA: SIGNIFICANT CHANGE UP
GLUCOSE BLDC GLUCOMTR-MCNC: 110 MG/DL — HIGH (ref 70–99)
GLUCOSE BLDC GLUCOMTR-MCNC: 111 MG/DL — HIGH (ref 70–99)
GLUCOSE BLDC GLUCOMTR-MCNC: 84 MG/DL — SIGNIFICANT CHANGE UP (ref 70–99)
GLUCOSE SERPL-MCNC: 105 MG/DL — HIGH (ref 70–99)
GLUCOSE UR QL: NEGATIVE MG/DL — SIGNIFICANT CHANGE UP
HCO3 BLDA-SCNC: 33 MMOL/L — HIGH (ref 21–28)
HCT VFR BLD CALC: 31.1 % — LOW (ref 34.5–45)
HGB BLD-MCNC: 10.6 G/DL — LOW (ref 11.5–15.5)
HOROWITZ INDEX BLDA+IHG-RTO: SIGNIFICANT CHANGE UP
IMM GRANULOCYTES NFR BLD AUTO: 5.4 % — HIGH (ref 0–0.9)
KETONES UR-MCNC: ABNORMAL MG/DL
LEUKOCYTE ESTERASE UR-ACNC: ABNORMAL
LYMPHOCYTES # BLD AUTO: 1.19 K/UL — SIGNIFICANT CHANGE UP (ref 1–3.3)
LYMPHOCYTES # BLD AUTO: 7.6 % — LOW (ref 13–44)
MCHC RBC-ENTMCNC: 31.1 PG — SIGNIFICANT CHANGE UP (ref 27–34)
MCHC RBC-ENTMCNC: 34.1 GM/DL — SIGNIFICANT CHANGE UP (ref 32–36)
MCV RBC AUTO: 91.2 FL — SIGNIFICANT CHANGE UP (ref 80–100)
MONOCYTES # BLD AUTO: 1.48 K/UL — HIGH (ref 0–0.9)
MONOCYTES NFR BLD AUTO: 9.5 % — SIGNIFICANT CHANGE UP (ref 2–14)
NEUTROPHILS # BLD AUTO: 12 K/UL — HIGH (ref 1.8–7.4)
NEUTROPHILS NFR BLD AUTO: 76.8 % — SIGNIFICANT CHANGE UP (ref 43–77)
NITRITE UR-MCNC: NEGATIVE — SIGNIFICANT CHANGE UP
OSMOLALITY UR: 751 MOSM/KG — SIGNIFICANT CHANGE UP (ref 300–1000)
PCO2 BLDA: 55 MMHG — HIGH (ref 32–45)
PH BLDA: 7.39 — SIGNIFICANT CHANGE UP (ref 7.35–7.45)
PH UR: 6.5 — SIGNIFICANT CHANGE UP (ref 5–8)
PLATELET # BLD AUTO: 428 K/UL — HIGH (ref 150–400)
PO2 BLDA: 127 MMHG — HIGH (ref 83–108)
POTASSIUM SERPL-MCNC: 3.9 MMOL/L — SIGNIFICANT CHANGE UP (ref 3.5–5.3)
POTASSIUM SERPL-SCNC: 3.9 MMOL/L — SIGNIFICANT CHANGE UP (ref 3.5–5.3)
PROT UR-MCNC: 30 MG/DL
RAPID RVP RESULT: SIGNIFICANT CHANGE UP
RBC # BLD: 3.41 M/UL — LOW (ref 3.8–5.2)
RBC # FLD: 12.1 % — SIGNIFICANT CHANGE UP (ref 10.3–14.5)
RBC CASTS # UR COMP ASSIST: 9 /HPF — HIGH (ref 0–4)
SAO2 % BLDA: 100 % — HIGH (ref 94–98)
SARS-COV-2 RNA SPEC QL NAA+PROBE: SIGNIFICANT CHANGE UP
SODIUM SERPL-SCNC: 134 MMOL/L — LOW (ref 135–145)
SODIUM UR-SCNC: 52 MMOL/L — SIGNIFICANT CHANGE UP
SP GR SPEC: 1.02 — SIGNIFICANT CHANGE UP (ref 1–1.03)
SQUAMOUS # UR AUTO: 3 /HPF — SIGNIFICANT CHANGE UP (ref 0–5)
UROBILINOGEN FLD QL: 0.2 MG/DL — SIGNIFICANT CHANGE UP (ref 0.2–1)
WBC # BLD: 15.62 K/UL — HIGH (ref 3.8–10.5)
WBC # FLD AUTO: 15.62 K/UL — HIGH (ref 3.8–10.5)
WBC UR QL: 18 /HPF — HIGH (ref 0–5)
YEAST-LIKE CELLS: PRESENT

## 2024-09-30 PROCEDURE — 99233 SBSQ HOSP IP/OBS HIGH 50: CPT

## 2024-09-30 PROCEDURE — 71045 X-RAY EXAM CHEST 1 VIEW: CPT | Mod: 26

## 2024-09-30 PROCEDURE — 99232 SBSQ HOSP IP/OBS MODERATE 35: CPT

## 2024-09-30 RX ORDER — METHYLPREDNISOLONE ACETATE 80 MG/ML
40 INJECTION, SUSPENSION INTRA-ARTICULAR; INTRALESIONAL; INTRAMUSCULAR; SOFT TISSUE EVERY 12 HOURS
Refills: 0 | Status: DISCONTINUED | OUTPATIENT
Start: 2024-09-30 | End: 2024-10-06

## 2024-09-30 RX ADMIN — Medication 1 DOSE(S): at 07:43

## 2024-09-30 RX ADMIN — Medication 3.12 MILLIGRAM(S): at 05:28

## 2024-09-30 RX ADMIN — Medication 5000 UNIT(S): at 17:53

## 2024-09-30 RX ADMIN — IPRATROPIUM BROMIDE AND ALBUTEROL SULFATE 3 MILLILITER(S): .5; 3 SOLUTION RESPIRATORY (INHALATION) at 09:16

## 2024-09-30 RX ADMIN — ATORVASTATIN CALCIUM 40 MILLIGRAM(S): 10 TABLET, FILM COATED ORAL at 21:25

## 2024-09-30 RX ADMIN — IPRATROPIUM BROMIDE AND ALBUTEROL SULFATE 3 MILLILITER(S): .5; 3 SOLUTION RESPIRATORY (INHALATION) at 21:33

## 2024-09-30 RX ADMIN — Medication 15 GRAM(S): at 05:29

## 2024-09-30 RX ADMIN — Medication 81 MILLIGRAM(S): at 11:35

## 2024-09-30 RX ADMIN — Medication 1 GRAM(S): at 14:32

## 2024-09-30 RX ADMIN — Medication 4 MILLIGRAM(S): at 21:47

## 2024-09-30 RX ADMIN — GABAPENTIN 300 MILLIGRAM(S): 800 TABLET, FILM COATED ORAL at 11:35

## 2024-09-30 RX ADMIN — Medication 225 MILLIGRAM(S): at 11:35

## 2024-09-30 RX ADMIN — Medication 1 GRAM(S): at 21:25

## 2024-09-30 RX ADMIN — Medication 4 MILLIGRAM(S): at 03:41

## 2024-09-30 RX ADMIN — Medication 15 GRAM(S): at 18:30

## 2024-09-30 RX ADMIN — Medication 3.12 MILLIGRAM(S): at 17:58

## 2024-09-30 RX ADMIN — Medication 5 MILLIGRAM(S): at 21:25

## 2024-09-30 RX ADMIN — ALPRAZOLAM 0.5 MILLIGRAM(S): 0.5 TABLET ORAL at 23:19

## 2024-09-30 RX ADMIN — Medication 5000 UNIT(S): at 23:51

## 2024-09-30 RX ADMIN — Medication 1 GRAM(S): at 05:28

## 2024-09-30 RX ADMIN — METHYLPREDNISOLONE ACETATE 40 MILLIGRAM(S): 80 INJECTION, SUSPENSION INTRA-ARTICULAR; INTRALESIONAL; INTRAMUSCULAR; SOFT TISSUE at 17:56

## 2024-09-30 RX ADMIN — IPRATROPIUM BROMIDE AND ALBUTEROL SULFATE 3 MILLILITER(S): .5; 3 SOLUTION RESPIRATORY (INHALATION) at 02:17

## 2024-09-30 RX ADMIN — ALPRAZOLAM 0.5 MILLIGRAM(S): 0.5 TABLET ORAL at 05:56

## 2024-09-30 RX ADMIN — TIOTROPIUM BROMIDE INHALATION SPRAY 2 PUFF(S): 3.12 SPRAY, METERED RESPIRATORY (INHALATION) at 07:40

## 2024-09-30 RX ADMIN — CHLORHEXIDINE GLUCONATE ORAL RINSE 1 APPLICATION(S): 1.2 SOLUTION DENTAL at 11:36

## 2024-09-30 RX ADMIN — IPRATROPIUM BROMIDE AND ALBUTEROL SULFATE 3 MILLILITER(S): .5; 3 SOLUTION RESPIRATORY (INHALATION) at 14:12

## 2024-09-30 RX ADMIN — Medication 5000 UNIT(S): at 07:39

## 2024-09-30 RX ADMIN — Medication 1 DOSE(S): at 21:33

## 2024-09-30 RX ADMIN — METHYLPREDNISOLONE ACETATE 40 MILLIGRAM(S): 80 INJECTION, SUSPENSION INTRA-ARTICULAR; INTRALESIONAL; INTRAMUSCULAR; SOFT TISSUE at 05:28

## 2024-09-30 RX ADMIN — Medication 4 MILLIGRAM(S): at 14:42

## 2024-09-30 RX ADMIN — PANTOPRAZOLE SODIUM 40 MILLIGRAM(S): 40 TABLET, DELAYED RELEASE ORAL at 11:35

## 2024-09-30 NOTE — PROGRESS NOTE ADULT - ASSESSMENT
74yr woman xh CAD/PCI, HFpEF, COPD on home O2 recent admission for CP with non obstructive CAD on [facility] readmitted with acute on chronic respiratory failure     Acute on Chronic Respiratory Failure  COPD  cont O2 support  via nasal canula  Patient becomes hypercarbic  Patient has been counseled about need for Bipap  Pulmonary following    Chronic Hyponatremia  improved   Nephrology following    Debility  Assist with care  Turn/reposition  Safety precautions    Encounter for Palliative Care  Palliative Care consulted to assist with goals of care.   Noted Dr. Bell's [facility] 9/26 - patient wishes CPR and Intubation but No Bipap  Patient has been counseled about the need to wear her BIPAP

## 2024-09-30 NOTE — PHYSICAL THERAPY INITIAL EVALUATION ADULT - GAIT DISTANCE, PT EVAL
30+20 with seated break on toilet for BM
pt declining further ambulation at this time due to c/o nausea/chair to bed/5 feet

## 2024-09-30 NOTE — PROGRESS NOTE ADULT - SUBJECTIVE AND OBJECTIVE BOX
No overnight events, still wearing BiPAP despite not liking the current mask interface.    Vitals, labs, medications, imaging reviewed    General: No respiratory distress  CV: regular  Resp: No wheezing  GI: Soft  Ext: No edema

## 2024-09-30 NOTE — PROGRESS NOTE ADULT - ASSESSMENT
73 yo F w h/o CAD , HFpEF, HTN, HLD, COPD, recent admission for CP  s/p LHC discharged home 9/19  returned to the Hospital with abdominal pain , nausea vomiting    Chronic Hyponatremia - improving at appropriate rate  Serum Na 126--> 129 --> 127 --> 131 --> 134  Acute hypovolemic hyponatremia (U Na+ < 30), repeat urine studies  - PO fluid restriction enforced again  - cont PO Urea and NaCl tabs  - Avoid over correction of serum Na by more than 6- 8 mEq /L per 24 hrs  - may need to consider Tolvaptan trial    Monitor labs will follow

## 2024-09-30 NOTE — PHYSICAL THERAPY INITIAL EVALUATION ADULT - IMPAIRMENTS CONTRIBUTING TO GAIT DEVIATIONS, PT EVAL
decreased endurance/impaired balance
impaired balance/impaired coordination/decreased flexibility/decreased ROM/decreased strength

## 2024-09-30 NOTE — PHYSICAL THERAPY INITIAL EVALUATION ADULT - DIAGNOSIS, PT EVAL
Decreased functional mobility
Impaired functional mobility due to reduced, strength, balance and endurance.

## 2024-09-30 NOTE — PHYSICAL THERAPY INITIAL EVALUATION ADULT - ADDITIONAL COMMENTS
pt reports living in private home with  who is able to assist. 4 + 1 FRANCES with no handrail and 14 stairs inside with handrail. Independent prior to admission. Recently started using a cane, owns RW & shower chair.
pt lives with spouse, pt states spouse can assist as needed. pt has SAC but does not use at home. pt has home O2 she uses "as needed." 4+1STE no HR

## 2024-09-30 NOTE — PROGRESS NOTE ADULT - ASSESSMENT
73 y/o F with a h/o CAD (s/p PCI), HFpEF, HTN, HLD, COPD (on home O2 PRN), recent admission for chest pain s/p C with nonobstructive CAD- discharged home 9/19, now readmitted on 9/22 with complaints abd pain nausea vomiting , hyponatremia, and acute hypercapnic respiratory failure    Acute on chronic hypercapnic respiratory failure 2/2 to COPD exacerbation  Compounded by elevated right hemidiaphragm  - baseline 4L NC  - pt now at baseline  - lung sounds improved today. AM CO2 55  - pt accumulates CO2 quickly  - strongly encouraged to wear bipap at night  - pulmonary following  - pt would benefit from RIGOBERTO for PT and bipap   - decrease IV steroids to 40mg q12hr today, plan to transition to PO taper tomorrow   - trend AM ABG  - needs OOB to chair and incentive spirometry, discussed with RN  - PT reconsulted     Hyponatremia - improving  History of chronic SIADH  Hyperkalemia - resolved  - monitor  - c/w fluid restriction  - nephro following  - c/w urea powder and salt tabs  - trend BMP    History of CAD s/p PCI  HFpEF  - cont home meds aspirin, carvedilol, statin    DVT ppx: subq heparin   Dispo: medically active, PT re-consulted as pt without bipap at home and would benefit from RIGOBERTO for both PT and bipap  GOC - full code     73 y/o F with a h/o CAD (s/p PCI), HFpEF, HTN, HLD, COPD (on home O2 PRN), recent admission for chest pain s/p C with nonobstructive CAD- discharged home 9/19, now readmitted on 9/22 with complaints abd pain nausea vomiting , hyponatremia, and acute hypercapnic respiratory failure    Acute on chronic hypercapnic respiratory failure 2/2 to COPD exacerbation  Compounded by elevated right hemidiaphragm  - baseline 4L NC  - pt now at baseline  - lung sounds improved today. AM CO2 55  - pt accumulates CO2 quickly  - strongly encouraged to wear bipap at night  - pulmonary following  - pt would benefit from RIGOBERTO for PT and bipap   - decrease IV steroids to 40mg q12hr today, plan to transition to PO taper tomorrow   - completed course of empiric abx  - trend AM ABG  - needs OOB to chair and incentive spirometry, discussed with RN  - PT reconsulted     Hyponatremia - improving  History of chronic SIADH  Hyperkalemia - resolved  - monitor  - c/w fluid restriction  - nephro following  - c/w urea powder and salt tabs  - trend BMP    History of CAD s/p PCI  HFpEF  - cont home meds aspirin, carvedilol, statin    DVT ppx: subq heparin   Dispo: medically active, PT re-consulted as pt without bipap at home and would benefit from RIGOBERTO for both PT and bipap  GOC - full code     73 y/o F with a h/o CAD (s/p PCI), HFpEF, HTN, HLD, COPD (on home O2 PRN), recent admission for chest pain s/p C with nonobstructive CAD- discharged home 9/19, now readmitted on 9/22 with complaints abd pain nausea vomiting , hyponatremia, and acute hypercapnic respiratory failure    Acute on chronic hypercapnic respiratory failure 2/2 to COPD exacerbation  Compounded by elevated right hemidiaphragm  - baseline 4L NC  - pt now at baseline  - lung sounds improved today. AM CO2 55  - pt accumulates CO2 quickly  - strongly encouraged to wear bipap at night  - pulmonary following  - pt needs NIV set up for home, discussed with pulm  - decrease IV steroids to 40mg q12hr today, plan to transition to PO taper tomorrow   - completed course of empiric abx  - trend AM ABG  - needs OOB to chair and incentive spirometry, discussed with RN  - PT reconsulted     Hyponatremia - improving  History of chronic SIADH  Hyperkalemia - resolved  - monitor  - c/w fluid restriction  - nephro following  - c/w urea powder and salt tabs  - trend BMP    History of CAD s/p PCI  HFpEF  - cont home meds aspirin, carvedilol, statin    DVT ppx: subq heparin   Dispo: medically active, eventually home, expecting 1-2 days  GOC - full code

## 2024-09-30 NOTE — PHYSICAL THERAPY INITIAL EVALUATION ADULT - IMPAIRED TRANSFERS: SIT/STAND, REHAB EVAL
able to stand from low toilet on unit close supervision as well/impaired balance/decreased strength
impaired balance/pain/decreased strength

## 2024-09-30 NOTE — PROGRESS NOTE ADULT - TIME BILLING
chart review, patient evaluation, documentation, coordination of care and discussion with nurses, ACP, social work, case management.

## 2024-09-30 NOTE — PROGRESS NOTE ADULT - SUBJECTIVE AND OBJECTIVE BOX
NEPHROLOGY INTERVAL HPI/OVERNIGHT EVENTS:    Examined earlier  No distress  Awake alert  denies HA CP no SOB    MEDICATIONS  (STANDING):  albuterol/ipratropium for Nebulization 3 milliLiter(s) Nebulizer every 6 hours  aspirin  chewable 81 milliGRAM(s) Oral daily  atorvastatin 40 milliGRAM(s) Oral at bedtime  carvedilol 3.125 milliGRAM(s) Oral every 12 hours  chlorhexidine 2% Cloths 1 Application(s) Topical daily  dextrose 5%. 1000 milliLiter(s) (100 mL/Hr) IV Continuous <Continuous>  dextrose 5%. 1000 milliLiter(s) (50 mL/Hr) IV Continuous <Continuous>  dextrose 50% Injectable 25 Gram(s) IV Push once  dextrose 50% Injectable 12.5 Gram(s) IV Push once  dextrose 50% Injectable 25 Gram(s) IV Push once  dextrose Oral Gel 15 Gram(s) Oral once  fluticasone propionate/ salmeterol 500-50 MICROgram(s) Diskus 1 Dose(s) Inhalation two times a day  gabapentin 300 milliGRAM(s) Oral daily  glucagon  Injectable 1 milliGRAM(s) IntraMuscular once  heparin   Injectable 5000 Unit(s) SubCutaneous every 8 hours  methylPREDNISolone sodium succinate Injectable 40 milliGRAM(s) IV Push every 12 hours  pantoprazole  Injectable 40 milliGRAM(s) IV Push daily  polyethylene glycol 3350 17 Gram(s) Oral daily  senna 2 Tablet(s) Oral at bedtime  sodium chloride 1 Gram(s) Oral three times a day  tiotropium 2.5 MICROgram(s) Inhaler 2 Puff(s) Inhalation daily  urea Oral Powder 15 Gram(s) Oral two times a day  venlafaxine  milliGRAM(s) Oral daily  zolpidem 5 milliGRAM(s) Oral at bedtime    MEDICATIONS  (PRN):  acetaminophen     Tablet .. 650 milliGRAM(s) Oral every 6 hours PRN Temp greater or equal to 38C (100.4F), Mild Pain (1 - 3), Moderate Pain (4 - 6)  ALPRAZolam 0.5 milliGRAM(s) Oral two times a day PRN anxiety  aluminum hydroxide/magnesium hydroxide/simethicone Suspension 30 milliLiter(s) Oral every 4 hours PRN Dyspepsia  bisacodyl 5 milliGRAM(s) Oral every 12 hours PRN Constipation  magnesium hydroxide Suspension 30 milliLiter(s) Oral daily PRN Constipation  ondansetron Injectable 4 milliGRAM(s) IV Push four times a day PRN Nausea and/or Vomiting      Allergies    No Known Allergies    Intolerances        Vital Signs Last 24 Hrs  T(C): 36.9 (30 Sep 2024 12:00), Max: 37.1 (29 Sep 2024 22:00)  T(F): 98.4 (30 Sep 2024 12:00), Max: 98.8 (29 Sep 2024 22:00)  HR: 99 (30 Sep 2024 14:14) (82 - 104)  BP: 134/86 (30 Sep 2024 12:00) (105/65 - 134/86)  BP(mean): 93 (30 Sep 2024 10:00) (78 - 104)  RR: 20 (30 Sep 2024 12:00) (18 - 22)  SpO2: 99% (30 Sep 2024 14:14) (95% - 100%)    Parameters below as of 30 Sep 2024 14:14  Patient On (Oxygen Delivery Method): nasal cannula      Daily     Daily     PHYSICAL EXAM:  GENERAL: Frail, lethargic  HEAD: BiPAP mask  NECK: Supple, No JVD  NERVOUS SYSTEM: Alert & Oriented X3  CHEST/LUNG: Clear bilaterally  HEART: Regular rate and rhythm; No rub  ABDOMEN: Soft, Nontender, +BS  EXTREMITIES: Tr dependent edema      LABS:                        10.6   15.62 )-----------( 428      ( 30 Sep 2024 08:33 )             31.1     09-30    134[L]  |  96  |  37.3[H]  ----------------------------<  105[H]  3.9   |  29.0  |  0.57    Ca    8.6      30 Sep 2024 08:33  Phos  2.1     09-29  Mg     2.0     09-29        Urinalysis Basic - ( 30 Sep 2024 09:00 )    Color: Yellow / Appearance: Cloudy / S.024 / pH: x  Gluc: x / Ketone: Trace mg/dL  / Bili: Negative / Urobili: 0.2 mg/dL   Blood: x / Protein: 30 mg/dL / Nitrite: Negative   Leuk Esterase: Moderate / RBC: 9 /HPF / WBC 18 /HPF   Sq Epi: x / Non Sq Epi: 3 /HPF / Bacteria: Few /HPF        ABG - ( 30 Sep 2024 04:00 )  pH, Arterial: 7.390 pH, Blood: x     /  pCO2: 55    /  pO2: 127   / HCO3: 33    / Base Excess: 8.3   /  SaO2: 100.0                 RADIOLOGY & ADDITIONAL TESTS:

## 2024-09-30 NOTE — PHYSICAL THERAPY INITIAL EVALUATION ADULT - PERTINENT HX OF CURRENT PROBLEM, REHAB EVAL
73 y/o F with a h/o CAD (s/p PCI), HFpEF, HTN, HLD, COPD (on home O2 PRN), recent admission for chest pain s/p C with nonobstructive CAD- discharged home 9/19, now readmitted on 9/22 with complaints abd pain nausea vomiting , hyponatremia, and acute hypercapnic respiratory failure
Per EMR: pt had LHC which showed nonobstructive CAD and was d/c home 9/19. Pt returned to ED with fever, RUQ/epigastric abd pain, and nausea/vomiting. In ED she was noted to be dyspneic and requesting nasal O2 and also had transient expressive aphasia and underwent stroke workup which was unrevealing. Found to be hyponatremic (Na 121), appeared hypovolemic. Abdominal US revealed distended gallbladder with stones and sludge, however no evidence of acute cholecystitis.

## 2024-09-30 NOTE — PROGRESS NOTE ADULT - SUBJECTIVE AND OBJECTIVE BOX
[facility] Division of Medicine    SUBJECTIVE / OVERNIGHT EVENTS: No overnight events as per RN. Pt wore BIPAP throughout the night. Pt seen at the bedside. Endorses feeling better in terms of her breathing. Denies any new complaints. All other systems reviewed and are negative.    MEDICATIONS  (STANDING):  albuterol/ipratropium for Nebulization 3 milliLiter(s) Nebulizer every 6 hours  aspirin  chewable 81 milliGRAM(s) Oral daily  atorvastatin 40 milliGRAM(s) Oral at bedtime  carvedilol 3.125 milliGRAM(s) Oral every 12 hours  chlorhexidine 2% Cloths 1 Application(s) Topical daily  dextrose 5%. 1000 milliLiter(s) (100 mL/Hr) IV Continuous <Continuous>  dextrose 5%. 1000 milliLiter(s) (50 mL/Hr) IV Continuous <Continuous>  dextrose 50% Injectable 25 Gram(s) IV Push once  dextrose 50% Injectable 12.5 Gram(s) IV Push once  dextrose 50% Injectable 25 Gram(s) IV Push once  dextrose Oral Gel 15 Gram(s) Oral once  fluticasone propionate/ salmeterol 500-50 MICROgram(s) Diskus 1 Dose(s) Inhalation two times a day  gabapentin 300 milliGRAM(s) Oral daily  glucagon  Injectable 1 milliGRAM(s) IntraMuscular once  heparin   Injectable 5000 Unit(s) SubCutaneous every 8 hours  methylPREDNISolone sodium succinate Injectable 40 milliGRAM(s) IV Push every 12 hours  pantoprazole  Injectable 40 milliGRAM(s) IV Push daily  polyethylene glycol 3350 17 Gram(s) Oral daily  senna 2 Tablet(s) Oral at bedtime  sodium chloride 1 Gram(s) Oral three times a day  tiotropium 2.5 MICROgram(s) Inhaler 2 Puff(s) Inhalation daily  urea Oral Powder 15 Gram(s) Oral two times a day  venlafaxine  milliGRAM(s) Oral daily  zolpidem 5 milliGRAM(s) Oral at bedtime    MEDICATIONS  (PRN):  acetaminophen     Tablet .. 650 milliGRAM(s) Oral every 6 hours PRN Temp greater or equal to 38C (100.4F), Mild Pain (1 - 3), Moderate Pain (4 - 6)  ALPRAZolam 0.5 milliGRAM(s) Oral two times a day PRN anxiety  aluminum hydroxide/magnesium hydroxide/simethicone Suspension 30 milliLiter(s) Oral every 4 hours PRN Dyspepsia  bisacodyl 5 milliGRAM(s) Oral every 12 hours PRN Constipation  magnesium hydroxide Suspension 30 milliLiter(s) Oral daily PRN Constipation  ondansetron Injectable 4 milliGRAM(s) IV Push four times a day PRN Nausea and/or Vomiting      I&O's Summary    29 Sep 2024 07:01  -  30 Sep 2024 07:00  --------------------------------------------------------  IN: 0 mL / OUT: 350 mL / NET: -350 mL        acetaminophen     Tablet .. 650 milliGRAM(s) Oral every 6 hours PRN  albuterol/ipratropium for Nebulization 3 milliLiter(s) Nebulizer every 6 hours  ALPRAZolam 0.5 milliGRAM(s) Oral two times a day PRN  aluminum hydroxide/magnesium hydroxide/simethicone Suspension 30 milliLiter(s) Oral every 4 hours PRN  aspirin  chewable 81 milliGRAM(s) Oral daily  atorvastatin 40 milliGRAM(s) Oral at bedtime  bisacodyl 5 milliGRAM(s) Oral every 12 hours PRN  carvedilol 3.125 milliGRAM(s) Oral every 12 hours  chlorhexidine 2% Cloths 1 Application(s) Topical daily  dextrose 5%. 1000 milliLiter(s) IV Continuous <Continuous>  dextrose 5%. 1000 milliLiter(s) IV Continuous <Continuous>  dextrose 50% Injectable 25 Gram(s) IV Push once  dextrose 50% Injectable 25 Gram(s) IV Push once  dextrose 50% Injectable 12.5 Gram(s) IV Push once  dextrose Oral Gel 15 Gram(s) Oral once  fluticasone propionate/ salmeterol 500-50 MICROgram(s) Diskus 1 Dose(s) Inhalation two times a day  gabapentin 300 milliGRAM(s) Oral daily  glucagon  Injectable 1 milliGRAM(s) IntraMuscular once  heparin   Injectable 5000 Unit(s) SubCutaneous every 8 hours  magnesium hydroxide Suspension 30 milliLiter(s) Oral daily PRN  methylPREDNISolone sodium succinate Injectable 40 milliGRAM(s) IV Push every 12 hours  ondansetron Injectable 4 milliGRAM(s) IV Push four times a day PRN  pantoprazole  Injectable 40 milliGRAM(s) IV Push daily  polyethylene glycol 3350 17 Gram(s) Oral daily  senna 2 Tablet(s) Oral at bedtime  sodium chloride 1 Gram(s) Oral three times a day  tiotropium 2.5 MICROgram(s) Inhaler 2 Puff(s) Inhalation daily  urea Oral Powder 15 Gram(s) Oral two times a day  venlafaxine  milliGRAM(s) Oral daily  zolpidem 5 milliGRAM(s) Oral at bedtime      PHYSICAL EXAM:  Vital Signs Last 24 Hrs  T(C): 37.1 (30 Sep 2024 08:00), Max: 37.1 (29 Sep 2024 22:00)  T(F): 98.8 (30 Sep 2024 08:00), Max: 98.8 (29 Sep 2024 22:00)  HR: 100 (30 Sep 2024 10:00) (82 - 104)  BP: 127/79 (30 Sep 2024 10:00) (103/45 - 128/91)  BP(mean): 93 (30 Sep 2024 10:00) (63 - 104)  RR: 20 (30 Sep 2024 10:00) (18 - 22)  SpO2: 100% (30 Sep 2024 10:00) (95% - 100%)    Parameters below as of 30 Sep 2024 10:00  Patient On (Oxygen Delivery Method): nasal cannula  O2 Flow (L/min): 3        CONSTITUTIONAL: no apparent distress  RESP: No respiratory distress, clear to auscultation bilaterally, no wheezes or rales  CV: RRR, +S1S2, no peripheral edema  GI: Soft, NT, ND  PSYCH: A+O x 3, mood and affect appropriate  NEURO: Cooperative, upper and lower motor function grossly intact bilaterally, sensation grossly intact throughout      LABS:                        10.6   15.62 )-----------( 428      ( 30 Sep 2024 08:33 )             31.1     09-30    134[L]  |  96  |  37.3[H]  ----------------------------<  105[H]  3.9   |  29.0  |  0.57    Ca    8.6      30 Sep 2024 08:33  Phos  2.1     09-  Mg     2.0                 Urinalysis Basic - ( 30 Sep 2024 09:00 )    Color: Yellow / Appearance: Cloudy / S.024 / pH: x  Gluc: x / Ketone: Trace mg/dL  / Bili: Negative / Urobili: 0.2 mg/dL   Blood: x / Protein: 30 mg/dL / Nitrite: Negative   Leuk Esterase: Moderate / RBC: 9 /HPF / WBC 18 /HPF   Sq Epi: x / Non Sq Epi: 3 /HPF / Bacteria: Few /HPF        CAPILLARY BLOOD GLUCOSE      POCT Blood Glucose.: 110 mg/dL (30 Sep 2024 05:34)  POCT Blood Glucose.: 116 mg/dL (29 Sep 2024 23:46)  POCT Blood Glucose.: 167 mg/dL (29 Sep 2024 16:55)  POCT Blood Glucose.: 143 mg/dL (29 Sep 2024 12:18)      IMAGING:

## 2024-09-30 NOTE — PHYSICAL THERAPY INITIAL EVALUATION ADULT - PLANNED THERAPY INTERVENTIONS, PT EVAL
balance training/bed mobility training/gait training/ROM/strengthening/transfer training
balance training/bed mobility training/gait training/neuromuscular re-education/postural re-education/strengthening/transfer training

## 2024-09-30 NOTE — PHYSICAL THERAPY INITIAL EVALUATION ADULT - IMPAIRMENTS FOUND, PT EVAL
aerobic capacity/endurance/gait, locomotion, and balance
gait, locomotion, and balance/muscle strength/ROM

## 2024-09-30 NOTE — PROGRESS NOTE ADULT - SUBJECTIVE AND OBJECTIVE BOX
CC:  Follow up GOC , Symptoms    OVERNIGHT EVENTS:  events noted - RR 2/2 hypercarbia    Present Symptoms:   Dyspnea:  No    Nausea/Vomiting:  No   Anxiety/ Agitation No    Depression:  Unable to assess  Fatigue:  Yes     Loss of appetite: Not assessed  Constipation: Not Reported       Pain: No               Location            Duration            Character            Severity            Factors            Effect    Pain AD Score:  [email or web address] (press ctrl + left click to view)    Review of Systems: Reviewed as above  All others negative      MEDICATIONS  (STANDING):  albuterol/ipratropium for Nebulization 3 milliLiter(s) Nebulizer every 6 hours  aspirin  chewable 81 milliGRAM(s) Oral daily  atorvastatin 40 milliGRAM(s) Oral at bedtime  carvedilol 3.125 milliGRAM(s) Oral every 12 hours  chlorhexidine 2% Cloths 1 Application(s) Topical daily  dextrose 5%. 1000 milliLiter(s) (100 mL/Hr) IV Continuous <Continuous>  dextrose 5%. 1000 milliLiter(s) (50 mL/Hr) IV Continuous <Continuous>  dextrose 50% Injectable 25 Gram(s) IV Push once  dextrose 50% Injectable 12.5 Gram(s) IV Push once  dextrose 50% Injectable 25 Gram(s) IV Push once  dextrose Oral Gel 15 Gram(s) Oral once  fluticasone propionate/ salmeterol 500-50 MICROgram(s) Diskus 1 Dose(s) Inhalation two times a day  gabapentin 300 milliGRAM(s) Oral daily  glucagon  Injectable 1 milliGRAM(s) IntraMuscular once  heparin   Injectable 5000 Unit(s) SubCutaneous every 8 hours  methylPREDNISolone sodium succinate Injectable 40 milliGRAM(s) IV Push every 12 hours  pantoprazole  Injectable 40 milliGRAM(s) IV Push daily  polyethylene glycol 3350 17 Gram(s) Oral daily  senna 2 Tablet(s) Oral at bedtime  sodium chloride 1 Gram(s) Oral three times a day  tiotropium 2.5 MICROgram(s) Inhaler 2 Puff(s) Inhalation daily  urea Oral Powder 15 Gram(s) Oral two times a day  venlafaxine  milliGRAM(s) Oral daily  zolpidem 5 milliGRAM(s) Oral at bedtime    MEDICATIONS  (PRN):  acetaminophen     Tablet .. 650 milliGRAM(s) Oral every 6 hours PRN Temp greater or equal to 38C (100.4F), Mild Pain (1 - 3), Moderate Pain (4 - 6)  ALPRAZolam 0.5 milliGRAM(s) Oral two times a day PRN anxiety  aluminum hydroxide/magnesium hydroxide/simethicone Suspension 30 milliLiter(s) Oral every 4 hours PRN Dyspepsia  bisacodyl 5 milliGRAM(s) Oral every 12 hours PRN Constipation  magnesium hydroxide Suspension 30 milliLiter(s) Oral daily PRN Constipation  ondansetron Injectable 4 milliGRAM(s) IV Push four times a day PRN Nausea and/or Vomiting      PHYSICAL EXAM:    Vital Signs Last 24 Hrs  T(C): 37.1 (30 Sep 2024 08:00), Max: 37.1 (29 Sep 2024 22:00)  T(F): 98.8 (30 Sep 2024 08:00), Max: 98.8 (29 Sep 2024 22:00)  HR: 100 (30 Sep 2024 10:00) (82 - 104)  BP: 127/79 (30 Sep 2024 10:00) (105/65 - 128/91)  BP(mean): 93 (30 Sep 2024 10:00) (73 - 104)  RR: 20 (30 Sep 2024 10:00) (18 - 22)  SpO2: 100% (30 Sep 2024 10:00) (95% - 100%)    Parameters below as of 30 Sep 2024 10:00  Patient On (Oxygen Delivery Method): nasal cannula  O2 Flow (L/min): 3    Karnofsky: 30 %  General: Frail elderly woman NAD  HEENT:  NCAT     Lungs: comfortable  non labored  CV:  RR  GI:  soft NTND  MSK: weak bedbound   Skin:  warm/dry  Neuro  sleepy  answers simple questions  Psych  calm cooperative    LABS:                          10.6   15.62 )-----------( 428      ( 30 Sep 2024 08:33 )             31.1     09-30    134[L]  |  96  |  37.3[H]  ----------------------------<  105[H]  3.9   |  29.0  |  0.57    Ca    8.6      30 Sep 2024 08:33  Phos  2.1     09-  Mg     2.0     -29        Urinalysis Basic - ( 30 Sep 2024 09:00 )    Color: Yellow / Appearance: Cloudy / S.024 / pH: x  Gluc: x / Ketone: Trace mg/dL  / Bili: Negative / Urobili: 0.2 mg/dL   Blood: x / Protein: 30 mg/dL / Nitrite: Negative   Leuk Esterase: Moderate / RBC: 9 /HPF / WBC 18 /HPF   Sq Epi: x / Non Sq Epi: 3 /HPF / Bacteria: Few /HPF      I&O's Summary    29 Sep 2024 07:01  -  30 Sep 2024 07:00  --------------------------------------------------------  IN: 0 mL / OUT: 350 mL / NET: -350 mL    30 Sep 2024 07:01  -  30 Sep 2024 12:56  --------------------------------------------------------  IN: 200 mL / OUT: 0 mL / NET: 200 mL        RADIOLOGY & ADDITIONAL STUDIES:  Imaging Reviewed  (  x )   < from: Xray Chest 1 View-PORTABLE IMMEDIATE (Xray Chest 1 View-PORTABLE IMMEDIATE .) (24 @ 11:54) >    ACC: [id number] EXAM:  XR CHEST PORTABLE IMMED 1V   ORDERED BY: MANJIT MANZANO     PROCEDURE DATE:  2024          INTERPRETATION:  AP chest on 2024 at 10:44 AM. Patient has   sepsis.    Heart magnified by technique.    Again notedis a left base infiltrate roughly similar to .    There is an increasing right base atelectatic infiltrate.    IMPRESSION: Stable left base infiltrate. Increasing right base   atelectatic infiltrate.    --- End of Report ---            PHIL NICHOLE MD; Attending Radiologist  This document has been electronically signed. Sep 30 2024 11:59AM    < end of copied text >      ADVANCE DIRECTIVE PREFERENCES    Full Code

## 2024-09-30 NOTE — PROGRESS NOTE ADULT - ASSESSMENT
Ms. 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 Her CO2 retention would appear to be less likely due to asthma, but rather NIPPV non-compliance in combination with her right sided hemidiaphragm paralysis.    Would plan on a further steroid wean tomorrow to daily dosing pending her night tonight and continue current inhalers/nebulizers for now.    She is currently being treated for chronic hypercapneic respiratory failure which has persisted despite the use of non-invasive ventilation. Her PCO2 remained high at 55 on 9/28/2024. Therefore, NIV will be prescribed for home usage in the mode of guaranteed augmented targeted tidal volumes which are not available on any home care bilevel devices to minimize further clinical decline and likelihood of readmissions to the hospital.

## 2024-09-30 NOTE — PHYSICAL THERAPY INITIAL EVALUATION ADULT - GENERAL OBSERVATIONS, REHAB EVAL
Pt received in chair + IV + tele//BP/temp + NC O2, + Ceja, in NAD, in 4/10 abdominal pain, c/o nausea agreeable to PT evaluation
pt in bed, NAD, +, +NC. pt agreeable to care

## 2024-10-01 LAB
ANION GAP SERPL CALC-SCNC: 11 MMOL/L — SIGNIFICANT CHANGE UP (ref 5–17)
BUN SERPL-MCNC: 33.1 MG/DL — HIGH (ref 8–20)
CALCIUM SERPL-MCNC: 8.9 MG/DL — SIGNIFICANT CHANGE UP (ref 8.4–10.5)
CHLORIDE SERPL-SCNC: 95 MMOL/L — LOW (ref 96–108)
CO2 SERPL-SCNC: 31 MMOL/L — HIGH (ref 22–29)
CREAT SERPL-MCNC: 0.54 MG/DL — SIGNIFICANT CHANGE UP (ref 0.5–1.3)
EGFR: 97 ML/MIN/1.73M2 — SIGNIFICANT CHANGE UP
GLUCOSE BLDC GLUCOMTR-MCNC: 115 MG/DL — HIGH (ref 70–99)
GLUCOSE BLDC GLUCOMTR-MCNC: 122 MG/DL — HIGH (ref 70–99)
GLUCOSE BLDC GLUCOMTR-MCNC: 128 MG/DL — HIGH (ref 70–99)
GLUCOSE BLDC GLUCOMTR-MCNC: 132 MG/DL — HIGH (ref 70–99)
GLUCOSE BLDC GLUCOMTR-MCNC: 133 MG/DL — HIGH (ref 70–99)
GLUCOSE BLDC GLUCOMTR-MCNC: 145 MG/DL — HIGH (ref 70–99)
GLUCOSE BLDC GLUCOMTR-MCNC: 62 MG/DL — LOW (ref 70–99)
GLUCOSE BLDC GLUCOMTR-MCNC: 65 MG/DL — LOW (ref 70–99)
GLUCOSE SERPL-MCNC: 107 MG/DL — HIGH (ref 70–99)
POTASSIUM SERPL-MCNC: 3.8 MMOL/L — SIGNIFICANT CHANGE UP (ref 3.5–5.3)
POTASSIUM SERPL-SCNC: 3.8 MMOL/L — SIGNIFICANT CHANGE UP (ref 3.5–5.3)
SODIUM SERPL-SCNC: 137 MMOL/L — SIGNIFICANT CHANGE UP (ref 135–145)

## 2024-10-01 PROCEDURE — 99233 SBSQ HOSP IP/OBS HIGH 50: CPT

## 2024-10-01 RX ORDER — ALCOHOL ANTISEPTIC PADS
12.5 PADS, MEDICATED (EA) TOPICAL ONCE
Refills: 0 | Status: COMPLETED | OUTPATIENT
Start: 2024-10-01 | End: 2024-10-01

## 2024-10-01 RX ORDER — METOCLOPRAMIDE HCL 5 MG
10 TABLET ORAL ONCE
Refills: 0 | Status: COMPLETED | OUTPATIENT
Start: 2024-10-01 | End: 2024-10-01

## 2024-10-01 RX ADMIN — ALPRAZOLAM 0.5 MILLIGRAM(S): 0.5 TABLET ORAL at 09:42

## 2024-10-01 RX ADMIN — Medication 5000 UNIT(S): at 16:56

## 2024-10-01 RX ADMIN — Medication 1 GRAM(S): at 05:32

## 2024-10-01 RX ADMIN — Medication 4 MILLIGRAM(S): at 16:54

## 2024-10-01 RX ADMIN — PANTOPRAZOLE SODIUM 40 MILLIGRAM(S): 40 TABLET, DELAYED RELEASE ORAL at 13:45

## 2024-10-01 RX ADMIN — Medication 0.5 MILLIGRAM(S): at 19:10

## 2024-10-01 RX ADMIN — Medication 4 MILLIGRAM(S): at 09:44

## 2024-10-01 RX ADMIN — IPRATROPIUM BROMIDE AND ALBUTEROL SULFATE 3 MILLILITER(S): .5; 3 SOLUTION RESPIRATORY (INHALATION) at 02:25

## 2024-10-01 RX ADMIN — IPRATROPIUM BROMIDE AND ALBUTEROL SULFATE 3 MILLILITER(S): .5; 3 SOLUTION RESPIRATORY (INHALATION) at 09:08

## 2024-10-01 RX ADMIN — Medication 5000 UNIT(S): at 09:42

## 2024-10-01 RX ADMIN — METHYLPREDNISOLONE ACETATE 40 MILLIGRAM(S): 80 INJECTION, SUSPENSION INTRA-ARTICULAR; INTRALESIONAL; INTRAMUSCULAR; SOFT TISSUE at 17:28

## 2024-10-01 RX ADMIN — IPRATROPIUM BROMIDE AND ALBUTEROL SULFATE 3 MILLILITER(S): .5; 3 SOLUTION RESPIRATORY (INHALATION) at 21:04

## 2024-10-01 RX ADMIN — Medication 10 MILLIGRAM(S): at 13:44

## 2024-10-01 RX ADMIN — Medication 4 MILLIGRAM(S): at 20:54

## 2024-10-01 RX ADMIN — Medication 5000 UNIT(S): at 23:36

## 2024-10-01 RX ADMIN — Medication 4 MILLIGRAM(S): at 03:45

## 2024-10-01 RX ADMIN — METHYLPREDNISOLONE ACETATE 40 MILLIGRAM(S): 80 INJECTION, SUSPENSION INTRA-ARTICULAR; INTRALESIONAL; INTRAMUSCULAR; SOFT TISSUE at 05:32

## 2024-10-01 RX ADMIN — CHLORHEXIDINE GLUCONATE ORAL RINSE 1 APPLICATION(S): 1.2 SOLUTION DENTAL at 17:27

## 2024-10-01 RX ADMIN — IPRATROPIUM BROMIDE AND ALBUTEROL SULFATE 3 MILLILITER(S): .5; 3 SOLUTION RESPIRATORY (INHALATION) at 14:59

## 2024-10-01 RX ADMIN — Medication 3.12 MILLIGRAM(S): at 05:32

## 2024-10-01 RX ADMIN — Medication 12.5 GRAM(S): at 18:24

## 2024-10-01 RX ADMIN — Medication 15 GRAM(S): at 05:32

## 2024-10-01 NOTE — CHART NOTE - NSCHARTNOTEFT_GEN_A_CORE
Source: Patient [ ]  Family [ ]   other [x ]chart/ staff    Current Diet: Diet, Regular:   1200mL Fluid Restriction ([id number]) (09-28-24 @ 16:34)    Patient reports [ ] nausea  [ ] vomiting [ ] diarrhea [ ] constipation  [ ]chewing problems [ ] swallowing issues  [ ] other: none at this time    PO intake:  < 50% [ ]   50-75%  [ ]   %  [x ]  other :    Source for PO intake [ ] Patient [x ] family [ ] chart [ x] staff [ ] other    Current Weight:   (9/24) 147.9#  (10/1) 140.8# RD bed scale weight     % Weight Change: unclear accuracy of weights    Pertinent Medications: MEDICATIONS  (STANDING):  dextrose 5%. 1000 milliLiter(s) (100 mL/Hr) IV Continuous <Continuous>  glucagon  Injectable 1 milliGRAM(s) IntraMuscular once  methylPREDNISolone sodium succinate Injectable 40 milliGRAM(s) IV Push every 12 hours  pantoprazole  Injectable 40 milliGRAM(s) IV Push daily  senna 2 Tablet(s) Oral at bedtime  sodium chloride 1 Gram(s) Oral three times a day    MEDICATIONS  (PRN):  aluminum hydroxide/magnesium hydroxide/simethicone Suspension 30 milliLiter(s) Oral every 4 hours PRN Dyspepsia  bisacodyl 5 milliGRAM(s) Oral every 12 hours PRN Constipation  magnesium hydroxide Suspension 30 milliLiter(s) Oral daily PRN Constipation  ondansetron Injectable 4 milliGRAM(s) IV Push four times a day PRN Nausea and/or Vomiting    Pertinent Labs: CBC Full  -  ( 30 Sep 2024 08:33 )  WBC Count : 15.62 K/uL  RBC Count : 3.41 M/uL  Hemoglobin : 10.6 g/dL  Hematocrit : 31.1 %  Platelet Count - Automated : 428 K/uL  Mean Cell Volume : 91.2 fl  Mean Cell Hemoglobin : 31.1 pg  Mean Cell Hemoglobin Concentration : 34.1 gm/dL      Skin: multiple skin tears, no edema documented at this time    Nutrition focused physical exam conducted - found signs of malnutrition [x ]absent [ ]present    Subcutaneous fat loss: [ ] Orbital fat pads region, [ ]Buccal fat region, [ ]Triceps region,  [ ]Ribs region    Muscle wasting: [ ]Temples region, [ ]Clavicle region, [ ]Shoulder region, [ ]Scapula region, [ ]Interosseous region,  [ ]thigh region, [ ]Calf region    Estimated Needs:   [x ] no change since previous assessment  [ ] recalculated:     Current Nutrition Diagnosis: Increased Nutrient Needs (energy, protein) related to increased physiological demand of nutrients as evidenced by acute respiratory failure requiring intubation, now extubated    75 yo F with a PMHX of CAD, HFpEF, HTN, HLD, COPD (on O2 prn), recent admission for NSTEMI (s/p LHC without intervention), discharged 9/19 presents to the ED on 09/22 with fever, right upper abdominal pain, lower abdominal pain, nausea, vomiting, and dyspnea. Initially abdmitted to the floor with hyponatremia and prerenal MADHURI. MICU consulted 09/23 during rapid response as patient was obtunded with agonal breathing. ABG showed severe hypercapnia/respiratory acidosis. Was emergently intubated and brought into MICU. Extubated on 09/24. On 09/26, patient refused bipap on the floor. ABG demonstrated respiratory acidosis (pH 7.2, CO2 73, O2 117, HCO3 28) and has been treated for acute hypercarbic respiratory failure. pt has been called for RRT with poor mentation and seen obtunded by medical floor staff and accessory muscle usage, found with respiratory acidosis on 5L NC and placed on bipap, MICU consulted for obtunded state and acute hypercarbic respiratory failure. Last BM documented 9/30.     Recommendations:   Continue current diet as tolerated  Monitor po intake / need for oral nutrition supplementation  Monitor nutrition related labs, weight trends and BMs    Monitoring and Evaluation:   [x ] PO intake [x ] Tolerance to diet prescription [X] Weights  [X] Follow up per protocol [X] Labs: chem 8, phos, mg, BGM

## 2024-10-01 NOTE — PROGRESS NOTE ADULT - ASSESSMENT
75 y/o F with a h/o CAD (s/p PCI), HFpEF, HTN, HLD, COPD (on home O2 PRN), recent admission for chest pain s/p LHC with nonobstructive CAD- discharged home 9/19, now readmitted on 9/22 with complaints abd pain nausea vomiting , hyponatremia, and acute hypercapnic respiratory failure    Acute on chronic hypercapnic respiratory failure 2/2 to COPD exacerbation (Home O2 4lnc)  Compounded by elevated right hemidiaphragm  - pulmonary following. Advising that patient accumulates co2 rapidly and requires BIPAP at night  Cont IV Steroids BID  Patient with resp distress this morning and placed on BIPAP during day  S/p Abx  Upgrade to Stepdown for continuous bipap    Hyponatremia - improving  History of chronic SIADH  Hyperkalemia - resolved  - monitor  - c/w fluid restriction  - nephro following  - c/w urea powder and salt tabs  - trend BMP    History of CAD s/p PCI  HFpEF  - cont home meds aspirin, carvedilol, statin    DVT ppx: subq heparin   Dispo: medically acute. Plan for ongoing GOC with palliative   GOC - full code

## 2024-10-01 NOTE — PROGRESS NOTE ADULT - SUBJECTIVE AND OBJECTIVE BOX
[facility] Division of Hospital Medicine    SUBJECTIVE / OVERNIGHT EVENTS:  Patient with resp distress this am and placed back on bipap    Patient denies chest pain, abd pain, N/V, fever, chills, dysuria or any other complaints. All remainder ROS negative.     MEDICATIONS  (STANDING):  albuterol/ipratropium for Nebulization 3 milliLiter(s) Nebulizer every 6 hours  aspirin  chewable 81 milliGRAM(s) Oral daily  atorvastatin 40 milliGRAM(s) Oral at bedtime  carvedilol 3.125 milliGRAM(s) Oral every 12 hours  chlorhexidine 2% Cloths 1 Application(s) Topical daily  dextrose 5%. 1000 milliLiter(s) (100 mL/Hr) IV Continuous <Continuous>  dextrose 5%. 1000 milliLiter(s) (50 mL/Hr) IV Continuous <Continuous>  dextrose 50% Injectable 25 Gram(s) IV Push once  dextrose 50% Injectable 12.5 Gram(s) IV Push once  dextrose 50% Injectable 25 Gram(s) IV Push once  dextrose Oral Gel 15 Gram(s) Oral once  fluticasone propionate/ salmeterol 500-50 MICROgram(s) Diskus 1 Dose(s) Inhalation two times a day  gabapentin 300 milliGRAM(s) Oral daily  glucagon  Injectable 1 milliGRAM(s) IntraMuscular once  heparin   Injectable 5000 Unit(s) SubCutaneous every 8 hours  methylPREDNISolone sodium succinate Injectable 40 milliGRAM(s) IV Push every 12 hours  pantoprazole  Injectable 40 milliGRAM(s) IV Push daily  polyethylene glycol 3350 17 Gram(s) Oral daily  senna 2 Tablet(s) Oral at bedtime  sodium chloride 1 Gram(s) Oral three times a day  tiotropium 2.5 MICROgram(s) Inhaler 2 Puff(s) Inhalation daily  urea Oral Powder 15 Gram(s) Oral two times a day  venlafaxine  milliGRAM(s) Oral daily  zolpidem 5 milliGRAM(s) Oral at bedtime    MEDICATIONS  (PRN):  acetaminophen     Tablet .. 650 milliGRAM(s) Oral every 6 hours PRN Temp greater or equal to 38C (100.4F), Mild Pain (1 - 3), Moderate Pain (4 - 6)  ALPRAZolam 0.5 milliGRAM(s) Oral two times a day PRN anxiety  aluminum hydroxide/magnesium hydroxide/simethicone Suspension 30 milliLiter(s) Oral every 4 hours PRN Dyspepsia  bisacodyl 5 milliGRAM(s) Oral every 12 hours PRN Constipation  magnesium hydroxide Suspension 30 milliLiter(s) Oral daily PRN Constipation  ondansetron Injectable 4 milliGRAM(s) IV Push four times a day PRN Nausea and/or Vomiting        I&O's Summary    30 Sep 2024 07:01  -  01 Oct 2024 07:00  --------------------------------------------------------  IN: 200 mL / OUT: 0 mL / NET: 200 mL        PHYSICAL EXAM:  Vital Signs Last 24 Hrs  T(C): 36.3 (01 Oct 2024 14:17), Max: 37.1 (30 Sep 2024 23:05)  T(F): 97.3 (01 Oct 2024 14:17), Max: 98.7 (30 Sep 2024 23:05)  HR: 114 (01 Oct 2024 14:17) (70 - 114)  BP: 134/82 (01 Oct 2024 14:17) (114/71 - 142/99)  BP(mean): --  RR: 21 (01 Oct 2024 14:17) (18 - 21)  SpO2: 100% (01 Oct 2024 14:17) (92% - 100%)    Parameters below as of 01 Oct 2024 14:17  Patient On (Oxygen Delivery Method): BiPAP/CPAP            CONSTITUTIONAL: NAD, appears stated age  ENMT: Moist oral mucosa, no pharyngeal injection or exudates; normal dentition  RESPIRATORY: Normal respiratory effort; on bipap. mechanical sounds auscultation bilaterally  CARDIOVASCULAR: Regular rate and rhythm, normal S1 and S2, no murmur/rub/gallop; Peripheral pulses are 2+ bilaterally  ABDOMEN: Nontender to palpation, normoactive bowel sounds, no rebound/guarding;   MUSCLOSKELETAL:  No clubbing or cyanosis of digits; no joint swelling or tenderness to palpation  PSYCH: A+O to person, place, and time; affect appropriate  NEUROLOGY: CN 2-12 are intact and symmetric; no gross sensory deficits;   SKIN: No rashes; no palpable lesions    LABS:                        10.6   15.62 )-----------( 428      ( 30 Sep 2024 08:33 )             31.1     10-01    137  |  95[L]  |  33.1[H]  ----------------------------<  107[H]  3.8   |  31.0[H]  |  0.54    Ca    8.9      01 Oct 2024 05:02            Urinalysis Basic - ( 01 Oct 2024 05:02 )    Color: x / Appearance: x / SG: x / pH: x  Gluc: 107 mg/dL / Ketone: x  / Bili: x / Urobili: x   Blood: x / Protein: x / Nitrite: x   Leuk Esterase: x / RBC: x / WBC x   Sq Epi: x / Non Sq Epi: x / Bacteria: x        CAPILLARY BLOOD GLUCOSE      POCT Blood Glucose.: 132 mg/dL (01 Oct 2024 12:08)  POCT Blood Glucose.: 133 mg/dL (01 Oct 2024 08:07)  POCT Blood Glucose.: 115 mg/dL (01 Oct 2024 05:41)  POCT Blood Glucose.: 145 mg/dL (01 Oct 2024 00:52)  POCT Blood Glucose.: 111 mg/dL (30 Sep 2024 16:25)        RADIOLOGY & ADDITIONAL TESTS:  Results Reviewed:   Imaging Personally Reviewed:  Electrocardiogram Personally Reviewed:

## 2024-10-01 NOTE — PROGRESS NOTE ADULT - SUBJECTIVE AND OBJECTIVE BOX
NEPHROLOGY INTERVAL HPI/OVERNIGHT EVENTS:    Examined earlier  Frail lethargic  On Bipap    MEDICATIONS  (STANDING):  albuterol/ipratropium for Nebulization 3 milliLiter(s) Nebulizer every 6 hours  aspirin  chewable 81 milliGRAM(s) Oral daily  atorvastatin 40 milliGRAM(s) Oral at bedtime  carvedilol 3.125 milliGRAM(s) Oral every 12 hours  chlorhexidine 2% Cloths 1 Application(s) Topical daily  dextrose 5%. 1000 milliLiter(s) (100 mL/Hr) IV Continuous <Continuous>  dextrose 5%. 1000 milliLiter(s) (50 mL/Hr) IV Continuous <Continuous>  dextrose 50% Injectable 25 Gram(s) IV Push once  dextrose 50% Injectable 12.5 Gram(s) IV Push once  dextrose 50% Injectable 25 Gram(s) IV Push once  dextrose Oral Gel 15 Gram(s) Oral once  fluticasone propionate/ salmeterol 500-50 MICROgram(s) Diskus 1 Dose(s) Inhalation two times a day  gabapentin 300 milliGRAM(s) Oral daily  glucagon  Injectable 1 milliGRAM(s) IntraMuscular once  heparin   Injectable 5000 Unit(s) SubCutaneous every 8 hours  methylPREDNISolone sodium succinate Injectable 40 milliGRAM(s) IV Push every 12 hours  pantoprazole  Injectable 40 milliGRAM(s) IV Push daily  polyethylene glycol 3350 17 Gram(s) Oral daily  senna 2 Tablet(s) Oral at bedtime  sodium chloride 1 Gram(s) Oral three times a day  tiotropium 2.5 MICROgram(s) Inhaler 2 Puff(s) Inhalation daily  urea Oral Powder 15 Gram(s) Oral two times a day  venlafaxine  milliGRAM(s) Oral daily  zolpidem 5 milliGRAM(s) Oral at bedtime    MEDICATIONS  (PRN):  acetaminophen     Tablet .. 650 milliGRAM(s) Oral every 6 hours PRN Temp greater or equal to 38C (100.4F), Mild Pain (1 - 3), Moderate Pain (4 - 6)  ALPRAZolam 0.5 milliGRAM(s) Oral two times a day PRN anxiety  aluminum hydroxide/magnesium hydroxide/simethicone Suspension 30 milliLiter(s) Oral every 4 hours PRN Dyspepsia  bisacodyl 5 milliGRAM(s) Oral every 12 hours PRN Constipation  magnesium hydroxide Suspension 30 milliLiter(s) Oral daily PRN Constipation  ondansetron Injectable 4 milliGRAM(s) IV Push four times a day PRN Nausea and/or Vomiting      Allergies    No Known Allergies    Intolerances        Vital Signs Last 24 Hrs  T(C): 36.3 (01 Oct 2024 14:17), Max: 37.1 (30 Sep 2024 23:05)  T(F): 97.3 (01 Oct 2024 14:17), Max: 98.7 (30 Sep 2024 23:05)  HR: 114 (01 Oct 2024 14:) (70 - 114)  BP: 134/82 (01 Oct 2024 14:) (114/71 - 142/99)  BP(mean): --  RR: 21 (01 Oct 2024 14:) (18 - 21)  SpO2: 100% (01 Oct 2024 14:) (92% - 100%)    Parameters below as of 01 Oct 2024 14:17  Patient On (Oxygen Delivery Method): BiPAP/CPAP      Daily     Daily Weight in k (01 Oct 2024 05:22)    PHYSICAL EXAM:  GENERAL: Frail, lethargic  HEAD: BiPAP mask  NECK: Supple, No JVD  NERVOUS SYSTEM: Alert & Oriented X3  CHEST/LUNG: Clear bilaterally  HEART: Regular rate and rhythm; No rub  ABDOMEN: Soft, Nontender, +BS  EXTREMITIES: Tr dependent edema      LABS:                        10.6   15.62 )-----------( 428      ( 30 Sep 2024 08:33 )             31.1     10-    137  |  95[L]  |  33.1[H]  ----------------------------<  107[H]  3.8   |  31.0[H]  |  0.54    Ca    8.9      01 Oct 2024 05:02        Urinalysis Basic - ( 01 Oct 2024 05:02 )    Color: x / Appearance: x / SG: x / pH: x  Gluc: 107 mg/dL / Ketone: x  / Bili: x / Urobili: x   Blood: x / Protein: x / Nitrite: x   Leuk Esterase: x / RBC: x / WBC x   Sq Epi: x / Non Sq Epi: x / Bacteria: x        ABG - ( 30 Sep 2024 04:00 )  pH, Arterial: 7.390 pH, Blood: x     /  pCO2: 55    /  pO2: 127   / HCO3: 33    / Base Excess: 8.3   /  SaO2: 100.0                 RADIOLOGY & ADDITIONAL TESTS:

## 2024-10-01 NOTE — PROGRESS NOTE ADULT - ASSESSMENT
73 yo F w h/o CAD , HFpEF, HTN, HLD, COPD, recent admission for CP  s/p LHC discharged home 9/19  returned to the Hospital with abdominal pain , nausea vomiting    Chronic Hyponatremia - improving at appropriate rate  Serum Na 126--> 129 --> 127 --> 131 --> 134 --> 137  Acute hypovolemic hyponatremia (U Na+ < 30), repeat urine studies  - PO fluid restriction enforced again  - cont PO Urea and NaCl tabs if serum Na remains at 137 or higher will stop NaCL and Urea powder  - Avoid over correction of serum Na by more than 6- 8 mEq /L per 24 hrs    Monitor labs will follow

## 2024-10-01 NOTE — PROVIDER CONTACT NOTE (HYPOGLYCEMIA EVENT) - NS PROVIDER CONTACT BACKGROUND-HYPO
Age: 74y    Gender: Female    POCT Blood Glucose:  128 mg/dL (10-01-24 @ 18:44)  65 mg/dL (10-01-24 @ 18:19)  62 mg/dL (10-01-24 @ 18:18)  132 mg/dL (10-01-24 @ 12:08)  133 mg/dL (10-01-24 @ 08:07)  115 mg/dL (10-01-24 @ 05:41)  145 mg/dL (10-01-24 @ 00:52)      eMAR:atorvastatin   40 milliGRAM(s) Oral (09-30-24 @ 21:25)    dextrose 50% Injectable   12.5 Gram(s) IV Push (10-01-24 @ 18:24)    methylPREDNISolone sodium succinate Injectable   40 milliGRAM(s) IV Push (10-01-24 @ 17:28)   40 milliGRAM(s) IV Push (10-01-24 @ 05:32)

## 2024-10-02 ENCOUNTER — APPOINTMENT (OUTPATIENT)
Dept: PULMONOLOGY | Facility: CLINIC | Age: 74
End: 2024-10-02

## 2024-10-02 LAB
ANION GAP SERPL CALC-SCNC: 11 MMOL/L — SIGNIFICANT CHANGE UP (ref 5–17)
ANISOCYTOSIS BLD QL: SLIGHT — SIGNIFICANT CHANGE UP
APPEARANCE UR: CLEAR — SIGNIFICANT CHANGE UP
BACTERIA # UR AUTO: NEGATIVE /HPF — SIGNIFICANT CHANGE UP
BASOPHILS # BLD AUTO: 0 K/UL — SIGNIFICANT CHANGE UP (ref 0–0.2)
BASOPHILS NFR BLD AUTO: 0 % — SIGNIFICANT CHANGE UP (ref 0–2)
BILIRUB UR-MCNC: NEGATIVE — SIGNIFICANT CHANGE UP
BUN SERPL-MCNC: 20.6 MG/DL — HIGH (ref 8–20)
CALCIUM SERPL-MCNC: 8.4 MG/DL — SIGNIFICANT CHANGE UP (ref 8.4–10.5)
CHLORIDE SERPL-SCNC: 98 MMOL/L — SIGNIFICANT CHANGE UP (ref 96–108)
CO2 SERPL-SCNC: 31 MMOL/L — HIGH (ref 22–29)
COLOR SPEC: YELLOW — SIGNIFICANT CHANGE UP
CREAT SERPL-MCNC: 0.56 MG/DL — SIGNIFICANT CHANGE UP (ref 0.5–1.3)
DIFF PNL FLD: ABNORMAL
EGFR: 96 ML/MIN/1.73M2 — SIGNIFICANT CHANGE UP
EOSINOPHIL # BLD AUTO: 0 K/UL — SIGNIFICANT CHANGE UP (ref 0–0.5)
EOSINOPHIL NFR BLD AUTO: 0 % — SIGNIFICANT CHANGE UP (ref 0–6)
GIANT PLATELETS BLD QL SMEAR: PRESENT — SIGNIFICANT CHANGE UP
GLUCOSE BLDC GLUCOMTR-MCNC: 108 MG/DL — HIGH (ref 70–99)
GLUCOSE BLDC GLUCOMTR-MCNC: 119 MG/DL — HIGH (ref 70–99)
GLUCOSE BLDC GLUCOMTR-MCNC: 145 MG/DL — HIGH (ref 70–99)
GLUCOSE BLDC GLUCOMTR-MCNC: 95 MG/DL — SIGNIFICANT CHANGE UP (ref 70–99)
GLUCOSE SERPL-MCNC: 103 MG/DL — HIGH (ref 70–99)
GLUCOSE UR QL: 100 MG/DL
HCT VFR BLD CALC: 29.4 % — LOW (ref 34.5–45)
HGB BLD-MCNC: 9.5 G/DL — LOW (ref 11.5–15.5)
KETONES UR-MCNC: 40 MG/DL
LEUKOCYTE ESTERASE UR-ACNC: ABNORMAL
LYMPHOCYTES # BLD AUTO: 0.17 K/UL — LOW (ref 1–3.3)
LYMPHOCYTES # BLD AUTO: 0.9 % — LOW (ref 13–44)
MANUAL SMEAR VERIFICATION: SIGNIFICANT CHANGE UP
MCHC RBC-ENTMCNC: 29.6 PG — SIGNIFICANT CHANGE UP (ref 27–34)
MCHC RBC-ENTMCNC: 32.3 GM/DL — SIGNIFICANT CHANGE UP (ref 32–36)
MCV RBC AUTO: 91.6 FL — SIGNIFICANT CHANGE UP (ref 80–100)
MONOCYTES # BLD AUTO: 0 K/UL — SIGNIFICANT CHANGE UP (ref 0–0.9)
MONOCYTES NFR BLD AUTO: 0 % — LOW (ref 2–14)
MYELOCYTES NFR BLD: 0.9 % — HIGH (ref 0–0)
NEUTROPHILS # BLD AUTO: 18.3 K/UL — HIGH (ref 1.8–7.4)
NEUTROPHILS NFR BLD AUTO: 98.2 % — HIGH (ref 43–77)
NITRITE UR-MCNC: NEGATIVE — SIGNIFICANT CHANGE UP
PH UR: 7.5 — SIGNIFICANT CHANGE UP (ref 5–8)
PLAT MORPH BLD: NORMAL — SIGNIFICANT CHANGE UP
PLATELET # BLD AUTO: 381 K/UL — SIGNIFICANT CHANGE UP (ref 150–400)
POIKILOCYTOSIS BLD QL AUTO: SLIGHT — SIGNIFICANT CHANGE UP
POLYCHROMASIA BLD QL SMEAR: SLIGHT — SIGNIFICANT CHANGE UP
POTASSIUM SERPL-MCNC: 3.6 MMOL/L — SIGNIFICANT CHANGE UP (ref 3.5–5.3)
POTASSIUM SERPL-SCNC: 3.6 MMOL/L — SIGNIFICANT CHANGE UP (ref 3.5–5.3)
PROCALCITONIN SERPL-MCNC: 0.09 NG/ML — SIGNIFICANT CHANGE UP (ref 0.02–0.1)
PROT UR-MCNC: 30 MG/DL
RAPID RVP RESULT: SIGNIFICANT CHANGE UP
RBC # BLD: 3.21 M/UL — LOW (ref 3.8–5.2)
RBC # FLD: 12.4 % — SIGNIFICANT CHANGE UP (ref 10.3–14.5)
RBC BLD AUTO: ABNORMAL
RBC CASTS # UR COMP ASSIST: 3 /HPF — SIGNIFICANT CHANGE UP (ref 0–4)
SARS-COV-2 RNA SPEC QL NAA+PROBE: SIGNIFICANT CHANGE UP
SMUDGE CELLS # BLD: PRESENT — SIGNIFICANT CHANGE UP
SODIUM SERPL-SCNC: 140 MMOL/L — SIGNIFICANT CHANGE UP (ref 135–145)
SP GR SPEC: 1.02 — SIGNIFICANT CHANGE UP (ref 1–1.03)
SQUAMOUS # UR AUTO: 3 /HPF — SIGNIFICANT CHANGE UP (ref 0–5)
STOMATOCYTES BLD QL SMEAR: SLIGHT — SIGNIFICANT CHANGE UP
UROBILINOGEN FLD QL: 0.2 MG/DL — SIGNIFICANT CHANGE UP (ref 0.2–1)
WBC # BLD: 18.64 K/UL — HIGH (ref 3.8–10.5)
WBC # FLD AUTO: 18.64 K/UL — HIGH (ref 3.8–10.5)
WBC UR QL: 20 /HPF — HIGH (ref 0–5)

## 2024-10-02 PROCEDURE — 99233 SBSQ HOSP IP/OBS HIGH 50: CPT

## 2024-10-02 PROCEDURE — 71045 X-RAY EXAM CHEST 1 VIEW: CPT | Mod: 26

## 2024-10-02 PROCEDURE — 93010 ELECTROCARDIOGRAM REPORT: CPT

## 2024-10-02 PROCEDURE — G0316 PROLONG INPT EVAL ADD15 M: CPT

## 2024-10-02 RX ORDER — ACETAMINOPHEN 325 MG
1000 TABLET ORAL ONCE
Refills: 0 | Status: COMPLETED | OUTPATIENT
Start: 2024-10-02 | End: 2024-10-02

## 2024-10-02 RX ORDER — ZOLPIDEM TARTRATE 5 MG
5 TABLET ORAL AT BEDTIME
Refills: 0 | Status: COMPLETED | OUTPATIENT
Start: 2024-10-02 | End: 2024-10-09

## 2024-10-02 RX ORDER — METOCLOPRAMIDE HCL 5 MG
10 TABLET ORAL ONCE
Refills: 0 | Status: COMPLETED | OUTPATIENT
Start: 2024-10-02 | End: 2024-10-02

## 2024-10-02 RX ORDER — ZOLPIDEM TARTRATE 5 MG
5 TABLET ORAL ONCE
Refills: 0 | Status: DISCONTINUED | OUTPATIENT
Start: 2024-10-02 | End: 2024-10-02

## 2024-10-02 RX ORDER — SODIUM CHLORIDE 0.9 % (FLUSH) 0.9 %
1 SYRINGE (ML) INJECTION
Refills: 0 | Status: DISCONTINUED | OUTPATIENT
Start: 2024-10-02 | End: 2024-10-07

## 2024-10-02 RX ADMIN — IPRATROPIUM BROMIDE AND ALBUTEROL SULFATE 3 MILLILITER(S): .5; 3 SOLUTION RESPIRATORY (INHALATION) at 08:14

## 2024-10-02 RX ADMIN — METHYLPREDNISOLONE ACETATE 40 MILLIGRAM(S): 80 INJECTION, SUSPENSION INTRA-ARTICULAR; INTRALESIONAL; INTRAMUSCULAR; SOFT TISSUE at 05:31

## 2024-10-02 RX ADMIN — Medication 650 MILLIGRAM(S): at 00:19

## 2024-10-02 RX ADMIN — Medication 650 MILLIGRAM(S): at 00:12

## 2024-10-02 RX ADMIN — CHLORHEXIDINE GLUCONATE ORAL RINSE 1 APPLICATION(S): 1.2 SOLUTION DENTAL at 12:14

## 2024-10-02 RX ADMIN — Medication 4 MILLIGRAM(S): at 16:01

## 2024-10-02 RX ADMIN — Medication 5000 UNIT(S): at 08:13

## 2024-10-02 RX ADMIN — IPRATROPIUM BROMIDE AND ALBUTEROL SULFATE 3 MILLILITER(S): .5; 3 SOLUTION RESPIRATORY (INHALATION) at 14:40

## 2024-10-02 RX ADMIN — Medication 4 MILLIGRAM(S): at 23:09

## 2024-10-02 RX ADMIN — Medication 0.5 MILLIGRAM(S): at 12:13

## 2024-10-02 RX ADMIN — Medication 10 MILLIGRAM(S): at 11:12

## 2024-10-02 RX ADMIN — Medication 5 MILLIGRAM(S): at 00:12

## 2024-10-02 RX ADMIN — IPRATROPIUM BROMIDE AND ALBUTEROL SULFATE 3 MILLILITER(S): .5; 3 SOLUTION RESPIRATORY (INHALATION) at 19:44

## 2024-10-02 RX ADMIN — PANTOPRAZOLE SODIUM 40 MILLIGRAM(S): 40 TABLET, DELAYED RELEASE ORAL at 12:13

## 2024-10-02 RX ADMIN — Medication 5000 UNIT(S): at 23:04

## 2024-10-02 RX ADMIN — Medication 5000 UNIT(S): at 16:02

## 2024-10-02 RX ADMIN — METHYLPREDNISOLONE ACETATE 40 MILLIGRAM(S): 80 INJECTION, SUSPENSION INTRA-ARTICULAR; INTRALESIONAL; INTRAMUSCULAR; SOFT TISSUE at 17:29

## 2024-10-02 RX ADMIN — Medication 400 MILLIGRAM(S): at 22:49

## 2024-10-02 RX ADMIN — IPRATROPIUM BROMIDE AND ALBUTEROL SULFATE 3 MILLILITER(S): .5; 3 SOLUTION RESPIRATORY (INHALATION) at 02:42

## 2024-10-02 RX ADMIN — Medication 4 MILLIGRAM(S): at 04:11

## 2024-10-02 RX ADMIN — Medication 0.5 MILLIGRAM(S): at 20:04

## 2024-10-02 NOTE — PROGRESS NOTE ADULT - ASSESSMENT
74yr woman xh CAD/PCI, HFpEF, COPD on home O2 recent admission for CP with non obstructive CAD on [facility] readmitted with acute on chronic respiratory failure     Acute on Chronic Respiratory Failure  COPD  cont O2 support  via Bipap - wean as tolerated  Pulmonary following    Chronic Hyponatremia  improved   Nephrology following    Debility  Assist with care  Turn/reposition  Safety precautions    Encounter for Palliative Care  Palliative Care consulted to assist with goals of care.   Met with  who wanted to know about next steps in wife's plan of care.  Discussed wife's tenuous condition.  Made aware she cannot be on Bipap indefinitely  Prepared him if she further decompensates will need to intubate and place on ventilator. Informed him if she placed on the vent, it will be likely  difficult for her to wean off given her baseline pulmonary issues.  Wife is already on home O2.  In the interim, will see if any further pulmonary recommendations.   Psychosocial support

## 2024-10-02 NOTE — PROGRESS NOTE ADULT - ASSESSMENT
75 y/o F with a h/o CAD (s/p PCI), HFpEF, HTN, HLD, COPD (on home O2 PRN), recent admission for chest pain s/p LHC with nonobstructive CAD- discharged home 9/19, now readmitted on 9/22 with complaints abd pain nausea vomiting , hyponatremia, and acute hypercapnic respiratory failure. Upgraded to SDU 10/1 for continuous BIPAP need, remains on BIPAP today, pulm following, continuing GOC conversations with patient family.     Acute on chronic hypercapnic respiratory failure 2/2 to COPD exacerbation (Home O2 4lnc)  Compounded by elevated right hemidiaphragm  - pulmonary following. Advising that patient accumulates co2 rapidly and requires BIPAP at night  - 10/1 noted increased work of breathing for which continuous BIPAP started, upgraded to SDU   - 10/2 attempted ween from BIPAP however not tolerated, remains on continuous BIPAP at this time   Cont IV Steroids BID as patient has deteriorated clinically   S/p Abx  - Low grade temperature noted overnight / this .4 Tmax, infectious workup started, as patient is s/p Abx course, and CT PE was negative 9/22, with withold further abxs at this time, if fever is persistent / worsens will begin Abx again   continue SDU level of care     Hyponatremia - improving  History of chronic SIADH  Hyperkalemia - resolved  - monitor  - c/w fluid restriction  - nephro following  - c/w urea powder and salt tabs  - trend BMP    History of CAD s/p PCI  HFpEF  - cont home meds aspirin, carvedilol, statin    DVT ppx: subq heparin   Dispo: medically acute. Plan for ongoing GOC with palliative, eventual [facility] placement likely, vs home with home care / comfort measures   GOC - full code 75 y/o F with a h/o CAD (s/p PCI), HFpEF, HTN, HLD, COPD (on home O2 PRN), recent admission for chest pain s/p LHC with nonobstructive CAD- discharged home 9/19, now readmitted on 9/22 with complaints abd pain nausea vomiting , hyponatremia, and acute hypercapnic respiratory failure. Upgraded to SDU 10/1 for continuous BIPAP need, remains on BIPAP today, pulm following, continuing GOC conversations with patient family.     Acute on chronic hypercapnic respiratory failure 2/2 to COPD exacerbation (Home O2 4lnc)  Compounded by elevated right hemidiaphragm  - pulmonary following. Advising that patient accumulates co2 rapidly and requires BIPAP at night  - 10/1 noted increased work of breathing for which continuous BIPAP started, upgraded to SDU   - 10/2 attempted ween from BIPAP however not tolerated, remains on continuous BIPAP at this time   Cont IV Steroids BID as patient has deteriorated clinically   S/p Abx  - Low grade temperature noted overnight / this .4 Tmax, infectious workup started, as patient is s/p Abx course, and CT PE was negative 9/22, with withold further abxs at this time, if fever is persistent / worsens will begin Abx again   WBC elevation in setting of steroid usage   continue SDU level of care     Hyponatremia - improving  History of chronic SIADH  Hyperkalemia - resolved  - monitor  - c/w fluid restriction  - nephro following  - c/w urea powder and salt tabs  - trend BMP    History of CAD s/p PCI  HFpEF  - cont home meds aspirin, carvedilol, statin    DVT ppx: subq heparin   Dispo: medically acute. Plan for ongoing GOC with palliative, eventual [facility] placement likely, vs home with home care / comfort measures   GOC - full code 73 y/o F with a h/o CAD (s/p PCI), HFpEF, HTN, HLD, COPD (on home O2 PRN), recent admission for chest pain s/p LHC with nonobstructive CAD- discharged home 9/19, now readmitted on 9/22 with complaints abd pain nausea vomiting , hyponatremia, and acute hypercapnic respiratory failure. Upgraded to SDU 10/1 for continuous BIPAP need, remains on BIPAP today, pulm following, continuing GOC conversations with patient family.     Acute on chronic hypercapnic respiratory failure 2/2 to COPD exacerbation (Home O2 4lnc)  Compounded by elevated right hemidiaphragm  - pulmonary following. Advising that patient accumulates co2 rapidly and requires BIPAP at night  - 10/1 noted increased work of breathing for which continuous BIPAP started, upgraded to SDU   - 10/2 attempted ween from BIPAP however not tolerated, remains on continuous BIPAP at this time   Cont IV Steroids BID as patient has deteriorated clinically   S/p Abx  - Low grade temperature noted overnight / this .4 Tmax, infectious workup started, as patient is s/p Abx course 9/22, and CT PE was negative 9/22, with withold further abxs at this time, if fever is persistent / worsens will begin Abx again with cefepime   WBC elevation in setting of steroid usage   continue SDU level of care     Hyponatremia - improving  History of chronic SIADH  Hyperkalemia - resolved  - monitor  - c/w fluid restriction  - nephro following  - c/w urea powder and salt tabs  - trend BMP    History of CAD s/p PCI  HFpEF  - cont home meds aspirin, carvedilol, statin    DVT ppx: subq heparin   Dispo: medically acute. Plan for ongoing GOC with palliative, eventual [facility] placement likely, vs home with home care / comfort measures   GOC - full code

## 2024-10-02 NOTE — PROGRESS NOTE ADULT - SUBJECTIVE AND OBJECTIVE BOX
[facility] Division of Hospital Medicine    Chief Complaint:      SUBJECTIVE / OVERNIGHT EVENTS:    Patient seen and examined at bedside, no acute events overnight, patient taken off BiPAP this AM with RT, however quickly noted increased work of breathing for which she was placed back on BiPAP. Noted low grade temperature throughout evening / this AM, tachycardia noted likely in setting of respiratory distress / anxiety, infectious workup sent, patient completed empiric abx course and CT PE 9/22 had been negative, will continue to monitor. Palliative continues discussions of GOC with patient / family. Patient herself is now ok with BIPAP usage, continues to desire full code.     MEDICATIONS  (STANDING):  albuterol/ipratropium for Nebulization 3 milliLiter(s) Nebulizer every 6 hours  aspirin  chewable 81 milliGRAM(s) Oral daily  atorvastatin 40 milliGRAM(s) Oral at bedtime  carvedilol 3.125 milliGRAM(s) Oral every 12 hours  chlorhexidine 2% Cloths 1 Application(s) Topical daily  dextrose 5%. 1000 milliLiter(s) (100 mL/Hr) IV Continuous <Continuous>  dextrose 5%. 1000 milliLiter(s) (50 mL/Hr) IV Continuous <Continuous>  dextrose 50% Injectable 25 Gram(s) IV Push once  dextrose 50% Injectable 25 Gram(s) IV Push once  dextrose 50% Injectable 12.5 Gram(s) IV Push once  dextrose Oral Gel 15 Gram(s) Oral once  fluticasone propionate/ salmeterol 500-50 MICROgram(s) Diskus 1 Dose(s) Inhalation two times a day  gabapentin 300 milliGRAM(s) Oral daily  glucagon  Injectable 1 milliGRAM(s) IntraMuscular once  heparin   Injectable 5000 Unit(s) SubCutaneous every 8 hours  methylPREDNISolone sodium succinate Injectable 40 milliGRAM(s) IV Push every 12 hours  pantoprazole  Injectable 40 milliGRAM(s) IV Push daily  polyethylene glycol 3350 17 Gram(s) Oral daily  senna 2 Tablet(s) Oral at bedtime  sodium chloride 1 Gram(s) Oral three times a day  tiotropium 2.5 MICROgram(s) Inhaler 2 Puff(s) Inhalation daily  urea Oral Powder 15 Gram(s) Oral two times a day  venlafaxine  milliGRAM(s) Oral daily  zolpidem 5 milliGRAM(s) Oral at bedtime    MEDICATIONS  (PRN):  acetaminophen     Tablet .. 650 milliGRAM(s) Oral every 6 hours PRN Temp greater or equal to 38C (100.4F), Mild Pain (1 - 3), Moderate Pain (4 - 6)  aluminum hydroxide/magnesium hydroxide/simethicone Suspension 30 milliLiter(s) Oral every 4 hours PRN Dyspepsia  bisacodyl 5 milliGRAM(s) Oral every 12 hours PRN Constipation  LORazepam   Injectable 0.5 milliGRAM(s) IV Push every 8 hours PRN Anxiety  magnesium hydroxide Suspension 30 milliLiter(s) Oral daily PRN Constipation  ondansetron Injectable 4 milliGRAM(s) IV Push four times a day PRN Nausea and/or Vomiting        I&O's Summary      PHYSICAL EXAM:  Vital Signs Last 24 Hrs  T(C): 37.8 (02 Oct 2024 12:00), Max: 38 (02 Oct 2024 00:00)  T(F): 100 (02 Oct 2024 12:00), Max: 100.4 (02 Oct 2024 00:00)  HR: 102 (02 Oct 2024 12:40) (92 - 118)  BP: 134/94 (02 Oct 2024 12:00) (123/75 - 140/93)  BP(mean): 106 (02 Oct 2024 12:00) (88 - 107)  RR: 20 (02 Oct 2024 12:00) (16 - 24)  SpO2: 113% (02 Oct 2024 12:40) (91% - 113%)    Parameters below as of 02 Oct 2024 12:00  Patient On (Oxygen Delivery Method): BiPAP/CPAP      CONSTITUTIONAL: NAD, appears stated age  ENMT: Moist oral mucosa, no pharyngeal injection or exudates; normal dentition  RESPIRATORY: increased work of breathing, mild accessory muscle usage off BiPAP, more comfortable on BiPAP   CARDIOVASCULAR: tachycardic rate and regular rhythm, normal S1 and S2, no murmur/rub/gallop; Peripheral pulses are 2+ bilaterally  ABDOMEN: Nontender to palpation, normoactive bowel sounds, no rebound/guarding;   MUSCLOSKELETAL:  No clubbing or cyanosis of digits; no joint swelling or tenderness to palpation  PSYCH: A+O to person, place, and time; affect appropriate  NEUROLOGY: CN 2-12 are intact and symmetric; no gross sensory deficits;   SKIN: No rashes; no palpable lesions    LABS:                        9.5    18.64 )-----------( 381      ( 02 Oct 2024 12:21 )             29.4     10-02    140  |  98  |  20.6[H]  ----------------------------<  103[H]  3.6   |  31.0[H]  |  0.56    Ca    8.4      02 Oct 2024 05:12            Urinalysis Basic - ( 02 Oct 2024 05:12 )    Color: x / Appearance: x / SG: x / pH: x  Gluc: 103 mg/dL / Ketone: x  / Bili: x / Urobili: x   Blood: x / Protein: x / Nitrite: x   Leuk Esterase: x / RBC: x / WBC x   Sq Epi: x / Non Sq Epi: x / Bacteria: x        CAPILLARY BLOOD GLUCOSE      POCT Blood Glucose.: 145 mg/dL (02 Oct 2024 11:15)  POCT Blood Glucose.: 108 mg/dL (02 Oct 2024 05:36)  POCT Blood Glucose.: 122 mg/dL (01 Oct 2024 23:38)  POCT Blood Glucose.: 128 mg/dL (01 Oct 2024 18:44)  POCT Blood Glucose.: 65 mg/dL (01 Oct 2024 18:19)  POCT Blood Glucose.: 62 mg/dL (01 Oct 2024 18:18)        RADIOLOGY & ADDITIONAL TESTS:  Results Reviewed:   Imaging Personally Reviewed:  Electrocardiogram Personally Reviewed:         [facility] Division of Hospital Medicine    Chief Complaint:      SUBJECTIVE / OVERNIGHT EVENTS:    Patient seen and examined at bedside, no acute events overnight, patient taken off BiPAP this AM with RT, however quickly noted increased work of breathing for which she was placed back on BiPAP. Noted low grade temperature throughout evening / this AM, tachycardia noted likely in setting of respiratory distress / anxiety, infectious workup sent, patient completed empiric abx course 9/22 and CT PE 9/22 had been negative, will continue to monitor. Palliative continues discussions of GOC with patient / family. Patient herself is now ok with BIPAP usage, continues to desire full code.     MEDICATIONS  (STANDING):  albuterol/ipratropium for Nebulization 3 milliLiter(s) Nebulizer every 6 hours  aspirin  chewable 81 milliGRAM(s) Oral daily  atorvastatin 40 milliGRAM(s) Oral at bedtime  carvedilol 3.125 milliGRAM(s) Oral every 12 hours  chlorhexidine 2% Cloths 1 Application(s) Topical daily  dextrose 5%. 1000 milliLiter(s) (100 mL/Hr) IV Continuous <Continuous>  dextrose 5%. 1000 milliLiter(s) (50 mL/Hr) IV Continuous <Continuous>  dextrose 50% Injectable 25 Gram(s) IV Push once  dextrose 50% Injectable 25 Gram(s) IV Push once  dextrose 50% Injectable 12.5 Gram(s) IV Push once  dextrose Oral Gel 15 Gram(s) Oral once  fluticasone propionate/ salmeterol 500-50 MICROgram(s) Diskus 1 Dose(s) Inhalation two times a day  gabapentin 300 milliGRAM(s) Oral daily  glucagon  Injectable 1 milliGRAM(s) IntraMuscular once  heparin   Injectable 5000 Unit(s) SubCutaneous every 8 hours  methylPREDNISolone sodium succinate Injectable 40 milliGRAM(s) IV Push every 12 hours  pantoprazole  Injectable 40 milliGRAM(s) IV Push daily  polyethylene glycol 3350 17 Gram(s) Oral daily  senna 2 Tablet(s) Oral at bedtime  sodium chloride 1 Gram(s) Oral three times a day  tiotropium 2.5 MICROgram(s) Inhaler 2 Puff(s) Inhalation daily  urea Oral Powder 15 Gram(s) Oral two times a day  venlafaxine  milliGRAM(s) Oral daily  zolpidem 5 milliGRAM(s) Oral at bedtime    MEDICATIONS  (PRN):  acetaminophen     Tablet .. 650 milliGRAM(s) Oral every 6 hours PRN Temp greater or equal to 38C (100.4F), Mild Pain (1 - 3), Moderate Pain (4 - 6)  aluminum hydroxide/magnesium hydroxide/simethicone Suspension 30 milliLiter(s) Oral every 4 hours PRN Dyspepsia  bisacodyl 5 milliGRAM(s) Oral every 12 hours PRN Constipation  LORazepam   Injectable 0.5 milliGRAM(s) IV Push every 8 hours PRN Anxiety  magnesium hydroxide Suspension 30 milliLiter(s) Oral daily PRN Constipation  ondansetron Injectable 4 milliGRAM(s) IV Push four times a day PRN Nausea and/or Vomiting        I&O's Summary      PHYSICAL EXAM:  Vital Signs Last 24 Hrs  T(C): 37.8 (02 Oct 2024 12:00), Max: 38 (02 Oct 2024 00:00)  T(F): 100 (02 Oct 2024 12:00), Max: 100.4 (02 Oct 2024 00:00)  HR: 102 (02 Oct 2024 12:40) (92 - 118)  BP: 134/94 (02 Oct 2024 12:00) (123/75 - 140/93)  BP(mean): 106 (02 Oct 2024 12:00) (88 - 107)  RR: 20 (02 Oct 2024 12:00) (16 - 24)  SpO2: 113% (02 Oct 2024 12:40) (91% - 113%)    Parameters below as of 02 Oct 2024 12:00  Patient On (Oxygen Delivery Method): BiPAP/CPAP      CONSTITUTIONAL: NAD, appears stated age  ENMT: Moist oral mucosa, no pharyngeal injection or exudates; normal dentition  RESPIRATORY: increased work of breathing, mild accessory muscle usage off BiPAP, more comfortable on BiPAP   CARDIOVASCULAR: tachycardic rate and regular rhythm, normal S1 and S2, no murmur/rub/gallop; Peripheral pulses are 2+ bilaterally  ABDOMEN: Nontender to palpation, normoactive bowel sounds, no rebound/guarding;   MUSCLOSKELETAL:  No clubbing or cyanosis of digits; no joint swelling or tenderness to palpation  PSYCH: A+O to person, place, and time; affect appropriate  NEUROLOGY: CN 2-12 are intact and symmetric; no gross sensory deficits;   SKIN: No rashes; no palpable lesions    LABS:                        9.5    18.64 )-----------( 381      ( 02 Oct 2024 12:21 )             29.4     10-02    140  |  98  |  20.6[H]  ----------------------------<  103[H]  3.6   |  31.0[H]  |  0.56    Ca    8.4      02 Oct 2024 05:12            Urinalysis Basic - ( 02 Oct 2024 05:12 )    Color: x / Appearance: x / SG: x / pH: x  Gluc: 103 mg/dL / Ketone: x  / Bili: x / Urobili: x   Blood: x / Protein: x / Nitrite: x   Leuk Esterase: x / RBC: x / WBC x   Sq Epi: x / Non Sq Epi: x / Bacteria: x        CAPILLARY BLOOD GLUCOSE      POCT Blood Glucose.: 145 mg/dL (02 Oct 2024 11:15)  POCT Blood Glucose.: 108 mg/dL (02 Oct 2024 05:36)  POCT Blood Glucose.: 122 mg/dL (01 Oct 2024 23:38)  POCT Blood Glucose.: 128 mg/dL (01 Oct 2024 18:44)  POCT Blood Glucose.: 65 mg/dL (01 Oct 2024 18:19)  POCT Blood Glucose.: 62 mg/dL (01 Oct 2024 18:18)        RADIOLOGY & ADDITIONAL TESTS:  Results Reviewed:   Imaging Personally Reviewed:  Electrocardiogram Personally Reviewed:

## 2024-10-02 NOTE — PROGRESS NOTE ADULT - SUBJECTIVE AND OBJECTIVE BOX
CC:  Follow up GOC , Symptoms    OVERNIGHT EVENTS:  worsened respiratory status - placed on Bipap  did not tolerate for long period  after bipap  d/c    Present Symptoms:   Dyspnea:   Yes  Nausea/Vomiting:  No   Anxiety/ Agitation   Yes  - per RN  Depression:  Unable to assess  Fatigue:  Yes    Loss of appetite:  Yes    Constipation: Not Reported       Pain:  No Signs            Location            Duration            Character            Severity            Factors            Effect    Pain AD Score:  [email or web address]/painad.pdf (press ctrl + left click to view)    Review of Systems: Reviewed as above  All others negative    MEDICATIONS  (STANDING):  albuterol/ipratropium for Nebulization 3 milliLiter(s) Nebulizer every 6 hours  aspirin  chewable 81 milliGRAM(s) Oral daily  atorvastatin 40 milliGRAM(s) Oral at bedtime  carvedilol 3.125 milliGRAM(s) Oral every 12 hours  chlorhexidine 2% Cloths 1 Application(s) Topical daily  dextrose 5%. 1000 milliLiter(s) (100 mL/Hr) IV Continuous <Continuous>  dextrose 5%. 1000 milliLiter(s) (50 mL/Hr) IV Continuous <Continuous>  dextrose 50% Injectable 25 Gram(s) IV Push once  dextrose 50% Injectable 12.5 Gram(s) IV Push once  dextrose 50% Injectable 25 Gram(s) IV Push once  dextrose Oral Gel 15 Gram(s) Oral once  fluticasone propionate/ salmeterol 500-50 MICROgram(s) Diskus 1 Dose(s) Inhalation two times a day  gabapentin 300 milliGRAM(s) Oral daily  glucagon  Injectable 1 milliGRAM(s) IntraMuscular once  heparin   Injectable 5000 Unit(s) SubCutaneous every 8 hours  methylPREDNISolone sodium succinate Injectable 40 milliGRAM(s) IV Push every 12 hours  metoclopramide Injectable 10 milliGRAM(s) IV Push once  pantoprazole  Injectable 40 milliGRAM(s) IV Push daily  polyethylene glycol 3350 17 Gram(s) Oral daily  senna 2 Tablet(s) Oral at bedtime  sodium chloride 1 Gram(s) Oral three times a day  tiotropium 2.5 MICROgram(s) Inhaler 2 Puff(s) Inhalation daily  urea Oral Powder 15 Gram(s) Oral two times a day  venlafaxine  milliGRAM(s) Oral daily  zolpidem 5 milliGRAM(s) Oral at bedtime    MEDICATIONS  (PRN):  acetaminophen     Tablet .. 650 milliGRAM(s) Oral every 6 hours PRN Temp greater or equal to 38C (100.4F), Mild Pain (1 - 3), Moderate Pain (4 - 6)  aluminum hydroxide/magnesium hydroxide/simethicone Suspension 30 milliLiter(s) Oral every 4 hours PRN Dyspepsia  bisacodyl 5 milliGRAM(s) Oral every 12 hours PRN Constipation  LORazepam   Injectable 0.5 milliGRAM(s) IV Push every 8 hours PRN Anxiety  magnesium hydroxide Suspension 30 milliLiter(s) Oral daily PRN Constipation  ondansetron Injectable 4 milliGRAM(s) IV Push four times a day PRN Nausea and/or Vomiting      PHYSICAL EXAM:    Vital Signs Last 24 Hrs  T(C): 37.9 (02 Oct 2024 08:00), Max: 38 (02 Oct 2024 00:00)  T(F): 100.2 (02 Oct 2024 08:00), Max: 100.4 (02 Oct 2024 00:00)  HR: 92 (02 Oct 2024 10:00) (92 - 118)  BP: 131/68 (02 Oct 2024 10:00) (123/75 - 135/89)  BP(mean): 88 (02 Oct 2024 10:00) (88 - 104)  RR: 18 (02 Oct 2024 10:00) (16 - 24)  SpO2: 99% (02 Oct 2024 10:00) (91% - 100%)    Parameters below as of 02 Oct 2024 10:00  Patient On (Oxygen Delivery Method): BiPAP/CPAP    Karnofsky: 30 %  General:  Elderly woman NAD  HEENT:  NCAT        Lungs: comfortable  non labored  CV:  RR  GI:  soft NTND  MSK: weak BB  Skin:  warm/dry  Neuro  lethargic  Psych  calm    LABS:      10-02    140  |  98  |  20.6[H]  ----------------------------<  103[H]  3.6   |  31.0[H]  |  0.56    Ca    8.4      02 Oct 2024 05:12        Urinalysis Basic - ( 02 Oct 2024 05:12 )    Color: x / Appearance: x / SG: x / pH: x  Gluc: 103 mg/dL / Ketone: x  / Bili: x / Urobili: x   Blood: x / Protein: x / Nitrite: x   Leuk Esterase: x / RBC: x / WBC x   Sq Epi: x / Non Sq Epi: x / Bacteria: x      I&O's Summary      RADIOLOGY & ADDITIONAL STUDIES:  Imaging Reviewed  ( x  )   < from: Xray Chest 1 View-PORTABLE IMMEDIATE (Xray Chest 1 View-PORTABLE IMMEDIATE .) (09.30.24 @ 11:54) >    ACC: [id number] EXAM:  XR CHEST PORTABLE IMMED 1V   ORDERED BY: MANJIT MANZANO     PROCEDURE DATE:  09/30/2024          INTERPRETATION:  AP chest on September 30, 2024 at 10:44 AM. Patient has   sepsis.    Heart magnified by technique.    Again notedis a left base infiltrate roughly similar to September 28.    There is an increasing right base atelectatic infiltrate.    IMPRESSION: Stable left base infiltrate. Increasing right base   atelectatic infiltrate.    --- End of Report ---            PHIL NICHOLE MD; Attending Radiologist  This document has been electronically signed. Sep 30 2024 11:59AM    < end of copied text >          ADVANCE DIRECTIVE PREFERENCES    Full Code

## 2024-10-02 NOTE — PROGRESS NOTE ADULT - TIME BILLING
Time spent with review of chart documents, labs, imaging. Direct patient assessment,  formulation of care plan. Discussion with  Interdisciplinary  team  Arlin Lees RN Time spent with review of chart documents, labs, imaging. Direct patient assessment,  formulation of care plan. Discussion with  Interdisciplinary  team  Arlin Lees RN, Dr.

## 2024-10-02 NOTE — PROGRESS NOTE ADULT - ASSESSMENT
73 yo F w h/o CAD , HFpEF, HTN, HLD, COPD, recent admission for CP  s/p LHC discharged home 9/19  returned to the Hospital with abdominal pain , nausea vomiting    Chronic Hyponatremia - improving at appropriate rate  Serum Na 126--> 129 --> 127 --> 131 --> 134 --> 137>140  Acute hypovolemic hyponatremia (U Na+ < 30), repeat urine studies  - PO fluid restriction enforced again  - Stop PO Urea and decrease NaCl tabs     Monitor labs will follow

## 2024-10-02 NOTE — PROGRESS NOTE ADULT - SUBJECTIVE AND OBJECTIVE BOX
Patient seen and examined    I&O's Summary    02 Oct 2024 07:01  -  02 Oct 2024 18:11  --------------------------------------------------------  IN: 0 mL / OUT: 400 mL / NET: -400 mL        REVIEW OF SYSTEMS: comfortable on O2    CONSTITUTIONAL: No F/C  RESPIRATORY: No cough,  No SOB on O2 - bipap  CARDIOVASCULAR: No CP/palpitations,    GASTROINTESTINAL: No abdominal pain  or NVD   GENITOURINARY: No UTI sx  NEUROLOGICAL: No headaches, NO wk/numbness  MUSCULOSKELETAL:   EXTREMITIES : no swelling,    Vital Signs Last 24 Hrs  T(C): 38.2 (02 Oct 2024 16:00), Max: 38.2 (02 Oct 2024 14:00)  T(F): 100.8 (02 Oct 2024 16:00), Max: 100.8 (02 Oct 2024 14:00)  HR: 107 (02 Oct 2024 17:22) (92 - 118)  BP: 125/73 (02 Oct 2024 16:00) (123/75 - 140/93)  BP(mean): 89 (02 Oct 2024 16:00) (88 - 107)  RR: 20 (02 Oct 2024 16:00) (16 - 24)  SpO2: 100% (02 Oct 2024 17:22) (98% - 113%)    Parameters below as of 02 Oct 2024 16:00  Patient On (Oxygen Delivery Method): BiPAP/CPAP        PHYSICAL EXAM:    GENERAL: NAD on bipap  EYES:  conjunctiva and sclera clear  NECK: Supple, No JVD/Bruit  NERVOUS SYSTEM:  A/O x3,   CHEST:  No rales few rhonchi  HEART:  RRR, No murmur  ABDOMEN: Soft, NT/ND BS+  EXTREMITIES:  No Edema;  SKIN: No rashes    LABS:                          9.5    18.64 )-----------( 381      ( 02 Oct 2024 12:21 )             29.4     10-02    140  |  98  |  20.6[H]  ----------------------------<  103[H]  3.6   |  31.0[H]  |  0.56    Ca    8.4      02 Oct 2024 05:12        MEDICATIONS  (STANDING):  acetaminophen     Tablet .. PRN  albuterol/ipratropium for Nebulization  aluminum hydroxide/magnesium hydroxide/simethicone Suspension PRN  aspirin  chewable  atorvastatin  bisacodyl PRN  carvedilol  chlorhexidine 2% Cloths  dextrose 5%.  dextrose 5%.  dextrose 50% Injectable  dextrose 50% Injectable  dextrose 50% Injectable  dextrose Oral Gel  fluticasone propionate/ salmeterol 500-50 MICROgram(s) Diskus  gabapentin  glucagon  Injectable  heparin   Injectable  LORazepam   Injectable PRN  magnesium hydroxide Suspension PRN  methylPREDNISolone sodium succinate Injectable  ondansetron Injectable PRN  pantoprazole  Injectable  polyethylene glycol 3350  senna  sodium chloride  tiotropium 2.5 MICROgram(s) Inhaler  urea Oral Powder  venlafaxine XR  zolpidem

## 2024-10-03 LAB
ANION GAP SERPL CALC-SCNC: 7 MMOL/L — SIGNIFICANT CHANGE UP (ref 5–17)
BASOPHILS # BLD AUTO: 0.03 K/UL — SIGNIFICANT CHANGE UP (ref 0–0.2)
BASOPHILS NFR BLD AUTO: 0.2 % — SIGNIFICANT CHANGE UP (ref 0–2)
BUN SERPL-MCNC: 15.3 MG/DL — SIGNIFICANT CHANGE UP (ref 8–20)
CALCIUM SERPL-MCNC: 8.9 MG/DL — SIGNIFICANT CHANGE UP (ref 8.4–10.5)
CHLORIDE SERPL-SCNC: 97 MMOL/L — SIGNIFICANT CHANGE UP (ref 96–108)
CO2 SERPL-SCNC: 35 MMOL/L — HIGH (ref 22–29)
CREAT SERPL-MCNC: 0.51 MG/DL — SIGNIFICANT CHANGE UP (ref 0.5–1.3)
EGFR: 98 ML/MIN/1.73M2 — SIGNIFICANT CHANGE UP
EOSINOPHIL # BLD AUTO: 0.01 K/UL — SIGNIFICANT CHANGE UP (ref 0–0.5)
EOSINOPHIL NFR BLD AUTO: 0.1 % — SIGNIFICANT CHANGE UP (ref 0–6)
GLUCOSE BLDC GLUCOMTR-MCNC: 168 MG/DL — HIGH (ref 70–99)
GLUCOSE BLDC GLUCOMTR-MCNC: 223 MG/DL — HIGH (ref 70–99)
GLUCOSE BLDC GLUCOMTR-MCNC: 91 MG/DL — SIGNIFICANT CHANGE UP (ref 70–99)
GLUCOSE SERPL-MCNC: 97 MG/DL — SIGNIFICANT CHANGE UP (ref 70–99)
HCT VFR BLD CALC: 28.9 % — LOW (ref 34.5–45)
HGB BLD-MCNC: 9.3 G/DL — LOW (ref 11.5–15.5)
IMM GRANULOCYTES NFR BLD AUTO: 4.7 % — HIGH (ref 0–0.9)
LYMPHOCYTES # BLD AUTO: 1.31 K/UL — SIGNIFICANT CHANGE UP (ref 1–3.3)
LYMPHOCYTES # BLD AUTO: 8.9 % — LOW (ref 13–44)
MCHC RBC-ENTMCNC: 29.7 PG — SIGNIFICANT CHANGE UP (ref 27–34)
MCHC RBC-ENTMCNC: 32.2 GM/DL — SIGNIFICANT CHANGE UP (ref 32–36)
MCV RBC AUTO: 92.3 FL — SIGNIFICANT CHANGE UP (ref 80–100)
MONOCYTES # BLD AUTO: 1.37 K/UL — HIGH (ref 0–0.9)
MONOCYTES NFR BLD AUTO: 9.3 % — SIGNIFICANT CHANGE UP (ref 2–14)
NEUTROPHILS # BLD AUTO: 11.39 K/UL — HIGH (ref 1.8–7.4)
NEUTROPHILS NFR BLD AUTO: 76.8 % — SIGNIFICANT CHANGE UP (ref 43–77)
PLATELET # BLD AUTO: 347 K/UL — SIGNIFICANT CHANGE UP (ref 150–400)
POTASSIUM SERPL-MCNC: 3.9 MMOL/L — SIGNIFICANT CHANGE UP (ref 3.5–5.3)
POTASSIUM SERPL-SCNC: 3.9 MMOL/L — SIGNIFICANT CHANGE UP (ref 3.5–5.3)
RBC # BLD: 3.13 M/UL — LOW (ref 3.8–5.2)
RBC # FLD: 12.6 % — SIGNIFICANT CHANGE UP (ref 10.3–14.5)
SODIUM SERPL-SCNC: 138 MMOL/L — SIGNIFICANT CHANGE UP (ref 135–145)
WBC # BLD: 14.8 K/UL — HIGH (ref 3.8–10.5)
WBC # FLD AUTO: 14.8 K/UL — HIGH (ref 3.8–10.5)

## 2024-10-03 PROCEDURE — 99232 SBSQ HOSP IP/OBS MODERATE 35: CPT

## 2024-10-03 PROCEDURE — 99233 SBSQ HOSP IP/OBS HIGH 50: CPT

## 2024-10-03 PROCEDURE — 99497 ADVNCD CARE PLAN 30 MIN: CPT | Mod: 25

## 2024-10-03 RX ORDER — ACETAMINOPHEN 325 MG
1000 TABLET ORAL ONCE
Refills: 0 | Status: COMPLETED | OUTPATIENT
Start: 2024-10-03 | End: 2024-10-03

## 2024-10-03 RX ADMIN — IPRATROPIUM BROMIDE AND ALBUTEROL SULFATE 3 MILLILITER(S): .5; 3 SOLUTION RESPIRATORY (INHALATION) at 20:57

## 2024-10-03 RX ADMIN — Medication 5000 UNIT(S): at 23:04

## 2024-10-03 RX ADMIN — Medication 1000 MILLIGRAM(S): at 00:00

## 2024-10-03 RX ADMIN — Medication 0.25 MILLIGRAM(S): at 01:19

## 2024-10-03 RX ADMIN — Medication 400 MILLIGRAM(S): at 09:24

## 2024-10-03 RX ADMIN — TIOTROPIUM BROMIDE INHALATION SPRAY 2 PUFF(S): 3.12 SPRAY, METERED RESPIRATORY (INHALATION) at 08:55

## 2024-10-03 RX ADMIN — Medication 2 TABLET(S): at 21:12

## 2024-10-03 RX ADMIN — Medication 1 DOSE(S): at 20:59

## 2024-10-03 RX ADMIN — IPRATROPIUM BROMIDE AND ALBUTEROL SULFATE 3 MILLILITER(S): .5; 3 SOLUTION RESPIRATORY (INHALATION) at 08:54

## 2024-10-03 RX ADMIN — Medication 1000 MILLIGRAM(S): at 09:54

## 2024-10-03 RX ADMIN — IPRATROPIUM BROMIDE AND ALBUTEROL SULFATE 3 MILLILITER(S): .5; 3 SOLUTION RESPIRATORY (INHALATION) at 14:48

## 2024-10-03 RX ADMIN — Medication 1 GRAM(S): at 17:29

## 2024-10-03 RX ADMIN — Medication 5000 UNIT(S): at 17:28

## 2024-10-03 RX ADMIN — CHLORHEXIDINE GLUCONATE ORAL RINSE 1 APPLICATION(S): 1.2 SOLUTION DENTAL at 13:25

## 2024-10-03 RX ADMIN — Medication 0.5 MILLIGRAM(S): at 17:28

## 2024-10-03 RX ADMIN — Medication 5 MILLIGRAM(S): at 21:12

## 2024-10-03 RX ADMIN — Medication 30 MILLILITER(S): at 18:36

## 2024-10-03 RX ADMIN — ATORVASTATIN CALCIUM 40 MILLIGRAM(S): 10 TABLET, FILM COATED ORAL at 21:12

## 2024-10-03 RX ADMIN — Medication 5000 UNIT(S): at 09:23

## 2024-10-03 RX ADMIN — Medication 3.12 MILLIGRAM(S): at 17:29

## 2024-10-03 RX ADMIN — Medication 1 DOSE(S): at 08:55

## 2024-10-03 RX ADMIN — IPRATROPIUM BROMIDE AND ALBUTEROL SULFATE 3 MILLILITER(S): .5; 3 SOLUTION RESPIRATORY (INHALATION) at 01:44

## 2024-10-03 RX ADMIN — METHYLPREDNISOLONE ACETATE 40 MILLIGRAM(S): 80 INJECTION, SUSPENSION INTRA-ARTICULAR; INTRALESIONAL; INTRAMUSCULAR; SOFT TISSUE at 17:29

## 2024-10-03 RX ADMIN — Medication 0.5 MILLIGRAM(S): at 09:23

## 2024-10-03 RX ADMIN — METHYLPREDNISOLONE ACETATE 40 MILLIGRAM(S): 80 INJECTION, SUSPENSION INTRA-ARTICULAR; INTRALESIONAL; INTRAMUSCULAR; SOFT TISSUE at 05:40

## 2024-10-03 NOTE — PROGRESS NOTE ADULT - SUBJECTIVE AND OBJECTIVE BOX
CC:  Follow up GOC , Symptoms    OVERNIGHT EVENTS:  informed off Bipap only for 4hrs yesterday    Present Symptoms:   Dyspnea:  No    Nausea/Vomiting:  No    Anxiety/ Agitation Yes    Depression:  No    Fatigue:  Yes   Loss of appetite:   Yes   Constipation: Not Reported      Pain: No               Location            Duration            Character            Severity            Factors            Effect    Pain AD Score:  http://geriatrictoolkit.[email or web address]/painad.pdf (press ctrl + left click to view)    Review of Systems: Reviewed as above  All others negative    MEDICATIONS  (STANDING):  albuterol/ipratropium for Nebulization 3 milliLiter(s) Nebulizer every 6 hours  aspirin  chewable 81 milliGRAM(s) Oral daily  atorvastatin 40 milliGRAM(s) Oral at bedtime  carvedilol 3.125 milliGRAM(s) Oral every 12 hours  chlorhexidine 2% Cloths 1 Application(s) Topical daily  dextrose 5%. 1000 milliLiter(s) (50 mL/Hr) IV Continuous <Continuous>  dextrose 5%. 1000 milliLiter(s) (100 mL/Hr) IV Continuous <Continuous>  dextrose 50% Injectable 12.5 Gram(s) IV Push once  dextrose 50% Injectable 25 Gram(s) IV Push once  dextrose 50% Injectable 25 Gram(s) IV Push once  dextrose Oral Gel 15 Gram(s) Oral once  fluticasone propionate/ salmeterol 500-50 MICROgram(s) Diskus 1 Dose(s) Inhalation two times a day  gabapentin 300 milliGRAM(s) Oral daily  glucagon  Injectable 1 milliGRAM(s) IntraMuscular once  heparin   Injectable 5000 Unit(s) SubCutaneous every 8 hours  methylPREDNISolone sodium succinate Injectable 40 milliGRAM(s) IV Push every 12 hours  pantoprazole  Injectable 40 milliGRAM(s) IV Push daily  polyethylene glycol 3350 17 Gram(s) Oral daily  senna 2 Tablet(s) Oral at bedtime  sodium chloride 1 Gram(s) Oral two times a day  tiotropium 2.5 MICROgram(s) Inhaler 2 Puff(s) Inhalation daily  venlafaxine  milliGRAM(s) Oral daily  zolpidem 5 milliGRAM(s) Oral at bedtime    MEDICATIONS  (PRN):  acetaminophen     Tablet .. 650 milliGRAM(s) Oral every 6 hours PRN Temp greater or equal to 38C (100.4F), Mild Pain (1 - 3), Moderate Pain (4 - 6)  aluminum hydroxide/magnesium hydroxide/simethicone Suspension 30 milliLiter(s) Oral every 4 hours PRN Dyspepsia  bisacodyl 5 milliGRAM(s) Oral every 12 hours PRN Constipation  LORazepam   Injectable 0.5 milliGRAM(s) IV Push every 8 hours PRN Anxiety  magnesium hydroxide Suspension 30 milliLiter(s) Oral daily PRN Constipation  ondansetron Injectable 4 milliGRAM(s) IV Push four times a day PRN Nausea and/or Vomiting      PHYSICAL EXAM:    Vital Signs Last 24 Hrs  T(C): 37.9 (03 Oct 2024 14:00), Max: 38.3 (02 Oct 2024 22:00)  T(F): 100.2 (03 Oct 2024 14:00), Max: 100.9 (02 Oct 2024 22:00)  HR: 104 (03 Oct 2024 15:29) (81 - 121)  BP: 119/83 (03 Oct 2024 14:00) (108/89 - 139/82)  BP(mean): 86 (03 Oct 2024 06:00) (83 - 99)  RR: 20 (03 Oct 2024 14:00) (18 - 21)  SpO2: 97% (03 Oct 2024 15:29) (97% - 100%)    Parameters below as of 03 Oct 2024 15:29  Patient On (Oxygen Delivery Method): nasal cannula    Karnofsky: 40 %  General: Elderly woman awake alert NAD - sitting in chair        HEENT:  NCAT  +NC  Lungs: comfortable  non labored  CV:  RR  MSK: weak  Skin:  warm/dry  Neuro  AOxr no focal deficits  Psych - little anxious    LABS:                          9.3    14.80 )-----------( 347      ( 03 Oct 2024 05:28 )             28.9     10-03    138  |  97  |  15.3  ----------------------------<  97  3.9   |  35.0[H]  |  0.51    Ca    8.9      03 Oct 2024 05:28        Urinalysis Basic - ( 03 Oct 2024 05:28 )    Color: x / Appearance: x / SG: x / pH: x  Gluc: 97 mg/dL / Ketone: x  / Bili: x / Urobili: x   Blood: x / Protein: x / Nitrite: x   Leuk Esterase: x / RBC: x / WBC x   Sq Epi: x / Non Sq Epi: x / Bacteria: x      I&O's Summary    02 Oct 2024 07:01  -  03 Oct 2024 07:00  --------------------------------------------------------  IN: 100 mL / OUT: 850 mL / NET: -750 mL        RADIOLOGY & ADDITIONAL STUDIES:  Imaging Reviewed  (  x )   < from: Xray Chest 1 View- PORTABLE-Urgent (Xray Chest 1 View- PORTABLE-Urgent .) (10.02.24 @ 12:40) >    ACC: [id number] EXAM:  XR CHEST PORTABLE URGENT 1V   ORDERED BY: JOVON PAIZ     PROCEDURE DATE:  10/02/2024          INTERPRETATION:  AP chest on October 2, 2024 at 12:05 PM. 2 images.   Patient is hypoxic.    Heart magnified by technique. Elevated right hemidiaphragm again noted.    There is a persistent atelectatic infiltrate at the right base but there   is an increasing lingular infiltrate.    IMPRESSION: Persistent right base atelectatic infiltrate. Increasing   lingular infiltrate.    --- End of Report ---            PHIL NICHOLE MD; Attending Radiologist  This document has been electronically signed. Oct  2 2024  5:10PM    < end of copied text >            ADVANCE DIRECTIVE PREFERENCES    Full Code

## 2024-10-03 NOTE — PROGRESS NOTE ADULT - ASSESSMENT
74yr woman xh CAD/PCI, HFpEF, COPD on home O2 recent admission for CP with non obstructive CAD on [facility] readmitted with acute on chronic respiratory failure     Acute on Chronic Respiratory Failure  Hemidiaphragm paralysis, COPD  reported on nc for 4hr yesterday and needed to be placed back on Bipap  patient currently on nc this am  continue to monitor     Chronic Hyponatremia  improved   Nephrology following    Debility  Assist with care  Turn/reposition  Safety precautions    Encounter for Palliative Care  Per [facility] by Dr. Bell on 9/26- patient wishes CPR and intubation but no Bipap  Patient has since agreed to Bipap.     See C note above for details.  In summary  Patient and  made aware of tenuous respiratory status. Patient understands if she further declines, may need intubation/vent.  She is agreeable to this, but would not want to be long term on a vent.

## 2024-10-03 NOTE — PROGRESS NOTE ADULT - SUBJECTIVE AND OBJECTIVE BOX
TULIO SUGGS Patient is a 74y old  Female who presents with a chief complaint of Acute hypercarbic respiratory failure (28 Sep 2024 17:43)     HPI:  74 F with PMHX CAD , HFpEF, HTN, HLD, COPD, recent admission for CP  s/p LHC discharged home   returned to the Hospital  yesterday evening with abdominal pain , nausea vomiting started yesterday c/o upper abdominal pain similar to prior presentation   Medical records reviewed she had  LHC with nonobstructive CAD. Pt also c/o fever at home and febrile now , still c/o epigastric right upper abdominal and lower abd pain on off , + dyspnea asking for oxygen ( she is on prn oxygen at home ) Episode of expressive aphasia which since resolved. CTH/CTA Head/Neck negative. Pt slow to respond otherwise without focal deficits.In [facility] ED blood work with hyponatremia , leukocytosis and MADHURI  . Appears dehydrated , no appetite since yesterday   1.5L NSS bolus given in ED. CT Chest/Abd/Pelvis WO +atelectasis otherwise negative. repeat Na 120    (22 Sep 2024 08:23)    The patient was seen and evaluated   The patient is in no acute distress.      I&O's Summary    02 Oct 2024 07:01  -  03 Oct 2024 07:00  --------------------------------------------------------  IN: 100 mL / OUT: 850 mL / NET: -750 mL      Allergies    No Known Allergies    Intolerances      HEALTH ISSUES - PROBLEM Dx:  Suspected deep vein thrombosis (DVT)    Acute on chronic respiratory failure with hypoxia and hypercapnia          PAST MEDICAL & SURGICAL HISTORY:  NICM (nonischemic cardiomyopathy)      HTN (hypertension)      Rheumatoid arthritis      Chronic back pain      HLD (hyperlipidemia)      Chronic obstructive asthma      Cellulitis      S/P       S/P hysterectomy              Vital Signs Last 24 Hrs  T(C): 37.9 (03 Oct 2024 14:00), Max: 38.3 (02 Oct 2024 22:00)  T(F): 100.2 (03 Oct 2024 14:00), Max: 100.9 (02 Oct 2024 22:00)  HR: 104 (03 Oct 2024 15:29) (81 - 121)  BP: 119/83 (03 Oct 2024 14:00) (108/89 - 139/82)  BP(mean): 86 (03 Oct 2024 06:00) (83 - 99)  RR: 20 (03 Oct 2024 14:00) (18 - 21)  SpO2: 97% (03 Oct 2024 15:29) (97% - 100%)    Parameters below as of 03 Oct 2024 15:29  Patient On (Oxygen Delivery Method): nasal cannula    T(C): 37.9 (10-03-24 @ 14:00), Max: 38.3 (10-02-24 @ 22:00)  HR: 104 (10-03-24 @ 15:29) (81 - 121)  BP: 119/83 (10-03-24 @ 14:00) (108/89 - 139/82)  RR: 20 (10-03-24 @ 14:00) (18 - 21)  SpO2: 97% (10-03-24 @ 15:29) (97% - 100%)  Wt(kg): --    PHYSICAL EXAM:    GENERAL: NAD  HEAD:  Atraumatic, Normocephalic  EYES: EOMI, PERRL, conjunctiva and sclera clear  ENMT:  Moist mucous membranes,  No lesions  NECK: Supple, No JVD, Normal thyroid  NERVOUS SYSTEM:  Alert & Oriented X3,  Moves upper and lower extremities; CNS-II-XII  CHEST/LUNG: Clear to auscultation bilaterally; No rales, rhonchi, wheezing,   HEART: Regular rate and rhythm; No murmurs,   ABDOMEN: Soft, Nontender, Nondistended; Bowel sounds present  EXTREMITIES:  Peripheral Pulses, No  cyanosis, or edema  SKIN: No rashes or lesions  psychiatry- mood and affect appropriate, Insight and judgement intact     acetaminophen     Tablet .. 650 milliGRAM(s) Oral every 6 hours PRN  albuterol/ipratropium for Nebulization 3 milliLiter(s) Nebulizer every 6 hours  aluminum hydroxide/magnesium hydroxide/simethicone Suspension 30 milliLiter(s) Oral every 4 hours PRN  aspirin  chewable 81 milliGRAM(s) Oral daily  atorvastatin 40 milliGRAM(s) Oral at bedtime  bisacodyl 5 milliGRAM(s) Oral every 12 hours PRN  carvedilol 3.125 milliGRAM(s) Oral every 12 hours  chlorhexidine 2% Cloths 1 Application(s) Topical daily  dextrose 5%. 1000 milliLiter(s) IV Continuous <Continuous>  dextrose 5%. 1000 milliLiter(s) IV Continuous <Continuous>  dextrose 50% Injectable 25 Gram(s) IV Push once  dextrose 50% Injectable 12.5 Gram(s) IV Push once  dextrose 50% Injectable 25 Gram(s) IV Push once  dextrose Oral Gel 15 Gram(s) Oral once  fluticasone propionate/ salmeterol 500-50 MICROgram(s) Diskus 1 Dose(s) Inhalation two times a day  gabapentin 300 milliGRAM(s) Oral daily  glucagon  Injectable 1 milliGRAM(s) IntraMuscular once  heparin   Injectable 5000 Unit(s) SubCutaneous every 8 hours  LORazepam   Injectable 0.5 milliGRAM(s) IV Push every 8 hours PRN  magnesium hydroxide Suspension 30 milliLiter(s) Oral daily PRN  methylPREDNISolone sodium succinate Injectable 40 milliGRAM(s) IV Push every 12 hours  ondansetron Injectable 4 milliGRAM(s) IV Push four times a day PRN  pantoprazole  Injectable 40 milliGRAM(s) IV Push daily  polyethylene glycol 3350 17 Gram(s) Oral daily  senna 2 Tablet(s) Oral at bedtime  sodium chloride 1 Gram(s) Oral two times a day  tiotropium 2.5 MICROgram(s) Inhaler 2 Puff(s) Inhalation daily  venlafaxine  milliGRAM(s) Oral daily  zolpidem 5 milliGRAM(s) Oral at bedtime      LABS:                          9.3    14.80 )-----------( 347      ( 03 Oct 2024 05:28 )             28.9     10-    138  |  97  |  15.3  ----------------------------<  97  3.9   |  35.0[H]  |  0.51    Ca    8.9      03 Oct 2024 05:28              Urinalysis Basic - ( 03 Oct 2024 05:28 )    Color: x / Appearance: x / SG: x / pH: x  Gluc: 97 mg/dL / Ketone: x  / Bili: x / Urobili: x   Blood: x / Protein: x / Nitrite: x   Leuk Esterase: x / RBC: x / WBC x   Sq Epi: x / Non Sq Epi: x / Bacteria: x      CAPILLARY BLOOD GLUCOSE      POCT Blood Glucose.: 168 mg/dL (03 Oct 2024 13:33)  POCT Blood Glucose.: 91 mg/dL (03 Oct 2024 05:39)  POCT Blood Glucose.: 119 mg/dL (02 Oct 2024 23:05)  POCT Blood Glucose.: 95 mg/dL (02 Oct 2024 18:09)      RADIOLOGY & ADDITIONAL TESTS:      Consultant notes reviewed  I independently reviwed all images/ labarotory results /cultures/ telemetry/EKG and my findings are indicated above  and discussed care plan with consultants/nursing/ family /patient

## 2024-10-03 NOTE — PROGRESS NOTE ADULT - SUBJECTIVE AND OBJECTIVE BOX
On more frequent BiPAP at the moment, but when I saw her this morning she was on nasal cannula and saturating well without any obvious respiratory distress.    Vitals, labs, medications, imaging reviewed    General: No respiratory distress  CV: regular  Resp: No wheezing  GI: Soft  Ext: No edema

## 2024-10-03 NOTE — PROGRESS NOTE ADULT - SUBJECTIVE AND OBJECTIVE BOX
Patient seen and examined    Feels sl stronger , OOb/Ch  Off bipap - on NC  no c/o CP SOB less and - NV   mild swelling feet    Vital Signs Last 24 Hrs  T(C): 37.8 (03 Oct 2024 12:00), Max: 38.3 (02 Oct 2024 22:00)  T(F): 100 (03 Oct 2024 12:00), Max: 100.9 (02 Oct 2024 22:00)  HR: 108 (03 Oct 2024 12:00) (81 - 116)  BP: 108/89 (03 Oct 2024 12:00) (108/89 - 139/82)  BP(mean): 86 (03 Oct 2024 06:00) (83 - 99)  RR: 20 (03 Oct 2024 12:00) (18 - 21)  SpO2: 97% (03 Oct 2024 12:00) (97% - 100%)    Parameters below as of 03 Oct 2024 12:00  Patient On (Oxygen Delivery Method): nasal cannula  O2 Flow (L/min): 4      PHYSICAL EXAM    GENERAL: NAD,   EYES:  conjunctiva and sclera clear  NECK: Supple, No JVD/Bruit  NERVOUS SYSTEM:  A/O x3,   CHEST:  No rales, No rhonchi  HEART:  RRR, No murmur  ABDOMEN: Soft, NT/ND BS+  EXTREMITIES:  NmildEdema;  SKIN: No rashes    03 Oct 2024 05:28    138    |  97     |  15.3   ----------------------------<  97     3.9     |  35.0   |  0.51     Ca    8.9        03 Oct 2024 05:28                            9.3    14.80 )-----------( 347      ( 03 Oct 2024 05:28 )             28.9         Continue present treatment

## 2024-10-03 NOTE — PROGRESS NOTE ADULT - ASSESSMENT
75 y/o F with a h/o CAD (s/p PCI), HFpEF, HTN, HLD, COPD (on home O2 PRN), recent admission for chest pain s/p LHC with nonobstructive CAD- discharged home 9/19, now readmitted on 9/22 with complaints abd pain nausea vomiting , hyponatremia, and acute hypercapnic respiratory failure. Upgraded to SDU 10/1 for continuous BIPAP need, remains on BIPAP today, pulm following, continuing GOC conversations with patient family.     Acute on chronic hypercapnic respiratory failure 2/2 to COPD exacerbation (Home O2 4lnc) + creps on PE lungs - temp aropund 100- cultures pending   Compounded by elevated right hemidiaphragm  - pulmonary following. Advising that patient accumulates co2 rapidly and requires BIPAP at night  - 10/1 noted increased work of breathing for which continuous BIPAP started, upgraded to SDU   - 10/2 attempted ween from BIPAP however not tolerated, remains on continuous BIPAP at this time   Cont IV Steroids BID as patient has deteriorated clinically   S/p Abx- had zosyn and zithromax  - Low grade temperature noted infectious workup pending cultures - no obvious source - ? as patient is s/p Abx course 9/22, and CT PE was negative 9/22, with withold further abxs at this time, if fever is persistent / worsens will begin Abx again with cefepime   WBC elevation in setting of steroid-  continue SDU level of care     Hyponatremia - improving  History of chronic SIADH  Hyperkalemia - resolved  - monitor  - c/w fluid restriction  - nephro following  - c/w urea powder and salt tabs  - trend BMP    CAD s/p PCI  HFpEF  - cont home meds aspirin, carvedilol, statin    DVT ppx: subq heparin   Dispo: medically acute. Plan for ongoing GOC with palliative, eventual [facility]IG[facility]O placement likely, vs home with home care / comfort measures   GOC - full code     Attestation Statements:  Time-based billing (NON-critical care).     50 minutes spent on total encounter. The necessity of the time spent during the encounter on this date of service was due to:     Chart, labs, orders, vitals, and imaging reviewed and plan of care discussed with consultants and team in detail. Plan of care discussed with patient at bedside.

## 2024-10-03 NOTE — PROGRESS NOTE ADULT - CONVERSATION DETAILS
Patient and  made aware of tenuous respiratory status.  Expressed concern she was only on nasal canula for 4 hours yesterday before returning to Bipap.   Patient made aware if she further decompensates than may need intubation/vent support. Discussed concern if she cannot be weaned off vent, then decision for trach/peg. Patient states she would not want to be long term on a vent. No trach/peg

## 2024-10-03 NOTE — PROGRESS NOTE ADULT - TIME BILLING
Time spent with review of chart documents, labs, imaging. Direct patient assessment,  formulation of care plan. Discussion with  Interdisciplinary  team  RN, with an additional  ACP  of   16   minutes This time is exclusive of the encounter

## 2024-10-03 NOTE — PROGRESS NOTE ADULT - ASSESSMENT
73 yo F w h/o CAD , HFpEF, HTN, HLD, COPD, recent admission for CP  s/p LHC discharged home 9/19  returned to the Hospital with abdominal pain , nausea vomiting    Chronic Hyponatremia - improving at appropriate rate  Serum Na 126--> 129 --> 127 --> 131 --> 134 --> 137>140> 138   Acute hypovolemic hyponatremia (U Na+ < 30), repeat urine studies  - PO fluid restriction enforced again  - Stop PO Urea and decrease NaCl tabs  and wqatch Na    Monitor labs will follow

## 2024-10-03 NOTE — PROGRESS NOTE ADULT - ASSESSMENT
Ms. 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 Her CO2 retention would appear to be less likely due to asthma, but rather NIPPV non-compliance in combination with her right sided hemidiaphragm paralysis.    Would continue BID steroid dosing today given her relatively more frequent need for BiPAP and continue current inhalers/nebulizers for now.    She is currently being treated for chronic hypercapneic respiratory failure which has persisted despite the use of non-invasive ventilation. Her PCO2 remained high at 55 on 9/28/2024. Therefore, NIV will be prescribed for home usage in the mode of guaranteed augmented targeted tidal volumes which are not available on any home care bilevel devices to minimize further clinical decline and likelihood of readmissions to the hospital.

## 2024-10-04 DIAGNOSIS — J98.6 DISORDERS OF DIAPHRAGM: ICD-10-CM

## 2024-10-04 LAB
ANION GAP SERPL CALC-SCNC: 9 MMOL/L — SIGNIFICANT CHANGE UP (ref 5–17)
BUN SERPL-MCNC: 15.6 MG/DL — SIGNIFICANT CHANGE UP (ref 8–20)
CALCIUM SERPL-MCNC: 8 MG/DL — LOW (ref 8.4–10.5)
CHLORIDE SERPL-SCNC: 93 MMOL/L — LOW (ref 96–108)
CO2 SERPL-SCNC: 30 MMOL/L — HIGH (ref 22–29)
CREAT SERPL-MCNC: 0.43 MG/DL — LOW (ref 0.5–1.3)
EGFR: 102 ML/MIN/1.73M2 — SIGNIFICANT CHANGE UP
GLUCOSE BLDC GLUCOMTR-MCNC: 105 MG/DL — HIGH (ref 70–99)
GLUCOSE SERPL-MCNC: 211 MG/DL — HIGH (ref 70–99)
HCT VFR BLD CALC: 28.1 % — LOW (ref 34.5–45)
HGB BLD-MCNC: 9.2 G/DL — LOW (ref 11.5–15.5)
MAGNESIUM SERPL-MCNC: 1.9 MG/DL — SIGNIFICANT CHANGE UP (ref 1.6–2.6)
MCHC RBC-ENTMCNC: 29.8 PG — SIGNIFICANT CHANGE UP (ref 27–34)
MCHC RBC-ENTMCNC: 32.7 GM/DL — SIGNIFICANT CHANGE UP (ref 32–36)
MCV RBC AUTO: 90.9 FL — SIGNIFICANT CHANGE UP (ref 80–100)
PHOSPHATE SERPL-MCNC: 1.9 MG/DL — LOW (ref 2.4–4.7)
PLATELET # BLD AUTO: 339 K/UL — SIGNIFICANT CHANGE UP (ref 150–400)
POTASSIUM SERPL-MCNC: 4.2 MMOL/L — SIGNIFICANT CHANGE UP (ref 3.5–5.3)
POTASSIUM SERPL-SCNC: 4.2 MMOL/L — SIGNIFICANT CHANGE UP (ref 3.5–5.3)
RBC # BLD: 3.09 M/UL — LOW (ref 3.8–5.2)
RBC # FLD: 12.5 % — SIGNIFICANT CHANGE UP (ref 10.3–14.5)
SODIUM SERPL-SCNC: 132 MMOL/L — LOW (ref 135–145)
WBC # BLD: 19.86 K/UL — HIGH (ref 3.8–10.5)
WBC # FLD AUTO: 19.86 K/UL — HIGH (ref 3.8–10.5)

## 2024-10-04 PROCEDURE — 99232 SBSQ HOSP IP/OBS MODERATE 35: CPT

## 2024-10-04 PROCEDURE — 99233 SBSQ HOSP IP/OBS HIGH 50: CPT

## 2024-10-04 RX ADMIN — Medication 0.5 MILLIGRAM(S): at 23:03

## 2024-10-04 RX ADMIN — Medication 0.5 MILLIGRAM(S): at 10:33

## 2024-10-04 RX ADMIN — IPRATROPIUM BROMIDE AND ALBUTEROL SULFATE 3 MILLILITER(S): .5; 3 SOLUTION RESPIRATORY (INHALATION) at 04:17

## 2024-10-04 RX ADMIN — Medication 650 MILLIGRAM(S): at 17:00

## 2024-10-04 RX ADMIN — Medication 0.5 MILLIGRAM(S): at 01:06

## 2024-10-04 RX ADMIN — IPRATROPIUM BROMIDE AND ALBUTEROL SULFATE 3 MILLILITER(S): .5; 3 SOLUTION RESPIRATORY (INHALATION) at 20:52

## 2024-10-04 RX ADMIN — ATORVASTATIN CALCIUM 40 MILLIGRAM(S): 10 TABLET, FILM COATED ORAL at 21:04

## 2024-10-04 RX ADMIN — Medication 1 GRAM(S): at 17:00

## 2024-10-04 RX ADMIN — Medication 1 DOSE(S): at 09:14

## 2024-10-04 RX ADMIN — Medication 30 MILLILITER(S): at 06:15

## 2024-10-04 RX ADMIN — GABAPENTIN 300 MILLIGRAM(S): 800 TABLET, FILM COATED ORAL at 11:57

## 2024-10-04 RX ADMIN — IPRATROPIUM BROMIDE AND ALBUTEROL SULFATE 3 MILLILITER(S): .5; 3 SOLUTION RESPIRATORY (INHALATION) at 09:14

## 2024-10-04 RX ADMIN — Medication 4 MILLIGRAM(S): at 17:00

## 2024-10-04 RX ADMIN — Medication 5 MILLIGRAM(S): at 21:04

## 2024-10-04 RX ADMIN — Medication 3.12 MILLIGRAM(S): at 17:00

## 2024-10-04 RX ADMIN — CHLORHEXIDINE GLUCONATE ORAL RINSE 1 APPLICATION(S): 1.2 SOLUTION DENTAL at 13:48

## 2024-10-04 RX ADMIN — Medication 225 MILLIGRAM(S): at 11:57

## 2024-10-04 RX ADMIN — IPRATROPIUM BROMIDE AND ALBUTEROL SULFATE 3 MILLILITER(S): .5; 3 SOLUTION RESPIRATORY (INHALATION) at 15:34

## 2024-10-04 RX ADMIN — Medication 81 MILLIGRAM(S): at 11:57

## 2024-10-04 RX ADMIN — Medication 1 GRAM(S): at 05:29

## 2024-10-04 RX ADMIN — PANTOPRAZOLE SODIUM 40 MILLIGRAM(S): 40 TABLET, DELAYED RELEASE ORAL at 11:58

## 2024-10-04 RX ADMIN — Medication 5000 UNIT(S): at 21:04

## 2024-10-04 RX ADMIN — Medication 1 DOSE(S): at 20:52

## 2024-10-04 RX ADMIN — Medication 5000 UNIT(S): at 16:40

## 2024-10-04 RX ADMIN — TIOTROPIUM BROMIDE INHALATION SPRAY 2 PUFF(S): 3.12 SPRAY, METERED RESPIRATORY (INHALATION) at 09:14

## 2024-10-04 RX ADMIN — METHYLPREDNISOLONE ACETATE 40 MILLIGRAM(S): 80 INJECTION, SUSPENSION INTRA-ARTICULAR; INTRALESIONAL; INTRAMUSCULAR; SOFT TISSUE at 05:29

## 2024-10-04 RX ADMIN — Medication 5000 UNIT(S): at 08:43

## 2024-10-04 RX ADMIN — METHYLPREDNISOLONE ACETATE 40 MILLIGRAM(S): 80 INJECTION, SUSPENSION INTRA-ARTICULAR; INTRALESIONAL; INTRAMUSCULAR; SOFT TISSUE at 17:01

## 2024-10-04 RX ADMIN — Medication 3.12 MILLIGRAM(S): at 05:29

## 2024-10-04 NOTE — PROGRESS NOTE ADULT - SUBJECTIVE AND OBJECTIVE BOX
NEPHROLOGY INTERVAL HPI/OVERNIGHT EVENTS:  comfortable  no distress    MEDICATIONS  (STANDING):  albuterol/ipratropium for Nebulization 3 milliLiter(s) Nebulizer every 6 hours  aspirin  chewable 81 milliGRAM(s) Oral daily  atorvastatin 40 milliGRAM(s) Oral at bedtime  carvedilol 3.125 milliGRAM(s) Oral every 12 hours  chlorhexidine 2% Cloths 1 Application(s) Topical daily  dextrose 5%. 1000 milliLiter(s) (50 mL/Hr) IV Continuous <Continuous>  dextrose 5%. 1000 milliLiter(s) (100 mL/Hr) IV Continuous <Continuous>  dextrose 50% Injectable 25 Gram(s) IV Push once  dextrose 50% Injectable 25 Gram(s) IV Push once  dextrose 50% Injectable 12.5 Gram(s) IV Push once  dextrose Oral Gel 15 Gram(s) Oral once  fluticasone propionate/ salmeterol 500-50 MICROgram(s) Diskus 1 Dose(s) Inhalation two times a day  gabapentin 300 milliGRAM(s) Oral daily  glucagon  Injectable 1 milliGRAM(s) IntraMuscular once  heparin   Injectable 5000 Unit(s) SubCutaneous every 8 hours  methylPREDNISolone sodium succinate Injectable 40 milliGRAM(s) IV Push every 12 hours  pantoprazole  Injectable 40 milliGRAM(s) IV Push daily  polyethylene glycol 3350 17 Gram(s) Oral daily  senna 2 Tablet(s) Oral at bedtime  sodium chloride 1 Gram(s) Oral two times a day  tiotropium 2.5 MICROgram(s) Inhaler 2 Puff(s) Inhalation daily  venlafaxine  milliGRAM(s) Oral daily  zolpidem 5 milliGRAM(s) Oral at bedtime    MEDICATIONS  (PRN):  acetaminophen     Tablet .. 650 milliGRAM(s) Oral every 6 hours PRN Temp greater or equal to 38C (100.4F), Mild Pain (1 - 3), Moderate Pain (4 - 6)  aluminum hydroxide/magnesium hydroxide/simethicone Suspension 30 milliLiter(s) Oral every 4 hours PRN Dyspepsia  bisacodyl 5 milliGRAM(s) Oral every 12 hours PRN Constipation  LORazepam   Injectable 0.5 milliGRAM(s) IV Push every 8 hours PRN Anxiety  magnesium hydroxide Suspension 30 milliLiter(s) Oral daily PRN Constipation  ondansetron Injectable 4 milliGRAM(s) IV Push four times a day PRN Nausea and/or Vomiting      Allergies    No Known Allergies          Vital Signs Last 24 Hrs  T(C): 36.7 (04 Oct 2024 08:00), Max: 37.9 (03 Oct 2024 14:00)  T(F): 98.1 (04 Oct 2024 08:00), Max: 100.2 (03 Oct 2024 14:00)  HR: 88 (04 Oct 2024 09:16) (81 - 121)  BP: 133/79 (04 Oct 2024 08:00) (108/89 - 135/82)  BP(mean): 95 (04 Oct 2024 08:00) (83 - 104)  RR: 18 (04 Oct 2024 08:00) (18 - 20)  SpO2: 98% (04 Oct 2024 09:16) (96% - 100%)    Parameters below as of 04 Oct 2024 09:16  Patient On (Oxygen Delivery Method): nasal cannula, 4l        PHYSICAL EXAM:      LABS:  GENERAL: Weak, tired  HEENT: Moist MMs  NECK: Supple, No JVD  NERVOUS SYSTEM: Alert & Oriented X3  CHEST/LUNG: Clear bilaterally  HEART: Regular rate and rhythm; No rub  ABDOMEN: Soft, Nontender, +BS  EXTREMITIES: Tr dependent edema                        9.3    14.80 )-----------( 347      ( 03 Oct 2024 05:28 )             28.9     10-03    138  |  97  |  15.3  ----------------------------<  97  3.9   |  35.0[H]  |  0.51    Ca    8.9      03 Oct 2024 05:28        Urinalysis Basic - ( 03 Oct 2024 05:28 )    Color: x / Appearance: x / SG: x / pH: x  Gluc: 97 mg/dL / Ketone: x  / Bili: x / Urobili: x   Blood: x / Protein: x / Nitrite: x   Leuk Esterase: x / RBC: x / WBC x   Sq Epi: x / Non Sq Epi: x / Bacteria: x          RADIOLOGY & ADDITIONAL TESTS:

## 2024-10-04 NOTE — PROGRESS NOTE ADULT - ASSESSMENT
75 y/o F with a h/o CAD (s/p PCI), HFpEF, HTN, HLD, COPD (on home O2 PRN), recent admission for chest pain s/p LHC with nonobstructive CAD- discharged home 9/19, now readmitted on 9/22 with complaints abd pain nausea vomiting , hyponatremia, and acute hypercapnic respiratory failure. Upgraded to SDU 10/1 for continuous BIPAP need, remains on BIPAP today, pulm following, continuing GOC conversations with patient family.     Acute on chronic hypercapnic respiratory failure 2/2 to COPD exacerbation (Home O2 4lnc) + creps on PE lungs -   yestersday temp around 100-no more fever- cultures negative   - pulmonary following. Advising that patient accumulates co2 rapidly and requires BIPAP at night  - 10/1 noted increased work of breathing for which continuous BIPAP started, upgraded to SDU   - 10/2 attempted ween from BIPAP however not tolerated, remains on continuous BIPAP at this time   Cont IV Steroids BID   S/p Abx- had zosyn and Zithromax no more fever completed course    no obvious source - ? as patient is s/p Abx course 9/22, and CT PE was negative 9/22, with further abxs at this time, if fever is WBC elevation in setting of steroid-increased WBC around 19     Hyponatremia - improving  History of chronic SIADH  Hyperkalemia - resolved  - monitor  - c/w fluid restriction  - nephro following  - c/w urea powder and salt tabs  - trend BMP    CAD s/p PCI  HFpEF  - cont home meds aspirin, carvedilol, statin    DVT ppx: subq heparin   Dispo: medically acute. Plan for ongoing GOC with palliative, eventual RIGOBERTO placement likely, vs home with home care / comfort measures   GOC - full code     Attestation Statements:  Time-based billing (NON-critical care).     50 minutes spent on total encounter. The necessity of the time spent during the encounter on this date of service was due to:     Chart, labs, orders, vitals, and imaging reviewed and plan of care discussed with consultants and team in detail. Plan of care discussed with patient at bedside.

## 2024-10-04 NOTE — PROGRESS NOTE ADULT - ASSESSMENT
Chronic hyponatremia: likely SIADH  Acute hypovolemic hyponatremia (U Na+ < 30) earlier and resolved  - PO fluid restriction   - cont NaCl tabs  - follow labs

## 2024-10-04 NOTE — PROGRESS NOTE ADULT - SUBJECTIVE AND OBJECTIVE BOX
TULIO SUGGS Patient is a 74y old  Female who presents with a chief complaint of Acute hypercarbic respiratory failure (28 Sep 2024 17:43)     HPI:  74 F with PMHX CAD , HFpEF, HTN, HLD, COPD, recent admission for CP  s/p LHC discharged home   returned to the Hospital  yesterday evening with abdominal pain , nausea vomiting started yesterday c/o upper abdominal pain similar to prior presentation   Medical records reviewed she had  LHC with nonobstructive CAD. Pt also c/o fever at home and febrile now , still c/o epigastric right upper abdominal and lower abd pain on off , + dyspnea asking for oxygen ( she is on prn oxygen at home ) Episode of expressive aphasia which since resolved. CTH/CTA Head/Neck negative. Pt slow to respond otherwise without focal deficits.In [facility] ED blood work with hyponatremia , leukocytosis and MADHURI  . Appears dehydrated , no appetite since yesterday   1.5L NSS bolus given in ED. CT Chest/Abd/Pelvis WO +atelectasis otherwise negative. repeat Na 120    (22 Sep 2024 08:23)    The patient was seen and evaluated  at bedside- discussed with daughter on phone -on nasal cannula now and not needed bipapa in last day and half   The patient is in no acute distress.  Denied any fever chest pain, palpitations, shortness of breath, abdominal pain, fever, dysuria, cough, edema       I&O's Summary    03 Oct 2024 07:01  -  04 Oct 2024 07:00  --------------------------------------------------------  IN: 500 mL / OUT: 200 mL / NET: 300 mL      Allergies    No Known Allergies    Intolerances      HEALTH ISSUES - PROBLEM Dx:  Suspected deep vein thrombosis (DVT)    Acute on chronic respiratory failure with hypoxia and hypercapnia    Elevated diaphragm          PAST MEDICAL & SURGICAL HISTORY:  NICM (nonischemic cardiomyopathy)      HTN (hypertension)      Rheumatoid arthritis      Chronic back pain      HLD (hyperlipidemia)      Chronic obstructive asthma      Cellulitis      S/P       S/P hysterectomy              Vital Signs Last 24 Hrs  T(C): 36.7 (04 Oct 2024 15:25), Max: 37.8 (03 Oct 2024 22:00)  T(F): 98.1 (04 Oct 2024 15:25), Max: 100 (03 Oct 2024 22:00)  HR: 98 (04 Oct 2024 15:35) (81 - 114)  BP: 128/80 (04 Oct 2024 14:00) (111/72 - 139/82)  BP(mean): 96 (04 Oct 2024 14:00) (83 - 104)  RR: 20 (04 Oct 2024 14:00) (18 - 22)  SpO2: 98% (04 Oct 2024 15:35) (96% - 100%)    Parameters below as of 04 Oct 2024 15:35  Patient On (Oxygen Delivery Method): nasal cannula, 4l    T(C): 36.7 (10-04-24 @ 15:25), Max: 37.8 (10-03-24 @ 22:00)  HR: 98 (10-04-24 @ 15:35) (81 - 114)  BP: 128/80 (10-04-24 @ 14:00) (111/72 - 139/82)  RR: 20 (10-04-24 @ 14:00) (18 - 22)  SpO2: 98% (10-04-24 @ 15:35) (96% - 100%)  Wt(kg): --    PHYSICAL EXAM:    GENERAL: NAD conversing pleasant   HEAD:  Atraumatic, Normocephalic  EYES: EOMI, PERRL, conjunctiva and sclera clear  ENMT:  Moist mucous membranes,  No lesions  NECK: Supple, No JVD, Normal thyroid  NERVOUS SYSTEM:  Alert & Oriented X3,  Moves upper and lower extremities; CNS-II-XII  CHEST/LUNG: Clear to auscultation bilaterally; No rales, rhonchi, wheezing,   HEART: Regular rate and rhythm; No murmurs,   ABDOMEN: Soft, Nontender, Nondistended; Bowel sounds present  EXTREMITIES:  Peripheral Pulses, No  cyanosis, or edema  psychiatry- mood and affect appropriate, Insight and judgement intact     acetaminophen     Tablet .. 650 milliGRAM(s) Oral every 6 hours PRN  albuterol/ipratropium for Nebulization 3 milliLiter(s) Nebulizer every 6 hours  aluminum hydroxide/magnesium hydroxide/simethicone Suspension 30 milliLiter(s) Oral every 4 hours PRN  aspirin  chewable 81 milliGRAM(s) Oral daily  atorvastatin 40 milliGRAM(s) Oral at bedtime  bisacodyl 5 milliGRAM(s) Oral every 12 hours PRN  carvedilol 3.125 milliGRAM(s) Oral every 12 hours  chlorhexidine 2% Cloths 1 Application(s) Topical daily  dextrose 5%. 1000 milliLiter(s) IV Continuous <Continuous>  dextrose 5%. 1000 milliLiter(s) IV Continuous <Continuous>  dextrose 50% Injectable 12.5 Gram(s) IV Push once  dextrose 50% Injectable 25 Gram(s) IV Push once  dextrose 50% Injectable 25 Gram(s) IV Push once  dextrose Oral Gel 15 Gram(s) Oral once  fluticasone propionate/ salmeterol 500-50 MICROgram(s) Diskus 1 Dose(s) Inhalation two times a day  gabapentin 300 milliGRAM(s) Oral daily  glucagon  Injectable 1 milliGRAM(s) IntraMuscular once  heparin   Injectable 5000 Unit(s) SubCutaneous every 8 hours  LORazepam   Injectable 0.5 milliGRAM(s) IV Push every 8 hours PRN  magnesium hydroxide Suspension 30 milliLiter(s) Oral daily PRN  methylPREDNISolone sodium succinate Injectable 40 milliGRAM(s) IV Push every 12 hours  ondansetron Injectable 4 milliGRAM(s) IV Push four times a day PRN  pantoprazole  Injectable 40 milliGRAM(s) IV Push daily  polyethylene glycol 3350 17 Gram(s) Oral daily  senna 2 Tablet(s) Oral at bedtime  sodium chloride 1 Gram(s) Oral two times a day  tiotropium 2.5 MICROgram(s) Inhaler 2 Puff(s) Inhalation daily  venlafaxine  milliGRAM(s) Oral daily  zolpidem 5 milliGRAM(s) Oral at bedtime      LABS:                          9.2    19.86 )-----------( 339      ( 04 Oct 2024 10:12 )             28.1     10-04    132[L]  |  93[L]  |  15.6  ----------------------------<  211[H]  4.2   |  30.0[H]  |  0.43[L]    Ca    8.0[L]      04 Oct 2024 10:12  Phos  1.9     10-04  Mg     1.9     10-04              Urinalysis Basic - ( 04 Oct 2024 10:12 )    Color: x / Appearance: x / SG: x / pH: x  Gluc: 211 mg/dL / Ketone: x  / Bili: x / Urobili: x   Blood: x / Protein: x / Nitrite: x   Leuk Esterase: x / RBC: x / WBC x   Sq Epi: x / Non Sq Epi: x / Bacteria: x      CAPILLARY BLOOD GLUCOSE      POCT Blood Glucose.: 105 mg/dL (04 Oct 2024 05:34)  POCT Blood Glucose.: 223 mg/dL (03 Oct 2024 23:04)      RADIOLOGY & ADDITIONAL TESTS:      Consultant notes reviewed  I independently reviwed all images/ labarotory results /cultures/ telemetry/EKG and my findings are indicated above  and discussed care plan with consultants/nursing/ family /patient

## 2024-10-04 NOTE — PROGRESS NOTE ADULT - ASSESSMENT
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 Noct BPAP.  IS. Follow lytes. Fluid restriction. Agree hold abx.     Her PCO2 remained high at 55 on 9/28/2024. Therefore, NIV will be prescribed for home usage in the mode of guaranteed augmented targeted tidal volumes which are not available on any home care bilevel devices to minimize further clinical decline and likelihood of readmissions to the hospital.

## 2024-10-04 NOTE — PROGRESS NOTE ADULT - SUBJECTIVE AND OBJECTIVE BOX
PULMONARY PROGRESS NOTE      [id number]    Patient is a 74y old  Female who presents with a chief complaint of Acute hypercarbic respiratory failure (28 Sep 2024 17:43)      INTERVAL HPI/OVERNIGHT EVENTS: Sitting up in chair; on NCO2 3 LPM, 99% sat. Used BPAP last pm.    MEDICATIONS  (STANDING):  albuterol/ipratropium for Nebulization 3 milliLiter(s) Nebulizer every 6 hours  aspirin  chewable 81 milliGRAM(s) Oral daily  atorvastatin 40 milliGRAM(s) Oral at bedtime  carvedilol 3.125 milliGRAM(s) Oral every 12 hours  chlorhexidine 2% Cloths 1 Application(s) Topical daily  dextrose 5%. 1000 milliLiter(s) (100 mL/Hr) IV Continuous <Continuous>  dextrose 5%. 1000 milliLiter(s) (50 mL/Hr) IV Continuous <Continuous>  dextrose 50% Injectable 25 Gram(s) IV Push once  dextrose 50% Injectable 12.5 Gram(s) IV Push once  dextrose 50% Injectable 25 Gram(s) IV Push once  dextrose Oral Gel 15 Gram(s) Oral once  fluticasone propionate/ salmeterol 500-50 MICROgram(s) Diskus 1 Dose(s) Inhalation two times a day  gabapentin 300 milliGRAM(s) Oral daily  glucagon  Injectable 1 milliGRAM(s) IntraMuscular once  heparin   Injectable 5000 Unit(s) SubCutaneous every 8 hours  methylPREDNISolone sodium succinate Injectable 40 milliGRAM(s) IV Push every 12 hours  pantoprazole  Injectable 40 milliGRAM(s) IV Push daily  polyethylene glycol 3350 17 Gram(s) Oral daily  senna 2 Tablet(s) Oral at bedtime  sodium chloride 1 Gram(s) Oral two times a day  tiotropium 2.5 MICROgram(s) Inhaler 2 Puff(s) Inhalation daily  venlafaxine  milliGRAM(s) Oral daily  zolpidem 5 milliGRAM(s) Oral at bedtime      MEDICATIONS  (PRN):  acetaminophen     Tablet .. 650 milliGRAM(s) Oral every 6 hours PRN Temp greater or equal to 38C (100.4F), Mild Pain (1 - 3), Moderate Pain (4 - 6)  aluminum hydroxide/magnesium hydroxide/simethicone Suspension 30 milliLiter(s) Oral every 4 hours PRN Dyspepsia  bisacodyl 5 milliGRAM(s) Oral every 12 hours PRN Constipation  LORazepam   Injectable 0.5 milliGRAM(s) IV Push every 8 hours PRN Anxiety  magnesium hydroxide Suspension 30 milliLiter(s) Oral daily PRN Constipation  ondansetron Injectable 4 milliGRAM(s) IV Push four times a day PRN Nausea and/or Vomiting      Allergies    No Known Allergies    Intolerances        PAST MEDICAL & SURGICAL HISTORY:  NICM (nonischemic cardiomyopathy)      HTN (hypertension)      Rheumatoid arthritis      Chronic back pain      HLD (hyperlipidemia)      Chronic obstructive asthma      Cellulitis      S/P       S/P hysterectomy          SOCIAL HISTORY  Smoking History: never smoked      REVIEW OF SYSTEMS:    CONSTITUTIONAL:  No distress    HEENT:  Eyes:  No diplopia or blurred vision. ENT:  No earache, sore throat or runny nose.    CARDIOVASCULAR:  No pressure, squeezing, tightness, heaviness or aching about the chest; no palpitations.    RESPIRATORY:  per HPI     GASTROINTESTINAL:  No nausea, vomiting or diarrhea.    GENITOURINARY:  No dysuria, frequency or urgency.    NEUROLOGIC:  No paresthesias, fasciculations, seizures or weakness.    PSYCHIATRIC:  No disorder of thought or mood.    Vital Signs Last 24 Hrs  T(C): 36.7 (04 Oct 2024 08:00), Max: 37.9 (03 Oct 2024 14:00)  T(F): 98.1 (04 Oct 2024 08:00), Max: 100.2 (03 Oct 2024 14:00)  HR: 88 (04 Oct 2024 09:16) (81 - 121)  BP: 133/79 (04 Oct 2024 08:00) (108/89 - 139/82)  BP(mean): 95 (04 Oct 2024 08:00) (83 - 104)  RR: 18 (04 Oct 2024 08:00) (18 - 20)  SpO2: 98% (04 Oct 2024 09:16) (96% - 100%)    Parameters below as of 04 Oct 2024 09:16  Patient On (Oxygen Delivery Method): nasal cannula, 4l        PHYSICAL EXAMINATION:    GENERAL: The patient is awake and alert in no apparent distress.     HEENT: Head is normocephalic and atraumatic.  Mucous membranes are moist.    NECK: Supple.    LUNGS: Decreased BS on R, respirations unlabored    HEART: Regular rate and rhythm      ABDOMEN: Soft, nontender, and nondistended.      NEUROLOGIC: Grossly intact.    LABS:                        9.3    14.80 )-----------( 347      ( 03 Oct 2024 05:28 )             28.9     10-03    138  |  97  |  15.3  ----------------------------<  97  3.9   |  35.0[H]  |  0.51    Ca    8.9      03 Oct 2024 05:28       Blood Gas Profile - Arterial in AM (24 @ 04:00)    pH, Arterial: 7.390: As of 2020 Reference Ranges have been amended for: LINDA, COHB, GLUWB,  HCO3, KWB, METHHB, NAWB, O2HB, PCO2.   pCO2, Arterial: 55 mmHg   pO2, Arterial: 127 mmHg   HCO3, Arterial: 33 mmol/L   Base Excess, Arterial: 8.3 mmol/L   Oxygen Saturation, Arterial: 100.0 %   FIO2, Arterial: 4LNC   Blood Gas Comments Arterial: 4LNC   Blood Gas Source Arterial: Arterial            Procalcitonin: 0.09 ng/mL (10-02-24 @ 12:21)      MICROBIOLOGY:    RADIOLOGY & ADDITIONAL STUDIES:  < from: Xray Chest 1 View- PORTABLE-Urgent (Xray Chest 1 View- PORTABLE-Urgent .) (10.02.24 @ 12:40) >    ACC: [id number] EXAM:  XR CHEST PORTABLE URGENT 1V   ORDERED BY: JOVON PAIZ     PROCEDURE DATE:  10/02/2024          INTERPRETATION:  AP chest on 2024 at 12:05 PM. 2 images.   Patient is hypoxic.    Heart magnified by technique. Elevated right hemidiaphragm again noted.    There is a persistent atelectatic infiltrate at the right base but there   is an increasing lingular infiltrate.    IMPRESSION: Persistent right base atelectatic infiltrate. Increasing   lingular infiltrate.    --- End of Report ---            PHIL NICHOLE MD; Attending Radiologist  This document has been electronically signed. Oct  2 2024  5:10PM    < end of copied text >

## 2024-10-05 LAB
ANION GAP SERPL CALC-SCNC: 5 MMOL/L — SIGNIFICANT CHANGE UP (ref 5–17)
BUN SERPL-MCNC: 12.6 MG/DL — SIGNIFICANT CHANGE UP (ref 8–20)
CALCIUM SERPL-MCNC: 8.5 MG/DL — SIGNIFICANT CHANGE UP (ref 8.4–10.5)
CHLORIDE SERPL-SCNC: 91 MMOL/L — LOW (ref 96–108)
CO2 SERPL-SCNC: 34 MMOL/L — HIGH (ref 22–29)
CREAT SERPL-MCNC: 0.37 MG/DL — LOW (ref 0.5–1.3)
EGFR: 106 ML/MIN/1.73M2 — SIGNIFICANT CHANGE UP
GLUCOSE BLDC GLUCOMTR-MCNC: 108 MG/DL — HIGH (ref 70–99)
GLUCOSE BLDC GLUCOMTR-MCNC: 208 MG/DL — HIGH (ref 70–99)
GLUCOSE SERPL-MCNC: 89 MG/DL — SIGNIFICANT CHANGE UP (ref 70–99)
POTASSIUM SERPL-MCNC: 4.4 MMOL/L — SIGNIFICANT CHANGE UP (ref 3.5–5.3)
POTASSIUM SERPL-SCNC: 4.4 MMOL/L — SIGNIFICANT CHANGE UP (ref 3.5–5.3)
SODIUM SERPL-SCNC: 130 MMOL/L — LOW (ref 135–145)

## 2024-10-05 PROCEDURE — 99232 SBSQ HOSP IP/OBS MODERATE 35: CPT

## 2024-10-05 PROCEDURE — 99233 SBSQ HOSP IP/OBS HIGH 50: CPT

## 2024-10-05 RX ORDER — UREA 40 G
15 VIAL (EA) INTRAVENOUS DAILY
Refills: 0 | Status: DISCONTINUED | OUTPATIENT
Start: 2024-10-05 | End: 2024-10-07

## 2024-10-05 RX ADMIN — IPRATROPIUM BROMIDE AND ALBUTEROL SULFATE 3 MILLILITER(S): .5; 3 SOLUTION RESPIRATORY (INHALATION) at 01:46

## 2024-10-05 RX ADMIN — IPRATROPIUM BROMIDE AND ALBUTEROL SULFATE 3 MILLILITER(S): .5; 3 SOLUTION RESPIRATORY (INHALATION) at 13:56

## 2024-10-05 RX ADMIN — Medication 1 MILLIGRAM(S): at 17:20

## 2024-10-05 RX ADMIN — Medication 5000 UNIT(S): at 23:01

## 2024-10-05 RX ADMIN — Medication 1 DOSE(S): at 08:08

## 2024-10-05 RX ADMIN — Medication 650 MILLIGRAM(S): at 22:56

## 2024-10-05 RX ADMIN — Medication 17 GRAM(S): at 11:05

## 2024-10-05 RX ADMIN — Medication 1 GRAM(S): at 05:23

## 2024-10-05 RX ADMIN — Medication 81 MILLIGRAM(S): at 11:05

## 2024-10-05 RX ADMIN — PANTOPRAZOLE SODIUM 40 MILLIGRAM(S): 40 TABLET, DELAYED RELEASE ORAL at 11:06

## 2024-10-05 RX ADMIN — GABAPENTIN 300 MILLIGRAM(S): 800 TABLET, FILM COATED ORAL at 11:06

## 2024-10-05 RX ADMIN — Medication 1 DOSE(S): at 21:17

## 2024-10-05 RX ADMIN — Medication 4 MILLIGRAM(S): at 04:08

## 2024-10-05 RX ADMIN — Medication 5 MILLIGRAM(S): at 22:55

## 2024-10-05 RX ADMIN — IPRATROPIUM BROMIDE AND ALBUTEROL SULFATE 3 MILLILITER(S): .5; 3 SOLUTION RESPIRATORY (INHALATION) at 21:16

## 2024-10-05 RX ADMIN — METHYLPREDNISOLONE ACETATE 40 MILLIGRAM(S): 80 INJECTION, SUSPENSION INTRA-ARTICULAR; INTRALESIONAL; INTRAMUSCULAR; SOFT TISSUE at 05:22

## 2024-10-05 RX ADMIN — Medication 225 MILLIGRAM(S): at 11:22

## 2024-10-05 RX ADMIN — Medication 650 MILLIGRAM(S): at 23:30

## 2024-10-05 RX ADMIN — Medication 1 MILLIGRAM(S): at 23:54

## 2024-10-05 RX ADMIN — ATORVASTATIN CALCIUM 40 MILLIGRAM(S): 10 TABLET, FILM COATED ORAL at 22:57

## 2024-10-05 RX ADMIN — Medication 3.12 MILLIGRAM(S): at 16:12

## 2024-10-05 RX ADMIN — Medication 5000 UNIT(S): at 16:11

## 2024-10-05 RX ADMIN — METHYLPREDNISOLONE ACETATE 40 MILLIGRAM(S): 80 INJECTION, SUSPENSION INTRA-ARTICULAR; INTRALESIONAL; INTRAMUSCULAR; SOFT TISSUE at 16:11

## 2024-10-05 RX ADMIN — Medication 15 GRAM(S): at 11:06

## 2024-10-05 RX ADMIN — Medication 1 GRAM(S): at 16:11

## 2024-10-05 RX ADMIN — IPRATROPIUM BROMIDE AND ALBUTEROL SULFATE 3 MILLILITER(S): .5; 3 SOLUTION RESPIRATORY (INHALATION) at 08:08

## 2024-10-05 RX ADMIN — Medication 0.5 MILLIGRAM(S): at 08:17

## 2024-10-05 RX ADMIN — Medication 3.12 MILLIGRAM(S): at 05:23

## 2024-10-05 RX ADMIN — Medication 0.5 MILLIGRAM(S): at 16:11

## 2024-10-05 RX ADMIN — TIOTROPIUM BROMIDE INHALATION SPRAY 2 PUFF(S): 3.12 SPRAY, METERED RESPIRATORY (INHALATION) at 08:08

## 2024-10-05 NOTE — PROGRESS NOTE ADULT - ASSESSMENT
73 y/o F with a h/o CAD (s/p PCI), HFpEF, HTN, HLD, COPD (on home O2 PRN), recent admission for chest pain s/p LHC with nonobstructive CAD- discharged home 9/19, now readmitted on 9/22 with complaints abd pain nausea vomiting , hyponatremia, and acute hypercapnic respiratory failure. Upgraded to SDU 10/1 for continuous BIPAP need. She was evaluated by Pulmonologist and she was found to have right hemidiaphragm paralysis. She was treated with IV Solu-Medrol and continuous BiPAP. 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 Completed broad spectrum Antibiotic  prior to discharge and during last admission.   -Started on continuous BiPAP. Gradually wean down to 4 liter nasal cannula during the day and BiPAP at night (home dose)  - pulmonary following. Advising that patient accumulates co2 rapidly and requires BIPAP at night  - Cont IV Steroids BID   -Poor prognosis. Pulmonology and palliative team is following    #Hyponatremia - improving  #History of chronic SIADH  #Hyperkalemia - resolved  - monitor  - c/w fluid restriction  - nephro following  - c/w urea powder and salt tabs  - trend BMP    CAD s/p PCI  HFpEF  - cont home meds aspirin, carvedilol, statin    DVT ppx: subq heparin   Dispo: medically acute. Plan for ongoing GOC with palliative, eventual RIGOBERTO placement likely, vs home with home care / comfort measures  based on hospital course  GOC - full code

## 2024-10-05 NOTE — PROGRESS NOTE ADULT - ASSESSMENT
Chronic hyponatremia: likely SIADH  Serum Na+ dropped yesterday  - reinforce PO fluid restriction   - cont NaCl tabs  - follow labs--> if serum Na+ continues to decrease will restart PO Urea Chronic hyponatremia: likely SIADH  Serum Na+ dropped yesterday  - reinforce PO fluid restriction   - cont NaCl tabs (pt states she cannot tolerate due to GI issues)  - follow labs--> if serum Na+ continues to decrease will restart PO Urea

## 2024-10-05 NOTE — PROGRESS NOTE ADULT - ASSESSMENT
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 Noct BPAP.  IS. Follow lytes. Nephro f/u. Agree hold abx.     Her PCO2 remained high at 55 on 9/28/2024. Therefore, NIV will be prescribed for home usage in the mode of guaranteed augmented targeted tidal volumes which are not available on any home care bilevel devices to minimize further clinical decline and likelihood of readmissions to the hospital.

## 2024-10-05 NOTE — PROGRESS NOTE ADULT - SUBJECTIVE AND OBJECTIVE BOX
PULMONARY PROGRESS NOTE      [id number]    Patient is a 74y old  Female who presents with a chief complaint of Acute hypercarbic respiratory failure (28 Sep 2024 17:43)      INTERVAL HPI/OVERNIGHT EVENTS: Feeling well now. Says less SOB. On NCO2. Used BPAP last pm.    MEDICATIONS  (STANDING):  albuterol/ipratropium for Nebulization 3 milliLiter(s) Nebulizer every 6 hours  aspirin  chewable 81 milliGRAM(s) Oral daily  atorvastatin 40 milliGRAM(s) Oral at bedtime  carvedilol 3.125 milliGRAM(s) Oral every 12 hours  chlorhexidine 2% Cloths 1 Application(s) Topical daily  dextrose 5%. 1000 milliLiter(s) (100 mL/Hr) IV Continuous <Continuous>  dextrose 5%. 1000 milliLiter(s) (50 mL/Hr) IV Continuous <Continuous>  dextrose 50% Injectable 25 Gram(s) IV Push once  dextrose 50% Injectable 12.5 Gram(s) IV Push once  dextrose 50% Injectable 25 Gram(s) IV Push once  dextrose Oral Gel 15 Gram(s) Oral once  fluticasone propionate/ salmeterol 500-50 MICROgram(s) Diskus 1 Dose(s) Inhalation two times a day  gabapentin 300 milliGRAM(s) Oral daily  glucagon  Injectable 1 milliGRAM(s) IntraMuscular once  heparin   Injectable 5000 Unit(s) SubCutaneous every 8 hours  methylPREDNISolone sodium succinate Injectable 40 milliGRAM(s) IV Push every 12 hours  pantoprazole  Injectable 40 milliGRAM(s) IV Push daily  polyethylene glycol 3350 17 Gram(s) Oral daily  senna 2 Tablet(s) Oral at bedtime  sodium chloride 1 Gram(s) Oral two times a day  tiotropium 2.5 MICROgram(s) Inhaler 2 Puff(s) Inhalation daily  urea Oral Powder 15 Gram(s) Oral daily  venlafaxine  milliGRAM(s) Oral daily  zolpidem 5 milliGRAM(s) Oral at bedtime      MEDICATIONS  (PRN):  acetaminophen     Tablet .. 650 milliGRAM(s) Oral every 6 hours PRN Temp greater or equal to 38C (100.4F), Mild Pain (1 - 3), Moderate Pain (4 - 6)  aluminum hydroxide/magnesium hydroxide/simethicone Suspension 30 milliLiter(s) Oral every 4 hours PRN Dyspepsia  bisacodyl 5 milliGRAM(s) Oral every 12 hours PRN Constipation  LORazepam   Injectable 0.5 milliGRAM(s) IV Push every 8 hours PRN Anxiety  magnesium hydroxide Suspension 30 milliLiter(s) Oral daily PRN Constipation  ondansetron Injectable 4 milliGRAM(s) IV Push four times a day PRN Nausea and/or Vomiting      Allergies    No Known Allergies    Intolerances        PAST MEDICAL & SURGICAL HISTORY:  NICM (nonischemic cardiomyopathy)      HTN (hypertension)      Rheumatoid arthritis      Chronic back pain      HLD (hyperlipidemia)      Chronic obstructive asthma      Cellulitis      S/P       S/P hysterectomy           REVIEW OF SYSTEMS:    CONSTITUTIONAL:  No distress    HEENT:  Eyes:  No diplopia or blurred vision. ENT:  No earache, sore throat or runny nose.    CARDIOVASCULAR:  No pressure, squeezing, tightness, heaviness or aching about the chest; no palpitations.    RESPIRATORY:  per HPI     GASTROINTESTINAL:  No nausea, vomiting or diarrhea.    GENITOURINARY:  No dysuria, frequency or urgency.    NEUROLOGIC:  No paresthesias, fasciculations, seizures or weakness.    PSYCHIATRIC:  No disorder of thought or mood.    Vital Signs Last 24 Hrs  T(C): 37.4 (05 Oct 2024 11:03), Max: 37.4 (05 Oct 2024 11:03)  T(F): 99.4 (05 Oct 2024 11:03), Max: 99.4 (05 Oct 2024 11:03)  HR: 92 (05 Oct 2024 11:03) (90 - 99)  BP: 132/85 (05 Oct 2024 11:03) (119/79 - 132/85)  BP(mean): 89 (05 Oct 2024 00:00) (84 - 96)  RR: 18 (05 Oct 2024 11:03) (17 - 24)  SpO2: 98% (05 Oct 2024 11:03) (97% - 100%)    Parameters below as of 05 Oct 2024 11:03  Patient On (Oxygen Delivery Method): nasal cannula        PHYSICAL EXAMINATION:    GENERAL: The patient is awake and alert in no apparent distress.     HEENT: Head is normocephalic and atraumatic.  Mucous membranes are moist.    NECK: Supple.    LUNGS: Clear to auscultation without wheezing, rales or rhonchi; respirations unlabored    HEART: Regular rate and rhythm      ABDOMEN: Soft, nontender, and nondistended.      NEUROLOGIC: Grossly intact.    LABS:                        9.2    19.86 )-----------( 339      ( 04 Oct 2024 10:12 )             28.1     10-05    130[L]  |  91[L]  |  12.6  ----------------------------<  89  4.4   |  34.0[H]  |  0.37[L]    Ca    8.5      05 Oct 2024 05:31  Phos  1.9     10-04  Mg     1.9     10-04          Procalcitonin: 0.09 ng/mL (10-02-24 @ 12:21)

## 2024-10-05 NOTE — PROGRESS NOTE ADULT - SUBJECTIVE AND OBJECTIVE BOX
[facility] Division of Hospital Medicine  Crystal Singh MD   I'm reachable on [email or web address] Teams      Patient is a 74y old  Female who presents with a chief complaint of Acute hypercarbic respiratory failure (28 Sep 2024 17:43)      SUBJECTIVE / OVERNIGHT EVENTS:   Patient was seen and examined during rounds    reports improvement in shortness of breath. She denied any fever, chills, chest pain. Remain on nocturnal Bipap    12 points ROS negative except above    MEDICATIONS  (STANDING):  albuterol/ipratropium for Nebulization 3 milliLiter(s) Nebulizer every 6 hours  aspirin  chewable 81 milliGRAM(s) Oral daily  atorvastatin 40 milliGRAM(s) Oral at bedtime  carvedilol 3.125 milliGRAM(s) Oral every 12 hours  chlorhexidine 2% Cloths 1 Application(s) Topical daily  dextrose 5%. 1000 milliLiter(s) (50 mL/Hr) IV Continuous <Continuous>  dextrose 5%. 1000 milliLiter(s) (100 mL/Hr) IV Continuous <Continuous>  dextrose 50% Injectable 12.5 Gram(s) IV Push once  dextrose 50% Injectable 25 Gram(s) IV Push once  dextrose 50% Injectable 25 Gram(s) IV Push once  dextrose Oral Gel 15 Gram(s) Oral once  fluticasone propionate/ salmeterol 500-50 MICROgram(s) Diskus 1 Dose(s) Inhalation two times a day  gabapentin 300 milliGRAM(s) Oral daily  glucagon  Injectable 1 milliGRAM(s) IntraMuscular once  heparin   Injectable 5000 Unit(s) SubCutaneous every 8 hours  methylPREDNISolone sodium succinate Injectable 40 milliGRAM(s) IV Push every 12 hours  pantoprazole  Injectable 40 milliGRAM(s) IV Push daily  polyethylene glycol 3350 17 Gram(s) Oral daily  senna 2 Tablet(s) Oral at bedtime  sodium chloride 1 Gram(s) Oral two times a day  tiotropium 2.5 MICROgram(s) Inhaler 2 Puff(s) Inhalation daily  urea Oral Powder 15 Gram(s) Oral daily  venlafaxine  milliGRAM(s) Oral daily  zolpidem 5 milliGRAM(s) Oral at bedtime    MEDICATIONS  (PRN):  acetaminophen     Tablet .. 650 milliGRAM(s) Oral every 6 hours PRN Temp greater or equal to 38C (100.4F), Mild Pain (1 - 3), Moderate Pain (4 - 6)  aluminum hydroxide/magnesium hydroxide/simethicone Suspension 30 milliLiter(s) Oral every 4 hours PRN Dyspepsia  bisacodyl 5 milliGRAM(s) Oral every 12 hours PRN Constipation  LORazepam   Injectable 0.5 milliGRAM(s) IV Push every 8 hours PRN Anxiety  magnesium hydroxide Suspension 30 milliLiter(s) Oral daily PRN Constipation  ondansetron Injectable 4 milliGRAM(s) IV Push four times a day PRN Nausea and/or Vomiting        I&O's Summary    04 Oct 2024 07:01  -  05 Oct 2024 07:00  --------------------------------------------------------  IN: 580 mL / OUT: 800 mL / NET: -220 mL        PHYSICAL EXAM:  Vital Signs Last 24 Hrs  T(C): 37.4 (05 Oct 2024 11:03), Max: 37.4 (05 Oct 2024 11:03)  T(F): 99.4 (05 Oct 2024 11:03), Max: 99.4 (05 Oct 2024 11:03)  HR: 92 (05 Oct 2024 11:03) (90 - 99)  BP: 132/85 (05 Oct 2024 11:03) (119/80 - 132/85)  BP(mean): 89 (05 Oct 2024 00:00) (84 - 91)  RR: 18 (05 Oct 2024 11:03) (17 - 24)  SpO2: 98% (05 Oct 2024 11:03) (97% - 100%)    Parameters below as of 05 Oct 2024 11:03  Patient On (Oxygen Delivery Method): nasal cannula        CONSTITUTIONAL: NAD, Not in acute distress  EYES:  EOMI, conjunctiva and sclera clear  ENMT: , neck supple , non-tender  RESPIRATORY: Normal respiratory effort; lungs are clear to auscultation bilaterally  CARDIOVASCULAR: S1S2 present  ABDOMEN: soft. bowel sound present  MUSCLOSKELETAL: ; no clubbing or cyanosis of digits;   PSYCH: A+O to person, place, and time; affect appropriate  NEUROLOGY: CN, motor and sensory intact   SKIN: No rashes; no palpable lesions  Extremity: No bilateral edema      LABS:                        9.2    19.86 )-----------( 339      ( 04 Oct 2024 10:12 )             28.1     10-05    130[L]  |  91[L]  |  12.6  ----------------------------<  89  4.4   |  34.0[H]  |  0.37[L]    Ca    8.5      05 Oct 2024 05:31  Phos  1.9     10-04  Mg     1.9     10-04            Urinalysis Basic - ( 05 Oct 2024 05:31 )    Color: x / Appearance: x / SG: x / pH: x  Gluc: 89 mg/dL / Ketone: x  / Bili: x / Urobili: x   Blood: x / Protein: x / Nitrite: x   Leuk Esterase: x / RBC: x / WBC x   Sq Epi: x / Non Sq Epi: x / Bacteria: x        CAPILLARY BLOOD GLUCOSE      POCT Blood Glucose.: 108 mg/dL (05 Oct 2024 05:21)      Labs reviewed:    RADIOLOGY & ADDITIONAL TESTS:  Imaging Personally Reviewed:  Electrocardiogram Personally Reviewed:

## 2024-10-05 NOTE — PROGRESS NOTE ADULT - SUBJECTIVE AND OBJECTIVE BOX
NEPHROLOGY INTERVAL HPI/OVERNIGHT EVENTS:  pt clinically stable overnight  no events documented    MEDICATIONS  (STANDING):  albuterol/ipratropium for Nebulization 3 milliLiter(s) Nebulizer every 6 hours  aspirin  chewable 81 milliGRAM(s) Oral daily  atorvastatin 40 milliGRAM(s) Oral at bedtime  carvedilol 3.125 milliGRAM(s) Oral every 12 hours  chlorhexidine 2% Cloths 1 Application(s) Topical daily  dextrose 5%. 1000 milliLiter(s) (100 mL/Hr) IV Continuous <Continuous>  dextrose 5%. 1000 milliLiter(s) (50 mL/Hr) IV Continuous <Continuous>  dextrose 50% Injectable 25 Gram(s) IV Push once  dextrose 50% Injectable 12.5 Gram(s) IV Push once  dextrose 50% Injectable 25 Gram(s) IV Push once  dextrose Oral Gel 15 Gram(s) Oral once  fluticasone propionate/ salmeterol 500-50 MICROgram(s) Diskus 1 Dose(s) Inhalation two times a day  gabapentin 300 milliGRAM(s) Oral daily  glucagon  Injectable 1 milliGRAM(s) IntraMuscular once  heparin   Injectable 5000 Unit(s) SubCutaneous every 8 hours  methylPREDNISolone sodium succinate Injectable 40 milliGRAM(s) IV Push every 12 hours  pantoprazole  Injectable 40 milliGRAM(s) IV Push daily  polyethylene glycol 3350 17 Gram(s) Oral daily  senna 2 Tablet(s) Oral at bedtime  sodium chloride 1 Gram(s) Oral two times a day  tiotropium 2.5 MICROgram(s) Inhaler 2 Puff(s) Inhalation daily  venlafaxine  milliGRAM(s) Oral daily  zolpidem 5 milliGRAM(s) Oral at bedtime    MEDICATIONS  (PRN):  acetaminophen     Tablet .. 650 milliGRAM(s) Oral every 6 hours PRN Temp greater or equal to 38C (100.4F), Mild Pain (1 - 3), Moderate Pain (4 - 6)  aluminum hydroxide/magnesium hydroxide/simethicone Suspension 30 milliLiter(s) Oral every 4 hours PRN Dyspepsia  bisacodyl 5 milliGRAM(s) Oral every 12 hours PRN Constipation  LORazepam   Injectable 0.5 milliGRAM(s) IV Push every 8 hours PRN Anxiety  magnesium hydroxide Suspension 30 milliLiter(s) Oral daily PRN Constipation  ondansetron Injectable 4 milliGRAM(s) IV Push four times a day PRN Nausea and/or Vomiting      Allergies    No Known Allergies        Vital Signs Last 24 Hrs  T(C): 37.1 (05 Oct 2024 05:00), Max: 37.1 (05 Oct 2024 05:00)  T(F): 98.7 (05 Oct 2024 05:00), Max: 98.7 (05 Oct 2024 05:00)  HR: 96 (05 Oct 2024 05:00) (88 - 106)  BP: 130/85 (05 Oct 2024 05:00) (119/79 - 139/82)  BP(mean): 89 (05 Oct 2024 00:00) (84 - 99)  RR: 18 (05 Oct 2024 05:00) (17 - 24)  SpO2: 98% (05 Oct 2024 05:00) (97% - 100%)    Parameters below as of 05 Oct 2024 05:00  Patient On (Oxygen Delivery Method): nasal cannula  O2 Flow (L/min): 4      PHYSICAL EXAM:  GENERAL: Deconditioned  HEENT: No periorbital edema  NECK: Supple, No JVD  NERVOUS SYSTEM: Alert & Oriented X3  CHEST/LUNG: Clear bilaterally  HEART: Regular rate and rhythm; No rub  ABDOMEN: Soft, Nontender, +BS  EXTREMITIES: Tr dependent edema       LABS:                        9.2    19.86 )-----------( 339      ( 04 Oct 2024 10:12 )             28.1     10-04    132[L]  |  93[L]  |  15.6  ----------------------------<  211[H]  4.2   |  30.0[H]  |  0.43[L]    Ca    8.0[L]      04 Oct 2024 10:12  Phos  1.9     10-04  Mg     1.9     10-04        Urinalysis Basic - ( 04 Oct 2024 10:12 )    Color: x / Appearance: x / SG: x / pH: x  Gluc: 211 mg/dL / Ketone: x  / Bili: x / Urobili: x   Blood: x / Protein: x / Nitrite: x   Leuk Esterase: x / RBC: x / WBC x   Sq Epi: x / Non Sq Epi: x / Bacteria: x      Magnesium: 1.9 mg/dL (10-04 @ 10:12)  Phosphorus: 1.9 mg/dL (10-04 @ 10:12)      RADIOLOGY & ADDITIONAL TESTS:   NEPHROLOGY INTERVAL HPI/OVERNIGHT EVENTS:  pt clinically stable overnight  no events documented    MEDICATIONS  (STANDING):  albuterol/ipratropium for Nebulization 3 milliLiter(s) Nebulizer every 6 hours  aspirin  chewable 81 milliGRAM(s) Oral daily  atorvastatin 40 milliGRAM(s) Oral at bedtime  carvedilol 3.125 milliGRAM(s) Oral every 12 hours  chlorhexidine 2% Cloths 1 Application(s) Topical daily  dextrose 5%. 1000 milliLiter(s) (100 mL/Hr) IV Continuous <Continuous>  dextrose 5%. 1000 milliLiter(s) (50 mL/Hr) IV Continuous <Continuous>  dextrose 50% Injectable 25 Gram(s) IV Push once  dextrose 50% Injectable 12.5 Gram(s) IV Push once  dextrose 50% Injectable 25 Gram(s) IV Push once  dextrose Oral Gel 15 Gram(s) Oral once  fluticasone propionate/ salmeterol 500-50 MICROgram(s) Diskus 1 Dose(s) Inhalation two times a day  gabapentin 300 milliGRAM(s) Oral daily  glucagon  Injectable 1 milliGRAM(s) IntraMuscular once  heparin   Injectable 5000 Unit(s) SubCutaneous every 8 hours  methylPREDNISolone sodium succinate Injectable 40 milliGRAM(s) IV Push every 12 hours  pantoprazole  Injectable 40 milliGRAM(s) IV Push daily  polyethylene glycol 3350 17 Gram(s) Oral daily  senna 2 Tablet(s) Oral at bedtime  sodium chloride 1 Gram(s) Oral two times a day  tiotropium 2.5 MICROgram(s) Inhaler 2 Puff(s) Inhalation daily  venlafaxine  milliGRAM(s) Oral daily  zolpidem 5 milliGRAM(s) Oral at bedtime    MEDICATIONS  (PRN):  acetaminophen     Tablet .. 650 milliGRAM(s) Oral every 6 hours PRN Temp greater or equal to 38C (100.4F), Mild Pain (1 - 3), Moderate Pain (4 - 6)  aluminum hydroxide/magnesium hydroxide/simethicone Suspension 30 milliLiter(s) Oral every 4 hours PRN Dyspepsia  bisacodyl 5 milliGRAM(s) Oral every 12 hours PRN Constipation  LORazepam   Injectable 0.5 milliGRAM(s) IV Push every 8 hours PRN Anxiety  magnesium hydroxide Suspension 30 milliLiter(s) Oral daily PRN Constipation  ondansetron Injectable 4 milliGRAM(s) IV Push four times a day PRN Nausea and/or Vomiting      Allergies    No Known Allergies        Vital Signs Last 24 Hrs  T(C): 37.1 (05 Oct 2024 05:00), Max: 37.1 (05 Oct 2024 05:00)  T(F): 98.7 (05 Oct 2024 05:00), Max: 98.7 (05 Oct 2024 05:00)  HR: 96 (05 Oct 2024 05:00) (88 - 106)  BP: 130/85 (05 Oct 2024 05:00) (119/79 - 139/82)  BP(mean): 89 (05 Oct 2024 00:00) (84 - 99)  RR: 18 (05 Oct 2024 05:00) (17 - 24)  SpO2: 98% (05 Oct 2024 05:00) (97% - 100%)    Parameters below as of 05 Oct 2024 05:00  Patient On (Oxygen Delivery Method): nasal cannula  O2 Flow (L/min): 4      PHYSICAL EXAM:  GENERAL: Deconditioned  HEENT: No periorbital edema  NECK: Supple, No JVD  NERVOUS SYSTEM: Alert & Oriented X3  CHEST/LUNG: Clear bilaterally  HEART: Regular rate and rhythm; No rub  ABDOMEN: Soft, Nontender, +BS  EXTREMITIES: Tr dependent edema       LABS:                        9.2    19.86 )-----------( 339      ( 04 Oct 2024 10:12 )             28.1     10-04    132[L]  |  93[L]  |  15.6  ----------------------------<  211[H]  4.2   |  30.0[H]  |  0.43[L]    Ca    8.0[L]      04 Oct 2024 10:12  Phos  1.9     10-04  Mg     1.9     10-04          Urinalysis Basic - ( 04 Oct 2024 10:12 )    Color: x / Appearance: x / SG: x / pH: x  Gluc: 211 mg/dL / Ketone: x  / Bili: x / Urobili: x   Blood: x / Protein: x / Nitrite: x   Leuk Esterase: x / RBC: x / WBC x   Sq Epi: x / Non Sq Epi: x / Bacteria: x      Magnesium: 1.9 mg/dL (10-04 @ 10:12)  Phosphorus: 1.9 mg/dL (10-04 @ 10:12)      RADIOLOGY & ADDITIONAL TESTS:

## 2024-10-06 LAB
ALBUMIN SERPL ELPH-MCNC: 3.2 G/DL — LOW (ref 3.3–5.2)
ALP SERPL-CCNC: 52 U/L — SIGNIFICANT CHANGE UP (ref 40–120)
ALT FLD-CCNC: 25 U/L — SIGNIFICANT CHANGE UP
ANION GAP SERPL CALC-SCNC: 6 MMOL/L — SIGNIFICANT CHANGE UP (ref 5–17)
AST SERPL-CCNC: 21 U/L — SIGNIFICANT CHANGE UP
BASE EXCESS BLDA CALC-SCNC: 13 MMOL/L — HIGH (ref -2–3)
BILIRUB SERPL-MCNC: <0.2 MG/DL — LOW (ref 0.4–2)
BLOOD GAS COMMENTS ARTERIAL: SIGNIFICANT CHANGE UP
BUN SERPL-MCNC: 12.6 MG/DL — SIGNIFICANT CHANGE UP (ref 8–20)
CALCIUM SERPL-MCNC: 8.7 MG/DL — SIGNIFICANT CHANGE UP (ref 8.4–10.5)
CHLORIDE SERPL-SCNC: 93 MMOL/L — LOW (ref 96–108)
CO2 SERPL-SCNC: 31 MMOL/L — HIGH (ref 22–29)
CREAT SERPL-MCNC: 0.44 MG/DL — LOW (ref 0.5–1.3)
EGFR: 101 ML/MIN/1.73M2 — SIGNIFICANT CHANGE UP
GLUCOSE BLDC GLUCOMTR-MCNC: 114 MG/DL — HIGH (ref 70–99)
GLUCOSE BLDC GLUCOMTR-MCNC: 134 MG/DL — HIGH (ref 70–99)
GLUCOSE BLDC GLUCOMTR-MCNC: 198 MG/DL — HIGH (ref 70–99)
GLUCOSE BLDC GLUCOMTR-MCNC: 94 MG/DL — SIGNIFICANT CHANGE UP (ref 70–99)
GLUCOSE SERPL-MCNC: 87 MG/DL — SIGNIFICANT CHANGE UP (ref 70–99)
HCO3 BLDA-SCNC: 36 MMOL/L — HIGH (ref 21–28)
HCT VFR BLD CALC: 27.7 % — LOW (ref 34.5–45)
HGB BLD-MCNC: 9.2 G/DL — LOW (ref 11.5–15.5)
MAGNESIUM SERPL-MCNC: 2 MG/DL — SIGNIFICANT CHANGE UP (ref 1.6–2.6)
MCHC RBC-ENTMCNC: 30 PG — SIGNIFICANT CHANGE UP (ref 27–34)
MCHC RBC-ENTMCNC: 33.2 GM/DL — SIGNIFICANT CHANGE UP (ref 32–36)
MCV RBC AUTO: 90.2 FL — SIGNIFICANT CHANGE UP (ref 80–100)
PCO2 BLDA: 49 MMHG — HIGH (ref 32–45)
PH BLDA: 7.48 — HIGH (ref 7.35–7.45)
PHOSPHATE SERPL-MCNC: 2.4 MG/DL — SIGNIFICANT CHANGE UP (ref 2.4–4.7)
PLATELET # BLD AUTO: 340 K/UL — SIGNIFICANT CHANGE UP (ref 150–400)
PO2 BLDA: 61 MMHG — LOW (ref 83–108)
POTASSIUM SERPL-MCNC: 4.6 MMOL/L — SIGNIFICANT CHANGE UP (ref 3.5–5.3)
POTASSIUM SERPL-SCNC: 4.6 MMOL/L — SIGNIFICANT CHANGE UP (ref 3.5–5.3)
PROT SERPL-MCNC: 5.5 G/DL — LOW (ref 6.6–8.7)
RBC # BLD: 3.07 M/UL — LOW (ref 3.8–5.2)
RBC # FLD: 12.3 % — SIGNIFICANT CHANGE UP (ref 10.3–14.5)
SAO2 % BLDA: 94.9 % — SIGNIFICANT CHANGE UP (ref 94–98)
SODIUM SERPL-SCNC: 130 MMOL/L — LOW (ref 135–145)
WBC # BLD: 15.4 K/UL — HIGH (ref 3.8–10.5)
WBC # FLD AUTO: 15.4 K/UL — HIGH (ref 3.8–10.5)

## 2024-10-06 PROCEDURE — 99233 SBSQ HOSP IP/OBS HIGH 50: CPT

## 2024-10-06 RX ORDER — METHYLPREDNISOLONE ACETATE 80 MG/ML
40 INJECTION, SUSPENSION INTRA-ARTICULAR; INTRALESIONAL; INTRAMUSCULAR; SOFT TISSUE DAILY
Refills: 0 | Status: DISCONTINUED | OUTPATIENT
Start: 2024-10-06 | End: 2024-10-07

## 2024-10-06 RX ADMIN — Medication 81 MILLIGRAM(S): at 11:19

## 2024-10-06 RX ADMIN — Medication 5000 UNIT(S): at 17:01

## 2024-10-06 RX ADMIN — IPRATROPIUM BROMIDE AND ALBUTEROL SULFATE 3 MILLILITER(S): .5; 3 SOLUTION RESPIRATORY (INHALATION) at 20:50

## 2024-10-06 RX ADMIN — TIOTROPIUM BROMIDE INHALATION SPRAY 2 PUFF(S): 3.12 SPRAY, METERED RESPIRATORY (INHALATION) at 07:46

## 2024-10-06 RX ADMIN — Medication 225 MILLIGRAM(S): at 11:19

## 2024-10-06 RX ADMIN — ATORVASTATIN CALCIUM 40 MILLIGRAM(S): 10 TABLET, FILM COATED ORAL at 21:49

## 2024-10-06 RX ADMIN — Medication 1 GRAM(S): at 05:23

## 2024-10-06 RX ADMIN — Medication 1 MILLIGRAM(S): at 22:52

## 2024-10-06 RX ADMIN — Medication 1 MILLIGRAM(S): at 17:02

## 2024-10-06 RX ADMIN — GABAPENTIN 300 MILLIGRAM(S): 800 TABLET, FILM COATED ORAL at 11:19

## 2024-10-06 RX ADMIN — CHLORHEXIDINE GLUCONATE ORAL RINSE 1 APPLICATION(S): 1.2 SOLUTION DENTAL at 11:20

## 2024-10-06 RX ADMIN — Medication 650 MILLIGRAM(S): at 22:49

## 2024-10-06 RX ADMIN — IPRATROPIUM BROMIDE AND ALBUTEROL SULFATE 3 MILLILITER(S): .5; 3 SOLUTION RESPIRATORY (INHALATION) at 03:57

## 2024-10-06 RX ADMIN — Medication 5 MILLIGRAM(S): at 21:49

## 2024-10-06 RX ADMIN — Medication 3.12 MILLIGRAM(S): at 17:02

## 2024-10-06 RX ADMIN — Medication 3.12 MILLIGRAM(S): at 05:23

## 2024-10-06 RX ADMIN — IPRATROPIUM BROMIDE AND ALBUTEROL SULFATE 3 MILLILITER(S): .5; 3 SOLUTION RESPIRATORY (INHALATION) at 14:21

## 2024-10-06 RX ADMIN — METHYLPREDNISOLONE ACETATE 40 MILLIGRAM(S): 80 INJECTION, SUSPENSION INTRA-ARTICULAR; INTRALESIONAL; INTRAMUSCULAR; SOFT TISSUE at 05:24

## 2024-10-06 RX ADMIN — Medication 650 MILLIGRAM(S): at 21:49

## 2024-10-06 RX ADMIN — Medication 1 DOSE(S): at 07:46

## 2024-10-06 RX ADMIN — PANTOPRAZOLE SODIUM 40 MILLIGRAM(S): 40 TABLET, DELAYED RELEASE ORAL at 11:20

## 2024-10-06 RX ADMIN — Medication 5000 UNIT(S): at 08:18

## 2024-10-06 RX ADMIN — METHYLPREDNISOLONE ACETATE 40 MILLIGRAM(S): 80 INJECTION, SUSPENSION INTRA-ARTICULAR; INTRALESIONAL; INTRAMUSCULAR; SOFT TISSUE at 17:01

## 2024-10-06 RX ADMIN — Medication 1 DOSE(S): at 21:07

## 2024-10-06 RX ADMIN — Medication 1 MILLIGRAM(S): at 09:18

## 2024-10-06 RX ADMIN — Medication 1 GRAM(S): at 17:02

## 2024-10-06 RX ADMIN — IPRATROPIUM BROMIDE AND ALBUTEROL SULFATE 3 MILLILITER(S): .5; 3 SOLUTION RESPIRATORY (INHALATION) at 07:45

## 2024-10-06 RX ADMIN — Medication 5000 UNIT(S): at 23:13

## 2024-10-06 NOTE — PROGRESS NOTE ADULT - ASSESSMENT
73 y/o F with a h/o CAD (s/p PCI), HFpEF, HTN, HLD, COPD (on home O2 PRN), recent admission for chest pain s/p C with nonobstructive CAD- discharged home 9/19, now readmitted on 9/22 with complaints abd pain nausea vomiting , hyponatremia, and acute hypercapnic respiratory failure. Upgraded to SDU 10/1 for continuous BIPAP need. She was evaluated by Pulmonologist and she was found to have right hemidiaphragm paralysis. She was treated with IV Solu-Medrol and continuous BiPAP. 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 Completed broad spectrum Antibiotic  prior to discharge and during last admission.   -Started on continuous BiPAP. Gradually wean down to 4 liter nasal cannula during the day and BiPAP at night (home dose)  - pulmonary following. Advising that patient accumulates co2 rapidly and requires BIPAP at night  - Cont IV Steroids BID   -Poor prognosis. Pulmonology and palliative team is following  -Repeat ABG suggestive of metabolic alkalosis.     #Hyponatremia - improving  #History of chronic SIADH  #Hyperkalemia - resolved  - monitor  - c/w fluid restriction   - nephro following  - c/w urea powder and salt tabs. the patient intermittently refusing urea powder due to bad taste.   - trend BMP    CAD s/p PCI  HFpEF  - cont home meds aspirin, carvedilol, statin    DVT ppx: subq heparin   Dispo:Remain on nasal cannula and BiPAP at night. Need pulmonology evaluation for home BiPAP/EVAPS arrangement and possible discharge in one to two days to start. We will follow up on palliative team for goal of care discussion    GOC - full code

## 2024-10-06 NOTE — PROGRESS NOTE ADULT - SUBJECTIVE AND OBJECTIVE BOX
[facility] Division of Hospital Medicine  Crystal Singh MD   I'm reachable on [employer] Teams      Patient is a 74y old  Female who presents with a chief complaint of Acute hypercarbic respiratory failure (28 Sep 2024 17:43)      SUBJECTIVE / OVERNIGHT EVENTS:   Patient was seen and examined during rounds  The patient was seen and evaluated in rounds. She reports improvement in shortness of breath. She was able to tolerate nasal cannula during the day and BiPAP at night.   As per the patient's  who was sitting at bedside, he noticed that the patient is more lethargic today that ABG ordered. Resulted as most likely metabolic alkalosis.   Sodium labile. Started on Urea powder by nephrology team. The patient refused due to bad taste.       12 points ROS negative except above    MEDICATIONS  (STANDING):  albuterol/ipratropium for Nebulization 3 milliLiter(s) Nebulizer every 6 hours  aspirin  chewable 81 milliGRAM(s) Oral daily  atorvastatin 40 milliGRAM(s) Oral at bedtime  carvedilol 3.125 milliGRAM(s) Oral every 12 hours  chlorhexidine 2% Cloths 1 Application(s) Topical daily  dextrose 5%. 1000 milliLiter(s) (100 mL/Hr) IV Continuous <Continuous>  dextrose 5%. 1000 milliLiter(s) (50 mL/Hr) IV Continuous <Continuous>  dextrose 50% Injectable 25 Gram(s) IV Push once  dextrose 50% Injectable 25 Gram(s) IV Push once  dextrose 50% Injectable 12.5 Gram(s) IV Push once  dextrose Oral Gel 15 Gram(s) Oral once  fluticasone propionate/ salmeterol 500-50 MICROgram(s) Diskus 1 Dose(s) Inhalation two times a day  gabapentin 300 milliGRAM(s) Oral daily  glucagon  Injectable 1 milliGRAM(s) IntraMuscular once  heparin   Injectable 5000 Unit(s) SubCutaneous every 8 hours  methylPREDNISolone sodium succinate Injectable 40 milliGRAM(s) IV Push daily  pantoprazole  Injectable 40 milliGRAM(s) IV Push daily  polyethylene glycol 3350 17 Gram(s) Oral daily  senna 2 Tablet(s) Oral at bedtime  sodium chloride 1 Gram(s) Oral two times a day  tiotropium 2.5 MICROgram(s) Inhaler 2 Puff(s) Inhalation daily  urea Oral Powder 15 Gram(s) Oral daily  venlafaxine  milliGRAM(s) Oral daily  zolpidem 5 milliGRAM(s) Oral at bedtime    MEDICATIONS  (PRN):  acetaminophen     Tablet .. 650 milliGRAM(s) Oral every 6 hours PRN Temp greater or equal to 38C (100.4F), Mild Pain (1 - 3), Moderate Pain (4 - 6)  aluminum hydroxide/magnesium hydroxide/simethicone Suspension 30 milliLiter(s) Oral every 4 hours PRN Dyspepsia  bisacodyl 5 milliGRAM(s) Oral every 12 hours PRN Constipation  LORazepam     Tablet 1 milliGRAM(s) Oral every 6 hours PRN Anxiety  magnesium hydroxide Suspension 30 milliLiter(s) Oral daily PRN Constipation  ondansetron Injectable 4 milliGRAM(s) IV Push four times a day PRN Nausea and/or Vomiting        I&O's Summary    05 Oct 2024 07:01  -  06 Oct 2024 07:00  --------------------------------------------------------  IN: 745 mL / OUT: 1550 mL / NET: -805 mL    06 Oct 2024 07:01  -  06 Oct 2024 13:24  --------------------------------------------------------  IN: 280 mL / OUT: 0 mL / NET: 280 mL        PHYSICAL EXAM:  Vital Signs Last 24 Hrs  T(C): 36.6 (06 Oct 2024 10:55), Max: 37.4 (05 Oct 2024 17:15)  T(F): 97.9 (06 Oct 2024 10:55), Max: 99.3 (05 Oct 2024 17:15)  HR: 95 (06 Oct 2024 10:55) (80 - 99)  BP: 115/75 (06 Oct 2024 10:55) (115/75 - 128/85)  BP(mean): --  RR: 18 (06 Oct 2024 10:55) (18 - 19)  SpO2: 96% (06 Oct 2024 10:55) (96% - 99%)    Parameters below as of 06 Oct 2024 10:55  Patient On (Oxygen Delivery Method): nasal cannula        CONSTITUTIONAL: NAD, Not in acute distress  EYES:  EOMI, conjunctiva and sclera clear  ENMT: , neck supple , non-tender  RESPIRATORY: Normal respiratory effort; lungs are clear to auscultation bilaterally.Decreased air entry on right side. Decreased air entry on right side.   CARDIOVASCULAR: S1S2 present  ABDOMEN: soft. bowel sound present  MUSCLOSKELETAL: ; no clubbing or cyanosis of digits;   PSYCH: A+O to person, place, and time; affect appropriate  NEUROLOGY: CN, motor and sensory intact   SKIN: No rashes; no palpable lesions  Extremity: No bilateral edema      LABS:                        9.2    15.40 )-----------( 340      ( 06 Oct 2024 05:14 )             27.7     10-06    130[L]  |  93[L]  |  12.6  ----------------------------<  87  4.6   |  31.0[H]  |  0.44[L]    Ca    8.7      06 Oct 2024 05:14  Phos  2.4     10-06  Mg     2.0     10-06    TPro  5.5[L]  /  Alb  3.2[L]  /  TBili  <0.2[L]  /  DBili  x   /  AST  21  /  ALT  25  /  AlkPhos  52  10-06          Urinalysis Basic - ( 06 Oct 2024 05:14 )    Color: x / Appearance: x / SG: x / pH: x  Gluc: 87 mg/dL / Ketone: x  / Bili: x / Urobili: x   Blood: x / Protein: x / Nitrite: x   Leuk Esterase: x / RBC: x / WBC x   Sq Epi: x / Non Sq Epi: x / Bacteria: x        CAPILLARY BLOOD GLUCOSE      POCT Blood Glucose.: 114 mg/dL (06 Oct 2024 07:45)  POCT Blood Glucose.: 94 mg/dL (06 Oct 2024 06:27)  POCT Blood Glucose.: 208 mg/dL (05 Oct 2024 23:04)      Labs reviewed:    RADIOLOGY & ADDITIONAL TESTS:  Imaging Personally Reviewed:  Electrocardiogram Personally Reviewed:

## 2024-10-06 NOTE — PROGRESS NOTE ADULT - SUBJECTIVE AND OBJECTIVE BOX
NEPHROLOGY INTERVAL HPI/OVERNIGHT EVENTS:  uneventful overnight  comfortable    MEDICATIONS  (STANDING):  albuterol/ipratropium for Nebulization 3 milliLiter(s) Nebulizer every 6 hours  aspirin  chewable 81 milliGRAM(s) Oral daily  atorvastatin 40 milliGRAM(s) Oral at bedtime  carvedilol 3.125 milliGRAM(s) Oral every 12 hours  chlorhexidine 2% Cloths 1 Application(s) Topical daily  dextrose 5%. 1000 milliLiter(s) (100 mL/Hr) IV Continuous <Continuous>  dextrose 5%. 1000 milliLiter(s) (50 mL/Hr) IV Continuous <Continuous>  dextrose 50% Injectable 25 Gram(s) IV Push once  dextrose 50% Injectable 12.5 Gram(s) IV Push once  dextrose 50% Injectable 25 Gram(s) IV Push once  dextrose Oral Gel 15 Gram(s) Oral once  fluticasone propionate/ salmeterol 500-50 MICROgram(s) Diskus 1 Dose(s) Inhalation two times a day  gabapentin 300 milliGRAM(s) Oral daily  glucagon  Injectable 1 milliGRAM(s) IntraMuscular once  heparin   Injectable 5000 Unit(s) SubCutaneous every 8 hours  methylPREDNISolone sodium succinate Injectable 40 milliGRAM(s) IV Push every 12 hours  pantoprazole  Injectable 40 milliGRAM(s) IV Push daily  polyethylene glycol 3350 17 Gram(s) Oral daily  senna 2 Tablet(s) Oral at bedtime  sodium chloride 1 Gram(s) Oral two times a day  tiotropium 2.5 MICROgram(s) Inhaler 2 Puff(s) Inhalation daily  urea Oral Powder 15 Gram(s) Oral daily  venlafaxine  milliGRAM(s) Oral daily  zolpidem 5 milliGRAM(s) Oral at bedtime    MEDICATIONS  (PRN):  acetaminophen     Tablet .. 650 milliGRAM(s) Oral every 6 hours PRN Temp greater or equal to 38C (100.4F), Mild Pain (1 - 3), Moderate Pain (4 - 6)  aluminum hydroxide/magnesium hydroxide/simethicone Suspension 30 milliLiter(s) Oral every 4 hours PRN Dyspepsia  bisacodyl 5 milliGRAM(s) Oral every 12 hours PRN Constipation  LORazepam     Tablet 1 milliGRAM(s) Oral every 6 hours PRN Anxiety  magnesium hydroxide Suspension 30 milliLiter(s) Oral daily PRN Constipation  ondansetron Injectable 4 milliGRAM(s) IV Push four times a day PRN Nausea and/or Vomiting      Allergies    No Known Allergies    Intolerances              Vital Signs Last 24 Hrs  T(C): 37.2 (06 Oct 2024 04:49), Max: 37.4 (05 Oct 2024 11:03)  T(F): 99 (06 Oct 2024 04:49), Max: 99.4 (05 Oct 2024 11:03)  HR: 96 (06 Oct 2024 04:49) (82 - 99)  BP: 124/80 (06 Oct 2024 04:49) (121/85 - 132/85)  BP(mean): --  RR: 18 (06 Oct 2024 04:49) (18 - 19)  SpO2: 99% (06 Oct 2024 04:49) (97% - 99%)    Parameters below as of 06 Oct 2024 04:49  Patient On (Oxygen Delivery Method): nasal cannula        PHYSICAL EXAM:  GENERAL: Frail, weak  HEENT: Moist MMs  NECK: Supple, No JVD  NERVOUS SYSTEM: Alert & Oriented X3  CHEST/LUNG: Clear bilaterally  HEART: Regular rate and rhythm; No rub  ABDOMEN: Soft, Nontender, +BS  EXTREMITIES: Tr dependent edema     LABS:                        9.2    19.86 )-----------( 339      ( 04 Oct 2024 10:12 )             28.1     10-05    130[L]  |  91[L]  |  12.6  ----------------------------<  89  4.4   |  34.0[H]  |  0.37[L]    Ca    8.5      05 Oct 2024 05:31  Phos  1.9     10-04  Mg     1.9     10-04        Urinalysis Basic - ( 05 Oct 2024 05:31 )    Color: x / Appearance: x / SG: x / pH: x  Gluc: 89 mg/dL / Ketone: x  / Bili: x / Urobili: x   Blood: x / Protein: x / Nitrite: x   Leuk Esterase: x / RBC: x / WBC x   Sq Epi: x / Non Sq Epi: x / Bacteria: x          RADIOLOGY & ADDITIONAL TESTS:   NEPHROLOGY INTERVAL HPI/OVERNIGHT EVENTS:  uneventful overnight  comfortable    MEDICATIONS  (STANDING):  albuterol/ipratropium for Nebulization 3 milliLiter(s) Nebulizer every 6 hours  aspirin  chewable 81 milliGRAM(s) Oral daily  atorvastatin 40 milliGRAM(s) Oral at bedtime  carvedilol 3.125 milliGRAM(s) Oral every 12 hours  chlorhexidine 2% Cloths 1 Application(s) Topical daily  dextrose 5%. 1000 milliLiter(s) (100 mL/Hr) IV Continuous <Continuous>  dextrose 5%. 1000 milliLiter(s) (50 mL/Hr) IV Continuous <Continuous>  dextrose 50% Injectable 25 Gram(s) IV Push once  dextrose 50% Injectable 12.5 Gram(s) IV Push once  dextrose 50% Injectable 25 Gram(s) IV Push once  dextrose Oral Gel 15 Gram(s) Oral once  fluticasone propionate/ salmeterol 500-50 MICROgram(s) Diskus 1 Dose(s) Inhalation two times a day  gabapentin 300 milliGRAM(s) Oral daily  glucagon  Injectable 1 milliGRAM(s) IntraMuscular once  heparin   Injectable 5000 Unit(s) SubCutaneous every 8 hours  methylPREDNISolone sodium succinate Injectable 40 milliGRAM(s) IV Push every 12 hours  pantoprazole  Injectable 40 milliGRAM(s) IV Push daily  polyethylene glycol 3350 17 Gram(s) Oral daily  senna 2 Tablet(s) Oral at bedtime  sodium chloride 1 Gram(s) Oral two times a day  tiotropium 2.5 MICROgram(s) Inhaler 2 Puff(s) Inhalation daily  urea Oral Powder 15 Gram(s) Oral daily  venlafaxine  milliGRAM(s) Oral daily  zolpidem 5 milliGRAM(s) Oral at bedtime    MEDICATIONS  (PRN):  acetaminophen     Tablet .. 650 milliGRAM(s) Oral every 6 hours PRN Temp greater or equal to 38C (100.4F), Mild Pain (1 - 3), Moderate Pain (4 - 6)  aluminum hydroxide/magnesium hydroxide/simethicone Suspension 30 milliLiter(s) Oral every 4 hours PRN Dyspepsia  bisacodyl 5 milliGRAM(s) Oral every 12 hours PRN Constipation  LORazepam     Tablet 1 milliGRAM(s) Oral every 6 hours PRN Anxiety  magnesium hydroxide Suspension 30 milliLiter(s) Oral daily PRN Constipation  ondansetron Injectable 4 milliGRAM(s) IV Push four times a day PRN Nausea and/or Vomiting      Allergies    No Known Allergies    Intolerances              Vital Signs Last 24 Hrs  T(C): 37.2 (06 Oct 2024 04:49), Max: 37.4 (05 Oct 2024 11:03)  T(F): 99 (06 Oct 2024 04:49), Max: 99.4 (05 Oct 2024 11:03)  HR: 96 (06 Oct 2024 04:49) (82 - 99)  BP: 124/80 (06 Oct 2024 04:49) (121/85 - 132/85)  BP(mean): --  RR: 18 (06 Oct 2024 04:49) (18 - 19)  SpO2: 99% (06 Oct 2024 04:49) (97% - 99%)    Parameters below as of 06 Oct 2024 04:49  Patient On (Oxygen Delivery Method): nasal cannula        PHYSICAL EXAM:  GENERAL: Frail, weak  HEENT: Moist MMs  NECK: Supple, No JVD  NERVOUS SYSTEM: Alert & Oriented X3  CHEST/LUNG: Clear bilaterally  HEART: Regular rate and rhythm; No rub  ABDOMEN: Soft, Nontender, +BS  EXTREMITIES: Tr dependent edema     LABS:                        9.2    19.86 )-----------( 339      ( 04 Oct 2024 10:12 )             28.1     10-05    130[L]  |  91[L]  |  12.6  ----------------------------<  89  4.4   |  34.0[H]  |  0.37[L]    Ca    8.5      05 Oct 2024 05:31  Phos  1.9     10-04  Mg     1.9     10-04    Sodium: 130 mmol/L (10.06.24)    Urinalysis Basic - ( 05 Oct 2024 05:31 )    Color: x / Appearance: x / SG: x / pH: x  Gluc: 89 mg/dL / Ketone: x  / Bili: x / Urobili: x   Blood: x / Protein: x / Nitrite: x   Leuk Esterase: x / RBC: x / WBC x   Sq Epi: x / Non Sq Epi: x / Bacteria: x          RADIOLOGY & ADDITIONAL TESTS:

## 2024-10-06 NOTE — PROGRESS NOTE ADULT - ASSESSMENT
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

## 2024-10-07 LAB
ALBUMIN SERPL ELPH-MCNC: 3.4 G/DL — SIGNIFICANT CHANGE UP (ref 3.3–5.2)
ALP SERPL-CCNC: 56 U/L — SIGNIFICANT CHANGE UP (ref 40–120)
ALT FLD-CCNC: 27 U/L — SIGNIFICANT CHANGE UP
ANION GAP SERPL CALC-SCNC: 12 MMOL/L — SIGNIFICANT CHANGE UP (ref 5–17)
AST SERPL-CCNC: 20 U/L — SIGNIFICANT CHANGE UP
BILIRUB SERPL-MCNC: <0.2 MG/DL — LOW (ref 0.4–2)
BUN SERPL-MCNC: 11.8 MG/DL — SIGNIFICANT CHANGE UP (ref 8–20)
CALCIUM SERPL-MCNC: 8.3 MG/DL — LOW (ref 8.4–10.5)
CHLORIDE SERPL-SCNC: 92 MMOL/L — LOW (ref 96–108)
CO2 SERPL-SCNC: 27 MMOL/L — SIGNIFICANT CHANGE UP (ref 22–29)
CREAT SERPL-MCNC: 0.37 MG/DL — LOW (ref 0.5–1.3)
CULTURE RESULTS: SIGNIFICANT CHANGE UP
CULTURE RESULTS: SIGNIFICANT CHANGE UP
EGFR: 106 ML/MIN/1.73M2 — SIGNIFICANT CHANGE UP
GLUCOSE BLDC GLUCOMTR-MCNC: 124 MG/DL — HIGH (ref 70–99)
GLUCOSE BLDC GLUCOMTR-MCNC: 189 MG/DL — HIGH (ref 70–99)
GLUCOSE BLDC GLUCOMTR-MCNC: 97 MG/DL — SIGNIFICANT CHANGE UP (ref 70–99)
GLUCOSE SERPL-MCNC: 87 MG/DL — SIGNIFICANT CHANGE UP (ref 70–99)
HCT VFR BLD CALC: 29.1 % — LOW (ref 34.5–45)
HGB BLD-MCNC: 9.6 G/DL — LOW (ref 11.5–15.5)
MAGNESIUM SERPL-MCNC: 2 MG/DL — SIGNIFICANT CHANGE UP (ref 1.6–2.6)
MCHC RBC-ENTMCNC: 30 PG — SIGNIFICANT CHANGE UP (ref 27–34)
MCHC RBC-ENTMCNC: 33 GM/DL — SIGNIFICANT CHANGE UP (ref 32–36)
MCV RBC AUTO: 90.9 FL — SIGNIFICANT CHANGE UP (ref 80–100)
PHOSPHATE SERPL-MCNC: 2.9 MG/DL — SIGNIFICANT CHANGE UP (ref 2.4–4.7)
PLATELET # BLD AUTO: 337 K/UL — SIGNIFICANT CHANGE UP (ref 150–400)
POTASSIUM SERPL-MCNC: 4.6 MMOL/L — SIGNIFICANT CHANGE UP (ref 3.5–5.3)
POTASSIUM SERPL-SCNC: 4.6 MMOL/L — SIGNIFICANT CHANGE UP (ref 3.5–5.3)
PROT SERPL-MCNC: 5.8 G/DL — LOW (ref 6.6–8.7)
RBC # BLD: 3.2 M/UL — LOW (ref 3.8–5.2)
RBC # FLD: 12.7 % — SIGNIFICANT CHANGE UP (ref 10.3–14.5)
SODIUM SERPL-SCNC: 131 MMOL/L — LOW (ref 135–145)
SPECIMEN SOURCE: SIGNIFICANT CHANGE UP
SPECIMEN SOURCE: SIGNIFICANT CHANGE UP
WBC # BLD: 16.31 K/UL — HIGH (ref 3.8–10.5)
WBC # FLD AUTO: 16.31 K/UL — HIGH (ref 3.8–10.5)

## 2024-10-07 PROCEDURE — 99233 SBSQ HOSP IP/OBS HIGH 50: CPT

## 2024-10-07 RX ORDER — PREDNISONE 5 MG/1
40 TABLET ORAL DAILY
Refills: 0 | Status: DISCONTINUED | OUTPATIENT
Start: 2024-10-08 | End: 2024-10-09

## 2024-10-07 RX ORDER — QUETIAPINE FUMARATE 50 MG/1
300 TABLET, FILM COATED ORAL AT BEDTIME
Refills: 0 | Status: DISCONTINUED | OUTPATIENT
Start: 2024-10-07 | End: 2024-10-09

## 2024-10-07 RX ORDER — PREDNISONE 5 MG/1
40 TABLET ORAL DAILY
Refills: 0 | Status: DISCONTINUED | OUTPATIENT
Start: 2024-10-07 | End: 2024-10-07

## 2024-10-07 RX ADMIN — Medication 81 MILLIGRAM(S): at 11:28

## 2024-10-07 RX ADMIN — IPRATROPIUM BROMIDE AND ALBUTEROL SULFATE 3 MILLILITER(S): .5; 3 SOLUTION RESPIRATORY (INHALATION) at 03:30

## 2024-10-07 RX ADMIN — Medication 1 DOSE(S): at 11:29

## 2024-10-07 RX ADMIN — PANTOPRAZOLE SODIUM 40 MILLIGRAM(S): 40 TABLET, DELAYED RELEASE ORAL at 11:27

## 2024-10-07 RX ADMIN — Medication 5000 UNIT(S): at 07:32

## 2024-10-07 RX ADMIN — Medication 5000 UNIT(S): at 17:17

## 2024-10-07 RX ADMIN — METHYLPREDNISOLONE ACETATE 40 MILLIGRAM(S): 80 INJECTION, SUSPENSION INTRA-ARTICULAR; INTRALESIONAL; INTRAMUSCULAR; SOFT TISSUE at 05:32

## 2024-10-07 RX ADMIN — Medication 225 MILLIGRAM(S): at 12:53

## 2024-10-07 RX ADMIN — Medication 3.12 MILLIGRAM(S): at 17:17

## 2024-10-07 RX ADMIN — TIOTROPIUM BROMIDE INHALATION SPRAY 2 PUFF(S): 3.12 SPRAY, METERED RESPIRATORY (INHALATION) at 11:30

## 2024-10-07 RX ADMIN — Medication 50 MILLIGRAM(S): at 21:06

## 2024-10-07 RX ADMIN — Medication 1 MILLIGRAM(S): at 03:23

## 2024-10-07 RX ADMIN — ATORVASTATIN CALCIUM 40 MILLIGRAM(S): 10 TABLET, FILM COATED ORAL at 21:05

## 2024-10-07 RX ADMIN — IPRATROPIUM BROMIDE AND ALBUTEROL SULFATE 3 MILLILITER(S): .5; 3 SOLUTION RESPIRATORY (INHALATION) at 20:18

## 2024-10-07 RX ADMIN — Medication 3.12 MILLIGRAM(S): at 05:31

## 2024-10-07 RX ADMIN — GABAPENTIN 300 MILLIGRAM(S): 800 TABLET, FILM COATED ORAL at 11:29

## 2024-10-07 RX ADMIN — Medication 17 GRAM(S): at 11:27

## 2024-10-07 RX ADMIN — Medication 1 GRAM(S): at 05:31

## 2024-10-07 RX ADMIN — Medication 2 TABLET(S): at 21:05

## 2024-10-07 RX ADMIN — Medication 1 MILLIGRAM(S): at 11:29

## 2024-10-07 RX ADMIN — CHLORHEXIDINE GLUCONATE ORAL RINSE 1 APPLICATION(S): 1.2 SOLUTION DENTAL at 11:30

## 2024-10-07 RX ADMIN — Medication 5 MILLIGRAM(S): at 21:05

## 2024-10-07 RX ADMIN — IPRATROPIUM BROMIDE AND ALBUTEROL SULFATE 3 MILLILITER(S): .5; 3 SOLUTION RESPIRATORY (INHALATION) at 08:47

## 2024-10-07 RX ADMIN — Medication 1 DOSE(S): at 20:18

## 2024-10-07 RX ADMIN — QUETIAPINE FUMARATE 300 MILLIGRAM(S): 50 TABLET, FILM COATED ORAL at 21:05

## 2024-10-07 NOTE — CHART NOTE - NSCHARTNOTEFT_GEN_A_CORE
Source: Patient [ ]  Family [ ]   other [x ]chart/ staff    Current Diet: Diet, Regular:   1200mL Fluid Restriction ([id number]) (09-28-24 @ 16:34)    Patient reports [ ] nausea  [ ] vomiting [ ] diarrhea [ ] constipation  [ ]chewing problems [ ] swallowing issues  [ ] other: none at this time    PO intake:  < 50% [x ]   50-75%  [ x]   %  [ ]  other :    Source for PO intake [ ] Patient [ ] family [ ] chart [ x] staff [x ] other    Current Weight:   (9/24) 147.9#  (10/1) 140.8# RD bed scale weight   (10/7) 138# RD bed scale weight     % Weight Change: unclear accuracy of weights    Pertinent Medications: MEDICATIONS  (STANDING):  dextrose 5%. 1000 milliLiter(s) (100 mL/Hr) IV Continuous <Continuous>  glucagon  Injectable 1 milliGRAM(s) IntraMuscular once  methylPREDNISolone sodium succinate Injectable 40 milliGRAM(s) IV Push every 12 hours  pantoprazole  Injectable 40 milliGRAM(s) IV Push daily  senna 2 Tablet(s) Oral at bedtime  sodium chloride 1 Gram(s) Oral three times a day    MEDICATIONS  (PRN):  aluminum hydroxide/magnesium hydroxide/simethicone Suspension 30 milliLiter(s) Oral every 4 hours PRN Dyspepsia  bisacodyl 5 milliGRAM(s) Oral every 12 hours PRN Constipation  magnesium hydroxide Suspension 30 milliLiter(s) Oral daily PRN Constipation  ondansetron Injectable 4 milliGRAM(s) IV Push four times a day PRN Nausea and/or Vomiting    Pertinent Labs:   10-07 Na131 mmol/L[L] Glu 87 mg/dL K+ 4.6 mmol/L Cr  0.37 mg/dL[L] BUN 11.8 mg/dL Phos 2.9 mg/dL Alb 3.4 g/dL PAB n/a       Skin: multiple skin tears, 1+ trace edema- generalized     Nutrition focused physical exam conducted - found signs of malnutrition [x ]absent [ ]present    Subcutaneous fat loss: [ ] Orbital fat pads region, [ ]Buccal fat region, [ ]Triceps region,  [ ]Ribs region    Muscle wasting: [ ]Temples region, [ ]Clavicle region, [ ]Shoulder region, [ ]Scapula region, [ ]Interosseous region,  [ ]thigh region, [ ]Calf region    Estimated Needs:   [x ] no change since previous assessment  [ ] recalculated:     Current Nutrition Diagnosis: Increased Nutrient Needs (energy, protein) related to increased physiological demand of nutrients as evidenced by acute respiratory failure requiring intubation, now extubated    73 yo F with a PMHX of CAD, HFpEF, HTN, HLD, COPD (on O2 prn), recent admission for NSTEMI (s/p C without intervention), discharged 9/19 presents to the ED on 09/22 with fever, right upper abdominal pain, lower abdominal pain, nausea, vomiting, and dyspnea. Initially abdmitted to the floor with hyponatremia and prerenal MADHURI. MICU consulted 09/23 during rapid response as patient was obtunded with agonal breathing. ABG showed severe hypercapnia/respiratory acidosis. Was emergently intubated and brought into MICU. Extubated on 09/24. On 09/26, patient refused bipap on the floor. ABG demonstrated respiratory acidosis (pH 7.2, CO2 73, O2 117, HCO3 28) and has been treated for acute hypercarbic respiratory failure. pt has been called for RRT with poor mentation and seen obtunded by medical floor staff and accessory muscle usage, found with respiratory acidosis on 5L NC and placed on bipap, MICU consulted for obtunded state and acute hypercarbic respiratory failure. Last BM documented 10/4. PO intake has decreased since last assessment, poor to fair. Will continue to monitor. RD to remain available.     Recommendations:   Continue current diet as tolerated  Monitor po intake / need for oral nutrition supplementation  Monitor nutrition related labs, weight trends and BMs    Monitoring and Evaluation:   [x ] PO intake [x ] Tolerance to diet prescription [X] Weights  [X] Follow up per protocol [X] Labs: chem 8, phos, mg, BGM

## 2024-10-07 NOTE — PROGRESS NOTE ADULT - SUBJECTIVE AND OBJECTIVE BOX
NEPHROLOGY INTERVAL HPI/OVERNIGHT EVENTS:  pt essentially the same   no acute distress    MEDICATIONS  (STANDING):  albuterol/ipratropium for Nebulization 3 milliLiter(s) Nebulizer every 6 hours  aspirin  chewable 81 milliGRAM(s) Oral daily  atorvastatin 40 milliGRAM(s) Oral at bedtime  carvedilol 3.125 milliGRAM(s) Oral every 12 hours  chlorhexidine 2% Cloths 1 Application(s) Topical daily  dextrose 5%. 1000 milliLiter(s) (100 mL/Hr) IV Continuous <Continuous>  dextrose 5%. 1000 milliLiter(s) (50 mL/Hr) IV Continuous <Continuous>  dextrose 50% Injectable 25 Gram(s) IV Push once  dextrose 50% Injectable 12.5 Gram(s) IV Push once  dextrose 50% Injectable 25 Gram(s) IV Push once  dextrose Oral Gel 15 Gram(s) Oral once  fluticasone propionate/ salmeterol 500-50 MICROgram(s) Diskus 1 Dose(s) Inhalation two times a day  gabapentin 300 milliGRAM(s) Oral daily  glucagon  Injectable 1 milliGRAM(s) IntraMuscular once  heparin   Injectable 5000 Unit(s) SubCutaneous every 8 hours  methylPREDNISolone sodium succinate Injectable 40 milliGRAM(s) IV Push daily  pantoprazole  Injectable 40 milliGRAM(s) IV Push daily  polyethylene glycol 3350 17 Gram(s) Oral daily  senna 2 Tablet(s) Oral at bedtime  sodium chloride 1 Gram(s) Oral two times a day  tiotropium 2.5 MICROgram(s) Inhaler 2 Puff(s) Inhalation daily  urea Oral Powder 15 Gram(s) Oral daily  venlafaxine  milliGRAM(s) Oral daily  zolpidem 5 milliGRAM(s) Oral at bedtime    MEDICATIONS  (PRN):  acetaminophen     Tablet .. 650 milliGRAM(s) Oral every 6 hours PRN Temp greater or equal to 38C (100.4F), Mild Pain (1 - 3), Moderate Pain (4 - 6)  aluminum hydroxide/magnesium hydroxide/simethicone Suspension 30 milliLiter(s) Oral every 4 hours PRN Dyspepsia  bisacodyl 5 milliGRAM(s) Oral every 12 hours PRN Constipation  LORazepam     Tablet 1 milliGRAM(s) Oral every 6 hours PRN Anxiety  magnesium hydroxide Suspension 30 milliLiter(s) Oral daily PRN Constipation  ondansetron Injectable 4 milliGRAM(s) IV Push four times a day PRN Nausea and/or Vomiting      Allergies    No Known Allergies    Intolerances            Vital Signs Last 24 Hrs  T(C): 36.5 (07 Oct 2024 04:37), Max: 37 (06 Oct 2024 20:00)  T(F): 97.7 (07 Oct 2024 04:37), Max: 98.6 (06 Oct 2024 20:00)  HR: 100 (07 Oct 2024 04:37) (72 - 100)  BP: 144/83 (07 Oct 2024 04:37) (115/75 - 144/83)  BP(mean): --  RR: 19 (07 Oct 2024 04:37) (17 - 19)  SpO2: 100% (07 Oct 2024 04:37) (95% - 100%)    Parameters below as of 07 Oct 2024 03:34  Patient On (Oxygen Delivery Method): nasal cannula        PHYSICAL EXAM:  GENERAL: Languid  HEENT: Moist MMs  NECK: Supple, No JVD  NERVOUS SYSTEM: Alert & Oriented X3  CHEST/LUNG: Clear bilaterally  HEART: Regular rate and rhythm; No rub  ABDOMEN: Soft, Nontender, +BS  EXTREMITIES: Tr dependent edema    LABS:                        9.6    16.31 )-----------( 337      ( 07 Oct 2024 04:24 )             29.1     10-07    131[L]  |  92[L]  |  11.8  ----------------------------<  87  4.6   |  27.0  |  0.37[L]    Ca    8.3[L]      07 Oct 2024 04:24  Phos  2.9     10-07  Mg     2.0     10-07    TPro  5.8[L]  /  Alb  3.4  /  TBili  <0.2[L]  /  DBili  x   /  AST  20  /  ALT  27  /  AlkPhos  56  10-07      Urinalysis Basic - ( 07 Oct 2024 04:24 )    Color: x / Appearance: x / SG: x / pH: x  Gluc: 87 mg/dL / Ketone: x  / Bili: x / Urobili: x   Blood: x / Protein: x / Nitrite: x   Leuk Esterase: x / RBC: x / WBC x   Sq Epi: x / Non Sq Epi: x / Bacteria: x      Magnesium: 2.0 mg/dL (10-07 @ 04:24)  Phosphorus: 2.9 mg/dL (10-07 @ 04:24)      RADIOLOGY & ADDITIONAL TESTS:

## 2024-10-07 NOTE — CHART NOTE - NSCHARTNOTESELECT_GEN_ALL_CORE
Critical Care/Event Note
Event Note
Event Note
Nutrition Services
iStop/Event Note
Event Note
Event Note
Nutrition Services
Transfer Note

## 2024-10-07 NOTE — PROGRESS NOTE ADULT - SUBJECTIVE AND OBJECTIVE BOX
[facility] Division of Hospital Medicine  Crystal Singh MD   I'm reachable on [employer] Teams      Patient is a 74y old  Female who presents with a chief complaint of Acute hypercarbic respiratory failure (28 Sep 2024 17:43)      SUBJECTIVE / OVERNIGHT EVENTS:   Patient was seen and examined during rounds  The patient reports feeling better. She reports improvement in shortness of breath. She denied any chest pain. She felt anxious on BiPAP last night. She required Ativan earlier.   The patient is requiring ed Ativan as scheduled. Discussed with the patient in detail. Discussed risks and benefits. After discussion, started on home dose of ativan nightly p.r.n. Restarted home Seroquel and trazodone       12 points ROS negative except above    MEDICATIONS  (STANDING):  albuterol/ipratropium for Nebulization 3 milliLiter(s) Nebulizer every 6 hours  aspirin  chewable 81 milliGRAM(s) Oral daily  atorvastatin 40 milliGRAM(s) Oral at bedtime  carvedilol 3.125 milliGRAM(s) Oral every 12 hours  chlorhexidine 2% Cloths 1 Application(s) Topical daily  dextrose 5%. 1000 milliLiter(s) (100 mL/Hr) IV Continuous <Continuous>  dextrose 5%. 1000 milliLiter(s) (50 mL/Hr) IV Continuous <Continuous>  dextrose 50% Injectable 25 Gram(s) IV Push once  dextrose 50% Injectable 25 Gram(s) IV Push once  dextrose 50% Injectable 12.5 Gram(s) IV Push once  dextrose Oral Gel 15 Gram(s) Oral once  fluticasone propionate/ salmeterol 500-50 MICROgram(s) Diskus 1 Dose(s) Inhalation two times a day  gabapentin 300 milliGRAM(s) Oral daily  glucagon  Injectable 1 milliGRAM(s) IntraMuscular once  heparin   Injectable 5000 Unit(s) SubCutaneous every 8 hours  pantoprazole  Injectable 40 milliGRAM(s) IV Push daily  polyethylene glycol 3350 17 Gram(s) Oral daily  QUEtiapine 300 milliGRAM(s) Oral at bedtime  senna 2 Tablet(s) Oral at bedtime  tiotropium 2.5 MICROgram(s) Inhaler 2 Puff(s) Inhalation daily  traZODone 50 milliGRAM(s) Oral at bedtime  venlafaxine  milliGRAM(s) Oral daily  zolpidem 5 milliGRAM(s) Oral at bedtime    MEDICATIONS  (PRN):  acetaminophen     Tablet .. 650 milliGRAM(s) Oral every 6 hours PRN Temp greater or equal to 38C (100.4F), Mild Pain (1 - 3), Moderate Pain (4 - 6)  aluminum hydroxide/magnesium hydroxide/simethicone Suspension 30 milliLiter(s) Oral every 4 hours PRN Dyspepsia  bisacodyl 5 milliGRAM(s) Oral every 12 hours PRN Constipation  LORazepam     Tablet 1 milliGRAM(s) Oral at bedtime PRN Anxiety  magnesium hydroxide Suspension 30 milliLiter(s) Oral daily PRN Constipation  ondansetron Injectable 4 milliGRAM(s) IV Push four times a day PRN Nausea and/or Vomiting        I&O's Summary    06 Oct 2024 07:01  -  07 Oct 2024 07:00  --------------------------------------------------------  IN: 520 mL / OUT: 1000 mL / NET: -480 mL        PHYSICAL EXAM:  Vital Signs Last 24 Hrs  T(C): 37.1 (07 Oct 2024 10:23), Max: 37.1 (07 Oct 2024 10:23)  T(F): 98.7 (07 Oct 2024 10:23), Max: 98.7 (07 Oct 2024 10:23)  HR: 98 (07 Oct 2024 10:23) (72 - 100)  BP: 125/74 (07 Oct 2024 10:23) (117/74 - 144/83)  BP(mean): --  RR: 19 (07 Oct 2024 10:23) (17 - 19)  SpO2: 100% (07 Oct 2024 10:23) (95% - 100%)    Parameters below as of 07 Oct 2024 03:34  Patient On (Oxygen Delivery Method): nasal cannula        CONSTITUTIONAL: NAD, Not in acute distress  EYES:  EOMI, conjunctiva and sclera clear  ENMT: , neck supple , non-tender  RESPIRATORY: Normal respiratory effort; lungs are clear to auscultation bilaterally  CARDIOVASCULAR: S1S2 present  ABDOMEN: soft. bowel sound present  MUSCLOSKELETAL: ; no clubbing or cyanosis of digits;   PSYCH: A+O to person, place, and time; affect appropriate  NEUROLOGY: CN, motor and sensory intact   SKIN: No rashes; no palpable lesions  Extremity: No bilateral edema      LABS:                        9.6    16.31 )-----------( 337      ( 07 Oct 2024 04:24 )             29.1     10-07    131[L]  |  92[L]  |  11.8  ----------------------------<  87  4.6   |  27.0  |  0.37[L]    Ca    8.3[L]      07 Oct 2024 04:24  Phos  2.9     10-07  Mg     2.0     10-07    TPro  5.8[L]  /  Alb  3.4  /  TBili  <0.2[L]  /  DBili  x   /  AST  20  /  ALT  27  /  AlkPhos  56  10-07          Urinalysis Basic - ( 07 Oct 2024 04:24 )    Color: x / Appearance: x / SG: x / pH: x  Gluc: 87 mg/dL / Ketone: x  / Bili: x / Urobili: x   Blood: x / Protein: x / Nitrite: x   Leuk Esterase: x / RBC: x / WBC x   Sq Epi: x / Non Sq Epi: x / Bacteria: x        CAPILLARY BLOOD GLUCOSE      POCT Blood Glucose.: 189 mg/dL (07 Oct 2024 11:26)  POCT Blood Glucose.: 97 mg/dL (07 Oct 2024 05:29)  POCT Blood Glucose.: 198 mg/dL (06 Oct 2024 21:46)  POCT Blood Glucose.: 134 mg/dL (06 Oct 2024 17:00)      Labs reviewed:    RADIOLOGY & ADDITIONAL TESTS:  Imaging Personally Reviewed:  Electrocardiogram Personally Reviewed:

## 2024-10-07 NOTE — PROGRESS NOTE ADULT - ASSESSMENT
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

## 2024-10-07 NOTE — PROGRESS NOTE ADULT - ASSESSMENT
73 y/o F with a h/o CAD (s/p PCI), HFpEF, HTN, HLD, COPD (on home O2 PRN), recent admission for chest pain s/p C with nonobstructive CAD- discharged home 9/19, now readmitted on 9/22 with complaints abd pain nausea vomiting , hyponatremia, and acute hypercapnic respiratory failure. Upgraded to SDU 10/1 for continuous BIPAP need. She was evaluated by Pulmonologist and she was found to have right hemidiaphragm paralysis. She was treated with IV Solu-Medrol and continuous BiPAP. 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 Completed broad spectrum Antibiotic  prior to discharge and during last admission.   -Started on continuous BiPAP. Gradually wean down to 4 liter nasal cannula during the day and BiPAP at night (home dose)  - pulmonary following. Advising that patient accumulates co2 rapidly and requires BIPAP at night  - IV solumedrol changed to oral prednsione  -Poor prognosis. Pulmonology and palliative team is following  -Repeat ABG suggestive of metabolic alkalosis.   -Case management will follow up about home NIV arrangement      #Hyponatremia - improving  #History of chronic SIADH  #Hyperkalemia - resolved  - monitor  - c/w fluid restriction   - nephro following  - c/w urea powder and salt tabs. the patient intermittently refusing urea powder due to bad taste.   - trend BMP    CAD s/p PCI  HFpEF  - cont home meds aspirin, carvedilol, statin    DVT ppx: subq heparin   Dispo:Remain on nasal cannula and BiPAP at night. possible discharge in 1-2 days after PT evaluation and NIV arrangement   GOC - full code    Discussed with patient's son on phone

## 2024-10-07 NOTE — PROGRESS NOTE ADULT - ASSESSMENT
74F never smoker with hx of CAD s/p PCI, HTN, HLD, asthma, HFpEF, R hemidiaphragm elevation, recent admission 9/17-9/19 for NSTEMI with nonobstructive CAD on cath presented 9/21 for abdominal pain w/ episode of AMS/aphasia with stroke w/u neg but noted to be febrile on started on empiric abx with course complicated by acute on chronic hyponatremia, acute hypercapnic respiratory failure requiring intubation 9/23-9/24 with recurrent episodes of hypercapnia since due to NIV non-compliance treated with empiric steroids/abx with interval improvement     Acute on chronic hypoxic/hypercapnic respiratory failure in setting of R hemidiaphragm elevation/asthma exacerbation     74F never smoker with hx of CAD s/p PCI, HTN, HLD, severe persistent asthma, RA, mood disorder, HFpEF, R hemidiaphragm elevation, recent admission 9/17-9/19 for NSTEMI with nonobstructive CAD on cath presented 9/21 for abdominal pain w/ episode of AMS/aphasia with stroke w/u neg but noted to be febrile on started on empiric abx with course complicated by acute on chronic hyponatremia, acute hypercapnic respiratory failure requiring intubation 9/23-9/24 with recurrent episodes of hypercapnia since due to NIV non-compliance treated with empiric steroids/abx with interval improvement     Acute on chronic hypoxic/hypercapnic respiratory failure in setting of R hemidiaphragm elevation/asthma exacerbation/atelectasis/possible superimposed PNA      c/w nocturnal NIV; compliance stressed   wean supplemental O2 as tolerated for goal >90-92%  taper prednisone 10mg q2 days   c/w Advair/Duoneb   resume Trelegy on discharge   completed course of abx previously; afebrile recently with most recent procal neg    OOB/ambulate   incentive spirometry   prior CT chest angio/duplex neg   c/w DVT ppx   caution with sedating medications   repeat CT chest as outpatient for f/u if remains stable   f/u with Dr. Pederson on discharge      74F never smoker with hx of CAD s/p PCI, HTN, HLD, severe persistent asthma, RA, mood disorder, HFpEF, R hemidiaphragm elevation, recent admission 9/17-9/19 for NSTEMI with nonobstructive CAD on cath presented 9/21 for abdominal pain w/ episode of AMS/aphasia with stroke w/u neg but noted to be febrile on started on empiric abx with course complicated by acute on chronic hyponatremia, acute hypercapnic respiratory failure requiring intubation 9/23-9/24 with recurrent episodes of hypercapnia since due to NIV non-compliance treated with empiric steroids/abx with interval improvement     Acute on chronic hypoxic/hypercapnic respiratory failure in setting of R hemidiaphragm elevation/asthma exacerbation/atelectasis/possible superimposed PNA      c/w nocturnal NIV; compliance stressed   wean supplemental O2 as tolerated for goal >90-92%  taper prednisone 10mg q2 days   c/w Advair/Duoneb   resume Trelegy on discharge   completed course of abx previously; afebrile recently with most recent procal neg    IgE normal, no significant eosinophilia   allergy panel neg   OOB/ambulate   incentive spirometry   prior CT chest angio/duplex neg   c/w DVT ppx   caution with sedating medications   repeat CT chest as outpatient for f/u if remains stable   f/u with Dr. Pederson on discharge

## 2024-10-07 NOTE — PROGRESS NOTE ADULT - TIME BILLING
reviewing chart notes, labs, independent review of imaging, outpatient notes, bedside assessment and documentation

## 2024-10-07 NOTE — PROVIDER CONTACT NOTE (MEDICATION) - SITUATION
pt. woke from sleep wanting to take bipap off, stating she was anxious & wanted to take her PRN Ativan. PRN ativan is not due until 7099

## 2024-10-07 NOTE — PROGRESS NOTE ADULT - SUBJECTIVE AND OBJECTIVE BOX
Patient is a 74y old  Female who presents with a chief complaint of Acute hypercarbic respiratory failure (28 Sep 2024 17:43)    Late note entry; seen 10/ afternoon  Interval hx:  Pt reports her shortness of breath is improved. Denies cough. Denies chest pain. Jayce wheezing. Reports compliance with NIV. Afebrile. Remains on 4L sating well.     PAST MEDICAL & SURGICAL HISTORY:  NICM (nonischemic cardiomyopathy)      HTN (hypertension)      Rheumatoid arthritis      Chronic back pain      HLD (hyperlipidemia)      Chronic obstructive asthma      Cellulitis      S/P       S/P hysterectomy      Medications:    carvedilol 3.125 milliGRAM(s) Oral every 12 hours    albuterol/ipratropium for Nebulization 3 milliLiter(s) Nebulizer every 6 hours  fluticasone propionate/ salmeterol 500-50 MICROgram(s) Diskus 1 Dose(s) Inhalation two times a day  tiotropium 2.5 MICROgram(s) Inhaler 2 Puff(s) Inhalation daily    acetaminophen     Tablet .. 650 milliGRAM(s) Oral every 6 hours PRN  gabapentin 300 milliGRAM(s) Oral daily  LORazepam     Tablet 1 milliGRAM(s) Oral at bedtime PRN  ondansetron Injectable 4 milliGRAM(s) IV Push four times a day PRN  QUEtiapine 300 milliGRAM(s) Oral at bedtime  traZODone 50 milliGRAM(s) Oral at bedtime  venlafaxine  milliGRAM(s) Oral daily  zolpidem 5 milliGRAM(s) Oral at bedtime      aspirin  chewable 81 milliGRAM(s) Oral daily  heparin   Injectable 5000 Unit(s) SubCutaneous every 8 hours    aluminum hydroxide/magnesium hydroxide/simethicone Suspension 30 milliLiter(s) Oral every 4 hours PRN  bisacodyl 5 milliGRAM(s) Oral every 12 hours PRN  magnesium hydroxide Suspension 30 milliLiter(s) Oral daily PRN  pantoprazole  Injectable 40 milliGRAM(s) IV Push daily  polyethylene glycol 3350 17 Gram(s) Oral daily  senna 2 Tablet(s) Oral at bedtime      atorvastatin 40 milliGRAM(s) Oral at bedtime  dextrose 50% Injectable 25 Gram(s) IV Push once  dextrose 50% Injectable 12.5 Gram(s) IV Push once  dextrose 50% Injectable 25 Gram(s) IV Push once  dextrose Oral Gel 15 Gram(s) Oral once  glucagon  Injectable 1 milliGRAM(s) IntraMuscular once  predniSONE   Tablet 40 milliGRAM(s) Oral daily    dextrose 5%. 1000 milliLiter(s) IV Continuous <Continuous>  dextrose 5%. 1000 milliLiter(s) IV Continuous <Continuous>      chlorhexidine 2% Cloths 1 Application(s) Topical daily            ICU Vital Signs Last 24 Hrs  T(C): 36.6 (08 Oct 2024 04:33), Max: 37.4 (07 Oct 2024 20:10)  T(F): 97.9 (08 Oct 2024 04:33), Max: 99.3 (07 Oct 2024 20:10)  HR: 102 (08 Oct 2024 04:33) (86 - 121)  BP: 108/67 (08 Oct 2024 04:33) (108/67 - 146/76)  BP(mean): --  ABP: --  ABP(mean): --  RR: 18 (08 Oct 2024 04:33) (18 - 19)  SpO2: 100% (08 Oct 2024 04:33) (97% - 100%)    O2 Parameters below as of 08 Oct 2024 03:34  Patient On (Oxygen Delivery Method): nasal cannula w/ humidification, 4L            ABG - ( 06 Oct 2024 11:10 )  pH, Arterial: 7.480 pH, Blood: x     /  pCO2: 49    /  pO2: 61    / HCO3: 36    / Base Excess: 13.0  /  SaO2: 94.9                I&O's Detail    06 Oct 2024 07:01  -  07 Oct 2024 07:00  --------------------------------------------------------  IN:    Oral Fluid: 520 mL  Total IN: 520 mL    OUT:    Voided (mL): 1000 mL  Total OUT: 1000 mL    Total NET: -480 mL      07 Oct 2024 07:01  -  08 Oct 2024 06:44  --------------------------------------------------------  IN:  Total IN: 0 mL    OUT:    Voided (mL): 2000 mL  Total OUT: 2000 mL    Total NET: -2000 mL            LABS:                        10.1   11.33 )-----------( 306      ( 08 Oct 2024 04:32 )             31.4     10    137  |  97  |  12.9  ----------------------------<  104[H]  4.0   |  28.0  |  0.55    Ca    9.1      08 Oct 2024 04:32  Phos  3.6     10-08  Mg     2.1     10-08    TPro  5.8[L]  /  Alb  3.4  /  TBili  <0.2[L]  /  DBili  x   /  AST  20  /  ALT  27  /  AlkPhos  56  10          CAPILLARY BLOOD GLUCOSE      POCT Blood Glucose.: 124 mg/dL (07 Oct 2024 17:21)      Urinalysis Basic - ( 08 Oct 2024 04:32 )    Color: x / Appearance: x / SG: x / pH: x  Gluc: 104 mg/dL / Ketone: x  / Bili: x / Urobili: x   Blood: x / Protein: x / Nitrite: x   Leuk Esterase: x / RBC: x / WBC x   Sq Epi: x / Non Sq Epi: x / Bacteria: x      CULTURES:  Rapid RVP Result: NotDetec (10-02 @ 17:47)  Culture Results:   No growth at 5 days (10-02 @ 12:21)  Culture Results:   No growth at 5 days (10-02 @ 12:11)      Physical Examination:    General: awake, alert, in NAD     HEENT: NC/AT, anicteric, MMM    PULM: diminished R lower/mid lungs, no accessory muscle use     NECK: Supple, trachea midline    CVS: S1S2, RRR    ABD: Soft, nondistended, nontender, normoactive bowel sounds    EXT: No edema, nontender    SKIN: Warm and well perfused, no rashes noted    NEURO: Alert, oriented, interactive, nonfocal    ROS: negative except as per HPI     RADIOLOGY:   < from: Xray Chest 1 View- PORTABLE-Urgent (Xray Chest 1 View- PORTABLE-Urgent .) (10.02.24 @ 12:40) >  INTERPRETATION:  AP chest on 2024 at 12:05 PM. 2 images.   Patient is hypoxic.    Heart magnified by technique. Elevated right hemidiaphragm again noted.    There is a persistent atelectatic infiltrate at the right base but there   is an increasing lingular infiltrate.    IMPRESSION: Persistent right base atelectatic infiltrate. Increasing   lingular infiltrate.    < end of copied text >      < from: US Duplex Venous Lower Ext Complete, Bilateral (24 @ 12:04) >    FINDINGS:    RIGHT:  Normal compressibility of the RIGHT common femoral, femoral and popliteal   veins.  Doppler examination shows normal spontaneous and phasic flow.  No RIGHT calf vein thrombosis is detected.    LEFT:  Normal compressibility of the LEFT common femoral, femoral and popliteal   veins.  Doppler examination shows normal spontaneous and phasic flow.  No LEFT calf vein thrombosis is detected.    < end of copied text >    < from: CT Angio Chest PE Protocol w/ IV Cont (24 @ 03:23) >  FINDINGS:  CHEST:  LUNGS AND LARGE AIRWAYS: Patent central airways. Redemonstrated elevated   right hemidiaphragm. No pulmonary nodules, masses or consolidation.   Redemonstrated complete atelectasis of the right middle lobe.   Redemonstrated partial atelectasis of the right lower lobe. Left basilar   subsegmental atelectasis. Left upper lobe linear atelectasis. Biapical   scarring.  PLEURA: No pleural effusion.  VESSELS: Limited evaluation of the segmental and subsegmental pulmonary   arteries secondary to motion artifact. No pulmonary embolism in the main   or lobar pulmonary arteries. Aberrant right subclavian artery.  HEART: Heart size is normal. No pericardial effusion.  MEDIASTINUM AND ALE: No lymphadenopathy.  CHEST WALL AND LOWER NECK: Old fracture of the inferior tip of the right   scapula. Moderate to severe bilateral glenohumeral osteoarthritis.    ABDOMEN AND PELVIS:  LIVER: Within normal limits.  BILE DUCTS: Normal caliber.  GALLBLADDER: Nonspecific distention.  SPLEEN: Within normal limits.  PANCREAS: Within normal limits.  ADRENALS: Within normal limits.  KIDNEYS/URETERS: Right renal cyst. Right renal subcentimeter hypodense   lesion too small for accurate characterization. No hydronephrosis. Mild   nonspecific bilateral perinephric fat stranding.    BLADDER: Within normal limits.  REPRODUCTIVE ORGANS: Hysterectomy. No adnexal masses.    BOWEL: No bowel obstruction or inflammation. Colonic diverticulosis   without diverticulitis. Appendix is normal.  PERITONEUM/RETROPERITONEUM: Within normal limits.  VESSELS: Within normal limits.  LYMPH NODES: No lymphadenopathy.  ABDOMINAL WALL: Anterior lower abdominal/pelvic wall scar.  BONES: 6 lumbar type vertebral bodies. L5-S1 posterior spinal fusion. L5   and L6 laminectomies. Degenerative changes of the spine. Age   indeterminate, but likely chronic, superior endplate compression fracture   deformities of the L1 and L2 vertebral bodies.    < end of copied text >

## 2024-10-08 ENCOUNTER — TRANSCRIPTION ENCOUNTER (OUTPATIENT)
Age: 74
End: 2024-10-08

## 2024-10-08 LAB
ANION GAP SERPL CALC-SCNC: 13 MMOL/L — SIGNIFICANT CHANGE UP (ref 5–17)
BUN SERPL-MCNC: 12.9 MG/DL — SIGNIFICANT CHANGE UP (ref 8–20)
CALCIUM SERPL-MCNC: 9.1 MG/DL — SIGNIFICANT CHANGE UP (ref 8.4–10.5)
CHLORIDE SERPL-SCNC: 97 MMOL/L — SIGNIFICANT CHANGE UP (ref 96–108)
CO2 SERPL-SCNC: 28 MMOL/L — SIGNIFICANT CHANGE UP (ref 22–29)
CREAT SERPL-MCNC: 0.55 MG/DL — SIGNIFICANT CHANGE UP (ref 0.5–1.3)
EGFR: 96 ML/MIN/1.73M2 — SIGNIFICANT CHANGE UP
GLUCOSE BLDC GLUCOMTR-MCNC: 105 MG/DL — HIGH (ref 70–99)
GLUCOSE BLDC GLUCOMTR-MCNC: 116 MG/DL — HIGH (ref 70–99)
GLUCOSE SERPL-MCNC: 104 MG/DL — HIGH (ref 70–99)
HCT VFR BLD CALC: 31.4 % — LOW (ref 34.5–45)
HGB BLD-MCNC: 10.1 G/DL — LOW (ref 11.5–15.5)
MAGNESIUM SERPL-MCNC: 2.1 MG/DL — SIGNIFICANT CHANGE UP (ref 1.8–2.6)
MCHC RBC-ENTMCNC: 29.4 PG — SIGNIFICANT CHANGE UP (ref 27–34)
MCHC RBC-ENTMCNC: 32.2 GM/DL — SIGNIFICANT CHANGE UP (ref 32–36)
MCV RBC AUTO: 91.5 FL — SIGNIFICANT CHANGE UP (ref 80–100)
PHOSPHATE SERPL-MCNC: 3.6 MG/DL — SIGNIFICANT CHANGE UP (ref 2.4–4.7)
PLATELET # BLD AUTO: 306 K/UL — SIGNIFICANT CHANGE UP (ref 150–400)
POTASSIUM SERPL-MCNC: 4 MMOL/L — SIGNIFICANT CHANGE UP (ref 3.5–5.3)
POTASSIUM SERPL-SCNC: 4 MMOL/L — SIGNIFICANT CHANGE UP (ref 3.5–5.3)
RBC # BLD: 3.43 M/UL — LOW (ref 3.8–5.2)
RBC # FLD: 13.2 % — SIGNIFICANT CHANGE UP (ref 10.3–14.5)
SODIUM SERPL-SCNC: 137 MMOL/L — SIGNIFICANT CHANGE UP (ref 135–145)
WBC # BLD: 11.33 K/UL — HIGH (ref 3.8–10.5)
WBC # FLD AUTO: 11.33 K/UL — HIGH (ref 3.8–10.5)

## 2024-10-08 PROCEDURE — 99232 SBSQ HOSP IP/OBS MODERATE 35: CPT

## 2024-10-08 RX ORDER — PREDNISONE 5 MG/1
4 TABLET ORAL
Qty: 30 | Refills: 0
Start: 2024-10-08 | End: 2024-10-19

## 2024-10-08 RX ORDER — ALPRAZOLAM 0.5 MG/1
0.5 TABLET ORAL
Refills: 0 | Status: DISCONTINUED | OUTPATIENT
Start: 2024-10-08 | End: 2024-10-09

## 2024-10-08 RX ORDER — SODIUM CHLORIDE 0.9 % (FLUSH) 0.9 %
1 SYRINGE (ML) INJECTION
Qty: 30 | Refills: 0
Start: 2024-10-08 | End: 2024-11-06

## 2024-10-08 RX ADMIN — Medication 1 DOSE(S): at 09:21

## 2024-10-08 RX ADMIN — ATORVASTATIN CALCIUM 40 MILLIGRAM(S): 10 TABLET, FILM COATED ORAL at 22:00

## 2024-10-08 RX ADMIN — Medication 5000 UNIT(S): at 17:00

## 2024-10-08 RX ADMIN — PREDNISONE 40 MILLIGRAM(S): 5 TABLET ORAL at 05:09

## 2024-10-08 RX ADMIN — IPRATROPIUM BROMIDE AND ALBUTEROL SULFATE 3 MILLILITER(S): .5; 3 SOLUTION RESPIRATORY (INHALATION) at 15:01

## 2024-10-08 RX ADMIN — IPRATROPIUM BROMIDE AND ALBUTEROL SULFATE 3 MILLILITER(S): .5; 3 SOLUTION RESPIRATORY (INHALATION) at 09:19

## 2024-10-08 RX ADMIN — Medication 5000 UNIT(S): at 07:17

## 2024-10-08 RX ADMIN — Medication 2 TABLET(S): at 22:00

## 2024-10-08 RX ADMIN — Medication 5000 UNIT(S): at 00:08

## 2024-10-08 RX ADMIN — Medication 650 MILLIGRAM(S): at 05:10

## 2024-10-08 RX ADMIN — Medication 225 MILLIGRAM(S): at 11:20

## 2024-10-08 RX ADMIN — TIOTROPIUM BROMIDE INHALATION SPRAY 2 PUFF(S): 3.12 SPRAY, METERED RESPIRATORY (INHALATION) at 09:21

## 2024-10-08 RX ADMIN — Medication 650 MILLIGRAM(S): at 06:10

## 2024-10-08 RX ADMIN — Medication 50 MILLIGRAM(S): at 22:00

## 2024-10-08 RX ADMIN — CHLORHEXIDINE GLUCONATE ORAL RINSE 1 APPLICATION(S): 1.2 SOLUTION DENTAL at 11:20

## 2024-10-08 RX ADMIN — Medication 3.12 MILLIGRAM(S): at 17:00

## 2024-10-08 RX ADMIN — Medication 81 MILLIGRAM(S): at 11:20

## 2024-10-08 RX ADMIN — PANTOPRAZOLE SODIUM 40 MILLIGRAM(S): 40 TABLET, DELAYED RELEASE ORAL at 11:20

## 2024-10-08 RX ADMIN — Medication 3.12 MILLIGRAM(S): at 05:09

## 2024-10-08 RX ADMIN — QUETIAPINE FUMARATE 300 MILLIGRAM(S): 50 TABLET, FILM COATED ORAL at 22:00

## 2024-10-08 RX ADMIN — Medication 1 DOSE(S): at 21:41

## 2024-10-08 RX ADMIN — GABAPENTIN 300 MILLIGRAM(S): 800 TABLET, FILM COATED ORAL at 11:20

## 2024-10-08 RX ADMIN — ALPRAZOLAM 0.5 MILLIGRAM(S): 0.5 TABLET ORAL at 15:55

## 2024-10-08 RX ADMIN — IPRATROPIUM BROMIDE AND ALBUTEROL SULFATE 3 MILLILITER(S): .5; 3 SOLUTION RESPIRATORY (INHALATION) at 03:15

## 2024-10-08 RX ADMIN — IPRATROPIUM BROMIDE AND ALBUTEROL SULFATE 3 MILLILITER(S): .5; 3 SOLUTION RESPIRATORY (INHALATION) at 21:39

## 2024-10-08 RX ADMIN — Medication 1 MILLIGRAM(S): at 00:05

## 2024-10-08 RX ADMIN — Medication 17 GRAM(S): at 11:20

## 2024-10-08 RX ADMIN — Medication 5 MILLIGRAM(S): at 22:00

## 2024-10-08 NOTE — DISCHARGE NOTE PROVIDER - ATTENDING DISCHARGE PHYSICAL EXAMINATION:
Vital Signs Last 24 Hrs  T(C): 37.1 (08 Oct 2024 12:05), Max: 37.4 (07 Oct 2024 20:10)  T(F): 98.8 (08 Oct 2024 12:05), Max: 99.3 (07 Oct 2024 20:10)  HR: 96 (08 Oct 2024 12:05) (86 - 121)  BP: 134/65 (08 Oct 2024 12:05) (108/67 - 146/76)  BP(mean): --  RR: 18 (08 Oct 2024 12:05) (18 - 19)  SpO2: 97% (08 Oct 2024 12:05) (97% - 100%)    Parameters below as of 08 Oct 2024 09:28  Patient On (Oxygen Delivery Method): nasal cannula w/ humidification    CONSTITUTIONAL: NAD, Not in acute distress  EYES:  EOMI, conjunctiva and sclera clear  ENMT: , neck supple , non-tender  RESPIRATORY: Normal respiratory effort; lungs are clear to auscultation bilaterally  CARDIOVASCULAR: S1S2 present  ABDOMEN: soft. bowel sound present  MUSCLOSKELETAL: ; no clubbing or cyanosis of digits;   PSYCH: A+O to person, place, and time; affect appropriate  NEUROLOGY: CN, motor and sensory intact   SKIN: No rashes; no palpable lesions  Extremity: No bilateral edema   Vital Signs Last 24 Hrs  T(C): 36.8 (09 Oct 2024 10:27), Max: 37.1 (08 Oct 2024 16:36)  T(F): 98.2 (09 Oct 2024 10:27), Max: 98.8 (09 Oct 2024 04:37)  HR: 105 (09 Oct 2024 14:47) (78 - 118)  BP: 102/67 (09 Oct 2024 10:27) (102/67 - 134/76)  BP(mean): --  RR: 18 (09 Oct 2024 10:27) (18 - 19)  SpO2: 100% (09 Oct 2024 14:47) (96% - 100%)    Parameters below as of 09 Oct 2024 14:47  Patient On (Oxygen Delivery Method): nasal cannula, 3 lpm          CONSTITUTIONAL: NAD, Not in acute distress  EYES:  EOMI, conjunctiva and sclera clear  ENMT: , neck supple , non-tender  RESPIRATORY: Normal respiratory effort; lungs are clear to auscultation bilaterally  CARDIOVASCULAR: S1S2 present  ABDOMEN: soft. bowel sound present  MUSCLOSKELETAL: ; no clubbing or cyanosis of digits;   PSYCH: A+O to person, place, and time; affect appropriate  NEUROLOGY: CN, motor and sensory intact   SKIN: No rashes; no palpable lesions  Extremity: No bilateral edema

## 2024-10-08 NOTE — PROGRESS NOTE ADULT - ASSESSMENT
Chronic hyponatremia: urine c/w SIADH--> improved  - reinforced PO fluid restriction   - monitor off Urea and NaCl tabs  - follow labs

## 2024-10-08 NOTE — DISCHARGE NOTE PROVIDER - CARE PROVIDER_API CALL
Maicol Pederson  Pulmonary Disease  [address] [facility], [facility] [address]  [place] [address]  Phone: [phone]  Fax: [phone]  Follow Up Time:

## 2024-10-08 NOTE — DISCHARGE NOTE PROVIDER - NSDCHHHOMEBOUND_GEN_ALL_CORE
Suction prior to each feed as needed.  Forrest should be peeing every 6-8 hours, if he shows signs of dehydration or is not feeding well return to the ER   Shortness of breath with minimal ambulation

## 2024-10-08 NOTE — DISCHARGE NOTE PROVIDER - HOSPITAL COURSE
73 y/o F with a h/o CAD (s/p PCI), HFpEF, HTN, HLD, COPD (on home O2 PRN), recent admission for chest pain s/p LHC with nonobstructive CAD- discharged home 9/19, now readmitted on 9/22 with complaints abd pain nausea vomiting , hyponatremia, and acute hypercapnic respiratory failure. Upgraded to SDU 10/1 for continuous BIPAP need. She was evaluated by Pulmonologist and she was found to have right hemidiaphragm paralysis. She was treated with IV Solu-Medrol and continuous BiPAP. 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   -Completed 7 days of empiric antibiotic  -Started on continuous BiPAP. Gradually wean down to 4 liter nasal cannula during the day and BiPAP at night (home dose)  -Arranged home NIV and continue home O2  -Follow up wt Dr. Tamez  -Discharged on tapering prednisone    #Hyponatremia - improving  #History of chronic SIADH  #Hyperkalemia - resolved  - monitor  - c/w fluid restriction   - discharged on sodium chloride tablets  - c/w urea powder and salt tabs. the patient intermittently refusing urea powder due to bad taste.   - trend BMP    #CAD s/p PCI  #HFpEF  - cont home meds aspirin, carvedilol, statin

## 2024-10-08 NOTE — PROGRESS NOTE ADULT - SUBJECTIVE AND OBJECTIVE BOX
NEPHROLOGY INTERVAL HPI/OVERNIGHT EVENTS:  stable overnight   no acute distress    MEDICATIONS  (STANDING):  albuterol/ipratropium for Nebulization 3 milliLiter(s) Nebulizer every 6 hours  aspirin  chewable 81 milliGRAM(s) Oral daily  atorvastatin 40 milliGRAM(s) Oral at bedtime  carvedilol 3.125 milliGRAM(s) Oral every 12 hours  chlorhexidine 2% Cloths 1 Application(s) Topical daily  dextrose 5%. 1000 milliLiter(s) (100 mL/Hr) IV Continuous <Continuous>  dextrose 5%. 1000 milliLiter(s) (50 mL/Hr) IV Continuous <Continuous>  dextrose 50% Injectable 25 Gram(s) IV Push once  dextrose 50% Injectable 12.5 Gram(s) IV Push once  dextrose 50% Injectable 25 Gram(s) IV Push once  dextrose Oral Gel 15 Gram(s) Oral once  fluticasone propionate/ salmeterol 500-50 MICROgram(s) Diskus 1 Dose(s) Inhalation two times a day  gabapentin 300 milliGRAM(s) Oral daily  glucagon  Injectable 1 milliGRAM(s) IntraMuscular once  heparin   Injectable 5000 Unit(s) SubCutaneous every 8 hours  pantoprazole  Injectable 40 milliGRAM(s) IV Push daily  polyethylene glycol 3350 17 Gram(s) Oral daily  predniSONE   Tablet 40 milliGRAM(s) Oral daily  QUEtiapine 300 milliGRAM(s) Oral at bedtime  senna 2 Tablet(s) Oral at bedtime  tiotropium 2.5 MICROgram(s) Inhaler 2 Puff(s) Inhalation daily  traZODone 50 milliGRAM(s) Oral at bedtime  venlafaxine  milliGRAM(s) Oral daily  zolpidem 5 milliGRAM(s) Oral at bedtime    MEDICATIONS  (PRN):  acetaminophen     Tablet .. 650 milliGRAM(s) Oral every 6 hours PRN Temp greater or equal to 38C (100.4F), Mild Pain (1 - 3), Moderate Pain (4 - 6)  aluminum hydroxide/magnesium hydroxide/simethicone Suspension 30 milliLiter(s) Oral every 4 hours PRN Dyspepsia  bisacodyl 5 milliGRAM(s) Oral every 12 hours PRN Constipation  LORazepam     Tablet 1 milliGRAM(s) Oral at bedtime PRN Anxiety  magnesium hydroxide Suspension 30 milliLiter(s) Oral daily PRN Constipation  ondansetron Injectable 4 milliGRAM(s) IV Push four times a day PRN Nausea and/or Vomiting      Allergies    No Known Allergies    Intolerances      Vital Signs Last 24 Hrs  T(C): 36.6 (08 Oct 2024 04:33), Max: 37.4 (07 Oct 2024 20:10)  T(F): 97.9 (08 Oct 2024 04:33), Max: 99.3 (07 Oct 2024 20:10)  HR: 102 (08 Oct 2024 04:33) (86 - 121)  BP: 108/67 (08 Oct 2024 04:33) (108/67 - 146/76)  BP(mean): --  RR: 18 (08 Oct 2024 04:33) (18 - 19)  SpO2: 100% (08 Oct 2024 04:33) (97% - 100%)    Parameters below as of 08 Oct 2024 03:34  Patient On (Oxygen Delivery Method): nasal cannula w/ humidification, 4L        PHYSICAL EXAM:  GENERAL: Frail, fatigued  HEENT: No periorbital edema  NECK: Supple, No JVD  NERVOUS SYSTEM: Alert & Oriented X3  CHEST/LUNG: Clear bilaterally  HEART: Regular rate and rhythm; No rub  ABDOMEN: Soft,  +BS  EXTREMITIES: Tr dependent edema    LABS:                        10.1   11.33 )-----------( 306      ( 08 Oct 2024 04:32 )             31.4     10-08    137  |  97  |  12.9  ----------------------------<  104[H]  4.0   |  28.0  |  0.55    Ca    9.1      08 Oct 2024 04:32  Phos  3.6     10-08  Mg     2.1     10-08    TPro  5.8[L]  /  Alb  3.4  /  TBili  <0.2[L]  /  DBili  x   /  AST  20  /  ALT  27  /  AlkPhos  56  10-07      Urinalysis Basic - ( 08 Oct 2024 04:32 )    Color: x / Appearance: x / SG: x / pH: x  Gluc: 104 mg/dL / Ketone: x  / Bili: x / Urobili: x   Blood: x / Protein: x / Nitrite: x   Leuk Esterase: x / RBC: x / WBC x   Sq Epi: x / Non Sq Epi: x / Bacteria: x      Magnesium: 2.1 mg/dL (10-08 @ 04:32)  Phosphorus: 3.6 mg/dL (10-08 @ 04:32)      RADIOLOGY & ADDITIONAL TESTS:

## 2024-10-08 NOTE — DISCHARGE NOTE PROVIDER - NSDCMRMEDTOKEN_GEN_ALL_CORE_FT
ALPRAZolam 0.5 mg oral tablet: 1 tab(s) orally 2 times a day  verified in iSTOP  aspirin 81 mg oral tablet, chewable: 1 tab(s) orally once a day  atorvastatin 40 mg oral tablet: 1 tab(s) orally once a day (at bedtime)  carvedilol 3.125 mg oral tablet: 1 tab(s) orally 2 times a day  famotidine 20 mg oral tablet: 1 tab(s) orally once a day  gabapentin 600 mg oral tablet: 1 tab(s) orally 2 times a day  predniSONE 10 mg oral tablet: 4 tab(s) orally once a day 4 tablets/day X3 days--&gt; 3 tablets/day X3 days--&gt;2 tablets/day x 3 days --&gt;1 tablet/dayx3 days  QUEtiapine 300 mg oral tablet: 1 tab(s) orally once a day (at bedtime)  sodium chloride 1 g oral tablet: 1 tab(s) orally 2 times a day  traZODone 50 mg oral tablet: orally once a day (at bedtime)  Trelegy Ellipta 200 mcg-62.5 mcg-25 mcg/inh inhalation powder: 1 puff(s) inhaled once a day  valsartan 80 mg oral capsule: 1 tab(s) orally once a day  venlafaxine 225 mg oral tablet, extended release: 1 tab(s) orally once a day  Ventolin HFA 90 mcg/inh inhalation aerosol: 1 puff(s) inhaled once a day as needed for bronchospasm  zolpidem 10 mg oral tablet: 1 tab(s) orally once a day (at bedtime)  verified in iSTOP

## 2024-10-08 NOTE — PROGRESS NOTE ADULT - ASSESSMENT
73 y/o F with a h/o CAD (s/p PCI), HFpEF, HTN, HLD, COPD (on home O2 PRN), recent admission for chest pain s/p C with nonobstructive CAD- discharged home 9/19, now readmitted on 9/22 with complaints abd pain nausea vomiting , hyponatremia, and acute hypercapnic respiratory failure. Upgraded to SDU 10/1 for continuous BIPAP need. She was evaluated by Pulmonologist and she was found to have right hemidiaphragm paralysis. She was treated with IV Solu-Medrol and continuous BiPAP. 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 Completed broad spectrum Antibiotic  prior to discharge and during last admission.   -Started on continuous BiPAP. Gradually wean down to 4 liter nasal cannula during the day and BiPAP at night (home dose)  - pulmonary following. Advising that patient accumulates co2 rapidly and requires BIPAP at night  - IV solumedrol changed to oral prednsione  -Poor prognosis. Pulmonology and palliative team is following  -Repeat ABG suggestive of metabolic alkalosis.   -Case management will follow up about home NIV arrangement      #Hyponatremia - improving  #History of chronic SIADH  #Hyperkalemia - resolved  - monitor  - c/w fluid restriction   - nephro following  - c/w urea powder and salt tabs. the patient intermittently refusing urea powder due to bad taste.   - trend BMP    CAD s/p PCI  HFpEF  - cont home meds aspirin, carvedilol, statin    Anxiety/ tachycardia   - Got anxious tachycardia.   - Resume home Xanax b.i.d. p.r.n.   - Continue home Seroquel and other medication     DVT ppx: subq heparin   Dispo:Possibility of discharge tomorrow if tachycardia improves   GOC - full code    Discussed with patient's son on phone

## 2024-10-08 NOTE — PROGRESS NOTE ADULT - SUBJECTIVE AND OBJECTIVE BOX
[facility] Division of Hospital Medicine  Crystal Singh MD   I'm reachable on [employer] Teams      Patient is a 74y old  Female who presents with a chief complaint of fever/abdominal pain/N/V (07 Oct 2024 16:50)      SUBJECTIVE / OVERNIGHT EVENTS:   Patient was seen and examined during rounds  The patient's patient reported feeling better. She reports improvement in shortness of breath. Discussed with the patient's  on phone who agreed for discharge home with home health   Discussed with case management, home NIV has been arranged.   The patient was supposed to be discharged today. But, she got tachycardic in the evening and anxious. Home Xanax administered. Discharge was held. We will follow up tomorrow       12 points ROS negative except above    MEDICATIONS  (STANDING):  albuterol/ipratropium for Nebulization 3 milliLiter(s) Nebulizer every 6 hours  aspirin  chewable 81 milliGRAM(s) Oral daily  atorvastatin 40 milliGRAM(s) Oral at bedtime  carvedilol 3.125 milliGRAM(s) Oral every 12 hours  chlorhexidine 2% Cloths 1 Application(s) Topical daily  dextrose 5%. 1000 milliLiter(s) (100 mL/Hr) IV Continuous <Continuous>  dextrose 5%. 1000 milliLiter(s) (50 mL/Hr) IV Continuous <Continuous>  dextrose 50% Injectable 25 Gram(s) IV Push once  dextrose 50% Injectable 25 Gram(s) IV Push once  dextrose 50% Injectable 12.5 Gram(s) IV Push once  dextrose Oral Gel 15 Gram(s) Oral once  fluticasone propionate/ salmeterol 500-50 MICROgram(s) Diskus 1 Dose(s) Inhalation two times a day  gabapentin 300 milliGRAM(s) Oral daily  glucagon  Injectable 1 milliGRAM(s) IntraMuscular once  heparin   Injectable 5000 Unit(s) SubCutaneous every 8 hours  pantoprazole  Injectable 40 milliGRAM(s) IV Push daily  polyethylene glycol 3350 17 Gram(s) Oral daily  predniSONE   Tablet 40 milliGRAM(s) Oral daily  QUEtiapine 300 milliGRAM(s) Oral at bedtime  senna 2 Tablet(s) Oral at bedtime  tiotropium 2.5 MICROgram(s) Inhaler 2 Puff(s) Inhalation daily  traZODone 50 milliGRAM(s) Oral at bedtime  venlafaxine  milliGRAM(s) Oral daily  zolpidem 5 milliGRAM(s) Oral at bedtime    MEDICATIONS  (PRN):  acetaminophen     Tablet .. 650 milliGRAM(s) Oral every 6 hours PRN Temp greater or equal to 38C (100.4F), Mild Pain (1 - 3), Moderate Pain (4 - 6)  ALPRAZolam 0.5 milliGRAM(s) Oral two times a day PRN anxiety  aluminum hydroxide/magnesium hydroxide/simethicone Suspension 30 milliLiter(s) Oral every 4 hours PRN Dyspepsia  bisacodyl 5 milliGRAM(s) Oral every 12 hours PRN Constipation  magnesium hydroxide Suspension 30 milliLiter(s) Oral daily PRN Constipation  ondansetron Injectable 4 milliGRAM(s) IV Push four times a day PRN Nausea and/or Vomiting        I&O's Summary    07 Oct 2024 07:01  -  08 Oct 2024 07:00  --------------------------------------------------------  IN: 0 mL / OUT: 2000 mL / NET: -2000 mL        PHYSICAL EXAM:  Vital Signs Last 24 Hrs  T(C): 37.1 (08 Oct 2024 12:05), Max: 37.4 (07 Oct 2024 20:10)  T(F): 98.8 (08 Oct 2024 12:05), Max: 99.3 (07 Oct 2024 20:10)  HR: 107 (08 Oct 2024 15:03) (86 - 121)  BP: 134/65 (08 Oct 2024 12:05) (108/67 - 146/76)  BP(mean): --  RR: 18 (08 Oct 2024 12:05) (18 - 19)  SpO2: 100% (08 Oct 2024 15:03) (97% - 100%)    Parameters below as of 08 Oct 2024 15:03  Patient On (Oxygen Delivery Method): nasal cannula w/ humidification        CONSTITUTIONAL: NAD, Not in acute distress  EYES:  EOMI, conjunctiva and sclera clear  ENMT: , neck supple , non-tender  RESPIRATORY: Normal respiratory effort; lungs are clear to auscultation bilaterally  CARDIOVASCULAR: S1S2 present  ABDOMEN: soft. bowel sound present  MUSCLOSKELETAL: ; no clubbing or cyanosis of digits;   PSYCH: A+O to person, place, and time; affect appropriate  NEUROLOGY: CN, motor and sensory intact   SKIN: No rashes; no palpable lesions  Extremity: No bilateral edema      LABS:                        10.1   11.33 )-----------( 306      ( 08 Oct 2024 04:32 )             31.4     10-08    137  |  97  |  12.9  ----------------------------<  104[H]  4.0   |  28.0  |  0.55    Ca    9.1      08 Oct 2024 04:32  Phos  3.6     10-08  Mg     2.1     10-08    TPro  5.8[L]  /  Alb  3.4  /  TBili  <0.2[L]  /  DBili  x   /  AST  20  /  ALT  27  /  AlkPhos  56  10-07          Urinalysis Basic - ( 08 Oct 2024 04:32 )    Color: x / Appearance: x / SG: x / pH: x  Gluc: 104 mg/dL / Ketone: x  / Bili: x / Urobili: x   Blood: x / Protein: x / Nitrite: x   Leuk Esterase: x / RBC: x / WBC x   Sq Epi: x / Non Sq Epi: x / Bacteria: x        CAPILLARY BLOOD GLUCOSE      POCT Blood Glucose.: 116 mg/dL (08 Oct 2024 07:16)  POCT Blood Glucose.: 105 mg/dL (08 Oct 2024 06:53)  POCT Blood Glucose.: 124 mg/dL (07 Oct 2024 17:21)      Labs reviewed:    RADIOLOGY & ADDITIONAL TESTS:  Imaging Personally Reviewed:  Electrocardiogram Personally Reviewed:

## 2024-10-08 NOTE — DISCHARGE NOTE PROVIDER - NSDCFUSCHEDAPPT_GEN_ALL_CORE_FT
Maicol Pederson  [facility] Physician [facility]  PULMMED [address]  Scheduled Appointment: 10/14/2024    Matt Hoover  [facility]  NEUROLOGY [address]  Scheduled Appointment: 12/16/2024

## 2024-10-08 NOTE — DISCHARGE NOTE PROVIDER - NSDCCPCAREPLAN_GEN_ALL_CORE_FT
PRINCIPAL DISCHARGE DIAGNOSIS  Diagnosis: Acute respiratory failure with hypercapnia  Assessment and Plan of Treatment: Use Bipap and oxygen as recommended. Take medication as prescribed. Follow up with Dr. Lauren      SECONDARY DISCHARGE DIAGNOSES  Diagnosis: Acute renal failure  Assessment and Plan of Treatment:     Diagnosis: Hyponatremia  Assessment and Plan of Treatment:     Diagnosis: Aphasia  Assessment and Plan of Treatment:

## 2024-10-08 NOTE — DISCHARGE NOTE NURSING/CASE MANAGEMENT/SOCIAL WORK - NSDCFUADDAPPT_GEN_ALL_CORE_FT
Please see below for post hospital follow up information     1. VIVO Meds To Bed: [phone] - Agreeable    2. Home Care:  [facility] at Home    3. Transitional Care Services: [phone]    4, A. Primary Care Appointment: PCP- Appointment scheduled with Dr. Landaverde   On 10/15  at 11:30 AM  At  [address] [place] [address].  If you are unable to attend your pre-scheduled appointment,   please contact the office directly at [phone] to reschedule.       4, B. Specialty Appointment: Pulmonary follow up Appointment scheduled with Dr. Telles   On 10/14 at 12:30PM  [facility], [place].  If you are unable to attend your pre-scheduled appointment,   please contact the office directly at [phone] to reschedule.              5. STAR Education Packet Provided    D/C PLAN IS FOR [facility]    Please see below for post hospital follow up information     1. VIVO Meds To Bed: [phone] - N/A    2. Home Care:  N/A    3. Transitional Care Services: [phone]    4, A. Primary Care Appointment: N/A    4, B. Specialty Appointment: Pulmonary follow up Appointment scheduled with Dr. Telles   On 10/14 at 12:30PM  [address], [place].  If you are unable to attend your pre-scheduled appointment,   please contact the office directly at [phone] to reschedule.      5. STAR Education Packet Provided  Pt to be discharged [facility].   D/C PLAN IS FOR [facility]    Please see below for post hospital follow up information     1. VIVO Meds To Bed: [phone] - N/A    2. Home Care:  N/A    3. Transitional Care Services: [phone]    4, A. Primary Care Appointment: N/A    4, B. Specialty Appointment: Pulmonary follow up Appointment scheduled with Dr. Telles   On 10/14 at 12:30PM  [address], [place].  If you are unable to attend your pre-scheduled appointment,   please contact the office directly at [phone] to reschedule.      5. STAR Education Packet Provided

## 2024-10-08 NOTE — DISCHARGE NOTE NURSING/CASE MANAGEMENT/SOCIAL WORK - PATIENT PORTAL LINK FT
You can access the FollowMyHealth Patient Portal offered by [facility] by registering at the following website: [email or web address]. By joining [employer]’s FollowMyHealth portal, you will also be able to view your health information using other applications (apps) compatible with our system.

## 2024-10-08 NOTE — DISCHARGE NOTE PROVIDER - NSDCFUADDAPPT_GEN_ALL_CORE_FT
Please see below for post hospital follow up information     1. VIVO Meds To Bed: [phone] - Agreeable    2. Home Care:  [facility] at Home    3. Transitional Care Services: [phone]    4, A. Primary Care Appointment: PCP- Appointment scheduled with Dr. Landaverde   On 10/15  at 11:30 AM  At  [address] [place] [address].  If you are unable to attend your pre-scheduled appointment,   please contact the office directly at [phone] to reschedule.       4, B. Specialty Appointment: Pulmonary follow up Appointment scheduled with Dr. Telles   On 10/14 at 12:30PM  39 [facility], [place].  If you are unable to attend your pre-scheduled appointment,   please contact the office directly at [phone] to reschedule.              5. STAR Education Packet Provided    Please see below for post hospital follow up information     1. VIVO Meds To Bed: [phone] - Agreeable    2. Home Care:  [facility] at Home    3. Transitional Care Services: [phone]    4, A. Primary Care Appointment: PCP- Appointment scheduled with Dr. Landaverde   On 10/15  at 11:30 AM  At  [address] [place] [address].  If you are unable to attend your pre-scheduled appointment,   please contact the office directly at [phone] to reschedule.       4, B. Specialty Appointment: Pulmonary follow up Appointment scheduled with Dr. Telles   On 10/14 at 12:30PM  39 [facility].  If you are unable to attend your pre-scheduled appointment,   please contact the office directly at [phone] to reschedule.              5. STAR Education Packet Provided     APPTS ARE READY TO BE MADE: [X] YES    Best Family or Patient Contact (if needed):    Additional Information about above appointments (if needed):    1: Dr. Tamez pulmonologist  2:   3:     Other comments or requests:

## 2024-10-09 VITALS
SYSTOLIC BLOOD PRESSURE: 117 MMHG | RESPIRATION RATE: 18 BRPM | TEMPERATURE: 97 F | OXYGEN SATURATION: 100 % | HEART RATE: 109 BPM | DIASTOLIC BLOOD PRESSURE: 75 MMHG

## 2024-10-09 LAB
ALBUMIN SERPL ELPH-MCNC: 3.1 G/DL — LOW (ref 3.3–5.2)
ALP SERPL-CCNC: 56 U/L — SIGNIFICANT CHANGE UP (ref 40–120)
ALT FLD-CCNC: 26 U/L — SIGNIFICANT CHANGE UP
ANION GAP SERPL CALC-SCNC: 7 MMOL/L — SIGNIFICANT CHANGE UP (ref 5–17)
AST SERPL-CCNC: 16 U/L — SIGNIFICANT CHANGE UP
BASOPHILS # BLD AUTO: 0.12 K/UL — SIGNIFICANT CHANGE UP (ref 0–0.2)
BASOPHILS NFR BLD AUTO: 0.9 % — SIGNIFICANT CHANGE UP (ref 0–2)
BILIRUB SERPL-MCNC: <0.2 MG/DL — LOW (ref 0.4–2)
BUN SERPL-MCNC: 14 MG/DL — SIGNIFICANT CHANGE UP (ref 8–20)
CALCIUM SERPL-MCNC: 8.3 MG/DL — LOW (ref 8.4–10.5)
CHLORIDE SERPL-SCNC: 99 MMOL/L — SIGNIFICANT CHANGE UP (ref 96–108)
CO2 SERPL-SCNC: 30 MMOL/L — HIGH (ref 22–29)
CREAT SERPL-MCNC: 0.46 MG/DL — LOW (ref 0.5–1.3)
EGFR: 100 ML/MIN/1.73M2 — SIGNIFICANT CHANGE UP
EOSINOPHIL # BLD AUTO: 0 K/UL — SIGNIFICANT CHANGE UP (ref 0–0.5)
EOSINOPHIL NFR BLD AUTO: 0 % — SIGNIFICANT CHANGE UP (ref 0–6)
GIANT PLATELETS BLD QL SMEAR: PRESENT — SIGNIFICANT CHANGE UP
GLUCOSE BLDC GLUCOMTR-MCNC: 123 MG/DL — HIGH (ref 70–99)
GLUCOSE BLDC GLUCOMTR-MCNC: 212 MG/DL — HIGH (ref 70–99)
GLUCOSE SERPL-MCNC: 119 MG/DL — HIGH (ref 70–99)
HCT VFR BLD CALC: 28.6 % — LOW (ref 34.5–45)
HGB BLD-MCNC: 9.3 G/DL — LOW (ref 11.5–15.5)
LYMPHOCYTES # BLD AUTO: 0.35 K/UL — LOW (ref 1–3.3)
LYMPHOCYTES # BLD AUTO: 2.6 % — LOW (ref 13–44)
MAGNESIUM SERPL-MCNC: 1.9 MG/DL — SIGNIFICANT CHANGE UP (ref 1.6–2.6)
MANUAL SMEAR VERIFICATION: SIGNIFICANT CHANGE UP
MCHC RBC-ENTMCNC: 30.5 PG — SIGNIFICANT CHANGE UP (ref 27–34)
MCHC RBC-ENTMCNC: 32.5 GM/DL — SIGNIFICANT CHANGE UP (ref 32–36)
MCV RBC AUTO: 93.8 FL — SIGNIFICANT CHANGE UP (ref 80–100)
MONOCYTES # BLD AUTO: 0.36 K/UL — SIGNIFICANT CHANGE UP (ref 0–0.9)
MONOCYTES NFR BLD AUTO: 2.7 % — SIGNIFICANT CHANGE UP (ref 2–14)
NEUTROPHILS # BLD AUTO: 12.51 K/UL — HIGH (ref 1.8–7.4)
NEUTROPHILS NFR BLD AUTO: 93.8 % — HIGH (ref 43–77)
PHOSPHATE SERPL-MCNC: 3.4 MG/DL — SIGNIFICANT CHANGE UP (ref 2.4–4.7)
PLAT MORPH BLD: NORMAL — SIGNIFICANT CHANGE UP
PLATELET # BLD AUTO: 305 K/UL — SIGNIFICANT CHANGE UP (ref 150–400)
POTASSIUM SERPL-MCNC: 4.4 MMOL/L — SIGNIFICANT CHANGE UP (ref 3.5–5.3)
POTASSIUM SERPL-SCNC: 4.4 MMOL/L — SIGNIFICANT CHANGE UP (ref 3.5–5.3)
PROT SERPL-MCNC: 5.4 G/DL — LOW (ref 6.6–8.7)
RBC # BLD: 3.05 M/UL — LOW (ref 3.8–5.2)
RBC # FLD: 13.8 % — SIGNIFICANT CHANGE UP (ref 10.3–14.5)
RBC BLD AUTO: NORMAL — SIGNIFICANT CHANGE UP
SODIUM SERPL-SCNC: 136 MMOL/L — SIGNIFICANT CHANGE UP (ref 135–145)
WBC # BLD: 13.34 K/UL — HIGH (ref 3.8–10.5)
WBC # FLD AUTO: 13.34 K/UL — HIGH (ref 3.8–10.5)

## 2024-10-09 PROCEDURE — 99232 SBSQ HOSP IP/OBS MODERATE 35: CPT

## 2024-10-09 PROCEDURE — 93970 EXTREMITY STUDY: CPT | Mod: 26

## 2024-10-09 PROCEDURE — 99239 HOSP IP/OBS DSCHRG MGMT >30: CPT

## 2024-10-09 RX ADMIN — Medication 3.12 MILLIGRAM(S): at 18:04

## 2024-10-09 RX ADMIN — PANTOPRAZOLE SODIUM 40 MILLIGRAM(S): 40 TABLET, DELAYED RELEASE ORAL at 13:36

## 2024-10-09 RX ADMIN — PREDNISONE 40 MILLIGRAM(S): 5 TABLET ORAL at 05:11

## 2024-10-09 RX ADMIN — ALPRAZOLAM 0.5 MILLIGRAM(S): 0.5 TABLET ORAL at 13:40

## 2024-10-09 RX ADMIN — ALPRAZOLAM 0.5 MILLIGRAM(S): 0.5 TABLET ORAL at 05:10

## 2024-10-09 RX ADMIN — TIOTROPIUM BROMIDE INHALATION SPRAY 2 PUFF(S): 3.12 SPRAY, METERED RESPIRATORY (INHALATION) at 08:33

## 2024-10-09 RX ADMIN — Medication 225 MILLIGRAM(S): at 13:36

## 2024-10-09 RX ADMIN — IPRATROPIUM BROMIDE AND ALBUTEROL SULFATE 3 MILLILITER(S): .5; 3 SOLUTION RESPIRATORY (INHALATION) at 14:44

## 2024-10-09 RX ADMIN — Medication 1 DOSE(S): at 08:33

## 2024-10-09 RX ADMIN — Medication 5000 UNIT(S): at 05:11

## 2024-10-09 RX ADMIN — Medication 3.12 MILLIGRAM(S): at 05:11

## 2024-10-09 RX ADMIN — IPRATROPIUM BROMIDE AND ALBUTEROL SULFATE 3 MILLILITER(S): .5; 3 SOLUTION RESPIRATORY (INHALATION) at 08:32

## 2024-10-09 RX ADMIN — Medication 81 MILLIGRAM(S): at 13:37

## 2024-10-09 RX ADMIN — CHLORHEXIDINE GLUCONATE ORAL RINSE 1 APPLICATION(S): 1.2 SOLUTION DENTAL at 13:37

## 2024-10-09 RX ADMIN — IPRATROPIUM BROMIDE AND ALBUTEROL SULFATE 3 MILLILITER(S): .5; 3 SOLUTION RESPIRATORY (INHALATION) at 04:43

## 2024-10-09 RX ADMIN — Medication 5000 UNIT(S): at 13:36

## 2024-10-09 RX ADMIN — GABAPENTIN 300 MILLIGRAM(S): 800 TABLET, FILM COATED ORAL at 13:37

## 2024-10-09 NOTE — PROGRESS NOTE ADULT - SUBJECTIVE AND OBJECTIVE BOX
CC:  Follow up GOC , Symptoms    OVERNIGHT EVENTS:  none reported    Present Symptoms:   Dyspnea:  No    Nausea/Vomiting:  No   Anxiety/ Agitation No   Depression:  No    Fatigue:  Yes     Loss of appetite:  Fair    Constipation: Not Reported      Pain: No               Location            Duration            Character            Severity            Factors            Effect    Pain AD Score:  [email or web address].[email or web address]/[email or web address] (press ctrl + left click to view)    Review of Systems: Reviewed as above  All others negative      MEDICATIONS  (STANDING):  albuterol/ipratropium for Nebulization 3 milliLiter(s) Nebulizer every 6 hours  aspirin  chewable 81 milliGRAM(s) Oral daily  atorvastatin 40 milliGRAM(s) Oral at bedtime  carvedilol 3.125 milliGRAM(s) Oral every 12 hours  chlorhexidine 2% Cloths 1 Application(s) Topical daily  dextrose 5%. 1000 milliLiter(s) (100 mL/Hr) IV Continuous <Continuous>  dextrose 5%. 1000 milliLiter(s) (50 mL/Hr) IV Continuous <Continuous>  dextrose 50% Injectable 25 Gram(s) IV Push once  dextrose 50% Injectable 25 Gram(s) IV Push once  dextrose 50% Injectable 12.5 Gram(s) IV Push once  dextrose Oral Gel 15 Gram(s) Oral once  fluticasone propionate/ salmeterol 500-50 MICROgram(s) Diskus 1 Dose(s) Inhalation two times a day  gabapentin 300 milliGRAM(s) Oral daily  glucagon  Injectable 1 milliGRAM(s) IntraMuscular once  heparin   Injectable 5000 Unit(s) SubCutaneous every 8 hours  pantoprazole  Injectable 40 milliGRAM(s) IV Push daily  polyethylene glycol 3350 17 Gram(s) Oral daily  predniSONE   Tablet 40 milliGRAM(s) Oral daily  QUEtiapine 300 milliGRAM(s) Oral at bedtime  senna 2 Tablet(s) Oral at bedtime  tiotropium 2.5 MICROgram(s) Inhaler 2 Puff(s) Inhalation daily  traZODone 50 milliGRAM(s) Oral at bedtime  venlafaxine  milliGRAM(s) Oral daily  zolpidem 5 milliGRAM(s) Oral at bedtime    MEDICATIONS  (PRN):  acetaminophen     Tablet .. 650 milliGRAM(s) Oral every 6 hours PRN Temp greater or equal to 38C (100.4F), Mild Pain (1 - 3), Moderate Pain (4 - 6)  ALPRAZolam 0.5 milliGRAM(s) Oral two times a day PRN anxiety  aluminum hydroxide/magnesium hydroxide/simethicone Suspension 30 milliLiter(s) Oral every 4 hours PRN Dyspepsia  bisacodyl 5 milliGRAM(s) Oral every 12 hours PRN Constipation  magnesium hydroxide Suspension 30 milliLiter(s) Oral daily PRN Constipation  ondansetron Injectable 4 milliGRAM(s) IV Push four times a day PRN Nausea and/or Vomiting      PHYSICAL EXAM:    Vital Signs Last 24 Hrs  T(C): 36.8 (09 Oct 2024 10:27), Max: 37.1 (08 Oct 2024 16:36)  T(F): 98.2 (09 Oct 2024 10:27), Max: 98.8 (09 Oct 2024 04:37)  HR: 101 (09 Oct 2024 10:27) (78 - 118)  BP: 102/67 (09 Oct 2024 10:27) (102/67 - 134/76)  BP(mean): --  RR: 18 (09 Oct 2024 10:27) (18 - 19)  SpO2: 98% (09 Oct 2024 10:27) (96% - 100%)    Parameters below as of 09 Oct 2024 08:36  Patient On (Oxygen Delivery Method): nasal cannula, 4 lpm      Karnofsky:  40%  General:  Elderly woman awake NAD       HEENT:  NCAT       Lungs: comfortable  non labored  CV:  RR  GI:   soft NTND  MSK: weakness  Skin:  warm/dry  Neuro  no focal deficits  Psych calm cooperative    LABS:                          9.3    13.34 )-----------( 305      ( 09 Oct 2024 07:40 )             28.6     10-09    136  |  99  |  14.0  ----------------------------<  119[H]  4.4   |  30.0[H]  |  0.46[L]    Ca    8.3[L]      09 Oct 2024 07:40  Phos  3.4     10-09  Mg     1.9     10-09    TPro  5.4[L]  /  Alb  3.1[L]  /  TBili  <0.2[L]  /  DBili  x   /  AST  16  /  ALT  26  /  AlkPhos  56  10-09      Urinalysis Basic - ( 09 Oct 2024 07:40 )    Color: x / Appearance: x / SG: x / pH: x  Gluc: 119 mg/dL / Ketone: x  / Bili: x / Urobili: x   Blood: x / Protein: x / Nitrite: x   Leuk Esterase: x / RBC: x / WBC x   Sq Epi: x / Non Sq Epi: x / Bacteria: x      I&O's Summary    08 Oct 2024 07:01  -  09 Oct 2024 07:00  --------------------------------------------------------  IN: 0 mL / OUT: 500 mL / NET: -500 mL

## 2024-10-09 NOTE — PROGRESS NOTE ADULT - PROVIDER SPECIALTY LIST ADULT
Hospitalist
Hospitalist
Internal Medicine
Nephrology
Pulmonology
Hospitalist
MICU
Nephrology
Palliative Care
Hospitalist
Internal Medicine
Nephrology
Pulmonology
Hospitalist
Pulmonology
Hospitalist
Palliative Care
Pulmonology
Pulmonology

## 2024-10-09 NOTE — PROGRESS NOTE ADULT - ASSESSMENT
74F never smoker with hx of CAD s/p PCI, HTN, HLD, severe persistent asthma, RA, mood disorder, HFpEF, R hemidiaphragm elevation, recent admission 9/17-9/19 for NSTEMI with nonobstructive CAD on cath presented 9/21 for abdominal pain w/ episode of AMS/aphasia with stroke w/u neg but noted to be febrile on started on empiric abx with course complicated by acute on chronic hyponatremia, acute hypercapnic respiratory failure requiring intubation 9/23-9/24 with recurrent episodes of hypercapnia since due to NIV non-compliance treated with empiric steroids/abx with interval improvement     Acute on chronic hypoxic/hypercapnic respiratory failure in setting of R hemidiaphragm elevation/asthma exacerbation/atelectasis/possible superimposed PNA      c/w nocturnal NIV; compliance stressed; arranged for home   weaned to home O2 requirements  taper prednisone 10mg q2 days to off   c/w Advair/Duoneb   resume Trelegy on discharge   completed course of abx previously; afebrile recently with most recent procal neg; cultures ngtd    IgE normal, no significant eosinophilia   allergy panel neg   OOB/ambulate   incentive spirometry   prior CT chest angio/duplex neg; repeat duplex neg    c/w DVT ppx   caution with sedating medications   repeat CT chest as outpatient for f/u  f/u with Dr. Pederson on discharge

## 2024-10-09 NOTE — PROGRESS NOTE ADULT - SUBJECTIVE AND OBJECTIVE BOX
Patient is a 74y old  Female who presents with a chief complaint of fever/abdominal pain/N/V (07 Oct 2024 16:50)      Interval hx:  Had mild tachycardia yesterday attributed to anxiety now improved. Denies any change in breathing. No current shortness of breath. Denies shortness of breath. Denies coughing. Denies chest pain, Denies wheezing. Denies N/V/Abd pain. Denies LE pain/edema. Remains afebrile, sating well on 4L NC.     PAST MEDICAL & SURGICAL HISTORY:  NICM (nonischemic cardiomyopathy)      HTN (hypertension)      Rheumatoid arthritis      Chronic back pain      HLD (hyperlipidemia)      Chronic obstructive asthma      Cellulitis      S/P       S/P hysterectomy            Medications:    carvedilol 3.125 milliGRAM(s) Oral every 12 hours    albuterol/ipratropium for Nebulization 3 milliLiter(s) Nebulizer every 6 hours  fluticasone propionate/ salmeterol 500-50 MICROgram(s) Diskus 1 Dose(s) Inhalation two times a day  tiotropium 2.5 MICROgram(s) Inhaler 2 Puff(s) Inhalation daily    acetaminophen     Tablet .. 650 milliGRAM(s) Oral every 6 hours PRN  ALPRAZolam 0.5 milliGRAM(s) Oral two times a day PRN  gabapentin 300 milliGRAM(s) Oral daily  ondansetron Injectable 4 milliGRAM(s) IV Push four times a day PRN  QUEtiapine 300 milliGRAM(s) Oral at bedtime  traZODone 50 milliGRAM(s) Oral at bedtime  venlafaxine  milliGRAM(s) Oral daily  zolpidem 5 milliGRAM(s) Oral at bedtime      aspirin  chewable 81 milliGRAM(s) Oral daily  heparin   Injectable 5000 Unit(s) SubCutaneous every 8 hours    aluminum hydroxide/magnesium hydroxide/simethicone Suspension 30 milliLiter(s) Oral every 4 hours PRN  bisacodyl 5 milliGRAM(s) Oral every 12 hours PRN  magnesium hydroxide Suspension 30 milliLiter(s) Oral daily PRN  pantoprazole  Injectable 40 milliGRAM(s) IV Push daily  polyethylene glycol 3350 17 Gram(s) Oral daily  senna 2 Tablet(s) Oral at bedtime      atorvastatin 40 milliGRAM(s) Oral at bedtime  dextrose 50% Injectable 25 Gram(s) IV Push once  dextrose 50% Injectable 25 Gram(s) IV Push once  dextrose 50% Injectable 12.5 Gram(s) IV Push once  dextrose Oral Gel 15 Gram(s) Oral once  glucagon  Injectable 1 milliGRAM(s) IntraMuscular once  predniSONE   Tablet 40 milliGRAM(s) Oral daily    dextrose 5%. 1000 milliLiter(s) IV Continuous <Continuous>  dextrose 5%. 1000 milliLiter(s) IV Continuous <Continuous>      chlorhexidine 2% Cloths 1 Application(s) Topical daily            ICU Vital Signs Last 24 Hrs  T(C): 36.2 (09 Oct 2024 17:02), Max: 37.1 (09 Oct 2024 04:37)  T(F): 97.2 (09 Oct 2024 17:02), Max: 98.8 (09 Oct 2024 04:37)  HR: 109 (09 Oct 2024 17:02) (78 - 112)  BP: 117/75 (09 Oct 2024 17:02) (102/67 - 127/85)  BP(mean): --  ABP: --  ABP(mean): --  RR: 18 (09 Oct 2024 17:02) (18 - 19)  SpO2: 100% (09 Oct 2024 17:02) (98% - 100%)    O2 Parameters below as of 09 Oct 2024 14:47  Patient On (Oxygen Delivery Method): nasal cannula, 3 lpm                I&O's Detail    08 Oct 2024 07:01  -  09 Oct 2024 07:00  --------------------------------------------------------  IN:  Total IN: 0 mL    OUT:    Voided (mL): 500 mL  Total OUT: 500 mL    Total NET: -500 mL      09 Oct 2024 07:01  -  09 Oct 2024 21:02  --------------------------------------------------------  IN:    Oral Fluid: 672 mL  Total IN: 672 mL    OUT:    Voided (mL): 600 mL  Total OUT: 600 mL    Total NET: 72 mL            LABS:                        9.3    13.34 )-----------( 305      ( 09 Oct 2024 07:40 )             28.6     10-09    136  |  99  |  14.0  ----------------------------<  119[H]  4.4   |  30.0[H]  |  0.46[L]    Ca    8.3[L]      09 Oct 2024 07:40  Phos  3.4     10-09  Mg     1.9     10-09    TPro  5.4[L]  /  Alb  3.1[L]  /  TBili  <0.2[L]  /  DBili  x   /  AST  16  /  ALT  26  /  AlkPhos  56  10-09          CAPILLARY BLOOD GLUCOSE      POCT Blood Glucose.: 212 mg/dL (09 Oct 2024 11:37)      Urinalysis Basic - ( 09 Oct 2024 07:40 )    Color: x / Appearance: x / SG: x / pH: x  Gluc: 119 mg/dL / Ketone: x  / Bili: x / Urobili: x   Blood: x / Protein: x / Nitrite: x   Leuk Esterase: x / RBC: x / WBC x   Sq Epi: x / Non Sq Epi: x / Bacteria: x    Physical Examination:    General: awake, alert, in NAD     HEENT: NC/AT, anicteric, MMM    PULM: diminished R lower/mid lungs, no accessory muscle use     NECK: Supple, trachea midline    CVS: S1S2, RRR    ABD: Soft, nondistended, nontender, normoactive bowel sounds    EXT: No edema, nontender    SKIN: Warm and well perfused, no rashes noted    NEURO: Alert, oriented, interactive, nonfocal    ROS: negative except as per HPI     RADIOLOGY:   < from: US Duplex Venous Lower Ext Complete, Bilateral (10.09.24 @ 13:26) >  FINDINGS:    RIGHT:  Normal compressibility of the RIGHT common femoral, femoral and popliteal   veins.  Doppler examination shows normal spontaneous and phasic flow.  No RIGHT calf vein thrombosis is detected.    LEFT:  Normal compressibility of the LEFT common femoral, femoral and popliteal   veins.  Doppler examination shows normal spontaneous and phasic flow.  No LEFT calf vein thrombosis is detected.    < end of copied text >

## 2024-10-09 NOTE — PROGRESS NOTE ADULT - ASSESSMENT
74yr woman xh CAD/PCI, HFpEF, COPD on home O2 recent admission for CP with non obstructive CAD on [facility] readmitted with acute on chronic respiratory failure     Acute on Chronic Respiratory Failure  Hemidiaphragm paralysis, COPD  tolerating nasal canula  continue to monitor     Chronic Hyponatremia  improved     Debility  Assist with care  safety precautions    Encounter for Palliative Care  Per [facility] by Dr. Bell on 9/26- patient wishes CPR and intubation but no Bipap  Per [facility] 10/3 - patient agreeable to Bipap and intubation/vent.  She would not want to be long term on a vent  Patient to be d/c on nocturnal Bipap  Patient to be d/c to [facility].    Palliative Care to sign off

## 2024-10-09 NOTE — PROGRESS NOTE ADULT - TIME BILLING
Time spent with review of chart documents, labs, imaging. Direct patient assessment,  formulation of care plan. Discussion with  Interdisciplinary  team  Dr. Delgadillo

## 2024-10-14 ENCOUNTER — APPOINTMENT (OUTPATIENT)
Dept: PULMONOLOGY | Facility: CLINIC | Age: 74
End: 2024-10-14

## 2024-10-16 NOTE — CDI QUERY NOTE - NSCDIOTHERTXTBX_GEN_ALL_CORE_HH
Admitted with acute on chronic hypercapnic respiratory failure 2/2 to COPD exacerbation  ED – SEPSIS Meets SIRS criteria on admission with elevated HR and WBC   Sepsis documented by attending, but there doesn't seem to be an infectious source until Pulm documents possible superimposed pna on 10/7 & 10/9    Can the status of sepsis be clarified, in addendum to DC summary, after study?   - Sepsis on admission 2/2 pneumonia   - Sepsis on admission 2/2 _______   - Sepsis ruled out   - Other    EVIDENCE/DOCUMENTATION  ED:  Principal Discharge DX:	Sepsis  Secondary Diagnosis:	Acute renal failure  Secondary Diagnosis:	Hyponatremia  Secondary Diagnosis:	Aphasia.    Medical Decision Making:  NIH stroke scale of 1 for aphasia.  Case discussed with Dr. queen, not a tenecteplase candidate given the low NIH score.  However here, patient also found to have tachycardia and fever, concern for underlying sepsis.  Unclear etiology.  Will start broad-spectrum antibiotics, give IV fluids however hold on full 30 cc/kg given concern for volume overload state with her history of cardiac disease.    SEPSIS – was this patient treated for sepsis? Yes.   Presentation suggestive of: Bacterial Etiology Suspicion of sepsis at:     ED VITALS:  As per H&P  Physical Exam: Vital Signs Last 24 Hrs  T(C): 38.4 (22 Sep 2024 07:33), Max: 38.7 (22 Sep 2024 03:49)  T(F): 101.1 (22 Sep 2024 07:33), Max: 101.6 (22 Sep 2024 03:49)  HR: 106 (22 Sep 2024 07:33) (87 - 159)  BP: 103/64 (22 Sep 2024 07:33) (103/64 - 128/66)  BP(mean): 75 (22 Sep 2024 01:00) (75 - 79)  RR: 18 (22 Sep 2024 07:33) (17 - 20)  SpO2: 100% (22 Sep 2024 07:33) (88% - 100%)      CXR   < from: Xray Chest 1 View- PORTABLE-Urgent (09.21.24 @ 23:16) >  IMPRESSION:  Elevated right hemidiaphragm with right lower lung consolidation and volume loss, likely representing associated atelectasis.   Please refer to follow-up CTA chest for complete evaluation.    < from: Xray Chest 1 View- PORTABLE-Urgent (Xray Chest 1 View- PORTABLE-Urgent .) (09.28.24 @ 17:34) >  IMPRESSION:  Elevated right hemidiaphragm with bibasilar patchy opacities which may represent atelectasis or pneumonia                       < from: Xray Chest 1 View-PORTABLE IMMEDIATE (Xray Chest 1 View-PORTABLE IMMEDIATE .) (09.30.24 @ 11:54) >  IMPRESSION: Stable left base infiltrate. Increasing right base atelectatic infiltrate.    WBC  WBC Count: 12.28 K/uL (09.21.24 @ 21:30)  WBC Count: 12.42 K/uL (09.22.24 @ 05:09)  WBC Count: 14.99 K/uL (09.22.24 @ 08:37)  WBC Count: 18.94 K/uL (09.23.24 @ 21:33)  WBC Count: 11.07 K/uL (09.24.24 @ 03:30)    LACTATE  Blood Gas Venous - Lactate: 1.70 mmol/L (09.21.24 @ 22:00)    PROCAL  Procalcitonin: 0.40: Reference range change as of 3/21/2019 ng/mL (09.29.24 @ 03:56)    ATTENDING 9/22 NOTE:  discharged yesterday returns to [facility] ER c/o upper abdominal pain similar to prior presentation admitted for Fevers, ARF, Hyponatremia, and Leukocytosis.  PLAN:  Fevers, Leukocytosis  -CT Chest/Abd/Pelvis no obvious source of infection  -UA negative  -COVID/RSV/Flu negative  -Check BCX  -Check RVP r/o viral infection  -Ofirmev PRN Fever  -Empiric IV ABX with Ceftriaxone/Azithromycin pending infection workup above    ATTENDING 9/25 & 9/26 NOTES:  Patient is a 74y old  Female who presents with a chief complaint of Sepsis    #Acute on chronic  hypercapnic respiratory failure 2/2 to COPD exacerbation?    Or restrictive lung disease due to elevated hemodiaphgragm and benzo use?   - s/p intubation. on NC, ABG noted, back on bipap  - extubated repeat ABG improved Co2 and PH   - cont to monitor , oxygen as needed keep pulse ox over 92%   #Acute metabolic encephalopathy   resolved - likely due to resp failure / hypercapnia   #leukocytosis   - GB sludge with distention , no cholecytstitis   - cont empirical iv zosyn complete course 5 days   - wbc is trending down   - urine cx , blood cx are neg     PULMONARY 10/7 & 10/9 NOTES:  Acute on chronic hypoxic/hypercapnic respiratory failure in setting of R hemidiaphragm elevation/asthma exacerbation/atelectasis/possible superimposed PNA      c/w nocturnal NIV; compliance stressed   completed course of abx previously; afebrile recently with most recent procal neg        ABXS  Azithromycin 500 mg IVP x 1 9/22  Indication: Empiric dosing  Ceftriaxone 1000 mg IVP x 1 9/22  Indication: Empiric dosing  Zosyn 3.375 mg IVP q8h x 1 9/22 then q8h x7d until 9/29    Indication:  Intra-abdominal infection  Cefepime  1000 mg IVPB x 1 on 9/21   Indication:  Sepsis      Thank you

## 2024-10-30 ENCOUNTER — TRANSCRIPTION ENCOUNTER (OUTPATIENT)
Age: 74
End: 2024-10-30

## 2024-11-11 ENCOUNTER — TRANSCRIPTION ENCOUNTER (OUTPATIENT)
Age: 74
End: 2024-11-11

## 2024-11-26 PROCEDURE — 84133 ASSAY OF URINE POTASSIUM: CPT

## 2024-11-26 PROCEDURE — 83735 ASSAY OF MAGNESIUM: CPT

## 2024-11-26 PROCEDURE — 0241U: CPT

## 2024-11-26 PROCEDURE — 82330 ASSAY OF CALCIUM: CPT

## 2024-11-26 PROCEDURE — 36415 COLL VENOUS BLD VENIPUNCTURE: CPT

## 2024-11-26 PROCEDURE — 86003 ALLG SPEC IGE CRUDE XTRC EA: CPT

## 2024-11-26 PROCEDURE — 70496 CT ANGIOGRAPHY HEAD: CPT | Mod: MC

## 2024-11-26 PROCEDURE — 82553 CREATINE MB FRACTION: CPT

## 2024-11-26 PROCEDURE — 71045 X-RAY EXAM CHEST 1 VIEW: CPT

## 2024-11-26 PROCEDURE — 94640 AIRWAY INHALATION TREATMENT: CPT

## 2024-11-26 PROCEDURE — 80053 COMPREHEN METABOLIC PANEL: CPT

## 2024-11-26 PROCEDURE — 81001 URINALYSIS AUTO W/SCOPE: CPT

## 2024-11-26 PROCEDURE — 86850 RBC ANTIBODY SCREEN: CPT

## 2024-11-26 PROCEDURE — 87449 NOS EACH ORGANISM AG IA: CPT

## 2024-11-26 PROCEDURE — 70450 CT HEAD/BRAIN W/O DYE: CPT | Mod: MC

## 2024-11-26 PROCEDURE — 74177 CT ABD & PELVIS W/CONTRAST: CPT | Mod: MC

## 2024-11-26 PROCEDURE — 87899 AGENT NOS ASSAY W/OPTIC: CPT

## 2024-11-26 PROCEDURE — 94660 CPAP INITIATION&MGMT: CPT

## 2024-11-26 PROCEDURE — 96374 THER/PROPH/DIAG INJ IV PUSH: CPT

## 2024-11-26 PROCEDURE — 85730 THROMBOPLASTIN TIME PARTIAL: CPT

## 2024-11-26 PROCEDURE — 96375 TX/PRO/DX INJ NEW DRUG ADDON: CPT

## 2024-11-26 PROCEDURE — 93458 L HRT ARTERY/VENTRICLE ANGIO: CPT

## 2024-11-26 PROCEDURE — 80048 BASIC METABOLIC PNL TOTAL CA: CPT

## 2024-11-26 PROCEDURE — 82435 ASSAY OF BLOOD CHLORIDE: CPT

## 2024-11-26 PROCEDURE — 87640 STAPH A DNA AMP PROBE: CPT

## 2024-11-26 PROCEDURE — 0225U NFCT DS DNA&RNA 21 SARSCOV2: CPT

## 2024-11-26 PROCEDURE — 84484 ASSAY OF TROPONIN QUANT: CPT

## 2024-11-26 PROCEDURE — 84100 ASSAY OF PHOSPHORUS: CPT

## 2024-11-26 PROCEDURE — 93005 ELECTROCARDIOGRAM TRACING: CPT

## 2024-11-26 PROCEDURE — 84145 PROCALCITONIN (PCT): CPT

## 2024-11-26 PROCEDURE — 84295 ASSAY OF SERUM SODIUM: CPT

## 2024-11-26 PROCEDURE — 83605 ASSAY OF LACTIC ACID: CPT

## 2024-11-26 PROCEDURE — 83690 ASSAY OF LIPASE: CPT

## 2024-11-26 PROCEDURE — 71275 CT ANGIOGRAPHY CHEST: CPT | Mod: MC

## 2024-11-26 PROCEDURE — 87641 MR-STAPH DNA AMP PROBE: CPT

## 2024-11-26 PROCEDURE — 82785 ASSAY OF IGE: CPT

## 2024-11-26 PROCEDURE — 87637 SARSCOV2&INF A&B&RSV AMP PRB: CPT

## 2024-11-26 PROCEDURE — 85014 HEMATOCRIT: CPT

## 2024-11-26 PROCEDURE — 97163 PT EVAL HIGH COMPLEX 45 MIN: CPT

## 2024-11-26 PROCEDURE — 85025 COMPLETE CBC W/AUTO DIFF WBC: CPT

## 2024-11-26 PROCEDURE — 82947 ASSAY GLUCOSE BLOOD QUANT: CPT

## 2024-11-26 PROCEDURE — 82803 BLOOD GASES ANY COMBINATION: CPT

## 2024-11-26 PROCEDURE — 83935 ASSAY OF URINE OSMOLALITY: CPT

## 2024-11-26 PROCEDURE — 94760 N-INVAS EAR/PLS OXIMETRY 1: CPT

## 2024-11-26 PROCEDURE — 82550 ASSAY OF CK (CPK): CPT

## 2024-11-26 PROCEDURE — 70551 MRI BRAIN STEM W/O DYE: CPT | Mod: MC

## 2024-11-26 PROCEDURE — 84156 ASSAY OF PROTEIN URINE: CPT

## 2024-11-26 PROCEDURE — 87040 BLOOD CULTURE FOR BACTERIA: CPT

## 2024-11-26 PROCEDURE — 82962 GLUCOSE BLOOD TEST: CPT

## 2024-11-26 PROCEDURE — 36600 WITHDRAWAL OF ARTERIAL BLOOD: CPT

## 2024-11-26 PROCEDURE — 85018 HEMOGLOBIN: CPT

## 2024-11-26 PROCEDURE — 86901 BLOOD TYPING SEROLOGIC RH(D): CPT

## 2024-11-26 PROCEDURE — 84132 ASSAY OF SERUM POTASSIUM: CPT

## 2024-11-26 PROCEDURE — 86900 BLOOD TYPING SEROLOGIC ABO: CPT

## 2024-11-26 PROCEDURE — 76700 US EXAM ABDOM COMPLETE: CPT

## 2024-11-26 PROCEDURE — 93970 EXTREMITY STUDY: CPT

## 2024-11-26 PROCEDURE — 84300 ASSAY OF URINE SODIUM: CPT

## 2024-11-26 PROCEDURE — 85027 COMPLETE CBC AUTOMATED: CPT

## 2024-11-26 PROCEDURE — 80076 HEPATIC FUNCTION PANEL: CPT

## 2024-11-26 PROCEDURE — 99291 CRITICAL CARE FIRST HOUR: CPT | Mod: 25

## 2024-11-26 PROCEDURE — 0042T: CPT | Mod: MC

## 2024-11-26 PROCEDURE — 94002 VENT MGMT INPAT INIT DAY: CPT

## 2024-11-26 PROCEDURE — 87086 URINE CULTURE/COLONY COUNT: CPT

## 2024-11-26 PROCEDURE — 70498 CT ANGIOGRAPHY NECK: CPT | Mod: MC

## 2024-11-26 PROCEDURE — 83930 ASSAY OF BLOOD OSMOLALITY: CPT

## 2024-11-26 PROCEDURE — 84540 ASSAY OF URINE/UREA-N: CPT

## 2024-11-26 PROCEDURE — 85610 PROTHROMBIN TIME: CPT

## 2024-11-26 PROCEDURE — 82570 ASSAY OF URINE CREATININE: CPT

## 2024-11-26 PROCEDURE — 96376 TX/PRO/DX INJ SAME DRUG ADON: CPT

## 2024-12-06 RX ORDER — CYCLOBENZAPRINE HYDROCHLORIDE 15 MG/1
0 CAPSULE, EXTENDED RELEASE ORAL
Qty: 0 | Refills: 0 | DISCHARGE
Start: 2024-12-06

## 2024-12-10 RX ORDER — HYDROCORTISONE 10 MG/G
0 CREAM TOPICAL
Qty: 0 | Refills: 0 | DISCHARGE
Start: 2024-12-10

## 2024-12-16 ENCOUNTER — APPOINTMENT (OUTPATIENT)
Dept: NEUROLOGY | Facility: CLINIC | Age: 74
End: 2024-12-16

## 2024-12-17 ENCOUNTER — NON-APPOINTMENT (OUTPATIENT)
Age: 74
End: 2024-12-17

## 2024-12-17 ENCOUNTER — APPOINTMENT (OUTPATIENT)
Dept: CARDIOLOGY | Facility: CLINIC | Age: 74
End: 2024-12-17
Payer: MEDICARE

## 2024-12-17 ENCOUNTER — EMERGENCY (EMERGENCY)
Facility: HOSPITAL | Age: 74
LOS: 1 days | Discharge: HOME | End: 2024-12-17
Attending: EMERGENCY MEDICINE
Payer: MEDICARE

## 2024-12-17 VITALS
WEIGHT: 130 LBS | HEIGHT: 60 IN | BODY MASS INDEX: 25.52 KG/M2 | RESPIRATION RATE: 16 BRPM | OXYGEN SATURATION: 93 % | SYSTOLIC BLOOD PRESSURE: 157 MMHG | HEART RATE: 89 BPM | DIASTOLIC BLOOD PRESSURE: 92 MMHG

## 2024-12-17 VITALS
OXYGEN SATURATION: 95 % | TEMPERATURE: 99 F | RESPIRATION RATE: 22 BRPM | DIASTOLIC BLOOD PRESSURE: 73 MMHG | WEIGHT: 130.07 LBS | HEART RATE: 104 BPM | SYSTOLIC BLOOD PRESSURE: 154 MMHG | HEIGHT: 60 IN

## 2024-12-17 DIAGNOSIS — I10 ESSENTIAL (PRIMARY) HYPERTENSION: ICD-10-CM

## 2024-12-17 DIAGNOSIS — I42.9 CARDIOMYOPATHY, UNSPECIFIED: ICD-10-CM

## 2024-12-17 DIAGNOSIS — I25.10 ATHEROSCLEROTIC HEART DISEASE OF NATIVE CORONARY ARTERY W/OUT ANGINA PECTORIS: ICD-10-CM

## 2024-12-17 DIAGNOSIS — Z90.710 ACQUIRED ABSENCE OF BOTH CERVIX AND UTERUS: Chronic | ICD-10-CM

## 2024-12-17 DIAGNOSIS — E87.1 HYPO-OSMOLALITY AND HYPONATREMIA: ICD-10-CM

## 2024-12-17 DIAGNOSIS — Z98.891 HISTORY OF UTERINE SCAR FROM PREVIOUS SURGERY: Chronic | ICD-10-CM

## 2024-12-17 PROCEDURE — 99215 OFFICE O/P EST HI 40 MIN: CPT

## 2024-12-17 PROCEDURE — 93000 ELECTROCARDIOGRAM COMPLETE: CPT

## 2024-12-17 PROCEDURE — 99285 EMERGENCY DEPT VISIT HI MDM: CPT | Mod: GC

## 2024-12-17 PROCEDURE — G2211 COMPLEX E/M VISIT ADD ON: CPT

## 2024-12-17 RX ORDER — ASPIRIN 325 MG
0 TABLET ORAL
Qty: 0 | Refills: 0 | DISCHARGE
Start: 2024-12-17

## 2024-12-17 RX ORDER — HYDROCORTISONE 25 MG/G
2.5 CREAM TOPICAL
Qty: 57 | Refills: 0 | Status: ACTIVE | COMMUNITY
Start: 2024-12-10

## 2024-12-17 RX ORDER — MUPIROCIN 20 MG/G
2 OINTMENT TOPICAL
Qty: 22 | Refills: 0 | Status: ACTIVE | COMMUNITY
Start: 2024-06-28

## 2024-12-17 RX ORDER — DOXYCYCLINE HYCLATE 100 MG/1
100 CAPSULE ORAL
Qty: 20 | Refills: 0 | Status: ACTIVE | COMMUNITY
Start: 2024-12-11

## 2024-12-17 RX ORDER — AMOXICILLIN AND CLAVULANATE POTASSIUM 875; 125 MG/1; MG/1
875-125 TABLET, COATED ORAL
Qty: 20 | Refills: 0 | Status: ACTIVE | COMMUNITY
Start: 2024-12-11

## 2024-12-17 RX ORDER — IPRATROPIUM BROMIDE AND ALBUTEROL SULFATE 2.5; .5 MG/3ML; MG/3ML
3 SOLUTION RESPIRATORY (INHALATION) ONCE
Refills: 0 | Status: COMPLETED | OUTPATIENT
Start: 2024-12-17 | End: 2024-12-17

## 2024-12-17 RX ORDER — NORMAL SALT TABLETS 1 G/G
1 TABLET ORAL
Refills: 0 | Status: ACTIVE | COMMUNITY
Start: 2024-12-17

## 2024-12-17 RX ORDER — CYCLOBENZAPRINE HYDROCHLORIDE 5 MG/1
5 TABLET, FILM COATED ORAL
Qty: 30 | Refills: 0 | Status: ACTIVE | COMMUNITY
Start: 2024-12-06

## 2024-12-17 RX ORDER — METHYLPREDNISOLONE SOD SUCC 125 MG
125 VIAL (EA) INJECTION ONCE
Refills: 0 | Status: COMPLETED | OUTPATIENT
Start: 2024-12-17 | End: 2024-12-17

## 2024-12-17 RX ORDER — FAMOTIDINE 20 MG/1
20 TABLET, FILM COATED ORAL
Qty: 30 | Refills: 0 | Status: ACTIVE | COMMUNITY
Start: 2024-12-10

## 2024-12-17 RX ORDER — MORPHINE SULFATE 15 MG/1
15 TABLET ORAL
Qty: 60 | Refills: 0 | Status: ACTIVE | COMMUNITY
Start: 2024-08-22

## 2024-12-17 RX ADMIN — IPRATROPIUM BROMIDE AND ALBUTEROL SULFATE 3 MILLILITER(S): 2.5; .5 SOLUTION RESPIRATORY (INHALATION) at 23:50

## 2024-12-17 RX ADMIN — IPRATROPIUM BROMIDE AND ALBUTEROL SULFATE 3 MILLILITER(S): 2.5; .5 SOLUTION RESPIRATORY (INHALATION) at 23:51

## 2024-12-17 RX ADMIN — Medication 125 MILLIGRAM(S): at 23:50

## 2024-12-17 NOTE — ED ADULT TRIAGE NOTE - CHIEF COMPLAINT QUOTE
Patient presents to ED with c/o cough, body aches and chest soreness since Wednesday.  Per patient, she got out of rehab last Tuesday, woke up with cough Wednesday morning.  Seen at PMD's office Wednesday afternoon, had CXR done and started on multiple medications with no improvement.  Seen at cardiologist today and advised to come to ED for further evaluation.  Per patient, O2 sat has been up and down all day with low of 83%.  Patient is currently on O2 2lpm prn.  Patient with recent hospital stay in 9/24 intubated.

## 2024-12-18 VITALS
SYSTOLIC BLOOD PRESSURE: 168 MMHG | TEMPERATURE: 98 F | HEART RATE: 105 BPM | DIASTOLIC BLOOD PRESSURE: 86 MMHG | OXYGEN SATURATION: 95 % | RESPIRATION RATE: 22 BRPM

## 2024-12-18 LAB
ALBUMIN SERPL ELPH-MCNC: 3.7 G/DL — SIGNIFICANT CHANGE UP (ref 3.3–5.2)
ALP SERPL-CCNC: 94 U/L — SIGNIFICANT CHANGE UP (ref 40–120)
ALT FLD-CCNC: 20 U/L — SIGNIFICANT CHANGE UP
ANION GAP SERPL CALC-SCNC: 10 MMOL/L — SIGNIFICANT CHANGE UP (ref 5–17)
AST SERPL-CCNC: 22 U/L — SIGNIFICANT CHANGE UP
BASOPHILS # BLD AUTO: 0.04 K/UL — SIGNIFICANT CHANGE UP (ref 0–0.2)
BASOPHILS NFR BLD AUTO: 0.4 % — SIGNIFICANT CHANGE UP (ref 0–2)
BILIRUB SERPL-MCNC: <0.2 MG/DL — LOW (ref 0.4–2)
BUN SERPL-MCNC: 18 MG/DL — SIGNIFICANT CHANGE UP (ref 8–20)
CALCIUM SERPL-MCNC: 8.9 MG/DL — SIGNIFICANT CHANGE UP (ref 8.4–10.5)
CHLORIDE SERPL-SCNC: 97 MMOL/L — SIGNIFICANT CHANGE UP (ref 96–108)
CO2 SERPL-SCNC: 28 MMOL/L — SIGNIFICANT CHANGE UP (ref 22–29)
CREAT SERPL-MCNC: 0.62 MG/DL — SIGNIFICANT CHANGE UP (ref 0.5–1.3)
EGFR: 93 ML/MIN/1.73M2 — SIGNIFICANT CHANGE UP
EOSINOPHIL # BLD AUTO: 0.12 K/UL — SIGNIFICANT CHANGE UP (ref 0–0.5)
EOSINOPHIL NFR BLD AUTO: 1.1 % — SIGNIFICANT CHANGE UP (ref 0–6)
GLUCOSE SERPL-MCNC: 91 MG/DL — SIGNIFICANT CHANGE UP (ref 70–99)
HCT VFR BLD CALC: 40.7 % — SIGNIFICANT CHANGE UP (ref 34.5–45)
HGB BLD-MCNC: 13.2 G/DL — SIGNIFICANT CHANGE UP (ref 11.5–15.5)
IMM GRANULOCYTES NFR BLD AUTO: 0.9 % — SIGNIFICANT CHANGE UP (ref 0–0.9)
LIDOCAIN IGE QN: 23 U/L — SIGNIFICANT CHANGE UP (ref 22–51)
LYMPHOCYTES # BLD AUTO: 3.25 K/UL — SIGNIFICANT CHANGE UP (ref 1–3.3)
LYMPHOCYTES # BLD AUTO: 30 % — SIGNIFICANT CHANGE UP (ref 13–44)
MAGNESIUM SERPL-MCNC: 2.3 MG/DL — SIGNIFICANT CHANGE UP (ref 1.6–2.6)
MCHC RBC-ENTMCNC: 28.1 PG — SIGNIFICANT CHANGE UP (ref 27–34)
MCHC RBC-ENTMCNC: 32.4 G/DL — SIGNIFICANT CHANGE UP (ref 32–36)
MCV RBC AUTO: 86.8 FL — SIGNIFICANT CHANGE UP (ref 80–100)
MONOCYTES # BLD AUTO: 1.05 K/UL — HIGH (ref 0–0.9)
MONOCYTES NFR BLD AUTO: 9.7 % — SIGNIFICANT CHANGE UP (ref 2–14)
NEUTROPHILS # BLD AUTO: 6.27 K/UL — SIGNIFICANT CHANGE UP (ref 1.8–7.4)
NEUTROPHILS NFR BLD AUTO: 57.9 % — SIGNIFICANT CHANGE UP (ref 43–77)
NT-PROBNP SERPL-SCNC: 631 PG/ML — HIGH (ref 0–300)
PLATELET # BLD AUTO: 372 K/UL — SIGNIFICANT CHANGE UP (ref 150–400)
POTASSIUM SERPL-MCNC: 4.3 MMOL/L — SIGNIFICANT CHANGE UP (ref 3.5–5.3)
POTASSIUM SERPL-SCNC: 4.3 MMOL/L — SIGNIFICANT CHANGE UP (ref 3.5–5.3)
PROT SERPL-MCNC: 7.1 G/DL — SIGNIFICANT CHANGE UP (ref 6.6–8.7)
RAPID RVP RESULT: SIGNIFICANT CHANGE UP
RBC # BLD: 4.69 M/UL — SIGNIFICANT CHANGE UP (ref 3.8–5.2)
RBC # FLD: 13.2 % — SIGNIFICANT CHANGE UP (ref 10.3–14.5)
SARS-COV-2 RNA SPEC QL NAA+PROBE: SIGNIFICANT CHANGE UP
SODIUM SERPL-SCNC: 135 MMOL/L — SIGNIFICANT CHANGE UP (ref 135–145)
TROPONIN T, HIGH SENSITIVITY RESULT: 38 NG/L — SIGNIFICANT CHANGE UP (ref 0–51)
TROPONIN T, HIGH SENSITIVITY RESULT: 41 NG/L — SIGNIFICANT CHANGE UP (ref 0–51)
WBC # BLD: 10.83 K/UL — HIGH (ref 3.8–10.5)
WBC # FLD AUTO: 10.83 K/UL — HIGH (ref 3.8–10.5)

## 2024-12-18 PROCEDURE — 71275 CT ANGIOGRAPHY CHEST: CPT | Mod: 26,MC

## 2024-12-18 PROCEDURE — 0225U NFCT DS DNA&RNA 21 SARSCOV2: CPT

## 2024-12-18 PROCEDURE — 83735 ASSAY OF MAGNESIUM: CPT

## 2024-12-18 PROCEDURE — 84484 ASSAY OF TROPONIN QUANT: CPT

## 2024-12-18 PROCEDURE — 83880 ASSAY OF NATRIURETIC PEPTIDE: CPT

## 2024-12-18 PROCEDURE — 36415 COLL VENOUS BLD VENIPUNCTURE: CPT

## 2024-12-18 PROCEDURE — 83690 ASSAY OF LIPASE: CPT

## 2024-12-18 PROCEDURE — 93005 ELECTROCARDIOGRAM TRACING: CPT

## 2024-12-18 PROCEDURE — 71045 X-RAY EXAM CHEST 1 VIEW: CPT

## 2024-12-18 PROCEDURE — 80053 COMPREHEN METABOLIC PANEL: CPT

## 2024-12-18 PROCEDURE — 99285 EMERGENCY DEPT VISIT HI MDM: CPT | Mod: 25

## 2024-12-18 PROCEDURE — 71275 CT ANGIOGRAPHY CHEST: CPT | Mod: MC

## 2024-12-18 PROCEDURE — 94640 AIRWAY INHALATION TREATMENT: CPT

## 2024-12-18 PROCEDURE — 71045 X-RAY EXAM CHEST 1 VIEW: CPT | Mod: 26

## 2024-12-18 PROCEDURE — 93010 ELECTROCARDIOGRAM REPORT: CPT

## 2024-12-18 PROCEDURE — 85025 COMPLETE CBC W/AUTO DIFF WBC: CPT

## 2024-12-18 PROCEDURE — 96374 THER/PROPH/DIAG INJ IV PUSH: CPT | Mod: XU

## 2024-12-18 NOTE — ED PROVIDER NOTE - WR ORDER ID 1
Cued up basaglar. (there is a warning on it also)        Will discontinue the lantus on chart also.     Samia Bingham RNC     [id number]

## 2024-12-18 NOTE — ED ADULT NURSE NOTE - OBJECTIVE STATEMENT
Pt presents to ED sent by cardiologist after becoming hypoxic for further evaluation. Pt also c/o of recent cough, congestion and body aches since Wednesday. Pt denies any CP, HA, dizziness, vision changes, abd pain, NVD, fever/chills,  and urinary symptoms at this time. Resp even and unlabored. NAD present. Pt on CM in NSR. Pt made aware of plan of care and verbalized understanding.

## 2024-12-18 NOTE — ED PROVIDER NOTE - PATIENT PORTAL LINK FT
You can access the FollowMyHealth Patient Portal offered by [facility] by registering at the following website: [email or web address]. By joining [employer]’s FollowMyHealth portal, you will also be able to view your health information using other applications (apps) compatible with our system.

## 2024-12-18 NOTE — ED PROVIDER NOTE - CLINICAL SUMMARY MEDICAL DECISION MAKING FREE TEXT BOX
Pt came in with cough. VS stable. pt satting well on RA. CTA chest is negative for PE, bloodwork shows mild leukocytosis, otherwise WNL. likely viral URI. pending rvp results, pt will get a callback if positive. given strict return precautions and told to follow up with cardiology and pcp.

## 2024-12-18 NOTE — ED PROVIDER NOTE - PHYSICAL EXAMINATION
General: NAD, well appearing  HEENT: Normocephalic, atraumatic  Neck: No apparent stiffness or JVD  Pulm: Chest wall symmetric and nontender, lungs clear to ascultation   Cardiac: Regular rate and regular rhythm  Abdomen: Nontender and nondistended  Skin: Skin is warm, dry and intact without rashes or lesions.  Neuro: No motor or sensory deficits above reported baseline  MSK: No deformity or tenderness above reported baseline General: NAD, well appearing  HEENT: Normocephalic, atraumatic  Neck: No apparent stiffness or JVD  Pulm: Chest wall symmetric and nontender, mild wheezing bl  Cardiac: Regular rate and regular rhythm  Abdomen: Nontender and nondistended  Skin: Skin is warm, dry and intact without rashes or lesions.  Neuro: No motor or sensory deficits above reported baseline  MSK: No deformity or tenderness above reported baseline

## 2024-12-18 NOTE — ED PROVIDER NOTE - PROGRESS NOTE DETAILS
VS stable. pt satting well on RA. CTA chest is negative for PE, bloodwork shows mild leukocytosis, otherwise WNL. likely viral URI. pending rvp results, pt will get a callback if positive. given strict return precautions and told to follow up with cardiology and pcp.

## 2024-12-18 NOTE — ED PROVIDER NOTE - NSFOLLOWUPINSTRUCTIONS_ED_ALL_ED_FT
Cough    Please follow up with your cardiologist and primary doctor within the next few days if symptoms persist.     Coughing is a reflex that clears your throat and your airways. Coughing helps to heal and protect your lungs. It is normal to cough occasionally, but a cough that happens with other symptoms or lasts a long time may be a sign of a condition that needs treatment. Coughing may be caused by infections, asthma or COPD, smoking, postnasal drip, gastroesophageal reflux, as well as other medical conditions. Take medicines only as instructed by your health care provider. Avoid environments or triggers that causes you to cough at work or at home.    SEEK IMMEDIATE MEDICAL CARE IF YOU HAVE ANY OF THE FOLLOWING SYMPTOMS: coughing up blood, shortness of breath, rapid heart rate, chest pain, unexplained weight loss or night sweats.

## 2024-12-18 NOTE — ED PROVIDER NOTE - OBJECTIVE STATEMENT
Pt is a 75 yo female with PMH of CAD (s/p PCI), HFpEF, HTN, HLD, COPD (on home O2 PRN), recent admission for chest pain s/p C with nonobstructive CAD presenting with cough. Patient presents to ED with c/o cough, body aches and chest soreness since Wednesday.  Pt was discharged from rehab Tuesday, woke up with cough Wednesday morning.  Pt was Seen at cardiologist today and advised to come to ED for further evaluation.  Per patient, O2 sat has been up and down all day with low of 83%.  Patient is currently on O2 2lpm prn.  Patient with recent hospital stay in 9/24 intubated. Pt denies fever, chills, chest pain, abdominal pain, N/V/D.

## 2024-12-24 PROBLEM — F10.90 ALCOHOL USE: Status: ACTIVE | Noted: 2018-01-23

## 2025-01-07 ENCOUNTER — APPOINTMENT (OUTPATIENT)
Dept: PULMONOLOGY | Facility: CLINIC | Age: 75
End: 2025-01-07
Payer: COMMERCIAL

## 2025-01-07 VITALS
WEIGHT: 130 LBS | DIASTOLIC BLOOD PRESSURE: 66 MMHG | HEART RATE: 86 BPM | RESPIRATION RATE: 16 BRPM | HEIGHT: 60 IN | SYSTOLIC BLOOD PRESSURE: 104 MMHG | OXYGEN SATURATION: 92 % | BODY MASS INDEX: 25.52 KG/M2

## 2025-01-07 DIAGNOSIS — Z09 ENCOUNTER FOR FOLLOW-UP EXAMINATION AFTER COMPLETED TREATMENT FOR CONDITIONS OTHER THAN MALIGNANT NEOPLASM: ICD-10-CM

## 2025-01-07 DIAGNOSIS — J98.6 DISORDERS OF DIAPHRAGM: ICD-10-CM

## 2025-01-07 DIAGNOSIS — R06.09 OTHER FORMS OF DYSPNEA: ICD-10-CM

## 2025-01-07 DIAGNOSIS — Z01.818 ENCOUNTER FOR OTHER PREPROCEDURAL EXAMINATION: ICD-10-CM

## 2025-01-07 PROCEDURE — 99205 OFFICE O/P NEW HI 60 MIN: CPT

## 2025-01-07 PROCEDURE — G2211 COMPLEX E/M VISIT ADD ON: CPT | Mod: NC

## 2025-01-13 ENCOUNTER — NON-APPOINTMENT (OUTPATIENT)
Age: 75
End: 2025-01-13

## 2025-01-14 PROBLEM — Z01.818 PREOPERATIVE CLEARANCE: Status: ACTIVE | Noted: 2020-06-04

## 2025-02-02 ENCOUNTER — INPATIENT (INPATIENT)
Facility: HOSPITAL | Age: 75
LOS: 3 days | Discharge: NURSING, REHAB OR LONG-TERM CARE | DRG: 536 | End: 2025-02-06
Attending: STUDENT IN AN ORGANIZED HEALTH CARE EDUCATION/TRAINING PROGRAM | Admitting: STUDENT IN AN ORGANIZED HEALTH CARE EDUCATION/TRAINING PROGRAM
Payer: MEDICARE

## 2025-02-02 VITALS
HEART RATE: 70 BPM | OXYGEN SATURATION: 95 % | HEIGHT: 60 IN | DIASTOLIC BLOOD PRESSURE: 82 MMHG | TEMPERATURE: 98 F | RESPIRATION RATE: 18 BRPM | SYSTOLIC BLOOD PRESSURE: 149 MMHG

## 2025-02-02 DIAGNOSIS — S72.009A FRACTURE OF UNSPECIFIED PART OF NECK OF UNSPECIFIED FEMUR, INITIAL ENCOUNTER FOR CLOSED FRACTURE: ICD-10-CM

## 2025-02-02 DIAGNOSIS — Z98.891 HISTORY OF UTERINE SCAR FROM PREVIOUS SURGERY: Chronic | ICD-10-CM

## 2025-02-02 DIAGNOSIS — Z90.710 ACQUIRED ABSENCE OF BOTH CERVIX AND UTERUS: Chronic | ICD-10-CM

## 2025-02-02 LAB
ANION GAP SERPL CALC-SCNC: 9 MMOL/L — SIGNIFICANT CHANGE UP (ref 5–17)
APTT BLD: 29.3 SEC — SIGNIFICANT CHANGE UP (ref 24.5–35.6)
BASOPHILS # BLD AUTO: 0.03 K/UL — SIGNIFICANT CHANGE UP (ref 0–0.2)
BASOPHILS NFR BLD AUTO: 0.3 % — SIGNIFICANT CHANGE UP (ref 0–2)
BLD GP AB SCN SERPL QL: SIGNIFICANT CHANGE UP
BUN SERPL-MCNC: 22.6 MG/DL — HIGH (ref 8–20)
CALCIUM SERPL-MCNC: 8.8 MG/DL — SIGNIFICANT CHANGE UP (ref 8.4–10.5)
CHLORIDE SERPL-SCNC: 98 MMOL/L — SIGNIFICANT CHANGE UP (ref 96–108)
CO2 SERPL-SCNC: 25 MMOL/L — SIGNIFICANT CHANGE UP (ref 22–29)
CREAT SERPL-MCNC: 0.75 MG/DL — SIGNIFICANT CHANGE UP (ref 0.5–1.3)
EGFR: 83 ML/MIN/1.73M2 — SIGNIFICANT CHANGE UP
EOSINOPHIL # BLD AUTO: 0.14 K/UL — SIGNIFICANT CHANGE UP (ref 0–0.5)
EOSINOPHIL NFR BLD AUTO: 1.5 % — SIGNIFICANT CHANGE UP (ref 0–6)
GLUCOSE SERPL-MCNC: 96 MG/DL — SIGNIFICANT CHANGE UP (ref 70–99)
HCT VFR BLD CALC: 34 % — LOW (ref 34.5–45)
HGB BLD-MCNC: 10.9 G/DL — LOW (ref 11.5–15.5)
IMM GRANULOCYTES NFR BLD AUTO: 0.4 % — SIGNIFICANT CHANGE UP (ref 0–0.9)
INR BLD: 0.96 RATIO — SIGNIFICANT CHANGE UP (ref 0.85–1.16)
LYMPHOCYTES # BLD AUTO: 1.18 K/UL — SIGNIFICANT CHANGE UP (ref 1–3.3)
LYMPHOCYTES # BLD AUTO: 12.8 % — LOW (ref 13–44)
MCHC RBC-ENTMCNC: 26.9 PG — LOW (ref 27–34)
MCHC RBC-ENTMCNC: 32.1 G/DL — SIGNIFICANT CHANGE UP (ref 32–36)
MCV RBC AUTO: 84 FL — SIGNIFICANT CHANGE UP (ref 80–100)
MONOCYTES # BLD AUTO: 0.79 K/UL — SIGNIFICANT CHANGE UP (ref 0–0.9)
MONOCYTES NFR BLD AUTO: 8.6 % — SIGNIFICANT CHANGE UP (ref 2–14)
MRSA PCR RESULT.: SIGNIFICANT CHANGE UP
NEUTROPHILS # BLD AUTO: 7.03 K/UL — SIGNIFICANT CHANGE UP (ref 1.8–7.4)
NEUTROPHILS NFR BLD AUTO: 76.4 % — SIGNIFICANT CHANGE UP (ref 43–77)
NT-PROBNP SERPL-SCNC: 1680 PG/ML — HIGH (ref 0–300)
PLATELET # BLD AUTO: 282 K/UL — SIGNIFICANT CHANGE UP (ref 150–400)
POTASSIUM SERPL-MCNC: 4.1 MMOL/L — SIGNIFICANT CHANGE UP (ref 3.5–5.3)
POTASSIUM SERPL-SCNC: 4.1 MMOL/L — SIGNIFICANT CHANGE UP (ref 3.5–5.3)
PROTHROM AB SERPL-ACNC: 10.8 SEC — SIGNIFICANT CHANGE UP (ref 9.9–13.4)
RBC # BLD: 4.05 M/UL — SIGNIFICANT CHANGE UP (ref 3.8–5.2)
RBC # FLD: 13.9 % — SIGNIFICANT CHANGE UP (ref 10.3–14.5)
S AUREUS DNA NOSE QL NAA+PROBE: DETECTED
SODIUM SERPL-SCNC: 132 MMOL/L — LOW (ref 135–145)
WBC # BLD: 9.21 K/UL — SIGNIFICANT CHANGE UP (ref 3.8–10.5)
WBC # FLD AUTO: 9.21 K/UL — SIGNIFICANT CHANGE UP (ref 3.8–10.5)

## 2025-02-02 PROCEDURE — 99223 1ST HOSP IP/OBS HIGH 75: CPT

## 2025-02-02 PROCEDURE — 99223 1ST HOSP IP/OBS HIGH 75: CPT | Mod: 57

## 2025-02-02 PROCEDURE — 93010 ELECTROCARDIOGRAM REPORT: CPT

## 2025-02-02 PROCEDURE — 71045 X-RAY EXAM CHEST 1 VIEW: CPT | Mod: 26

## 2025-02-02 PROCEDURE — 72170 X-RAY EXAM OF PELVIS: CPT | Mod: 26,XE

## 2025-02-02 PROCEDURE — 99285 EMERGENCY DEPT VISIT HI MDM: CPT

## 2025-02-02 PROCEDURE — 73502 X-RAY EXAM HIP UNI 2-3 VIEWS: CPT | Mod: 26,RT

## 2025-02-02 RX ORDER — OXYCODONE HYDROCHLORIDE 30 MG/1
10 TABLET ORAL EVERY 4 HOURS
Refills: 0 | Status: DISCONTINUED | OUTPATIENT
Start: 2025-02-02 | End: 2025-02-03

## 2025-02-02 RX ORDER — ENOXAPARIN SODIUM 100 MG/ML
40 INJECTION SUBCUTANEOUS ONCE
Refills: 0 | Status: COMPLETED | OUTPATIENT
Start: 2025-02-02 | End: 2025-02-02

## 2025-02-02 RX ORDER — POVIDONE-IODINE 0.01 ML/1
1 SWAB TOPICAL ONCE
Refills: 0 | Status: COMPLETED | OUTPATIENT
Start: 2025-02-02 | End: 2025-02-03

## 2025-02-02 RX ORDER — POLYETHYLENE GLYCOL 3350 17 G/17G
17 POWDER, FOR SOLUTION ORAL DAILY
Refills: 0 | Status: DISCONTINUED | OUTPATIENT
Start: 2025-02-02 | End: 2025-02-03

## 2025-02-02 RX ORDER — TRAZODONE HCL 100 MG
50 TABLET ORAL AT BEDTIME
Refills: 0 | Status: DISCONTINUED | OUTPATIENT
Start: 2025-02-02 | End: 2025-02-03

## 2025-02-02 RX ORDER — QUETIAPINE FUMARATE 300 MG/1
300 TABLET ORAL AT BEDTIME
Refills: 0 | Status: DISCONTINUED | OUTPATIENT
Start: 2025-02-02 | End: 2025-02-03

## 2025-02-02 RX ORDER — SENNOSIDES 8.6 MG
2 TABLET ORAL AT BEDTIME
Refills: 0 | Status: DISCONTINUED | OUTPATIENT
Start: 2025-02-02 | End: 2025-02-03

## 2025-02-02 RX ORDER — ACETAMINOPHEN, DIPHENHYDRAMINE HCL, PHENYLEPHRINE HCL 325; 25; 5 MG/1; MG/1; MG/1
3 TABLET ORAL AT BEDTIME
Refills: 0 | Status: DISCONTINUED | OUTPATIENT
Start: 2025-02-02 | End: 2025-02-03

## 2025-02-02 RX ORDER — MUPIROCIN 2 G/100G
1 CREAM TOPICAL
Refills: 0 | Status: DISCONTINUED | OUTPATIENT
Start: 2025-02-02 | End: 2025-02-03

## 2025-02-02 RX ORDER — ALPRAZOLAM 2 MG
0.5 TABLET ORAL
Refills: 0 | Status: DISCONTINUED | OUTPATIENT
Start: 2025-02-02 | End: 2025-02-03

## 2025-02-02 RX ORDER — HYDROMORPHONE HYDROCHLORIDE 4 MG/ML
0.5 INJECTION, SOLUTION INTRAMUSCULAR; INTRAVENOUS; SUBCUTANEOUS
Refills: 0 | Status: DISCONTINUED | OUTPATIENT
Start: 2025-02-02 | End: 2025-02-02

## 2025-02-02 RX ORDER — ZOLPIDEM TARTRATE 5 MG/1
5 TABLET, COATED ORAL AT BEDTIME
Refills: 0 | Status: DISCONTINUED | OUTPATIENT
Start: 2025-02-02 | End: 2025-02-03

## 2025-02-02 RX ORDER — OXYCODONE HYDROCHLORIDE 30 MG/1
5 TABLET ORAL EVERY 4 HOURS
Refills: 0 | Status: DISCONTINUED | OUTPATIENT
Start: 2025-02-02 | End: 2025-02-03

## 2025-02-02 RX ORDER — ENOXAPARIN SODIUM 100 MG/ML
40 INJECTION SUBCUTANEOUS ONCE
Refills: 0 | Status: DISCONTINUED | OUTPATIENT
Start: 2025-02-02 | End: 2025-02-02

## 2025-02-02 RX ORDER — MAGNESIUM, ALUMINUM HYDROXIDE 200-225/5
30 SUSPENSION, ORAL (FINAL DOSE FORM) ORAL EVERY 4 HOURS
Refills: 0 | Status: DISCONTINUED | OUTPATIENT
Start: 2025-02-02 | End: 2025-02-03

## 2025-02-02 RX ORDER — ANTISEPTIC SURGICAL SCRUB 0.04 MG/ML
1 SOLUTION TOPICAL EVERY 12 HOURS
Refills: 0 | Status: COMPLETED | OUTPATIENT
Start: 2025-02-02 | End: 2025-02-03

## 2025-02-02 RX ORDER — TRANEXAMIC ACID 100 MG/ML
1000 INJECTION, SOLUTION INTRAVENOUS ONCE
Refills: 0 | Status: DISCONTINUED | OUTPATIENT
Start: 2025-02-02 | End: 2025-02-03

## 2025-02-02 RX ORDER — TIOTROPIUM BROMIDE MONOHYDRATE 18 UG/1
2 CAPSULE ORAL; RESPIRATORY (INHALATION) DAILY
Refills: 0 | Status: DISCONTINUED | OUTPATIENT
Start: 2025-02-02 | End: 2025-02-03

## 2025-02-02 RX ORDER — HYDROMORPHONE HYDROCHLORIDE 4 MG/ML
0.2 INJECTION, SOLUTION INTRAMUSCULAR; INTRAVENOUS; SUBCUTANEOUS
Refills: 0 | Status: DISCONTINUED | OUTPATIENT
Start: 2025-02-02 | End: 2025-02-02

## 2025-02-02 RX ORDER — HYDROMORPHONE HYDROCHLORIDE 4 MG/ML
0.5 INJECTION, SOLUTION INTRAMUSCULAR; INTRAVENOUS; SUBCUTANEOUS ONCE
Refills: 0 | Status: DISCONTINUED | OUTPATIENT
Start: 2025-02-02 | End: 2025-02-02

## 2025-02-02 RX ORDER — GABAPENTIN 800 MG/1
600 TABLET ORAL
Refills: 0 | Status: DISCONTINUED | OUTPATIENT
Start: 2025-02-02 | End: 2025-02-03

## 2025-02-02 RX ORDER — VENLAFAXINE HCL 75 MG
225 TABLET ORAL DAILY
Refills: 0 | Status: DISCONTINUED | OUTPATIENT
Start: 2025-02-02 | End: 2025-02-03

## 2025-02-02 RX ORDER — HYDROMORPHONE HYDROCHLORIDE 4 MG/ML
0.2 INJECTION, SOLUTION INTRAMUSCULAR; INTRAVENOUS; SUBCUTANEOUS
Refills: 0 | Status: DISCONTINUED | OUTPATIENT
Start: 2025-02-02 | End: 2025-02-03

## 2025-02-02 RX ORDER — BACTERIOSTATIC SODIUM CHLORIDE 0.9 %
1 VIAL (ML) INJECTION
Refills: 0 | Status: DISCONTINUED | OUTPATIENT
Start: 2025-02-02 | End: 2025-02-03

## 2025-02-02 RX ORDER — FLUTICASONE PROPIONATE AND SALMETEROL 113; 14 UG/1; UG/1
1 POWDER, METERED RESPIRATORY (INHALATION)
Refills: 0 | Status: DISCONTINUED | OUTPATIENT
Start: 2025-02-02 | End: 2025-02-03

## 2025-02-02 RX ORDER — VALSARTAN 80 MG
80 TABLET ORAL DAILY
Refills: 0 | Status: DISCONTINUED | OUTPATIENT
Start: 2025-02-02 | End: 2025-02-03

## 2025-02-02 RX ORDER — CEFAZOLIN SODIUM IN 0.9 % NACL 2 G/10 ML
2000 SYRINGE (ML) INTRAVENOUS ONCE
Refills: 0 | Status: DISCONTINUED | OUTPATIENT
Start: 2025-02-03 | End: 2025-02-03

## 2025-02-02 RX ORDER — NALOXONE HYDROCHLORIDE 3 MG/.1ML
0.4 SPRAY NASAL
Refills: 0 | Status: DISCONTINUED | OUTPATIENT
Start: 2025-02-02 | End: 2025-02-03

## 2025-02-02 RX ORDER — HYDROMORPHONE HYDROCHLORIDE 4 MG/ML
1 INJECTION, SOLUTION INTRAMUSCULAR; INTRAVENOUS; SUBCUTANEOUS
Refills: 0 | Status: DISCONTINUED | OUTPATIENT
Start: 2025-02-02 | End: 2025-02-02

## 2025-02-02 RX ORDER — ATORVASTATIN CALCIUM 80 MG/1
40 TABLET, FILM COATED ORAL AT BEDTIME
Refills: 0 | Status: DISCONTINUED | OUTPATIENT
Start: 2025-02-02 | End: 2025-02-03

## 2025-02-02 RX ORDER — ONDANSETRON 4 MG/1
4 TABLET, ORALLY DISINTEGRATING ORAL EVERY 8 HOURS
Refills: 0 | Status: DISCONTINUED | OUTPATIENT
Start: 2025-02-02 | End: 2025-02-03

## 2025-02-02 RX ORDER — CARVEDILOL 6.25 MG
3.12 TABLET ORAL EVERY 12 HOURS
Refills: 0 | Status: DISCONTINUED | OUTPATIENT
Start: 2025-02-02 | End: 2025-02-03

## 2025-02-02 RX ORDER — ALBUTEROL 90 MCG
1 AEROSOL REFILL (GRAM) INHALATION
Refills: 0 | Status: DISCONTINUED | OUTPATIENT
Start: 2025-02-02 | End: 2025-02-03

## 2025-02-02 RX ORDER — ACETAMINOPHEN 160 MG/5ML
650 SUSPENSION ORAL EVERY 6 HOURS
Refills: 0 | Status: DISCONTINUED | OUTPATIENT
Start: 2025-02-02 | End: 2025-02-03

## 2025-02-02 RX ADMIN — HYDROMORPHONE HYDROCHLORIDE 0.2 MILLIGRAM(S): 4 INJECTION, SOLUTION INTRAMUSCULAR; INTRAVENOUS; SUBCUTANEOUS at 22:15

## 2025-02-02 RX ADMIN — Medication 1 GRAM(S): at 17:08

## 2025-02-02 RX ADMIN — HYDROMORPHONE HYDROCHLORIDE 1 MILLIGRAM(S): 4 INJECTION, SOLUTION INTRAMUSCULAR; INTRAVENOUS; SUBCUTANEOUS at 15:15

## 2025-02-02 RX ADMIN — OXYCODONE HYDROCHLORIDE 10 MILLIGRAM(S): 30 TABLET ORAL at 18:12

## 2025-02-02 RX ADMIN — ONDANSETRON 4 MILLIGRAM(S): 4 TABLET, ORALLY DISINTEGRATING ORAL at 17:05

## 2025-02-02 RX ADMIN — Medication 50 MILLIGRAM(S): at 22:16

## 2025-02-02 RX ADMIN — MUPIROCIN 1 APPLICATION(S): 2 CREAM TOPICAL at 17:14

## 2025-02-02 RX ADMIN — HYDROMORPHONE HYDROCHLORIDE 0.5 MILLIGRAM(S): 4 INJECTION, SOLUTION INTRAMUSCULAR; INTRAVENOUS; SUBCUTANEOUS at 12:46

## 2025-02-02 RX ADMIN — OXYCODONE HYDROCHLORIDE 10 MILLIGRAM(S): 30 TABLET ORAL at 21:13

## 2025-02-02 RX ADMIN — HYDROMORPHONE HYDROCHLORIDE 1 MILLIGRAM(S): 4 INJECTION, SOLUTION INTRAMUSCULAR; INTRAVENOUS; SUBCUTANEOUS at 15:13

## 2025-02-02 RX ADMIN — GABAPENTIN 600 MILLIGRAM(S): 800 TABLET ORAL at 17:06

## 2025-02-02 RX ADMIN — QUETIAPINE FUMARATE 300 MILLIGRAM(S): 300 TABLET ORAL at 22:16

## 2025-02-02 RX ADMIN — HYDROMORPHONE HYDROCHLORIDE 0.5 MILLIGRAM(S): 4 INJECTION, SOLUTION INTRAMUSCULAR; INTRAVENOUS; SUBCUTANEOUS at 09:29

## 2025-02-02 RX ADMIN — ENOXAPARIN SODIUM 40 MILLIGRAM(S): 100 INJECTION SUBCUTANEOUS at 18:28

## 2025-02-02 RX ADMIN — OXYCODONE HYDROCHLORIDE 10 MILLIGRAM(S): 30 TABLET ORAL at 22:13

## 2025-02-02 RX ADMIN — HYDROMORPHONE HYDROCHLORIDE 0.2 MILLIGRAM(S): 4 INJECTION, SOLUTION INTRAMUSCULAR; INTRAVENOUS; SUBCUTANEOUS at 23:15

## 2025-02-02 RX ADMIN — Medication 3.12 MILLIGRAM(S): at 17:07

## 2025-02-02 RX ADMIN — HYDROMORPHONE HYDROCHLORIDE 0.5 MILLIGRAM(S): 4 INJECTION, SOLUTION INTRAMUSCULAR; INTRAVENOUS; SUBCUTANEOUS at 09:33

## 2025-02-02 RX ADMIN — HYDROMORPHONE HYDROCHLORIDE 0.2 MILLIGRAM(S): 4 INJECTION, SOLUTION INTRAMUSCULAR; INTRAVENOUS; SUBCUTANEOUS at 19:13

## 2025-02-02 RX ADMIN — ATORVASTATIN CALCIUM 40 MILLIGRAM(S): 80 TABLET, FILM COATED ORAL at 22:16

## 2025-02-02 RX ADMIN — OXYCODONE HYDROCHLORIDE 10 MILLIGRAM(S): 30 TABLET ORAL at 17:12

## 2025-02-02 NOTE — H&P ADULT - NSHPPHYSICALEXAM_GEN_ALL_CORE
GENERAL: pt examined bedside, appears uncomfortable but NAD  HEENT: NC/AT, moist oral mucosa, clear conjunctiva, sclera nonicteric  RESPIRATORY: Normal respiratory effort, no wheezing, rhonchi, rales  CARDIOVASCULAR: RRR, normal S1 and S2, no murmur/rub/gallop  ABDOMEN: soft, NT/ND, normoactive bowel sounds, no rebound/guarding  MSK: right foot externally rotated and right hip pain to palpation  EXTREMITIES: No cynaosis, no clubbing, no lower extremity edema  PSYCH: affect appropriate and cooperative  NEUROLOGY: A+Ox3, no focal neurologic deficits appreciated  SKIN: right hip echymosis

## 2025-02-02 NOTE — H&P ADULT - NSHPLABSRESULTS_GEN_ALL_CORE
10.9   9.21  )-----------( 282      ( 02 Feb 2025 08:55 )             34.0      02-02    132[L]  |  98  |  22.6[H]  ----------------------------<  96  4.1   |  25.0  |  0.75    Ca    8.8      02 Feb 2025 08:55      < from: Xray Hip/pelvis , Right (02.02.25 @ 09:36) >    IMPRESSION: A right subcapital/femoral neck hip fracture is apparent with   marked overriding of the fracture fragments.    < end of copied text >          < from: TTE W or WO Ultrasound Enhancing Agent (09.18.24 @ 11:47) >    CONCLUSIONS:      1. Left ventricular cavity is normal in size. Left ventricular wall thickness is mildly increased. Left ventricular systolic function is normal with an ejection fraction of 56 % by Mace's method of disks.   2. There is mild (grade 1) left ventricular diastolic dysfunction.   3. Normal right ventricular cavity size and normal right ventricular systolic function.   4. Trileaflet aortic valve with normal systolic excursion. Fibrocalcific aortic valve sclerosis without stenosis.   5. Trace aortic regurgitation.   6. There is mild calcification of the mitral valve annulus.   7. Structurally normal mitral valve with normal leaflet excursion.   8. Mild mitral regurgitation.   9. Mild tricuspid regurgitation.  10. Aortic root at the sinuses of Valsalva is normal in size, measuring 2.70 cm (indexed 1.62 cm/m²). Ascending aorta is normal in size, measuring 2.60 cm (indexed 1.56 cm/m²).  11. Left atrium is normal in size.  12. The right atrium is normal in size.  13. Estimated pulmonaryartery systolic pressure is 33 mmHg.    < end of copied text >

## 2025-02-02 NOTE — CONSULT NOTE ADULT - SUBJECTIVE AND OBJECTIVE BOX
Pt Name: TULIO SUGGS    MRN: [id number]      Patient is a 74y Female presenting to the emergency department with a chief complaint of right hip pain s/p fall at home this morning. Pt says she was walking from the bathroom in her bedroom and is not sure what happened, but she fell onto her right side. She was unable to get up after the fall and her  tried to assist her. She managed to scoot to the bed and he helped her pull herself up before calling EMS. She had immediate pain in her right hip and groin and was unable to bear weight. She has hx of neuropathy in BLE, R>L with numbness at baseline. She does not use assistance when walking but says that she knows she should. No knee pain. No other injuries or complaints. Hx of COPD on home O2. She was recently admitted for CHF and COPD and went to rehab in october.     HEALTH ISSUES - PROBLEM Dx:  CHF  COPD on O2  HTN  Right hip fx    REVIEW OF SYSTEMS    General:	No fevers    Skin: +bruise, no wound    Respiratory and Thorax: COPD on NC   	  Cardiovascular:	CHF    Musculoskeletal:	 see hpi    Neurological:	see hpi  	    Allergic/Immunologic:	    ROS is otherwise negative.    PAST MEDICAL & SURGICAL HISTORY:  PAST MEDICAL & SURGICAL HISTORY:  NICM (nonischemic cardiomyopathy)      HTN (hypertension)      Rheumatoid arthritis      Chronic back pain      HLD (hyperlipidemia)      Chronic obstructive asthma      Cellulitis      S/P       S/P hysterectomy      HX B/L TKA    Allergies: No Known Allergies      Medications: chlorhexidine 2% Cloths 1 Application(s) Topical every 12 hours  enoxaparin Injectable 40 milliGRAM(s) SubCutaneous once  mupirocin 2% Ointment 1 Application(s) Both Nostrils two times a day  povidone iodine 10% Nasal Swab 1 Application(s) Both Nostrils once  tranexamic acid IVPB 1000 milliGRAM(s) IV Intermittent once      FAMILY HISTORY:  Family history of diabetes mellitus (DM)    FH: CAD (coronary artery disease)    : non-contributory    Social History:     Ambulation: Walking independently [X] With Cane [ ] With Walker [ ]  Bedbound [ ]                           10.9   9.21  )-----------( 282      ( 2025 08:55 )             34.0     02-    132[L]  |  98  |  22.6[H]  ----------------------------<  96  4.1   |  25.0  |  0.75    Ca    8.8      2025 08:55        PHYSICAL EXAM:    Vital Signs Last 24 Hrs  T(C): 36.9 (2025 11:25), Max: 36.9 (2025 11:25)  T(F): 98.5 (:), Max: 98.5 (2025 11:25)  HR: 83 (:) (70 - 83)  BP: 125/77 (2025 11:25) (125/77 - 149/82)  BP(mean): --  RR: 20 (:) (18 - 20)  SpO2: 95% (:) (84% - 95%)    Parameters below as of :  Patient On (Oxygen Delivery Method): room air      Daily Height in cm: 152.4 (2025 08:16)    Daily     Appearance: Alert, responsive, in no acute distress.    Neurological: Sensation is grossly intact to light touch. 5/5 motor function of all extremities. No focal deficits or weaknesses found.    Skin: no rash on visible skin. Skin is clean, dry and intact. No bleeding. No abrasions. No ulcerations.    Vascular: 2+ distal pulses. Cap refill < 2 sec.  No extremity ulcerations. No cyanosis.    Musculoskeletal:         Left Upper Extremity: AT. NTTP. FROM. NVI       Right Upper Extremity: AT. NTTP. FROM. NVI       Left Lower Extremity: AT. ND. NTTP. FROM. NVI       Right Lower Extremity: skin intact. +ecchymosis lateral hip. Thigh soft and compressible. Knee no effusion. Calf soft, NT. +mild ecchymosis lower leg. +DF/PF. +EHL/FHL Sensation intact but decreased as baseline. DP 2+. Brisk cap refill.    Imaging Studies:  < from: Xray Hip 2-3 Views, Right (25 @ 09:36) >    ACC: [id number] EXAM:  XR HIP 2-3V RT   ORDERED BY: ARAMIS HERNADEZ     ACC: [id number] EXAM:  XR PELVIS AP ONLY 1-2 VIEWS   ORDERED BY: ARAMIS HERNADEZ     PROCEDURE DATE:  2025          INTERPRETATION:  Pelvis and 2 views of the right hip    COMPARISON: None available    IMPRESSION: A right subcapital/femoral neck hip fracture is apparent with   marked overriding of the fracture fragments.    The remainder of the bone is otherwise intact with orthopedic hardware   identified at the lower lumbosacral spine as partially visualized.    --- End of Report ---    < end of copied text >      A/P:  Pt is a  74y Female with right hip FN fx    PLAN:   - Admitted to medicine  * NPO for OR tomorrow  * IV fluids ordered and to start once NPO  * Pre-operative ABX ordered  *Routine daily anticoagulation held for OR  * Single dose anticoaguation ordered  * Medical clearance requested for procedure  * Bed rest  - anesthesia aware  - pain control  D/W Dr Landeros

## 2025-02-02 NOTE — ED PROVIDER NOTE - PHYSICAL EXAMINATION
Normal results. Please notify pt Vital Signs per nursing documentation  Gen: well appearing, no acute distress  HEENT: NCAT, MMM  Cardiac: regular rate rhythm, normal S1S2  Chest: clear to auscultation bilateral, no wheezes or crackles  Abdomen: soft, non tender non distended  Extremity: right hip tender, +deformity, foot neurovascularly intact  Skin: no rash  Neuro: nonfocal neuro exam

## 2025-02-02 NOTE — ED ADULT NURSE NOTE - OBJECTIVE STATEMENT
Patient presented to ED s/p fall walking out of the bathroom. Patient AXOX4, States she landed on her right side, denies head strike LOC. Patient on 3L N/C at home. patient 4/5 out of 10 pain. NAD noted.

## 2025-02-02 NOTE — ED PROVIDER NOTE - OBJECTIVE STATEMENT
73 yo female with PMH of CAD, HFpEF, HTN, HLD, COPD (on home O2 PRN) here with fall this morning. Was in bathroom, slipped on wooden floor and fell on her right hip. Did not hit her head. Reports scooting on her bottom towards the bedroom to her  for help. Unable to get her up so called EMS. Pain to her right hip. Denies headache, n/v, cp, sob, abd pain, numbness, weakness.

## 2025-02-02 NOTE — ED ADULT NURSE NOTE - NSFALLRISKINTERV_ED_ALL_ED

## 2025-02-02 NOTE — ED PROVIDER NOTE - CLINICAL SUMMARY MEDICAL DECISION MAKING FREE TEXT BOX
75 yo female with PMH of CAD, HFpEF, HTN, HLD, COPD (on home O2 PRN) here with fall this morning. Was in bathroom, slipped on wooden floor and fell on her right hip. Did not hit her head. Reports scooting on her bottom towards the bedroom to her  for help. Unable to get her up so called EMS. Pain to her right hip. Denies headache, n/v, cp, sob, abd pain, numbness, weakness.  AP - right hip fracture. ortho consulted. will admit for surgical management

## 2025-02-02 NOTE — H&P ADULT - ASSESSMENT
73 y/o F w/ PMH of nonobstructive CAD, NICM (stress induced), HFimprovedEF (LVEF 54% 09/2024), HTN, HLD, COPD (on 2-4L prn NC) presented from home after mechanical fall at home this morning.  Pt states she was walking back from the bathroom and she lost her footing and fell landing on her right hip.  Pt on 2L NC and sating well.  Initial w/u significant for mild hyponatremia and Rt subcapital/femoral neck fracture on Xray.  Received IV Dilaudid in ED and ortho consulted. Will admit for Rt hip fracture.       Traumatic Rt hip fracture 2/2 mechanical fall  - Xray hip/pelvis reporting right subcapital/femoral neck hip fracture is apparent with   marked overriding of the fracture fragments.  - Analgesics as needed   - Incentive spirometer given risk of atelectasis   - 1x dose lovenox given today and resume chemical vte ppx post op if ok w/ ortho  - Strict bed rest for now   - s/p OR will need PT eval once cleared by ortho to bear weight   - EKG from 02/02/25 and 12/18/24 both w/ L-anterior fasicular blocks, no evidence of acute ischemia on current EKG   - TTE from 9/2024 w/ LVEF 56% and clinically euvolemic at this time   - RCRI 1 but EKG relatively unchanged from prior w/ no evidence of acute ischemia.  No absolute medical contraindications to proceeding w/ planned orthopedic intervention tomorrow 2/3/25  - NPO after midnight for OR tomorrow   - Ortho following and recs noted      Euvolemic mild hyponatremia likely 2/2 pain   - Has hx of chronic hyponatremia but is compliant w/ NaCl tabs   - Currently likely from pain   - Started on analgesics and anticipate Na will improve w/ pain control   - Resume home NaCl tabs   - Monitor I/O's, renal function and lytes      Normocytic anemia   - Baseline Hb ranges 9-10  - H/H at baseline at this time   - Will check iron panel   - Monitor CBC and transfuse for Hb<8      Chronic stress induced NICM now w/ HFimprovedEF  - Most recent [facility] 09/17/24 again found pt to be nonobstructive   - TTE from 9/2024 w/ LVEF 56% and clinically euvolemic at this time   - Monitor daily weights and I/O's and caution w/ IVFs   - Maintain K>4 and Mg>2      COPD   - Not in acute exacerbation   - On baseline 2-4L prn O2   - Currently on 2L and sating well   - Maintain O2 at goal of 88-92%  - Therapeutic interchange of Trelegy ellipta ordered   - PRN duonebs       HTN / HLD  Nonobstructive CAD  - c/w valsartan and carvedilol  - ASA on hold for now but resume s/p OR    - c/w statin       Chronic back pain w/ LE neuropathies  - c/w gabapentin       Anxiety / Depression   - c/w quetiapine, trazodone, venlafaxine    - Ambien 10mg qhs w/ 30dy supply last dispensed 01/24/25  - Alprazolam 0.5mg BID w/ 30dy supply last dispensed 1/24/25  - Confirmed on ISTOP  Reference #: [id number]        VTE ppx: 1x dose lovenox given today.  Resume chemical vte ppx post op if ok w/ ortho.      Dispo: Acute.  Anticipate d/c to [facility] eventually.

## 2025-02-02 NOTE — ED PROVIDER NOTE - IV ALTEPLASE EXCL ABS HIDDEN
show External auditory canal size and shape acceptable/Acceptable shape position of pinnae/No pits or tags

## 2025-02-02 NOTE — ED ADULT TRIAGE NOTE - CHIEF COMPLAINT QUOTE
BIBEMS s/p mechanical slip and fall from standing height. Pt states that she was walking out of the bathroom when she slipped and fell onto her right side on the wooden floor. C/o right sided hip pain. Pt states that she has neuropathy causing her right foot to be colder than the left foot at baseline. Motor and sensation are intact.

## 2025-02-02 NOTE — CONSULT NOTE ADULT - NS ATTEND AMEND GEN_ALL_CORE FT
Orthopaedic Trauma Surgeon Addendum:    I have personally performed a face-to-face diagnostic evaluation on this patient.  I have reviewed the physician assistant note and agree with the history, exam, and plan of care, except as noted.    Orthopedic Surgery is ready to proceed with surgery pending medical optimization and adequate Operating Room availability. Risks of surgical delay discussed with other providers and staff. Please call with any questions or concerns.     Osteopenia and osteoporosis are significant risk factors for fragility fractures. Given the type of fracture that has occurred, I would highly recommend that the patient followup with their primary care physician to discuss treatment for low bone mineral density. We discussed the benefits of these medications frequently far outweigh the small risks. Their primary care physician can call the office with any specific questions or concerns.     Plan for Hemiarthroplasty vs total hip arthroplasty.     It was explained to the patient that any surgical procedure carries with it the risks of loss of limb or loss of life. Medical complications include but are not limited to death or disability from a heart attack, stroke, GI bleeding, thrombophlebitis and pulmonary embolism, sepsis, adverse reactions including death due to blood transfusions, allergy or adverse drug reaction. There are other rare, unknown and uncommon systemic conditions that could also adversely affect the systemic outcome. Local complications include but are not limited to wound dehiscence, deep infection, failure of fixation or reconstruction, damage to nerves and vessels which could be temporary or permanent, as well as other rare, uncommon and unknown local complications that may necessitate re-operation, more complex orthopaedic reconstructions or amputation.     Dale Landeros MD  Orthopaedic Trauma Surgeon  [facility]

## 2025-02-02 NOTE — PATIENT PROFILE ADULT - DO YOU EVER NEED HELP READING HOSPITAL MATERIALS?
Chronic daily migraine.  She has been untreated for several months and I recommended she resume her previous therapy until we pursue other options such as Botox.    Plan:  1.  Resume topiramate 25 mg twice a day  2.  Refilled sumatriptan oral and injectable today.   no

## 2025-02-02 NOTE — PATIENT PROFILE ADULT - FALL HARM RISK - HARM RISK INTERVENTIONS

## 2025-02-02 NOTE — PATIENT PROFILE ADULT - FALL HARM RISK - CONCLUSION
Fall with Harm Risk Anemia    WHAT YOU NEED TO KNOW:    Anemia is a low number of red blood cells or a low amount of hemoglobin in your red blood cells. Hemoglobin is a protein that helps carry oxygen throughout your body. Red blood cells use iron to create hemoglobin. Anemia may develop if your body does not have enough iron. It may also develop if your body does not make enough red blood cells or they die faster than your body can make them.     DISCHARGE INSTRUCTIONS:    Call 911 or have someone call 911 for any of the following:     You lose consciousness.      You have severe chest pain.    Return to the emergency department if:     You have dark or bloody bowel movements.        Contact your healthcare provider if:     Your symptoms are worse, even after treatment.      You have questions or concerns about your condition or care.    Medicines:     Iron or folic acid supplements help increase your red blood cell and hemoglobin levels.       Vitamin B12 injections may help boost your red blood cell level and decrease your symptoms. Ask your healthcare provider how to inject B12.      Take your medicine as directed. Contact your healthcare provider if you think your medicine is not helping or if you have side effects. Tell him of her if you are allergic to any medicine. Keep a list of the medicines, vitamins, and herbs you take. Include the amounts, and when and why you take them. Bring the list or the pill bottles to follow-up visits. Carry your medicine list with you in case of an emergency.    Prevent anemia: Eat healthy foods rich in iron and vitamin C. Nuts, meat, dark leafy green vegetables, and beans are high in iron and protein. Vitamin C helps your body absorb iron. Foods rich in vitamin C include oranges and other citrus fruits. Ask your healthcare provider for a list of other foods that are high in iron or vitamin C. Ask if you need to be on a special diet.     Follow up with your healthcare provider as directed: Write down your questions so you remember to ask them during your visits.        © Copyright [employer] 2019 All illustrations and images included in CareNotes are the copyrighted property of [employer] or [employer]       Blood Transfusion    WHAT YOU NEED TO KNOW:    A blood transfusion is used to give you blood through an IV. You may get only part of the blood, such as red blood cells, platelets, or plasma. The blood may be from you and stored for you to use later. The blood may instead be from another person. Donated blood is tested for HIV, hepatitis, syphilis, West Nile virus, and other diseases.    DISCHARGE INSTRUCTIONS:    Call 911 for any of the following:     You have a skin rash, hives, swelling, or itching.       You have trouble breathing, shortness of breath, wheezing, or coughing.      Your throat tightens or your lips or tongue swell.      You have difficulty swallowing or speaking.    Seek care immediately if:     You develop a high fever and chills.       You are dizzy, lightheaded, confused, or feel like you are going to faint.      You have nausea, diarrhea, or abdominal cramps, or you are vomiting.      You urinate little or not at all.      You develop headaches or double vision.      Your skin or the whites of your eyes look yellow.      You see pinpoint purple spots or purple patches on your body.       You have a seizure.     Contact your healthcare provider if:     You feel tired and weak within 10 days of your transfusion.      You have questions or concerns about blood transfusions.    Medicines:     Antihistamines may help stop mild itching or a rash.      Epinephrine is emergency medicine used to stop anaphylaxis. You may be given epinephrine if you are at risk for anaphylaxis. Your healthcare provider will teach you how to use it.      Take your medicine as directed. Contact your healthcare provider if you think your medicine is not helping or if you have side effects. Tell him or her if you are allergic to any medicine. Keep a list of the medicines, vitamins, and herbs you take. Include the amounts, and when and why you take them. Bring the list or the pill bottles to follow-up visits. Carry your medicine list with you in case of an emergency.    Apply ice to decrease pain and swelling. Use an ice pack, or put ice in a plastic bag and wrap a towel around it. Apply the ice pack or wrapped bag to your transfusion site for 20 minutes each hour or as directed.     Follow up with your healthcare provider as directed: Write down your questions so you remember to ask them during your visits.       © Copyright [employer] 2019 All illustrations and images included in CareNotes are the copyrighted property of [employer] or [employer].

## 2025-02-02 NOTE — H&P ADULT - HISTORY OF PRESENT ILLNESS
75 y/o F w/ PMH of nonobstructive CAD, NICM (stress induced), HFimprovedEF (LVEF 54% 09/2024), HTN, HLD, COPD (on 2-4L prn NC) presented from home after mechanical fall at home this morning.  Pt states she was walking back from the bathroom and she lost her footing and fell landing on her right hip.  She was unable to get back up and instantly felt severe pain.  She dragged her self in seated position back to bedroom where  helped her into bed and ambulance was called.  She denies head trauma or LOC.  She also denies prodrome including dizziness, cp, palpitations, sob, diaphoresis prior to fall.  She has paresthesias of b/l LE at baseline and remain unchanged.  Pt also denies HA, vision/hearing changes, abd pain, N/V, diarrhea, fever, chills, dysuria.

## 2025-02-03 ENCOUNTER — TRANSCRIPTION ENCOUNTER (OUTPATIENT)
Age: 75
End: 2025-02-03

## 2025-02-03 LAB
ALBUMIN SERPL ELPH-MCNC: 3.4 G/DL — SIGNIFICANT CHANGE UP (ref 3.3–5.2)
ALP SERPL-CCNC: 75 U/L — SIGNIFICANT CHANGE UP (ref 40–120)
ALT FLD-CCNC: 12 U/L — SIGNIFICANT CHANGE UP
ANION GAP SERPL CALC-SCNC: 11 MMOL/L — SIGNIFICANT CHANGE UP (ref 5–17)
ANION GAP SERPL CALC-SCNC: 8 MMOL/L — SIGNIFICANT CHANGE UP (ref 5–17)
AST SERPL-CCNC: 15 U/L — SIGNIFICANT CHANGE UP
BASOPHILS # BLD AUTO: 0.03 K/UL — SIGNIFICANT CHANGE UP (ref 0–0.2)
BASOPHILS NFR BLD AUTO: 0.4 % — SIGNIFICANT CHANGE UP (ref 0–2)
BILIRUB SERPL-MCNC: <0.2 MG/DL — LOW (ref 0.4–2)
BUN SERPL-MCNC: 14.8 MG/DL — SIGNIFICANT CHANGE UP (ref 8–20)
BUN SERPL-MCNC: 16 MG/DL — SIGNIFICANT CHANGE UP (ref 8–20)
CALCIUM SERPL-MCNC: 7.9 MG/DL — LOW (ref 8.4–10.5)
CALCIUM SERPL-MCNC: 8.7 MG/DL — SIGNIFICANT CHANGE UP (ref 8.4–10.5)
CHLORIDE SERPL-SCNC: 97 MMOL/L — SIGNIFICANT CHANGE UP (ref 96–108)
CHLORIDE SERPL-SCNC: 99 MMOL/L — SIGNIFICANT CHANGE UP (ref 96–108)
CO2 SERPL-SCNC: 24 MMOL/L — SIGNIFICANT CHANGE UP (ref 22–29)
CO2 SERPL-SCNC: 25 MMOL/L — SIGNIFICANT CHANGE UP (ref 22–29)
CREAT SERPL-MCNC: 0.65 MG/DL — SIGNIFICANT CHANGE UP (ref 0.5–1.3)
CREAT SERPL-MCNC: 0.74 MG/DL — SIGNIFICANT CHANGE UP (ref 0.5–1.3)
EGFR: 85 ML/MIN/1.73M2 — SIGNIFICANT CHANGE UP
EGFR: 92 ML/MIN/1.73M2 — SIGNIFICANT CHANGE UP
EOSINOPHIL # BLD AUTO: 0.4 K/UL — SIGNIFICANT CHANGE UP (ref 0–0.5)
EOSINOPHIL NFR BLD AUTO: 5.4 % — SIGNIFICANT CHANGE UP (ref 0–6)
FERRITIN SERPL-MCNC: 66 NG/ML — SIGNIFICANT CHANGE UP (ref 13–330)
GLUCOSE SERPL-MCNC: 109 MG/DL — HIGH (ref 70–99)
GLUCOSE SERPL-MCNC: 125 MG/DL — HIGH (ref 70–99)
HCT VFR BLD CALC: 32.6 % — LOW (ref 34.5–45)
HCT VFR BLD CALC: 34.5 % — SIGNIFICANT CHANGE UP (ref 34.5–45)
HGB BLD-MCNC: 10.1 G/DL — LOW (ref 11.5–15.5)
HGB BLD-MCNC: 10.6 G/DL — LOW (ref 11.5–15.5)
IMM GRANULOCYTES NFR BLD AUTO: 0.3 % — SIGNIFICANT CHANGE UP (ref 0–0.9)
IRON SATN MFR SERPL: 14 % — SIGNIFICANT CHANGE UP (ref 14–50)
IRON SATN MFR SERPL: 43 UG/DL — SIGNIFICANT CHANGE UP (ref 37–145)
LYMPHOCYTES # BLD AUTO: 1.26 K/UL — SIGNIFICANT CHANGE UP (ref 1–3.3)
LYMPHOCYTES # BLD AUTO: 17.2 % — SIGNIFICANT CHANGE UP (ref 13–44)
MAGNESIUM SERPL-MCNC: 1.9 MG/DL — SIGNIFICANT CHANGE UP (ref 1.6–2.6)
MCHC RBC-ENTMCNC: 26.4 PG — LOW (ref 27–34)
MCHC RBC-ENTMCNC: 26.8 PG — LOW (ref 27–34)
MCHC RBC-ENTMCNC: 30.7 G/DL — LOW (ref 32–36)
MCHC RBC-ENTMCNC: 31 G/DL — LOW (ref 32–36)
MCV RBC AUTO: 86 FL — SIGNIFICANT CHANGE UP (ref 80–100)
MCV RBC AUTO: 86.5 FL — SIGNIFICANT CHANGE UP (ref 80–100)
MONOCYTES # BLD AUTO: 0.91 K/UL — HIGH (ref 0–0.9)
MONOCYTES NFR BLD AUTO: 12.4 % — SIGNIFICANT CHANGE UP (ref 2–14)
NEUTROPHILS # BLD AUTO: 4.72 K/UL — SIGNIFICANT CHANGE UP (ref 1.8–7.4)
NEUTROPHILS NFR BLD AUTO: 64.3 % — SIGNIFICANT CHANGE UP (ref 43–77)
PHOSPHATE SERPL-MCNC: 4 MG/DL — SIGNIFICANT CHANGE UP (ref 2.4–4.7)
PLATELET # BLD AUTO: 266 K/UL — SIGNIFICANT CHANGE UP (ref 150–400)
PLATELET # BLD AUTO: 269 K/UL — SIGNIFICANT CHANGE UP (ref 150–400)
POTASSIUM SERPL-MCNC: 4.7 MMOL/L — SIGNIFICANT CHANGE UP (ref 3.5–5.3)
POTASSIUM SERPL-MCNC: 4.8 MMOL/L — SIGNIFICANT CHANGE UP (ref 3.5–5.3)
POTASSIUM SERPL-SCNC: 4.7 MMOL/L — SIGNIFICANT CHANGE UP (ref 3.5–5.3)
POTASSIUM SERPL-SCNC: 4.8 MMOL/L — SIGNIFICANT CHANGE UP (ref 3.5–5.3)
PROT SERPL-MCNC: 6.2 G/DL — LOW (ref 6.6–8.7)
RBC # BLD: 3.77 M/UL — LOW (ref 3.8–5.2)
RBC # BLD: 4.01 M/UL — SIGNIFICANT CHANGE UP (ref 3.8–5.2)
RBC # FLD: 13.8 % — SIGNIFICANT CHANGE UP (ref 10.3–14.5)
RBC # FLD: 13.8 % — SIGNIFICANT CHANGE UP (ref 10.3–14.5)
SODIUM SERPL-SCNC: 131 MMOL/L — LOW (ref 135–145)
SODIUM SERPL-SCNC: 132 MMOL/L — LOW (ref 135–145)
TIBC SERPL-MCNC: 309 UG/DL — SIGNIFICANT CHANGE UP (ref 220–430)
TRANSFERRIN SERPL-MCNC: 216 MG/DL — SIGNIFICANT CHANGE UP (ref 192–382)
WBC # BLD: 12.42 K/UL — HIGH (ref 3.8–10.5)
WBC # BLD: 7.34 K/UL — SIGNIFICANT CHANGE UP (ref 3.8–10.5)
WBC # FLD AUTO: 12.42 K/UL — HIGH (ref 3.8–10.5)
WBC # FLD AUTO: 7.34 K/UL — SIGNIFICANT CHANGE UP (ref 3.8–10.5)

## 2025-02-03 PROCEDURE — 73501 X-RAY EXAM HIP UNI 1 VIEW: CPT | Mod: 26,RT

## 2025-02-03 PROCEDURE — 27236 TREAT THIGH FRACTURE: CPT | Mod: RT

## 2025-02-03 PROCEDURE — 99232 SBSQ HOSP IP/OBS MODERATE 35: CPT

## 2025-02-03 PROCEDURE — 27495 REINFORCE THIGH: CPT | Mod: RT

## 2025-02-03 DEVICE — CUP ACETABULAR RNGLC BI-P 28MM 45MM: Type: IMPLANTABLE DEVICE | Site: RIGHT | Status: FUNCTIONAL

## 2025-02-03 DEVICE — ZIMMER FEMORAL BONE CEMENT KIT: Type: IMPLANTABLE DEVICE | Site: RIGHT | Status: FUNCTIONAL

## 2025-02-03 DEVICE — ASBLY CBL CBL-RDY 1.8X910MM: Type: IMPLANTABLE DEVICE | Site: RIGHT | Status: FUNCTIONAL

## 2025-02-03 DEVICE — HEAD FEM DELTA CERAMIC TAPER 12/14 MED 28MM 0MM: Type: IMPLANTABLE DEVICE | Site: RIGHT | Status: FUNCTIONAL

## 2025-02-03 DEVICE — IMPLANTABLE DEVICE: Type: IMPLANTABLE DEVICE | Site: RIGHT | Status: FUNCTIONAL

## 2025-02-03 RX ORDER — GABAPENTIN 800 MG/1
600 TABLET ORAL
Refills: 0 | Status: DISCONTINUED | OUTPATIENT
Start: 2025-02-03 | End: 2025-02-06

## 2025-02-03 RX ORDER — FENTANYL CITRATE 50 UG/ML
25 INJECTION INTRAMUSCULAR; INTRAVENOUS
Refills: 0 | Status: DISCONTINUED | OUTPATIENT
Start: 2025-02-03 | End: 2025-02-03

## 2025-02-03 RX ORDER — ALPRAZOLAM 2 MG
0.5 TABLET ORAL
Refills: 0 | Status: DISCONTINUED | OUTPATIENT
Start: 2025-02-03 | End: 2025-02-06

## 2025-02-03 RX ORDER — QUETIAPINE FUMARATE 300 MG/1
300 TABLET ORAL AT BEDTIME
Refills: 0 | Status: DISCONTINUED | OUTPATIENT
Start: 2025-02-03 | End: 2025-02-06

## 2025-02-03 RX ORDER — BACTERIOSTATIC SODIUM CHLORIDE 0.9 %
1000 VIAL (ML) INJECTION
Refills: 0 | Status: DISCONTINUED | OUTPATIENT
Start: 2025-02-03 | End: 2025-02-06

## 2025-02-03 RX ORDER — ACETAMINOPHEN 160 MG/5ML
1000 SUSPENSION ORAL ONCE
Refills: 0 | Status: COMPLETED | OUTPATIENT
Start: 2025-02-03 | End: 2025-02-04

## 2025-02-03 RX ORDER — ZOLPIDEM TARTRATE 5 MG/1
5 TABLET, COATED ORAL AT BEDTIME
Refills: 0 | Status: DISCONTINUED | OUTPATIENT
Start: 2025-02-03 | End: 2025-02-06

## 2025-02-03 RX ORDER — ASPIRIN 81 MG/1
81 TABLET, COATED ORAL
Refills: 0 | Status: DISCONTINUED | OUTPATIENT
Start: 2025-02-03 | End: 2025-02-06

## 2025-02-03 RX ORDER — OXYCODONE HYDROCHLORIDE 30 MG/1
5 TABLET ORAL
Refills: 0 | Status: DISCONTINUED | OUTPATIENT
Start: 2025-02-03 | End: 2025-02-06

## 2025-02-03 RX ORDER — MAGNESIUM HYDROXIDE 400 MG/5ML
30 SUSPENSION, ORAL (FINAL DOSE FORM) ORAL DAILY
Refills: 0 | Status: DISCONTINUED | OUTPATIENT
Start: 2025-02-03 | End: 2025-02-06

## 2025-02-03 RX ORDER — SENNOSIDES 8.6 MG
2 TABLET ORAL AT BEDTIME
Refills: 0 | Status: DISCONTINUED | OUTPATIENT
Start: 2025-02-03 | End: 2025-02-06

## 2025-02-03 RX ORDER — TRAZODONE HCL 100 MG
50 TABLET ORAL AT BEDTIME
Refills: 0 | Status: DISCONTINUED | OUTPATIENT
Start: 2025-02-03 | End: 2025-02-06

## 2025-02-03 RX ORDER — VENLAFAXINE HCL 75 MG
225 TABLET ORAL DAILY
Refills: 0 | Status: DISCONTINUED | OUTPATIENT
Start: 2025-02-03 | End: 2025-02-06

## 2025-02-03 RX ORDER — ALBUTEROL 90 MCG
1 AEROSOL REFILL (GRAM) INHALATION
Refills: 0 | Status: DISCONTINUED | OUTPATIENT
Start: 2025-02-03 | End: 2025-02-06

## 2025-02-03 RX ORDER — POLYETHYLENE GLYCOL 3350 17 G/17G
17 POWDER, FOR SOLUTION ORAL AT BEDTIME
Refills: 0 | Status: DISCONTINUED | OUTPATIENT
Start: 2025-02-03 | End: 2025-02-06

## 2025-02-03 RX ORDER — VALSARTAN 80 MG
80 TABLET ORAL DAILY
Refills: 0 | Status: DISCONTINUED | OUTPATIENT
Start: 2025-02-03 | End: 2025-02-06

## 2025-02-03 RX ORDER — MAGNESIUM, ALUMINUM HYDROXIDE 200-225/5
30 SUSPENSION, ORAL (FINAL DOSE FORM) ORAL
Refills: 0 | Status: DISCONTINUED | OUTPATIENT
Start: 2025-02-03 | End: 2025-02-06

## 2025-02-03 RX ORDER — HYDROMORPHONE HYDROCHLORIDE 4 MG/ML
4 INJECTION, SOLUTION INTRAMUSCULAR; INTRAVENOUS; SUBCUTANEOUS
Refills: 0 | Status: DISCONTINUED | OUTPATIENT
Start: 2025-02-03 | End: 2025-02-06

## 2025-02-03 RX ORDER — BISACODYL 5 MG
10 TABLET, DELAYED RELEASE (ENTERIC COATED) ORAL ONCE
Refills: 0 | Status: DISCONTINUED | OUTPATIENT
Start: 2025-02-05 | End: 2025-02-06

## 2025-02-03 RX ORDER — BACTERIOSTATIC SODIUM CHLORIDE 0.9 %
500 VIAL (ML) INJECTION ONCE
Refills: 0 | Status: COMPLETED | OUTPATIENT
Start: 2025-02-03 | End: 2025-02-03

## 2025-02-03 RX ORDER — ONDANSETRON 4 MG/1
4 TABLET, ORALLY DISINTEGRATING ORAL ONCE
Refills: 0 | Status: DISCONTINUED | OUTPATIENT
Start: 2025-02-03 | End: 2025-02-03

## 2025-02-03 RX ORDER — ACETAMINOPHEN, DIPHENHYDRAMINE HCL, PHENYLEPHRINE HCL 325; 25; 5 MG/1; MG/1; MG/1
3 TABLET ORAL AT BEDTIME
Refills: 0 | Status: DISCONTINUED | OUTPATIENT
Start: 2025-02-03 | End: 2025-02-06

## 2025-02-03 RX ORDER — ACETAMINOPHEN 160 MG/5ML
975 SUSPENSION ORAL EVERY 8 HOURS
Refills: 0 | Status: DISCONTINUED | OUTPATIENT
Start: 2025-02-03 | End: 2025-02-06

## 2025-02-03 RX ORDER — ONDANSETRON 4 MG/1
4 TABLET, ORALLY DISINTEGRATING ORAL EVERY 6 HOURS
Refills: 0 | Status: DISCONTINUED | OUTPATIENT
Start: 2025-02-03 | End: 2025-02-06

## 2025-02-03 RX ORDER — CELECOXIB 200 MG
200 CAPSULE ORAL EVERY 12 HOURS
Refills: 0 | Status: DISCONTINUED | OUTPATIENT
Start: 2025-02-05 | End: 2025-02-06

## 2025-02-03 RX ORDER — CEFAZOLIN SODIUM IN 0.9 % NACL 2 G/10 ML
2000 SYRINGE (ML) INTRAVENOUS
Refills: 0 | Status: COMPLETED | OUTPATIENT
Start: 2025-02-03 | End: 2025-02-03

## 2025-02-03 RX ORDER — OXYCODONE HYDROCHLORIDE 30 MG/1
10 TABLET ORAL
Refills: 0 | Status: DISCONTINUED | OUTPATIENT
Start: 2025-02-03 | End: 2025-02-06

## 2025-02-03 RX ORDER — TIOTROPIUM BROMIDE MONOHYDRATE 18 UG/1
2 CAPSULE ORAL; RESPIRATORY (INHALATION) DAILY
Refills: 0 | Status: DISCONTINUED | OUTPATIENT
Start: 2025-02-03 | End: 2025-02-06

## 2025-02-03 RX ORDER — PANTOPRAZOLE 20 MG/1
40 TABLET, DELAYED RELEASE ORAL
Refills: 0 | Status: DISCONTINUED | OUTPATIENT
Start: 2025-02-03 | End: 2025-02-06

## 2025-02-03 RX ORDER — ATORVASTATIN CALCIUM 80 MG/1
40 TABLET, FILM COATED ORAL AT BEDTIME
Refills: 0 | Status: DISCONTINUED | OUTPATIENT
Start: 2025-02-03 | End: 2025-02-06

## 2025-02-03 RX ORDER — KETOROLAC TROMETHAMINE 10 MG
15 TABLET ORAL EVERY 6 HOURS
Refills: 0 | Status: DISCONTINUED | OUTPATIENT
Start: 2025-02-03 | End: 2025-02-04

## 2025-02-03 RX ORDER — MUPIROCIN 2 G/100G
1 CREAM TOPICAL
Refills: 0 | Status: DISCONTINUED | OUTPATIENT
Start: 2025-02-03 | End: 2025-02-06

## 2025-02-03 RX ORDER — CARVEDILOL 6.25 MG
3.12 TABLET ORAL EVERY 12 HOURS
Refills: 0 | Status: DISCONTINUED | OUTPATIENT
Start: 2025-02-03 | End: 2025-02-06

## 2025-02-03 RX ORDER — FLUTICASONE PROPIONATE AND SALMETEROL 113; 14 UG/1; UG/1
1 POWDER, METERED RESPIRATORY (INHALATION)
Refills: 0 | Status: DISCONTINUED | OUTPATIENT
Start: 2025-02-03 | End: 2025-02-06

## 2025-02-03 RX ORDER — SODIUM CHLORIDE 9 G/ML
1000 INJECTION, SOLUTION INTRAVENOUS
Refills: 0 | Status: DISCONTINUED | OUTPATIENT
Start: 2025-02-03 | End: 2025-02-03

## 2025-02-03 RX ADMIN — ACETAMINOPHEN 975 MILLIGRAM(S): 160 SUSPENSION ORAL at 22:00

## 2025-02-03 RX ADMIN — OXYCODONE HYDROCHLORIDE 10 MILLIGRAM(S): 30 TABLET ORAL at 01:13

## 2025-02-03 RX ADMIN — FENTANYL CITRATE 25 MICROGRAM(S): 50 INJECTION INTRAMUSCULAR; INTRAVENOUS at 12:00

## 2025-02-03 RX ADMIN — HYDROMORPHONE HYDROCHLORIDE 0.2 MILLIGRAM(S): 4 INJECTION, SOLUTION INTRAMUSCULAR; INTRAVENOUS; SUBCUTANEOUS at 02:18

## 2025-02-03 RX ADMIN — Medication 2000 MILLIGRAM(S): at 23:59

## 2025-02-03 RX ADMIN — ATORVASTATIN CALCIUM 40 MILLIGRAM(S): 80 TABLET, FILM COATED ORAL at 21:08

## 2025-02-03 RX ADMIN — HYDROMORPHONE HYDROCHLORIDE 4 MILLIGRAM(S): 4 INJECTION, SOLUTION INTRAMUSCULAR; INTRAVENOUS; SUBCUTANEOUS at 22:34

## 2025-02-03 RX ADMIN — ASPIRIN 81 MILLIGRAM(S): 81 TABLET, COATED ORAL at 17:27

## 2025-02-03 RX ADMIN — GABAPENTIN 600 MILLIGRAM(S): 800 TABLET ORAL at 05:15

## 2025-02-03 RX ADMIN — Medication 1 GRAM(S): at 05:15

## 2025-02-03 RX ADMIN — OXYCODONE HYDROCHLORIDE 10 MILLIGRAM(S): 30 TABLET ORAL at 02:13

## 2025-02-03 RX ADMIN — FENTANYL CITRATE 25 MICROGRAM(S): 50 INJECTION INTRAMUSCULAR; INTRAVENOUS at 11:38

## 2025-02-03 RX ADMIN — Medication 15 MILLIGRAM(S): at 18:14

## 2025-02-03 RX ADMIN — Medication 15 MILLIGRAM(S): at 17:27

## 2025-02-03 RX ADMIN — ANTISEPTIC SURGICAL SCRUB 1 APPLICATION(S): 0.04 SOLUTION TOPICAL at 05:15

## 2025-02-03 RX ADMIN — HYDROMORPHONE HYDROCHLORIDE 4 MILLIGRAM(S): 4 INJECTION, SOLUTION INTRAMUSCULAR; INTRAVENOUS; SUBCUTANEOUS at 23:34

## 2025-02-03 RX ADMIN — ACETAMINOPHEN 975 MILLIGRAM(S): 160 SUSPENSION ORAL at 14:07

## 2025-02-03 RX ADMIN — Medication 85 MILLILITER(S): at 16:45

## 2025-02-03 RX ADMIN — ACETAMINOPHEN 975 MILLIGRAM(S): 160 SUSPENSION ORAL at 15:07

## 2025-02-03 RX ADMIN — ACETAMINOPHEN 975 MILLIGRAM(S): 160 SUSPENSION ORAL at 21:08

## 2025-02-03 RX ADMIN — HYDROMORPHONE HYDROCHLORIDE 4 MILLIGRAM(S): 4 INJECTION, SOLUTION INTRAMUSCULAR; INTRAVENOUS; SUBCUTANEOUS at 19:33

## 2025-02-03 RX ADMIN — Medication 2000 MILLIGRAM(S): at 16:45

## 2025-02-03 RX ADMIN — HYDROMORPHONE HYDROCHLORIDE 4 MILLIGRAM(S): 4 INJECTION, SOLUTION INTRAMUSCULAR; INTRAVENOUS; SUBCUTANEOUS at 20:33

## 2025-02-03 RX ADMIN — Medication 15 MILLIGRAM(S): at 23:59

## 2025-02-03 RX ADMIN — HYDROMORPHONE HYDROCHLORIDE 0.2 MILLIGRAM(S): 4 INJECTION, SOLUTION INTRAMUSCULAR; INTRAVENOUS; SUBCUTANEOUS at 03:18

## 2025-02-03 RX ADMIN — Medication 500 MILLILITER(S): at 11:38

## 2025-02-03 RX ADMIN — GABAPENTIN 600 MILLIGRAM(S): 800 TABLET ORAL at 21:07

## 2025-02-03 RX ADMIN — QUETIAPINE FUMARATE 300 MILLIGRAM(S): 300 TABLET ORAL at 21:08

## 2025-02-03 RX ADMIN — FLUTICASONE PROPIONATE AND SALMETEROL 1 DOSE(S): 113; 14 POWDER, METERED RESPIRATORY (INHALATION) at 20:13

## 2025-02-03 RX ADMIN — Medication 50 MILLIGRAM(S): at 21:08

## 2025-02-03 RX ADMIN — POVIDONE-IODINE 1 APPLICATION(S): 0.01 SWAB TOPICAL at 06:58

## 2025-02-03 RX ADMIN — MUPIROCIN 1 APPLICATION(S): 2 CREAM TOPICAL at 05:15

## 2025-02-03 RX ADMIN — OXYCODONE HYDROCHLORIDE 10 MILLIGRAM(S): 30 TABLET ORAL at 05:15

## 2025-02-03 NOTE — PROGRESS NOTE ADULT - ASSESSMENT
75 y/o F w/ PMH of nonobstructive CAD, NICM (stress induced), HFimprovedEF (LVEF 54% 09/2024), HTN, HLD, COPD (on 2-4L prn NC) presented from home after mechanical fall at home this morning.  Pt states she was walking back from the bathroom and she lost her footing and fell landing on her right hip.  Pt on 2L NC and sating well.  Initial w/u significant for mild hyponatremia and Rt subcapital/femoral neck fracture on Xray.  Received IV Dilaudid in ED and ortho consulted. Will admit for Rt hip fracture.     Traumatic Rt hip fracture 2/2 mechanical fall  - Xray hip/pelvis reporting right subcapital/femoral neck hip fracture is apparent with   marked overriding of the fracture fragments.  - Analgesics as needed   - Incentive spirometer given risk of atelectasis   - sp OR 2/3  - DVT and pain regimen per ortho  - PT eval pending     Euvolemic mild hyponatremia likely 2/2 pain   - Has hx of chronic hyponatremia but is compliant w/ NaCl tabs   - Currently likely from pain   - Started on analgesics and anticipate Na will improve w/ pain control   - Resume home NaCl tabs   - Monitor I/O's, renal function and lytes    Normocytic anemia   - Baseline Hb ranges 9-10  - H/H at baseline at this time   - Will check iron panel   - Monitor CBC and transfuse for Hb<8    Chronic stress induced NICM now w/ HFimprovedEF  - Most recent [facility] 09/17/24 again found pt to be nonobstructive   - TTE from 9/2024 w/ LVEF 56% and clinically euvolemic at this time   - Monitor daily weights and I/O's and caution w/ IVFs   - Maintain K>4 and Mg>2    COPD   - Not in acute exacerbation   - On baseline 2-4L prn O2   - Currently on 2L and sating well   - Maintain O2 at goal of 88-92%  - Therapeutic interchange of Trelegy ellipta ordered   - PRN duonebs     HTN / HLD  Nonobstructive CAD  - c/w valsartan and carvedilol  - ASA on hold for now but resume s/p OR    - c/w statin     Chronic back pain w/ LE neuropathies  - c/w gabapentin     Anxiety / Depression   - c/w quetiapine, trazodone, venlafaxine    - Ambien 10mg qhs w/ 30dy supply last dispensed 01/24/25  - Alprazolam 0.5mg BID w/ 30dy supply last dispensed 1/24/25  - Confirmed on ISTOP  Reference #: [id number]    VTE ppx: ASA BID  Dispo: pending PT eval and ortho clearance, 1-2 days

## 2025-02-03 NOTE — DISCHARGE NOTE PROVIDER - NSDCCPCAREPLAN_GEN_ALL_CORE_FT
PRINCIPAL DISCHARGE DIAGNOSIS  Diagnosis: Closed fracture of hip  Assessment and Plan of Treatment:      PRINCIPAL DISCHARGE DIAGNOSIS  Diagnosis: Closed fracture of hip  Assessment and Plan of Treatment:       SECONDARY DISCHARGE DIAGNOSES  Diagnosis: Anemia due to acute blood loss  Assessment and Plan of Treatment:

## 2025-02-03 NOTE — BRIEF OPERATIVE NOTE - COMMENTS
Post-op Plan: WBAT, PT/OT, ancef per protocol, BLE SCD, LMWH (or equivalent) x 4 weeks post-op, likely f/u prn or telehealth due to age, medical comorbidities and limited functional status.  Post-op Plan: WBAT, PT/OT, ancef per protocol, BLE SCD, ASA (or equivalent) x 4 weeks post-op, likely f/u prn or telehealth due to age, medical comorbidities and limited functional status.

## 2025-02-03 NOTE — DISCHARGE NOTE PROVIDER - ATTENDING DISCHARGE PHYSICAL EXAMINATION:
VITALS:   T(C): 36.7 (02-06-25 @ 08:22), Max: 37.6 (02-05-25 @ 20:30)  HR: 85 (02-06-25 @ 08:22) (85 - 105)  BP: 98/61 (02-06-25 @ 08:22) (98/61 - 123/69)  RR: 16 (02-06-25 @ 08:22) (16 - 18)  SpO2: 97% (02-06-25 @ 08:22) (95% - 100%)    CONSTITUTIONAL: NAD, sitting up in chair, on NC  RESPIRATORY: Normal respiratory effort; crackles noted on the right side   CARDIOVASCULAR: Regular rate and rhythm, no LE edema  ABDOMEN: Nontender to palpation, normoactive bowel sounds  PSYCH: A+O x3; affect appropriate  SKIN: Right hip surgical site c/d, with bruising noted

## 2025-02-03 NOTE — DISCHARGE NOTE PROVIDER - NSDCFUSCHEDAPPT_GEN_ALL_CORE_FT
Billy Ervin  [facility]  CARDIOLOGY [address]  Scheduled Appointment: 02/18/2025    [facility]  [facility] [address]  Scheduled Appointment: 04/10/2025    Maicol Pederson  [facility] [address]  Scheduled Appointment: 04/10/2025

## 2025-02-03 NOTE — DISCHARGE NOTE PROVIDER - HOSPITAL COURSE
The patient underwent a RIGHT HIP HEMIARTHROPLASTY on 02/03/20225. The patient received antibiotics consistent with SCIP guidelines. The patient received medical cleared pre-operatively and underwent the procedure and had no intra-operative complications. Post-operatively, the patient was seen by medicine and PT. The patient received ASPIRIN for  DVTP. The patient received pain medications per orthopedic pain management pathway and the pain was appropriately controlled. Patient was instructed on posterior total hip precautions by PT. The patient did not have any post-operative medical complications. The patient was discharged in stable condition. The patient underwent a RIGHT HIP HEMIARTHROPLASTY on 02/03/20225. The patient received antibiotics consistent with SCIP guidelines. The patient received medical cleared pre-operatively and underwent the procedure and had no intra-operative complications. Post-operatively, the patient was seen by medicine and PT. The patient received ASPIRIN for  DVTP. The patient received pain medications per orthopedic pain management pathway and the pain was appropriately controlled. Patient was instructed on posterior total hip precautions by PT. Course cb anemia s/p 1u prbc with stabilization of Hg. The patient was discharged in stable condition.

## 2025-02-03 NOTE — DISCHARGE NOTE PROVIDER - NSDCFUADDINST_GEN_ALL_CORE_FT
The patient will be seen in the office between 2-3 weeks for wound check.   **Your first post-operative visit has been scheduled prior to your admission. PLEASE CONTACT OFFICE TO CONFIRM THE APPOINTMENT DATE. Sutures/Staples/Tape will be removed at that time.  **  The silver based dressing is to be removed 7 days from the date of surgery (02/10/2025).   ** CONTACT THE OFFICE IF THE FOLLOWING DEVELOP:  - the dressing becomes soiled or saturated  - you develop a fever greater that 101F  - the wound becomes red or you develop blistering around the wound  * Patient may shower after post-op day #3 (02/06/2025).   * The patient will continue home PT consistent with posterior hip replacement protocol and will continue to practice posterior hip precautions for a minimum of 6 week. Transition to outpatient PT will occur at the time of the first office visit.   * The patient is FULL weight bearing.  * The patient will continue ASPIRIN for 6 weeks after surgery for blood clot prevention. While on aspirin, the patient will take daily omeprazole or other similar medication to protect the stomach from irritation.   * The patient will take OXYCODONE AND TYLENOL for pain control and adjust according to prescription and patient needs. Contact the office if pain increases while taking prescribed pain medications or related concerns develop.  * Celebrex will be taken twice daily for 3 weeks for pain control and prevention of excessive bone growth. Additional prescription may be requested at your office follow-up visit.  * The patient will take Senna S while taking oxycodone to prevent narcotic associated constipation.  Additionally, increase water intake (drink at least 8 glasses of water daily) and try adding fiber to the diet by eating fruits, vegetables and foods that are rich in grains. If constipation is experienced, contact the medical/primary care provider to discuss further treatment options.  * To avoid injury at home:  - continue use of rolling walker until cleared by physical therapist  - have family or friend remove all throw rug or objects in hallways that may present a trip hazard.  - if you experience any dizziness or medical concerns, call your medical doctor or  911.  * The implant may activate metal detection devices.

## 2025-02-03 NOTE — BRIEF OPERATIVE NOTE - VENOUS THROMBOEMBOLISM PROPHYLAXIS THERAPY
contralateral SCD, LMWH (or equivalent) x 4 weeks post-op contralateral SCD, ASA (or equivalent) x 4 weeks post-op

## 2025-02-03 NOTE — DISCHARGE NOTE PROVIDER - CARE PROVIDER_API CALL
Dale Landeros  Orthopaedic Trauma  [address] [place] [address]  Phone: [phone]  Fax: [phone]  Follow Up Time:

## 2025-02-03 NOTE — DISCHARGE NOTE PROVIDER - NSDCMRMEDTOKEN_GEN_ALL_CORE_FT
ALPRAZolam 0.5 mg oral tablet: 1 tab(s) orally 2 times a day  verified in iSTOP  aspirin 81 mg oral tablet, chewable: 1 tab(s) orally once a day  atorvastatin 40 mg oral tablet: 1 tab(s) orally once a day (at bedtime)  carvedilol 3.125 mg oral tablet: 1 tab(s) orally 2 times a day  famotidine 20 mg oral tablet: 1 tab(s) orally once a day  gabapentin 600 mg oral tablet: 1 tab(s) orally 2 times a day  QUEtiapine 300 mg oral tablet: 1 tab(s) orally once a day (at bedtime)  sodium chloride 1 g oral tablet: 1 tab(s) orally 2 times a day  traZODone 50 mg oral tablet: orally once a day (at bedtime)  Trelegy Ellipta 200 mcg-62.5 mcg-25 mcg/inh inhalation powder: 1 puff(s) inhaled once a day  valsartan 80 mg oral capsule: 1 tab(s) orally once a day  venlafaxine 225 mg oral tablet, extended release: 1 tab(s) orally once a day  Ventolin HFA 90 mcg/inh inhalation aerosol: 1 puff(s) inhaled once a day as needed for bronchospasm  zolpidem 10 mg oral tablet: 1 tab(s) orally once a day (at bedtime)  verified in iSTOP   acetaminophen 325 mg oral tablet: 3 tab(s) orally every 8 hours  ALPRAZolam 0.5 mg oral tablet: 1 tab(s) orally 2 times a day  verified in iSTOP  aspirin 81 mg oral tablet, chewable: 1 tab(s) orally 2 times a day take aspirin twice a day x 1 month  then go back to take Aspirin once daily  atorvastatin 40 mg oral tablet: 1 tab(s) orally once a day (at bedtime)  carvedilol 3.125 mg oral tablet: 1 tab(s) orally 2 times a day  celecoxib 200 mg oral capsule: 1 cap(s) orally every 12 hours take for 7 days  gabapentin 600 mg oral tablet: 1 tab(s) orally 2 times a day  HYDROmorphone 4 mg oral tablet: 1 tab(s) orally every 3 hours As needed Severe Pain (7 - 10)  oxyCODONE 10 mg oral tablet: 1 tab(s) orally every 3 hours As needed Moderate Pain (4 - 6)  oxyCODONE 5 mg oral tablet: 1 tab(s) orally every 3 hours As needed Mild Pain (1 - 3)  pantoprazole 40 mg oral delayed release tablet: 1 tab(s) orally once a day (before a meal)  polyethylene glycol 3350 oral powder for reconstitution: 17 gram(s) orally 2 times a day  QUEtiapine 300 mg oral tablet: 1 tab(s) orally once a day (at bedtime)  senna leaf extract oral tablet: 2 tab(s) orally once a day (at bedtime)  sodium chloride 1 g oral tablet: 1 tab(s) orally 2 times a day  traZODone 50 mg oral tablet: orally once a day (at bedtime)  Trelegy Ellipta 200 mcg-62.5 mcg-25 mcg/inh inhalation powder: 1 puff(s) inhaled once a day  valsartan 80 mg oral capsule: 1 tab(s) orally once a day  venlafaxine 225 mg oral tablet, extended release: 1 tab(s) orally once a day  Ventolin HFA 90 mcg/inh inhalation aerosol: 1 puff(s) inhaled once a day as needed for bronchospasm  zolpidem 10 mg oral tablet: 1 tab(s) orally once a day (at bedtime)  verified in iSTOP

## 2025-02-04 ENCOUNTER — TRANSCRIPTION ENCOUNTER (OUTPATIENT)
Age: 75
End: 2025-02-04

## 2025-02-04 LAB
ANION GAP SERPL CALC-SCNC: 8 MMOL/L — SIGNIFICANT CHANGE UP (ref 5–17)
BUN SERPL-MCNC: 12.7 MG/DL — SIGNIFICANT CHANGE UP (ref 8–20)
CALCIUM SERPL-MCNC: 7.7 MG/DL — LOW (ref 8.4–10.5)
CHLORIDE SERPL-SCNC: 104 MMOL/L — SIGNIFICANT CHANGE UP (ref 96–108)
CO2 SERPL-SCNC: 25 MMOL/L — SIGNIFICANT CHANGE UP (ref 22–29)
CREAT SERPL-MCNC: 0.67 MG/DL — SIGNIFICANT CHANGE UP (ref 0.5–1.3)
EGFR: 92 ML/MIN/1.73M2 — SIGNIFICANT CHANGE UP
GLUCOSE SERPL-MCNC: 152 MG/DL — HIGH (ref 70–99)
HCT VFR BLD CALC: 26.1 % — LOW (ref 34.5–45)
HGB BLD-MCNC: 8 G/DL — LOW (ref 11.5–15.5)
MCHC RBC-ENTMCNC: 26.9 PG — LOW (ref 27–34)
MCHC RBC-ENTMCNC: 30.7 G/DL — LOW (ref 32–36)
MCV RBC AUTO: 87.9 FL — SIGNIFICANT CHANGE UP (ref 80–100)
PLATELET # BLD AUTO: 216 K/UL — SIGNIFICANT CHANGE UP (ref 150–400)
POTASSIUM SERPL-MCNC: 4.6 MMOL/L — SIGNIFICANT CHANGE UP (ref 3.5–5.3)
POTASSIUM SERPL-SCNC: 4.6 MMOL/L — SIGNIFICANT CHANGE UP (ref 3.5–5.3)
RBC # BLD: 2.97 M/UL — LOW (ref 3.8–5.2)
RBC # FLD: 13.8 % — SIGNIFICANT CHANGE UP (ref 10.3–14.5)
SODIUM SERPL-SCNC: 137 MMOL/L — SIGNIFICANT CHANGE UP (ref 135–145)
WBC # BLD: 9.08 K/UL — SIGNIFICANT CHANGE UP (ref 3.8–10.5)
WBC # FLD AUTO: 9.08 K/UL — SIGNIFICANT CHANGE UP (ref 3.8–10.5)

## 2025-02-04 PROCEDURE — 99232 SBSQ HOSP IP/OBS MODERATE 35: CPT

## 2025-02-04 RX ADMIN — ASPIRIN 81 MILLIGRAM(S): 81 TABLET, COATED ORAL at 17:26

## 2025-02-04 RX ADMIN — ZOLPIDEM TARTRATE 5 MILLIGRAM(S): 5 TABLET, COATED ORAL at 23:43

## 2025-02-04 RX ADMIN — Medication 15 MILLIGRAM(S): at 13:39

## 2025-02-04 RX ADMIN — FLUTICASONE PROPIONATE AND SALMETEROL 1 DOSE(S): 113; 14 POWDER, METERED RESPIRATORY (INHALATION) at 08:19

## 2025-02-04 RX ADMIN — GABAPENTIN 600 MILLIGRAM(S): 800 TABLET ORAL at 05:30

## 2025-02-04 RX ADMIN — Medication 0.5 MILLIGRAM(S): at 15:01

## 2025-02-04 RX ADMIN — ACETAMINOPHEN 975 MILLIGRAM(S): 160 SUSPENSION ORAL at 21:12

## 2025-02-04 RX ADMIN — Medication 15 MILLIGRAM(S): at 05:31

## 2025-02-04 RX ADMIN — ATORVASTATIN CALCIUM 40 MILLIGRAM(S): 80 TABLET, FILM COATED ORAL at 21:07

## 2025-02-04 RX ADMIN — TIOTROPIUM BROMIDE MONOHYDRATE 2 PUFF(S): 18 CAPSULE ORAL; RESPIRATORY (INHALATION) at 08:28

## 2025-02-04 RX ADMIN — PANTOPRAZOLE 40 MILLIGRAM(S): 20 TABLET, DELAYED RELEASE ORAL at 05:31

## 2025-02-04 RX ADMIN — ACETAMINOPHEN 975 MILLIGRAM(S): 160 SUSPENSION ORAL at 14:19

## 2025-02-04 RX ADMIN — ACETAMINOPHEN 400 MILLIGRAM(S): 160 SUSPENSION ORAL at 05:31

## 2025-02-04 RX ADMIN — ACETAMINOPHEN 975 MILLIGRAM(S): 160 SUSPENSION ORAL at 21:07

## 2025-02-04 RX ADMIN — ACETAMINOPHEN 975 MILLIGRAM(S): 160 SUSPENSION ORAL at 15:19

## 2025-02-04 RX ADMIN — ASPIRIN 81 MILLIGRAM(S): 81 TABLET, COATED ORAL at 05:31

## 2025-02-04 RX ADMIN — Medication 15 MILLIGRAM(S): at 12:42

## 2025-02-04 RX ADMIN — Medication 1 PUFF(S): at 08:29

## 2025-02-04 RX ADMIN — Medication 15 MILLIGRAM(S): at 00:42

## 2025-02-04 RX ADMIN — HYDROMORPHONE HYDROCHLORIDE 4 MILLIGRAM(S): 4 INJECTION, SOLUTION INTRAMUSCULAR; INTRAVENOUS; SUBCUTANEOUS at 05:30

## 2025-02-04 RX ADMIN — MUPIROCIN 1 APPLICATION(S): 2 CREAM TOPICAL at 12:42

## 2025-02-04 RX ADMIN — FLUTICASONE PROPIONATE AND SALMETEROL 1 DOSE(S): 113; 14 POWDER, METERED RESPIRATORY (INHALATION) at 21:40

## 2025-02-04 RX ADMIN — QUETIAPINE FUMARATE 300 MILLIGRAM(S): 300 TABLET ORAL at 21:07

## 2025-02-04 RX ADMIN — Medication 225 MILLIGRAM(S): at 12:41

## 2025-02-04 RX ADMIN — Medication 2 TABLET(S): at 21:07

## 2025-02-04 RX ADMIN — Medication 3.12 MILLIGRAM(S): at 21:07

## 2025-02-04 RX ADMIN — POLYETHYLENE GLYCOL 3350 17 GRAM(S): 17 POWDER, FOR SOLUTION ORAL at 21:08

## 2025-02-04 RX ADMIN — GABAPENTIN 600 MILLIGRAM(S): 800 TABLET ORAL at 21:12

## 2025-02-04 RX ADMIN — Medication 50 MILLIGRAM(S): at 21:07

## 2025-02-04 NOTE — DISCHARGE NOTE NURSING/CASE MANAGEMENT/SOCIAL WORK - PATIENT PORTAL LINK FT
You can access the FollowMyHealth Patient Portal offered by [facility] by registering at the following website: [email or web address]. By joining [employer]’s FollowMyHealth portal, you will also be able to view your health information using other applications (apps) compatible with our system.

## 2025-02-04 NOTE — PHYSICAL THERAPY INITIAL EVALUATION ADULT - ACTIVE RANGE OF MOTION EXAMINATION, REHAB EVAL
right LE grossly 2/5 in hip and knee/bilateral upper extremity Active ROM was WFL (within functional limits)/Left LE Active ROM was WFL (within functional limits)/deficits as listed below

## 2025-02-04 NOTE — PHYSICAL THERAPY INITIAL EVALUATION ADULT - ADDITIONAL COMMENTS
alert Pt lives in a house  with 5 steps to enter with  rails and 14  stairs inside with  rails.  Pt owns medical equipment: SAC, RW, Cassius, SC   Pt lives with: Spouse   Someone is always available to provide assist.

## 2025-02-04 NOTE — PROGRESS NOTE ADULT - ASSESSMENT
73 y/o F w/ PMH of nonobstructive CAD, NICM (stress induced), HFimprovedEF (LVEF 54% 09/2024), HTN, HLD, COPD (on 2-4L prn NC) presented from home after mechanical fall at home this morning.  Pt states she was walking back from the bathroom and she lost her footing and fell landing on her right hip.  Pt on 2L NC and sating well.  Initial w/u significant for mild hyponatremia and Rt subcapital/femoral neck fracture on Xray.  Received IV Dilaudid in ED and ortho consulted. Will admit for Rt hip fracture.     Traumatic Rt hip fracture 2/2 mechanical fall  - Xray hip/pelvis reporting right subcapital/femoral neck hip fracture is apparent with   marked overriding of the fracture fragments.  - Analgesics as needed   - Incentive spirometer given risk of atelectasis   - sp OR 2/3  - DVT and pain regimen per ortho  - PT eval pending     Euvolemic mild hyponatremia likely 2/2 pain   - Has hx of chronic hyponatremia but is compliant w/ NaCl tabs   - Currently likely from pain   - Started on analgesics and anticipate Na will improve w/ pain control   - Resume home NaCl tabs   - Monitor I/O's, renal function and lytes    Normocytic anemia   - Baseline Hb ranges 9-10  - H/H at baseline at this time   - Will check iron panel   - Monitor CBC and transfuse for Hb<8    Chronic stress induced NICM now w/ HFimprovedEF  - Most recent [facility] 09/17/24 again found pt to be nonobstructive   - TTE from 9/2024 w/ LVEF 56% and clinically euvolemic at this time   - Monitor daily weights and I/O's and caution w/ IVFs   - Maintain K>4 and Mg>2    COPD   - Not in acute exacerbation   - On baseline 2-4L prn O2   - Currently on 2L and sating well   - Maintain O2 at goal of 88-92%  - Therapeutic interchange of Trelegy ellipta ordered   - PRN duonebs     HTN / HLD  Nonobstructive CAD  - c/w valsartan and carvedilol  - ASA on hold for now but resume s/p OR    - c/w statin     Chronic back pain w/ LE neuropathies  - c/w gabapentin     Anxiety / Depression   - c/w quetiapine, trazodone, venlafaxine    - Ambien 10mg qhs w/ 30dy supply last dispensed 01/24/25  - Alprazolam 0.5mg BID w/ 30dy supply last dispensed 1/24/25  - Confirmed on ISTOP  Reference #: [id number]    VTE ppx: ASA BID  Dispo: pending PT eval  73 y/o F w/ PMH of nonobstructive CAD, NICM (stress induced), HFimprovedEF (LVEF 54% 09/2024), HTN, HLD, COPD (on 2-4L prn NC) presented from home after mechanical fall at home this morning.  Pt states she was walking back from the bathroom and she lost her footing and fell landing on her right hip.  Pt on 2L NC and sating well.  Initial w/u significant for mild hyponatremia and Rt subcapital/femoral neck fracture on Xray.  Received IV Dilaudid in ED and ortho consulted. Will admit for Rt hip fracture.     Traumatic Rt hip fracture 2/2 mechanical fall  - Xray hip/pelvis reporting right subcapital/femoral neck hip fracture is apparent with   marked overriding of the fracture fragments.  - Analgesics as needed   - Incentive spirometer given risk of atelectasis   - sp OR 2/3  - DVT and pain regimen per ortho  - PT eval pending -- likely need RIGOBERTO    Euvolemic mild hyponatremia likely 2/2 pain   - Has hx of chronic hyponatremia but is compliant w/ NaCl tabs   - Currently likely from pain   - Started on analgesics and anticipate Na will improve w/ pain control   - Resume home NaCl tabs   - Monitor I/O's, renal function and lytes    Normocytic anemia   - Baseline Hb ranges 9-10  - H/H at baseline at this time   - Will check iron panel   - Monitor CBC and transfuse for Hb<8    Chronic stress induced NICM now w/ HFimprovedEF  - Most recent [facility] 09/17/24 again found pt to be nonobstructive   - TTE from 9/2024 w/ LVEF 56% and clinically euvolemic at this time   - Monitor daily weights and I/O's and caution w/ IVFs   - Maintain K>4 and Mg>2    COPD   - Not in acute exacerbation   - On baseline 2-4L prn O2   - Currently on 2L and sating well   - Maintain O2 at goal of 88-92%  - Therapeutic interchange of Trelegy ellipta ordered   - PRN duonebs     HTN / HLD  Nonobstructive CAD  - c/w valsartan and carvedilol  - ASA on hold for now but resume s/p OR    - c/w statin     Chronic back pain w/ LE neuropathies  - c/w gabapentin     Anxiety / Depression   - c/w quetiapine, trazodone, venlafaxine    - Ambien 10mg qhs w/ 30dy supply last dispensed 01/24/25  - Alprazolam 0.5mg BID w/ 30dy supply last dispensed 1/24/25  - Confirmed on ISTOP  Reference #: [id number]    VTE ppx: ASA BID  Dispo: pending PT eval

## 2025-02-04 NOTE — PHYSICAL THERAPY INITIAL EVALUATION ADULT - CLONUS, BILATERAL
Duration Of Freeze Thaw-Cycle (Seconds): 0 Consent: The patient's consent was obtained including but not limited to risks of crusting, scabbing, blistering, scarring, darker or lighter pigmentary change, recurrence, incomplete removal and infection. Post-Care Instructions: I reviewed with the patient in detail post-care instructions. Patient is to wear sunprotection, and avoid picking at any of the treated lesions. Pt may apply Vaseline to crusted or scabbing areas. absent Render In Bullet Format When Appropriate: No Total Number Of Aks Treated: 7 Detail Level: Zone

## 2025-02-04 NOTE — DISCHARGE NOTE NURSING/CASE MANAGEMENT/SOCIAL WORK - FINANCIAL ASSISTANCE
[facility] provides services at a reduced cost to those who are determined to be eligible through [facility]’s financial assistance program. Information regarding [facility]’s financial assistance program can be found by going to [email or web address] or by calling [phone].

## 2025-02-05 LAB
ANION GAP SERPL CALC-SCNC: 8 MMOL/L — SIGNIFICANT CHANGE UP (ref 5–17)
BLD GP AB SCN SERPL QL: SIGNIFICANT CHANGE UP
BUN SERPL-MCNC: 11.4 MG/DL — SIGNIFICANT CHANGE UP (ref 8–20)
CALCIUM SERPL-MCNC: 8 MG/DL — LOW (ref 8.4–10.5)
CHLORIDE SERPL-SCNC: 105 MMOL/L — SIGNIFICANT CHANGE UP (ref 96–108)
CO2 SERPL-SCNC: 26 MMOL/L — SIGNIFICANT CHANGE UP (ref 22–29)
CREAT SERPL-MCNC: 0.52 MG/DL — SIGNIFICANT CHANGE UP (ref 0.5–1.3)
EGFR: 97 ML/MIN/1.73M2 — SIGNIFICANT CHANGE UP
GLUCOSE SERPL-MCNC: 90 MG/DL — SIGNIFICANT CHANGE UP (ref 70–99)
HCT VFR BLD CALC: 23.5 % — LOW (ref 34.5–45)
HCT VFR BLD CALC: 24.8 % — LOW (ref 34.5–45)
HCT VFR BLD CALC: 29 % — LOW (ref 34.5–45)
HGB BLD-MCNC: 7.1 G/DL — LOW (ref 11.5–15.5)
HGB BLD-MCNC: 7.5 G/DL — LOW (ref 11.5–15.5)
HGB BLD-MCNC: 8.9 G/DL — LOW (ref 11.5–15.5)
MCHC RBC-ENTMCNC: 26.5 PG — LOW (ref 27–34)
MCHC RBC-ENTMCNC: 26.7 PG — LOW (ref 27–34)
MCHC RBC-ENTMCNC: 27.1 PG — SIGNIFICANT CHANGE UP (ref 27–34)
MCHC RBC-ENTMCNC: 30.2 G/DL — LOW (ref 32–36)
MCHC RBC-ENTMCNC: 30.2 G/DL — LOW (ref 32–36)
MCHC RBC-ENTMCNC: 30.7 G/DL — LOW (ref 32–36)
MCV RBC AUTO: 86.3 FL — SIGNIFICANT CHANGE UP (ref 80–100)
MCV RBC AUTO: 88.3 FL — SIGNIFICANT CHANGE UP (ref 80–100)
MCV RBC AUTO: 89.5 FL — SIGNIFICANT CHANGE UP (ref 80–100)
PLATELET # BLD AUTO: 217 K/UL — SIGNIFICANT CHANGE UP (ref 150–400)
PLATELET # BLD AUTO: 231 K/UL — SIGNIFICANT CHANGE UP (ref 150–400)
PLATELET # BLD AUTO: 246 K/UL — SIGNIFICANT CHANGE UP (ref 150–400)
POTASSIUM SERPL-MCNC: 4.7 MMOL/L — SIGNIFICANT CHANGE UP (ref 3.5–5.3)
POTASSIUM SERPL-SCNC: 4.7 MMOL/L — SIGNIFICANT CHANGE UP (ref 3.5–5.3)
RBC # BLD: 2.66 M/UL — LOW (ref 3.8–5.2)
RBC # BLD: 2.77 M/UL — LOW (ref 3.8–5.2)
RBC # BLD: 3.36 M/UL — LOW (ref 3.8–5.2)
RBC # FLD: 14 % — SIGNIFICANT CHANGE UP (ref 10.3–14.5)
RBC # FLD: 14 % — SIGNIFICANT CHANGE UP (ref 10.3–14.5)
RBC # FLD: 15.1 % — HIGH (ref 10.3–14.5)
SODIUM SERPL-SCNC: 139 MMOL/L — SIGNIFICANT CHANGE UP (ref 135–145)
WBC # BLD: 10.38 K/UL — SIGNIFICANT CHANGE UP (ref 3.8–10.5)
WBC # BLD: 7.87 K/UL — SIGNIFICANT CHANGE UP (ref 3.8–10.5)
WBC # BLD: 9.05 K/UL — SIGNIFICANT CHANGE UP (ref 3.8–10.5)
WBC # FLD AUTO: 10.38 K/UL — SIGNIFICANT CHANGE UP (ref 3.8–10.5)
WBC # FLD AUTO: 7.87 K/UL — SIGNIFICANT CHANGE UP (ref 3.8–10.5)
WBC # FLD AUTO: 9.05 K/UL — SIGNIFICANT CHANGE UP (ref 3.8–10.5)

## 2025-02-05 RX ADMIN — ACETAMINOPHEN 975 MILLIGRAM(S): 160 SUSPENSION ORAL at 05:16

## 2025-02-05 RX ADMIN — GABAPENTIN 600 MILLIGRAM(S): 800 TABLET ORAL at 08:24

## 2025-02-05 RX ADMIN — ACETAMINOPHEN 975 MILLIGRAM(S): 160 SUSPENSION ORAL at 21:47

## 2025-02-05 RX ADMIN — ZOLPIDEM TARTRATE 5 MILLIGRAM(S): 5 TABLET, COATED ORAL at 23:45

## 2025-02-05 RX ADMIN — POLYETHYLENE GLYCOL 3350 17 GRAM(S): 17 POWDER, FOR SOLUTION ORAL at 21:48

## 2025-02-05 RX ADMIN — Medication 50 MILLIGRAM(S): at 21:48

## 2025-02-05 RX ADMIN — ASPIRIN 81 MILLIGRAM(S): 81 TABLET, COATED ORAL at 05:21

## 2025-02-05 RX ADMIN — PANTOPRAZOLE 40 MILLIGRAM(S): 20 TABLET, DELAYED RELEASE ORAL at 05:16

## 2025-02-05 RX ADMIN — GABAPENTIN 600 MILLIGRAM(S): 800 TABLET ORAL at 18:13

## 2025-02-05 RX ADMIN — MUPIROCIN 1 APPLICATION(S): 2 CREAM TOPICAL at 12:17

## 2025-02-05 RX ADMIN — Medication 20 MILLIGRAM(S): at 12:17

## 2025-02-05 RX ADMIN — ASPIRIN 81 MILLIGRAM(S): 81 TABLET, COATED ORAL at 18:13

## 2025-02-05 RX ADMIN — FLUTICASONE PROPIONATE AND SALMETEROL 1 DOSE(S): 113; 14 POWDER, METERED RESPIRATORY (INHALATION) at 08:25

## 2025-02-05 RX ADMIN — Medication 200 MILLIGRAM(S): at 05:16

## 2025-02-05 RX ADMIN — Medication 200 MILLIGRAM(S): at 18:13

## 2025-02-05 RX ADMIN — Medication 2 TABLET(S): at 21:47

## 2025-02-05 RX ADMIN — FLUTICASONE PROPIONATE AND SALMETEROL 1 DOSE(S): 113; 14 POWDER, METERED RESPIRATORY (INHALATION) at 21:13

## 2025-02-05 RX ADMIN — ACETAMINOPHEN 975 MILLIGRAM(S): 160 SUSPENSION ORAL at 21:48

## 2025-02-05 RX ADMIN — ATORVASTATIN CALCIUM 40 MILLIGRAM(S): 80 TABLET, FILM COATED ORAL at 21:48

## 2025-02-05 RX ADMIN — ACETAMINOPHEN 975 MILLIGRAM(S): 160 SUSPENSION ORAL at 14:56

## 2025-02-05 RX ADMIN — Medication 225 MILLIGRAM(S): at 12:17

## 2025-02-05 RX ADMIN — TIOTROPIUM BROMIDE MONOHYDRATE 2 PUFF(S): 18 CAPSULE ORAL; RESPIRATORY (INHALATION) at 08:25

## 2025-02-05 RX ADMIN — ACETAMINOPHEN 975 MILLIGRAM(S): 160 SUSPENSION ORAL at 13:56

## 2025-02-05 RX ADMIN — Medication 200 MILLIGRAM(S): at 05:18

## 2025-02-05 RX ADMIN — ACETAMINOPHEN 975 MILLIGRAM(S): 160 SUSPENSION ORAL at 05:18

## 2025-02-05 RX ADMIN — QUETIAPINE FUMARATE 300 MILLIGRAM(S): 300 TABLET ORAL at 21:47

## 2025-02-05 RX ADMIN — Medication 3.12 MILLIGRAM(S): at 18:13

## 2025-02-05 NOTE — PROGRESS NOTE ADULT - SUBJECTIVE AND OBJECTIVE BOX
TULIO SUGGS    [id number]    History: Patient is status post right posterior hip kendal-arthroplasty POD # 2. Patient is doing well and is comfortable. The patient's pain is controlled using the prescribed pain medications. The patient is participating in physical therapy. Denies CP, SOB, dizziness, HA, fever/chills, numbness or tingling. No new complaints.    Vital Signs Last 24 Hrs  T(C): 36.6 (05 Feb 2025 04:46), Max: 36.8 (04 Feb 2025 17:00)  T(F): 97.8 (05 Feb 2025 04:46), Max: 98.3 (04 Feb 2025 17:00)  HR: 81 (05 Feb 2025 04:46) (59 - 118)  BP: 100/65 (05 Feb 2025 04:46) (100/65 - 154/51)  BP(mean): --  RR: 17 (05 Feb 2025 04:46) (17 - 18)  SpO2: 100% (05 Feb 2025 04:46) (91% - 100%)    Parameters below as of 05 Feb 2025 04:46  Patient On (Oxygen Delivery Method): nasal cannula  O2 Flow (L/min): 3                     7.1    7.87  )-----------( 217      ( 05 Feb 2025 05:15 )             23.5     02-05    139  |  105  |  11.4  ----------------------------<  90  4.7   |  26.0  |  0.52    Ca    8.0[L]      05 Feb 2025 05:15    General: Alert, awake, NAD  Physical exam: The right hip dressing is clean, dry and intact. No drainage, discharge, erythema, blistering or ecchymosis.   The calf is supple nontender b/l. SILT. +EHL/FHL/AT/GC. 2+ DP pulse. BCR. No cyanosis.    Plan:   - DVT prophylaxis as prescribed, including use of compression devices and ankle pumps  - Orthopedics recommends blood transfusion for Hb less than 8  -  Continue physical therapy  - Weight bearing status of surgical extremity: WBAT  - mupirocin ointment ordered  - Incentive spirometry encouraged  - Pain control as clinically indicated  - Posterior hip precautions reviewed with patient  - Discharge planning – anticipated discharge is Home / subacute rehabilitation / acute rehabilitation  
Katrin Appiah M.D.    Patient is a 74y old  Female who presents with a chief complaint of Rt hip fracture (03 Feb 2025 13:07)      SUBJECTIVE / OVERNIGHT EVENTS: no event overnight.     Patient denies chest pain, abd pain, N/V, fever, chills, dysuria or any other complaints. All remainder ROS negative.     MEDICATIONS  (STANDING):  acetaminophen     Tablet .. 975 milliGRAM(s) Oral every 8 hours  acetaminophen   IVPB .. 1000 milliGRAM(s) IV Intermittent once  aspirin enteric coated 81 milliGRAM(s) Oral two times a day  atorvastatin 40 milliGRAM(s) Oral at bedtime  carvedilol 3.125 milliGRAM(s) Oral every 12 hours  ceFAZolin  Injectable. 2000 milliGRAM(s) IV Push <User Schedule>  fluticasone propionate/ salmeterol 100-50 MICROgram(s) Diskus 1 Dose(s) Inhalation two times a day  gabapentin 600 milliGRAM(s) Oral two times a day  ketorolac   Injectable 15 milliGRAM(s) IV Push every 6 hours  mupirocin 2% Nasal 1 Application(s) Both Nostrils <User Schedule>  pantoprazole    Tablet 40 milliGRAM(s) Oral before breakfast  polyethylene glycol 3350 17 Gram(s) Oral at bedtime  QUEtiapine 300 milliGRAM(s) Oral at bedtime  senna 2 Tablet(s) Oral at bedtime  sodium chloride 0.9%. 1000 milliLiter(s) (85 mL/Hr) IV Continuous <Continuous>  tiotropium 2.5 MICROgram(s) Inhaler 2 Puff(s) Inhalation daily  traZODone 50 milliGRAM(s) Oral at bedtime  valsartan 80 milliGRAM(s) Oral daily  venlafaxine XR. 225 milliGRAM(s) Oral daily    MEDICATIONS  (PRN):  albuterol    90 MICROgram(s) HFA Inhaler 1 Puff(s) Inhalation four times a day PRN Shortness of Breath and/or Wheezing  ALPRAZolam 0.5 milliGRAM(s) Oral two times a day PRN anxiety  aluminum hydroxide/magnesium hydroxide/simethicone Suspension 30 milliLiter(s) Oral four times a day PRN Indigestion  HYDROmorphone   Tablet 4 milliGRAM(s) Oral every 3 hours PRN Severe Pain (7 - 10)  magnesium hydroxide Suspension 30 milliLiter(s) Oral daily PRN Constipation  melatonin 3 milliGRAM(s) Oral at bedtime PRN Insomnia  ondansetron Injectable 4 milliGRAM(s) IV Push every 6 hours PRN Nausea and/or Vomiting  oxyCODONE    IR 5 milliGRAM(s) Oral every 3 hours PRN Mild Pain (1 - 3)  oxyCODONE    IR 10 milliGRAM(s) Oral every 3 hours PRN Moderate Pain (4 - 6)  zolpidem 5 milliGRAM(s) Oral at bedtime PRN Insomnia  zolpidem 5 milliGRAM(s) Oral at bedtime PRN Insomnia      I&O's Summary    02 Feb 2025 07:01  -  03 Feb 2025 07:00  --------------------------------------------------------  IN: 200 mL / OUT: 600 mL / NET: -400 mL    03 Feb 2025 07:01  -  03 Feb 2025 14:56  --------------------------------------------------------  IN: 812 mL / OUT: 0 mL / NET: 812 mL        PHYSICAL EXAM:  Vital Signs Last 24 Hrs  T(C): 36.9 (03 Feb 2025 12:28), Max: 37.1 (02 Feb 2025 15:30)  T(F): 98.5 (03 Feb 2025 12:28), Max: 98.7 (02 Feb 2025 15:30)  HR: 99 (03 Feb 2025 12:28) (66 - 99)  BP: 109/72 (03 Feb 2025 12:28) (101/56 - 162/98)  BP(mean): 79 (03 Feb 2025 12:00) (70 - 103)  RR: 18 (03 Feb 2025 12:28) (12 - 20)  SpO2: 95% (03 Feb 2025 12:28) (94% - 100%)    Parameters below as of 03 Feb 2025 12:28  Patient On (Oxygen Delivery Method): nasal cannula  O2 Flow (L/min): 3    CONSTITUTIONAL: NAD, well-groomed  RESPIRATORY: Normal respiratory effort; lungs are clear to auscultation bilaterally  CARDIOVASCULAR: Regular rate and rhythm, no LE edema  ABDOMEN: Nontender to palpation, +BS  LABS:                        10.1   12.42 )-----------( 269      ( 03 Feb 2025 11:16 )             32.6     02-03    131[L]  |  99  |  14.8  ----------------------------<  125[H]  4.8   |  24.0  |  0.65    Ca    7.9[L]      03 Feb 2025 11:16  Phos  4.0     02-03  Mg     1.9     02-03    TPro  6.2[L]  /  Alb  3.4  /  TBili  <0.2[L]  /  DBili  x   /  AST  15  /  ALT  12  /  AlkPhos  75  02-03    PT/INR - ( 02 Feb 2025 08:55 )   PT: 10.8 sec;   INR: 0.96 ratio         PTT - ( 02 Feb 2025 08:55 )  PTT:29.3 sec      Urinalysis Basic - ( 03 Feb 2025 11:16 )    Color: x / Appearance: x / SG: x / pH: x  Gluc: 125 mg/dL / Ketone: x  / Bili: x / Urobili: x   Blood: x / Protein: x / Nitrite: x   Leuk Esterase: x / RBC: x / WBC x   Sq Epi: x / Non Sq Epi: x / Bacteria: x        CAPILLARY BLOOD GLUCOSE          RADIOLOGY & ADDITIONAL TESTS:  Results Reviewed:   Imaging Personally Reviewed:  Electrocardiogram Personally Reviewed:
Katrin Appiah M.D.    Patient is a 74y old  Female who presents with a chief complaint of Rt hip fracture (05 Feb 2025 07:25)      SUBJECTIVE / OVERNIGHT EVENTS: no event overnight.     Patient denies chest pain, SOB, abd pain, N/V, fever, chills, dysuria or any other complaints. All remainder ROS negative.     MEDICATIONS  (STANDING):  acetaminophen     Tablet .. 975 milliGRAM(s) Oral every 8 hours  aspirin enteric coated 81 milliGRAM(s) Oral two times a day  atorvastatin 40 milliGRAM(s) Oral at bedtime  carvedilol 3.125 milliGRAM(s) Oral every 12 hours  celecoxib 200 milliGRAM(s) Oral every 12 hours  fluticasone propionate/ salmeterol 100-50 MICROgram(s) Diskus 1 Dose(s) Inhalation two times a day  gabapentin 600 milliGRAM(s) Oral two times a day  mupirocin 2% Nasal 1 Application(s) Both Nostrils <User Schedule>  pantoprazole    Tablet 40 milliGRAM(s) Oral before breakfast  polyethylene glycol 3350 17 Gram(s) Oral at bedtime  QUEtiapine 300 milliGRAM(s) Oral at bedtime  senna 2 Tablet(s) Oral at bedtime  sodium chloride 0.9%. 1000 milliLiter(s) (85 mL/Hr) IV Continuous <Continuous>  tiotropium 2.5 MICROgram(s) Inhaler 2 Puff(s) Inhalation daily  traZODone 50 milliGRAM(s) Oral at bedtime  valsartan 80 milliGRAM(s) Oral daily  venlafaxine XR. 225 milliGRAM(s) Oral daily    MEDICATIONS  (PRN):  albuterol    90 MICROgram(s) HFA Inhaler 1 Puff(s) Inhalation four times a day PRN Shortness of Breath and/or Wheezing  ALPRAZolam 0.5 milliGRAM(s) Oral two times a day PRN anxiety  aluminum hydroxide/magnesium hydroxide/simethicone Suspension 30 milliLiter(s) Oral four times a day PRN Indigestion  bisacodyl Suppository 10 milliGRAM(s) Rectal once PRN Constipation  HYDROmorphone   Tablet 4 milliGRAM(s) Oral every 3 hours PRN Severe Pain (7 - 10)  magnesium hydroxide Suspension 30 milliLiter(s) Oral daily PRN Constipation  melatonin 3 milliGRAM(s) Oral at bedtime PRN Insomnia  ondansetron Injectable 4 milliGRAM(s) IV Push every 6 hours PRN Nausea and/or Vomiting  oxyCODONE    IR 5 milliGRAM(s) Oral every 3 hours PRN Mild Pain (1 - 3)  oxyCODONE    IR 10 milliGRAM(s) Oral every 3 hours PRN Moderate Pain (4 - 6)  zolpidem 5 milliGRAM(s) Oral at bedtime PRN Insomnia  zolpidem 5 milliGRAM(s) Oral at bedtime PRN Insomnia      I&O's Summary    04 Feb 2025 07:01  -  05 Feb 2025 07:00  --------------------------------------------------------  IN: 240 mL / OUT: 1200 mL / NET: -960 mL    05 Feb 2025 07:01  -  05 Feb 2025 11:27  --------------------------------------------------------  IN: 0 mL / OUT: 800 mL / NET: -800 mL        PHYSICAL EXAM:  Vital Signs Last 24 Hrs  T(C): 36.3 (05 Feb 2025 08:44), Max: 36.8 (04 Feb 2025 17:00)  T(F): 97.4 (05 Feb 2025 08:44), Max: 98.3 (04 Feb 2025 17:00)  HR: 70 (05 Feb 2025 08:44) (70 - 104)  BP: 101/53 (05 Feb 2025 08:44) (100/65 - 114/68)  BP(mean): --  RR: 18 (05 Feb 2025 08:44) (17 - 18)  SpO2: 100% (05 Feb 2025 08:44) (96% - 100%)    Parameters below as of 05 Feb 2025 08:44  Patient On (Oxygen Delivery Method): nasal cannula  O2 Flow (L/min): 3    CONSTITUTIONAL: NAD, sitting up in chair, on NC  RESPIRATORY: Normal respiratory effort; crackles noted on the right side   CARDIOVASCULAR: Regular rate and rhythm, no LE edema  ABDOMEN: Nontender to palpation, normoactive bowel sounds  PSYCH: A+O x3; affect appropriate  SKIN: Right hip surgical site c/d, with large bruising noted    LABS:                        7.5    9.05  )-----------( 231      ( 05 Feb 2025 07:57 )             24.8     02-05    139  |  105  |  11.4  ----------------------------<  90  4.7   |  26.0  |  0.52    Ca    8.0[L]      05 Feb 2025 05:15            Urinalysis Basic - ( 05 Feb 2025 05:15 )    Color: x / Appearance: x / SG: x / pH: x  Gluc: 90 mg/dL / Ketone: x  / Bili: x / Urobili: x   Blood: x / Protein: x / Nitrite: x   Leuk Esterase: x / RBC: x / WBC x   Sq Epi: x / Non Sq Epi: x / Bacteria: x        CAPILLARY BLOOD GLUCOSE          RADIOLOGY & ADDITIONAL TESTS:  Results Reviewed:   Imaging Personally Reviewed:  Electrocardiogram Personally Reviewed:
Patient seen and examined. Patient is POD #0 of right hip kendal. No complaints at this time. PT pending.     Vital Signs Last 24 Hrs  T(C): 36.9 (03 Feb 2025 12:28), Max: 37.1 (02 Feb 2025 15:30)  T(F): 98.5 (03 Feb 2025 12:28), Max: 98.7 (02 Feb 2025 15:30)  HR: 99 (03 Feb 2025 12:28) (66 - 99)  BP: 109/72 (03 Feb 2025 12:28) (101/56 - 162/98)  BP(mean): 79 (03 Feb 2025 12:00) (70 - 103)  RR: 18 (03 Feb 2025 12:28) (12 - 20)  SpO2: 95% (03 Feb 2025 12:28) (94% - 100%)    Parameters below as of 03 Feb 2025 12:28  Patient On (Oxygen Delivery Method): nasal cannula  O2 Flow (L/min): 3      Physical exam;  alert and oriented  RLE:  Dressing c/d/i  SILT  +DF/PF  WWP      Pelvis & hip films reviewed. Implants are in appropriate position. No fracture or dislocation noted. Patient is WBAT of the surgical extremity.     Plan:  Ancef per scip  dvtp  ptot wbat  pain control  dispo planning
TULIO SUGGS    [id number]    History: Patient is status post right hip kendal arthroplasty on 2/3, POD#1. Patient is doing well. The patient's pain is controlled using the prescribed pain medications. The patient will  participating in physical therapy, WBAT RLE and stair training.  Denies nausea, vomiting, chest pain, shortness of breath, abdominal pain or fever. No new orthopedic complaints.    Vital Signs Last 24 Hrs  T(C): 36.8 (04 Feb 2025 04:14), Max: 37.2 (03 Feb 2025 20:20)  T(F): 98.2 (04 Feb 2025 04:14), Max: 99 (03 Feb 2025 20:20)  HR: 86 (04 Feb 2025 04:14) (75 - 99)  BP: 101/64 (04 Feb 2025 04:14) (101/59 - 144/84)  BP(mean): 79 (03 Feb 2025 12:00) (70 - 103)  RR: 17 (04 Feb 2025 04:14) (12 - 18)  SpO2: 97% (04 Feb 2025 04:14) (95% - 100%)    Parameters below as of 04 Feb 2025 04:14  Patient On (Oxygen Delivery Method): nasal cannula  O2 Flow (L/min): 2                            10.1   12.42 )-----------( 269      ( 03 Feb 2025 11:16 )             32.6       02-03    131[L]  |  99  |  14.8  ----------------------------<  125[H]  4.8   |  24.0  |  0.65    Ca    7.9[L]      03 Feb 2025 11:16  Phos  4.0     02-03  Mg     1.9     02-03    TPro  6.2[L]  /  Alb  3.4  /  TBili  <0.2[L]  /  DBili  x   /  AST  15  /  ALT  12  /  AlkPhos  75  02-03      MEDICATIONS  (STANDING):  acetaminophen     Tablet .. 975 milliGRAM(s) Oral every 8 hours  aspirin enteric coated 81 milliGRAM(s) Oral two times a day  atorvastatin 40 milliGRAM(s) Oral at bedtime  carvedilol 3.125 milliGRAM(s) Oral every 12 hours  fluticasone propionate/ salmeterol 100-50 MICROgram(s) Diskus 1 Dose(s) Inhalation two times a day  gabapentin 600 milliGRAM(s) Oral two times a day  ketorolac   Injectable 15 milliGRAM(s) IV Push every 6 hours  mupirocin 2% Nasal 1 Application(s) Both Nostrils <User Schedule>  pantoprazole    Tablet 40 milliGRAM(s) Oral before breakfast  polyethylene glycol 3350 17 Gram(s) Oral at bedtime  QUEtiapine 300 milliGRAM(s) Oral at bedtime  senna 2 Tablet(s) Oral at bedtime  sodium chloride 0.9%. 1000 milliLiter(s) (85 mL/Hr) IV Continuous <Continuous>  tiotropium 2.5 MICROgram(s) Inhaler 2 Puff(s) Inhalation daily  traZODone 50 milliGRAM(s) Oral at bedtime  valsartan 80 milliGRAM(s) Oral daily  venlafaxine XR. 225 milliGRAM(s) Oral daily    MEDICATIONS  (PRN):  albuterol    90 MICROgram(s) HFA Inhaler 1 Puff(s) Inhalation four times a day PRN Shortness of Breath and/or Wheezing  ALPRAZolam 0.5 milliGRAM(s) Oral two times a day PRN anxiety  aluminum hydroxide/magnesium hydroxide/simethicone Suspension 30 milliLiter(s) Oral four times a day PRN Indigestion  HYDROmorphone   Tablet 4 milliGRAM(s) Oral every 3 hours PRN Severe Pain (7 - 10)  magnesium hydroxide Suspension 30 milliLiter(s) Oral daily PRN Constipation  melatonin 3 milliGRAM(s) Oral at bedtime PRN Insomnia  ondansetron Injectable 4 milliGRAM(s) IV Push every 6 hours PRN Nausea and/or Vomiting  oxyCODONE    IR 5 milliGRAM(s) Oral every 3 hours PRN Mild Pain (1 - 3)  oxyCODONE    IR 10 milliGRAM(s) Oral every 3 hours PRN Moderate Pain (4 - 6)  zolpidem 5 milliGRAM(s) Oral at bedtime PRN Insomnia  zolpidem 5 milliGRAM(s) Oral at bedtime PRN Insomnia      Physical exam: The right hip mepilex dressing remains clean, dry and intact. No drainage or discharge. No erythema is noted. No blistering. No ecchymosis. The calf is supple nontender. Passive range of motion is acceptable to due postoperative pain. No calf tenderness. Sensation to light touch is grossly intact distally. Motor function distally is 5/5. No foot drop. 2+ dorsalis pedis pulse. Capillary refill is less than 2 seconds. No cyanosis.    Primary Orthopedic Assessment:  • s/p RIGHT hip Hemiarthroplasty, POD#1.    Secondary  Orthopedic Assessment(s):   •     Secondary  Medical Assessment(s):   •     Plan:   • DVT prophylaxis as prescribed, including use of compression devices and ankle pumps  • Continue physical therapy and stair training.   • Weightbearing as tolerated of the right lower extremity with assistance of a walker  • Incentive spirometry encouraged  • Pain control as clinically indicated  • Discharge planning – anticipated discharge is Home vs [facility]
Katrin Appiah M.D.    Patient is a 74y old  Female who presents with a chief complaint of Rt hip fracture (04 Feb 2025 07:09)      SUBJECTIVE / OVERNIGHT EVENTS: no concerns.     Patient denies chest pain, SOB, abd pain, N/V, fever, chills, dysuria or any other complaints. All remainder ROS negative.     MEDICATIONS  (STANDING):  acetaminophen     Tablet .. 975 milliGRAM(s) Oral every 8 hours  aspirin enteric coated 81 milliGRAM(s) Oral two times a day  atorvastatin 40 milliGRAM(s) Oral at bedtime  carvedilol 3.125 milliGRAM(s) Oral every 12 hours  fluticasone propionate/ salmeterol 100-50 MICROgram(s) Diskus 1 Dose(s) Inhalation two times a day  gabapentin 600 milliGRAM(s) Oral two times a day  ketorolac   Injectable 15 milliGRAM(s) IV Push every 6 hours  mupirocin 2% Nasal 1 Application(s) Both Nostrils <User Schedule>  pantoprazole    Tablet 40 milliGRAM(s) Oral before breakfast  polyethylene glycol 3350 17 Gram(s) Oral at bedtime  QUEtiapine 300 milliGRAM(s) Oral at bedtime  senna 2 Tablet(s) Oral at bedtime  sodium chloride 0.9%. 1000 milliLiter(s) (85 mL/Hr) IV Continuous <Continuous>  tiotropium 2.5 MICROgram(s) Inhaler 2 Puff(s) Inhalation daily  traZODone 50 milliGRAM(s) Oral at bedtime  valsartan 80 milliGRAM(s) Oral daily  venlafaxine XR. 225 milliGRAM(s) Oral daily    MEDICATIONS  (PRN):  albuterol    90 MICROgram(s) HFA Inhaler 1 Puff(s) Inhalation four times a day PRN Shortness of Breath and/or Wheezing  ALPRAZolam 0.5 milliGRAM(s) Oral two times a day PRN anxiety  aluminum hydroxide/magnesium hydroxide/simethicone Suspension 30 milliLiter(s) Oral four times a day PRN Indigestion  HYDROmorphone   Tablet 4 milliGRAM(s) Oral every 3 hours PRN Severe Pain (7 - 10)  magnesium hydroxide Suspension 30 milliLiter(s) Oral daily PRN Constipation  melatonin 3 milliGRAM(s) Oral at bedtime PRN Insomnia  ondansetron Injectable 4 milliGRAM(s) IV Push every 6 hours PRN Nausea and/or Vomiting  oxyCODONE    IR 5 milliGRAM(s) Oral every 3 hours PRN Mild Pain (1 - 3)  oxyCODONE    IR 10 milliGRAM(s) Oral every 3 hours PRN Moderate Pain (4 - 6)  zolpidem 5 milliGRAM(s) Oral at bedtime PRN Insomnia  zolpidem 5 milliGRAM(s) Oral at bedtime PRN Insomnia      I&O's Summary    03 Feb 2025 07:01  -  04 Feb 2025 07:00  --------------------------------------------------------  IN: 1052 mL / OUT: 1150 mL / NET: -98 mL        PHYSICAL EXAM:  Vital Signs Last 24 Hrs  T(C): 36.3 (04 Feb 2025 08:32), Max: 37.2 (03 Feb 2025 20:20)  T(F): 97.3 (04 Feb 2025 08:32), Max: 99 (03 Feb 2025 20:20)  HR: 59 (04 Feb 2025 08:32) (59 - 99)  BP: 154/51 (04 Feb 2025 08:32) (101/59 - 154/51)  BP(mean): 79 (03 Feb 2025 12:00) (73 - 103)  RR: 18 (04 Feb 2025 08:32) (12 - 18)  SpO2: 95% (04 Feb 2025 08:32) (95% - 100%)    Parameters below as of 04 Feb 2025 08:32  Patient On (Oxygen Delivery Method): nasal cannula  O2 Flow (L/min): 2    CONSTITUTIONAL: NAD, sitting up in chair, on NC  RESPIRATORY: Normal respiratory effort; crackles noted on the right side   CARDIOVASCULAR: Regular rate and rhythm, no LE edema  ABDOMEN: Nontender to palpation, normoactive bowel sounds  PSYCH: A+O x3; affect appropriate    LABS:                        8.0    9.08  )-----------( 216      ( 04 Feb 2025 06:45 )             26.1     02-04    137  |  104  |  12.7  ----------------------------<  152[H]  4.6   |  25.0  |  0.67    Ca    7.7[L]      04 Feb 2025 06:45  Phos  4.0     02-03  Mg     1.9     02-03    TPro  6.2[L]  /  Alb  3.4  /  TBili  <0.2[L]  /  DBili  x   /  AST  15  /  ALT  12  /  AlkPhos  75  02-03          Urinalysis Basic - ( 04 Feb 2025 06:45 )    Color: x / Appearance: x / SG: x / pH: x  Gluc: 152 mg/dL / Ketone: x  / Bili: x / Urobili: x   Blood: x / Protein: x / Nitrite: x   Leuk Esterase: x / RBC: x / WBC x   Sq Epi: x / Non Sq Epi: x / Bacteria: x        CAPILLARY BLOOD GLUCOSE          RADIOLOGY & ADDITIONAL TESTS:  Results Reviewed:   Imaging Personally Reviewed:  Electrocardiogram Personally Reviewed:

## 2025-02-05 NOTE — PROGRESS NOTE ADULT - ASSESSMENT
75 y/o F w/ PMH of nonobstructive CAD, NICM (stress induced), HFimprovedEF (LVEF 54% 09/2024), HTN, HLD, COPD (on 2-4L prn NC) presented from home after mechanical fall at home this morning.  Pt states she was walking back from the bathroom and she lost her footing and fell landing on her right hip.  Pt on 2L NC and sating well.  Initial w/u significant for mild hyponatremia and Rt subcapital/femoral neck fracture on Xray.  Received IV Dilaudid in ED and ortho consulted. Will admit for Rt hip fracture.     Traumatic Rt hip fracture 2/2 mechanical fall  - Xray hip/pelvis reporting right subcapital/femoral neck hip fracture is apparent with   marked overriding of the fracture fragments.  - Analgesics as needed   - Incentive spirometer given risk of atelectasis   - sp OR 2/3  - DVT and pain regimen per ortho  - PT eval pending -- likely need [facility]    Euvolemic mild hyponatremia likely 2/2 pain   - Has hx of chronic hyponatremia but is compliant w/ NaCl tabs   - Currently likely from pain   - Started on analgesics and anticipate Na will improve w/ pain control   - Resume home NaCl tabs   - Monitor I/O's, renal function and lytes    Normocytic anemia   - Hg dropped to 7  - plan for 1u prbc 2/5, will give lasix post transfusion x1  - Monitor CBC and transfuse for Hb<8    Chronic stress induced NICM now w/ HFimprovedEF  - Most recent [facility] 09/17/24 again found pt to be nonobstructive   - TTE from 9/2024 w/ LVEF 56% and clinically euvolemic at this time   - Monitor daily weights and I/O's and caution w/ IVFs   - Maintain K>4 and Mg>2    COPD   - Not in acute exacerbation   - On baseline 2-4L prn O2   - Currently on 2L and sating well   - Maintain O2 at goal of 88-92%  - Therapeutic interchange of Trelegy ellipta ordered   - PRN duonebs     HTN / HLD  Nonobstructive CAD  - c/w valsartan and carvedilol  - ASA on hold for now but resume s/p OR    - c/w statin     Chronic back pain w/ LE neuropathies  - c/w gabapentin     Anxiety / Depression   - c/w quetiapine, trazodone, venlafaxine    - Ambien 10mg qhs w/ 30dy supply last dispensed 01/24/25  - Alprazolam 0.5mg BID w/ 30dy supply last dispensed 1/24/25  - Confirmed on ISTOP  Reference #: [id number]    VTE ppx: ASA BID  Dispo: [facility] in AM if Hg stable

## 2025-02-06 VITALS
RESPIRATION RATE: 20 BRPM | DIASTOLIC BLOOD PRESSURE: 71 MMHG | HEART RATE: 106 BPM | TEMPERATURE: 98 F | SYSTOLIC BLOOD PRESSURE: 116 MMHG | OXYGEN SATURATION: 92 %

## 2025-02-06 LAB
ANION GAP SERPL CALC-SCNC: 8 MMOL/L — SIGNIFICANT CHANGE UP (ref 5–17)
BUN SERPL-MCNC: 10.8 MG/DL — SIGNIFICANT CHANGE UP (ref 8–20)
CALCIUM SERPL-MCNC: 8.3 MG/DL — LOW (ref 8.4–10.5)
CHLORIDE SERPL-SCNC: 99 MMOL/L — SIGNIFICANT CHANGE UP (ref 96–108)
CO2 SERPL-SCNC: 30 MMOL/L — HIGH (ref 22–29)
CREAT SERPL-MCNC: 0.51 MG/DL — SIGNIFICANT CHANGE UP (ref 0.5–1.3)
EGFR: 98 ML/MIN/1.73M2 — SIGNIFICANT CHANGE UP
GLUCOSE SERPL-MCNC: 107 MG/DL — HIGH (ref 70–99)
HCT VFR BLD CALC: 27.8 % — LOW (ref 34.5–45)
HGB BLD-MCNC: 8.6 G/DL — LOW (ref 11.5–15.5)
MCHC RBC-ENTMCNC: 26.6 PG — LOW (ref 27–34)
MCHC RBC-ENTMCNC: 30.9 G/DL — LOW (ref 32–36)
MCV RBC AUTO: 86.1 FL — SIGNIFICANT CHANGE UP (ref 80–100)
PLATELET # BLD AUTO: 265 K/UL — SIGNIFICANT CHANGE UP (ref 150–400)
POTASSIUM SERPL-MCNC: 4.6 MMOL/L — SIGNIFICANT CHANGE UP (ref 3.5–5.3)
POTASSIUM SERPL-SCNC: 4.6 MMOL/L — SIGNIFICANT CHANGE UP (ref 3.5–5.3)
RBC # BLD: 3.23 M/UL — LOW (ref 3.8–5.2)
RBC # FLD: 15.3 % — HIGH (ref 10.3–14.5)
SODIUM SERPL-SCNC: 137 MMOL/L — SIGNIFICANT CHANGE UP (ref 135–145)
WBC # BLD: 8.54 K/UL — SIGNIFICANT CHANGE UP (ref 3.8–10.5)
WBC # FLD AUTO: 8.54 K/UL — SIGNIFICANT CHANGE UP (ref 3.8–10.5)

## 2025-02-06 PROCEDURE — 87641 MR-STAPH DNA AMP PROBE: CPT

## 2025-02-06 PROCEDURE — 83880 ASSAY OF NATRIURETIC PEPTIDE: CPT

## 2025-02-06 PROCEDURE — 99239 HOSP IP/OBS DSCHRG MGMT >30: CPT

## 2025-02-06 PROCEDURE — 86850 RBC ANTIBODY SCREEN: CPT

## 2025-02-06 PROCEDURE — 85730 THROMBOPLASTIN TIME PARTIAL: CPT

## 2025-02-06 PROCEDURE — 86923 COMPATIBILITY TEST ELECTRIC: CPT

## 2025-02-06 PROCEDURE — 36430 TRANSFUSION BLD/BLD COMPNT: CPT

## 2025-02-06 PROCEDURE — 85027 COMPLETE CBC AUTOMATED: CPT

## 2025-02-06 PROCEDURE — 36415 COLL VENOUS BLD VENIPUNCTURE: CPT

## 2025-02-06 PROCEDURE — P9016: CPT

## 2025-02-06 PROCEDURE — 72170 X-RAY EXAM OF PELVIS: CPT

## 2025-02-06 PROCEDURE — 73501 X-RAY EXAM HIP UNI 1 VIEW: CPT

## 2025-02-06 PROCEDURE — 84466 ASSAY OF TRANSFERRIN: CPT

## 2025-02-06 PROCEDURE — 71045 X-RAY EXAM CHEST 1 VIEW: CPT

## 2025-02-06 PROCEDURE — 83550 IRON BINDING TEST: CPT

## 2025-02-06 PROCEDURE — 86901 BLOOD TYPING SEROLOGIC RH(D): CPT

## 2025-02-06 PROCEDURE — C1889: CPT

## 2025-02-06 PROCEDURE — C1776: CPT

## 2025-02-06 PROCEDURE — 84100 ASSAY OF PHOSPHORUS: CPT

## 2025-02-06 PROCEDURE — 96374 THER/PROPH/DIAG INJ IV PUSH: CPT

## 2025-02-06 PROCEDURE — 94664 DEMO&/EVAL PT USE INHALER: CPT

## 2025-02-06 PROCEDURE — 86900 BLOOD TYPING SEROLOGIC ABO: CPT

## 2025-02-06 PROCEDURE — 83540 ASSAY OF IRON: CPT

## 2025-02-06 PROCEDURE — 80053 COMPREHEN METABOLIC PANEL: CPT

## 2025-02-06 PROCEDURE — 85610 PROTHROMBIN TIME: CPT

## 2025-02-06 PROCEDURE — 73502 X-RAY EXAM HIP UNI 2-3 VIEWS: CPT

## 2025-02-06 PROCEDURE — 99285 EMERGENCY DEPT VISIT HI MDM: CPT | Mod: 25

## 2025-02-06 PROCEDURE — 94640 AIRWAY INHALATION TREATMENT: CPT

## 2025-02-06 PROCEDURE — 87640 STAPH A DNA AMP PROBE: CPT

## 2025-02-06 PROCEDURE — C9399: CPT

## 2025-02-06 PROCEDURE — 93005 ELECTROCARDIOGRAM TRACING: CPT

## 2025-02-06 PROCEDURE — 85025 COMPLETE CBC W/AUTO DIFF WBC: CPT

## 2025-02-06 PROCEDURE — 97163 PT EVAL HIGH COMPLEX 45 MIN: CPT

## 2025-02-06 PROCEDURE — 83735 ASSAY OF MAGNESIUM: CPT

## 2025-02-06 PROCEDURE — 82728 ASSAY OF FERRITIN: CPT

## 2025-02-06 PROCEDURE — 80048 BASIC METABOLIC PNL TOTAL CA: CPT

## 2025-02-06 RX ORDER — PANTOPRAZOLE 20 MG/1
1 TABLET, DELAYED RELEASE ORAL
Qty: 0 | Refills: 0 | DISCHARGE
Start: 2025-02-06

## 2025-02-06 RX ORDER — ACETAMINOPHEN 160 MG/5ML
3 SUSPENSION ORAL
Qty: 0 | Refills: 0 | DISCHARGE
Start: 2025-02-06

## 2025-02-06 RX ORDER — POLYETHYLENE GLYCOL 3350 17 G/17G
17 POWDER, FOR SOLUTION ORAL
Qty: 0 | Refills: 0 | DISCHARGE
Start: 2025-02-06

## 2025-02-06 RX ORDER — ASPIRIN 81 MG/1
1 TABLET, COATED ORAL
Qty: 60 | Refills: 0
Start: 2025-02-06 | End: 2025-03-07

## 2025-02-06 RX ORDER — OXYCODONE HYDROCHLORIDE 30 MG/1
1 TABLET ORAL
Qty: 0 | Refills: 0 | DISCHARGE
Start: 2025-02-06

## 2025-02-06 RX ORDER — CELECOXIB 200 MG
1 CAPSULE ORAL
Qty: 0 | Refills: 0 | DISCHARGE
Start: 2025-02-06

## 2025-02-06 RX ORDER — HYDROMORPHONE HYDROCHLORIDE 4 MG/ML
1 INJECTION, SOLUTION INTRAMUSCULAR; INTRAVENOUS; SUBCUTANEOUS
Qty: 0 | Refills: 0 | DISCHARGE
Start: 2025-02-06

## 2025-02-06 RX ORDER — SENNOSIDES 8.6 MG
2 TABLET ORAL
Qty: 0 | Refills: 0 | DISCHARGE
Start: 2025-02-06

## 2025-02-06 RX ADMIN — Medication 3.12 MILLIGRAM(S): at 05:11

## 2025-02-06 RX ADMIN — Medication 225 MILLIGRAM(S): at 12:33

## 2025-02-06 RX ADMIN — ACETAMINOPHEN 975 MILLIGRAM(S): 160 SUSPENSION ORAL at 14:08

## 2025-02-06 RX ADMIN — TIOTROPIUM BROMIDE MONOHYDRATE 2 PUFF(S): 18 CAPSULE ORAL; RESPIRATORY (INHALATION) at 10:43

## 2025-02-06 RX ADMIN — Medication 200 MILLIGRAM(S): at 05:15

## 2025-02-06 RX ADMIN — OXYCODONE HYDROCHLORIDE 5 MILLIGRAM(S): 30 TABLET ORAL at 14:40

## 2025-02-06 RX ADMIN — Medication 200 MILLIGRAM(S): at 05:12

## 2025-02-06 RX ADMIN — GABAPENTIN 600 MILLIGRAM(S): 800 TABLET ORAL at 05:12

## 2025-02-06 RX ADMIN — ACETAMINOPHEN 975 MILLIGRAM(S): 160 SUSPENSION ORAL at 05:12

## 2025-02-06 RX ADMIN — ACETAMINOPHEN 975 MILLIGRAM(S): 160 SUSPENSION ORAL at 05:15

## 2025-02-06 RX ADMIN — Medication 80 MILLIGRAM(S): at 05:12

## 2025-02-06 RX ADMIN — ACETAMINOPHEN 975 MILLIGRAM(S): 160 SUSPENSION ORAL at 14:40

## 2025-02-06 RX ADMIN — FLUTICASONE PROPIONATE AND SALMETEROL 1 DOSE(S): 113; 14 POWDER, METERED RESPIRATORY (INHALATION) at 10:43

## 2025-02-06 RX ADMIN — MUPIROCIN 1 APPLICATION(S): 2 CREAM TOPICAL at 10:41

## 2025-02-06 RX ADMIN — OXYCODONE HYDROCHLORIDE 5 MILLIGRAM(S): 30 TABLET ORAL at 14:08

## 2025-02-06 RX ADMIN — PANTOPRAZOLE 40 MILLIGRAM(S): 20 TABLET, DELAYED RELEASE ORAL at 05:12

## 2025-02-06 RX ADMIN — ASPIRIN 81 MILLIGRAM(S): 81 TABLET, COATED ORAL at 05:12

## 2025-02-18 ENCOUNTER — APPOINTMENT (OUTPATIENT)
Dept: CARDIOLOGY | Facility: CLINIC | Age: 75
End: 2025-02-18

## 2025-02-19 ENCOUNTER — NON-APPOINTMENT (OUTPATIENT)
Age: 75
End: 2025-02-19

## 2025-02-20 ENCOUNTER — APPOINTMENT (OUTPATIENT)
Dept: ORTHOPEDIC SURGERY | Facility: CLINIC | Age: 75
End: 2025-02-20
Payer: MEDICARE

## 2025-02-20 VITALS
HEIGHT: 60 IN | SYSTOLIC BLOOD PRESSURE: 107 MMHG | BODY MASS INDEX: 24.15 KG/M2 | DIASTOLIC BLOOD PRESSURE: 67 MMHG | HEART RATE: 94 BPM | WEIGHT: 123 LBS

## 2025-02-20 DIAGNOSIS — Z96.649 PRESENCE OF UNSPECIFIED ARTIFICIAL HIP JOINT: ICD-10-CM

## 2025-02-20 PROCEDURE — 73502 X-RAY EXAM HIP UNI 2-3 VIEWS: CPT | Mod: 26,RT

## 2025-02-20 PROCEDURE — 99024 POSTOP FOLLOW-UP VISIT: CPT

## 2025-04-01 ENCOUNTER — OUTPATIENT (OUTPATIENT)
Dept: OUTPATIENT SERVICES | Facility: HOSPITAL | Age: 75
LOS: 1 days | End: 2025-04-01
Payer: MEDICARE

## 2025-04-01 ENCOUNTER — TRANSCRIPTION ENCOUNTER (OUTPATIENT)
Age: 75
End: 2025-04-01

## 2025-04-01 DIAGNOSIS — Z90.710 ACQUIRED ABSENCE OF BOTH CERVIX AND UTERUS: Chronic | ICD-10-CM

## 2025-04-01 DIAGNOSIS — M54.16 RADICULOPATHY, LUMBAR REGION: ICD-10-CM

## 2025-04-01 DIAGNOSIS — Z98.891 HISTORY OF UTERINE SCAR FROM PREVIOUS SURGERY: Chronic | ICD-10-CM

## 2025-04-01 PROCEDURE — 64483 NJX AA&/STRD TFRM EPI L/S 1: CPT | Mod: RT

## 2025-04-01 PROCEDURE — 76000 FLUOROSCOPY <1 HR PHYS/QHP: CPT

## 2025-04-01 PROCEDURE — 64484 NJX AA&/STRD TFRM EPI L/S EA: CPT | Mod: RT

## 2025-04-02 ENCOUNTER — APPOINTMENT (OUTPATIENT)
Dept: ORTHOPEDIC SURGERY | Facility: CLINIC | Age: 75
End: 2025-04-02
Payer: MEDICARE

## 2025-04-02 DIAGNOSIS — Z96.649 PRESENCE OF UNSPECIFIED ARTIFICIAL HIP JOINT: ICD-10-CM

## 2025-04-02 PROCEDURE — 73502 X-RAY EXAM HIP UNI 2-3 VIEWS: CPT | Mod: RT

## 2025-04-02 PROCEDURE — 99024 POSTOP FOLLOW-UP VISIT: CPT

## 2025-04-10 ENCOUNTER — APPOINTMENT (OUTPATIENT)
Dept: PULMONOLOGY | Facility: CLINIC | Age: 75
End: 2025-04-10
Payer: MEDICARE

## 2025-04-10 VITALS — HEIGHT: 61 IN | BODY MASS INDEX: 23.6 KG/M2 | WEIGHT: 125 LBS

## 2025-04-10 VITALS — RESPIRATION RATE: 16 BRPM | SYSTOLIC BLOOD PRESSURE: 108 MMHG | DIASTOLIC BLOOD PRESSURE: 68 MMHG

## 2025-04-10 DIAGNOSIS — J98.11 ATELECTASIS: ICD-10-CM

## 2025-04-10 DIAGNOSIS — J45.50 SEVERE PERSISTENT ASTHMA, UNCOMPLICATED: ICD-10-CM

## 2025-04-10 DIAGNOSIS — J98.6 DISORDERS OF DIAPHRAGM: ICD-10-CM

## 2025-04-10 DIAGNOSIS — R06.09 OTHER FORMS OF DYSPNEA: ICD-10-CM

## 2025-04-10 PROCEDURE — 99214 OFFICE O/P EST MOD 30 MIN: CPT | Mod: 25

## 2025-04-10 PROCEDURE — 94010 BREATHING CAPACITY TEST: CPT

## 2025-04-13 ENCOUNTER — INPATIENT (INPATIENT)
Facility: HOSPITAL | Age: 75
LOS: 8 days | Discharge: HOME | DRG: 872 | End: 2025-04-22
Attending: INTERNAL MEDICINE | Admitting: HOSPITALIST
Payer: MEDICARE

## 2025-04-13 VITALS
DIASTOLIC BLOOD PRESSURE: 78 MMHG | RESPIRATION RATE: 17 BRPM | TEMPERATURE: 98 F | SYSTOLIC BLOOD PRESSURE: 136 MMHG | OXYGEN SATURATION: 94 % | HEART RATE: 103 BPM

## 2025-04-13 DIAGNOSIS — Z90.710 ACQUIRED ABSENCE OF BOTH CERVIX AND UTERUS: Chronic | ICD-10-CM

## 2025-04-13 DIAGNOSIS — Z98.891 HISTORY OF UTERINE SCAR FROM PREVIOUS SURGERY: Chronic | ICD-10-CM

## 2025-04-13 LAB
APTT BLD: 30.1 SEC — SIGNIFICANT CHANGE UP (ref 24.5–35.6)
GAS PNL BLDV: SIGNIFICANT CHANGE UP
HCT VFR BLD CALC: 33.7 % — LOW (ref 34.5–45)
HGB BLD-MCNC: 11.3 G/DL — LOW (ref 11.5–15.5)
INR BLD: 0.99 RATIO — SIGNIFICANT CHANGE UP (ref 0.85–1.16)
MCHC RBC-ENTMCNC: 27.8 PG — SIGNIFICANT CHANGE UP (ref 27–34)
MCHC RBC-ENTMCNC: 33.5 G/DL — SIGNIFICANT CHANGE UP (ref 32–36)
MCV RBC AUTO: 82.8 FL — SIGNIFICANT CHANGE UP (ref 80–100)
NRBC # BLD AUTO: 0 K/UL — SIGNIFICANT CHANGE UP (ref 0–0)
NRBC # FLD: 0 K/UL — SIGNIFICANT CHANGE UP (ref 0–0)
NRBC BLD AUTO-RTO: 0 /100 WBCS — SIGNIFICANT CHANGE UP (ref 0–0)
PLATELET # BLD AUTO: 362 K/UL — SIGNIFICANT CHANGE UP (ref 150–400)
PMV BLD: 9.3 FL — SIGNIFICANT CHANGE UP (ref 7–13)
PROTHROM AB SERPL-ACNC: 11.5 SEC — SIGNIFICANT CHANGE UP (ref 9.9–13.4)
RBC # BLD: 4.07 M/UL — SIGNIFICANT CHANGE UP (ref 3.8–5.2)
RBC # FLD: 15.9 % — HIGH (ref 10.3–14.5)
WBC # BLD: 15.47 K/UL — HIGH (ref 3.8–10.5)
WBC # FLD AUTO: 15.47 K/UL — HIGH (ref 3.8–10.5)

## 2025-04-13 PROCEDURE — 70450 CT HEAD/BRAIN W/O DYE: CPT | Mod: 26

## 2025-04-13 PROCEDURE — 99285 EMERGENCY DEPT VISIT HI MDM: CPT

## 2025-04-13 PROCEDURE — 72125 CT NECK SPINE W/O DYE: CPT | Mod: 26

## 2025-04-13 PROCEDURE — 93010 ELECTROCARDIOGRAM REPORT: CPT

## 2025-04-13 NOTE — ED ADULT TRIAGE NOTE - CHIEF COMPLAINT QUOTE
pt BIBA for mech fall at home from standing height +HS +AC (unknown) lac to forehead with hematoma noted. Pt noted a&o x3 per ems baseline a&o x4. MD Reddy called to bedside priority CT called [phone]

## 2025-04-13 NOTE — ED PROVIDER NOTE - OBJECTIVE STATEMENT
73 y/o F w/ PMH of nonobstructive CAD, NICM (stress induced), HFimprovedEF (LVEF 54% 09/2024), HTN, HLD, COPD (on 2-4L prn NC) presented from home after mechanical fall at home. Patient states she got out of bed to go to the bathroom when she tripped and fell striking the left side of her head. No LOC or extremity  pain. Patient states she has been coughing the past couple of days. Otherwise no complaints.

## 2025-04-13 NOTE — ED PROVIDER NOTE - ENMT, MLM
Airway patent, Nasal mucosa clear. Mouth with normal mucosa. Throat has no vesicles, no oropharyngeal exudates and uvula is midline. Left forehead contusion

## 2025-04-13 NOTE — ED PROVIDER NOTE - CARE PLAN
Principal Discharge DX:	Acute delirium  Secondary Diagnosis:	Acute hyponatremia  Secondary Diagnosis:	Enterovirus infection, unspecified  Secondary Diagnosis:	Closed head injury   1

## 2025-04-13 NOTE — ED PROVIDER NOTE - CLINICAL SUMMARY MEDICAL DECISION MAKING FREE TEXT BOX
73 y/o F w/ PMH of nonobstructive CAD, NICM (stress induced), HFimprovedEF (LVEF 54% 09/2024), HTN, HLD, COPD (on 2-4L prn NC) presented from home after mechanical fall at home. Patient states she got out of bed to go to the bathroom when she tripped and fell striking the left side of her head. No LOC or extremity  pain. Patient states she has been coughing the past couple of days. Otherwise no complaints. 73 y/o F w/ PMH of nonobstructive CAD, NICM (stress induced), HFimprovedEF (LVEF 54% 09/2024), HTN, HLD, COPD (on 2-4L prn NC) presented from home after mechanical fall at home. Patient states she got out of bed to go to the bathroom when she tripped and fell striking the left side of her head. No LOC or extremity  pain. Patient states she has been coughing the past couple of days. Otherwise no complaints.    Patient  at bedside states the patient has been confused the past two days, improving but not at baseline. otherwise no focal complaints. 73 y/o F w/ PMH of nonobstructive CAD, NICM (stress induced), HFimprovedEF (LVEF 54% 09/2024), HTN, HLD, COPD (on 2-4L prn NC) presented from home after mechanical fall at home. Patient states she got out of bed to go to the bathroom when she tripped and fell striking the left side of her head. No LOC or extremity  pain. Patient states she has been coughing the past couple of days. Otherwise no complaints.    Patient  at bedside states the patient has been confused the past two days, improving but not at baseline. otherwise no focal complaints.    Labs and CT are as noted. Head wound cleaned and appears to be a quater sized abrasion.     Patient requesting her xanax 0.5 mg which she takes nightly.

## 2025-04-14 DIAGNOSIS — R41.0 DISORIENTATION, UNSPECIFIED: ICD-10-CM

## 2025-04-14 DIAGNOSIS — S00.81XA ABRASION OF OTHER PART OF HEAD, INITIAL ENCOUNTER: ICD-10-CM

## 2025-04-14 LAB
ALBUMIN SERPL ELPH-MCNC: 3.9 G/DL — SIGNIFICANT CHANGE UP (ref 3.3–5.2)
ALP SERPL-CCNC: 107 U/L — SIGNIFICANT CHANGE UP (ref 40–120)
ALT FLD-CCNC: 14 U/L — SIGNIFICANT CHANGE UP
ANION GAP SERPL CALC-SCNC: 11 MMOL/L — SIGNIFICANT CHANGE UP (ref 5–17)
ANION GAP SERPL CALC-SCNC: 11 MMOL/L — SIGNIFICANT CHANGE UP (ref 5–17)
ANION GAP SERPL CALC-SCNC: 13 MMOL/L — SIGNIFICANT CHANGE UP (ref 5–17)
ANION GAP SERPL CALC-SCNC: 14 MMOL/L — SIGNIFICANT CHANGE UP (ref 5–17)
ANISOCYTOSIS BLD QL: SLIGHT — SIGNIFICANT CHANGE UP
APPEARANCE UR: CLEAR — SIGNIFICANT CHANGE UP
AST SERPL-CCNC: 17 U/L — SIGNIFICANT CHANGE UP
BASOPHILS # BLD AUTO: 0.02 K/UL — SIGNIFICANT CHANGE UP (ref 0–0.2)
BASOPHILS # BLD AUTO: 0.03 K/UL — SIGNIFICANT CHANGE UP (ref 0–0.2)
BASOPHILS # BLD MANUAL: 0 K/UL — SIGNIFICANT CHANGE UP (ref 0–0.2)
BASOPHILS # BLD MANUAL: 0 K/UL — SIGNIFICANT CHANGE UP (ref 0–0.2)
BASOPHILS NFR BLD AUTO: 0.1 % — SIGNIFICANT CHANGE UP (ref 0–2)
BASOPHILS NFR BLD AUTO: 0.2 % — SIGNIFICANT CHANGE UP (ref 0–2)
BASOPHILS NFR BLD MANUAL: 0 % — SIGNIFICANT CHANGE UP (ref 0–2)
BASOPHILS NFR BLD MANUAL: 0 % — SIGNIFICANT CHANGE UP (ref 0–2)
BILIRUB SERPL-MCNC: 0.3 MG/DL — LOW (ref 0.4–2)
BILIRUB UR-MCNC: NEGATIVE — SIGNIFICANT CHANGE UP
BLD GP AB SCN SERPL QL: SIGNIFICANT CHANGE UP
BUN SERPL-MCNC: 5.1 MG/DL — LOW (ref 8–20)
BUN SERPL-MCNC: 6.9 MG/DL — LOW (ref 8–20)
BUN SERPL-MCNC: 7.3 MG/DL — LOW (ref 8–20)
BUN SERPL-MCNC: 9 MG/DL — SIGNIFICANT CHANGE UP (ref 8–20)
CALCIUM SERPL-MCNC: 7.9 MG/DL — LOW (ref 8.4–10.5)
CALCIUM SERPL-MCNC: 8.5 MG/DL — SIGNIFICANT CHANGE UP (ref 8.4–10.5)
CALCIUM SERPL-MCNC: 8.8 MG/DL — SIGNIFICANT CHANGE UP (ref 8.4–10.5)
CALCIUM SERPL-MCNC: 9 MG/DL — SIGNIFICANT CHANGE UP (ref 8.4–10.5)
CHLORIDE SERPL-SCNC: 86 MMOL/L — LOW (ref 96–108)
CHLORIDE SERPL-SCNC: 91 MMOL/L — LOW (ref 96–108)
CHLORIDE SERPL-SCNC: 95 MMOL/L — LOW (ref 96–108)
CHLORIDE SERPL-SCNC: 95 MMOL/L — LOW (ref 96–108)
CO2 SERPL-SCNC: 23 MMOL/L — SIGNIFICANT CHANGE UP (ref 22–29)
CO2 SERPL-SCNC: 24 MMOL/L — SIGNIFICANT CHANGE UP (ref 22–29)
CO2 SERPL-SCNC: 25 MMOL/L — SIGNIFICANT CHANGE UP (ref 22–29)
CO2 SERPL-SCNC: 26 MMOL/L — SIGNIFICANT CHANGE UP (ref 22–29)
COLOR SPEC: YELLOW — SIGNIFICANT CHANGE UP
CREAT SERPL-MCNC: 0.37 MG/DL — LOW (ref 0.5–1.3)
CREAT SERPL-MCNC: 0.45 MG/DL — LOW (ref 0.5–1.3)
CREAT SERPL-MCNC: 0.57 MG/DL — SIGNIFICANT CHANGE UP (ref 0.5–1.3)
CREAT SERPL-MCNC: 0.58 MG/DL — SIGNIFICANT CHANGE UP (ref 0.5–1.3)
DIFF PNL FLD: NEGATIVE — SIGNIFICANT CHANGE UP
EGFR: 101 ML/MIN/1.73M2 — SIGNIFICANT CHANGE UP
EGFR: 101 ML/MIN/1.73M2 — SIGNIFICANT CHANGE UP
EGFR: 106 ML/MIN/1.73M2 — SIGNIFICANT CHANGE UP
EGFR: 106 ML/MIN/1.73M2 — SIGNIFICANT CHANGE UP
EGFR: 95 ML/MIN/1.73M2 — SIGNIFICANT CHANGE UP
EOSINOPHIL # BLD AUTO: 0.1 K/UL — SIGNIFICANT CHANGE UP (ref 0–0.5)
EOSINOPHIL # BLD AUTO: 0.14 K/UL — SIGNIFICANT CHANGE UP (ref 0–0.5)
EOSINOPHIL # BLD MANUAL: 0 K/UL — SIGNIFICANT CHANGE UP (ref 0–0.5)
EOSINOPHIL # BLD MANUAL: 0.14 K/UL — SIGNIFICANT CHANGE UP (ref 0–0.5)
EOSINOPHIL NFR BLD AUTO: 0.7 % — SIGNIFICANT CHANGE UP (ref 0–6)
EOSINOPHIL NFR BLD AUTO: 0.9 % — SIGNIFICANT CHANGE UP (ref 0–6)
EOSINOPHIL NFR BLD MANUAL: 0 % — SIGNIFICANT CHANGE UP (ref 0–6)
EOSINOPHIL NFR BLD MANUAL: 0.9 % — SIGNIFICANT CHANGE UP (ref 0–6)
ETHANOL SERPL-MCNC: <10 MG/DL — SIGNIFICANT CHANGE UP (ref 0–9)
GLUCOSE BLDC GLUCOMTR-MCNC: 149 MG/DL — HIGH (ref 70–99)
GLUCOSE SERPL-MCNC: 108 MG/DL — HIGH (ref 70–99)
GLUCOSE SERPL-MCNC: 114 MG/DL — HIGH (ref 70–99)
GLUCOSE SERPL-MCNC: 126 MG/DL — HIGH (ref 70–99)
GLUCOSE SERPL-MCNC: 187 MG/DL — HIGH (ref 70–99)
GLUCOSE UR QL: NEGATIVE MG/DL — SIGNIFICANT CHANGE UP
GRAM STN FLD: ABNORMAL
HCT VFR BLD CALC: 32.6 % — LOW (ref 34.5–45)
HGB BLD-MCNC: 10.6 G/DL — LOW (ref 11.5–15.5)
HIV 1 & 2 AB SERPL IA.RAPID: SIGNIFICANT CHANGE UP
IMM GRANULOCYTES # BLD AUTO: 0.07 K/UL — SIGNIFICANT CHANGE UP (ref 0–0.07)
IMM GRANULOCYTES # BLD AUTO: 0.09 K/UL — HIGH (ref 0–0.07)
IMM GRANULOCYTES NFR BLD AUTO: 0.5 % — SIGNIFICANT CHANGE UP (ref 0–0.9)
IMM GRANULOCYTES NFR BLD AUTO: 0.6 % — SIGNIFICANT CHANGE UP (ref 0–0.9)
KETONES UR-MCNC: NEGATIVE MG/DL — SIGNIFICANT CHANGE UP
LEUKOCYTE ESTERASE UR-ACNC: NEGATIVE — SIGNIFICANT CHANGE UP
LYMPHOCYTES # BLD AUTO: 1.22 K/UL — SIGNIFICANT CHANGE UP (ref 1–3.3)
LYMPHOCYTES # BLD AUTO: 1.28 K/UL — SIGNIFICANT CHANGE UP (ref 1–3.3)
LYMPHOCYTES # BLD MANUAL: 0.9 K/UL — LOW (ref 1–3.3)
LYMPHOCYTES # BLD MANUAL: 1.21 K/UL — SIGNIFICANT CHANGE UP (ref 1–3.3)
LYMPHOCYTES NFR BLD AUTO: 8.2 % — LOW (ref 13–44)
LYMPHOCYTES NFR BLD AUTO: 8.3 % — LOW (ref 13–44)
LYMPHOCYTES NFR BLD MANUAL: 6 % — LOW (ref 13–44)
LYMPHOCYTES NFR BLD MANUAL: 7.8 % — LOW (ref 13–44)
MACROCYTES BLD QL: SLIGHT — SIGNIFICANT CHANGE UP
MAGNESIUM SERPL-MCNC: 1.6 MG/DL — SIGNIFICANT CHANGE UP (ref 1.6–2.6)
MANUAL REACTIVE LYMPHOCYTES #: 0.14 K/UL — SIGNIFICANT CHANGE UP (ref 0–0.63)
MCHC RBC-ENTMCNC: 27.6 PG — SIGNIFICANT CHANGE UP (ref 27–34)
MCHC RBC-ENTMCNC: 32.5 G/DL — SIGNIFICANT CHANGE UP (ref 32–36)
MCV RBC AUTO: 84.9 FL — SIGNIFICANT CHANGE UP (ref 80–100)
MICROCYTES BLD QL: SLIGHT — SIGNIFICANT CHANGE UP
MONOCYTES # BLD AUTO: 1.85 K/UL — HIGH (ref 0–0.9)
MONOCYTES # BLD AUTO: 2.18 K/UL — HIGH (ref 0–0.9)
MONOCYTES # BLD MANUAL: 0.67 K/UL — SIGNIFICANT CHANGE UP (ref 0–0.9)
MONOCYTES # BLD MANUAL: 1.81 K/UL — HIGH (ref 0–0.9)
MONOCYTES NFR BLD AUTO: 12 % — SIGNIFICANT CHANGE UP (ref 2–14)
MONOCYTES NFR BLD AUTO: 14.6 % — HIGH (ref 2–14)
MONOCYTES NFR BLD MANUAL: 12.1 % — SIGNIFICANT CHANGE UP (ref 2–14)
MONOCYTES NFR BLD MANUAL: 4.3 % — SIGNIFICANT CHANGE UP (ref 2–14)
MRSA PCR RESULT.: SIGNIFICANT CHANGE UP
NEUTROPHILS # BLD AUTO: 11.35 K/UL — HIGH (ref 1.8–7.4)
NEUTROPHILS # BLD AUTO: 12.08 K/UL — HIGH (ref 1.8–7.4)
NEUTROPHILS # BLD MANUAL: 12.24 K/UL — HIGH (ref 1.8–7.4)
NEUTROPHILS # BLD MANUAL: 13.32 K/UL — HIGH (ref 1.8–7.4)
NEUTROPHILS NFR BLD AUTO: 75.9 % — SIGNIFICANT CHANGE UP (ref 43–77)
NEUTROPHILS NFR BLD AUTO: 78 % — HIGH (ref 43–77)
NEUTROPHILS NFR BLD MANUAL: 81.9 % — HIGH (ref 43–77)
NEUTROPHILS NFR BLD MANUAL: 86.1 % — HIGH (ref 43–77)
NITRITE UR-MCNC: NEGATIVE — SIGNIFICANT CHANGE UP
NRBC # BLD AUTO: 0.02 K/UL — HIGH (ref 0–0)
NRBC # FLD: 0.02 K/UL — HIGH (ref 0–0)
NRBC BLD AUTO-RTO: 0 /100 WBCS — SIGNIFICANT CHANGE UP (ref 0–0)
OVALOCYTES BLD QL SMEAR: SLIGHT — SIGNIFICANT CHANGE UP
PH UR: 7 — SIGNIFICANT CHANGE UP (ref 5–8)
PHOSPHATE SERPL-MCNC: 2.7 MG/DL — SIGNIFICANT CHANGE UP (ref 2.4–4.7)
PLAT MORPH BLD: NORMAL — SIGNIFICANT CHANGE UP
PLAT MORPH BLD: NORMAL — SIGNIFICANT CHANGE UP
PLATELET # BLD AUTO: 339 K/UL — SIGNIFICANT CHANGE UP (ref 150–400)
PMV BLD: 9.2 FL — SIGNIFICANT CHANGE UP (ref 7–13)
POIKILOCYTOSIS BLD QL AUTO: SLIGHT — SIGNIFICANT CHANGE UP
POLYCHROMASIA BLD QL SMEAR: SLIGHT — SIGNIFICANT CHANGE UP
POTASSIUM SERPL-MCNC: 4 MMOL/L — SIGNIFICANT CHANGE UP (ref 3.5–5.3)
POTASSIUM SERPL-MCNC: 4 MMOL/L — SIGNIFICANT CHANGE UP (ref 3.5–5.3)
POTASSIUM SERPL-MCNC: 4.1 MMOL/L — SIGNIFICANT CHANGE UP (ref 3.5–5.3)
POTASSIUM SERPL-MCNC: 4.1 MMOL/L — SIGNIFICANT CHANGE UP (ref 3.5–5.3)
POTASSIUM SERPL-SCNC: 4 MMOL/L — SIGNIFICANT CHANGE UP (ref 3.5–5.3)
POTASSIUM SERPL-SCNC: 4 MMOL/L — SIGNIFICANT CHANGE UP (ref 3.5–5.3)
POTASSIUM SERPL-SCNC: 4.1 MMOL/L — SIGNIFICANT CHANGE UP (ref 3.5–5.3)
POTASSIUM SERPL-SCNC: 4.1 MMOL/L — SIGNIFICANT CHANGE UP (ref 3.5–5.3)
PROT SERPL-MCNC: 7.2 G/DL — SIGNIFICANT CHANGE UP (ref 6.6–8.7)
PROT UR-MCNC: NEGATIVE MG/DL — SIGNIFICANT CHANGE UP
RAPID RVP RESULT: DETECTED
RBC # BLD: 3.84 M/UL — SIGNIFICANT CHANGE UP (ref 3.8–5.2)
RBC # FLD: 16.2 % — HIGH (ref 10.3–14.5)
RBC BLD AUTO: ABNORMAL
RBC BLD AUTO: NORMAL — SIGNIFICANT CHANGE UP
RV+EV RNA SPEC QL NAA+PROBE: DETECTED
S AUREUS DNA NOSE QL NAA+PROBE: DETECTED
SARS-COV-2 RNA SPEC QL NAA+PROBE: SIGNIFICANT CHANGE UP
SODIUM SERPL-SCNC: 124 MMOL/L — LOW (ref 135–145)
SODIUM SERPL-SCNC: 126 MMOL/L — LOW (ref 135–145)
SODIUM SERPL-SCNC: 129 MMOL/L — LOW (ref 135–145)
SODIUM SERPL-SCNC: 134 MMOL/L — LOW (ref 135–145)
SP GR SPEC: 1.01 — SIGNIFICANT CHANGE UP (ref 1–1.03)
SPECIMEN SOURCE: SIGNIFICANT CHANGE UP
UROBILINOGEN FLD QL: 0.2 MG/DL — SIGNIFICANT CHANGE UP (ref 0.2–1)
VARIANT LYMPHS # BLD: 0.9 % — SIGNIFICANT CHANGE UP (ref 0–6)
VARIANT LYMPHS NFR BLD MANUAL: 0.9 % — SIGNIFICANT CHANGE UP (ref 0–6)
WBC # BLD: 14.94 K/UL — HIGH (ref 3.8–10.5)
WBC # FLD AUTO: 14.94 K/UL — HIGH (ref 3.8–10.5)

## 2025-04-14 PROCEDURE — 99223 1ST HOSP IP/OBS HIGH 75: CPT

## 2025-04-14 PROCEDURE — 71045 X-RAY EXAM CHEST 1 VIEW: CPT | Mod: 26

## 2025-04-14 RX ORDER — ENOXAPARIN SODIUM 100 MG/ML
40 INJECTION SUBCUTANEOUS EVERY 24 HOURS
Refills: 0 | Status: DISCONTINUED | OUTPATIENT
Start: 2025-04-14 | End: 2025-04-22

## 2025-04-14 RX ORDER — AZITHROMYCIN 250 MG
500 CAPSULE ORAL ONCE
Refills: 0 | Status: COMPLETED | OUTPATIENT
Start: 2025-04-14 | End: 2025-04-14

## 2025-04-14 RX ORDER — GABAPENTIN 400 MG/1
600 CAPSULE ORAL
Refills: 0 | Status: DISCONTINUED | OUTPATIENT
Start: 2025-04-14 | End: 2025-04-22

## 2025-04-14 RX ORDER — CARVEDILOL 3.12 MG/1
3.12 TABLET, FILM COATED ORAL EVERY 12 HOURS
Refills: 0 | Status: DISCONTINUED | OUTPATIENT
Start: 2025-04-14 | End: 2025-04-22

## 2025-04-14 RX ORDER — ACETAMINOPHEN 500 MG/5ML
650 LIQUID (ML) ORAL EVERY 6 HOURS
Refills: 0 | Status: DISCONTINUED | OUTPATIENT
Start: 2025-04-14 | End: 2025-04-22

## 2025-04-14 RX ORDER — ALPRAZOLAM 0.5 MG
0.5 TABLET, EXTENDED RELEASE 24 HR ORAL
Refills: 0 | Status: DISCONTINUED | OUTPATIENT
Start: 2025-04-14 | End: 2025-04-21

## 2025-04-14 RX ORDER — CEFTRIAXONE 500 MG/1
1000 INJECTION, POWDER, FOR SOLUTION INTRAMUSCULAR; INTRAVENOUS ONCE
Refills: 0 | Status: COMPLETED | OUTPATIENT
Start: 2025-04-14 | End: 2025-04-14

## 2025-04-14 RX ORDER — ONDANSETRON HCL/PF 4 MG/2 ML
4 VIAL (ML) INJECTION EVERY 8 HOURS
Refills: 0 | Status: DISCONTINUED | OUTPATIENT
Start: 2025-04-14 | End: 2025-04-22

## 2025-04-14 RX ORDER — CYCLOBENZAPRINE HYDROCHLORIDE 15 MG/1
1 CAPSULE, EXTENDED RELEASE ORAL
Refills: 0 | DISCHARGE

## 2025-04-14 RX ORDER — OXYBUTYNIN CHLORIDE 5 MG/1
1 TABLET, FILM COATED, EXTENDED RELEASE ORAL
Refills: 0 | DISCHARGE

## 2025-04-14 RX ORDER — VENLAFAXINE HYDROCHLORIDE 37.5 MG/1
225 CAPSULE, EXTENDED RELEASE ORAL DAILY
Refills: 0 | Status: DISCONTINUED | OUTPATIENT
Start: 2025-04-14 | End: 2025-04-14

## 2025-04-14 RX ORDER — ALPRAZOLAM 0.5 MG
0.5 TABLET, EXTENDED RELEASE 24 HR ORAL ONCE
Refills: 0 | Status: DISCONTINUED | OUTPATIENT
Start: 2025-04-14 | End: 2025-04-14

## 2025-04-14 RX ORDER — DEXTROMETHORPHAN HBR, GUAIFENESIN 200 MG/10ML
200 LIQUID ORAL EVERY 6 HOURS
Refills: 0 | Status: DISCONTINUED | OUTPATIENT
Start: 2025-04-14 | End: 2025-04-22

## 2025-04-14 RX ORDER — AZITHROMYCIN 250 MG
CAPSULE ORAL
Refills: 0 | Status: DISCONTINUED | OUTPATIENT
Start: 2025-04-14 | End: 2025-04-16

## 2025-04-14 RX ORDER — BENZONATATE 100 MG
100 CAPSULE ORAL THREE TIMES A DAY
Refills: 0 | Status: DISCONTINUED | OUTPATIENT
Start: 2025-04-14 | End: 2025-04-22

## 2025-04-14 RX ORDER — ATORVASTATIN CALCIUM 80 MG/1
40 TABLET, FILM COATED ORAL AT BEDTIME
Refills: 0 | Status: DISCONTINUED | OUTPATIENT
Start: 2025-04-14 | End: 2025-04-22

## 2025-04-14 RX ORDER — IPRATROPIUM BROMIDE AND ALBUTEROL SULFATE .5; 2.5 MG/3ML; MG/3ML
3 SOLUTION RESPIRATORY (INHALATION) EVERY 6 HOURS
Refills: 0 | Status: DISCONTINUED | OUTPATIENT
Start: 2025-04-14 | End: 2025-04-22

## 2025-04-14 RX ORDER — INFLUENZA A VIRUS A/IDAHO/07/2018 (H1N1) ANTIGEN (MDCK CELL DERIVED, PROPIOLACTONE INACTIVATED, INFLUENZA A VIRUS A/INDIANA/08/2018 (H3N2) ANTIGEN (MDCK CELL DERIVED, PROPIOLACTONE INACTIVATED), INFLUENZA B VIRUS B/SINGAPORE/INFTT-16-0610/2016 ANTIGEN (MDCK CELL DERIVED, PROPIOLACTONE INACTIVATED), INFLUENZA B VIRUS B/IOWA/06/2017 ANTIGEN (MDCK CELL DERIVED, PROPIOLACTONE INACTIVATED) 15; 15; 15; 15 UG/.5ML; UG/.5ML; UG/.5ML; UG/.5ML
0.5 INJECTION, SUSPENSION INTRAMUSCULAR ONCE
Refills: 0 | Status: DISCONTINUED | OUTPATIENT
Start: 2025-04-14 | End: 2025-04-22

## 2025-04-14 RX ORDER — MAGNESIUM, ALUMINUM HYDROXIDE 200-200 MG
30 TABLET,CHEWABLE ORAL EVERY 4 HOURS
Refills: 0 | Status: DISCONTINUED | OUTPATIENT
Start: 2025-04-14 | End: 2025-04-22

## 2025-04-14 RX ORDER — ACETAMINOPHEN 500 MG/5ML
975 LIQUID (ML) ORAL ONCE
Refills: 0 | Status: COMPLETED | OUTPATIENT
Start: 2025-04-14 | End: 2025-04-14

## 2025-04-14 RX ORDER — AZITHROMYCIN 250 MG
500 CAPSULE ORAL EVERY 24 HOURS
Refills: 0 | Status: DISCONTINUED | OUTPATIENT
Start: 2025-04-15 | End: 2025-04-16

## 2025-04-14 RX ORDER — TIOTROPIUM BROMIDE INHALATION SPRAY 3.12 UG/1
2 SPRAY, METERED RESPIRATORY (INHALATION) DAILY
Refills: 0 | Status: DISCONTINUED | OUTPATIENT
Start: 2025-04-14 | End: 2025-04-22

## 2025-04-14 RX ORDER — OXYBUTYNIN CHLORIDE 5 MG/1
10 TABLET, FILM COATED, EXTENDED RELEASE ORAL DAILY
Refills: 0 | Status: DISCONTINUED | OUTPATIENT
Start: 2025-04-14 | End: 2025-04-22

## 2025-04-14 RX ORDER — OXYBUTYNIN CHLORIDE 5 MG/1
10 TABLET, FILM COATED, EXTENDED RELEASE ORAL DAILY
Refills: 0 | Status: DISCONTINUED | OUTPATIENT
Start: 2025-04-14 | End: 2025-04-14

## 2025-04-14 RX ORDER — VENLAFAXINE HYDROCHLORIDE 37.5 MG/1
225 CAPSULE, EXTENDED RELEASE ORAL DAILY
Refills: 0 | Status: DISCONTINUED | OUTPATIENT
Start: 2025-04-14 | End: 2025-04-22

## 2025-04-14 RX ORDER — CEFTRIAXONE 500 MG/1
1000 INJECTION, POWDER, FOR SOLUTION INTRAMUSCULAR; INTRAVENOUS ONCE
Refills: 0 | Status: DISCONTINUED | OUTPATIENT
Start: 2025-04-14 | End: 2025-04-14

## 2025-04-14 RX ORDER — QUETIAPINE FUMARATE 25 MG/1
25 TABLET ORAL ONCE
Refills: 0 | Status: COMPLETED | OUTPATIENT
Start: 2025-04-14 | End: 2025-04-14

## 2025-04-14 RX ORDER — METHYLPREDNISOLONE ACETATE 80 MG/ML
40 INJECTION, SUSPENSION INTRA-ARTICULAR; INTRALESIONAL; INTRAMUSCULAR; SOFT TISSUE DAILY
Refills: 0 | Status: DISCONTINUED | OUTPATIENT
Start: 2025-04-14 | End: 2025-04-16

## 2025-04-14 RX ORDER — CYCLOBENZAPRINE HYDROCHLORIDE 15 MG/1
5 CAPSULE, EXTENDED RELEASE ORAL DAILY
Refills: 0 | Status: DISCONTINUED | OUTPATIENT
Start: 2025-04-14 | End: 2025-04-22

## 2025-04-14 RX ORDER — MELATONIN 5 MG
3 TABLET ORAL AT BEDTIME
Refills: 0 | Status: DISCONTINUED | OUTPATIENT
Start: 2025-04-14 | End: 2025-04-22

## 2025-04-14 RX ADMIN — Medication 80 MILLIGRAM(S): at 07:55

## 2025-04-14 RX ADMIN — ENOXAPARIN SODIUM 40 MILLIGRAM(S): 100 INJECTION SUBCUTANEOUS at 11:39

## 2025-04-14 RX ADMIN — Medication 0.5 MILLIGRAM(S): at 07:54

## 2025-04-14 RX ADMIN — GABAPENTIN 600 MILLIGRAM(S): 400 CAPSULE ORAL at 18:10

## 2025-04-14 RX ADMIN — GABAPENTIN 600 MILLIGRAM(S): 400 CAPSULE ORAL at 06:29

## 2025-04-14 RX ADMIN — ATORVASTATIN CALCIUM 40 MILLIGRAM(S): 80 TABLET, FILM COATED ORAL at 21:32

## 2025-04-14 RX ADMIN — IPRATROPIUM BROMIDE AND ALBUTEROL SULFATE 3 MILLILITER(S): .5; 2.5 SOLUTION RESPIRATORY (INHALATION) at 08:50

## 2025-04-14 RX ADMIN — CARVEDILOL 3.12 MILLIGRAM(S): 3.12 TABLET, FILM COATED ORAL at 18:09

## 2025-04-14 RX ADMIN — Medication 975 MILLIGRAM(S): at 02:47

## 2025-04-14 RX ADMIN — TIOTROPIUM BROMIDE INHALATION SPRAY 2 PUFF(S): 3.12 SPRAY, METERED RESPIRATORY (INHALATION) at 07:55

## 2025-04-14 RX ADMIN — DEXTROMETHORPHAN HBR, GUAIFENESIN 200 MILLIGRAM(S): 200 LIQUID ORAL at 11:31

## 2025-04-14 RX ADMIN — Medication 2000 MILLILITER(S): at 02:10

## 2025-04-14 RX ADMIN — VENLAFAXINE HYDROCHLORIDE 225 MILLIGRAM(S): 37.5 CAPSULE, EXTENDED RELEASE ORAL at 11:32

## 2025-04-14 RX ADMIN — Medication 1 DOSE(S): at 07:55

## 2025-04-14 RX ADMIN — QUETIAPINE FUMARATE 25 MILLIGRAM(S): 25 TABLET ORAL at 11:34

## 2025-04-14 RX ADMIN — Medication 100 MILLIGRAM(S): at 06:29

## 2025-04-14 RX ADMIN — Medication 250 MILLIGRAM(S): at 03:41

## 2025-04-14 RX ADMIN — Medication 975 MILLIGRAM(S): at 02:12

## 2025-04-14 RX ADMIN — Medication 0.5 MILLIGRAM(S): at 03:16

## 2025-04-14 RX ADMIN — Medication 100 MILLIGRAM(S): at 15:00

## 2025-04-14 RX ADMIN — Medication 100 MILLIGRAM(S): at 21:32

## 2025-04-14 RX ADMIN — Medication 1 GRAM(S): at 18:10

## 2025-04-14 RX ADMIN — Medication 650 MILLIGRAM(S): at 22:35

## 2025-04-14 RX ADMIN — OXYBUTYNIN CHLORIDE 10 MILLIGRAM(S): 5 TABLET, FILM COATED, EXTENDED RELEASE ORAL at 11:38

## 2025-04-14 RX ADMIN — Medication 650 MILLIGRAM(S): at 21:39

## 2025-04-14 RX ADMIN — Medication 40 MILLIGRAM(S): at 06:52

## 2025-04-14 RX ADMIN — CARVEDILOL 3.12 MILLIGRAM(S): 3.12 TABLET, FILM COATED ORAL at 06:29

## 2025-04-14 RX ADMIN — CEFTRIAXONE 1000 MILLIGRAM(S): 500 INJECTION, POWDER, FOR SOLUTION INTRAMUSCULAR; INTRAVENOUS at 01:04

## 2025-04-14 RX ADMIN — Medication 2000 MILLILITER(S): at 01:04

## 2025-04-14 RX ADMIN — METHYLPREDNISOLONE ACETATE 40 MILLIGRAM(S): 80 INJECTION, SUSPENSION INTRA-ARTICULAR; INTRALESIONAL; INTRAMUSCULAR; SOFT TISSUE at 06:30

## 2025-04-14 RX ADMIN — Medication 1 DOSE(S): at 21:32

## 2025-04-14 RX ADMIN — IPRATROPIUM BROMIDE AND ALBUTEROL SULFATE 3 MILLILITER(S): .5; 2.5 SOLUTION RESPIRATORY (INHALATION) at 13:55

## 2025-04-14 NOTE — H&P ADULT - NSHPLABSRESULTS_GEN_ALL_CORE
CTH/CT Cervical WO IMPRESSION:  CT BRAIN: No acute intracranial bleeding, mass effect, or acute calvarium   fracture. Trace left frontal scalp soft tissue swelling.  Paranasal sinus disease as described.    CT CERVICAL SPINE: No acute cervical spine fracture, subluxation or   evidence of traumatic malalignment. Multilevel degenerative changes as   described above.  Provided no contraindications, MRI can be performed if there is concern   for subtle osseous, ligamentous or cord injury

## 2025-04-14 NOTE — H&P ADULT - ASSESSMENT
ASSESSMENT:  74F with PMHX COPD on 2-4L Home O2 baseline, CAD, NICM, HFimprovedEF, HTN, HLD, MDD, Anxiety, Mood Disorder, Insomnia BIBEMS with forehead laceration s/p mechanical fall admitted for Sepsis 2/2 Enterorhinovirus and Mucopurulent Bronchitis c/b Hyponatremia.     PLAN:  Sepsis 2/2 Enterorhinovirus and Mucopurulent Bronchitis  -2L IVFB NSS given in ED  -Hold further IVF for now  -Lactate negative 1.3  -UA negative  -BCX pending  -RVP +Enterorhinovirus  -Check Sputum Cx with purulent sputum  -Check Atypicals and Legionella with sepsis and hyponatremia  -Azithromycin 500mg IV q24  -Solumedrol 40mg IV q24 x5d  -Duoneb q6  -Tessalon PO TID  -O2 via NC 2-4LPM PRN (Home Baseline)  -Monitor     AMS  -CTH WO negative  -AMS resolved at time of admission. Pt AAOx4. Likely from 102F fever now resolved    Hyponatremia  -Na 124  -Given 2L IVFB NSS in ED  -Hold further IVF  -Stat BMP  -Trend BMP q6  -Previously used to be on Sodium Chloride tabs but states she stopped them when she was told to liberalize her diet instead during outpt f/u    Scalp Hematoma 2/2 Fall  -CTH/CT Cervical negative +trace L frontal scalp soft tissue swelling  -PT Consulted  -Fall Precautions    MDD, Anxiety, Mood Disorder, Insomnia  -No longer takes Seroquel 300mg qHS per patient  -Xanax 0.5mg PO BID PRN Anxiety  -Zolpidem 10mg qHS PRN  -Venlafaxine 225mg q24    CAD, NICM, HFrEF, HTN, HLD  -Carvedilol 3.125mg PO BID  -Valsartan 80mg q24  -Atorvastatin 40mg qHS    Chronic Pain Syndrome  -Gabapentin 600mg BID  -Hold Ibuprofen 600mg BID PRN for now    COPD  -O2 via NC 2-4LPM home O2 baseline PRN  -Breo Ellipta  -Duonebs/Steroids as above    VTE PPX: SCD/LMWH  DISPO: Admit for Sepsis/Bronchitis/Viral Syndrome c/b Hyponatremia and Fall. Prepare for dc in 2-3 days.
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
## 2025-04-14 NOTE — H&P ADULT - HISTORY OF PRESENT ILLNESS
Writer spoke with patient for the [facility] clinic, Dr. Lofton,regarding upcoming procedure on 04/06.  We do require a COVID test 48-72 hours prior to that procedure. I am calling to make that appointment for you.   Covid screening appointment date of 04/04.  You are to go to the [facility] for you screening. You check in to a lab .  Once this nasal swab is completed we do ask that you self-quarantine date and time of your procedure.   If you are unable to self-quarantine please wear a mask in public place and try to social distance yourself form others.  Patient understood and had no questions.    
74F with PMHX COPD on 2-4L Home O2 baseline, CAD, NICM, HFimprovedEF, HTN, HLD, MDD, Anxiety, Mood Disorder, Insomnia BIBEMS with forehead laceration s/p mechanical fall. Patient states she fell trying to get up and go to the bathroom. Denies LOC. Pt noted to be tachycardic and tachypneic with +Coughing. +Purulent green sputum production. Febrile in ED Tmax 102F. Leukocytosis and Hyponatremia on labs. Treated for Sepsis in ED. RVP +Enterorhinovirus. Patient's  reports patient has been confused for past 12 days. Currently AAOx4 after Ofirmev given for fever. No other complaints.    ROS negative unless mentioned.

## 2025-04-14 NOTE — CHART NOTE - NSCHARTNOTEFT_GEN_A_CORE
patient being seen fro viral bronchitis. patient seen at bedside and states feeling better    a.p  74F with PMHX COPD on 2-4L Home O2 baseline, CAD, NICM, HFimprovedEF, HTN, HLD, MDD, Anxiety, Mood Disorder, Insomnia BIBEMS with forehead laceration s/p mechanical fall admitted for Sepsis 2/2 Enterorhinovirus and Mucopurulent Bronchitis c/b Hyponatremia.     PLAN:  Sepsis 2/2 Enterorhinovirus and Mucopurulent Bronchitis  -RVP +Enterorhinovirus  -Azithromycin 500mg IV q24  -Solumedrol 40mg IV q24 x5d  -Duoneb q6    forehead lac - clean with saline  -unclear if needs sutures    AMS  -CTH WO negative  -AMS resolved at time of admission.    Hyponatremia   - improved  -Previously used to be on Sodium Chloride tabs but states she stopped them when she was told to liberalize her diet instead during outpt f/u  start salt tabe    MDD, Anxiety, Mood Disorder, Insomnia  -No longer takes Seroquel 300mg qHS per patient  -Xanax 0.5mg PO BID PRN Anxiety  -Zolpidem 10mg qHS PRN  -Venlafaxine 225mg q24    CAD, NICM, HFrEF, HTN, HLD  -Carvedilol 3.125mg PO BID  -Valsartan 80mg q24  -Atorvastatin 40mg qHS    COPD  -O2 via NC 2-4LPM home O2 baseline PRN  -Breo Ellipta  -Duonebs/Steroids as above    VTE PPX: SCD/LMWH  DISPO: Admit for Sepsis/Bronchitis/Viral Syndrome c/b Hyponatremia and Fall. Prepare for dc in 2-3 days.

## 2025-04-14 NOTE — PHYSICAL THERAPY INITIAL EVALUATION ADULT - ADDITIONAL COMMENTS
Pt lives in a house  with 5 steps to enter with  rails and 14  stairs inside with  rails.  Pt owns medical equipment: SAC, RW, [place]ius, SC   Pt lives with: Spouse   Someone is always available to provide assist.

## 2025-04-14 NOTE — PATIENT PROFILE ADULT - FALL HARM RISK - HARM RISK INTERVENTIONS

## 2025-04-14 NOTE — PHYSICAL THERAPY INITIAL EVALUATION ADULT - PERTINENT HX OF CURRENT PROBLEM, REHAB EVAL
74F with PMHX COPD on 2-4L Home O2 baseline, CAD, NICM, HFimprovedEF, HTN, HLD, MDD, Anxiety, Mood Disorder, Insomnia BIBEMS with forehead laceration s/p mechanical fall. Patient states she fell trying to get up and go to the bathroom. Denies LOC. Pt noted to be tachycardic and tachypneic with +Coughing. +Purulent green sputum production. Febrile in ED Tmax 102F. Leukocytosis and Hyponatremia on labs. Treated for Sepsis in ED. RVP +Enterorhinovirus. Patient's  reports patient has been confused for past 12 days. Currently AAOx4 after Ofirmev given for fever. No other complaints.

## 2025-04-14 NOTE — ED ADULT NURSE NOTE - OBJECTIVE STATEMENT
Patient states that she tripped on something and fell. patient alert and orientated x4 with periods of confusion. respirations are even and non-labored. IV placed. patient states that she has had cough and congestion for a few days

## 2025-04-14 NOTE — H&P ADULT - NSHPPHYSICALEXAM_GEN_ALL_CORE
Vital Signs Last 24 Hrs  T(C): 37.4 (14 Apr 2025 02:30), Max: 38.9 (14 Apr 2025 01:34)  T(F): 99.4 (14 Apr 2025 02:30), Max: 102.1 (14 Apr 2025 01:34)  HR: 117 (14 Apr 2025 02:35) (103 - 117)  BP: 147/84 (14 Apr 2025 02:35) (131/82 - 152/84)  BP(mean): --  RR: 18 (14 Apr 2025 02:35) (17 - 18)  SpO2: 93% (14 Apr 2025 02:35) (90% - 94%)    Parameters below as of 14 Apr 2025 02:35  Patient On (Oxygen Delivery Method): nasal cannula  O2 Flow (L/min): 2    Constitutional: NAD, VSS  Head: NC +L forehead abrasion/hematoma  Eyes: PERRL, EOMI, anicteric sclera, conjunctiva WNL  ENT: Normal Pharynx, MMM, No tonsillar exudate/erythema  Neck: Supple, Non-tender  Chest: Non-tender, no rashes  Cardio: RRR, s1/s2, no appreciable murmurs/rubs/gallops  Resp: +Coarse BS BL throughout  Abd: Soft, Non-tender, Non-distended, no rebound/guarding/rigidity  : not examined  Rectal: not examined  MSK: moving all extremities, no motor weakness, full ROM x4  Ext: palpable distal pulses, good capillary refill  Psych: appropriate, cooperative +anxious appearing  Neuro: CN II-XII grossly intact, no focal deficits  Skin: Warm/Dry. No rashes.

## 2025-04-15 ENCOUNTER — TRANSCRIPTION ENCOUNTER (OUTPATIENT)
Age: 75
End: 2025-04-15

## 2025-04-15 LAB
-  STAPHYLOCOCCUS EPIDERMIDIS: SIGNIFICANT CHANGE UP
ALBUMIN SERPL ELPH-MCNC: 3.5 G/DL — SIGNIFICANT CHANGE UP (ref 3.3–5.2)
ALP SERPL-CCNC: 99 U/L — SIGNIFICANT CHANGE UP (ref 40–120)
ALT FLD-CCNC: 14 U/L — SIGNIFICANT CHANGE UP
AMMONIA BLD-MCNC: 19 UMOL/L — SIGNIFICANT CHANGE UP (ref 11–55)
ANION GAP SERPL CALC-SCNC: 8 MMOL/L — SIGNIFICANT CHANGE UP (ref 5–17)
AST SERPL-CCNC: 19 U/L — SIGNIFICANT CHANGE UP
BASOPHILS # BLD AUTO: 0.02 K/UL — SIGNIFICANT CHANGE UP (ref 0–0.2)
BASOPHILS NFR BLD AUTO: 0.2 % — SIGNIFICANT CHANGE UP (ref 0–2)
BILIRUB SERPL-MCNC: <0.2 MG/DL — LOW (ref 0.4–2)
BUN SERPL-MCNC: 7.6 MG/DL — LOW (ref 8–20)
CALCIUM SERPL-MCNC: 8.4 MG/DL — SIGNIFICANT CHANGE UP (ref 8.4–10.5)
CHLORIDE SERPL-SCNC: 89 MMOL/L — LOW (ref 96–108)
CO2 SERPL-SCNC: 28 MMOL/L — SIGNIFICANT CHANGE UP (ref 22–29)
CREAT ?TM UR-MCNC: 18 MG/DL — SIGNIFICANT CHANGE UP
CREAT SERPL-MCNC: 0.47 MG/DL — LOW (ref 0.5–1.3)
CULTURE RESULTS: ABNORMAL
EGFR: 100 ML/MIN/1.73M2 — SIGNIFICANT CHANGE UP
EGFR: 100 ML/MIN/1.73M2 — SIGNIFICANT CHANGE UP
EOSINOPHIL # BLD AUTO: 0.07 K/UL — SIGNIFICANT CHANGE UP (ref 0–0.5)
EOSINOPHIL NFR BLD AUTO: 0.6 % — SIGNIFICANT CHANGE UP (ref 0–6)
GLUCOSE SERPL-MCNC: 111 MG/DL — HIGH (ref 70–99)
GRAM STN FLD: ABNORMAL
HCT VFR BLD CALC: 30.8 % — LOW (ref 34.5–45)
HGB BLD-MCNC: 10.2 G/DL — LOW (ref 11.5–15.5)
IMM GRANULOCYTES # BLD AUTO: 0.08 K/UL — HIGH (ref 0–0.07)
IMM GRANULOCYTES NFR BLD AUTO: 0.7 % — SIGNIFICANT CHANGE UP (ref 0–0.9)
LEGIONELLA AG UR QL: NEGATIVE — SIGNIFICANT CHANGE UP
LYMPHOCYTES # BLD AUTO: 0.94 K/UL — LOW (ref 1–3.3)
LYMPHOCYTES NFR BLD AUTO: 8.4 % — LOW (ref 13–44)
MAGNESIUM SERPL-MCNC: 1.9 MG/DL — SIGNIFICANT CHANGE UP (ref 1.6–2.6)
MCHC RBC-ENTMCNC: 28.4 PG — SIGNIFICANT CHANGE UP (ref 27–34)
MCHC RBC-ENTMCNC: 33.1 G/DL — SIGNIFICANT CHANGE UP (ref 32–36)
MCV RBC AUTO: 85.8 FL — SIGNIFICANT CHANGE UP (ref 80–100)
METHOD TYPE: SIGNIFICANT CHANGE UP
MONOCYTES # BLD AUTO: 1.3 K/UL — HIGH (ref 0–0.9)
MONOCYTES NFR BLD AUTO: 11.7 % — SIGNIFICANT CHANGE UP (ref 2–14)
NEUTROPHILS # BLD AUTO: 8.72 K/UL — HIGH (ref 1.8–7.4)
NEUTROPHILS NFR BLD AUTO: 78.4 % — HIGH (ref 43–77)
NRBC # BLD AUTO: 0 K/UL — SIGNIFICANT CHANGE UP (ref 0–0)
NRBC # FLD: 0 K/UL — SIGNIFICANT CHANGE UP (ref 0–0)
NRBC BLD AUTO-RTO: 0 /100 WBCS — SIGNIFICANT CHANGE UP (ref 0–0)
NT-PROBNP SERPL-SCNC: 1469 PG/ML — HIGH (ref 0–300)
ORGANISM # SPEC MICROSCOPIC CNT: ABNORMAL
ORGANISM # SPEC MICROSCOPIC CNT: SIGNIFICANT CHANGE UP
OSMOLALITY SERPL: 263 MOSMOL/KG — LOW (ref 280–301)
OSMOLALITY UR: 201 MOSM/KG — LOW (ref 300–1000)
PLATELET # BLD AUTO: 340 K/UL — SIGNIFICANT CHANGE UP (ref 150–400)
PMV BLD: 9.5 FL — SIGNIFICANT CHANGE UP (ref 7–13)
POTASSIUM SERPL-MCNC: 3.7 MMOL/L — SIGNIFICANT CHANGE UP (ref 3.5–5.3)
POTASSIUM SERPL-SCNC: 3.7 MMOL/L — SIGNIFICANT CHANGE UP (ref 3.5–5.3)
POTASSIUM UR-SCNC: 11 MMOL/L — SIGNIFICANT CHANGE UP
PROT ?TM UR-MCNC: 7 MG/DL — SIGNIFICANT CHANGE UP (ref 0–12)
PROT SERPL-MCNC: 6.5 G/DL — LOW (ref 6.6–8.7)
PROT/CREAT UR-RTO: 0.4 RATIO — HIGH
RBC # BLD: 3.59 M/UL — LOW (ref 3.8–5.2)
RBC # FLD: 16.3 % — HIGH (ref 10.3–14.5)
S PNEUM AG UR QL: NEGATIVE — SIGNIFICANT CHANGE UP
SODIUM SERPL-SCNC: 125 MMOL/L — LOW (ref 135–145)
SODIUM UR-SCNC: 52 MMOL/L — SIGNIFICANT CHANGE UP
SPECIMEN SOURCE: SIGNIFICANT CHANGE UP
SPECIMEN SOURCE: SIGNIFICANT CHANGE UP
TSH SERPL-MCNC: 0.72 UIU/ML — SIGNIFICANT CHANGE UP (ref 0.27–4.2)
VIT B12 SERPL-MCNC: 309 PG/ML — SIGNIFICANT CHANGE UP (ref 232–1245)
WBC # BLD: 11.13 K/UL — HIGH (ref 3.8–10.5)
WBC # FLD AUTO: 11.13 K/UL — HIGH (ref 3.8–10.5)

## 2025-04-15 PROCEDURE — 99232 SBSQ HOSP IP/OBS MODERATE 35: CPT

## 2025-04-15 RX ADMIN — IPRATROPIUM BROMIDE AND ALBUTEROL SULFATE 3 MILLILITER(S): .5; 2.5 SOLUTION RESPIRATORY (INHALATION) at 14:30

## 2025-04-15 RX ADMIN — Medication 0.5 MILLIGRAM(S): at 22:23

## 2025-04-15 RX ADMIN — ATORVASTATIN CALCIUM 40 MILLIGRAM(S): 80 TABLET, FILM COATED ORAL at 22:23

## 2025-04-15 RX ADMIN — IPRATROPIUM BROMIDE AND ALBUTEROL SULFATE 3 MILLILITER(S): .5; 2.5 SOLUTION RESPIRATORY (INHALATION) at 09:07

## 2025-04-15 RX ADMIN — CARVEDILOL 3.12 MILLIGRAM(S): 3.12 TABLET, FILM COATED ORAL at 05:16

## 2025-04-15 RX ADMIN — Medication 5 MILLIGRAM(S): at 00:24

## 2025-04-15 RX ADMIN — Medication 5 MILLIGRAM(S): at 01:32

## 2025-04-15 RX ADMIN — OXYBUTYNIN CHLORIDE 10 MILLIGRAM(S): 5 TABLET, FILM COATED, EXTENDED RELEASE ORAL at 17:27

## 2025-04-15 RX ADMIN — TIOTROPIUM BROMIDE INHALATION SPRAY 2 PUFF(S): 3.12 SPRAY, METERED RESPIRATORY (INHALATION) at 09:13

## 2025-04-15 RX ADMIN — Medication 1 DOSE(S): at 14:34

## 2025-04-15 RX ADMIN — Medication 1 DOSE(S): at 20:26

## 2025-04-15 RX ADMIN — IPRATROPIUM BROMIDE AND ALBUTEROL SULFATE 3 MILLILITER(S): .5; 2.5 SOLUTION RESPIRATORY (INHALATION) at 20:26

## 2025-04-15 RX ADMIN — Medication 80 MILLIGRAM(S): at 05:16

## 2025-04-15 RX ADMIN — Medication 1 GRAM(S): at 05:16

## 2025-04-15 RX ADMIN — VENLAFAXINE HYDROCHLORIDE 225 MILLIGRAM(S): 37.5 CAPSULE, EXTENDED RELEASE ORAL at 17:28

## 2025-04-15 RX ADMIN — Medication 250 MILLIGRAM(S): at 03:06

## 2025-04-15 RX ADMIN — GABAPENTIN 600 MILLIGRAM(S): 400 CAPSULE ORAL at 05:16

## 2025-04-15 RX ADMIN — Medication 100 MILLIGRAM(S): at 22:23

## 2025-04-15 RX ADMIN — GABAPENTIN 600 MILLIGRAM(S): 400 CAPSULE ORAL at 17:28

## 2025-04-15 RX ADMIN — Medication 40 MILLIGRAM(S): at 05:16

## 2025-04-15 RX ADMIN — Medication 650 MILLIGRAM(S): at 05:15

## 2025-04-15 RX ADMIN — ENOXAPARIN SODIUM 40 MILLIGRAM(S): 100 INJECTION SUBCUTANEOUS at 12:44

## 2025-04-15 RX ADMIN — Medication 0.5 MILLIGRAM(S): at 09:02

## 2025-04-15 RX ADMIN — METHYLPREDNISOLONE ACETATE 40 MILLIGRAM(S): 80 INJECTION, SUSPENSION INTRA-ARTICULAR; INTRALESIONAL; INTRAMUSCULAR; SOFT TISSUE at 05:15

## 2025-04-15 RX ADMIN — DEXTROMETHORPHAN HBR, GUAIFENESIN 200 MILLIGRAM(S): 200 LIQUID ORAL at 03:07

## 2025-04-15 RX ADMIN — Medication 100 MILLIGRAM(S): at 17:28

## 2025-04-15 RX ADMIN — Medication 5 MILLIGRAM(S): at 23:07

## 2025-04-15 RX ADMIN — Medication 1 GRAM(S): at 17:27

## 2025-04-15 RX ADMIN — CARVEDILOL 3.12 MILLIGRAM(S): 3.12 TABLET, FILM COATED ORAL at 17:31

## 2025-04-15 RX ADMIN — DEXTROMETHORPHAN HBR, GUAIFENESIN 200 MILLIGRAM(S): 200 LIQUID ORAL at 12:44

## 2025-04-15 RX ADMIN — Medication 100 MILLIGRAM(S): at 05:16

## 2025-04-15 NOTE — DISCHARGE NOTE PROVIDER - HOSPITAL COURSE
Pt is a 75 y/o F with a PMHx of nonobstructive CAD, non-ischemic cardiomyopathy, HTN, HLD, COPD (2-4L prn NC), HFimprovedEF (LVEF 54% 09/24), MDD, Insomnia, & Anxiety, who presented to [facility] ED on 4/13 for forehead laceration s/p mechanical fall after trying to go to the bathroom. Pts  reported confusion in pt x2 days prior to admission and a cough for a few days. Pt denies any LOC or changes in vision. In ED, pt was tachycardic, tachypneic and also had a productive cough w/ purulent green sputum. Labs WBC 15.47, Na 124, RVP + for Enterorhinovirus. Pt given 2L IVFB NSS and IV Rocephin/Azithromycin in ED. Admitted to medicine for sepsis 2/2 Enterorhinovirus and Mucopurulent Bronchitis complicated by Hyponatremia. Pt currently on NaCl 1g tabs BID. On 4/14/25 AM labs signficant for hyponatermia 125. Urine osm, urine studies, serum osms pending. Pt is a 73 y/o F with a PMHx of nonobstructive CAD, non-ischemic cardiomyopathy, HTN, HLD, COPD (2-4L prn NC), HFimprovedEF (LVEF 54% 09/24), MDD, Insomnia, & Anxiety, who presented to [facility] ED on 4/13 for forehead laceration s/p mechanical fall after trying to go to the bathroom. Pts  reported confusion in pt x2 days prior to admission and a cough for a few days. Pt denies any LOC or changes in vision. In ED, pt was tachycardic, tachypneic and also had a productive cough w/ purulent green sputum. Labs WBC 15.47, Na 124, RVP + for Enterorhinovirus. Pt given 2L IVFB NSS and IV Rocephin/Azithromycin in ED. Admitted to medicine for sepsis 2/2 Enterorhinovirus and Mucopurulent Bronchitis complicated by Hyponatremia. Pt currently on NaCl 1g tabs BID. On 4/14/25 AM labs significant for hyponatremia 125. Urine studies and serum osms significant for primary polydipsia. To be discharged on NaCl tablets and educated on water restriction. Was seen by PT, recommending home PT with assist. Pt reassessed at bedside, hemodynamically stable, in no acute distress, AAOx3. Pt medically cleared to be discharged   Pt is a 75 y/o F with a PMHx of nonobstructive CAD, non-ischemic cardiomyopathy, HTN, HLD, COPD (2-4L prn NC), HFimprovedEF (LVEF 54% 09/24), MDD, Insomnia, & Anxiety, who presented to [facility] ED on 4/13 for forehead laceration s/p mechanical fall after trying to go to the bathroom. Pts  reported confusion in pt x2 days prior to admission and a cough for a few days. Pt denies any LOC or changes in vision. In ED, pt was tachycardic, tachypneic and also had a productive cough w/ purulent green sputum. Labs WBC 15.47, Na 124, RVP + for Enterorhinovirus. Pt given 2L IVFB NSS and IV Rocephin/Azithromycin in ED. Admitted to medicine for sepsis 2/2 Enterorhinovirus and Mucopurulent Bronchitis complicated by Hyponatremia. Pt currently on NaCl 1g tabs BID. On 4/14/25 AM labs significant for hyponatremia 125, however over course of hospitalization the levels improved to 130. Urine studies and serum osms significant for primary polydipsia. To be discharged on NaCl tablets and educated on water restriction. Was seen by PT, recommending home PT with assist. Pt reassessed at bedside, hemodynamically stable, in no acute distress, AAOx3. Pt medically cleared to be discharged home with home services.

## 2025-04-15 NOTE — DISCHARGE NOTE NURSING/CASE MANAGEMENT/SOCIAL WORK - CASE MANAGER'S NAME
Ashley Duenas  [phone] Anus position normal and patency confirmed, rectal-cutaneous fistula absent, normal anal wink.

## 2025-04-15 NOTE — DISCHARGE NOTE NURSING/CASE MANAGEMENT/SOCIAL WORK - PATIENT PORTAL LINK FT
You can access the FollowMyHealth Patient Portal offered by [facility] by registering at the following website: [email or web address]. By joining [facility]’s FollowMyHealth portal, you will also be able to view your health information using other applications (apps) compatible with our system.

## 2025-04-15 NOTE — DISCHARGE NOTE NURSING/CASE MANAGEMENT/SOCIAL WORK - NSDCPEFALRISK_GEN_ALL_CORE
For information on Fall & Injury Prevention, visit: [email or web address] OR  [email or web address] OR  https://www.cdc.gov/steadi/patient.html

## 2025-04-15 NOTE — DISCHARGE NOTE PROVIDER - PROVIDER TOKENS
Quality 110: Preventive Care And Screening: Influenza Immunization: Influenza Immunization not Administered for Documented Reasons. Quality 431: Preventive Care And Screening: Unhealthy Alcohol Use - Screening: Patient not identified as an unhealthy alcohol user when screened for unhealthy alcohol use using a systematic screening method Quality 226: Preventive Care And Screening: Tobacco Use: Screening And Cessation Intervention: Patient screened for tobacco use and is an ex/non-smoker Detail Level: Detailed PROVIDER:[TOKEN:[[id number]]] PROVIDER:[TOKEN:[[id number]]],PROVIDER:[TOKEN:[[id number]]] PROVIDER:[TOKEN:[[id number]],FOLLOWUP:[1 week]],PROVIDER:[TOKEN:[[id number]],FOLLOWUP:[1 week]]

## 2025-04-15 NOTE — DISCHARGE NOTE PROVIDER - ATTENDING DISCHARGE PHYSICAL EXAMINATION:
GENERAL: no acute distress, comfortably in bed  HEAD: NC/AT  EYES: PERRLA, EOMI, Non-icteric  ENT: Mucous membranes moist, neck supple  NEURO: No focal deficits, moving all extremities spontaneously, A&Ox3, no dysarthria, CN II-XII grossly intact  PSYCH: Normal affect, calm, appropriate insight and judgment, fluent speech  RESP: CTAB, no wrr, non-labored breathing  CVS: RRR, no murmur appreciated  GI: Soft, non-tender, non-distended, no organomegaly, no appreciable masses, +bs all 4 quadrants  : No rushing, no suprapubic tenderness  EXTREMITIES: Nontender, no clubbing, cyanosis, or edema  SKIN: Healing laceration on forehead, L side   Vital Signs Last 24 Hrs  T(C): 37.1 (22 Apr 2025 07:23), Max: 37.8 (21 Apr 2025 18:10)  T(F): 98.7 (22 Apr 2025 07:23), Max: 100.1 (21 Apr 2025 18:10)  HR: 91 (22 Apr 2025 10:01) (78 - 96)  BP: 108/56 (22 Apr 2025 07:23) (108/56 - 147/79)  BP(mean): --  RR: 19 (22 Apr 2025 07:23) (18 - 19)  SpO2: 96% (22 Apr 2025 10:01) (96% - 98%)    Parameters below as of 22 Apr 2025 08:41  Patient On (Oxygen Delivery Method): room air    GENERAL: no acute distress, comfortably in bed  HEAD: NC/AT  EYES: PERRLA, EOMI, Non-icteric  ENT: Mucous membranes moist, neck supple  NEURO: No focal deficits, moving all extremities spontaneously, A&Ox3, no dysarthria, CN II-XII grossly intact  PSYCH: Normal affect, calm, appropriate insight and judgment, fluent speech  RESP: CTAB, no wrr, non-labored breathing  CVS: RRR, no murmur appreciated  GI: Soft, non-tender, non-distended, no organomegaly, no appreciable masses, +bs all 4 quadrants  : No rushing, no suprapubic tenderness  EXTREMITIES: Nontender, no clubbing, cyanosis, or edema  SKIN: Healing laceration on forehead, L side

## 2025-04-15 NOTE — DISCHARGE NOTE PROVIDER - CARE PROVIDER_API CALL
Tunde Landaverde.  Internal Medicine  [address] [place] [address]  Phone: [phone]  Fax: [phone]  Follow Up Time:    Tunde Landaverde  Internal Medicine  [address] [place] [address]  Phone: [phone]  Fax: [phone]  Follow Up Time:     Jonh Sandoval  Nephrology  [address] [place] [id number]  Phone: [phone]  Fax: [phone]  Follow Up Time:    Tunde Landaverde.  Internal Medicine  [address] [place] [address]  Phone: [phone]  Fax: [phone]  Follow Up Time: 1 week    Jonh Sandoval  Nephrology  [address] [place] [id number]  Phone: [phone]  Fax: [phone]  Follow Up Time: 1 week

## 2025-04-15 NOTE — PROGRESS NOTE ADULT - ASSESSMENT
74F with PMHX COPD on 2-4L Home O2 baseline, CAD, NICM, HFimprovedEF, HTN, HLD, MDD, Anxiety, Mood Disorder, Insomnia BIBEMS with forehead laceration s/p mechanical fall admitted for Sepsis 2/2 Enterorhinovirus and Mucopurulent Bronchitis c/b Hyponatremia.     #Sepsis 2/2 Enterorhinovirus and Mucopurulent Bronchitis  -s/p 2L IVFB NSS given in ED, hold further IVF  -Lactate negative 1.3  -UA negative  -BCX pending  -RVP +Enterorhinovirus  -Check Sputum Cx with purulent sputum  -Check Atypicals and Legionella with sepsis and hyponatremia  -c/w Azithromycin 500mg IV q24  -c/w Benzonatate 100mg, oral TID  -c/w Guaifenesin 200mg oral q6h, PRN    #AMS  -4/13 CT Head unremarkable  -AMS likely 2/2 fever, now resolved    #Hyponatremia  -4/15 Na 125  -Given 2L IVFB NSS in ED, hold further IVF  -c/w NaCl 1g oral BID  -Previously on NaCl tabs but stopped them when she was told to liberalize her diet instead during outpt f/u    #Scalp Hematoma 2/2 Fall  -CTH/CT Cervical negative +trace L frontal scalp soft tissue swelling  -PT Consulted  -Fall Precautions    #MDD, Anxiety, Mood Disorder, Insomnia  -No longer takes Seroquel 300mg qHS per patient  -c/w Xanax 0.5mg PO BID PRN Anxiety  -c/w Zolpidem 5-10mg qHS PRN  -c/w Venlafaxine 225mg oral daily    #CAD, NICM, HFimprovedEF, HTN, HLD  -c/w Carvedilol 3.125mg PO BID  -c/w Valsartan 80mg, oral daily  -c/w Atorvastatin 40mg qHS    #Chronic Pain Syndrome  -c/w Gabapentin 600mg BID  -c/w Cyclobenzaprine 5mg, oral daily PRN  -Hold Ibuprofen 600mg BID PRN for now    #COPD  -O2 via NC 2-4LPM home O2 baseline PRN  -c/w Advair 100/50 Inhaler BID & Spriva 2.5mcg Inhaler daily  -c/w Duoneb 3mL, nebulizer q6h  -c/w Solumedrol 40mg IV push daily    #Overactive Bladder  -c/w Oxybutynin 10mg, oral daily    VTE PPX: SCD/LMWH  DISPO: Admit for Sepsis/Bronchitis/Viral Syndrome c/b Hyponatremia and Fall. Prepare for dc in 2-3 days.   74F with PMHX COPD on 2-4L Home O2 baseline, CAD, NICM, HFimprovedEF, HTN, HLD, MDD, Anxiety, Mood Disorder, Insomnia BIBEMS with forehead laceration s/p mechanical fall admitted for Sepsis 2/2 Enterorhinovirus and Mucopurulent Bronchitis complicated by Hyponatremia.     #Sepsis 2/2 Enterorhinovirus and Mucopurulent Bronchitis  -s/p 2L IVFB NSS given in ED, hold further IVF  -Lactate negative 1.3  -UA negative  -4/14 BClx +Staph Epi & Sputum Clx w/ Gram + Cocci   -4/13 RVP +Enterorhinovirus  -Check Atypicals and Legionella with sepsis and hyponatremia  -c/w Azithromycin 500mg IV q24  -c/w Benzonatate 100mg, oral TID  -c/w Guaifenesin 200mg oral q6h, PRN    #AMS  -4/13 CT Head unremarkable  -AMS likely 2/2 fever, now resolved    #Hyponatremia  -4/15 Na 125  -Given 2L IVFB NSS in ED, hold further IVF  -c/w NaCl 1g oral BID  -Previously on NaCl tabs but stopped them when she was told to liberalize her diet instead during outpt f/u    #Scalp Hematoma 2/2 Fall  -CTH/CT Cervical negative +trace L frontal scalp soft tissue swelling  -PT Consulted  -Fall Precautions    #MDD, Anxiety, Mood Disorder, Insomnia  -No longer takes Seroquel 300mg qHS per patient  -c/w Xanax 0.5mg PO BID PRN Anxiety  -c/w Zolpidem 5-10mg qHS PRN  -c/w Venlafaxine 225mg oral daily    #CAD, NICM, HFimprovedEF, HTN, HLD  -c/w Carvedilol 3.125mg PO BID  -c/w Valsartan 80mg, oral daily  -c/w Atorvastatin 40mg qHS    #Chronic Pain Syndrome  -c/w Gabapentin 600mg BID  -c/w Cyclobenzaprine 5mg, oral daily PRN  -Hold Ibuprofen 600mg BID PRN for now    #COPD  -O2 via NC 2-4LPM home O2 baseline PRN  -c/w Advair 100/50 Inhaler BID & Spriva 2.5mcg Inhaler daily  -c/w Duoneb 3mL, nebulizer q6h  -c/w Solumedrol 40mg IV push daily    #Overactive Bladder  -c/w Oxybutynin 10mg, oral daily    VTE PPX: SCD/LMWH  DISPO: Admit for Sepsis/Bronchitis/Viral Syndrome c/b Hyponatremia and Fall. Prepare for dc in 2-3 days.   74F with PMHX COPD on 2-4L Home O2 baseline, CAD, NICM, HFimprovedEF, HTN, HLD, MDD, Anxiety, Mood Disorder, Insomnia BIBEMS with forehead laceration s/p mechanical fall admitted for Sepsis 2/2 Enterorhinovirus and Mucopurulent Bronchitis complicated by Hyponatremia.     #Sepsis 2/2 Enterorhinovirus and Mucopurulent Bronchitis  -s/p 2L IVFB NSS given in ED, hold further IVF  -Lactate negative 1.3  -UA negative  -4/14 BClx +Staph Epi & Sputum Clx w/ Gram + Cocci   -4/13 RVP +Enterorhinovirus  -Check Atypicals and Legionella with sepsis and hyponatremia  -c/w Azithromycin 500mg IV q24  -c/w Benzonatate 100mg, oral TID  -c/w Guaifenesin 200mg oral q6h, PRN    #Hyponatremia  -4/15 Na 125  -Given 2L IVFB NSS in ED, hold further IVF  -c/w NaCl 1g oral BID  -check Urine Panel  -Previously on NaCl tabs but stopped them when she was told to liberalize her diet instead during outpt f/u    #AMS  -4/13 CT Head unremarkable  -AMS likely 2/2 fever, now resolved    #Scalp Hematoma 2/2 Fall  -CTH/CT Cervical negative +trace L frontal scalp soft tissue swelling  -PT Consulted  -Fall Precautions    #MDD, Anxiety, Mood Disorder, Insomnia  -No longer takes Seroquel 300mg qHS per patient  -c/w Xanax 0.5mg PO BID PRN Anxiety  -c/w Zolpidem 5-10mg qHS PRN  -c/w Venlafaxine 225mg oral daily    #CAD, NICM, HFimprovedEF, HTN, HLD  -c/w Carvedilol 3.125mg PO BID  -c/w Valsartan 80mg, oral daily  -c/w Atorvastatin 40mg qHS    #Chronic Pain Syndrome  -c/w Gabapentin 600mg BID  -c/w Cyclobenzaprine 5mg, oral daily PRN  -Hold Ibuprofen 600mg BID PRN for now    #COPD  -O2 via NC 2-4LPM home O2 baseline PRN  -c/w Advair 100/50 Inhaler BID & Spriva 2.5mcg Inhaler daily  -c/w Duoneb 3mL, nebulizer q6h  -c/w Solumedrol 40mg IV push daily    #Overactive Bladder  -c/w Oxybutynin 10mg, oral daily    VTE PPX: SCD/LMWH  DISPO: Admit for Sepsis/Bronchitis/Viral Syndrome c/b Hyponatremia and Fall. Prepare for dc in 2-3 days.   74F with PMHX COPD on 2-4L Home O2 baseline, CAD, NICM, HFimprovedEF, HTN, HLD, MDD, Anxiety, Mood Disorder, Insomnia BIBEMS with forehead laceration s/p mechanical fall admitted for Sepsis 2/2 Enterorhinovirus and Mucopurulent Bronchitis c/b Hyponatremia.     PLAN:  Sepsis 2/2 Enterorhinovirus and Mucopurulent Bronchitis  -2L IVFB NSS given in ED  -Hold further IVF for now  -Lactate negative 1.3  -UA negative  -BCX pending  -RVP +Enterorhinovirus  -Check Sputum Cx with purulent sputum  -Check Atypicals and Legionella with sepsis and hyponatremia  -Azithromycin 500mg IV q24  -Solumedrol 40mg IV q24 x5d  -Duoneb q6  -Tessalon PO TID  -O2 via NC 2-4LPM PRN (Home Baseline)  -Monitor     AMS  -CTH WO negative  -AMS resolved at time of admission. Pt AAOx4. Likely from 89F fever now resolved    Hyponatremia  -Na 124  -Given 2L IVFB NSS in ED  -Hold further IVF  -Stat BMP  -Trend BMP q6  -Previously used to be on Sodium Chloride tabs but states she stopped them when she was told to liberalize her diet instead during outpt f/u    Scalp Hematoma 2/2 Fall  -CTH/CT Cervical negative +trace L frontal scalp soft tissue swelling  -PT Consulted  -Fall Precautions    MDD, Anxiety, Mood Disorder, Insomnia  -No longer takes Seroquel 300mg qHS per patient  -Xanax 0.5mg PO BID PRN Anxiety  -Zolpidem 10mg qHS PRN  -Venlafaxine 225mg q24    CAD, NICM, HFrEF, HTN, HLD  -Carvedilol 3.125mg PO BID  -Valsartan 80mg q24  -Atorvastatin 40mg qHS    Chronic Pain Syndrome  -Gabapentin 600mg BID  -Hold Ibuprofen 600mg BID PRN for now    COPD  -O2 via NC 2-4LPM home O2 baseline PRN  -Breo Ellipta  -Duonebs/Steroids as above    VTE PPX: SCD/LMWH  DISPO: Admit for Sepsis/Bronchitis/Viral Syndrome c/b Hyponatremia and Fall. Prepare for dc in 2-3 days. F with PMHX COPD on 2-4L Home O2 baseline, CAD, NICM, HFimprovedEF, HTN, HLD, MDD, Anxiety, Mood Disorder, Insomnia BIBEMS with forehead laceration s/p mechanical fall admitted for Sepsis 2/2 Enterorhinovirus and Mucopurulent Bronchitis complicated by Hyponatremia.      #Sepsis 2/2 Enterorhinovirus and Mucopurulent Bronchitis  -s/p 2L IVFB NSS given in ED, hold further IVF  -Lactate negative 1.3  -UA negative  -4/14 BClx +Staph Epi & Sputum Clx w/ Gram + Cocci   -4/13 RVP +Enterorhinovirus  -c/w Azithromycin 500mg IV q24  -c/w Benzonatate 100mg, oral TID  -c/w Guaifenesin 200mg oral q6h, PRN     #Hyponatremia  -4/15 Na 125  -Given 2L IVFB NSS in ED, hold further IVF  -1L Fluid Restriction  -c/w NaCl 1g oral BID  -f/u BNP  -f/u Urine Panel  -Previously on NaCl tabs but stopped them when she was told to liberalize her diet instead during outpt f/u     #AMS, resolved  -4/13 CT Head unremarkable  -AMS likely 2/2 fever, now resolved     #Scalp Hematoma 2/2 Fall, resolving  -CTH/CT Cervical negative +trace L frontal scalp soft tissue swelling  -PT Consulted, rec home PT w/ home assist ( willing to provide)  -Fall Precautions     #MDD, Anxiety, Mood Disorder, Insomnia  -No longer takes Seroquel 300mg qHS per patient  -c/w Xanax 0.5mg PO BID PRN Anxiety  -c/w Zolpidem 5-10mg qHS PRN  -c/w Venlafaxine 225mg oral daily     #CAD, NICM, HFimprovedEF, HTN, HLD  -c/w Carvedilol 3.125mg PO BID  -c/w Valsartan 80mg, oral daily  -c/w Atorvastatin 40mg qHS     #Chronic Pain Syndrome  -c/w Gabapentin 600mg BID  -c/w Cyclobenzaprine 5mg, oral daily PRN  -Hold Ibuprofen 600mg BID PRN for now     #COPD  -O2 via NC 2-4LPM home O2 baseline PRN  -c/w Advair 100/50 Inhaler BID & Spriva 2.5mcg Inhaler daily  -c/w Duoneb 3mL, nebulizer q6h  -c/w Solumedrol 40mg IV push daily     #Overactive Bladder  -c/w Oxybutynin 10mg, oral daily     VTE PPX: SCD/LMWH  DISPO: f/u urine. with resolution of hyponatremia, d/c w/ home PT. 1-2 days

## 2025-04-15 NOTE — DISCHARGE NOTE PROVIDER - DETAILS OF MALNUTRITION DIAGNOSIS/DIAGNOSES
This patient has been assessed with a concern for Malnutrition and was treated during this hospitalization for the following Nutrition diagnosis/diagnoses:     -  04/16/2025: Moderate protein-calorie malnutrition

## 2025-04-15 NOTE — DISCHARGE NOTE PROVIDER - NSDCCPCAREPLAN_GEN_ALL_CORE_FT
PRINCIPAL DISCHARGE DIAGNOSIS  Diagnosis: Sepsis  Assessment and Plan of Treatment: Likely secondary to a Enterrorhinovirus (URI) infection and mucopurulent bronchitis. During this hospitalization your URI was symptomatically managed. You received supplemental oxygen, IVF, and anti-cough medication. Your bronchitis was treated with steroids, duonebs, inhalers, and antibiotics. Your symptoms, other than the cough have now resolved. You completed the antibiotic course. Please finish the prednisone course as prescribed. Please follow up with your pulmonologist to discuss your bronchitis history and any new medication adjustments that might be necessary. Please follow up with your PCP if symptoms persist and/or to review this current hospitalization.      SECONDARY DISCHARGE DIAGNOSES  Diagnosis: Acute hyponatremia  Assessment and Plan of Treatment: During this hospital you were found to have low sodium levels. This is thought to be due to primary polydipsia, or drinking too much water. You were started on NaCl tablets, your fluids were restricted and you had a regular diet. Please continue taking your NaCl tablets as prescribed.. Please limit your fluid intake to 1L per day. Please follow up with your PCP within 1-2 wks to review this hospitalization and medication reconciliation    Diagnosis: Enterovirus infection, unspecified  Assessment and Plan of Treatment: This was likely one of the causes of your sepsis, for which your symptoms have now resolved, other than the cough. During this hospitalization your URI was symptomatically managed. You received supplemental oxygen, and anti-cough medication.  Please follow up with your PCP if symptoms persist and/or to review this current hospitalization.    Diagnosis: Chronic obstructive pulmonary disease (COPD)  Assessment and Plan of Treatment: You have a history of COPD, on home O2 via NC 2-4LPM at baseline. During this hospitalization your were found to have bronchitis (a potential cause of your sepsis) and were given supplemental oxygen, steroids, duonebs and antibiotics. Please complete the steroid course as prescribed. Please continue taking your home medication. Please follow up with your pulmonologist and PCP regularly to keep your symptoms under control.       Diagnosis: AMS (altered mental status)  Assessment and Plan of Treatment: Likely secondary to fever, no resolved.    Diagnosis: Traumatic hematoma  Assessment and Plan of Treatment: This is likely due to a fall, and currently resolving. CTH/CT Cervical negative +trace L frontal scalp soft tissue swelling. You were seen by PT who reccommended home PT w/ home assist ( willing to provide)    Diagnosis: CAD (coronary artery disease)  Assessment and Plan of Treatment: You have a history of CAD, NICM, HFimprovedEF, HTN, HLD. Continue w/ home medications of Carvedilol , valsartan and Atorvastatin . Please follow up with your cardiologist and PCP regularly    Diagnosis: Pain syndrome, chronic  Assessment and Plan of Treatment: Please continue taking your home Gabapentin and Cyclobenzaprine. Follow up with your PCP    Diagnosis: Insomnia  Assessment and Plan of Treatment: You have a history of MDD, Anxiety, Mood Disorder, Insomnia. Continue with your home medications and follow up with your PCP      Diagnosis: Overactive bladder  Assessment and Plan of Treatment: Continue with your home medication and follow up with your PCP     PRINCIPAL DISCHARGE DIAGNOSIS  Diagnosis: Sepsis  Assessment and Plan of Treatment: Likely secondary to a Enterrorhinovirus (URI) infection and mucopurulent bronchitis. During this hospitalization your URI was symptomatically managed. You received supplemental oxygen, IVF, and anti-cough medication. Your bronchitis was treated with steroids, duonebs, inhalers, and antibiotics. Your symptoms, other than the cough have now resolved. You completed the antibiotic course. Please finish the prednisone course as prescribed. Please follow up with your pulmonologist to discuss your bronchitis history and any new medication adjustments that might be necessary. Please follow up with your PCP if symptoms persist and/or to review this current hospitalization.      SECONDARY DISCHARGE DIAGNOSES  Diagnosis: Enterovirus infection, unspecified  Assessment and Plan of Treatment: This was likely one of the causes of your sepsis, for which your symptoms have now resolved, other than the cough. During this hospitalization your URI was symptomatically managed. You received supplemental oxygen, and anti-cough medication.  Please follow up with your PCP if symptoms persist and/or to review this current hospitalization.    Diagnosis: AMS (altered mental status)  Assessment and Plan of Treatment: Likely secondary to fever, no resolved.    Diagnosis: Traumatic hematoma  Assessment and Plan of Treatment: This is likely due to a fall, and currently resolving. CTH/CT Cervical negative +trace L frontal scalp soft tissue swelling. You were seen by PT who reccommended home PT w/ home assist ( willing to provide)    Diagnosis: CAD (coronary artery disease)  Assessment and Plan of Treatment: You have a history of CAD, NICM, HFimprovedEF, HTN, HLD. Continue w/ home medications of Carvedilol , valsartan and Atorvastatin . Please follow up with your cardiologist and PCP regularly    Diagnosis: Chronic obstructive pulmonary disease (COPD)  Assessment and Plan of Treatment: You have a history of COPD, on home O2 via NC 2-4LPM at baseline. During this hospitalization your were found to have bronchitis (a potential cause of your sepsis) and were given supplemental oxygen, steroids, duonebs and antibiotics. Please complete the steroid course as prescribed. Please continue taking your home medication. Please follow up with your pulmonologist and PCP regularly to keep your symptoms under control.       Diagnosis: Pain syndrome, chronic  Assessment and Plan of Treatment: Please continue taking your home Gabapentin and Cyclobenzaprine. Follow up with your PCP    Diagnosis: Insomnia  Assessment and Plan of Treatment: You have a history of MDD, Anxiety, Mood Disorder, Insomnia. Continue with your home medications and follow up with your PCP      Diagnosis: Overactive bladder  Assessment and Plan of Treatment: Continue with your home medication and follow up with your PCP    Diagnosis: Hyponatremia  Assessment and Plan of Treatment: you have been found with low sodium levels. this is likely due to drinking too much water.   you were started on sodium tabs and fluid restriction.   please limit fluid intake to 1 L / day.   please follow up with primary care / nmephrology in 1 week to repeat labs and further care.

## 2025-04-15 NOTE — PROGRESS NOTE ADULT - SUBJECTIVE AND OBJECTIVE BOX
SUBJECTIVE  BRIEF HOSPITAL COURSE SUMMARY: Pt is a 75 y/o F with a PMHx of     LAST 24 HOURS:  TODAY:    OBJECTIVE  PHYSICAL EXAM:    Vital Signs Last 24 Hrs  T(C): 37 (15 Apr 2025 09:10), Max: 37.7 (15 Apr 2025 01:22)  T(F): 98.6 (15 Apr 2025 09:10), Max: 99.8 (15 Apr 2025 01:22)  HR: 90 (15 Apr 2025 09:24) (77 - 110)  BP: 147/90 (15 Apr 2025 09:10) (102/69 - 147/90)  BP(mean): 106 (14 Apr 2025 23:29) (80 - 106)  RR: 18 (15 Apr 2025 09:10) (17 - 18)  SpO2: 98% (15 Apr 2025 09:24) (97% - 100%)    Parameters below as of 15 Apr 2025 09:24  Patient On (Oxygen Delivery Method): nasal cannula w/ humidification      MEDICATIONS  (STANDING):  albuterol/ipratropium for Nebulization 3 milliLiter(s) Nebulizer every 6 hours  atorvastatin 40 milliGRAM(s) Oral at bedtime  azithromycin  IVPB      azithromycin  IVPB 500 milliGRAM(s) IV Intermittent every 24 hours  benzonatate 100 milliGRAM(s) Oral three times a day  carvedilol 3.125 milliGRAM(s) Oral every 12 hours  enoxaparin Injectable 40 milliGRAM(s) SubCutaneous every 24 hours  fluticasone propionate/ salmeterol 100-50 MICROgram(s) Diskus 1 Dose(s) Inhalation two times a day  gabapentin 600 milliGRAM(s) Oral two times a day  influenza  Vaccine (HIGH DOSE) 0.5 milliLiter(s) IntraMuscular once  methylPREDNISolone sodium succinate Injectable 40 milliGRAM(s) IV Push daily  oxybutynin XL 10 milliGRAM(s) Oral daily  pantoprazole    Tablet 40 milliGRAM(s) Oral before breakfast  sodium chloride 1 Gram(s) Oral two times a day  tiotropium 2.5 MICROgram(s) Inhaler 2 Puff(s) Inhalation daily  valsartan 80 milliGRAM(s) Oral daily  venlafaxine XR. 225 milliGRAM(s) Oral daily    MEDICATIONS  (PRN):  acetaminophen     Tablet .. 650 milliGRAM(s) Oral every 6 hours PRN Temp greater or equal to 38C (100.4F), Mild Pain (1 - 3)  ALPRAZolam 0.5 milliGRAM(s) Oral two times a day PRN Anxiety  aluminum hydroxide/magnesium hydroxide/simethicone Suspension 30 milliLiter(s) Oral every 4 hours PRN Dyspepsia  cyclobenzaprine 5 milliGRAM(s) Oral daily PRN Muscle Spasm  guaiFENesin Oral Liquid (Sugar-Free) 200 milliGRAM(s) Oral every 6 hours PRN Cough  melatonin 3 milliGRAM(s) Oral at bedtime PRN Insomnia  ondansetron Injectable 4 milliGRAM(s) IV Push every 8 hours PRN Nausea and/or Vomiting  zolpidem 5 milliGRAM(s) Oral at bedtime PRN Insomnia  zolpidem 5 milliGRAM(s) Oral at bedtime PRN Insomnia    Allergies    No Known Allergies    Intolerances        LABS:                        10.2   11.13 )-----------( 340      ( 15 Apr 2025 06:34 )             30.8     04-15    125[L]  |  89[L]  |  7.6[L]  ----------------------------<  111[H]  3.7   |  28.0  |  0.47[L]    Ca    8.4      15 Apr 2025 06:34  Phos  2.7     04-14  Mg     1.9     04-15    TPro  6.5[L]  /  Alb  3.5  /  TBili  <0.2[L]  /  DBili  x   /  AST  19  /  ALT  14  /  AlkPhos  99  04-15    PT/INR - ( 13 Apr 2025 23:33 )   PT: 11.5 sec;   INR: 0.99 ratio         PTT - ( 13 Apr 2025 23:33 )  PTT:30.1 sec  Urinalysis Basic - ( 15 Apr 2025 06:34 )    Color: x / Appearance: x / SG: x / pH: x  Gluc: 111 mg/dL / Ketone: x  / Bili: x / Urobili: x   Blood: x / Protein: x / Nitrite: x   Leuk Esterase: x / RBC: x / WBC x   Sq Epi: x / Non Sq Epi: x / Bacteria: x      CAPILLARY BLOOD GLUCOSE      POCT Blood Glucose.: 149 mg/dL (14 Apr 2025 17:18)      CULTURE DATA:    Urinalysis with Rflx Culture (collected 04-14-25 @ 00:38)    Culture - Blood (collected 04-14-25 @ 01:00)  Source: Blood Blood  Gram Stain (04-15-25 @ 00:52):    Growth in aerobic bottle: Gram Positive Cocci in Clusters  Preliminary Report (04-15-25 @ 00:52):    Growth in aerobic bottle: Gram Positive Cocci in Clusters    Direct identification is available within approximately 3-5    hours either by Blood Panel Multiplexed PCR or Direct    MALDI-TOF. Details: [email or web address]  Organism: Blood Culture PCR (04-15-25 @ 02:57)  Organism: Blood Culture PCR (04-15-25 @ 02:57)    Sensitivities:      -  Staphylococcus epidermidis: Detec      Method Type: PCR    Culture - Sputum (collected 04-14-25 @ 10:29)  Source: Sputum Sputum  Gram Stain (04-14-25 @ 23:57):    Moderate polymorphonuclear leukocytes per low power field    Rare Squamous epithelial cells per low power field    Rare Yeast like cells per oil power field    Rare Gram positive cocci in pairs per oil power field        RADIOLOGY & ADDITIONAL TESTS:    CT Head No Contrast (04.13.25 @22:18)  IMPRESSION: (CT BRAIN) No acute intracranial bleeding, mass effect, or acute calvarium   fracture. Trace left frontal scalp soft tissue swelling. Paranasal sinus disease as described.    CT Cervical Spine No Contrast (04.13.25 @22:18)  IMPRESSION: (CT CERVICAL SPINE) No acute cervical spine fracture, subluxation or   evidence of traumatic malalignment. Multilevel degenerative changes as   described above.    Provided no contraindications, MRI can be performed if there is concern   for subtle osseous, ligamentous or cord injury    Xray Chest 1 View (04.14.25 @00:55)  IMPRESSION: No acute cardiopulmonary disease process.   SUBJECTIVE  BRIEF HOSPITAL COURSE SUMMARY: Pt is a 75 y/o F with a PMHx of nonobstructive CAD, non-ischemic cardiomyopathy, HTN, HLD, COPD (2-4L prn NC), HFimprovedEF (LVEF 54% 09/24) who presented to [facility] ED on 4/13 for    LAST 24 HOURS:  TODAY:    OBJECTIVE  PHYSICAL EXAM:    Vital Signs Last 24 Hrs  T(C): 37 (15 Apr 2025 09:10), Max: 37.7 (15 Apr 2025 01:22)  T(F): 98.6 (15 Apr 2025 09:10), Max: 99.8 (15 Apr 2025 01:22)  HR: 90 (15 Apr 2025 09:24) (77 - 110)  BP: 147/90 (15 Apr 2025 09:10) (102/69 - 147/90)  BP(mean): 106 (14 Apr 2025 23:29) (80 - 106)  RR: 18 (15 Apr 2025 09:10) (17 - 18)  SpO2: 98% (15 Apr 2025 09:24) (97% - 100%)    Parameters below as of 15 Apr 2025 09:24  Patient On (Oxygen Delivery Method): nasal cannula w/ humidification      MEDICATIONS  (STANDING):  albuterol/ipratropium for Nebulization 3 milliLiter(s) Nebulizer every 6 hours  atorvastatin 40 milliGRAM(s) Oral at bedtime  azithromycin  IVPB      azithromycin  IVPB 500 milliGRAM(s) IV Intermittent every 24 hours  benzonatate 100 milliGRAM(s) Oral three times a day  carvedilol 3.125 milliGRAM(s) Oral every 12 hours  enoxaparin Injectable 40 milliGRAM(s) SubCutaneous every 24 hours  fluticasone propionate/ salmeterol 100-50 MICROgram(s) Diskus 1 Dose(s) Inhalation two times a day  gabapentin 600 milliGRAM(s) Oral two times a day  influenza  Vaccine (HIGH DOSE) 0.5 milliLiter(s) IntraMuscular once  methylPREDNISolone sodium succinate Injectable 40 milliGRAM(s) IV Push daily  oxybutynin XL 10 milliGRAM(s) Oral daily  pantoprazole    Tablet 40 milliGRAM(s) Oral before breakfast  sodium chloride 1 Gram(s) Oral two times a day  tiotropium 2.5 MICROgram(s) Inhaler 2 Puff(s) Inhalation daily  valsartan 80 milliGRAM(s) Oral daily  venlafaxine XR. 225 milliGRAM(s) Oral daily    MEDICATIONS  (PRN):  acetaminophen     Tablet .. 650 milliGRAM(s) Oral every 6 hours PRN Temp greater or equal to 38C (100.4F), Mild Pain (1 - 3)  ALPRAZolam 0.5 milliGRAM(s) Oral two times a day PRN Anxiety  aluminum hydroxide/magnesium hydroxide/simethicone Suspension 30 milliLiter(s) Oral every 4 hours PRN Dyspepsia  cyclobenzaprine 5 milliGRAM(s) Oral daily PRN Muscle Spasm  guaiFENesin Oral Liquid (Sugar-Free) 200 milliGRAM(s) Oral every 6 hours PRN Cough  melatonin 3 milliGRAM(s) Oral at bedtime PRN Insomnia  ondansetron Injectable 4 milliGRAM(s) IV Push every 8 hours PRN Nausea and/or Vomiting  zolpidem 5 milliGRAM(s) Oral at bedtime PRN Insomnia  zolpidem 5 milliGRAM(s) Oral at bedtime PRN Insomnia    Allergies    No Known Allergies    Intolerances        LABS:                        10.2   11.13 )-----------( 340      ( 15 Apr 2025 06:34 )             30.8     04-15    125[L]  |  89[L]  |  7.6[L]  ----------------------------<  111[H]  3.7   |  28.0  |  0.47[L]    Ca    8.4      15 Apr 2025 06:34  Phos  2.7     04-14  Mg     1.9     04-15    TPro  6.5[L]  /  Alb  3.5  /  TBili  <0.2[L]  /  DBili  x   /  AST  19  /  ALT  14  /  AlkPhos  99  04-15    PT/INR - ( 13 Apr 2025 23:33 )   PT: 11.5 sec;   INR: 0.99 ratio         PTT - ( 13 Apr 2025 23:33 )  PTT:30.1 sec  Urinalysis Basic - ( 15 Apr 2025 06:34 )    Color: x / Appearance: x / SG: x / pH: x  Gluc: 111 mg/dL / Ketone: x  / Bili: x / Urobili: x   Blood: x / Protein: x / Nitrite: x   Leuk Esterase: x / RBC: x / WBC x   Sq Epi: x / Non Sq Epi: x / Bacteria: x      CAPILLARY BLOOD GLUCOSE      POCT Blood Glucose.: 149 mg/dL (14 Apr 2025 17:18)      CULTURE DATA:    Urinalysis with Rflx Culture (collected 04-14-25 @ 00:38)    Culture - Blood (collected 04-14-25 @ 01:00)  Source: Blood Blood  Gram Stain (04-15-25 @ 00:52):    Growth in aerobic bottle: Gram Positive Cocci in Clusters  Preliminary Report (04-15-25 @ 00:52):    Growth in aerobic bottle: Gram Positive Cocci in Clusters    Direct identification is available within approximately 3-5    hours either by Blood Panel Multiplexed PCR or Direct    MALDI-TOF. Details: [email or web address]  Organism: Blood Culture PCR (04-15-25 @ 02:57)  Organism: Blood Culture PCR (04-15-25 @ 02:57)    Sensitivities:      -  Staphylococcus epidermidis: Detec      Method Type: PCR    Culture - Sputum (collected 04-14-25 @ 10:29)  Source: Sputum Sputum  Gram Stain (04-14-25 @ 23:57):    Moderate polymorphonuclear leukocytes per low power field    Rare Squamous epithelial cells per low power field    Rare Yeast like cells per oil power field    Rare Gram positive cocci in pairs per oil power field        RADIOLOGY & ADDITIONAL TESTS:    CT Head No Contrast (04.13.25 @22:18)  IMPRESSION: (CT BRAIN) No acute intracranial bleeding, mass effect, or acute calvarium   fracture. Trace left frontal scalp soft tissue swelling. Paranasal sinus disease as described.    CT Cervical Spine No Contrast (04.13.25 @22:18)  IMPRESSION: (CT CERVICAL SPINE) No acute cervical spine fracture, subluxation or   evidence of traumatic malalignment. Multilevel degenerative changes as   described above.    Provided no contraindications, MRI can be performed if there is concern   for subtle osseous, ligamentous or cord injury    Xray Chest 1 View (04.14.25 @00:55)  IMPRESSION: No acute cardiopulmonary disease process.   SUBJECTIVE  BRIEF HOSPITAL COURSE SUMMARY: Pt is a 75 y/o F with a PMHx of nonobstructive CAD, non-ischemic cardiomyopathy, HTN, HLD, COPD (2-4L prn NC), HFimprovedEF (LVEF 54% 09/24), MDD, Insomnia, & Anxiety, who presented to [facility] ED on 4/13 for forehead laceration s/p mechanical fall after trying to go to the bathroom. Pt denies any LOC or changes in vision. In ED, pt was tachycardic, tachypneic, febrile, and also had a productive cough w/ purulent green sputum. Labs WBC, Na, RVP + for Enterorhinovirus.     LAST 24 HOURS: No acute overnight events  TODAY: Pt alert, awake at bedside. Reports that she is feeling well.     OBJECTIVE  PHYSICAL EXAM:    Constitutional: No acute distress, laying comfortably in bed  Head: +well healing L forehead abrasion/hematoma, no discharge, no erythema  Eyes: PERRL, EOMI, anicteric sclera, no conjunctival injection  Neck: Supple, Non-tender  Cardio: RRR, Normal S1/S2, no m/r/g  Resp: +Rhonchi, +diffuse expiratory wheezing, no rales  Abd: Soft, no TTP, Non-distended, no rebound/guarding/rigidity  MSK: 5/5 motor strength x4, full ROM x4  Ext: 2+DP pulses, good capillary refill, no cyanosis  Psych: appropriate affect  Neuro: no focal deficits  Skin: + diffuse ecchymosis to distal R shin, warm/dry, No rashes    Vital Signs Last 24 Hrs  T(C): 37 (15 Apr 2025 09:10), Max: 37.7 (15 Apr 2025 01:22)  T(F): 98.6 (15 Apr 2025 09:10), Max: 99.8 (15 Apr 2025 01:22)  HR: 90 (15 Apr 2025 09:24) (77 - 110)  BP: 147/90 (15 Apr 2025 09:10) (102/69 - 147/90)  BP(mean): 106 (14 Apr 2025 23:29) (80 - 106)  RR: 18 (15 Apr 2025 09:10) (17 - 18)  SpO2: 98% (15 Apr 2025 09:24) (97% - 100%)    Parameters below as of 15 Apr 2025 09:24  Patient On (Oxygen Delivery Method): nasal cannula w/ humidification      MEDICATIONS  (STANDING):  albuterol/ipratropium for Nebulization 3 milliLiter(s) Nebulizer every 6 hours  atorvastatin 40 milliGRAM(s) Oral at bedtime  azithromycin  IVPB      azithromycin  IVPB 500 milliGRAM(s) IV Intermittent every 24 hours  benzonatate 100 milliGRAM(s) Oral three times a day  carvedilol 3.125 milliGRAM(s) Oral every 12 hours  enoxaparin Injectable 40 milliGRAM(s) SubCutaneous every 24 hours  fluticasone propionate/ salmeterol 100-50 MICROgram(s) Diskus 1 Dose(s) Inhalation two times a day  gabapentin 600 milliGRAM(s) Oral two times a day  influenza  Vaccine (HIGH DOSE) 0.5 milliLiter(s) IntraMuscular once  methylPREDNISolone sodium succinate Injectable 40 milliGRAM(s) IV Push daily  oxybutynin XL 10 milliGRAM(s) Oral daily  pantoprazole    Tablet 40 milliGRAM(s) Oral before breakfast  sodium chloride 1 Gram(s) Oral two times a day  tiotropium 2.5 MICROgram(s) Inhaler 2 Puff(s) Inhalation daily  valsartan 80 milliGRAM(s) Oral daily  venlafaxine XR. 225 milliGRAM(s) Oral daily    MEDICATIONS  (PRN):  acetaminophen     Tablet .. 650 milliGRAM(s) Oral every 6 hours PRN Temp greater or equal to 38C (100.4F), Mild Pain (1 - 3)  ALPRAZolam 0.5 milliGRAM(s) Oral two times a day PRN Anxiety  aluminum hydroxide/magnesium hydroxide/simethicone Suspension 30 milliLiter(s) Oral every 4 hours PRN Dyspepsia  cyclobenzaprine 5 milliGRAM(s) Oral daily PRN Muscle Spasm  guaiFENesin Oral Liquid (Sugar-Free) 200 milliGRAM(s) Oral every 6 hours PRN Cough  melatonin 3 milliGRAM(s) Oral at bedtime PRN Insomnia  ondansetron Injectable 4 milliGRAM(s) IV Push every 8 hours PRN Nausea and/or Vomiting  zolpidem 5 milliGRAM(s) Oral at bedtime PRN Insomnia  zolpidem 5 milliGRAM(s) Oral at bedtime PRN Insomnia    Allergies    No Known Allergies    Intolerances        LABS:                        10.2   11.13 )-----------( 340      ( 15 Apr 2025 06:34 )             30.8     04-15    125[L]  |  89[L]  |  7.6[L]  ----------------------------<  111[H]  3.7   |  28.0  |  0.47[L]    Ca    8.4      15 Apr 2025 06:34  Phos  2.7     04-14  Mg     1.9     04-15    TPro  6.5[L]  /  Alb  3.5  /  TBili  <0.2[L]  /  DBili  x   /  AST  19  /  ALT  14  /  AlkPhos  99  04-15    PT/INR - ( 13 Apr 2025 23:33 )   PT: 11.5 sec;   INR: 0.99 ratio         PTT - ( 13 Apr 2025 23:33 )  PTT:30.1 sec  Urinalysis Basic - ( 15 Apr 2025 06:34 )    Color: x / Appearance: x / SG: x / pH: x  Gluc: 111 mg/dL / Ketone: x  / Bili: x / Urobili: x   Blood: x / Protein: x / Nitrite: x   Leuk Esterase: x / RBC: x / WBC x   Sq Epi: x / Non Sq Epi: x / Bacteria: x      CAPILLARY BLOOD GLUCOSE      POCT Blood Glucose.: 149 mg/dL (14 Apr 2025 17:18)      CULTURE DATA:    Urinalysis with Rflx Culture (collected 04-14-25 @ 00:38)    Culture - Blood (collected 04-14-25 @ 01:00)  Source: Blood Blood  Gram Stain (04-15-25 @ 00:52):    Growth in aerobic bottle: Gram Positive Cocci in Clusters  Preliminary Report (04-15-25 @ 00:52):    Growth in aerobic bottle: Gram Positive Cocci in Clusters    Direct identification is available within approximately 3-5    hours either by Blood Panel Multiplexed PCR or Direct    MALDI-TOF. Details: [email or web address]  Organism: Blood Culture PCR (04-15-25 @ 02:57)  Organism: Blood Culture PCR (04-15-25 @ 02:57)    Sensitivities:      -  Staphylococcus epidermidis: Detec      Method Type: PCR    Culture - Sputum (collected 04-14-25 @ 10:29)  Source: Sputum Sputum  Gram Stain (04-14-25 @ 23:57):    Moderate polymorphonuclear leukocytes per low power field    Rare Squamous epithelial cells per low power field    Rare Yeast like cells per oil power field    Rare Gram positive cocci in pairs per oil power field        RADIOLOGY & ADDITIONAL TESTS:    CT Head No Contrast (04.13.25 @22:18)  IMPRESSION: (CT BRAIN) No acute intracranial bleeding, mass effect, or acute calvarium   fracture. Trace left frontal scalp soft tissue swelling. Paranasal sinus disease as described.    CT Cervical Spine No Contrast (04.13.25 @22:18)  IMPRESSION: (CT CERVICAL SPINE) No acute cervical spine fracture, subluxation or   evidence of traumatic malalignment. Multilevel degenerative changes as   described above.    Provided no contraindications, MRI can be performed if there is concern   for subtle osseous, ligamentous or cord injury    Xray Chest 1 View (04.14.25 @00:55)  IMPRESSION: No acute cardiopulmonary disease process.   SUBJECTIVE  BRIEF HOSPITAL COURSE SUMMARY: Pt is a 73 y/o F with a PMHx of nonobstructive CAD, non-ischemic cardiomyopathy, HTN, HLD, COPD (2-4L prn NC), HFimprovedEF (LVEF 54% 09/24), MDD, Insomnia, & Anxiety, who presented to [facility] ED on 4/13 for forehead laceration s/p mechanical fall after trying to go to the bathroom. Pt denies any LOC or changes in vision. In ED, pt was tachycardic, tachypneic and also had a productive cough w/ purulent green sputum. Labs WBC 15.47, Na 124, RVP + for Enterorhinovirus.     LAST 24 HOURS: No acute overnight events  TODAY: Pt alert, awake at bedside. Reports that she vomited once this AM. Denies any cp, sob, headache, diarrhea, dizziness, abdominal pain, palpitations    ROS:        OBJECTIVE  PHYSICAL EXAM:    Constitutional: No acute distress, laying comfortably in bed  Head: +well healing L forehead abrasion/hematoma, no discharge, no erythema  Eyes: PERRL, EOMI, anicteric sclera, no conjunctival injection  Neck: Supple, Non-tender  Cardio: RRR, Normal S1/S2, no m/r/g  Resp: +Rhonchi, +diffuse expiratory wheezing, no rales  Abd: Soft, no TTP, Non-distended, no rebound/guarding/rigidity  MSK: 5/5 motor strength x4, full ROM x4  Ext: 2+DP pulses, good capillary refill, no cyanosis  Psych: appropriate affect  Neuro: no focal deficits  Skin: + diffuse ecchymosis to distal R shin, warm/dry, No rashes    Vital Signs Last 24 Hrs  T(C): 37 (15 Apr 2025 09:10), Max: 37.7 (15 Apr 2025 01:22)  T(F): 98.6 (15 Apr 2025 09:10), Max: 99.8 (15 Apr 2025 01:22)  HR: 90 (15 Apr 2025 09:24) (77 - 110)  BP: 147/90 (15 Apr 2025 09:10) (102/69 - 147/90)  BP(mean): 106 (14 Apr 2025 23:29) (80 - 106)  RR: 18 (15 Apr 2025 09:10) (17 - 18)  SpO2: 98% (15 Apr 2025 09:24) (97% - 100%)    Parameters below as of 15 Apr 2025 09:24  Patient On (Oxygen Delivery Method): nasal cannula w/ humidification      MEDICATIONS  (STANDING):  albuterol/ipratropium for Nebulization 3 milliLiter(s) Nebulizer every 6 hours  atorvastatin 40 milliGRAM(s) Oral at bedtime  azithromycin  IVPB      azithromycin  IVPB 500 milliGRAM(s) IV Intermittent every 24 hours  benzonatate 100 milliGRAM(s) Oral three times a day  carvedilol 3.125 milliGRAM(s) Oral every 12 hours  enoxaparin Injectable 40 milliGRAM(s) SubCutaneous every 24 hours  fluticasone propionate/ salmeterol 100-50 MICROgram(s) Diskus 1 Dose(s) Inhalation two times a day  gabapentin 600 milliGRAM(s) Oral two times a day  influenza  Vaccine (HIGH DOSE) 0.5 milliLiter(s) IntraMuscular once  methylPREDNISolone sodium succinate Injectable 40 milliGRAM(s) IV Push daily  oxybutynin XL 10 milliGRAM(s) Oral daily  pantoprazole    Tablet 40 milliGRAM(s) Oral before breakfast  sodium chloride 1 Gram(s) Oral two times a day  tiotropium 2.5 MICROgram(s) Inhaler 2 Puff(s) Inhalation daily  valsartan 80 milliGRAM(s) Oral daily  venlafaxine XR. 225 milliGRAM(s) Oral daily    MEDICATIONS  (PRN):  acetaminophen     Tablet .. 650 milliGRAM(s) Oral every 6 hours PRN Temp greater or equal to 38C (100.4F), Mild Pain (1 - 3)  ALPRAZolam 0.5 milliGRAM(s) Oral two times a day PRN Anxiety  aluminum hydroxide/magnesium hydroxide/simethicone Suspension 30 milliLiter(s) Oral every 4 hours PRN Dyspepsia  cyclobenzaprine 5 milliGRAM(s) Oral daily PRN Muscle Spasm  guaiFENesin Oral Liquid (Sugar-Free) 200 milliGRAM(s) Oral every 6 hours PRN Cough  melatonin 3 milliGRAM(s) Oral at bedtime PRN Insomnia  ondansetron Injectable 4 milliGRAM(s) IV Push every 8 hours PRN Nausea and/or Vomiting  zolpidem 5 milliGRAM(s) Oral at bedtime PRN Insomnia  zolpidem 5 milliGRAM(s) Oral at bedtime PRN Insomnia    Allergies    No Known Allergies    Intolerances        LABS:                        10.2   11.13 )-----------( 340      ( 15 Apr 2025 06:34 )             30.8     04-15    125[L]  |  89[L]  |  7.6[L]  ----------------------------<  111[H]  3.7   |  28.0  |  0.47[L]    Ca    8.4      15 Apr 2025 06:34  Phos  2.7     04-14  Mg     1.9     04-15    TPro  6.5[L]  /  Alb  3.5  /  TBili  <0.2[L]  /  DBili  x   /  AST  19  /  ALT  14  /  AlkPhos  99  04-15    PT/INR - ( 13 Apr 2025 23:33 )   PT: 11.5 sec;   INR: 0.99 ratio         PTT - ( 13 Apr 2025 23:33 )  PTT:30.1 sec  Urinalysis Basic - ( 15 Apr 2025 06:34 )    Color: x / Appearance: x / SG: x / pH: x  Gluc: 111 mg/dL / Ketone: x  / Bili: x / Urobili: x   Blood: x / Protein: x / Nitrite: x   Leuk Esterase: x / RBC: x / WBC x   Sq Epi: x / Non Sq Epi: x / Bacteria: x      CAPILLARY BLOOD GLUCOSE      POCT Blood Glucose.: 149 mg/dL (14 Apr 2025 17:18)      CULTURE DATA:    Urinalysis with Rflx Culture (collected 04-14-25 @ 00:38)    Culture - Blood (collected 04-14-25 @ 01:00)  Source: Blood Blood  Gram Stain (04-15-25 @ 00:52):    Growth in aerobic bottle: Gram Positive Cocci in Clusters  Preliminary Report (04-15-25 @ 00:52):    Growth in aerobic bottle: Gram Positive Cocci in Clusters    Direct identification is available within approximately 3-5    hours either by Blood Panel Multiplexed PCR or Direct    MALDI-TOF. Details: [email or web address]  Organism: Blood Culture PCR (04-15-25 @ 02:57)  Organism: Blood Culture PCR (04-15-25 @ 02:57)    Sensitivities:      -  Staphylococcus epidermidis: Detec      Method Type: PCR    Culture - Sputum (collected 04-14-25 @ 10:29)  Source: Sputum Sputum  Gram Stain (04-14-25 @ 23:57):    Moderate polymorphonuclear leukocytes per low power field    Rare Squamous epithelial cells per low power field    Rare Yeast like cells per oil power field    Rare Gram positive cocci in pairs per oil power field        RADIOLOGY & ADDITIONAL TESTS:    CT Head No Contrast (04.13.25 @22:18)  IMPRESSION: (CT BRAIN) No acute intracranial bleeding, mass effect, or acute calvarium   fracture. Trace left frontal scalp soft tissue swelling. Paranasal sinus disease as described.    CT Cervical Spine No Contrast (04.13.25 @22:18)  IMPRESSION: (CT CERVICAL SPINE) No acute cervical spine fracture, subluxation or   evidence of traumatic malalignment. Multilevel degenerative changes as   described above.    Provided no contraindications, MRI can be performed if there is concern   for subtle osseous, ligamentous or cord injury    Xray Chest 1 View (04.14.25 @00:55)  IMPRESSION: No acute cardiopulmonary disease process.   SUBJECTIVE  BRIEF HOSPITAL COURSE SUMMARY: Pt is a 75 y/o F with a PMHx of nonobstructive CAD, non-ischemic cardiomyopathy, HTN, HLD, COPD (2-4L prn NC), HFimprovedEF (LVEF 54% 09/24), MDD, Insomnia, & Anxiety, who presented to [facility] ED on 4/13 for forehead laceration s/p mechanical fall after trying to go to the bathroom. Pts  reported confusion in pt x2 days prior to admission and a cough for a few days. Pt denies any LOC or changes in vision. In ED, pt was tachycardic, tachypneic and also had a productive cough w/ purulent green sputum. Labs WBC 15.47, Na 124, RVP + for Enterorhinovirus. Pt given 2L IVFB NSS and IV Rocephin/Azithromycin in ED. Admitted to medicine for sepsis 2/2 Enterorhinovirus and Mucopurulent Bronchitis complicated by Hyponatremia.     LAST 24 HOURS: No acute overnight events  TODAY: Pt alert, awake at bedside. Reports that she vomited once this AM. Denies any cp, sob, headache, diarrhea, dizziness, abdominal pain, palpitations    ROS:        OBJECTIVE  PHYSICAL EXAM:    Constitutional: No acute distress, laying comfortably in bed  Head: +well healing L forehead abrasion/hematoma, no discharge, no erythema  Eyes: PERRL, EOMI, anicteric sclera, no conjunctival injection  Neck: Supple, Non-tender  Cardio: RRR, Normal S1/S2, no m/r/g  Resp: +Rhonchi, +diffuse expiratory wheezing, no rales  Abd: Soft, no TTP, Non-distended, no rebound/guarding/rigidity  MSK: 5/5 motor strength x4, full ROM x4  Ext: 2+DP pulses, good capillary refill, no cyanosis  Psych: appropriate affect  Neuro: no focal deficits  Skin: + diffuse ecchymosis to distal R shin, warm/dry, No rashes    Vital Signs Last 24 Hrs  T(C): 37 (15 Apr 2025 09:10), Max: 37.7 (15 Apr 2025 01:22)  T(F): 98.6 (15 Apr 2025 09:10), Max: 99.8 (15 Apr 2025 01:22)  HR: 90 (15 Apr 2025 09:24) (77 - 110)  BP: 147/90 (15 Apr 2025 09:10) (102/69 - 147/90)  BP(mean): 106 (14 Apr 2025 23:29) (80 - 106)  RR: 18 (15 Apr 2025 09:10) (17 - 18)  SpO2: 98% (15 Apr 2025 09:24) (97% - 100%)    Parameters below as of 15 Apr 2025 09:24  Patient On (Oxygen Delivery Method): nasal cannula w/ humidification      MEDICATIONS  (STANDING):  albuterol/ipratropium for Nebulization 3 milliLiter(s) Nebulizer every 6 hours  atorvastatin 40 milliGRAM(s) Oral at bedtime  azithromycin  IVPB      azithromycin  IVPB 500 milliGRAM(s) IV Intermittent every 24 hours  benzonatate 100 milliGRAM(s) Oral three times a day  carvedilol 3.125 milliGRAM(s) Oral every 12 hours  enoxaparin Injectable 40 milliGRAM(s) SubCutaneous every 24 hours  fluticasone propionate/ salmeterol 100-50 MICROgram(s) Diskus 1 Dose(s) Inhalation two times a day  gabapentin 600 milliGRAM(s) Oral two times a day  influenza  Vaccine (HIGH DOSE) 0.5 milliLiter(s) IntraMuscular once  methylPREDNISolone sodium succinate Injectable 40 milliGRAM(s) IV Push daily  oxybutynin XL 10 milliGRAM(s) Oral daily  pantoprazole    Tablet 40 milliGRAM(s) Oral before breakfast  sodium chloride 1 Gram(s) Oral two times a day  tiotropium 2.5 MICROgram(s) Inhaler 2 Puff(s) Inhalation daily  valsartan 80 milliGRAM(s) Oral daily  venlafaxine XR. 225 milliGRAM(s) Oral daily    MEDICATIONS  (PRN):  acetaminophen     Tablet .. 650 milliGRAM(s) Oral every 6 hours PRN Temp greater or equal to 38C (100.4F), Mild Pain (1 - 3)  ALPRAZolam 0.5 milliGRAM(s) Oral two times a day PRN Anxiety  aluminum hydroxide/magnesium hydroxide/simethicone Suspension 30 milliLiter(s) Oral every 4 hours PRN Dyspepsia  cyclobenzaprine 5 milliGRAM(s) Oral daily PRN Muscle Spasm  guaiFENesin Oral Liquid (Sugar-Free) 200 milliGRAM(s) Oral every 6 hours PRN Cough  melatonin 3 milliGRAM(s) Oral at bedtime PRN Insomnia  ondansetron Injectable 4 milliGRAM(s) IV Push every 8 hours PRN Nausea and/or Vomiting  zolpidem 5 milliGRAM(s) Oral at bedtime PRN Insomnia  zolpidem 5 milliGRAM(s) Oral at bedtime PRN Insomnia    Allergies    No Known Allergies    Intolerances        LABS:                        10.2   11.13 )-----------( 340      ( 15 Apr 2025 06:34 )             30.8     04-15    125[L]  |  89[L]  |  7.6[L]  ----------------------------<  111[H]  3.7   |  28.0  |  0.47[L]    Ca    8.4      15 Apr 2025 06:34  Phos  2.7     04-14  Mg     1.9     04-15    TPro  6.5[L]  /  Alb  3.5  /  TBili  <0.2[L]  /  DBili  x   /  AST  19  /  ALT  14  /  AlkPhos  99  04-15    PT/INR - ( 13 Apr 2025 23:33 )   PT: 11.5 sec;   INR: 0.99 ratio         PTT - ( 13 Apr 2025 23:33 )  PTT:30.1 sec  Urinalysis Basic - ( 15 Apr 2025 06:34 )    Color: x / Appearance: x / SG: x / pH: x  Gluc: 111 mg/dL / Ketone: x  / Bili: x / Urobili: x   Blood: x / Protein: x / Nitrite: x   Leuk Esterase: x / RBC: x / WBC x   Sq Epi: x / Non Sq Epi: x / Bacteria: x      CAPILLARY BLOOD GLUCOSE      POCT Blood Glucose.: 149 mg/dL (14 Apr 2025 17:18)      CULTURE DATA:    Urinalysis with Rflx Culture (collected 04-14-25 @ 00:38)    Culture - Blood (collected 04-14-25 @ 01:00)  Source: Blood Blood  Gram Stain (04-15-25 @ 00:52):    Growth in aerobic bottle: Gram Positive Cocci in Clusters  Preliminary Report (04-15-25 @ 00:52):    Growth in aerobic bottle: Gram Positive Cocci in Clusters    Direct identification is available within approximately 3-5    hours either by Blood Panel Multiplexed PCR or Direct    MALDI-TOF. Details: [email or web address]  Organism: Blood Culture PCR (04-15-25 @ 02:57)  Organism: Blood Culture PCR (04-15-25 @ 02:57)    Sensitivities:      -  Staphylococcus epidermidis: Detec      Method Type: PCR    Culture - Sputum (collected 04-14-25 @ 10:29)  Source: Sputum Sputum  Gram Stain (04-14-25 @ 23:57):    Moderate polymorphonuclear leukocytes per low power field    Rare Squamous epithelial cells per low power field    Rare Yeast like cells per oil power field    Rare Gram positive cocci in pairs per oil power field        RADIOLOGY & ADDITIONAL TESTS:    CT Head No Contrast (04.13.25 @22:18)  IMPRESSION: (CT BRAIN) No acute intracranial bleeding, mass effect, or acute calvarium   fracture. Trace left frontal scalp soft tissue swelling. Paranasal sinus disease as described.    CT Cervical Spine No Contrast (04.13.25 @22:18)  IMPRESSION: (CT CERVICAL SPINE) No acute cervical spine fracture, subluxation or   evidence of traumatic malalignment. Multilevel degenerative changes as   described above.    Provided no contraindications, MRI can be performed if there is concern   for subtle osseous, ligamentous or cord injury    Xray Chest 1 View (04.14.25 @00:55)  IMPRESSION: No acute cardiopulmonary disease process.   SUBJECTIVE  BRIEF HOSPITAL COURSE SUMMARY: Pt is a 73 y/o F with a PMHx of nonobstructive CAD, non-ischemic cardiomyopathy, HTN, HLD, COPD (2-4L prn NC), HFimprovedEF (LVEF 54% 09/24), MDD, Insomnia, & Anxiety, who presented to [facility] ED on 4/13 for forehead laceration s/p mechanical fall after trying to go to the bathroom. Pts  reported confusion in pt x2 days prior to admission and a cough for a few days. Pt denies any LOC or changes in vision. In ED, pt was tachycardic, tachypneic and also had a productive cough w/ purulent green sputum. Labs WBC 15.47, Na 124, RVP + for Enterorhinovirus. Pt given 2L IVFB NSS and IV Rocephin/Azithromycin in ED. Admitted to medicine for sepsis 2/2 Enterorhinovirus and Mucopurulent Bronchitis complicated by Hyponatremia. Pt currently on NaCl 1g tabs BID.    LAST 24 HOURS: No acute overnight events  TODAY: Pt alert, awake at bedside, hemodynamically stable on 3L NC (on home O2 2-4L), AAOx3. Reports that she vomited once this AM and that she drank 2 pitchers of water and 1 cup of tea yesterday. Pt reported poor PO intake due to disliking meals. No issues with urination or BMs, denies pain. Endorses productive cough with green mucus. Denies any fever, chills, lightheadedness, dizziness, headache, sore throat, sob, cp, palpitations, abd pain, nausea, diarrhea      REVIEW OF SYSTEMS   General: no fever, no weight loss  HEENT: + productive cough, no throat pain, rhinorrhea, hemoptysis    Chest: No SOB, chest pain  Abdomen: No abdominal pain, No hematemesis   Genitourinary: No dysuria, No hematuria   Extremities: No deformity, no change in ROM  Skin: No rashes, no cyanosis  Neuro: no headache, dizziness, or changes in vision      OBJECTIVE  PHYSICAL EXAM:    Constitutional: No acute distress, laying comfortably in bed  Head: +well healing L forehead abrasion/hematoma, no discharge, no erythema  Eyes: PERRL, EOMI, anicteric sclera, no conjunctival injection  Neck: Supple, Non-tender, no JVD  Cardio: RRR, Normal S1/S2, no m/r/g  Resp: +Rhonchi, +diffuse expiratory wheezing auscultated across all lung fields, no rales. No nasal flaring or increased work of breathing  Abd: Soft, no TTP, Non-distended, no rebound/guarding/rigidity  MSK: sensation intact x4, 5/5 motor strength x4, full ROM x4  Ext: 2+ radial/DP pulses, good capillary refill, no cyanosis  Psych: appropriate affect  Neuro: no focal deficits  Skin: + diffuse ecchymosis to distal R shin, warm/dry, no rashes, no LE edema b/l    Vital Signs Last 24 Hrs  T(C): 37 (15 Apr 2025 09:10), Max: 37.7 (15 Apr 2025 01:22)  T(F): 98.6 (15 Apr 2025 09:10), Max: 99.8 (15 Apr 2025 01:22)  HR: 90 (15 Apr 2025 09:24) (77 - 110)  BP: 147/90 (15 Apr 2025 09:10) (102/69 - 147/90)  BP(mean): 106 (14 Apr 2025 23:29) (80 - 106)  RR: 18 (15 Apr 2025 09:10) (17 - 18)  SpO2: 98% (15 Apr 2025 09:24) (97% - 100%)    Parameters below as of 15 Apr 2025 09:24  Patient On (Oxygen Delivery Method): nasal cannula w/ humidification      MEDICATIONS  (STANDING):  albuterol/ipratropium for Nebulization 3 milliLiter(s) Nebulizer every 6 hours  atorvastatin 40 milliGRAM(s) Oral at bedtime  azithromycin  IVPB      azithromycin  IVPB 500 milliGRAM(s) IV Intermittent every 24 hours  benzonatate 100 milliGRAM(s) Oral three times a day  carvedilol 3.125 milliGRAM(s) Oral every 12 hours  enoxaparin Injectable 40 milliGRAM(s) SubCutaneous every 24 hours  fluticasone propionate/ salmeterol 100-50 MICROgram(s) Diskus 1 Dose(s) Inhalation two times a day  gabapentin 600 milliGRAM(s) Oral two times a day  influenza  Vaccine (HIGH DOSE) 0.5 milliLiter(s) IntraMuscular once  methylPREDNISolone sodium succinate Injectable 40 milliGRAM(s) IV Push daily  oxybutynin XL 10 milliGRAM(s) Oral daily  pantoprazole    Tablet 40 milliGRAM(s) Oral before breakfast  sodium chloride 1 Gram(s) Oral two times a day  tiotropium 2.5 MICROgram(s) Inhaler 2 Puff(s) Inhalation daily  valsartan 80 milliGRAM(s) Oral daily  venlafaxine XR. 225 milliGRAM(s) Oral daily    MEDICATIONS  (PRN):  acetaminophen     Tablet .. 650 milliGRAM(s) Oral every 6 hours PRN Temp greater or equal to 38C (100.4F), Mild Pain (1 - 3)  ALPRAZolam 0.5 milliGRAM(s) Oral two times a day PRN Anxiety  aluminum hydroxide/magnesium hydroxide/simethicone Suspension 30 milliLiter(s) Oral every 4 hours PRN Dyspepsia  cyclobenzaprine 5 milliGRAM(s) Oral daily PRN Muscle Spasm  guaiFENesin Oral Liquid (Sugar-Free) 200 milliGRAM(s) Oral every 6 hours PRN Cough  melatonin 3 milliGRAM(s) Oral at bedtime PRN Insomnia  ondansetron Injectable 4 milliGRAM(s) IV Push every 8 hours PRN Nausea and/or Vomiting  zolpidem 5 milliGRAM(s) Oral at bedtime PRN Insomnia  zolpidem 5 milliGRAM(s) Oral at bedtime PRN Insomnia    Allergies    No Known Allergies    Intolerances        LABS:                        10.2   11.13 )-----------( 340      ( 15 Apr 2025 06:34 )             30.8     04-15    125[L]  |  89[L]  |  7.6[L]  ----------------------------<  111[H]  3.7   |  28.0  |  0.47[L]    Ca    8.4      15 Apr 2025 06:34  Phos  2.7     04-14  Mg     1.9     04-15    TPro  6.5[L]  /  Alb  3.5  /  TBili  <0.2[L]  /  DBili  x   /  AST  19  /  ALT  14  /  AlkPhos  99  04-15    PT/INR - ( 13 Apr 2025 23:33 )   PT: 11.5 sec;   INR: 0.99 ratio         PTT - ( 13 Apr 2025 23:33 )  PTT:30.1 sec  Urinalysis Basic - ( 15 Apr 2025 06:34 )    Color: x / Appearance: x / SG: x / pH: x  Gluc: 111 mg/dL / Ketone: x  / Bili: x / Urobili: x   Blood: x / Protein: x / Nitrite: x   Leuk Esterase: x / RBC: x / WBC x   Sq Epi: x / Non Sq Epi: x / Bacteria: x      CAPILLARY BLOOD GLUCOSE      POCT Blood Glucose.: 149 mg/dL (14 Apr 2025 17:18)      CULTURE DATA:    Urinalysis with Rflx Culture (collected 04-14-25 @ 00:38)    Culture - Blood (collected 04-14-25 @ 01:00)  Source: Blood Blood  Gram Stain (04-15-25 @ 00:52):    Growth in aerobic bottle: Gram Positive Cocci in Clusters  Preliminary Report (04-15-25 @ 00:52):    Growth in aerobic bottle: Gram Positive Cocci in Clusters    Direct identification is available within approximately 3-5    hours either by Blood Panel Multiplexed PCR or Direct    MALDI-TOF. Details: [email or web address]  Organism: Blood Culture PCR (04-15-25 @ 02:57)  Organism: Blood Culture PCR (04-15-25 @ 02:57)    Sensitivities:      -  Staphylococcus epidermidis: Detec      Method Type: PCR    Culture - Sputum (collected 04-14-25 @ 10:29)  Source: Sputum Sputum  Gram Stain (04-14-25 @ 23:57):    Moderate polymorphonuclear leukocytes per low power field    Rare Squamous epithelial cells per low power field    Rare Yeast like cells per oil power field    Rare Gram positive cocci in pairs per oil power field        RADIOLOGY & ADDITIONAL TESTS:    CT Head No Contrast (04.13.25 @22:18)  IMPRESSION: (CT BRAIN) No acute intracranial bleeding, mass effect, or acute calvarium   fracture. Trace left frontal scalp soft tissue swelling. Paranasal sinus disease as described.    CT Cervical Spine No Contrast (04.13.25 @22:18)  IMPRESSION: (CT CERVICAL SPINE) No acute cervical spine fracture, subluxation or   evidence of traumatic malalignment. Multilevel degenerative changes as   described above.    Provided no contraindications, MRI can be performed if there is concern   for subtle osseous, ligamentous or cord injury    Xray Chest 1 View (04.14.25 @00:55)  IMPRESSION: No acute cardiopulmonary disease process.

## 2025-04-15 NOTE — DISCHARGE NOTE NURSING/CASE MANAGEMENT/SOCIAL WORK - FINANCIAL ASSISTANCE
[facility] provides services at a reduced cost to those who are determined to be eligible through [facility]’s financial assistance program. Information regarding [facility]’s financial assistance program can be found by going to [email or web address] or by calling [phone].

## 2025-04-15 NOTE — DISCHARGE NOTE PROVIDER - NSDCMRMEDTOKEN_GEN_ALL_CORE_FT
ALPRAZolam 0.5 mg oral tablet: 1 tab(s) orally 2 times a day  verified in iSTOP  atorvastatin 40 mg oral tablet: 1 tab(s) orally once a day (at bedtime)  carvedilol 3.125 mg oral tablet: 1 tab(s) orally 2 times a day  cyclobenzaprine 5 mg oral tablet: 1 tab(s) orally once a day as needed for  muscle spasm  gabapentin 600 mg oral tablet: 1 tab(s) orally 2 times a day  ibuprofen 600 mg oral tablet: 1 tab(s) orally 2 times a day as needed for  moderate pain  oxyBUTYnin 10 mg/24 hr oral tablet, extended release: 1 tab(s) orally once a day  pantoprazole 40 mg oral delayed release tablet: 1 tab(s) orally once a day (before a meal)  Trelegy Ellipta 200 mcg-62.5 mcg-25 mcg/inh inhalation powder: 1 puff(s) inhaled once a day  valsartan 80 mg oral capsule: 1 tab(s) orally once a day  venlafaxine 225 mg oral tablet, extended release: 1 tab(s) orally once a day  Ventolin HFA 90 mcg/inh inhalation aerosol: 1 puff(s) inhaled once a day as needed for bronchospasm  zolpidem 10 mg oral tablet: 1 tab(s) orally once a day (at bedtime)  verified in iSTOP   ALPRAZolam 0.5 mg oral tablet: 1 tab(s) orally 2 times a day  verified in iSTOP  atorvastatin 40 mg oral tablet: 1 tab(s) orally once a day (at bedtime)  benzonatate 100 mg oral capsule: 1 cap(s) orally 3 times a day  carvedilol 3.125 mg oral tablet: 1 tab(s) orally 2 times a day  cyclobenzaprine 5 mg oral tablet: 1 tab(s) orally once a day as needed for  muscle spasm  gabapentin 600 mg oral tablet: 1 tab(s) orally 2 times a day  ibuprofen 600 mg oral tablet: 1 tab(s) orally 2 times a day as needed for  moderate pain  oxyBUTYnin 10 mg/24 hr oral tablet, extended release: 1 tab(s) orally once a day  pantoprazole 40 mg oral delayed release tablet: 1 tab(s) orally once a day (before a meal)  Trelegy Ellipta 200 mcg-62.5 mcg-25 mcg/inh inhalation powder: 1 puff(s) inhaled once a day  valsartan 80 mg oral capsule: 1 tab(s) orally once a day  venlafaxine 225 mg oral tablet, extended release: 1 tab(s) orally once a day  Ventolin HFA 90 mcg/inh inhalation aerosol: 1 puff(s) inhaled once a day as needed for bronchospasm  zolpidem 10 mg oral tablet: 1 tab(s) orally once a day (at bedtime)  verified in iSTOP   ALPRAZolam 0.5 mg oral tablet: 1 tab(s) orally 2 times a day as needed for  anxiety verified in iSTOP  aspirin 81 mg oral tablet, chewable: 1 tab(s) orally once a day  atorvastatin 40 mg oral tablet: 1 tab(s) orally once a day (at bedtime)  benzonatate 100 mg oral capsule: 1 cap(s) orally 3 times a day as needed for  cough  carvedilol 3.125 mg oral tablet: 1 tab(s) orally 2 times a day  cyclobenzaprine 5 mg oral tablet: 1 tab(s) orally once a day as needed for  muscle spasm  gabapentin 600 mg oral tablet: 1 tab(s) orally 2 times a day  oxyBUTYnin 10 mg/24 hr oral tablet, extended release: 1 tab(s) orally once a day  pantoprazole 40 mg oral delayed release tablet: 1 tab(s) orally once a day (before a meal)  sodium chloride 1 g oral tablet: 1 tab(s) orally 3 times a day  Trelegy Ellipta 200 mcg-62.5 mcg-25 mcg/inh inhalation powder: 1 puff(s) inhaled once a day  valsartan 80 mg oral capsule: 1 tab(s) orally once a day  venlafaxine 225 mg oral tablet, extended release: 1 tab(s) orally once a day  Ventolin HFA 90 mcg/inh inhalation aerosol: 1 puff(s) inhaled once a day as needed for bronchospasm  zolpidem 10 mg oral tablet: 1 tab(s) orally once a day (at bedtime) as needed for  insomnia verified in iSTOP   ALPRAZolam 0.5 mg oral tablet: 1 tab(s) orally 2 times a day as needed for  anxiety do not combine with sleep meds or alcohol. do not drive after taking meds  aspirin 81 mg oral tablet, chewable: 1 tab(s) orally once a day  atorvastatin 40 mg oral tablet: 1 tab(s) orally once a day (at bedtime)  benzonatate 100 mg oral capsule: 1 cap(s) orally 3 times a day as needed for  cough  carvedilol 3.125 mg oral tablet: 1 tab(s) orally 2 times a day  cyclobenzaprine 5 mg oral tablet: 1 tab(s) orally once a day as needed for  muscle spasm  gabapentin 600 mg oral tablet: 1 tab(s) orally 2 times a day  oxyBUTYnin 10 mg/24 hr oral tablet, extended release: 1 tab(s) orally once a day  pantoprazole 40 mg oral delayed release tablet: 1 tab(s) orally once a day (before a meal)  sodium chloride 1 g oral tablet: 1 tab(s) orally 3 times a day  Trelegy Ellipta 200 mcg-62.5 mcg-25 mcg/inh inhalation powder: 1 puff(s) inhaled once a day  valsartan 80 mg oral capsule: 1 tab(s) orally once a day  venlafaxine 225 mg oral tablet, extended release: 1 tab(s) orally once a day  Ventolin HFA 90 mcg/inh inhalation aerosol: 1 puff(s) inhaled once a day as needed for bronchospasm  zolpidem 10 mg oral tablet: 1 tab(s) orally once a day (at bedtime) as needed for  insomnia do  not  combine with any pain meds/ or anxiety meds or alcohol. do not drive after taking meds

## 2025-04-15 NOTE — DISCHARGE NOTE PROVIDER - NSDCFUADDAPPT_GEN_ALL_CORE_FT
Please follow up with your PCP within 1-2 weeks to review your recent hospitalization  Please follow up with your pulmonologist within 1-2 weeks for your COPD and bronchitis Please follow up with your PCP within 1-2 weeks to review your recent hospitalization  Please follow up with your pulmonologist within 1-2 weeks for your COPD and bronchitis  Please follow up with your nephrologist within 1 week for monitoring of your sodium.

## 2025-04-15 NOTE — PROVIDER CONTACT NOTE (CRITICAL VALUE NOTIFICATION) - TEST AND RESULT REPORTED:
perliminary (+) bcx Initiate Treatment: ammonium lactate 12 % lotion \\nQuantity: 396.0 g  Days Supply: 30\\nSig: Apply to aa 1-2x daily Detail Level: Zone Render In Strict Bullet Format?: No

## 2025-04-16 LAB
ALBUMIN SERPL ELPH-MCNC: 3.3 G/DL — SIGNIFICANT CHANGE UP (ref 3.3–5.2)
ALP SERPL-CCNC: 91 U/L — SIGNIFICANT CHANGE UP (ref 40–120)
ALT FLD-CCNC: 16 U/L — SIGNIFICANT CHANGE UP
ANION GAP SERPL CALC-SCNC: 9 MMOL/L — SIGNIFICANT CHANGE UP (ref 5–17)
AST SERPL-CCNC: 20 U/L — SIGNIFICANT CHANGE UP
BILIRUB SERPL-MCNC: <0.2 MG/DL — LOW (ref 0.4–2)
BUN SERPL-MCNC: 8.5 MG/DL — SIGNIFICANT CHANGE UP (ref 8–20)
CALCIUM SERPL-MCNC: 8.4 MG/DL — SIGNIFICANT CHANGE UP (ref 8.4–10.5)
CHLORIDE SERPL-SCNC: 90 MMOL/L — LOW (ref 96–108)
CO2 SERPL-SCNC: 28 MMOL/L — SIGNIFICANT CHANGE UP (ref 22–29)
CREAT SERPL-MCNC: 0.39 MG/DL — LOW (ref 0.5–1.3)
CULTURE RESULTS: ABNORMAL
EGFR: 104 ML/MIN/1.73M2 — SIGNIFICANT CHANGE UP
EGFR: 104 ML/MIN/1.73M2 — SIGNIFICANT CHANGE UP
GLUCOSE SERPL-MCNC: 88 MG/DL — SIGNIFICANT CHANGE UP (ref 70–99)
HCT VFR BLD CALC: 29.9 % — LOW (ref 34.5–45)
HGB BLD-MCNC: 9.9 G/DL — LOW (ref 11.5–15.5)
MCHC RBC-ENTMCNC: 27.8 PG — SIGNIFICANT CHANGE UP (ref 27–34)
MCHC RBC-ENTMCNC: 33.1 G/DL — SIGNIFICANT CHANGE UP (ref 32–36)
MCV RBC AUTO: 84 FL — SIGNIFICANT CHANGE UP (ref 80–100)
NRBC # BLD AUTO: 0 K/UL — SIGNIFICANT CHANGE UP (ref 0–0)
NRBC # FLD: 0 K/UL — SIGNIFICANT CHANGE UP (ref 0–0)
NRBC BLD AUTO-RTO: 0 /100 WBCS — SIGNIFICANT CHANGE UP (ref 0–0)
PLATELET # BLD AUTO: 355 K/UL — SIGNIFICANT CHANGE UP (ref 150–400)
PMV BLD: 9.3 FL — SIGNIFICANT CHANGE UP (ref 7–13)
POTASSIUM SERPL-MCNC: 3.9 MMOL/L — SIGNIFICANT CHANGE UP (ref 3.5–5.3)
POTASSIUM SERPL-SCNC: 3.9 MMOL/L — SIGNIFICANT CHANGE UP (ref 3.5–5.3)
PROT SERPL-MCNC: 6.1 G/DL — LOW (ref 6.6–8.7)
RBC # BLD: 3.56 M/UL — LOW (ref 3.8–5.2)
RBC # FLD: 15.9 % — HIGH (ref 10.3–14.5)
SODIUM SERPL-SCNC: 127 MMOL/L — LOW (ref 135–145)
SPECIMEN SOURCE: SIGNIFICANT CHANGE UP
UUN UR-MCNC: 160 MG/DL — SIGNIFICANT CHANGE UP
WBC # BLD: 9.97 K/UL — SIGNIFICANT CHANGE UP (ref 3.8–10.5)
WBC # FLD AUTO: 9.97 K/UL — SIGNIFICANT CHANGE UP (ref 3.8–10.5)

## 2025-04-16 PROCEDURE — 73030 X-RAY EXAM OF SHOULDER: CPT | Mod: 26,LT

## 2025-04-16 PROCEDURE — 99232 SBSQ HOSP IP/OBS MODERATE 35: CPT

## 2025-04-16 RX ORDER — BENZONATATE 100 MG
1 CAPSULE ORAL
Qty: 90 | Refills: 0
Start: 2025-04-16 | End: 2025-05-15

## 2025-04-16 RX ORDER — PREDNISONE 20 MG/1
40 TABLET ORAL DAILY
Refills: 0 | Status: COMPLETED | OUTPATIENT
Start: 2025-04-16 | End: 2025-04-18

## 2025-04-16 RX ADMIN — Medication 40 MILLIGRAM(S): at 05:43

## 2025-04-16 RX ADMIN — ATORVASTATIN CALCIUM 40 MILLIGRAM(S): 80 TABLET, FILM COATED ORAL at 21:15

## 2025-04-16 RX ADMIN — TIOTROPIUM BROMIDE INHALATION SPRAY 2 PUFF(S): 3.12 SPRAY, METERED RESPIRATORY (INHALATION) at 09:02

## 2025-04-16 RX ADMIN — Medication 100 MILLIGRAM(S): at 12:18

## 2025-04-16 RX ADMIN — Medication 100 MILLIGRAM(S): at 05:43

## 2025-04-16 RX ADMIN — GABAPENTIN 600 MILLIGRAM(S): 400 CAPSULE ORAL at 05:44

## 2025-04-16 RX ADMIN — Medication 250 MILLIGRAM(S): at 02:13

## 2025-04-16 RX ADMIN — IPRATROPIUM BROMIDE AND ALBUTEROL SULFATE 3 MILLILITER(S): .5; 2.5 SOLUTION RESPIRATORY (INHALATION) at 02:28

## 2025-04-16 RX ADMIN — Medication 100 MILLIGRAM(S): at 21:15

## 2025-04-16 RX ADMIN — METHYLPREDNISOLONE ACETATE 40 MILLIGRAM(S): 80 INJECTION, SUSPENSION INTRA-ARTICULAR; INTRALESIONAL; INTRAMUSCULAR; SOFT TISSUE at 05:43

## 2025-04-16 RX ADMIN — Medication 80 MILLIGRAM(S): at 05:44

## 2025-04-16 RX ADMIN — Medication 0.5 MILLIGRAM(S): at 17:12

## 2025-04-16 RX ADMIN — VENLAFAXINE HYDROCHLORIDE 225 MILLIGRAM(S): 37.5 CAPSULE, EXTENDED RELEASE ORAL at 12:18

## 2025-04-16 RX ADMIN — Medication 5 MILLIGRAM(S): at 23:44

## 2025-04-16 RX ADMIN — Medication 1 DOSE(S): at 09:01

## 2025-04-16 RX ADMIN — OXYBUTYNIN CHLORIDE 10 MILLIGRAM(S): 5 TABLET, FILM COATED, EXTENDED RELEASE ORAL at 12:18

## 2025-04-16 RX ADMIN — CARVEDILOL 3.12 MILLIGRAM(S): 3.12 TABLET, FILM COATED ORAL at 05:44

## 2025-04-16 RX ADMIN — IPRATROPIUM BROMIDE AND ALBUTEROL SULFATE 3 MILLILITER(S): .5; 2.5 SOLUTION RESPIRATORY (INHALATION) at 21:40

## 2025-04-16 RX ADMIN — GABAPENTIN 600 MILLIGRAM(S): 400 CAPSULE ORAL at 17:12

## 2025-04-16 RX ADMIN — Medication 1 GRAM(S): at 21:15

## 2025-04-16 RX ADMIN — Medication 650 MILLIGRAM(S): at 22:16

## 2025-04-16 RX ADMIN — IPRATROPIUM BROMIDE AND ALBUTEROL SULFATE 3 MILLILITER(S): .5; 2.5 SOLUTION RESPIRATORY (INHALATION) at 14:40

## 2025-04-16 RX ADMIN — DEXTROMETHORPHAN HBR, GUAIFENESIN 200 MILLIGRAM(S): 200 LIQUID ORAL at 17:11

## 2025-04-16 RX ADMIN — Medication 1 GRAM(S): at 05:43

## 2025-04-16 RX ADMIN — ENOXAPARIN SODIUM 40 MILLIGRAM(S): 100 INJECTION SUBCUTANEOUS at 12:17

## 2025-04-16 RX ADMIN — Medication 1 GRAM(S): at 12:18

## 2025-04-16 RX ADMIN — IPRATROPIUM BROMIDE AND ALBUTEROL SULFATE 3 MILLILITER(S): .5; 2.5 SOLUTION RESPIRATORY (INHALATION) at 09:01

## 2025-04-16 RX ADMIN — Medication 1 DOSE(S): at 21:41

## 2025-04-16 RX ADMIN — Medication 650 MILLIGRAM(S): at 21:16

## 2025-04-16 RX ADMIN — Medication 0.5 MILLIGRAM(S): at 05:43

## 2025-04-16 RX ADMIN — CARVEDILOL 3.12 MILLIGRAM(S): 3.12 TABLET, FILM COATED ORAL at 17:12

## 2025-04-16 NOTE — DIETITIAN INITIAL EVALUATION ADULT - OTHER INFO
75 y/o F with a PMHx of nonobstructive CAD, non-ischemic cardiomyopathy, HTN, HLD, COPD (2-4L prn NC), HFimprove EF (LVEF 54% 09/24), MDD, Insomnia, & Anxiety, who presented to [facility] ED on 4/13 for forehead laceration s/p mechanical fall after trying to go to the bathroom. Pts  reported confusion in pt x2 days prior to admission and a cough for a few days. Pt denies any LOC or changes in vision. In ED, pt was tachycardic, tachypneic and also had a productive cough w/ purulent green sputum. Labs WBC 15.47, Na 124, RVP + for Enterorhinovirus. Pt given 2L IVFB NSS and IV Rocephin/Azithromycin in ED. Admitted to medicine for sepsis 2/2 Enterorhinovirus and Mucopurulent Bronchitis complicated by Hyponatremia. Pt on NaCl 1g tabs, increased to TID.

## 2025-04-16 NOTE — PROGRESS NOTE ADULT - SUBJECTIVE AND OBJECTIVE BOX
SUBJECTIVE  BRIEF HOSPITAL COURSE SUMMARY: Pt is a 74yFemale with a PMH of - who presented with -. In the ED, -. Now, -.    LAST 24 HOURS:  TODAY:    OBJECTIVE  PHYSICAL EXAM:    Vital Signs Last 24 Hrs  T(C): 37.1 (16 Apr 2025 07:24), Max: 37.4 (15 Apr 2025 17:30)  T(F): 98.7 (16 Apr 2025 07:24), Max: 99.3 (15 Apr 2025 17:30)  HR: 75 (16 Apr 2025 09:08) (64 - 95)  BP: 116/74 (16 Apr 2025 07:24) (116/74 - 159/76)  BP(mean): --  RR: 18 (16 Apr 2025 07:24) (18 - 18)  SpO2: 96% (16 Apr 2025 09:08) (96% - 99%)    Parameters below as of 16 Apr 2025 09:08  Patient On (Oxygen Delivery Method): nasal cannula w/ humidification            MEDICATIONS  (STANDING):  albuterol/ipratropium for Nebulization 3 milliLiter(s) Nebulizer every 6 hours  atorvastatin 40 milliGRAM(s) Oral at bedtime  azithromycin  IVPB      azithromycin  IVPB 500 milliGRAM(s) IV Intermittent every 24 hours  benzonatate 100 milliGRAM(s) Oral three times a day  carvedilol 3.125 milliGRAM(s) Oral every 12 hours  enoxaparin Injectable 40 milliGRAM(s) SubCutaneous every 24 hours  fluticasone propionate/ salmeterol 100-50 MICROgram(s) Diskus 1 Dose(s) Inhalation two times a day  gabapentin 600 milliGRAM(s) Oral two times a day  influenza  Vaccine (HIGH DOSE) 0.5 milliLiter(s) IntraMuscular once  methylPREDNISolone sodium succinate Injectable 40 milliGRAM(s) IV Push daily  oxybutynin XL 10 milliGRAM(s) Oral daily  pantoprazole    Tablet 40 milliGRAM(s) Oral before breakfast  sodium chloride 1 Gram(s) Oral three times a day  tiotropium 2.5 MICROgram(s) Inhaler 2 Puff(s) Inhalation daily  valsartan 80 milliGRAM(s) Oral daily  venlafaxine XR. 225 milliGRAM(s) Oral daily    MEDICATIONS  (PRN):  acetaminophen     Tablet .. 650 milliGRAM(s) Oral every 6 hours PRN Temp greater or equal to 38C (100.4F), Mild Pain (1 - 3)  ALPRAZolam 0.5 milliGRAM(s) Oral two times a day PRN Anxiety  aluminum hydroxide/magnesium hydroxide/simethicone Suspension 30 milliLiter(s) Oral every 4 hours PRN Dyspepsia  cyclobenzaprine 5 milliGRAM(s) Oral daily PRN Muscle Spasm  guaiFENesin Oral Liquid (Sugar-Free) 200 milliGRAM(s) Oral every 6 hours PRN Cough  melatonin 3 milliGRAM(s) Oral at bedtime PRN Insomnia  ondansetron Injectable 4 milliGRAM(s) IV Push every 8 hours PRN Nausea and/or Vomiting  zolpidem 5 milliGRAM(s) Oral at bedtime PRN Insomnia  zolpidem 5 milliGRAM(s) Oral at bedtime PRN Insomnia    Allergies    No Known Allergies    Intolerances        LABS:                        9.9    9.97  )-----------( 355      ( 16 Apr 2025 06:51 )             29.9     04-16    127[L]  |  90[L]  |  8.5  ----------------------------<  88  3.9   |  28.0  |  0.39[L]    Ca    8.4      16 Apr 2025 06:51  Mg     1.9     04-15    TPro  6.1[L]  /  Alb  3.3  /  TBili  <0.2[L]  /  DBili  x   /  AST  20  /  ALT  16  /  AlkPhos  91  04-16      Urinalysis Basic - ( 16 Apr 2025 06:51 )    Color: x / Appearance: x / SG: x / pH: x  Gluc: 88 mg/dL / Ketone: x  / Bili: x / Urobili: x   Blood: x / Protein: x / Nitrite: x   Leuk Esterase: x / RBC: x / WBC x   Sq Epi: x / Non Sq Epi: x / Bacteria: x      CAPILLARY BLOOD GLUCOSE          CULTURE DATA:    Urinalysis with Rflx Culture (collected 04-14-25 @ 00:38)    Culture - Blood (collected 04-14-25 @ 01:00)  Source: Blood Blood  Gram Stain (04-15-25 @ 00:52):    Growth in aerobic bottle: Gram Positive Cocci in Clusters  Final Report (04-15-25 @ 18:18):    Growth in aerobic bottle: Staphylococcus epidermidis    Isolation of Coagulase negative Staphylococcus from single blood culture    sets may represent    contamination. Contact the Microbiology Department at [phone] if    susceptibility testing is needed.    clinically indicated.    Direct identification is available within approximately 3-5    hours either by Blood Panel Multiplexed PCR or Direct    MALDI-TOF. Details: [email or web address]  Organism: Blood Culture PCR (04-15-25 @ 18:18)  Organism: Blood Culture PCR (04-15-25 @ 18:18)    Sensitivities:      -  Staphylococcus epidermidis: Detec      Method Type: PCR    Culture - Sputum (collected 04-14-25 @ 10:29)  Source: Sputum Sputum  Gram Stain (04-14-25 @ 23:57):    Moderate polymorphonuclear leukocytes per low power field    Rare Squamous epithelial cells per low power field    Rare Yeast like cells per oil power field    Rare Gram positive cocci in pairs per oil power field  Preliminary Report (04-15-25 @ 13:57):    Commensal ruby consistent with body site        RADIOLOGY & ADDITIONAL TESTS: SUBJECTIVE  BRIEF HOSPITAL COURSE SUMMARY: Pt is a 75 y/o F with a PMHx of nonobstructive CAD, non-ischemic cardiomyopathy, HTN, HLD, COPD (2-4L prn NC), HFimprovedEF (LVEF 54% 09/24), MDD, Insomnia, & Anxiety, who presented to [facility] ED on 4/13 for forehead laceration s/p mechanical fall after trying to go to the bathroom. Pts  reported confusion in pt x2 days prior to admission and a cough for a few days. Pt denies any LOC or changes in vision. In ED, pt was tachycardic, tachypneic and also had a productive cough w/ purulent green sputum. Labs WBC 15.47, Na 124, RVP + for Enterorhinovirus. Pt given 2L IVFB NSS and IV Rocephin/Azithromycin in ED. Admitted to medicine for sepsis 2/2 Enterorhinovirus and Mucopurulent Bronchitis complicated by Hyponatremia. Pt currently on NaCl 1g tabs BID.    LAST 24 HOURS: No acute overnight events  TODAY: Pt alert, awake at bedside, hemodynamically stable on 3L NC (on home O2 2-4L), AAOx3. and that she drank 2 pitchers of water and 1 cup of tea yesterday. Pt reported poor PO intake due to disliking meals. No issues with urination or BMs, denies pain. Endorses dry cough. Denies any fever, chills, lightheadedness, dizziness, headache, sore throat, sob, cp, palpitations, abd pain, vomiting, nausea, diarrhea      REVIEW OF SYSTEMS   General: no fever, no weight loss  HEENT: + dry cough, no throat pain, rhinorrhea, hemoptysis    Chest: No SOB, chest pain  Abdomen: No abdominal pain, No hematemesis   Genitourinary: No dysuria, No hematuria   Extremities: No deformity, no change in ROM  Skin: No rashes, no cyanosis  Neuro: no headache, dizziness, or changes in vision      OBJECTIVE  PHYSICAL EXAM:    Constitutional: No acute distress, laying comfortably in bed  Head: +well healing L forehead abrasion/hematoma, no discharge, no erythema  Eyes: PERRL, EOMI, anicteric sclera, no conjunctival injection  Neck: Supple, Non-tender, no JVD  Cardio: RRR, Normal S1/S2, no m/r/g  Resp: +Rhonchi, +diffuse expiratory wheezing auscultated across all lung fields, no rales. No nasal flaring or increased work of breathing  Abd: Soft, no TTP, Non-distended, no rebound/guarding/rigidity  MSK: sensation intact x4, 5/5 motor strength x4, full ROM x4  Ext: 2+ radial/DP pulses, good capillary refill, no cyanosis  Psych: appropriate affect  Neuro: no focal deficits  Skin: + diffuse ecchymosis to distal R shin, warm/dry, no rashes, no LE edema b/l        LAST 24 HOURS:  TODAY:    OBJECTIVE  PHYSICAL EXAM:    Vital Signs Last 24 Hrs  T(C): 37.1 (16 Apr 2025 07:24), Max: 37.4 (15 Apr 2025 17:30)  T(F): 98.7 (16 Apr 2025 07:24), Max: 99.3 (15 Apr 2025 17:30)  HR: 75 (16 Apr 2025 09:08) (64 - 95)  BP: 116/74 (16 Apr 2025 07:24) (116/74 - 159/76)  BP(mean): --  RR: 18 (16 Apr 2025 07:24) (18 - 18)  SpO2: 96% (16 Apr 2025 09:08) (96% - 99%)    Parameters below as of 16 Apr 2025 09:08  Patient On (Oxygen Delivery Method): nasal cannula w/ humidification      MEDICATIONS  (STANDING):  albuterol/ipratropium for Nebulization 3 milliLiter(s) Nebulizer every 6 hours  atorvastatin 40 milliGRAM(s) Oral at bedtime  azithromycin  IVPB      azithromycin  IVPB 500 milliGRAM(s) IV Intermittent every 24 hours  benzonatate 100 milliGRAM(s) Oral three times a day  carvedilol 3.125 milliGRAM(s) Oral every 12 hours  enoxaparin Injectable 40 milliGRAM(s) SubCutaneous every 24 hours  fluticasone propionate/ salmeterol 100-50 MICROgram(s) Diskus 1 Dose(s) Inhalation two times a day  gabapentin 600 milliGRAM(s) Oral two times a day  influenza  Vaccine (HIGH DOSE) 0.5 milliLiter(s) IntraMuscular once  methylPREDNISolone sodium succinate Injectable 40 milliGRAM(s) IV Push daily  oxybutynin XL 10 milliGRAM(s) Oral daily  pantoprazole    Tablet 40 milliGRAM(s) Oral before breakfast  sodium chloride 1 Gram(s) Oral three times a day  tiotropium 2.5 MICROgram(s) Inhaler 2 Puff(s) Inhalation daily  valsartan 80 milliGRAM(s) Oral daily  venlafaxine XR. 225 milliGRAM(s) Oral daily    MEDICATIONS  (PRN):  acetaminophen     Tablet .. 650 milliGRAM(s) Oral every 6 hours PRN Temp greater or equal to 38C (100.4F), Mild Pain (1 - 3)  ALPRAZolam 0.5 milliGRAM(s) Oral two times a day PRN Anxiety  aluminum hydroxide/magnesium hydroxide/simethicone Suspension 30 milliLiter(s) Oral every 4 hours PRN Dyspepsia  cyclobenzaprine 5 milliGRAM(s) Oral daily PRN Muscle Spasm  guaiFENesin Oral Liquid (Sugar-Free) 200 milliGRAM(s) Oral every 6 hours PRN Cough  melatonin 3 milliGRAM(s) Oral at bedtime PRN Insomnia  ondansetron Injectable 4 milliGRAM(s) IV Push every 8 hours PRN Nausea and/or Vomiting  zolpidem 5 milliGRAM(s) Oral at bedtime PRN Insomnia  zolpidem 5 milliGRAM(s) Oral at bedtime PRN Insomnia    Allergies    No Known Allergies    Intolerances        LABS:                        9.9    9.97  )-----------( 355      ( 16 Apr 2025 06:51 )             29.9     04-16    127[L]  |  90[L]  |  8.5  ----------------------------<  88  3.9   |  28.0  |  0.39[L]    Ca    8.4      16 Apr 2025 06:51  Mg     1.9     04-15    TPro  6.1[L]  /  Alb  3.3  /  TBili  <0.2[L]  /  DBili  x   /  AST  20  /  ALT  16  /  AlkPhos  91  04-16      Urinalysis Basic - ( 16 Apr 2025 06:51 )    Color: x / Appearance: x / SG: x / pH: x  Gluc: 88 mg/dL / Ketone: x  / Bili: x / Urobili: x   Blood: x / Protein: x / Nitrite: x   Leuk Esterase: x / RBC: x / WBC x   Sq Epi: x / Non Sq Epi: x / Bacteria: x      CAPILLARY BLOOD GLUCOSE          CULTURE DATA:    Urinalysis with Rflx Culture (collected 04-14-25 @ 00:38)    Culture - Blood (collected 04-14-25 @ 01:00)  Source: Blood Blood  Gram Stain (04-15-25 @ 00:52):    Growth in aerobic bottle: Gram Positive Cocci in Clusters  Final Report (04-15-25 @ 18:18):    Growth in aerobic bottle: Staphylococcus epidermidis    Isolation of Coagulase negative Staphylococcus from single blood culture    sets may represent    contamination. Contact the Microbiology Department at [phone] if    susceptibility testing is needed.    clinically indicated.    Direct identification is available within approximately 3-5    hours either by Blood Panel Multiplexed PCR or Direct    MALDI-TOF. Details: [email or web address]  Organism: Blood Culture PCR (04-15-25 @ 18:18)  Organism: Blood Culture PCR (04-15-25 @ 18:18)    Sensitivities:      -  Staphylococcus epidermidis: Detec      Method Type: PCR    Culture - Sputum (collected 04-14-25 @ 10:29)  Source: Sputum Sputum  Gram Stain (04-14-25 @ 23:57):    Moderate polymorphonuclear leukocytes per low power field    Rare Squamous epithelial cells per low power field    Rare Yeast like cells per oil power field    Rare Gram positive cocci in pairs per oil power field  Preliminary Report (04-15-25 @ 13:57):    Commensal ruby consistent with body site        RADIOLOGY & ADDITIONAL TESTS:    CT Head No Contrast (04.13.25 @22:18)  IMPRESSION: (CT BRAIN) No acute intracranial bleeding, mass effect, or acute calvarium   fracture. Trace left frontal scalp soft tissue swelling. Paranasal sinus disease as described.    CT Cervical Spine No Contrast (04.13.25 @22:18)  IMPRESSION: (CT CERVICAL SPINE) No acute cervical spine fracture, subluxation or   evidence of traumatic malalignment. Multilevel degenerative changes as   described above.    Provided no contraindications, MRI can be performed if there is concern   for subtle osseous, ligamentous or cord injury    Xray Chest 1 View (04.14.25 @00:55)  IMPRESSION: No acute cardiopulmonary disease process.   SUBJECTIVE  BRIEF HOSPITAL COURSE SUMMARY: Pt is a 75 y/o F with a PMHx of nonobstructive CAD, non-ischemic cardiomyopathy, HTN, HLD, COPD (2-4L prn NC), HFimprovedEF (LVEF 54% 09/24), MDD, Insomnia, & Anxiety, who presented to [facility] ED on 4/13 for forehead laceration s/p mechanical fall after trying to go to the bathroom. Pts  reported confusion in pt x2 days prior to admission and a cough for a few days. Pt denies any LOC or changes in vision. In ED, pt was tachycardic, tachypneic and also had a productive cough w/ purulent green sputum. Labs WBC 15.47, Na 124, RVP + for Enterorhinovirus. Pt given 2L IVFB NSS and IV Rocephin/Azithromycin in ED. Admitted to medicine for sepsis 2/2 Enterorhinovirus and Mucopurulent Bronchitis complicated by Hyponatremia. Pt on NaCl 1g tabs, increased to TID.    LAST 24 HOURS: No acute overnight events  TODAY: Pt alert, awake at bedside, hemodynamically stable on 2L NC (on home O2 2-4L), AAOx3. Pt still on 1L fluid restriction, Na 127 today. Pt reported some improvement in PO intake stating she ate her dinner last night. No issues with urination or BMs, denies pain. Endorses dry cough. Denies any fever, chills, lightheadedness, dizziness, headache, sore throat, sob, cp, palpitations, abd pain, vomiting, nausea, diarrhea.      REVIEW OF SYSTEMS   General: no fever, no weight loss  HEENT: + dry cough, no throat pain, rhinorrhea, hemoptysis    Chest: No SOB, chest pain  Abdomen: No abdominal pain, No hematemesis   Genitourinary: No dysuria, No hematuria   Extremities: No deformity, no change in ROM  Skin: No rashes, no cyanosis  Neuro: no headache, dizziness, or changes in vision      OBJECTIVE  PHYSICAL EXAM:    Constitutional: No acute distress, laying comfortably in bed  Head: +well healing L forehead abrasion/hematoma, no discharge, no erythema  Eyes: PERRL, EOMI, anicteric sclera, no conjunctival injection  Neck: Supple, Non-tender, no JVD  Cardio: RRR, Normal S1/S2, no m/r/g  Resp: +mild Rhonchi, +diffuse expiratory wheezing auscultated across all lung fields (posterior>anterior chest), no rales. No nasal flaring or increased work of breathing  Abd: Soft, no TTP, Non-distended, no rebound/guarding/rigidity  MSK: sensation intact x4, 5/5 motor strength x4, full ROM x4  Ext: 2+ radial/DP pulses, good capillary refill, no cyanosis  Psych: appropriate affect  Neuro: no focal deficits  Skin: + diffuse ecchymosis to distal R shin, warm/dry, no rashes, no LE edema b/l      Vital Signs Last 24 Hrs  T(C): 37.1 (16 Apr 2025 07:24), Max: 37.4 (15 Apr 2025 17:30)  T(F): 98.7 (16 Apr 2025 07:24), Max: 99.3 (15 Apr 2025 17:30)  HR: 75 (16 Apr 2025 09:08) (64 - 95)  BP: 116/74 (16 Apr 2025 07:24) (116/74 - 159/76)  BP(mean): --  RR: 18 (16 Apr 2025 07:24) (18 - 18)  SpO2: 96% (16 Apr 2025 09:08) (96% - 99%)    Parameters below as of 16 Apr 2025 09:08  Patient On (Oxygen Delivery Method): nasal cannula w/ humidification      MEDICATIONS  (STANDING):  albuterol/ipratropium for Nebulization 3 milliLiter(s) Nebulizer every 6 hours  atorvastatin 40 milliGRAM(s) Oral at bedtime  azithromycin  IVPB      azithromycin  IVPB 500 milliGRAM(s) IV Intermittent every 24 hours  benzonatate 100 milliGRAM(s) Oral three times a day  carvedilol 3.125 milliGRAM(s) Oral every 12 hours  enoxaparin Injectable 40 milliGRAM(s) SubCutaneous every 24 hours  fluticasone propionate/ salmeterol 100-50 MICROgram(s) Diskus 1 Dose(s) Inhalation two times a day  gabapentin 600 milliGRAM(s) Oral two times a day  influenza  Vaccine (HIGH DOSE) 0.5 milliLiter(s) IntraMuscular once  methylPREDNISolone sodium succinate Injectable 40 milliGRAM(s) IV Push daily  oxybutynin XL 10 milliGRAM(s) Oral daily  pantoprazole    Tablet 40 milliGRAM(s) Oral before breakfast  sodium chloride 1 Gram(s) Oral three times a day  tiotropium 2.5 MICROgram(s) Inhaler 2 Puff(s) Inhalation daily  valsartan 80 milliGRAM(s) Oral daily  venlafaxine XR. 225 milliGRAM(s) Oral daily    MEDICATIONS  (PRN):  acetaminophen     Tablet .. 650 milliGRAM(s) Oral every 6 hours PRN Temp greater or equal to 38C (100.4F), Mild Pain (1 - 3)  ALPRAZolam 0.5 milliGRAM(s) Oral two times a day PRN Anxiety  aluminum hydroxide/magnesium hydroxide/simethicone Suspension 30 milliLiter(s) Oral every 4 hours PRN Dyspepsia  cyclobenzaprine 5 milliGRAM(s) Oral daily PRN Muscle Spasm  guaiFENesin Oral Liquid (Sugar-Free) 200 milliGRAM(s) Oral every 6 hours PRN Cough  melatonin 3 milliGRAM(s) Oral at bedtime PRN Insomnia  ondansetron Injectable 4 milliGRAM(s) IV Push every 8 hours PRN Nausea and/or Vomiting  zolpidem 5 milliGRAM(s) Oral at bedtime PRN Insomnia  zolpidem 5 milliGRAM(s) Oral at bedtime PRN Insomnia    Allergies    No Known Allergies    Intolerances        LABS:                        9.9    9.97  )-----------( 355      ( 16 Apr 2025 06:51 )             29.9     04-16    127[L]  |  90[L]  |  8.5  ----------------------------<  88  3.9   |  28.0  |  0.39[L]    Ca    8.4      16 Apr 2025 06:51  Mg     1.9     04-15    TPro  6.1[L]  /  Alb  3.3  /  TBili  <0.2[L]  /  DBili  x   /  AST  20  /  ALT  16  /  AlkPhos  91  04-16      Urinalysis Basic - ( 16 Apr 2025 06:51 )    Color: x / Appearance: x / SG: x / pH: x  Gluc: 88 mg/dL / Ketone: x  / Bili: x / Urobili: x   Blood: x / Protein: x / Nitrite: x   Leuk Esterase: x / RBC: x / WBC x   Sq Epi: x / Non Sq Epi: x / Bacteria: x      CAPILLARY BLOOD GLUCOSE          CULTURE DATA:    Urinalysis with Rflx Culture (collected 04-14-25 @ 00:38)    Culture - Blood (collected 04-14-25 @ 01:00)  Source: Blood Blood  Gram Stain (04-15-25 @ 00:52):    Growth in aerobic bottle: Gram Positive Cocci in Clusters  Final Report (04-15-25 @ 18:18):    Growth in aerobic bottle: Staphylococcus epidermidis    Isolation of Coagulase negative Staphylococcus from single blood culture    sets may represent    contamination. Contact the Microbiology Department at [phone] if    susceptibility testing is needed.    clinically indicated.    Direct identification is available within approximately 3-5    hours either by Blood Panel Multiplexed PCR or Direct    MALDI-TOF. Details: [email or web address]  Organism: Blood Culture PCR (04-15-25 @ 18:18)  Organism: Blood Culture PCR (04-15-25 @ 18:18)    Sensitivities:      -  Staphylococcus epidermidis: Detec      Method Type: PCR    Culture - Sputum (collected 04-14-25 @ 10:29)  Source: Sputum Sputum  Gram Stain (04-14-25 @ 23:57):    Moderate polymorphonuclear leukocytes per low power field    Rare Squamous epithelial cells per low power field    Rare Yeast like cells per oil power field    Rare Gram positive cocci in pairs per oil power field  Preliminary Report (04-15-25 @ 13:57):    Commensal ruby consistent with body site        RADIOLOGY & ADDITIONAL TESTS:    CT Head No Contrast (04.13.25 @22:18)  IMPRESSION: (CT BRAIN) No acute intracranial bleeding, mass effect, or acute calvarium   fracture. Trace left frontal scalp soft tissue swelling. Paranasal sinus disease as described.    CT Cervical Spine No Contrast (04.13.25 @22:18)  IMPRESSION: (CT CERVICAL SPINE) No acute cervical spine fracture, subluxation or   evidence of traumatic malalignment. Multilevel degenerative changes as   described above.    Provided no contraindications, MRI can be performed if there is concern   for subtle osseous, ligamentous or cord injury    Xray Chest 1 View (04.14.25 @00:55)  IMPRESSION: No acute cardiopulmonary disease process.   SUBJECTIVE  BRIEF HOSPITAL COURSE SUMMARY: Pt is a 73 y/o F with a PMHx of nonobstructive CAD, non-ischemic cardiomyopathy, HTN, HLD, COPD (2-4L prn NC), HFimprovedEF (LVEF 54% 09/24), MDD, Insomnia, & Anxiety, who presented to [facility] ED on 4/13 for forehead laceration s/p mechanical fall after trying to go to the bathroom. Pts  reported confusion in pt x2 days prior to admission and a cough for a few days. Pt denies any LOC or changes in vision. In ED, pt was tachycardic, tachypneic and also had a productive cough w/ purulent green sputum. Labs WBC 15.47, Na 124, RVP + for Enterorhinovirus. Pt given 2L IVFB NSS and IV Rocephin/Azithromycin in ED. Admitted to medicine for sepsis 2/2 Enterorhinovirus and Mucopurulent Bronchitis complicated by Hyponatremia. Pt on NaCl 1g tabs, increased to TID.    LAST 24 HOURS: No acute overnight events  TODAY: Pt alert, awake at bedside, hemodynamically stable on 2L NC (on home O2 2-4L), AAOx3. Pt still on 1L fluid restriction, Na 127 today. Pt reported some improvement in PO intake stating she ate her dinner last night. No issues with urination or BMs, denies pain. Endorses dry cough. Denies any fever, chills, lightheadedness, dizziness, headache, sore throat, sob, cp, palpitations, abd pain, vomiting, nausea, diarrhea.      REVIEW OF SYSTEMS   General: no fever, no weight loss  HEENT: + dry cough, no throat pain, rhinorrhea, hemoptysis    Chest: No SOB, chest pain  Abdomen: No abdominal pain, No hematemesis   Genitourinary: No dysuria, No hematuria   Extremities: No deformity, no change in ROM  Skin: No rashes, no cyanosis  Neuro: no headache, dizziness, or changes in vision      OBJECTIVE  PHYSICAL EXAM:    Constitutional: No acute distress, laying comfortably in bed  Head: +well healing L forehead abrasion/hematoma, no discharge, no erythema  Eyes: PERRL, EOMI, anicteric sclera, no conjunctival injection  Neck: Supple, Non-tender, no JVD  Cardio: RRR, Normal S1/S2, no m/r/g  Resp: +mild Rhonchi, +diffuse expiratory wheezing auscultated across all lung fields (posterior>anterior chest), no rales. No nasal flaring or increased work of breathing  Abd: Soft, no TTP, Non-distended, no rebound/guarding/rigidity  MSK: sensation intact x4, 5/5 motor strength x4, full ROM x4  Ext: 2+ radial/DP pulses, good capillary refill, no cyanosis  Psych: appropriate affect  Neuro: no focal deficits  Skin: + diffuse ecchymosis to distal R shin, warm/dry, no rashes, no LE edema b/l      Vital Signs Last 24 Hrs  T(C): 37.1 (16 Apr 2025 07:24), Max: 37.4 (15 Apr 2025 17:30)  T(F): 98.7 (16 Apr 2025 07:24), Max: 99.3 (15 Apr 2025 17:30)  HR: 75 (16 Apr 2025 09:08) (64 - 95)  BP: 116/74 (16 Apr 2025 07:24) (116/74 - 159/76)  BP(mean): --  RR: 18 (16 Apr 2025 07:24) (18 - 18)  SpO2: 96% (16 Apr 2025 09:08) (96% - 99%)      MEDICATIONS  (STANDING):  albuterol/ipratropium for Nebulization 3 milliLiter(s) Nebulizer every 6 hours  atorvastatin 40 milliGRAM(s) Oral at bedtime  azithromycin  IVPB      azithromycin  IVPB 500 milliGRAM(s) IV Intermittent every 24 hours  benzonatate 100 milliGRAM(s) Oral three times a day  carvedilol 3.125 milliGRAM(s) Oral every 12 hours  enoxaparin Injectable 40 milliGRAM(s) SubCutaneous every 24 hours  fluticasone propionate/ salmeterol 100-50 MICROgram(s) Diskus 1 Dose(s) Inhalation two times a day  gabapentin 600 milliGRAM(s) Oral two times a day  influenza  Vaccine (HIGH DOSE) 0.5 milliLiter(s) IntraMuscular once  methylPREDNISolone sodium succinate Injectable 40 milliGRAM(s) IV Push daily  oxybutynin XL 10 milliGRAM(s) Oral daily  pantoprazole    Tablet 40 milliGRAM(s) Oral before breakfast  sodium chloride 1 Gram(s) Oral three times a day  tiotropium 2.5 MICROgram(s) Inhaler 2 Puff(s) Inhalation daily  valsartan 80 milliGRAM(s) Oral daily  venlafaxine XR. 225 milliGRAM(s) Oral daily    MEDICATIONS  (PRN):  acetaminophen     Tablet .. 650 milliGRAM(s) Oral every 6 hours PRN Temp greater or equal to 38C (100.4F), Mild Pain (1 - 3)  ALPRAZolam 0.5 milliGRAM(s) Oral two times a day PRN Anxiety  aluminum hydroxide/magnesium hydroxide/simethicone Suspension 30 milliLiter(s) Oral every 4 hours PRN Dyspepsia  cyclobenzaprine 5 milliGRAM(s) Oral daily PRN Muscle Spasm  guaiFENesin Oral Liquid (Sugar-Free) 200 milliGRAM(s) Oral every 6 hours PRN Cough  melatonin 3 milliGRAM(s) Oral at bedtime PRN Insomnia  ondansetron Injectable 4 milliGRAM(s) IV Push every 8 hours PRN Nausea and/or Vomiting  zolpidem 5 milliGRAM(s) Oral at bedtime PRN Insomnia  zolpidem 5 milliGRAM(s) Oral at bedtime PRN Insomnia    Allergies    No Known Allergies    Intolerances        LABS:                        9.9    9.97  )-----------( 355      ( 16 Apr 2025 06:51 )             29.9     04-16    127[L]  |  90[L]  |  8.5  ----------------------------<  88  3.9   |  28.0  |  0.39[L]    Ca    8.4      16 Apr 2025 06:51  Mg     1.9     04-15    TPro  6.1[L]  /  Alb  3.3  /  TBili  <0.2[L]  /  DBili  x   /  AST  20  /  ALT  16  /  AlkPhos  91  04-16      Urinalysis Basic - ( 16 Apr 2025 06:51 )    Color: x / Appearance: x / SG: x / pH: x  Gluc: 88 mg/dL / Ketone: x  / Bili: x / Urobili: x   Blood: x / Protein: x / Nitrite: x   Leuk Esterase: x / RBC: x / WBC x   Sq Epi: x / Non Sq Epi: x / Bacteria: x      CAPILLARY BLOOD GLUCOSE          CULTURE DATA:    Urinalysis with Rflx Culture (collected 04-14-25 @ 00:38)    Culture - Blood (collected 04-14-25 @ 01:00)  Source: Blood Blood  Gram Stain (04-15-25 @ 00:52):    Growth in aerobic bottle: Gram Positive Cocci in Clusters  Final Report (04-15-25 @ 18:18):    Growth in aerobic bottle: Staphylococcus epidermidis    Isolation of Coagulase negative Staphylococcus from single blood culture    sets may represent    contamination. Contact the Microbiology Department at [phone] if    susceptibility testing is needed.    clinically indicated.    Direct identification is available within approximately 3-5    hours either by Blood Panel Multiplexed PCR or Direct    MALDI-TOF. Details: [email or web address]  Organism: Blood Culture PCR (04-15-25 @ 18:18)  Organism: Blood Culture PCR (04-15-25 @ 18:18)    Sensitivities:      -  Staphylococcus epidermidis: Detec      Method Type: PCR    Culture - Sputum (collected 04-14-25 @ 10:29)  Source: Sputum Sputum  Gram Stain (04-14-25 @ 23:57):    Moderate polymorphonuclear leukocytes per low power field    Rare Squamous epithelial cells per low power field    Rare Yeast like cells per oil power field    Rare Gram positive cocci in pairs per oil power field  Preliminary Report (04-15-25 @ 13:57):    Commensal ruby consistent with body site        RADIOLOGY & ADDITIONAL TESTS:    CT Head No Contrast (04.13.25 @22:18)  IMPRESSION: (CT BRAIN) No acute intracranial bleeding, mass effect, or acute calvarium   fracture. Trace left frontal scalp soft tissue swelling. Paranasal sinus disease as described.    CT Cervical Spine No Contrast (04.13.25 @22:18)  IMPRESSION: (CT CERVICAL SPINE) No acute cervical spine fracture, subluxation or   evidence of traumatic malalignment. Multilevel degenerative changes as   described above.    Provided no contraindications, MRI can be performed if there is concern   for subtle osseous, ligamentous or cord injury    Xray Chest 1 View (04.14.25 @00:55)  IMPRESSION: No acute cardiopulmonary disease process.

## 2025-04-16 NOTE — DIETITIAN INITIAL EVALUATION ADULT - PERTINENT LABORATORY DATA
04-16    127[L]  |  90[L]  |  8.5  ----------------------------<  88  3.9   |  28.0  |  0.39[L]    Ca    8.4      16 Apr 2025 06:51  Mg     1.9     04-15    TPro  6.1[L]  /  Alb  3.3  /  TBili  <0.2[L]  /  DBili  x   /  AST  20  /  ALT  16  /  AlkPhos  91  04-16

## 2025-04-16 NOTE — DIETITIAN NUTRITION RISK NOTIFICATION - ADDITIONAL COMMENTS/DIETITIAN RECOMMENDATIONS
Pt declining nutritional supplement  Encourage diet and fluid restriction compliance.   Encourage HBV protein sources  Monitor na levels

## 2025-04-16 NOTE — DIETITIAN INITIAL EVALUATION ADULT - PERTINENT MEDS FT
MEDICATIONS  (STANDING):  albuterol/ipratropium for Nebulization 3 milliLiter(s) Nebulizer every 6 hours  atorvastatin 40 milliGRAM(s) Oral at bedtime  azithromycin  IVPB      azithromycin  IVPB 500 milliGRAM(s) IV Intermittent every 24 hours  benzonatate 100 milliGRAM(s) Oral three times a day  carvedilol 3.125 milliGRAM(s) Oral every 12 hours  enoxaparin Injectable 40 milliGRAM(s) SubCutaneous every 24 hours  MEDICATIONS  (PRN):  acetaminophen     Tablet .. 650 milliGRAM(s) Oral every 6 hours PRN Temp greater or equal to 38C (100.4F), Mild Pain (1 - 3)  ALPRAZolam 0.5 milliGRAM(s) Oral two times a day PRN Anxiety  aluminum hydroxide/magnesium hydroxide/simethicone Suspension 30 milliLiter(s) Oral every 4 hours PRN Dyspepsia  cyclobenzaprine 5 milliGRAM(s) Oral daily PRN Muscle Spasm  guaiFENesin Oral Liquid (Sugar-Free) 200 milliGRAM(s) Oral every 6 hours PRN Cough  melatonin 3 milliGRAM(s) Oral at bedtime PRN Insomnia  ondansetron Injectable 4 milliGRAM(s) IV Push every 8 hours PRN Nausea and/or Vomiting  zolpidem 5 milliGRAM(s) Oral at bedtime PRN Insomnia  zolpidem 5 milliGRAM(s) Oral at bedtime PRN Insomnia

## 2025-04-16 NOTE — PROGRESS NOTE ADULT - ASSESSMENT
75 y/o F with PMHx COPD on 2-4L Home O2 baseline, CAD, NICM, HFimprovedEF, HTN, HLD, MDD, Anxiety, Mood Disorder, Insomnia BIBEMS with forehead laceration s/p mechanical fall admitted for Sepsis 2/2 Enterorhinovirus and Mucopurulent Bronchitis complicated by Hyponatremia.      #Sepsis 2/2 Enterorhinovirus and Mucopurulent Bronchitis  -s/p 2L IVFB NSS given in ED, hold further IVF  -Lactate negative 1.3  -UA negative  -4/14 BClx +Staph Epi & Sputum Clx w/ Gram + Cocci   -4/13 RVP +Enterorhinovirus  -c/w Azithromycin 500mg IV q24  -c/w Benzonatate 100mg, oral TID  -c/w Guaifenesin 200mg oral q6h, PRN     #Hyponatremia  -4/16 Na 127  -Given 2L IVFB NSS in ED, hold further IVF  -1L Fluid Restriction  -c/w NaCl 1g oral BID  -BNP  -f/u Urine Panel  -Previously on NaCl tabs but stopped them when she was told to liberalize her diet instead during outpt f/u     #AMS, resolved  -4/13 CT Head unremarkable  -AMS likely 2/2 fever, now resolved     #Scalp Hematoma 2/2 Fall, resolving  -CTH/CT Cervical negative +trace L frontal scalp soft tissue swelling  -PT Consulted, rec home PT w/ home assist ( willing to provide)  -Fall Precautions     #MDD, Anxiety, Mood Disorder, Insomnia  -No longer takes Seroquel 300mg qHS per patient  -c/w Xanax 0.5mg PO BID PRN Anxiety  -c/w Zolpidem 5-10mg qHS PRN  -c/w Venlafaxine 225mg oral daily     #CAD, NICM, HFimprovedEF, HTN, HLD  -c/w Carvedilol 3.125mg PO BID  -c/w Valsartan 80mg, oral daily  -c/w Atorvastatin 40mg qHS     #Chronic Pain Syndrome  -c/w Gabapentin 600mg BID  -c/w Cyclobenzaprine 5mg, oral daily PRN  -Hold Ibuprofen 600mg BID PRN for now     #COPD  -O2 via NC 2-4LPM home O2 baseline PRN  -c/w Advair 100/50 Inhaler BID & Spiriva 2.5mcg Inhaler daily  -c/w Duoneb 3mL, nebulizer q6h  -c/w Solumedrol 40mg IV push daily     #Overactive Bladder  -c/w Oxybutynin 10mg, oral daily     VTE PPX: SCD/LMWH  DISPO: f/u urine. With resolution of hyponatremia, d/c w/ home PT. 1-2 days 73 y/o F with PMHx COPD on 2-4L Home O2 baseline, CAD, NICM, HFimprovedEF, HTN, HLD, MDD, Anxiety, Mood Disorder, Insomnia BIBEMS with forehead laceration s/p mechanical fall admitted for Sepsis 2/2 Enterorhinovirus and Mucopurulent Bronchitis complicated by Hyponatremia.      #Sepsis 2/2 Enterorhinovirus and Mucopurulent Bronchitis  -s/p 2L IVFB NSS given in ED, hold further IVF  -Lactate negative 1.3  -UA negative  -4/14 BClx +Staph Epi & Sputum Clx w/ Gram + Cocci   -4/13 RVP +Enterorhinovirus  -c/w Azithromycin 500mg IV q24  -c/w Benzonatate 100mg, oral TID  -c/w Guaifenesin 200mg oral q6h, PRN     #Hyponatremia  -4/16 Na 127  -Given 2L IVFB NSS in ED, hold further IVF  -1L Fluid Restriction  -NaCl 1g oral TID  -proBNP 1469  -Urine Osm 201, Urine Na 52  -Previously on NaCl tabs but stopped them when she was told to liberalize her diet instead during outpt f/u     #AMS, resolved  -4/13 CT Head unremarkable  -AMS likely 2/2 fever, now resolved     #Scalp Hematoma 2/2 Fall, resolving  -CTH/CT Cervical negative +trace L frontal scalp soft tissue swelling  -PT Consulted, rec home PT w/ home assist ( willing to provide)  -Fall Precautions     #MDD, Anxiety, Mood Disorder, Insomnia  -No longer takes Seroquel 300mg qHS per patient  -c/w Xanax 0.5mg PO BID PRN Anxiety  -c/w Zolpidem 5-10mg qHS PRN  -c/w Venlafaxine 225mg oral daily     #CAD, NICM, HFimprovedEF, HTN, HLD  -c/w Carvedilol 3.125mg PO BID  -c/w Valsartan 80mg, oral daily  -c/w Atorvastatin 40mg qHS     #Chronic Pain Syndrome  -c/w Gabapentin 600mg BID  -c/w Cyclobenzaprine 5mg, oral daily PRN  -Hold Ibuprofen 600mg BID PRN for now     #COPD  -O2 via NC 2-4LPM home O2 baseline PRN  -c/w Advair 100/50 Inhaler BID & Spiriva 2.5mcg Inhaler daily  -c/w Duoneb 3mL, nebulizer q6h  -c/w Solumedrol 40mg IV push daily     #Overactive Bladder  -c/w Oxybutynin 10mg, oral daily     VTE PPX: SCD/LMWH  DISPO: f/u urine. With resolution of hyponatremia, d/c w/ home PT. 1-2 days

## 2025-04-16 NOTE — DIETITIAN INITIAL EVALUATION ADULT - ORAL INTAKE PTA/DIET HISTORY
Met with pt who reports 20# weight loss with some nausea and vomiting yesterday. No d, c noted. Fluid restriction, Dash diet discussed with pt. Pt declining nutritional supplement. Po intake now improving today.

## 2025-04-17 LAB
ALBUMIN SERPL ELPH-MCNC: 3.2 G/DL — LOW (ref 3.3–5.2)
ALP SERPL-CCNC: 92 U/L — SIGNIFICANT CHANGE UP (ref 40–120)
ALT FLD-CCNC: 16 U/L — SIGNIFICANT CHANGE UP
ANION GAP SERPL CALC-SCNC: 10 MMOL/L — SIGNIFICANT CHANGE UP (ref 5–17)
AST SERPL-CCNC: 18 U/L — SIGNIFICANT CHANGE UP
BILIRUB SERPL-MCNC: <0.2 MG/DL — LOW (ref 0.4–2)
BUN SERPL-MCNC: 9.7 MG/DL — SIGNIFICANT CHANGE UP (ref 8–20)
CALCIUM SERPL-MCNC: 8.3 MG/DL — LOW (ref 8.4–10.5)
CHLORIDE SERPL-SCNC: 90 MMOL/L — LOW (ref 96–108)
CO2 SERPL-SCNC: 28 MMOL/L — SIGNIFICANT CHANGE UP (ref 22–29)
CREAT SERPL-MCNC: 0.51 MG/DL — SIGNIFICANT CHANGE UP (ref 0.5–1.3)
EGFR: 98 ML/MIN/1.73M2 — SIGNIFICANT CHANGE UP
EGFR: 98 ML/MIN/1.73M2 — SIGNIFICANT CHANGE UP
GLUCOSE SERPL-MCNC: 87 MG/DL — SIGNIFICANT CHANGE UP (ref 70–99)
HCT VFR BLD CALC: 29 % — LOW (ref 34.5–45)
HGB BLD-MCNC: 9.6 G/DL — LOW (ref 11.5–15.5)
MCHC RBC-ENTMCNC: 27.9 PG — SIGNIFICANT CHANGE UP (ref 27–34)
MCHC RBC-ENTMCNC: 33.1 G/DL — SIGNIFICANT CHANGE UP (ref 32–36)
MCV RBC AUTO: 84.3 FL — SIGNIFICANT CHANGE UP (ref 80–100)
NRBC # BLD AUTO: 0 K/UL — SIGNIFICANT CHANGE UP (ref 0–0)
NRBC # FLD: 0 K/UL — SIGNIFICANT CHANGE UP (ref 0–0)
NRBC BLD AUTO-RTO: 0 /100 WBCS — SIGNIFICANT CHANGE UP (ref 0–0)
PLATELET # BLD AUTO: 365 K/UL — SIGNIFICANT CHANGE UP (ref 150–400)
PMV BLD: 9.3 FL — SIGNIFICANT CHANGE UP (ref 7–13)
POTASSIUM SERPL-MCNC: 4 MMOL/L — SIGNIFICANT CHANGE UP (ref 3.5–5.3)
POTASSIUM SERPL-SCNC: 4 MMOL/L — SIGNIFICANT CHANGE UP (ref 3.5–5.3)
PROT SERPL-MCNC: 5.9 G/DL — LOW (ref 6.6–8.7)
RBC # BLD: 3.44 M/UL — LOW (ref 3.8–5.2)
RBC # FLD: 15.9 % — HIGH (ref 10.3–14.5)
SODIUM SERPL-SCNC: 128 MMOL/L — LOW (ref 135–145)
WBC # BLD: 9.17 K/UL — SIGNIFICANT CHANGE UP (ref 3.8–10.5)
WBC # FLD AUTO: 9.17 K/UL — SIGNIFICANT CHANGE UP (ref 3.8–10.5)

## 2025-04-17 PROCEDURE — 99233 SBSQ HOSP IP/OBS HIGH 50: CPT | Mod: GC

## 2025-04-17 RX ADMIN — Medication 0.5 MILLIGRAM(S): at 00:05

## 2025-04-17 RX ADMIN — DEXTROMETHORPHAN HBR, GUAIFENESIN 200 MILLIGRAM(S): 200 LIQUID ORAL at 13:23

## 2025-04-17 RX ADMIN — IPRATROPIUM BROMIDE AND ALBUTEROL SULFATE 3 MILLILITER(S): .5; 2.5 SOLUTION RESPIRATORY (INHALATION) at 08:19

## 2025-04-17 RX ADMIN — VENLAFAXINE HYDROCHLORIDE 225 MILLIGRAM(S): 37.5 CAPSULE, EXTENDED RELEASE ORAL at 11:46

## 2025-04-17 RX ADMIN — Medication 650 MILLIGRAM(S): at 05:23

## 2025-04-17 RX ADMIN — GABAPENTIN 600 MILLIGRAM(S): 400 CAPSULE ORAL at 05:24

## 2025-04-17 RX ADMIN — Medication 100 MILLIGRAM(S): at 21:38

## 2025-04-17 RX ADMIN — DEXTROMETHORPHAN HBR, GUAIFENESIN 200 MILLIGRAM(S): 200 LIQUID ORAL at 21:37

## 2025-04-17 RX ADMIN — ENOXAPARIN SODIUM 40 MILLIGRAM(S): 100 INJECTION SUBCUTANEOUS at 11:47

## 2025-04-17 RX ADMIN — Medication 100 MILLIGRAM(S): at 05:24

## 2025-04-17 RX ADMIN — Medication 1 GRAM(S): at 11:46

## 2025-04-17 RX ADMIN — Medication 650 MILLIGRAM(S): at 06:53

## 2025-04-17 RX ADMIN — IPRATROPIUM BROMIDE AND ALBUTEROL SULFATE 3 MILLILITER(S): .5; 2.5 SOLUTION RESPIRATORY (INHALATION) at 15:30

## 2025-04-17 RX ADMIN — Medication 1 DOSE(S): at 08:18

## 2025-04-17 RX ADMIN — GABAPENTIN 600 MILLIGRAM(S): 400 CAPSULE ORAL at 18:23

## 2025-04-17 RX ADMIN — Medication 1 GRAM(S): at 21:38

## 2025-04-17 RX ADMIN — Medication 5 MILLIGRAM(S): at 23:14

## 2025-04-17 RX ADMIN — OXYBUTYNIN CHLORIDE 10 MILLIGRAM(S): 5 TABLET, FILM COATED, EXTENDED RELEASE ORAL at 11:46

## 2025-04-17 RX ADMIN — Medication 1 GRAM(S): at 05:23

## 2025-04-17 RX ADMIN — PREDNISONE 40 MILLIGRAM(S): 20 TABLET ORAL at 05:23

## 2025-04-17 RX ADMIN — Medication 0.5 MILLIGRAM(S): at 13:23

## 2025-04-17 RX ADMIN — Medication 1 DOSE(S): at 21:48

## 2025-04-17 RX ADMIN — Medication 40 MILLIGRAM(S): at 05:23

## 2025-04-17 RX ADMIN — CARVEDILOL 3.12 MILLIGRAM(S): 3.12 TABLET, FILM COATED ORAL at 05:23

## 2025-04-17 RX ADMIN — IPRATROPIUM BROMIDE AND ALBUTEROL SULFATE 3 MILLILITER(S): .5; 2.5 SOLUTION RESPIRATORY (INHALATION) at 21:51

## 2025-04-17 RX ADMIN — CARVEDILOL 3.12 MILLIGRAM(S): 3.12 TABLET, FILM COATED ORAL at 18:23

## 2025-04-17 RX ADMIN — TIOTROPIUM BROMIDE INHALATION SPRAY 2 PUFF(S): 3.12 SPRAY, METERED RESPIRATORY (INHALATION) at 08:19

## 2025-04-17 RX ADMIN — Medication 100 MILLIGRAM(S): at 11:46

## 2025-04-17 RX ADMIN — Medication 80 MILLIGRAM(S): at 05:24

## 2025-04-17 RX ADMIN — ATORVASTATIN CALCIUM 40 MILLIGRAM(S): 80 TABLET, FILM COATED ORAL at 21:37

## 2025-04-17 NOTE — PROGRESS NOTE ADULT - SUBJECTIVE AND OBJECTIVE BOX
SUBJECTIVE  BRIEF HOSPITAL COURSE SUMMARY: Pt is a 75 y/o F with a PMHx of nonobstructive CAD, non-ischemic cardiomyopathy, HTN, HLD, COPD (2-4L prn NC), HFimprovedEF (LVEF 54% 09/24), MDD, Insomnia, & Anxiety, who presented to [facility] ED on 4/13 for forehead laceration s/p mechanical fall after trying to go to the bathroom. Pts  reported confusion in pt x2 days prior to admission and a cough for a few days. Pt denies any LOC or changes in vision. In ED, pt was tachycardic, tachypneic and also had a productive cough w/ purulent green sputum. Labs WBC 15.47, Na 124, RVP + for Enterorhinovirus. Pt given 2L IVFB NSS and IV Rocephin/Azithromycin in ED. Admitted to medicine for sepsis 2/2 Enterorhinovirus and Mucopurulent Bronchitis complicated by Hyponatremia. Pt on NaCl 1g tabs, increased to TID.    LAST 24 HOURS:   Yesterday switch diet from DASH to regular, increased frequency of NaCl tablets to TID.   No acute events ovn.   Pt alert, awake at bedside, hemodynamically stable on 2L NC (on home O2 2-4L), AAOx3. Pt still on 1L fluid restriction, Na 128 today. Pt reported adequate po intake, limiting fluids. No issues with urination or BMs, denies pain. Endorses dry cough, and when she coughs she sips 'a little water to clear my throat', but has been limiting fluid intake.  Denies any fever, chills, lightheadedness, dizziness, headache, sore throat, sob, cp, palpitations, abd pain, vomiting, nausea, diarrhea.      REVIEW OF SYSTEMS   General: no fever, no weight loss  HEENT: + dry cough, no throat pain, rhinorrhea, hemoptysis    Chest: No SOB, chest pain  Abdomen: No abdominal pain, No hematemesis   Genitourinary: No dysuria, No hematuria   Extremities: No deformity, no change in ROM  Skin: No rashes, no cyanosis  Neuro: no headache, dizziness, or changes in vision      OBJECTIVE  PHYSICAL EXAM:    Constitutional: No acute distress, laying comfortably in bed  Head: +well healing L forehead abrasion/hematoma, no discharge, no erythema  Eyes: PERRL, EOMI, anicteric sclera, no conjunctival injection  Neck: Supple, Non-tender, no JVD  Cardio: RRR, Normal S1/S2, no m/r/g  Resp: +mild Rhonchi, +diffuse expiratory wheezing auscultated across all lung fields (posterior>anterior chest), no rales. No nasal flaring or increased work of breathing  Abd: Soft, no TTP, Non-distended, no rebound/guarding/rigidity  MSK: sensation intact x4, 5/5 motor strength x4, full ROM x4  Ext: 2+ radial/DP pulses, good capillary refill, no cyanosis  Psych: appropriate affect  Neuro: no focal deficits  Skin: + diffuse ecchymosis to distal R shin, warm/dry, no rashes, no LE edema b/l      Vital Signs Last 24 Hrs  T(C): 37.6 (17 Apr 2025 08:54), Max: 37.6 (17 Apr 2025 08:54)  T(F): 99.6 (17 Apr 2025 08:54), Max: 99.6 (17 Apr 2025 08:54)  HR: 77 (17 Apr 2025 08:54) (77 - 105)  BP: 148/88 (17 Apr 2025 08:54) (135/89 - 167/91)  BP(mean): --  RR: 18 (17 Apr 2025 08:54) (18 - 18)  SpO2: 98% (17 Apr 2025 08:54) (96% - 100%)    MEDICATIONS  (STANDING):  albuterol/ipratropium for Nebulization 3 milliLiter(s) Nebulizer every 6 hours  atorvastatin 40 milliGRAM(s) Oral at bedtime  azithromycin  IVPB      azithromycin  IVPB 500 milliGRAM(s) IV Intermittent every 24 hours  benzonatate 100 milliGRAM(s) Oral three times a day  carvedilol 3.125 milliGRAM(s) Oral every 12 hours  enoxaparin Injectable 40 milliGRAM(s) SubCutaneous every 24 hours  fluticasone propionate/ salmeterol 100-50 MICROgram(s) Diskus 1 Dose(s) Inhalation two times a day  gabapentin 600 milliGRAM(s) Oral two times a day  influenza  Vaccine (HIGH DOSE) 0.5 milliLiter(s) IntraMuscular once  methylPREDNISolone sodium succinate Injectable 40 milliGRAM(s) IV Push daily  oxybutynin XL 10 milliGRAM(s) Oral daily  pantoprazole    Tablet 40 milliGRAM(s) Oral before breakfast  sodium chloride 1 Gram(s) Oral three times a day  tiotropium 2.5 MICROgram(s) Inhaler 2 Puff(s) Inhalation daily  valsartan 80 milliGRAM(s) Oral daily  venlafaxine XR. 225 milliGRAM(s) Oral daily    MEDICATIONS  (PRN):  acetaminophen     Tablet .. 650 milliGRAM(s) Oral every 6 hours PRN Temp greater or equal to 38C (100.4F), Mild Pain (1 - 3)  ALPRAZolam 0.5 milliGRAM(s) Oral two times a day PRN Anxiety  aluminum hydroxide/magnesium hydroxide/simethicone Suspension 30 milliLiter(s) Oral every 4 hours PRN Dyspepsia  cyclobenzaprine 5 milliGRAM(s) Oral daily PRN Muscle Spasm  guaiFENesin Oral Liquid (Sugar-Free) 200 milliGRAM(s) Oral every 6 hours PRN Cough  melatonin 3 milliGRAM(s) Oral at bedtime PRN Insomnia  ondansetron Injectable 4 milliGRAM(s) IV Push every 8 hours PRN Nausea and/or Vomiting  zolpidem 5 milliGRAM(s) Oral at bedtime PRN Insomnia  zolpidem 5 milliGRAM(s) Oral at bedtime PRN Insomnia    Allergies    No Known Allergies    Intolerances        LABS:  LABS:  cret                        9.6    9.17  )-----------( 365      ( 17 Apr 2025 04:20 )             29.0     04-17    128[L]  |  90[L]  |  9.7  ----------------------------<  87  4.0   |  28.0  |  0.51    Ca    8.3[L]      17 Apr 2025 04:20    TPro  5.9[L]  /  Alb  3.2[L]  /  TBili  <0.2[L]  /  DBili  x   /  AST  18  /  ALT  16  /  AlkPhos  92  04-17              Color: x / Appearance: x / SG: x / pH: x  Gluc: 88 mg/dL / Ketone: x  / Bili: x / Urobili: x   Blood: x / Protein: x / Nitrite: x   Leuk Esterase: x / RBC: x / WBC x   Sq Epi: x / Non Sq Epi: x / Bacteria: x      CAPILLARY BLOOD GLUCOSE          CULTURE DATA:    Urinalysis with Rflx Culture (collected 04-14-25 @ 00:38)    Culture - Blood (collected 04-14-25 @ 01:00)  Source: Blood Blood  Gram Stain (04-15-25 @ 00:52):    Growth in aerobic bottle: Gram Positive Cocci in Clusters  Final Report (04-15-25 @ 18:18):    Growth in aerobic bottle: Staphylococcus epidermidis    Isolation of Coagulase negative Staphylococcus from single blood culture    sets may represent    contamination. Contact the Microbiology Department at [phone] if    susceptibility testing is needed.    clinically indicated.    Direct identification is available within approximately 3-5    hours either by Blood Panel Multiplexed PCR or Direct    MALDI-TOF. Details: [email or web address]  Organism: Blood Culture PCR (04-15-25 @ 18:18)  Organism: Blood Culture PCR (04-15-25 @ 18:18)    Sensitivities:      -  Staphylococcus epidermidis: Detec      Method Type: PCR    Culture - Sputum (collected 04-14-25 @ 10:29)  Source: Sputum Sputum  Gram Stain (04-14-25 @ 23:57):    Moderate polymorphonuclear leukocytes per low power field    Rare Squamous epithelial cells per low power field    Rare Yeast like cells per oil power field    Rare Gram positive cocci in pairs per oil power field  Preliminary Report (04-15-25 @ 13:57):    Commensal ruby consistent with body site        RADIOLOGY & ADDITIONAL TESTS:    CT Head No Contrast (04.13.25 @22:18)  IMPRESSION: (CT BRAIN) No acute intracranial bleeding, mass effect, or acute calvarium   fracture. Trace left frontal scalp soft tissue swelling. Paranasal sinus disease as described.    CT Cervical Spine No Contrast (04.13.25 @22:18)  IMPRESSION: (CT CERVICAL SPINE) No acute cervical spine fracture, subluxation or   evidence of traumatic malalignment. Multilevel degenerative changes as   described above.    Provided no contraindications, MRI can be performed if there is concern   for subtle osseous, ligamentous or cord injury    Xray Chest 1 View (04.14.25 @00:55)  IMPRESSION: No acute cardiopulmonary disease process.

## 2025-04-17 NOTE — PROGRESS NOTE ADULT - ASSESSMENT
75 y/o F with PMHx COPD on 2-4L Home O2 baseline, CAD, NICM, HFimprovedEF, HTN, HLD, MDD, Anxiety, Mood Disorder, Insomnia BIBEMS with forehead laceration s/p mechanical fall admitted for Sepsis 2/2 Enterorhinovirus and Mucopurulent Bronchitis complicated by Hyponatremia.      #Sepsis 2/2 Enterorhinovirus and Mucopurulent Bronchitis  -s/p 2L IVFB NSS given in ED, hold further IVF  -Lactate negative 1.3  -UA negative  -4/14 BClx +Staph Epi & Sputum Clx w/ Gram + Cocci (contaminant)  -4/13 RVP +Enterorhinovirus  -c/w Azithromycin 500mg IV q24  -c/w Benzonatate 100mg, oral TID  -c/w Guaifenesin 200mg oral q6h, PRN     #Hyponatremia 2/2 primary polydipsia  - patient reports has NaCl tablets at home, noncompliant  - Na 127 --> 128 (will d/c at 130)  -Given 2L IVFB NSS in ED, hold further IVF  - Regular diet, 1L Fluid Restriction  -NaCl 1g oral TID  -proBNP 1469  -Urine Osm 201, Urine Na 52  -Previously on NaCl tabs but stopped them when she was told to liberalize her diet instead during outpt f/u     #AMS, resolved  -4/13 CT Head unremarkable  -AMS likely 2/2 fever, now resolved     #Scalp Hematoma 2/2 Fall, resolving  -CTH/CT Cervical negative +trace L frontal scalp soft tissue swelling  -PT Consulted, rec home PT w/ home assist ( willing to provide)  -Fall Precautions     #MDD, Anxiety, Mood Disorder, Insomnia  -No longer takes Seroquel 300mg qHS per patient  -c/w Xanax 0.5mg PO BID PRN Anxiety  -c/w Zolpidem 5-10mg qHS PRN  -c/w Venlafaxine 225mg oral daily     #CAD, NICM, HFimprovedEF, HTN, HLD  -c/w Carvedilol 3.125mg PO BID  -c/w Valsartan 80mg, oral daily  -c/w Atorvastatin 40mg qHS     #Chronic Pain Syndrome  -c/w Gabapentin 600mg BID  -c/w Cyclobenzaprine 5mg, oral daily PRN  -Hold Ibuprofen 600mg BID PRN for now     #COPD  -O2 via NC 2-4LPM home O2 baseline PRN  -c/w Advair 100/50 Inhaler BID & Spiriva 2.5mcg Inhaler daily  -c/w Duoneb 3mL, nebulizer q6h  -c/w Solumedrol 40mg IV push daily     #Overactive Bladder  -c/w Oxybutynin 10mg, oral daily     VTE PPX: SCD/LMWH  DISPO: f/u urine. With resolution of hyponatremia, d/c w/ home PT. 1-2 days

## 2025-04-18 LAB
ALBUMIN SERPL ELPH-MCNC: 3.7 G/DL — SIGNIFICANT CHANGE UP (ref 3.3–5.2)
ALP SERPL-CCNC: 112 U/L — SIGNIFICANT CHANGE UP (ref 40–120)
ALT FLD-CCNC: 19 U/L — SIGNIFICANT CHANGE UP
ANION GAP SERPL CALC-SCNC: 11 MMOL/L — SIGNIFICANT CHANGE UP (ref 5–17)
AST SERPL-CCNC: 18 U/L — SIGNIFICANT CHANGE UP
BILIRUB SERPL-MCNC: <0.2 MG/DL — LOW (ref 0.4–2)
BUN SERPL-MCNC: 9.3 MG/DL — SIGNIFICANT CHANGE UP (ref 8–20)
CALCIUM SERPL-MCNC: 8.9 MG/DL — SIGNIFICANT CHANGE UP (ref 8.4–10.5)
CHLORIDE SERPL-SCNC: 88 MMOL/L — LOW (ref 96–108)
CO2 SERPL-SCNC: 30 MMOL/L — HIGH (ref 22–29)
CREAT SERPL-MCNC: 0.48 MG/DL — LOW (ref 0.5–1.3)
EGFR: 99 ML/MIN/1.73M2 — SIGNIFICANT CHANGE UP
EGFR: 99 ML/MIN/1.73M2 — SIGNIFICANT CHANGE UP
GLUCOSE SERPL-MCNC: 80 MG/DL — SIGNIFICANT CHANGE UP (ref 70–99)
HCT VFR BLD CALC: 33.7 % — LOW (ref 34.5–45)
HGB BLD-MCNC: 11.2 G/DL — LOW (ref 11.5–15.5)
MCHC RBC-ENTMCNC: 27.9 PG — SIGNIFICANT CHANGE UP (ref 27–34)
MCHC RBC-ENTMCNC: 33.2 G/DL — SIGNIFICANT CHANGE UP (ref 32–36)
MCV RBC AUTO: 84 FL — SIGNIFICANT CHANGE UP (ref 80–100)
NRBC # BLD AUTO: 0 K/UL — SIGNIFICANT CHANGE UP (ref 0–0)
NRBC # FLD: 0 K/UL — SIGNIFICANT CHANGE UP (ref 0–0)
NRBC BLD AUTO-RTO: 0 /100 WBCS — SIGNIFICANT CHANGE UP (ref 0–0)
PLATELET # BLD AUTO: 436 K/UL — HIGH (ref 150–400)
PMV BLD: 9.8 FL — SIGNIFICANT CHANGE UP (ref 7–13)
POTASSIUM SERPL-MCNC: 3.8 MMOL/L — SIGNIFICANT CHANGE UP (ref 3.5–5.3)
POTASSIUM SERPL-SCNC: 3.8 MMOL/L — SIGNIFICANT CHANGE UP (ref 3.5–5.3)
PROT SERPL-MCNC: 6.7 G/DL — SIGNIFICANT CHANGE UP (ref 6.6–8.7)
RBC # BLD: 4.01 M/UL — SIGNIFICANT CHANGE UP (ref 3.8–5.2)
RBC # FLD: 15.8 % — HIGH (ref 10.3–14.5)
SODIUM SERPL-SCNC: 128 MMOL/L — LOW (ref 135–145)
WBC # BLD: 12.25 K/UL — HIGH (ref 3.8–10.5)
WBC # FLD AUTO: 12.25 K/UL — HIGH (ref 3.8–10.5)

## 2025-04-18 PROCEDURE — 99233 SBSQ HOSP IP/OBS HIGH 50: CPT | Mod: GC

## 2025-04-18 RX ORDER — UREA 40 G
15 VIAL (EA) INTRAVENOUS DAILY
Refills: 0 | Status: COMPLETED | OUTPATIENT
Start: 2025-04-18 | End: 2025-04-20

## 2025-04-18 RX ADMIN — Medication 100 MILLIGRAM(S): at 11:32

## 2025-04-18 RX ADMIN — Medication 650 MILLIGRAM(S): at 20:14

## 2025-04-18 RX ADMIN — Medication 1 DOSE(S): at 20:56

## 2025-04-18 RX ADMIN — Medication 5 MILLIGRAM(S): at 23:30

## 2025-04-18 RX ADMIN — Medication 0.5 MILLIGRAM(S): at 00:34

## 2025-04-18 RX ADMIN — Medication 1 GRAM(S): at 11:32

## 2025-04-18 RX ADMIN — OXYBUTYNIN CHLORIDE 10 MILLIGRAM(S): 5 TABLET, FILM COATED, EXTENDED RELEASE ORAL at 11:32

## 2025-04-18 RX ADMIN — TIOTROPIUM BROMIDE INHALATION SPRAY 2 PUFF(S): 3.12 SPRAY, METERED RESPIRATORY (INHALATION) at 10:13

## 2025-04-18 RX ADMIN — Medication 1 GRAM(S): at 20:14

## 2025-04-18 RX ADMIN — GABAPENTIN 600 MILLIGRAM(S): 400 CAPSULE ORAL at 05:29

## 2025-04-18 RX ADMIN — Medication 650 MILLIGRAM(S): at 05:44

## 2025-04-18 RX ADMIN — ENOXAPARIN SODIUM 40 MILLIGRAM(S): 100 INJECTION SUBCUTANEOUS at 11:34

## 2025-04-18 RX ADMIN — Medication 650 MILLIGRAM(S): at 06:44

## 2025-04-18 RX ADMIN — PREDNISONE 40 MILLIGRAM(S): 20 TABLET ORAL at 05:28

## 2025-04-18 RX ADMIN — CARVEDILOL 3.12 MILLIGRAM(S): 3.12 TABLET, FILM COATED ORAL at 05:29

## 2025-04-18 RX ADMIN — GABAPENTIN 600 MILLIGRAM(S): 400 CAPSULE ORAL at 17:34

## 2025-04-18 RX ADMIN — CARVEDILOL 3.12 MILLIGRAM(S): 3.12 TABLET, FILM COATED ORAL at 17:34

## 2025-04-18 RX ADMIN — Medication 15 GRAM(S): at 17:35

## 2025-04-18 RX ADMIN — IPRATROPIUM BROMIDE AND ALBUTEROL SULFATE 3 MILLILITER(S): .5; 2.5 SOLUTION RESPIRATORY (INHALATION) at 15:45

## 2025-04-18 RX ADMIN — Medication 0.5 MILLIGRAM(S): at 13:56

## 2025-04-18 RX ADMIN — Medication 40 MILLIGRAM(S): at 05:28

## 2025-04-18 RX ADMIN — Medication 1 DOSE(S): at 10:13

## 2025-04-18 RX ADMIN — IPRATROPIUM BROMIDE AND ALBUTEROL SULFATE 3 MILLILITER(S): .5; 2.5 SOLUTION RESPIRATORY (INHALATION) at 10:12

## 2025-04-18 RX ADMIN — IPRATROPIUM BROMIDE AND ALBUTEROL SULFATE 3 MILLILITER(S): .5; 2.5 SOLUTION RESPIRATORY (INHALATION) at 20:56

## 2025-04-18 RX ADMIN — Medication 650 MILLIGRAM(S): at 21:14

## 2025-04-18 RX ADMIN — ATORVASTATIN CALCIUM 40 MILLIGRAM(S): 80 TABLET, FILM COATED ORAL at 20:13

## 2025-04-18 RX ADMIN — Medication 1 GRAM(S): at 05:28

## 2025-04-18 RX ADMIN — Medication 80 MILLIGRAM(S): at 05:28

## 2025-04-18 RX ADMIN — VENLAFAXINE HYDROCHLORIDE 225 MILLIGRAM(S): 37.5 CAPSULE, EXTENDED RELEASE ORAL at 11:32

## 2025-04-18 RX ADMIN — Medication 100 MILLIGRAM(S): at 05:28

## 2025-04-18 RX ADMIN — Medication 100 MILLIGRAM(S): at 20:13

## 2025-04-18 NOTE — PROGRESS NOTE ADULT - ASSESSMENT
73 y/o F with PMHx COPD on 2-4L Home O2 baseline, CAD, NICM, HFimprovedEF, HTN, HLD, MDD, Anxiety, Mood Disorder, Insomnia BIBEMS with forehead laceration s/p mechanical fall admitted for Sepsis 2/2 Enterorhinovirus and Mucopurulent Bronchitis complicated by Hyponatremia.      #Sepsis 2/2 Enterorhinovirus and Mucopurulent Bronchitis  -s/p 2L IVFB NSS given in ED, hold further IVF  -Lactate negative 1.3  -UA negative  -4/14 BClx +Staph Epi & Sputum Clx w/ Gram + Cocci (contaminant)  -4/13 RVP +Enterorhinovirus  -c/w Benzonatate 100mg, oral TID  -c/w Guaifenesin 200mg oral q6h, PRN     #Hyponatremia 2/2 primary polydipsia  - patient reports has NaCl tablets at home, noncompliant  - Na 127 --> 128 (will d/c at 130)  -Given 2L IVFB NSS in ED, hold further IVF  - Regular diet, 1L Fluid Restriction  -NaCl 1g oral TID  -Start Urea 15g Oral, daily  -proBNP 1469  -Urine Osm 201, Urine Na 52  -Previously on NaCl tabs but stopped them when she was told to liberalize her diet instead during outpt f/u     #AMS, resolved  -4/13 CT Head unremarkable  -AMS likely 2/2 fever, now resolved     #Scalp Hematoma 2/2 Fall, resolving  -CTH/CT Cervical negative +trace L frontal scalp soft tissue swelling  -PT Consulted, rec home PT w/ home assist ( willing to provide)  -Fall Precautions     #MDD, Anxiety, Mood Disorder, Insomnia  -No longer takes Seroquel 300mg qHS per patient  -c/w Xanax 0.5mg PO BID PRN Anxiety  -c/w Zolpidem 5-10mg qHS PRN  -c/w Venlafaxine 225mg oral daily     #CAD, NICM, HFimprovedEF, HTN, HLD  -c/w Carvedilol 3.125mg PO BID  -c/w Valsartan 80mg, oral daily  -c/w Atorvastatin 40mg qHS     #Chronic Pain Syndrome  -c/w Gabapentin 600mg BID  -c/w Cyclobenzaprine 5mg, oral daily PRN  -Hold Ibuprofen 600mg BID PRN for now     #COPD  -O2 via NC 2-4LPM home O2 baseline PRN  -c/w Advair 100/50 Inhaler BID & Spiriva 2.5mcg Inhaler daily  -c/w Duoneb 3mL, nebulizer q6h  -c/w Solumedrol 40mg IV push daily     #Overactive Bladder  -c/w Oxybutynin 10mg, oral daily     VTE PPX: SCD/LMWH  DISPO: f/u urine. With resolution of hyponatremia, d/c w/ home PT. 1-2 days 73 y/o F with PMHx COPD on 2-4L Home O2 baseline, CAD, NICM, HFimprovedEF, HTN, HLD, MDD, Anxiety, Mood Disorder, Insomnia BIBEMS with forehead laceration s/p mechanical fall admitted for Sepsis 2/2 Enterorhinovirus and Mucopurulent Bronchitis complicated by Hyponatremia.      #Sepsis 2/2 Enterorhinovirus and Mucopurulent Bronchitis  -s/p 2L IVFB NSS given in ED, hold further IVF  -Lactate negative 1.3  -UA negative  -4/14 BClx +Staph Epi & Sputum Clx w/ Gram + Cocci (contaminant)  -4/13 RVP +Enterorhinovirus  -c/w Benzonatate 100mg, oral TID  -c/w Guaifenesin 200mg oral q6h, PRN     #Hyponatremia 2/2 primary polydipsia  - patient reports has NaCl tablets at home, noncompliant  - Na 127 --> 128 (will d/c at 130)  -Given 2L IVFB NSS in ED, hold further IVF  -Regular diet, 1L Fluid Restriction  -NaCl 1g oral TID  -Start Urea 15g Oral, daily  -proBNP 1469  -Urine Osm 201, Urine Na 52  -Previously on NaCl tabs but stopped them when she was told to liberalize her diet instead during outpt f/u     #AMS, resolved  -4/13 CT Head unremarkable  -AMS likely 2/2 fever, now resolved     #Scalp Hematoma 2/2 Fall, resolving  -CTH/CT Cervical negative +trace L frontal scalp soft tissue swelling  -PT Consulted, rec home PT w/ home assist ( willing to provide)  -Fall Precautions     #MDD, Anxiety, Mood Disorder, Insomnia  -No longer takes Seroquel 300mg qHS per patient  -c/w Xanax 0.5mg PO BID PRN Anxiety  -c/w Zolpidem 5-10mg qHS PRN  -c/w Venlafaxine 225mg oral daily     #CAD, NICM, HFimprovedEF, HTN, HLD  -c/w Carvedilol 3.125mg PO BID  -c/w Valsartan 80mg, oral daily  -c/w Atorvastatin 40mg qHS     #Chronic Pain Syndrome  -c/w Gabapentin 600mg BID  -c/w Cyclobenzaprine 5mg, oral daily PRN  -Hold Ibuprofen 600mg BID PRN for now     #COPD  -O2 via NC 2-4LPM home O2 baseline PRN  -c/w Advair 100/50 Inhaler BID & Spiriva 2.5mcg Inhaler daily  -c/w Duoneb 3mL, nebulizer q6h     #Overactive Bladder  -c/w Oxybutynin 10mg, oral daily     VTE PPX: SCD/LMWH  DISPO: f/u urine. With resolution of hyponatremia, d/c w/ home PT. 1-2 days 75 y/o F with PMHx COPD on 2-4L Home O2 baseline, CAD, NICM, HFimprovedEF, HTN, HLD, MDD, Anxiety, Mood Disorder, Insomnia BIBEMS with forehead laceration s/p mechanical fall admitted for Sepsis 2/2 Enterorhinovirus and Mucopurulent Bronchitis complicated by Hyponatremia.      #Sepsis 2/2 Enterorhinovirus and Mucopurulent Bronchitis  -s/p 2L IVFB NSS given in ED, hold further IVF  -Lactate negative 1.3  -UA negative  -4/14 BClx +Staph Epi & Sputum Clx w/ Gram + Cocci (contaminant)  -4/13 RVP +Enterorhinovirus  -c/w Benzonatate 100mg, oral TID  -c/w Guaifenesin 200mg oral q6h, PRN     #Hyponatremia 2/2 primary polydipsia  - patient reports has NaCl tablets at home, noncompliant  - Na 127 --> 128 (will d/c at 130)  -Given 2L IVFB NSS in ED, hold further IVF  -Regular diet, 1L Fluid Restriction  -NaCl 1g oral TID  -Start Urea 15g Oral, daily  -proBNP 1469  -Urine Osm 201, Urine Na 52  -Previously on NaCl tabs but stopped them when she was told to liberalize her diet instead during outpt f/u     #AMS, resolved  -4/13 CT Head unremarkable  -AMS likely 2/2 fever, now resolved     #Scalp Hematoma 2/2 Fall, resolving  -CTH/CT Cervical negative +trace L frontal scalp soft tissue swelling  -PT Consulted, rec home PT w/ home assist ( willing to provide)  -Fall Precautions     #MDD, Anxiety, Mood Disorder, Insomnia  -No longer takes Seroquel 300mg qHS per patient  -c/w Xanax 0.5mg PO BID PRN Anxiety  -c/w Zolpidem 5-10mg qHS PRN  -c/w Venlafaxine 225mg oral daily     #CAD, NICM, HFimprovedEF, HTN, HLD  -c/w Carvedilol 3.125mg PO BID  -c/w Valsartan 80mg, oral daily  -c/w Atorvastatin 40mg qHS     #Chronic Pain Syndrome  -c/w Gabapentin 600mg BID  -c/w Cyclobenzaprine 5mg, oral daily PRN  -Hold Ibuprofen 600mg BID PRN for now     #COPD  -O2 via NC 2-4LPM home O2 baseline PRN  -c/w Advair 100/50 Inhaler BID & Spiriva 2.5mcg Inhaler daily  -c/w Duoneb 3mL, nebulizer q6h     #Overactive Bladder  -c/w Oxybutynin 10mg, oral daily     VTE PPX: SCD/LMWH  DISPO: With resolution of hyponatremia, d/c w/ home PT. 1-2 days

## 2025-04-18 NOTE — PROGRESS NOTE ADULT - SUBJECTIVE AND OBJECTIVE BOX
SUBJECTIVE  BRIEF HOSPITAL COURSE SUMMARY: Pt is a 74yFemale with a PMH of - who presented with -. In the ED, -. Now, -.    LAST 24 HOURS:  TODAY:    OBJECTIVE  PHYSICAL EXAM:    Vital Signs Last 24 Hrs  T(C): 37 (18 Apr 2025 07:34), Max: 37.1 (17 Apr 2025 17:00)  T(F): 98.6 (18 Apr 2025 07:34), Max: 98.8 (17 Apr 2025 17:00)  HR: 84 (18 Apr 2025 10:16) (75 - 106)  BP: 151/92 (18 Apr 2025 07:34) (136/76 - 158/83)  BP(mean): --  RR: 19 (18 Apr 2025 07:34) (18 - 19)  SpO2: 97% (18 Apr 2025 10:16) (95% - 100%)    Parameters below as of 18 Apr 2025 10:16  Patient On (Oxygen Delivery Method): nasal cannula            MEDICATIONS  (STANDING):  albuterol/ipratropium for Nebulization 3 milliLiter(s) Nebulizer every 6 hours  atorvastatin 40 milliGRAM(s) Oral at bedtime  benzonatate 100 milliGRAM(s) Oral three times a day  carvedilol 3.125 milliGRAM(s) Oral every 12 hours  enoxaparin Injectable 40 milliGRAM(s) SubCutaneous every 24 hours  fluticasone propionate/ salmeterol 100-50 MICROgram(s) Diskus 1 Dose(s) Inhalation two times a day  gabapentin 600 milliGRAM(s) Oral two times a day  influenza  Vaccine (HIGH DOSE) 0.5 milliLiter(s) IntraMuscular once  oxybutynin XL 10 milliGRAM(s) Oral daily  pantoprazole    Tablet 40 milliGRAM(s) Oral before breakfast  sodium chloride 1 Gram(s) Oral three times a day  tiotropium 2.5 MICROgram(s) Inhaler 2 Puff(s) Inhalation daily  urea Oral Powder 15 Gram(s) Oral daily  valsartan 80 milliGRAM(s) Oral daily  venlafaxine XR. 225 milliGRAM(s) Oral daily    MEDICATIONS  (PRN):  acetaminophen     Tablet .. 650 milliGRAM(s) Oral every 6 hours PRN Temp greater or equal to 38C (100.4F), Mild Pain (1 - 3)  ALPRAZolam 0.5 milliGRAM(s) Oral two times a day PRN Anxiety  aluminum hydroxide/magnesium hydroxide/simethicone Suspension 30 milliLiter(s) Oral every 4 hours PRN Dyspepsia  cyclobenzaprine 5 milliGRAM(s) Oral daily PRN Muscle Spasm  guaiFENesin Oral Liquid (Sugar-Free) 200 milliGRAM(s) Oral every 6 hours PRN Cough  melatonin 3 milliGRAM(s) Oral at bedtime PRN Insomnia  ondansetron Injectable 4 milliGRAM(s) IV Push every 8 hours PRN Nausea and/or Vomiting  zolpidem 5 milliGRAM(s) Oral at bedtime PRN Insomnia  zolpidem 5 milliGRAM(s) Oral at bedtime PRN Insomnia    Allergies    No Known Allergies    Intolerances        LABS:                        11.2   12.25 )-----------( 436      ( 18 Apr 2025 04:40 )             33.7     04-18    128[L]  |  88[L]  |  9.3  ----------------------------<  80  3.8   |  30.0[H]  |  0.48[L]    Ca    8.9      18 Apr 2025 04:40    TPro  6.7  /  Alb  3.7  /  TBili  <0.2[L]  /  DBili  x   /  AST  18  /  ALT  19  /  AlkPhos  112  04-18      Urinalysis Basic - ( 18 Apr 2025 04:40 )    Color: x / Appearance: x / SG: x / pH: x  Gluc: 80 mg/dL / Ketone: x  / Bili: x / Urobili: x   Blood: x / Protein: x / Nitrite: x   Leuk Esterase: x / RBC: x / WBC x   Sq Epi: x / Non Sq Epi: x / Bacteria: x      CAPILLARY BLOOD GLUCOSE          CULTURE DATA:    Urinalysis with Rflx Culture (collected 04-14-25 @ 00:38)    Culture - Blood (collected 04-14-25 @ 01:00)  Source: Blood Blood  Gram Stain (04-15-25 @ 00:52):    Growth in aerobic bottle: Gram Positive Cocci in Clusters  Final Report (04-15-25 @ 18:18):    Growth in aerobic bottle: Staphylococcus epidermidis    Isolation of Coagulase negative Staphylococcus from single blood culture    sets may represent    contamination. Contact the Microbiology Department at [phone] if    susceptibility testing is needed.    clinically indicated.    Direct identification is available within approximately 3-5    hours either by Blood Panel Multiplexed PCR or Direct    MALDI-TOF. Details: [email or web address]  Organism: Blood Culture PCR (04-15-25 @ 18:18)  Organism: Blood Culture PCR (04-15-25 @ 18:18)    Sensitivities:      -  Staphylococcus epidermidis: Detec      Method Type: PCR    Culture - Sputum (collected 04-14-25 @ 10:29)  Source: Sputum Sputum  Gram Stain (04-14-25 @ 23:57):    Moderate polymorphonuclear leukocytes per low power field    Rare Squamous epithelial cells per low power field    Rare Yeast like cells per oil power field    Rare Gram positive cocci in pairs per oil power field  Final Report (04-16-25 @ 16:06):    Commensal ruby consistent with body site    Culture - Blood (collected 04-16-25 @ 09:05)  Source: Blood Blood  Preliminary Report (04-17-25 @ 16:01):    No growth at 24 hours        RADIOLOGY & ADDITIONAL TESTS: SUBJECTIVE  BRIEF HOSPITAL COURSE SUMMARY: Pt is a 75 y/o F with a PMHx of nonobstructive CAD, non-ischemic cardiomyopathy, HTN, HLD, COPD (2-4L prn NC), HFimprovedEF (LVEF 54% 09/24), MDD, Insomnia, & Anxiety, who presented to [facility] ED on 4/13 for forehead laceration s/p mechanical fall after trying to go to the bathroom. Pts  reported confusion in pt x2 days prior to admission and a cough for a few days. Pt denies any LOC or changes in vision. In ED, pt was tachycardic, tachypneic and also had a productive cough w/ purulent green sputum. Labs WBC 15.47, Na 124, RVP + for Enterorhinovirus. Pt given 2L IVFB NSS and IV Rocephin/Azithromycin in ED. Admitted to medicine for sepsis 2/2 Enterorhinovirus and Mucopurulent Bronchitis complicated by Hyponatremia. Pt on NaCl 1g tabs, increased to TID.    LAST 24 HOURS: No overnight events  TODAY: Pt alert, awake at bedside, hemodynamically stable on 2L NC (on home O2 2-4L), AAOx3. Pt still on 1L fluid restriction, Na 128 today. Will be starting Urea 15g oral daily. Pt reported adequate po intake, limiting fluids. No issues with urination or BMs, denies pain. Endorses dry cough, and when she coughs she sips 'a little water to clear my throat' but has been limiting fluid intake. Also reports mild neck pain secondary to discomfort from bed and noted only getting 30 min of sleep. Denies any fever, chills, lightheadedness, dizziness, headache, sore throat, sob, cp, palpitations, abd pain, vomiting, nausea, diarrhea.    REVIEW OF SYSTEMS   General: no fever, no weight loss  HEENT: + dry cough, no throat pain, rhinorrhea, hemoptysis    Chest: No SOB, no chest pain  Abdomen: No abdominal pain, No hematemesis   Genitourinary: No dysuria, No hematuria   Extremities: No deformity, no change in ROM  Skin: No rashes, no cyanosis  Neuro: no headache, dizziness, or changes in vision      OBJECTIVE  PHYSICAL EXAM:    Constitutional: No acute distress, laying comfortably in bed  Head: +well healing L forehead abrasion/hematoma, no discharge, no erythema  Eyes: PERRL, EOMI, anicteric sclera, no conjunctival injection  Neck: Supple, Non-tender, no JVD  Cardio: RRR, Normal S1/S2, no m/r/g  Resp: +mild Rhonchi, +diffuse expiratory wheezing auscultated posteriorly, no rales. No nasal flaring or increased work of breathing  Abd: Soft, no TTP, Non-distended, no rebound/guarding/rigidity  MSK: sensation intact x4, 5/5 motor strength x4, full ROM x4  Ext: 2+ radial/DP pulses, good capillary refill, no cyanosis  Psych: appropriate affect  Neuro: no focal deficits  Skin: + diffuse ecchymosis to distal R shin, warm/dry, no rashes, no LE edema b/l        Vital Signs Last 24 Hrs  T(C): 37 (18 Apr 2025 07:34), Max: 37.1 (17 Apr 2025 17:00)  T(F): 98.6 (18 Apr 2025 07:34), Max: 98.8 (17 Apr 2025 17:00)  HR: 84 (18 Apr 2025 10:16) (75 - 106)  BP: 151/92 (18 Apr 2025 07:34) (136/76 - 158/83)  BP(mean): --  RR: 19 (18 Apr 2025 07:34) (18 - 19)  SpO2: 97% (18 Apr 2025 10:16) (95% - 100%)    Parameters below as of 18 Apr 2025 10:16  Patient On (Oxygen Delivery Method): nasal cannula      MEDICATIONS  (STANDING):  albuterol/ipratropium for Nebulization 3 milliLiter(s) Nebulizer every 6 hours  atorvastatin 40 milliGRAM(s) Oral at bedtime  benzonatate 100 milliGRAM(s) Oral three times a day  carvedilol 3.125 milliGRAM(s) Oral every 12 hours  enoxaparin Injectable 40 milliGRAM(s) SubCutaneous every 24 hours  fluticasone propionate/ salmeterol 100-50 MICROgram(s) Diskus 1 Dose(s) Inhalation two times a day  gabapentin 600 milliGRAM(s) Oral two times a day  influenza  Vaccine (HIGH DOSE) 0.5 milliLiter(s) IntraMuscular once  oxybutynin XL 10 milliGRAM(s) Oral daily  pantoprazole    Tablet 40 milliGRAM(s) Oral before breakfast  sodium chloride 1 Gram(s) Oral three times a day  tiotropium 2.5 MICROgram(s) Inhaler 2 Puff(s) Inhalation daily  urea Oral Powder 15 Gram(s) Oral daily  valsartan 80 milliGRAM(s) Oral daily  venlafaxine XR. 225 milliGRAM(s) Oral daily    MEDICATIONS  (PRN):  acetaminophen     Tablet .. 650 milliGRAM(s) Oral every 6 hours PRN Temp greater or equal to 38C (100.4F), Mild Pain (1 - 3)  ALPRAZolam 0.5 milliGRAM(s) Oral two times a day PRN Anxiety  aluminum hydroxide/magnesium hydroxide/simethicone Suspension 30 milliLiter(s) Oral every 4 hours PRN Dyspepsia  cyclobenzaprine 5 milliGRAM(s) Oral daily PRN Muscle Spasm  guaiFENesin Oral Liquid (Sugar-Free) 200 milliGRAM(s) Oral every 6 hours PRN Cough  melatonin 3 milliGRAM(s) Oral at bedtime PRN Insomnia  ondansetron Injectable 4 milliGRAM(s) IV Push every 8 hours PRN Nausea and/or Vomiting  zolpidem 5 milliGRAM(s) Oral at bedtime PRN Insomnia  zolpidem 5 milliGRAM(s) Oral at bedtime PRN Insomnia    Allergies    No Known Allergies    Intolerances        LABS:                        11.2   12.25 )-----------( 436      ( 18 Apr 2025 04:40 )             33.7     04-18    128[L]  |  88[L]  |  9.3  ----------------------------<  80  3.8   |  30.0[H]  |  0.48[L]    Ca    8.9      18 Apr 2025 04:40    TPro  6.7  /  Alb  3.7  /  TBili  <0.2[L]  /  DBili  x   /  AST  18  /  ALT  19  /  AlkPhos  112  04-18      Urinalysis Basic - ( 18 Apr 2025 04:40 )    Color: x / Appearance: x / SG: x / pH: x  Gluc: 80 mg/dL / Ketone: x  / Bili: x / Urobili: x   Blood: x / Protein: x / Nitrite: x   Leuk Esterase: x / RBC: x / WBC x   Sq Epi: x / Non Sq Epi: x / Bacteria: x      CAPILLARY BLOOD GLUCOSE      CULTURE DATA:    Urinalysis with Rflx Culture (collected 04-14-25 @ 00:38)    Culture - Blood (collected 04-14-25 @ 01:00)  Source: Blood Blood  Gram Stain (04-15-25 @ 00:52):    Growth in aerobic bottle: Gram Positive Cocci in Clusters  Final Report (04-15-25 @ 18:18):    Growth in aerobic bottle: Staphylococcus epidermidis    Isolation of Coagulase negative Staphylococcus from single blood culture    sets may represent    contamination. Contact the Microbiology Department at [phone] if    susceptibility testing is needed.    clinically indicated.    Direct identification is available within approximately 3-5    hours either by Blood Panel Multiplexed PCR or Direct    MALDI-TOF. Details: [email or web address]  Organism: Blood Culture PCR (04-15-25 @ 18:18)  Organism: Blood Culture PCR (04-15-25 @ 18:18)    Sensitivities:      -  Staphylococcus epidermidis: Detec      Method Type: PCR    Culture - Sputum (collected 04-14-25 @ 10:29)  Source: Sputum Sputum  Gram Stain (04-14-25 @ 23:57):    Moderate polymorphonuclear leukocytes per low power field    Rare Squamous epithelial cells per low power field    Rare Yeast like cells per oil power field    Rare Gram positive cocci in pairs per oil power field  Final Report (04-16-25 @ 16:06):    Commensal ruby consistent with body site    Culture - Blood (collected 04-16-25 @ 09:05)  Source: Blood Blood  Preliminary Report (04-17-25 @ 16:01):    No growth at 24 hours        RADIOLOGY & ADDITIONAL TESTS:    CT Head No Contrast (04.13.25 @22:18)  IMPRESSION: (CT BRAIN) No acute intracranial bleeding, mass effect, or acute calvarium   fracture. Trace left frontal scalp soft tissue swelling. Paranasal sinus disease as described.    CT Cervical Spine No Contrast (04.13.25 @22:18)  IMPRESSION: (CT CERVICAL SPINE) No acute cervical spine fracture, subluxation or   evidence of traumatic malalignment. Multilevel degenerative changes as   described above.    Provided no contraindications, MRI can be performed if there is concern   for subtle osseous, ligamentous or cord injury    Xray Chest 1 View (04.14.25 @00:55)  IMPRESSION: No acute cardiopulmonary disease process.

## 2025-04-19 LAB
ALBUMIN SERPL ELPH-MCNC: 3.3 G/DL — SIGNIFICANT CHANGE UP (ref 3.3–5.2)
ALP SERPL-CCNC: 105 U/L — SIGNIFICANT CHANGE UP (ref 40–120)
ALT FLD-CCNC: 17 U/L — SIGNIFICANT CHANGE UP
ANION GAP SERPL CALC-SCNC: 15 MMOL/L — SIGNIFICANT CHANGE UP (ref 5–17)
AST SERPL-CCNC: 19 U/L — SIGNIFICANT CHANGE UP
BILIRUB SERPL-MCNC: <0.2 MG/DL — LOW (ref 0.4–2)
BUN SERPL-MCNC: 19.6 MG/DL — SIGNIFICANT CHANGE UP (ref 8–20)
CALCIUM SERPL-MCNC: 8.6 MG/DL — SIGNIFICANT CHANGE UP (ref 8.4–10.5)
CHLORIDE SERPL-SCNC: 90 MMOL/L — LOW (ref 96–108)
CO2 SERPL-SCNC: 26 MMOL/L — SIGNIFICANT CHANGE UP (ref 22–29)
CREAT SERPL-MCNC: 0.43 MG/DL — LOW (ref 0.5–1.3)
EGFR: 102 ML/MIN/1.73M2 — SIGNIFICANT CHANGE UP
EGFR: 102 ML/MIN/1.73M2 — SIGNIFICANT CHANGE UP
GLUCOSE SERPL-MCNC: 84 MG/DL — SIGNIFICANT CHANGE UP (ref 70–99)
HCT VFR BLD CALC: 32.2 % — LOW (ref 34.5–45)
HGB BLD-MCNC: 10.6 G/DL — LOW (ref 11.5–15.5)
MCHC RBC-ENTMCNC: 27.3 PG — SIGNIFICANT CHANGE UP (ref 27–34)
MCHC RBC-ENTMCNC: 32.9 G/DL — SIGNIFICANT CHANGE UP (ref 32–36)
MCV RBC AUTO: 83 FL — SIGNIFICANT CHANGE UP (ref 80–100)
NRBC # BLD AUTO: 0 K/UL — SIGNIFICANT CHANGE UP (ref 0–0)
NRBC # FLD: 0 K/UL — SIGNIFICANT CHANGE UP (ref 0–0)
NRBC BLD AUTO-RTO: 0 /100 WBCS — SIGNIFICANT CHANGE UP (ref 0–0)
PLATELET # BLD AUTO: 407 K/UL — HIGH (ref 150–400)
PMV BLD: 9.2 FL — SIGNIFICANT CHANGE UP (ref 7–13)
POTASSIUM SERPL-MCNC: 3.8 MMOL/L — SIGNIFICANT CHANGE UP (ref 3.5–5.3)
POTASSIUM SERPL-SCNC: 3.8 MMOL/L — SIGNIFICANT CHANGE UP (ref 3.5–5.3)
PROT SERPL-MCNC: 6.1 G/DL — LOW (ref 6.6–8.7)
RBC # BLD: 3.88 M/UL — SIGNIFICANT CHANGE UP (ref 3.8–5.2)
RBC # FLD: 15.6 % — HIGH (ref 10.3–14.5)
SODIUM SERPL-SCNC: 131 MMOL/L — LOW (ref 135–145)
WBC # BLD: 11.59 K/UL — HIGH (ref 3.8–10.5)
WBC # FLD AUTO: 11.59 K/UL — HIGH (ref 3.8–10.5)

## 2025-04-19 PROCEDURE — 99232 SBSQ HOSP IP/OBS MODERATE 35: CPT

## 2025-04-19 RX ADMIN — IPRATROPIUM BROMIDE AND ALBUTEROL SULFATE 3 MILLILITER(S): .5; 2.5 SOLUTION RESPIRATORY (INHALATION) at 16:22

## 2025-04-19 RX ADMIN — Medication 1 GRAM(S): at 06:07

## 2025-04-19 RX ADMIN — Medication 80 MILLIGRAM(S): at 06:07

## 2025-04-19 RX ADMIN — CARVEDILOL 3.12 MILLIGRAM(S): 3.12 TABLET, FILM COATED ORAL at 17:39

## 2025-04-19 RX ADMIN — ENOXAPARIN SODIUM 40 MILLIGRAM(S): 100 INJECTION SUBCUTANEOUS at 11:08

## 2025-04-19 RX ADMIN — Medication 650 MILLIGRAM(S): at 12:09

## 2025-04-19 RX ADMIN — Medication 1 GRAM(S): at 15:07

## 2025-04-19 RX ADMIN — Medication 1 DOSE(S): at 21:22

## 2025-04-19 RX ADMIN — Medication 5 MILLIGRAM(S): at 00:39

## 2025-04-19 RX ADMIN — Medication 0.5 MILLIGRAM(S): at 00:39

## 2025-04-19 RX ADMIN — OXYBUTYNIN CHLORIDE 10 MILLIGRAM(S): 5 TABLET, FILM COATED, EXTENDED RELEASE ORAL at 11:10

## 2025-04-19 RX ADMIN — GABAPENTIN 600 MILLIGRAM(S): 400 CAPSULE ORAL at 17:39

## 2025-04-19 RX ADMIN — Medication 40 MILLIEQUIVALENT(S): at 11:09

## 2025-04-19 RX ADMIN — Medication 40 MILLIGRAM(S): at 06:07

## 2025-04-19 RX ADMIN — Medication 0.5 MILLIGRAM(S): at 12:25

## 2025-04-19 RX ADMIN — Medication 15 GRAM(S): at 11:09

## 2025-04-19 RX ADMIN — ATORVASTATIN CALCIUM 40 MILLIGRAM(S): 80 TABLET, FILM COATED ORAL at 21:21

## 2025-04-19 RX ADMIN — Medication 1 GRAM(S): at 21:22

## 2025-04-19 RX ADMIN — Medication 1 DOSE(S): at 10:52

## 2025-04-19 RX ADMIN — IPRATROPIUM BROMIDE AND ALBUTEROL SULFATE 3 MILLILITER(S): .5; 2.5 SOLUTION RESPIRATORY (INHALATION) at 10:51

## 2025-04-19 RX ADMIN — Medication 100 MILLIGRAM(S): at 15:06

## 2025-04-19 RX ADMIN — Medication 100 MILLIGRAM(S): at 06:07

## 2025-04-19 RX ADMIN — IPRATROPIUM BROMIDE AND ALBUTEROL SULFATE 3 MILLILITER(S): .5; 2.5 SOLUTION RESPIRATORY (INHALATION) at 22:09

## 2025-04-19 RX ADMIN — IPRATROPIUM BROMIDE AND ALBUTEROL SULFATE 3 MILLILITER(S): .5; 2.5 SOLUTION RESPIRATORY (INHALATION) at 04:19

## 2025-04-19 RX ADMIN — CARVEDILOL 3.12 MILLIGRAM(S): 3.12 TABLET, FILM COATED ORAL at 06:07

## 2025-04-19 RX ADMIN — Medication 650 MILLIGRAM(S): at 11:09

## 2025-04-19 RX ADMIN — TIOTROPIUM BROMIDE INHALATION SPRAY 2 PUFF(S): 3.12 SPRAY, METERED RESPIRATORY (INHALATION) at 10:57

## 2025-04-19 RX ADMIN — VENLAFAXINE HYDROCHLORIDE 225 MILLIGRAM(S): 37.5 CAPSULE, EXTENDED RELEASE ORAL at 11:10

## 2025-04-19 RX ADMIN — Medication 100 MILLIGRAM(S): at 21:22

## 2025-04-19 RX ADMIN — GABAPENTIN 600 MILLIGRAM(S): 400 CAPSULE ORAL at 06:08

## 2025-04-19 RX ADMIN — Medication 5 MILLIGRAM(S): at 21:21

## 2025-04-19 NOTE — PROGRESS NOTE ADULT - ASSESSMENT
75 y/o F with PMHx COPD on 2-4L Home O2 baseline, CAD, NICM, HFimprovedEF, HTN, HLD, MDD, Anxiety, Mood Disorder, Insomnia BIBEMS with forehead laceration s/p mechanical fall admitted for Sepsis 2/2 Enterorhinovirus and Mucopurulent Bronchitis complicated by Hyponatremia.        #Hyponatremia 2/2 primary polydipsia  - patient reports has NaCl tablets at home, noncompliant  - Na 127 --> 128 -->> 131   - c/w 1L Fluid Restriction, c/w NaCl 1g oral TID, c/w  Urea 15g Oral, daily  - repeat bmp in am      #Sepsis 2/2 Enterorhinovirus and Mucopurulent Bronchitis  -s/p 2L IVFB NSS given in ED, hold further IVF  -Lactate negative 1.3  -UA negative  -4/14 BClx +Staph Epi & Sputum Clx w/ Gram + Cocci (contaminant)  -4/13 RVP +Enterorhinovirus  -c/w Benzonatate 100mg, oral TID  -c/w Guaifenesin 200mg oral q6h, PRN    #AMS, resolved  -4/13 CT Head unremarkable  -AMS likely 2/2 fever, now resolved     #Scalp Hematoma 2/2 Fall, resolving  -CTH/CT Cervical negative +trace L frontal scalp soft tissue swelling  -PT Consulted, rec home PT w/ home assist ( willing to provide)  -Fall Precautions     #MDD, Anxiety, Mood Disorder, Insomnia  -No longer takes Seroquel 300mg qHS per patient  -c/w Xanax 0.5mg PO BID PRN Anxiety  -c/w Zolpidem 5-10mg qHS PRN  -c/w Venlafaxine 225mg oral daily     #CAD, NICM, HFimprovedEF, HTN, HLD  -c/w Carvedilol 3.125mg PO BID  -c/w Valsartan 80mg, oral daily  -c/w Atorvastatin 40mg qHS     #Chronic Pain Syndrome  -c/w Gabapentin 600mg BID  -c/w Cyclobenzaprine 5mg, oral daily PRN  -Hold Ibuprofen 600mg BID PRN for now     #COPD  -O2 via NC 2-4LPM home O2 baseline PRN  -c/w Advair 100/50 Inhaler BID & Spiriva 2.5mcg Inhaler daily  -c/w Duoneb 3mL, nebulizer q6h     #Overactive Bladder  -c/w Oxybutynin 10mg, oral daily     VTE PPX: SCD/LMWH  DISPO: With resolution of hyponatremia, d/c w/ home PT. 1-2 days

## 2025-04-19 NOTE — PROGRESS NOTE ADULT - SUBJECTIVE AND OBJECTIVE BOX
Patient is a 74y old  Female who presents with a chief complaint of Head Lac 2/2 Fall, Hyponatremia, Sepsis (18 Apr 2025 11:06)      Patient seen and examined at bedside. No overnight events reported.     ALLERGIES:  No Known Allergies    MEDICATIONS  (STANDING):  albuterol/ipratropium for Nebulization 3 milliLiter(s) Nebulizer every 6 hours  atorvastatin 40 milliGRAM(s) Oral at bedtime  benzonatate 100 milliGRAM(s) Oral three times a day  carvedilol 3.125 milliGRAM(s) Oral every 12 hours  enoxaparin Injectable 40 milliGRAM(s) SubCutaneous every 24 hours  fluticasone propionate/ salmeterol 100-50 MICROgram(s) Diskus 1 Dose(s) Inhalation two times a day  gabapentin 600 milliGRAM(s) Oral two times a day  influenza  Vaccine (HIGH DOSE) 0.5 milliLiter(s) IntraMuscular once  oxybutynin XL 10 milliGRAM(s) Oral daily  pantoprazole    Tablet 40 milliGRAM(s) Oral before breakfast  sodium chloride 1 Gram(s) Oral three times a day  tiotropium 2.5 MICROgram(s) Inhaler 2 Puff(s) Inhalation daily  urea Oral Powder 15 Gram(s) Oral daily  valsartan 80 milliGRAM(s) Oral daily  venlafaxine XR. 225 milliGRAM(s) Oral daily    MEDICATIONS  (PRN):  acetaminophen     Tablet .. 650 milliGRAM(s) Oral every 6 hours PRN Temp greater or equal to 38C (100.4F), Mild Pain (1 - 3)  ALPRAZolam 0.5 milliGRAM(s) Oral two times a day PRN Anxiety  aluminum hydroxide/magnesium hydroxide/simethicone Suspension 30 milliLiter(s) Oral every 4 hours PRN Dyspepsia  cyclobenzaprine 5 milliGRAM(s) Oral daily PRN Muscle Spasm  guaiFENesin Oral Liquid (Sugar-Free) 200 milliGRAM(s) Oral every 6 hours PRN Cough  melatonin 3 milliGRAM(s) Oral at bedtime PRN Insomnia  ondansetron Injectable 4 milliGRAM(s) IV Push every 8 hours PRN Nausea and/or Vomiting  zolpidem 5 milliGRAM(s) Oral at bedtime PRN Insomnia  zolpidem 5 milliGRAM(s) Oral at bedtime PRN Insomnia    Vital Signs Last 24 Hrs  T(F): 98.5 (19 Apr 2025 08:12), Max: 98.9 (18 Apr 2025 20:48)  HR: 84 (19 Apr 2025 08:12) (84 - 110)  BP: 132/79 (19 Apr 2025 08:12) (124/73 - 134/85)  RR: 18 (19 Apr 2025 08:12) (18 - 18)  SpO2: 98% (19 Apr 2025 10:55) (98% - 100%)  I&O's Summary    PHYSICAL EXAM:  General: NAD, A/O x 3  ENT: MMM, no thrush  Neck: Supple, No JVD  Lungs: Clear to auscultation bilaterally, good air entry, non-labored breathing  Cardio: RRR, S1/S2, No murmur  Abdomen: Soft, Nontender, Nondistended; Bowel sounds present  Extremities: No calf tenderness, No pitting edema    LABS:                        10.6   11.59 )-----------( 407      ( 19 Apr 2025 07:00 )             32.2     04-19    131  |  90  |  19.6  ----------------------------<  84  3.8   |  26.0  |  0.43    Ca    8.6      19 Apr 2025 07:00    TPro  6.1  /  Alb  3.3  /  TBili  <0.2  /  DBili  x   /  AST  19  /  ALT  17  /  AlkPhos  105  04-19                                      Urinalysis Basic - ( 19 Apr 2025 07:00 )    Color: x / Appearance: x / SG: x / pH: x  Gluc: 84 mg/dL / Ketone: x  / Bili: x / Urobili: x   Blood: x / Protein: x / Nitrite: x   Leuk Esterase: x / RBC: x / WBC x   Sq Epi: x / Non Sq Epi: x / Bacteria: x        Culture - Blood (collected 16 Apr 2025 09:05)  Source: Blood Blood  Preliminary Report (18 Apr 2025 16:01):    No growth at 48 Hours    Culture - Sputum (collected 14 Apr 2025 10:29)  Source: Sputum Sputum  Gram Stain (14 Apr 2025 23:57):    Moderate polymorphonuclear leukocytes per low power field    Rare Squamous epithelial cells per low power field    Rare Yeast like cells per oil power field    Rare Gram positive cocci in pairs per oil power field  Final Report (16 Apr 2025 16:06):    Commensal ruby consistent with body site    Culture - Blood (collected 14 Apr 2025 01:00)  Source: Blood Blood  Gram Stain (15 Apr 2025 00:52):    Growth in aerobic bottle: Gram Positive Cocci in Clusters  Final Report (15 Apr 2025 18:18):    Growth in aerobic bottle: Staphylococcus epidermidis    Isolation of Coagulase negative Staphylococcus from single blood culture    sets may represent    contamination. Contact the Microbiology Department at [phone] if    susceptibility testing is needed.    clinically indicated.    Direct identification is available within approximately 3-5    hours either by Blood Panel Multiplexed PCR or Direct    MALDI-TOF. Details: [email or web address]  Organism: Blood Culture PCR (15 Apr 2025 18:18)  Organism: Blood Culture PCR (15 Apr 2025 18:18)      Method Type: PCR      -  Staphylococcus epidermidis: Detec        RADIOLOGY & ADDITIONAL TESTS:    Care Discussed with Consultants/Other Providers:

## 2025-04-20 LAB
ALBUMIN SERPL ELPH-MCNC: 3.3 G/DL — SIGNIFICANT CHANGE UP (ref 3.3–5.2)
ALP SERPL-CCNC: 110 U/L — SIGNIFICANT CHANGE UP (ref 40–120)
ALT FLD-CCNC: 17 U/L — SIGNIFICANT CHANGE UP
ANION GAP SERPL CALC-SCNC: 8 MMOL/L — SIGNIFICANT CHANGE UP (ref 5–17)
AST SERPL-CCNC: 15 U/L — SIGNIFICANT CHANGE UP
BILIRUB SERPL-MCNC: <0.2 MG/DL — LOW (ref 0.4–2)
BUN SERPL-MCNC: 16.7 MG/DL — SIGNIFICANT CHANGE UP (ref 8–20)
CALCIUM SERPL-MCNC: 8.4 MG/DL — SIGNIFICANT CHANGE UP (ref 8.4–10.5)
CHLORIDE SERPL-SCNC: 90 MMOL/L — LOW (ref 96–108)
CO2 SERPL-SCNC: 30 MMOL/L — HIGH (ref 22–29)
CREAT SERPL-MCNC: 0.5 MG/DL — SIGNIFICANT CHANGE UP (ref 0.5–1.3)
EGFR: 98 ML/MIN/1.73M2 — SIGNIFICANT CHANGE UP
EGFR: 98 ML/MIN/1.73M2 — SIGNIFICANT CHANGE UP
GLUCOSE SERPL-MCNC: 83 MG/DL — SIGNIFICANT CHANGE UP (ref 70–99)
HCT VFR BLD CALC: 31.6 % — LOW (ref 34.5–45)
HGB BLD-MCNC: 10.2 G/DL — LOW (ref 11.5–15.5)
MAGNESIUM SERPL-MCNC: 1.8 MG/DL — SIGNIFICANT CHANGE UP (ref 1.6–2.6)
MCHC RBC-ENTMCNC: 27.5 PG — SIGNIFICANT CHANGE UP (ref 27–34)
MCHC RBC-ENTMCNC: 32.3 G/DL — SIGNIFICANT CHANGE UP (ref 32–36)
MCV RBC AUTO: 85.2 FL — SIGNIFICANT CHANGE UP (ref 80–100)
NRBC # BLD AUTO: 0 K/UL — SIGNIFICANT CHANGE UP (ref 0–0)
NRBC # FLD: 0 K/UL — SIGNIFICANT CHANGE UP (ref 0–0)
NRBC BLD AUTO-RTO: 0 /100 WBCS — SIGNIFICANT CHANGE UP (ref 0–0)
OSMOLALITY UR: 349 MOSM/KG — SIGNIFICANT CHANGE UP (ref 300–1000)
PLATELET # BLD AUTO: 411 K/UL — HIGH (ref 150–400)
PMV BLD: 9.1 FL — SIGNIFICANT CHANGE UP (ref 7–13)
POTASSIUM SERPL-MCNC: 4.5 MMOL/L — SIGNIFICANT CHANGE UP (ref 3.5–5.3)
POTASSIUM SERPL-SCNC: 4.5 MMOL/L — SIGNIFICANT CHANGE UP (ref 3.5–5.3)
PROT SERPL-MCNC: 5.9 G/DL — LOW (ref 6.6–8.7)
RBC # BLD: 3.71 M/UL — LOW (ref 3.8–5.2)
RBC # FLD: 15.9 % — HIGH (ref 10.3–14.5)
SODIUM SERPL-SCNC: 128 MMOL/L — LOW (ref 135–145)
SODIUM UR-SCNC: 61 MMOL/L — SIGNIFICANT CHANGE UP
WBC # BLD: 11.89 K/UL — HIGH (ref 3.8–10.5)
WBC # FLD AUTO: 11.89 K/UL — HIGH (ref 3.8–10.5)

## 2025-04-20 PROCEDURE — 99232 SBSQ HOSP IP/OBS MODERATE 35: CPT | Mod: GC

## 2025-04-20 RX ORDER — ALPRAZOLAM 0.5 MG
0.5 TABLET, EXTENDED RELEASE 24 HR ORAL
Refills: 0 | Status: DISCONTINUED | OUTPATIENT
Start: 2025-04-22 | End: 2025-04-22

## 2025-04-20 RX ORDER — OXYCODONE HYDROCHLORIDE 30 MG/1
2.5 TABLET ORAL EVERY 8 HOURS
Refills: 0 | Status: DISCONTINUED | OUTPATIENT
Start: 2025-04-20 | End: 2025-04-22

## 2025-04-20 RX ADMIN — CYCLOBENZAPRINE HYDROCHLORIDE 5 MILLIGRAM(S): 15 CAPSULE, EXTENDED RELEASE ORAL at 01:54

## 2025-04-20 RX ADMIN — Medication 80 MILLIGRAM(S): at 05:33

## 2025-04-20 RX ADMIN — Medication 100 MILLIGRAM(S): at 05:34

## 2025-04-20 RX ADMIN — Medication 1 GRAM(S): at 22:04

## 2025-04-20 RX ADMIN — Medication 650 MILLIGRAM(S): at 22:04

## 2025-04-20 RX ADMIN — IPRATROPIUM BROMIDE AND ALBUTEROL SULFATE 3 MILLILITER(S): .5; 2.5 SOLUTION RESPIRATORY (INHALATION) at 09:10

## 2025-04-20 RX ADMIN — Medication 650 MILLIGRAM(S): at 23:04

## 2025-04-20 RX ADMIN — ENOXAPARIN SODIUM 40 MILLIGRAM(S): 100 INJECTION SUBCUTANEOUS at 13:07

## 2025-04-20 RX ADMIN — Medication 5 MILLIGRAM(S): at 00:06

## 2025-04-20 RX ADMIN — Medication 1 GRAM(S): at 13:08

## 2025-04-20 RX ADMIN — Medication 5 MILLIGRAM(S): at 23:02

## 2025-04-20 RX ADMIN — Medication 1 DOSE(S): at 21:38

## 2025-04-20 RX ADMIN — IPRATROPIUM BROMIDE AND ALBUTEROL SULFATE 3 MILLILITER(S): .5; 2.5 SOLUTION RESPIRATORY (INHALATION) at 03:42

## 2025-04-20 RX ADMIN — Medication 0.5 MILLIGRAM(S): at 00:06

## 2025-04-20 RX ADMIN — OXYBUTYNIN CHLORIDE 10 MILLIGRAM(S): 5 TABLET, FILM COATED, EXTENDED RELEASE ORAL at 13:08

## 2025-04-20 RX ADMIN — Medication 650 MILLIGRAM(S): at 14:10

## 2025-04-20 RX ADMIN — Medication 3 MILLIGRAM(S): at 00:55

## 2025-04-20 RX ADMIN — Medication 100 MILLIGRAM(S): at 22:04

## 2025-04-20 RX ADMIN — Medication 100 MILLIGRAM(S): at 13:08

## 2025-04-20 RX ADMIN — Medication 1 GRAM(S): at 05:34

## 2025-04-20 RX ADMIN — Medication 1 DOSE(S): at 09:18

## 2025-04-20 RX ADMIN — TIOTROPIUM BROMIDE INHALATION SPRAY 2 PUFF(S): 3.12 SPRAY, METERED RESPIRATORY (INHALATION) at 09:15

## 2025-04-20 RX ADMIN — Medication 0.5 MILLIGRAM(S): at 03:02

## 2025-04-20 RX ADMIN — OXYCODONE HYDROCHLORIDE 2.5 MILLIGRAM(S): 30 TABLET ORAL at 18:13

## 2025-04-20 RX ADMIN — IPRATROPIUM BROMIDE AND ALBUTEROL SULFATE 3 MILLILITER(S): .5; 2.5 SOLUTION RESPIRATORY (INHALATION) at 15:10

## 2025-04-20 RX ADMIN — GABAPENTIN 600 MILLIGRAM(S): 400 CAPSULE ORAL at 05:33

## 2025-04-20 RX ADMIN — IPRATROPIUM BROMIDE AND ALBUTEROL SULFATE 3 MILLILITER(S): .5; 2.5 SOLUTION RESPIRATORY (INHALATION) at 21:38

## 2025-04-20 RX ADMIN — GABAPENTIN 600 MILLIGRAM(S): 400 CAPSULE ORAL at 17:43

## 2025-04-20 RX ADMIN — Medication 40 MILLIGRAM(S): at 05:35

## 2025-04-20 RX ADMIN — CARVEDILOL 3.12 MILLIGRAM(S): 3.12 TABLET, FILM COATED ORAL at 05:34

## 2025-04-20 RX ADMIN — Medication 15 GRAM(S): at 13:07

## 2025-04-20 RX ADMIN — CARVEDILOL 3.12 MILLIGRAM(S): 3.12 TABLET, FILM COATED ORAL at 17:43

## 2025-04-20 RX ADMIN — ATORVASTATIN CALCIUM 40 MILLIGRAM(S): 80 TABLET, FILM COATED ORAL at 22:04

## 2025-04-20 RX ADMIN — VENLAFAXINE HYDROCHLORIDE 225 MILLIGRAM(S): 37.5 CAPSULE, EXTENDED RELEASE ORAL at 13:07

## 2025-04-20 RX ADMIN — Medication 650 MILLIGRAM(S): at 13:08

## 2025-04-20 NOTE — PROGRESS NOTE ADULT - ATTENDING COMMENTS
75 y/o F with PMHx COPD on 2-4L Home O2 baseline,     #2/2 Enterorhinovirus and Mucopurulent Bronchitis  #mechanical fall   #Hyponatremia 2/2 primary polydipsia  #AMS, resolved, 4/13 CT Head unremarkable  #Scalp Hematoma 2/2 Fall, resolving  #MDD, Anxiety, Mood Disorder, Insomnia      possible  d/c home in 1-2 days pending improvement in sodium , has to be 130 or higher
na 128 again today   c/w fluid restriction / salt tabs / urea   nephro consult   dc home when na 130 or higher for two consecutive labs  plan for eventual dc home with hc
Viral PNA  AOc Hypoxic RF  Hyponatremia - secondary to water ingestions     Increase salt tabs. cont fluid restriction  likely dc tomorrow if Na improved
73 y/o F with PMHx COPD on 2-4L Home O2 baseline,     #2/2 Enterorhinovirus and Mucopurulent Bronchitis  #mechanical fall   #Hyponatremia 2/2 primary polydipsia  #AMS, resolved, 4/13 CT Head unremarkable  #Scalp Hematoma 2/2 Fall, resolving  #MDD, Anxiety, Mood Disorder, Insomnia    started on urea   c/w na tabs   fluid restriction
Viral PNA  Aoc HRF  Hyponatremia     Cont salt tabs  add urine studies. suspect polydipsia drank > 2 pitchers water yesterday without adequate solute intake   fluid restrict   likely dc tomorrow if Na > 130

## 2025-04-20 NOTE — PROGRESS NOTE ADULT - ASSESSMENT
73 y/o F with PMHx COPD on 2-4L Home O2 baseline, CAD, NICM, HFimprovedEF, HTN, HLD, MDD, Anxiety, Mood Disorder, Insomnia BIBEMS with forehead laceration s/p mechanical fall admitted for Sepsis 2/2 Enterorhinovirus and Mucopurulent Bronchitis. Hospital course complicated by Hyponatremia.        #Hyponatremia 2/2 primary polydipsia  - patient reports has NaCl tablets at home, noncompliant  - Na 127 --> 128 -->> 131--> 128  - c/w 1L Fluid Restriction, c/w NaCl 1g oral TID, c/w  Urea 15g Oral, daily  - repeat bmp in am    - f/u urine studies  - nephro consulted    #Sepsis 2/2 Enterorhinovirus and Mucopurulent Bronchitis  -s/p 2L IVFB NSS given in ED, hold further IVF  -Lactate negative 1.3  -UA negative  -4/14 BClx +Staph Epi & Sputum Clx w/ Gram + Cocci (contaminant)  -4/13 RVP +Enterorhinovirus  -c/w Benzonatate 100mg, oral TID  -c/w Guaifenesin 200mg oral q6h, PRN    #AMS, resolved  -4/13 CT Head unremarkable  -AMS likely 2/2 fever, now resolved     #Scalp Hematoma 2/2 Fall, resolving  -CTH/CT Cervical negative +trace L frontal scalp soft tissue swelling  -PT Consulted, rec home PT w/ home assist ( willing to provide)  -Fall Precautions     #MDD, Anxiety, Mood Disorder, Insomnia  -No longer takes Seroquel 300mg qHS per patient  -c/w Xanax 0.5mg PO BID PRN Anxiety  -c/w Zolpidem 5-10mg qHS PRN  -c/w Venlafaxine 225mg oral daily     #CAD, NICM, HFimprovedEF, HTN, HLD  -c/w Carvedilol 3.125mg PO BID  -c/w Valsartan 80mg, oral daily  -c/w Atorvastatin 40mg qHS     #Chronic Pain Syndrome  -c/w Gabapentin 600mg BID  -c/w Cyclobenzaprine 5mg, oral daily PRN  -Hold Ibuprofen 600mg BID PRN for now     #COPD  -O2 via NC 2-4LPM home O2 baseline PRN  -c/w Advair 100/50 Inhaler BID & Spiriva 2.5mcg Inhaler daily  -c/w Duoneb 3mL, nebulizer q6h     #Overactive Bladder  -c/w Oxybutynin 10mg, oral daily     VTE PPX: SCD/LMWH  DISPO: With resolution of hyponatremia, d/c w/ home PT. 1-2 days

## 2025-04-20 NOTE — PROGRESS NOTE ADULT - SUBJECTIVE AND OBJECTIVE BOX
SUBJECTIVE  BRIEF HOSPITAL COURSE SUMMARY: Pt is a 73 y/o F with a PMHx of nonobstructive CAD, NISCM, HTN, HLD, COPD (home O2, 2-4L prn NC), HFimprovedEF (LVEF 54% 09/24), MDD, Insomnia, & Anxiety, who presented to [facility] ED on 4/13 for forehead laceration s/p mechanical fall, confusion, cough. In ED pt was tachycardic, tachypneic and had a productive cough w/ purulent green sputum. Labs WBC 15.47, Na 124, RVP + for Enterorhinovirus. Pt given 2L IVFB NSS and IV Rocephin/Azithromycin in ED. Admitted to medicine for sepsis 2/2 Enterorhinovirus and Mucopurulent Bronchitis complicated by Hyponatremia 2/2 primary polydipsia, on NaCl 1g TID and urea, with no improvement, nephrology consulted    LAST 24 HOURS: No overnight events  TODAY: Pt alert, awake at bedside, hemodynamically stable on 2L NC, AAOx3. Pt reported adequate po intake, limiting fluids. No issues with urination or BMs, denies pain. Endorses dry cough, otherwise no acute medical complaints. ROS negative     ALLERGIES:  No Known Allergies    MEDICATIONS  (STANDING):  albuterol/ipratropium for Nebulization 3 milliLiter(s) Nebulizer every 6 hours  atorvastatin 40 milliGRAM(s) Oral at bedtime  benzonatate 100 milliGRAM(s) Oral three times a day  carvedilol 3.125 milliGRAM(s) Oral every 12 hours  enoxaparin Injectable 40 milliGRAM(s) SubCutaneous every 24 hours  fluticasone propionate/ salmeterol 100-50 MICROgram(s) Diskus 1 Dose(s) Inhalation two times a day  gabapentin 600 milliGRAM(s) Oral two times a day  influenza  Vaccine (HIGH DOSE) 0.5 milliLiter(s) IntraMuscular once  oxybutynin XL 10 milliGRAM(s) Oral daily  pantoprazole    Tablet 40 milliGRAM(s) Oral before breakfast  sodium chloride 1 Gram(s) Oral three times a day  tiotropium 2.5 MICROgram(s) Inhaler 2 Puff(s) Inhalation daily  urea Oral Powder 15 Gram(s) Oral daily  valsartan 80 milliGRAM(s) Oral daily  venlafaxine XR. 225 milliGRAM(s) Oral daily    MEDICATIONS  (PRN):  acetaminophen     Tablet .. 650 milliGRAM(s) Oral every 6 hours PRN Temp greater or equal to 38C (100.4F), Mild Pain (1 - 3)  ALPRAZolam 0.5 milliGRAM(s) Oral two times a day PRN Anxiety  aluminum hydroxide/magnesium hydroxide/simethicone Suspension 30 milliLiter(s) Oral every 4 hours PRN Dyspepsia  cyclobenzaprine 5 milliGRAM(s) Oral daily PRN Muscle Spasm  guaiFENesin Oral Liquid (Sugar-Free) 200 milliGRAM(s) Oral every 6 hours PRN Cough  melatonin 3 milliGRAM(s) Oral at bedtime PRN Insomnia  ondansetron Injectable 4 milliGRAM(s) IV Push every 8 hours PRN Nausea and/or Vomiting  zolpidem 5 milliGRAM(s) Oral at bedtime PRN Insomnia  zolpidem 5 milliGRAM(s) Oral at bedtime PRN Insomnia    Vital Signs Last 24 Hrs  T(F): 98.5 (19 Apr 2025 08:12), Max: 98.9 (18 Apr 2025 20:48)  HR: 84 (19 Apr 2025 08:12) (84 - 110)  BP: 132/79 (19 Apr 2025 08:12) (124/73 - 134/85)  RR: 18 (19 Apr 2025 08:12) (18 - 18)  SpO2: 98% (19 Apr 2025 10:55) (98% - 100%)  I&O's Summary    PHYSICAL EXAM:  General: NAD, A/O x 3  ENT: MMM, no thrush  Neck: Supple, No JVD  Lungs: Rhonchi ausculated across lung lobes b/l, good air entry, non-labored breathing  Cardio: RRR, S1/S2, No murmur  Abdomen: Soft, Nontender, Nondistended; Bowel sounds present  Extremities: No calf tenderness, No pitting edema    LABS:                          10.2   11.89 )-----------( 411      ( 20 Apr 2025 07:49 )             31.6     04-20    128[L]  |  90[L]  |  16.7  ----------------------------<  83  4.5   |  30.0[H]  |  0.50    Ca    8.4      20 Apr 2025 07:49  Mg     1.8     04-20    TPro  5.9[L]  /  Alb  3.3  /  TBili  <0.2[L]  /  DBili  x   /  AST  15  /  ALT  17  /  AlkPhos  110  04-20      Urinalysis Basic - ( 19 Apr 2025 07:00 )    Color: x / Appearance: x / SG: x / pH: x  Gluc: 84 mg/dL / Ketone: x  / Bili: x / Urobili: x   Blood: x / Protein: x / Nitrite: x   Leuk Esterase: x / RBC: x / WBC x   Sq Epi: x / Non Sq Epi: x / Bacteria: x        Culture - Blood (collected 16 Apr 2025 09:05)  Source: Blood Blood  Preliminary Report (18 Apr 2025 16:01):    No growth at 48 Hours    Culture - Sputum (collected 14 Apr 2025 10:29)  Source: Sputum Sputum  Gram Stain (14 Apr 2025 23:57):    Moderate polymorphonuclear leukocytes per low power field    Rare Squamous epithelial cells per low power field    Rare Yeast like cells per oil power field    Rare Gram positive cocci in pairs per oil power field  Final Report (16 Apr 2025 16:06):    Commensal ruby consistent with body site    Culture - Blood (collected 14 Apr 2025 01:00)  Source: Blood Blood  Gram Stain (15 Apr 2025 00:52):    Growth in aerobic bottle: Gram Positive Cocci in Clusters  Final Report (15 Apr 2025 18:18):    Growth in aerobic bottle: Staphylococcus epidermidis    Isolation of Coagulase negative Staphylococcus from single blood culture    sets may represent    contamination. Contact the Microbiology Department at [phone] if    susceptibility testing is needed.    clinically indicated.    Direct identification is available within approximately 3-5    hours either by Blood Panel Multiplexed PCR or Direct    MALDI-TOF. Details: [email or web address]  Organism: Blood Culture PCR (15 Apr 2025 18:18)  Organism: Blood Culture PCR (15 Apr 2025 18:18)      Method Type: PCR      -  Staphylococcus epidermidis: Detec        RADIOLOGY & ADDITIONAL TESTS:    Care Discussed with Consultants/Other Providers:

## 2025-04-20 NOTE — CONSULT NOTE ADULT - SUBJECTIVE AND OBJECTIVE BOX
pt's chart reviewed; full consult to follow    73 yo female PMH + NICM + COPD/home O2 admitted after a fall with head laceration and sepsis secondary to Enterorhinovirus and Mucopurulent Bronchitis  Chronic hyponatremia noted (according to old labs) >>>  current serum Na+ level seems relatively stable without much acute worsening  Recent Eber+52 and UOsm 201 which is inconsistent with SIADH  - will recheck urine studies now  - cont NaCl tabs but consider stopping PO Urea if not SIADH  - reinforce need for adherence to PO fluid restriction  - trend labs

## 2025-04-21 LAB
ALBUMIN SERPL ELPH-MCNC: 3.4 G/DL — SIGNIFICANT CHANGE UP (ref 3.3–5.2)
ALP SERPL-CCNC: 112 U/L — SIGNIFICANT CHANGE UP (ref 40–120)
ALT FLD-CCNC: 16 U/L — SIGNIFICANT CHANGE UP
ANION GAP SERPL CALC-SCNC: 11 MMOL/L — SIGNIFICANT CHANGE UP (ref 5–17)
AST SERPL-CCNC: 17 U/L — SIGNIFICANT CHANGE UP
BILIRUB SERPL-MCNC: <0.2 MG/DL — LOW (ref 0.4–2)
BUN SERPL-MCNC: 16.4 MG/DL — SIGNIFICANT CHANGE UP (ref 8–20)
CALCIUM SERPL-MCNC: 8.4 MG/DL — SIGNIFICANT CHANGE UP (ref 8.4–10.5)
CHLORIDE SERPL-SCNC: 90 MMOL/L — LOW (ref 96–108)
CO2 SERPL-SCNC: 29 MMOL/L — SIGNIFICANT CHANGE UP (ref 22–29)
CREAT SERPL-MCNC: 0.44 MG/DL — LOW (ref 0.5–1.3)
CULTURE RESULTS: SIGNIFICANT CHANGE UP
EGFR: 101 ML/MIN/1.73M2 — SIGNIFICANT CHANGE UP
EGFR: 101 ML/MIN/1.73M2 — SIGNIFICANT CHANGE UP
GLUCOSE SERPL-MCNC: 89 MG/DL — SIGNIFICANT CHANGE UP (ref 70–99)
HCT VFR BLD CALC: 31.7 % — LOW (ref 34.5–45)
HGB BLD-MCNC: 10.3 G/DL — LOW (ref 11.5–15.5)
MCHC RBC-ENTMCNC: 27.5 PG — SIGNIFICANT CHANGE UP (ref 27–34)
MCHC RBC-ENTMCNC: 32.5 G/DL — SIGNIFICANT CHANGE UP (ref 32–36)
MCV RBC AUTO: 84.8 FL — SIGNIFICANT CHANGE UP (ref 80–100)
NRBC # BLD AUTO: 0 K/UL — SIGNIFICANT CHANGE UP (ref 0–0)
NRBC # FLD: 0 K/UL — SIGNIFICANT CHANGE UP (ref 0–0)
NRBC BLD AUTO-RTO: 0 /100 WBCS — SIGNIFICANT CHANGE UP (ref 0–0)
PLATELET # BLD AUTO: 406 K/UL — HIGH (ref 150–400)
PMV BLD: 8.9 FL — SIGNIFICANT CHANGE UP (ref 7–13)
POTASSIUM SERPL-MCNC: 4.2 MMOL/L — SIGNIFICANT CHANGE UP (ref 3.5–5.3)
POTASSIUM SERPL-SCNC: 4.2 MMOL/L — SIGNIFICANT CHANGE UP (ref 3.5–5.3)
PROT SERPL-MCNC: 6 G/DL — LOW (ref 6.6–8.7)
RBC # BLD: 3.74 M/UL — LOW (ref 3.8–5.2)
RBC # FLD: 15.8 % — HIGH (ref 10.3–14.5)
SODIUM SERPL-SCNC: 130 MMOL/L — LOW (ref 135–145)
SPECIMEN SOURCE: SIGNIFICANT CHANGE UP
WBC # BLD: 9.52 K/UL — SIGNIFICANT CHANGE UP (ref 3.8–10.5)
WBC # FLD AUTO: 9.52 K/UL — SIGNIFICANT CHANGE UP (ref 3.8–10.5)

## 2025-04-21 PROCEDURE — 99232 SBSQ HOSP IP/OBS MODERATE 35: CPT

## 2025-04-21 RX ORDER — BENZONATATE 100 MG
1 CAPSULE ORAL
Qty: 0 | Refills: 0 | DISCHARGE
Start: 2025-04-21

## 2025-04-21 RX ORDER — ASPIRIN 325 MG
1 TABLET ORAL
Qty: 0 | Refills: 0 | DISCHARGE
Start: 2025-04-21

## 2025-04-21 RX ORDER — IBUPROFEN 200 MG
1 TABLET ORAL
Refills: 0 | DISCHARGE

## 2025-04-21 RX ADMIN — OXYCODONE HYDROCHLORIDE 2.5 MILLIGRAM(S): 30 TABLET ORAL at 05:17

## 2025-04-21 RX ADMIN — IPRATROPIUM BROMIDE AND ALBUTEROL SULFATE 3 MILLILITER(S): .5; 2.5 SOLUTION RESPIRATORY (INHALATION) at 09:24

## 2025-04-21 RX ADMIN — GABAPENTIN 600 MILLIGRAM(S): 400 CAPSULE ORAL at 05:06

## 2025-04-21 RX ADMIN — Medication 5 MILLIGRAM(S): at 22:08

## 2025-04-21 RX ADMIN — Medication 1 GRAM(S): at 20:21

## 2025-04-21 RX ADMIN — OXYBUTYNIN CHLORIDE 10 MILLIGRAM(S): 5 TABLET, FILM COATED, EXTENDED RELEASE ORAL at 11:49

## 2025-04-21 RX ADMIN — GABAPENTIN 600 MILLIGRAM(S): 400 CAPSULE ORAL at 17:13

## 2025-04-21 RX ADMIN — Medication 5 MILLIGRAM(S): at 00:10

## 2025-04-21 RX ADMIN — Medication 1 DOSE(S): at 09:26

## 2025-04-21 RX ADMIN — Medication 40 MILLIGRAM(S): at 05:06

## 2025-04-21 RX ADMIN — OXYCODONE HYDROCHLORIDE 2.5 MILLIGRAM(S): 30 TABLET ORAL at 06:17

## 2025-04-21 RX ADMIN — OXYCODONE HYDROCHLORIDE 2.5 MILLIGRAM(S): 30 TABLET ORAL at 15:35

## 2025-04-21 RX ADMIN — ATORVASTATIN CALCIUM 40 MILLIGRAM(S): 80 TABLET, FILM COATED ORAL at 20:22

## 2025-04-21 RX ADMIN — Medication 0.5 MILLIGRAM(S): at 20:22

## 2025-04-21 RX ADMIN — Medication 100 MILLIGRAM(S): at 05:06

## 2025-04-21 RX ADMIN — CARVEDILOL 3.12 MILLIGRAM(S): 3.12 TABLET, FILM COATED ORAL at 17:13

## 2025-04-21 RX ADMIN — OXYCODONE HYDROCHLORIDE 2.5 MILLIGRAM(S): 30 TABLET ORAL at 14:35

## 2025-04-21 RX ADMIN — ENOXAPARIN SODIUM 40 MILLIGRAM(S): 100 INJECTION SUBCUTANEOUS at 11:48

## 2025-04-21 RX ADMIN — Medication 1 GRAM(S): at 14:36

## 2025-04-21 RX ADMIN — Medication 100 MILLIGRAM(S): at 14:36

## 2025-04-21 RX ADMIN — VENLAFAXINE HYDROCHLORIDE 225 MILLIGRAM(S): 37.5 CAPSULE, EXTENDED RELEASE ORAL at 11:50

## 2025-04-21 RX ADMIN — IPRATROPIUM BROMIDE AND ALBUTEROL SULFATE 3 MILLILITER(S): .5; 2.5 SOLUTION RESPIRATORY (INHALATION) at 20:27

## 2025-04-21 RX ADMIN — IPRATROPIUM BROMIDE AND ALBUTEROL SULFATE 3 MILLILITER(S): .5; 2.5 SOLUTION RESPIRATORY (INHALATION) at 13:47

## 2025-04-21 RX ADMIN — Medication 100 MILLIGRAM(S): at 20:22

## 2025-04-21 RX ADMIN — CARVEDILOL 3.12 MILLIGRAM(S): 3.12 TABLET, FILM COATED ORAL at 05:06

## 2025-04-21 RX ADMIN — Medication 1 GRAM(S): at 05:06

## 2025-04-21 RX ADMIN — IPRATROPIUM BROMIDE AND ALBUTEROL SULFATE 3 MILLILITER(S): .5; 2.5 SOLUTION RESPIRATORY (INHALATION) at 03:30

## 2025-04-21 RX ADMIN — Medication 1 DOSE(S): at 20:26

## 2025-04-21 RX ADMIN — Medication 80 MILLIGRAM(S): at 05:06

## 2025-04-21 NOTE — CONSULT NOTE ADULT - ASSESSMENT
73 yo female PMH + NICM + COPD/home O2 admitted after a fall with head laceration and sepsis secondary to Enterorhinovirus bronchitis  Chronic hyponatremia noted (according to old labs) >>>  current serum Na+ level seems relatively stable without much acute worsening  Recent Eber+52 and UOsm 201 which is inconsistent with SIADH but repeat urine studies done yesterday now more c/w SIADH  CT brain and CXR show no significant pathology  but repeat urine studies done yesterday now more c/w SIADH  - cont NaCl tabs and PO fluid restriction  - restart PO Urea  - consider trial of Tolvaptan  - trend labs

## 2025-04-21 NOTE — CONSULT NOTE ADULT - SUBJECTIVE AND OBJECTIVE BOX
HPI: The pt is a 75 yo female PMH + CAD/NICM/CHF + COPD (home O2 dependent) + HTN + HLD + psychiatric illness admitted several days ago with a forehead laceration post fall.  Pt also found to have fever and productive cough and was subsequently diagnosed with  +Enterorhinovirus. We are now asked to see regarding persistent hyponatremia.      PAST MEDICAL & SURGICAL HISTORY:  NICM (nonischemic cardiomyopathy)      HTN (hypertension)      Rheumatoid arthritis      Chronic back pain      HLD (hyperlipidemia)      Chronic obstructive asthma      Cellulitis      S/P       S/P hysterectomy          FAMILY HISTORY:  Family history of diabetes mellitus (DM)    FH: CAD (coronary artery disease)      Social History:  No current tobacco; no etoh nor ilicit drug abuse    MEDICATIONS  (STANDING):  albuterol/ipratropium for Nebulization 3 milliLiter(s) Nebulizer every 6 hours  atorvastatin 40 milliGRAM(s) Oral at bedtime  benzonatate 100 milliGRAM(s) Oral three times a day  carvedilol 3.125 milliGRAM(s) Oral every 12 hours  enoxaparin Injectable 40 milliGRAM(s) SubCutaneous every 24 hours  fluticasone propionate/ salmeterol 100-50 MICROgram(s) Diskus 1 Dose(s) Inhalation two times a day  gabapentin 600 milliGRAM(s) Oral two times a day  influenza  Vaccine (HIGH DOSE) 0.5 milliLiter(s) IntraMuscular once  oxybutynin XL 10 milliGRAM(s) Oral daily  pantoprazole    Tablet 40 milliGRAM(s) Oral before breakfast  sodium chloride 1 Gram(s) Oral three times a day  tiotropium 2.5 MICROgram(s) Inhaler 2 Puff(s) Inhalation daily  valsartan 80 milliGRAM(s) Oral daily  venlafaxine XR. 225 milliGRAM(s) Oral daily    MEDICATIONS  (PRN):  acetaminophen     Tablet .. 650 milliGRAM(s) Oral every 6 hours PRN Temp greater or equal to 38C (100.4F), Mild Pain (1 - 3)  ALPRAZolam 0.5 milliGRAM(s) Oral two times a day PRN Anxiety  aluminum hydroxide/magnesium hydroxide/simethicone Suspension 30 milliLiter(s) Oral every 4 hours PRN Dyspepsia  cyclobenzaprine 5 milliGRAM(s) Oral daily PRN Muscle Spasm  guaiFENesin Oral Liquid (Sugar-Free) 200 milliGRAM(s) Oral every 6 hours PRN Cough  melatonin 3 milliGRAM(s) Oral at bedtime PRN Insomnia  ondansetron Injectable 4 milliGRAM(s) IV Push every 8 hours PRN Nausea and/or Vomiting  oxyCODONE    IR 2.5 milliGRAM(s) Oral every 8 hours PRN Severe Pain (7 - 10)  zolpidem 5 milliGRAM(s) Oral at bedtime PRN Insomnia      Allergies    No Known Allergies    Intolerances        REVIEW OF SYSTEMS:  CONSTITUTIONAL: No fever, weight loss, + fatigue  EYES: No eye pain, visual disturbances, or discharge  ENMT:  No difficulty hearing, tinnitus, vertigo; No sinus or throat pain  NECK: No pain or stiffness  BREASTS: No pain, masses, or nipple discharge  RESPIRATORY: + cough, no wheezing, chills or hemoptysis; No shortness of breath  CARDIOVASCULAR: No chest pain, palpitations, dizziness, or leg swelling  GASTROINTESTINAL: No abdominal or epigastric pain. No nausea, vomiting, or hematemesis; No diarrhea or constipation. No melena or hematochezia.  GENITOURINARY: No dysuria, frequency, hematuria, or incontinence  NEUROLOGICAL: No headaches, memory loss, loss of strength, numbness, or tremors  SKIN: No itching, burning, rashes, or lesions   MUSCULOSKELETAL: No joint pain or swelling; No muscle, back, or extremity pain  PSYCHIATRIC: +, anxiety, + mood swings        Vital Signs Last 24 Hrs  T(C): 36.9 (2025 04:23), Max: 37.2 (2025 17:10)  T(F): 98.4 (2025 04:23), Max: 99 (2025 22:00)  HR: 95 (2025 04:23) (83 - 100)  BP: 110/68 (2025 04:23) (106/67 - 130/69)  BP(mean): --  RR: 18 (2025 04:23) (18 - 18)  SpO2: 98% (2025 04:23) (94% - 100%)    Parameters below as of 2025 04:23  Patient On (Oxygen Delivery Method): nasal cannula  O2 Flow (L/min): 2      PHYSICAL EXAM:  GENERAL: Weak, fatigued  HEENT: Moist MMs; no periorbital edema  NECK: Supple, No JVD  NERVOUS SYSTEM:  Alert & Oriented X3, intact and symmetric  CHEST/LUNG: Clear bilaterally  HEART: Regular rate and rhythm; No rub  ABDOMEN: Soft, Nontender, +BS  EXTREMITIES: + dependent edema  SKIN: No rashes or lesions      LABS:                        10.2   11.89 )-----------( 411      ( 2025 07:49 )             31.6     04-20    128[L]  |  90[L]  |  16.7  ----------------------------<  83  4.5   |  30.0[H]  |  0.50    Ca    8.4      2025 07:49  Mg     1.8     -20    TPro  5.9[L]  /  Alb  3.3  /  TBili  <0.2[L]  /  DBili  x   /  AST  15  /  ALT  17  /  AlkPhos  110        Urinalysis Basic - ( 2025 07:49 )    Color: x / Appearance: x / SG: x / pH: x  Gluc: 83 mg/dL / Ketone: x  / Bili: x / Urobili: x   Blood: x / Protein: x / Nitrite: x   Leuk Esterase: x / RBC: x / WBC x   Sq Epi: x / Non Sq Epi: x / Bacteria: x      Magnesium: 1.8 mg/dL ( @ 07:49)      RADIOLOGY & ADDITIONAL TESTS:  < from: CT Head No Cont (25 @ 22:18) >    ACC: [id number] EXAM:  CT CERVICAL SPINE   ORDERED BY: LALO NAVASINFA     ACC: [id number] EXAM:  CT BRAIN   ORDERED BY: LALO SINHA     PROCEDURE DATE:  2025          INTERPRETATION:  CLINICAL INFORMATION: Trauma    COMPARISON: CT head 2024.    CONTRAST:  IV Contrast: NONE    TECHNIQUE:    CT BRAIN: Serial axial images were obtained from the skull base to the   vertex using multi-slice helical technique. Sagittal and coronal   reformats were obtained.    CT CERVICAL SPINE: Axial images were obtained of the cervical spine using   multislice helical technique. Reformatted coronal and sagittal images   were obtained.    FINDINGS:    CT BRAIN:    There is no acute intra-axial or extra-axial hemorrhage. No mass effect   or shift of the midline. The basal cisterns are not effaced.    There is mild cerebral volume loss with proportional prominence of the   ventricles and sulci. There are mild patchy areas of low attenuation   within the periventricular and subcortical whitematter which are   nonspecific but likely the sequela of chronic microvascular change. There   is no CT evidence of a large vascular territory infarct.    Moderate air-fluid level in the right maxillary sinus. Trace air-fluid   level in the left maxillary sinus. The mastoid air cells are well   aerated. Replaced bilateral ocular lenses.    No acute displaced calvarium fracture. Trace left frontal scalp soft   tissue swelling.    CT CERVICAL SPINE:    The cervical alignment is intact. Straightening of normal cervical   lordosis, likely related to patient's positioning. There are no acute   fractures or subluxations. The vertebral body heights are maintained.   Multilevel facet alignment is preserved. Mildly subluxed atlantodental   interval, query related to patient's positioning. Otherwise,   atlanto-dental interval and the craniocervical junction are maintained.   There is no prevertebral hematoma.    There are multilevel degenerative changes of the spine, characterized by   disc space height loss, posterior osteophytic ridging disc complexes,   uncovertebral facet hypertrophy which contribute to varying degree of   foraminal stenosis. No critical central canal stenosis. Canal contents as   well as extent of spondylotic changes are suboptimally evaluated inherent   to CT technique and best assessed with MRI provided no MR   contraindications.    The visualized soft tissues of the neck show no acute abnormality. The   lung apices show no pneumothorax.    IMPRESSION:    CT BRAIN: No acute intracranial bleeding, mass effect, or acute calvarium   fracture. Trace left frontal scalp soft tissue swelling.    Paranasal sinus disease as described.    CT CERVICAL SPINE: No acute cervical spine fracture, subluxation or   evidence of traumatic malalignment. Multilevel degenerative changes as   described above.    Provided no contraindications, MRI can be performed if there is concern   for subtle osseous, ligamentous or cord injury    < end of copied text >      < from: Xray Chest 1 View-PORTABLE IMMEDIATE (Xray Chest 1 View-PORTABLE IMMEDIATE .) (25 @ 00:55) >  ACC: [id number] EXAM:  XR CHEST PORTABLE IMMED 1V   ORDERED BY: LALO SINHA     PROCEDURE DATE:  2025          INTERPRETATION:  TECHNIQUE: Single portable view of the chest.    COMPARISON:  2025    CLINICAL HISTORY: cough    FINDINGS:    Single frontal view of the chest demonstrates the lungs to be clear. The   cardiomediastinal silhouette is unremarkable. No acute osseous   abnormalities. Overlying EKG leads and wires are noted. Right   hemidiaphragm is moderately elevated.    IMPRESSION: No acute cardiopulmonary disease process.    < end of copied text >     HPI: The pt is a 73 yo female PMH + CAD/NICM/CHF + COPD (home O2 dependent) + HTN + HLD + psychiatric illness admitted several days ago with a forehead laceration post fall.  Pt also found to have fever and productive cough and was subsequently diagnosed with  +Enterorhinovirus. We are now asked to see regarding persistent hyponatremia. Pt states this problem is longstanding and she follows with Dr Haider in our office.      PAST MEDICAL & SURGICAL HISTORY:  NICM (nonischemic cardiomyopathy)      HTN (hypertension)      Rheumatoid arthritis      Chronic back pain      HLD (hyperlipidemia)      Chronic obstructive asthma      Cellulitis      S/P       S/P hysterectomy          FAMILY HISTORY:  Family history of diabetes mellitus (DM)    FH: CAD (coronary artery disease)      Social History:  No current tobacco; no etoh nor ilicit drug abuse    MEDICATIONS  (STANDING):  albuterol/ipratropium for Nebulization 3 milliLiter(s) Nebulizer every 6 hours  atorvastatin 40 milliGRAM(s) Oral at bedtime  benzonatate 100 milliGRAM(s) Oral three times a day  carvedilol 3.125 milliGRAM(s) Oral every 12 hours  enoxaparin Injectable 40 milliGRAM(s) SubCutaneous every 24 hours  fluticasone propionate/ salmeterol 100-50 MICROgram(s) Diskus 1 Dose(s) Inhalation two times a day  gabapentin 600 milliGRAM(s) Oral two times a day  influenza  Vaccine (HIGH DOSE) 0.5 milliLiter(s) IntraMuscular once  oxybutynin XL 10 milliGRAM(s) Oral daily  pantoprazole    Tablet 40 milliGRAM(s) Oral before breakfast  sodium chloride 1 Gram(s) Oral three times a day  tiotropium 2.5 MICROgram(s) Inhaler 2 Puff(s) Inhalation daily  valsartan 80 milliGRAM(s) Oral daily  venlafaxine XR. 225 milliGRAM(s) Oral daily    MEDICATIONS  (PRN):  acetaminophen     Tablet .. 650 milliGRAM(s) Oral every 6 hours PRN Temp greater or equal to 38C (100.4F), Mild Pain (1 - 3)  ALPRAZolam 0.5 milliGRAM(s) Oral two times a day PRN Anxiety  aluminum hydroxide/magnesium hydroxide/simethicone Suspension 30 milliLiter(s) Oral every 4 hours PRN Dyspepsia  cyclobenzaprine 5 milliGRAM(s) Oral daily PRN Muscle Spasm  guaiFENesin Oral Liquid (Sugar-Free) 200 milliGRAM(s) Oral every 6 hours PRN Cough  melatonin 3 milliGRAM(s) Oral at bedtime PRN Insomnia  ondansetron Injectable 4 milliGRAM(s) IV Push every 8 hours PRN Nausea and/or Vomiting  oxyCODONE    IR 2.5 milliGRAM(s) Oral every 8 hours PRN Severe Pain (7 - 10)  zolpidem 5 milliGRAM(s) Oral at bedtime PRN Insomnia      Allergies    No Known Allergies    Intolerances        REVIEW OF SYSTEMS:  CONSTITUTIONAL: No fever, weight loss, + fatigue  EYES: No eye pain, visual disturbances, or discharge  ENMT:  No difficulty hearing, tinnitus, vertigo; No sinus or throat pain  NECK: No pain or stiffness  BREASTS: No pain, masses, or nipple discharge  RESPIRATORY: + cough, no wheezing, chills or hemoptysis; +occ shortness of breath  CARDIOVASCULAR: No chest pain, palpitations, dizziness, or leg swelling  GASTROINTESTINAL: No abdominal or epigastric pain. No nausea, vomiting, or hematemesis; No diarrhea or constipation. No melena or hematochezia.  GENITOURINARY: No dysuria, frequency, hematuria, or incontinence  NEUROLOGICAL: No headaches, memory loss, loss of strength, numbness, or tremors  SKIN: No itching, burning, rashes, or lesions   MUSCULOSKELETAL: No joint pain or swelling; No muscle, back, or extremity pain  PSYCHIATRIC: +, anxiety, + mood swings        Vital Signs Last 24 Hrs  T(C): 36.9 (2025 04:23), Max: 37.2 (2025 17:10)  T(F): 98.4 (2025 04:23), Max: 99 (2025 22:00)  HR: 95 (2025 04:23) (83 - 100)  BP: 110/68 (2025 04:23) (106/67 - 130/69)  BP(mean): --  RR: 18 (2025 04:23) (18 - 18)  SpO2: 98% (2025 04:23) (94% - 100%)    Parameters below as of 2025 04:23  Patient On (Oxygen Delivery Method): nasal cannula  O2 Flow (L/min): 2      PHYSICAL EXAM:  GENERAL: Weak, fatigued  HEENT: Moist MMs; no periorbital edema  NECK: Supple, No JVD  NERVOUS SYSTEM:  Alert & Oriented X3, intact and symmetric  CHEST/LUNG: Clear bilaterally  HEART: Regular rate and rhythm; No rub  ABDOMEN: Soft, Nontender, +BS  EXTREMITIES: + tr dependent edema  SKIN: No rashes or lesions      LABS:                        10.2   11.89 )-----------( 411      ( 2025 07:49 )             31.6         128[L]  |  90[L]  |  16.7  ----------------------------<  83  4.5   |  30.0[H]  |  0.50    Ca    8.4      2025 07:49  Mg     1.8         TPro  5.9[L]  /  Alb  3.3  /  TBili  <0.2[L]  /  DBili  x   /  AST  15  /  ALT  17  /  AlkPhos  110        Urinalysis Basic - ( 2025 07:49 )    Color: x / Appearance: x / SG: x / pH: x  Gluc: 83 mg/dL / Ketone: x  / Bili: x / Urobili: x   Blood: x / Protein: x / Nitrite: x   Leuk Esterase: x / RBC: x / WBC x   Sq Epi: x / Non Sq Epi: x / Bacteria: x      Magnesium: 1.8 mg/dL ( @ 07:49)      RADIOLOGY & ADDITIONAL TESTS:  < from: CT Head No Cont (25 @ 22:18) >    ACC: [id number] EXAM:  CT CERVICAL SPINE   ORDERED BY: LALO ESTINFA     ACC: [id number] EXAM:  CT BRAIN   ORDERED BY: LALO ESTINFA     PROCEDURE DATE:  2025          INTERPRETATION:  CLINICAL INFORMATION: Trauma    COMPARISON: CT head 2024.    CONTRAST:  IV Contrast: NONE    TECHNIQUE:    CT BRAIN: Serial axial images were obtained from the skull base to the   vertex using multi-slice helical technique. Sagittal and coronal   reformats were obtained.    CT CERVICAL SPINE: Axial images were obtained of the cervical spine using   multislice helical technique. Reformatted coronal and sagittal images   were obtained.    FINDINGS:    CT BRAIN:    There is no acute intra-axial or extra-axial hemorrhage. No mass effect   or shift of the midline. The basal cisterns are not effaced.    There is mild cerebral volume loss with proportional prominence of the   ventricles and sulci. There are mild patchy areas of low attenuation   within the periventricular and subcortical whitematter which are   nonspecific but likely the sequela of chronic microvascular change. There   is no CT evidence of a large vascular territory infarct.    Moderate air-fluid level in the right maxillary sinus. Trace air-fluid   level in the left maxillary sinus. The mastoid air cells are well   aerated. Replaced bilateral ocular lenses.    No acute displaced calvarium fracture. Trace left frontal scalp soft   tissue swelling.    CT CERVICAL SPINE:    The cervical alignment is intact. Straightening of normal cervical   lordosis, likely related to patient's positioning. There are no acute   fractures or subluxations. The vertebral body heights are maintained.   Multilevel facet alignment is preserved. Mildly subluxed atlantodental   interval, query related to patient's positioning. Otherwise,   atlanto-dental interval and the craniocervical junction are maintained.   There is no prevertebral hematoma.    There are multilevel degenerative changes of the spine, characterized by   disc space height loss, posterior osteophytic ridging disc complexes,   uncovertebral facet hypertrophy which contribute to varying degree of   foraminal stenosis. No critical central canal stenosis. Canal contents as   well as extent of spondylotic changes are suboptimally evaluated inherent   to CT technique and best assessed with MRI provided no MR   contraindications.    The visualized soft tissues of the neck show no acute abnormality. The   lung apices show no pneumothorax.    IMPRESSION:    CT BRAIN: No acute intracranial bleeding, mass effect, or acute calvarium   fracture. Trace left frontal scalp soft tissue swelling.    Paranasal sinus disease as described.    CT CERVICAL SPINE: No acute cervical spine fracture, subluxation or   evidence of traumatic malalignment. Multilevel degenerative changes as   described above.    Provided no contraindications, MRI can be performed if there is concern   for subtle osseous, ligamentous or cord injury    < end of copied text >      < from: Xray Chest 1 View-PORTABLE IMMEDIATE (Xray Chest 1 View-PORTABLE IMMEDIATE .) (25 @ 00:55) >  ACC: [id number] EXAM:  XR CHEST PORTABLE IMMED 1V   ORDERED BY: LALO SINHA     PROCEDURE DATE:  2025          INTERPRETATION:  TECHNIQUE: Single portable view of the chest.    COMPARISON:  2025    CLINICAL HISTORY: cough    FINDINGS:    Single frontal view of the chest demonstrates the lungs to be clear. The   cardiomediastinal silhouette is unremarkable. No acute osseous   abnormalities. Overlying EKG leads and wires are noted. Right   hemidiaphragm is moderately elevated.    IMPRESSION: No acute cardiopulmonary disease process.    < end of copied text >

## 2025-04-21 NOTE — PROGRESS NOTE ADULT - ASSESSMENT
73 y/o F with PMHx COPD on 2-4L Home O2 baseline, CAD, NICM, HFimprovedEF, HTN, HLD, MDD, Anxiety, Mood Disorder, Insomnia BIBEMS with forehead laceration s/p mechanical fall admitted for Sepsis 2/2 Enterorhinovirus and Mucopurulent Bronchitis. Hospital course complicated by Hyponatremia.        #Hyponatremia 2/2 primary polydipsia  - patient reports has NaCl tablets at home, noncompliant  - Na 127 --> 128 -->> 131--> 128 -- > 130  - c/w 1L Fluid Restriction, c/w NaCl 1g oral TID, c/w  Urea 15g Oral, daily  - repeat bmp in am    - f/u urine studies  - nephro consulted    #Sepsis 2/2 Enterorhinovirus and Mucopurulent Bronchitis  -s/p 2L IVFB NSS given in ED, hold further IVF  -Lactate negative 1.3  -UA negative  -4/14 BClx +Staph Epi & Sputum Clx w/ Gram + Cocci (contaminant)  -4/13 RVP +Enterorhinovirus  -c/w Benzonatate 100mg, oral TID  -c/w Guaifenesin 200mg oral q6h, PRN    #AMS, resolved  -4/13 CT Head unremarkable  -AMS likely 2/2 fever, now resolved     #Scalp Hematoma 2/2 Fall, resolving  -CTH/CT Cervical negative +trace L frontal scalp soft tissue swelling  -PT Consulted, rec home PT w/ home assist ( willing to provide)  -Fall Precautions     #MDD, Anxiety, Mood Disorder, Insomnia  -No longer takes Seroquel 300mg qHS per patient  -c/w Xanax 0.5mg PO BID PRN Anxiety  -c/w Zolpidem 5-10mg qHS PRN  -c/w Venlafaxine 225mg oral daily     #CAD, NICM, HFimprovedEF, HTN, HLD  -c/w Carvedilol 3.125mg PO BID  -c/w Valsartan 80mg, oral daily  -c/w Atorvastatin 40mg qHS     #Chronic Pain Syndrome  -c/w Gabapentin 600mg BID  -c/w Cyclobenzaprine 5mg, oral daily PRN  -Hold Ibuprofen 600mg BID PRN for now     #COPD  -O2 via NC 2-4LPM home O2 baseline PRN  -c/w Advair 100/50 Inhaler BID & Spiriva 2.5mcg Inhaler daily  -c/w Duoneb 3mL, nebulizer q6h     #Overactive Bladder  -c/w Oxybutynin 10mg, oral daily     VTE PPX: SCD/LMWH  DISPO: DC today

## 2025-04-21 NOTE — PROGRESS NOTE ADULT - SUBJECTIVE AND OBJECTIVE BOX
SUBJECTIVE    Pt alert, awake at bedside, hemodynamically stable on 2L NC, AAOx3. Pt reported adequate po intake, limiting fluids. No issues with urination or BMs, denies pain. Endorses dry cough, otherwise no acute medical complaints. ROS negative     OBJECTIVE      Vital Signs Last 24 Hrs  T(C): 37.1 (22 Apr 2025 07:23), Max: 37.8 (21 Apr 2025 18:10)  T(F): 98.7 (22 Apr 2025 07:23), Max: 100.1 (21 Apr 2025 18:10)  HR: 88 (22 Apr 2025 14:40) (78 - 96)  BP: 108/56 (22 Apr 2025 07:23) (108/56 - 147/79)  BP(mean): --  RR: 19 (22 Apr 2025 07:23) (18 - 19)  SpO2: 96% (22 Apr 2025 10:01) (96% - 98%)    Parameters below as of 22 Apr 2025 08:41  Patient On (Oxygen Delivery Method): room air    PHYSICAL EXAM:  General: NAD, A/O x 3  ENT: MMM, no thrush  Neck: Supple, No JVD  Lungs: Rhonchi ausculated across lung lobes b/l, good air entry, non-labored breathing  Cardio: RRR, S1/S2, No murmur  Abdomen: Soft, Nontender, Nondistended; Bowel sounds present  Extremities: No calf tenderness, No pittng edema    MEDICATIONS  (STANDING):  albuterol/ipratropium for Nebulization 3 milliLiter(s) Nebulizer every 6 hours  atorvastatin 40 milliGRAM(s) Oral at bedtime  benzonatate 100 milliGRAM(s) Oral three times a day  carvedilol 3.125 milliGRAM(s) Oral every 12 hours  enoxaparin Injectable 40 milliGRAM(s) SubCutaneous every 24 hours  fluticasone propionate/ salmeterol 100-50 MICROgram(s) Diskus 1 Dose(s) Inhalation two times a day  gabapentin 600 milliGRAM(s) Oral two times a day  influenza  Vaccine (HIGH DOSE) 0.5 milliLiter(s) IntraMuscular once  oxybutynin XL 10 milliGRAM(s) Oral daily  pantoprazole    Tablet 40 milliGRAM(s) Oral before breakfast  sodium chloride 1 Gram(s) Oral three times a day  tiotropium 2.5 MICROgram(s) Inhaler 2 Puff(s) Inhalation daily  valsartan 80 milliGRAM(s) Oral daily  venlafaxine XR. 225 milliGRAM(s) Oral daily    MEDICATIONS  (PRN):  acetaminophen     Tablet .. 650 milliGRAM(s) Oral every 6 hours PRN Temp greater or equal to 38C (100.4F), Mild Pain (1 - 3)  ALPRAZolam 0.5 milliGRAM(s) Oral two times a day PRN Anxiety  aluminum hydroxide/magnesium hydroxide/simethicone Suspension 30 milliLiter(s) Oral every 4 hours PRN Dyspepsia  cyclobenzaprine 5 milliGRAM(s) Oral daily PRN Muscle Spasm  guaiFENesin Oral Liquid (Sugar-Free) 200 milliGRAM(s) Oral every 6 hours PRN Cough  melatonin 3 milliGRAM(s) Oral at bedtime PRN Insomnia  ondansetron Injectable 4 milliGRAM(s) IV Push every 8 hours PRN Nausea and/or Vomiting  oxyCODONE    IR 2.5 milliGRAM(s) Oral every 8 hours PRN Severe Pain (7 - 10)  zolpidem 5 milliGRAM(s) Oral at bedtime PRN Insomnia  zolpidem 5 milliGRAM(s) Oral at bedtime PRN Insomnia    Allergies    No Known Allergies    Intolerances        LABS:                        10.3   9.52  )-----------( 406      ( 21 Apr 2025 08:37 )             31.7     04-21    130[L]  |  90[L]  |  16.4  ----------------------------<  89  4.2   |  29.0  |  0.44[L]    Ca    8.4      21 Apr 2025 08:37    TPro  6.0[L]  /  Alb  3.4  /  TBili  <0.2[L]  /  DBili  x   /  AST  17  /  ALT  16  /  AlkPhos  112  04-21      Urinalysis Basic - ( 21 Apr 2025 08:37 )    Color: x / Appearance: x / SG: x / pH: x  Gluc: 89 mg/dL / Ketone: x  / Bili: x / Urobili: x   Blood: x / Protein: x / Nitrite: x   Leuk Esterase: x / RBC: x / WBC x   Sq Epi: x / Non Sq Epi: x / Bacteria: x      CAPILLARY BLOOD GLUCOSE          CULTURE DATA:    Urinalysis with Rflx Culture (collected 04-14-25 @ 00:38)    Culture - Blood (collected 04-14-25 @ 01:00)  Source: Blood Blood  Gram Stain (04-15-25 @ 00:52):    Growth in aerobic bottle: Gram Positive Cocci in Clusters  Final Report (04-15-25 @ 18:18):    Growth in aerobic bottle: Staphylococcus epidermidis    Isolation of Coagulase negative Staphylococcus from single blood culture    sets may represent    contamination. Contact the Microbiology Department at [phone] if    susceptibility testing is needed.    clinically indicated.    Direct identification is available within approximately 3-5    hours either by Blood Panel Multiplexed PCR or Direct    MALDI-TOF. Details: [email or web address]  Organism: Blood Culture PCR (04-15-25 @ 18:18)  Organism: Blood Culture PCR (04-15-25 @ 18:18)    Sensitivities:      -  Staphylococcus epidermidis: Detec      Method Type: PCR    Culture - Sputum (collected 04-14-25 @ 10:29)  Source: Sputum Sputum  Gram Stain (04-14-25 @ 23:57):    Moderate polymorphonuclear leukocytes per low power field    Rare Squamous epithelial cells per low power field    Rare Yeast like cells per oil power field    Rare Gram positive cocci in pairs per oil power field  Final Report (04-16-25 @ 16:06):    Commensal ruby consistent with body site    Culture - Blood (collected 04-16-25 @ 09:05)  Source: Blood Blood  Final Report (04-21-25 @ 16:00):    No growth at 5 days        RADIOLOGY & ADDITIONAL TESTS:

## 2025-04-22 VITALS
RESPIRATION RATE: 19 BRPM | TEMPERATURE: 98 F | OXYGEN SATURATION: 95 % | DIASTOLIC BLOOD PRESSURE: 71 MMHG | SYSTOLIC BLOOD PRESSURE: 111 MMHG | HEART RATE: 93 BPM

## 2025-04-22 PROCEDURE — 0225U NFCT DS DNA&RNA 21 SARSCOV2: CPT

## 2025-04-22 PROCEDURE — 82803 BLOOD GASES ANY COMBINATION: CPT

## 2025-04-22 PROCEDURE — 83930 ASSAY OF BLOOD OSMOLALITY: CPT

## 2025-04-22 PROCEDURE — 96361 HYDRATE IV INFUSION ADD-ON: CPT

## 2025-04-22 PROCEDURE — 82570 ASSAY OF URINE CREATININE: CPT

## 2025-04-22 PROCEDURE — 83735 ASSAY OF MAGNESIUM: CPT

## 2025-04-22 PROCEDURE — 96374 THER/PROPH/DIAG INJ IV PUSH: CPT

## 2025-04-22 PROCEDURE — 84443 ASSAY THYROID STIM HORMONE: CPT

## 2025-04-22 PROCEDURE — 87040 BLOOD CULTURE FOR BACTERIA: CPT

## 2025-04-22 PROCEDURE — 82330 ASSAY OF CALCIUM: CPT

## 2025-04-22 PROCEDURE — 83935 ASSAY OF URINE OSMOLALITY: CPT

## 2025-04-22 PROCEDURE — 99285 EMERGENCY DEPT VISIT HI MDM: CPT

## 2025-04-22 PROCEDURE — 85730 THROMBOPLASTIN TIME PARTIAL: CPT

## 2025-04-22 PROCEDURE — 83605 ASSAY OF LACTIC ACID: CPT

## 2025-04-22 PROCEDURE — 87640 STAPH A DNA AMP PROBE: CPT

## 2025-04-22 PROCEDURE — 84132 ASSAY OF SERUM POTASSIUM: CPT

## 2025-04-22 PROCEDURE — 85014 HEMATOCRIT: CPT

## 2025-04-22 PROCEDURE — 86703 HIV-1/HIV-2 1 RESULT ANTBDY: CPT

## 2025-04-22 PROCEDURE — 85018 HEMOGLOBIN: CPT

## 2025-04-22 PROCEDURE — 85610 PROTHROMBIN TIME: CPT

## 2025-04-22 PROCEDURE — 87899 AGENT NOS ASSAY W/OPTIC: CPT

## 2025-04-22 PROCEDURE — 80307 DRUG TEST PRSMV CHEM ANLYZR: CPT

## 2025-04-22 PROCEDURE — 93005 ELECTROCARDIOGRAM TRACING: CPT

## 2025-04-22 PROCEDURE — 82140 ASSAY OF AMMONIA: CPT

## 2025-04-22 PROCEDURE — 84295 ASSAY OF SERUM SODIUM: CPT

## 2025-04-22 PROCEDURE — 82947 ASSAY GLUCOSE BLOOD QUANT: CPT

## 2025-04-22 PROCEDURE — 70450 CT HEAD/BRAIN W/O DYE: CPT | Mod: MC

## 2025-04-22 PROCEDURE — 99239 HOSP IP/OBS DSCHRG MGMT >30: CPT

## 2025-04-22 PROCEDURE — 86900 BLOOD TYPING SEROLOGIC ABO: CPT

## 2025-04-22 PROCEDURE — 94640 AIRWAY INHALATION TREATMENT: CPT

## 2025-04-22 PROCEDURE — 82607 VITAMIN B-12: CPT

## 2025-04-22 PROCEDURE — 71045 X-RAY EXAM CHEST 1 VIEW: CPT

## 2025-04-22 PROCEDURE — 85025 COMPLETE CBC W/AUTO DIFF WBC: CPT

## 2025-04-22 PROCEDURE — 83880 ASSAY OF NATRIURETIC PEPTIDE: CPT

## 2025-04-22 PROCEDURE — 72125 CT NECK SPINE W/O DYE: CPT | Mod: MC

## 2025-04-22 PROCEDURE — 94760 N-INVAS EAR/PLS OXIMETRY 1: CPT

## 2025-04-22 PROCEDURE — 82435 ASSAY OF BLOOD CHLORIDE: CPT

## 2025-04-22 PROCEDURE — 85027 COMPLETE CBC AUTOMATED: CPT

## 2025-04-22 PROCEDURE — 84540 ASSAY OF URINE/UREA-N: CPT

## 2025-04-22 PROCEDURE — 87070 CULTURE OTHR SPECIMN AEROBIC: CPT

## 2025-04-22 PROCEDURE — 84133 ASSAY OF URINE POTASSIUM: CPT

## 2025-04-22 PROCEDURE — 84156 ASSAY OF PROTEIN URINE: CPT

## 2025-04-22 PROCEDURE — 80053 COMPREHEN METABOLIC PANEL: CPT

## 2025-04-22 PROCEDURE — 81003 URINALYSIS AUTO W/O SCOPE: CPT

## 2025-04-22 PROCEDURE — 80048 BASIC METABOLIC PNL TOTAL CA: CPT

## 2025-04-22 PROCEDURE — 87641 MR-STAPH DNA AMP PROBE: CPT

## 2025-04-22 PROCEDURE — 87205 SMEAR GRAM STAIN: CPT

## 2025-04-22 PROCEDURE — 36415 COLL VENOUS BLD VENIPUNCTURE: CPT

## 2025-04-22 PROCEDURE — 86850 RBC ANTIBODY SCREEN: CPT

## 2025-04-22 PROCEDURE — 97163 PT EVAL HIGH COMPLEX 45 MIN: CPT

## 2025-04-22 PROCEDURE — 87077 CULTURE AEROBIC IDENTIFY: CPT

## 2025-04-22 PROCEDURE — 84100 ASSAY OF PHOSPHORUS: CPT

## 2025-04-22 PROCEDURE — 87150 DNA/RNA AMPLIFIED PROBE: CPT

## 2025-04-22 PROCEDURE — 84300 ASSAY OF URINE SODIUM: CPT

## 2025-04-22 PROCEDURE — 86901 BLOOD TYPING SEROLOGIC RH(D): CPT

## 2025-04-22 PROCEDURE — 82962 GLUCOSE BLOOD TEST: CPT

## 2025-04-22 PROCEDURE — 73030 X-RAY EXAM OF SHOULDER: CPT

## 2025-04-22 RX ORDER — BENZONATATE 100 MG
1 CAPSULE ORAL
Qty: 30 | Refills: 0
Start: 2025-04-22 | End: 2025-05-01

## 2025-04-22 RX ADMIN — IPRATROPIUM BROMIDE AND ALBUTEROL SULFATE 3 MILLILITER(S): .5; 2.5 SOLUTION RESPIRATORY (INHALATION) at 09:52

## 2025-04-22 RX ADMIN — TIOTROPIUM BROMIDE INHALATION SPRAY 2 PUFF(S): 3.12 SPRAY, METERED RESPIRATORY (INHALATION) at 09:55

## 2025-04-22 RX ADMIN — Medication 1 GRAM(S): at 06:32

## 2025-04-22 RX ADMIN — Medication 80 MILLIGRAM(S): at 06:31

## 2025-04-22 RX ADMIN — CARVEDILOL 3.12 MILLIGRAM(S): 3.12 TABLET, FILM COATED ORAL at 17:34

## 2025-04-22 RX ADMIN — GABAPENTIN 600 MILLIGRAM(S): 400 CAPSULE ORAL at 17:34

## 2025-04-22 RX ADMIN — VENLAFAXINE HYDROCHLORIDE 225 MILLIGRAM(S): 37.5 CAPSULE, EXTENDED RELEASE ORAL at 12:37

## 2025-04-22 RX ADMIN — IPRATROPIUM BROMIDE AND ALBUTEROL SULFATE 3 MILLILITER(S): .5; 2.5 SOLUTION RESPIRATORY (INHALATION) at 14:25

## 2025-04-22 RX ADMIN — ENOXAPARIN SODIUM 40 MILLIGRAM(S): 100 INJECTION SUBCUTANEOUS at 12:37

## 2025-04-22 RX ADMIN — CARVEDILOL 3.12 MILLIGRAM(S): 3.12 TABLET, FILM COATED ORAL at 06:32

## 2025-04-22 RX ADMIN — Medication 100 MILLIGRAM(S): at 06:32

## 2025-04-22 RX ADMIN — Medication 5 MILLIGRAM(S): at 02:08

## 2025-04-22 RX ADMIN — Medication 100 MILLIGRAM(S): at 17:35

## 2025-04-22 RX ADMIN — GABAPENTIN 600 MILLIGRAM(S): 400 CAPSULE ORAL at 06:32

## 2025-04-22 RX ADMIN — IPRATROPIUM BROMIDE AND ALBUTEROL SULFATE 3 MILLILITER(S): .5; 2.5 SOLUTION RESPIRATORY (INHALATION) at 03:26

## 2025-04-22 RX ADMIN — Medication 0.5 MILLIGRAM(S): at 13:05

## 2025-04-22 RX ADMIN — OXYBUTYNIN CHLORIDE 10 MILLIGRAM(S): 5 TABLET, FILM COATED, EXTENDED RELEASE ORAL at 12:37

## 2025-04-22 RX ADMIN — Medication 1 DOSE(S): at 09:56

## 2025-04-22 RX ADMIN — Medication 40 MILLIGRAM(S): at 06:32

## 2025-04-22 RX ADMIN — Medication 1 GRAM(S): at 17:36

## 2025-04-22 NOTE — PROGRESS NOTE ADULT - REASON FOR ADMISSION
Head Lac 2/2 Fall, Hyponatremia, Sepsis
Marissa Guadarrama was found on routine mammogram a group of microcalcification is in the upper outer right breast. There was also a focal asymmetry in the lower left breast. Subsequently the patient went on to have bilateral diagnostic mammography with ultrasound on March 28, 2016.. On the right breast there was microcalcification is the main grouping measures 1.6 cm x 1 cm bilateral 2.9 cm located 2.9 cm from the nipple. An additional tiny area about 0.2 cm seen at the anterolateral margin of this main grouping about 1 cm from the main grouping. The left breast shows no masses or significant abnormalities. Subsequently the patient went on to have breast needle biopsy on March 30, 2016. The pathology came back positive for invasive ductal carcinoma grade 3/3. Angiolymphatic invasion was not identified. 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 Microcalcifications was also associated with ductal carcinoma in situ. His surgeon receptor was positive. Progesterone receptor was negative. She underwent bilateral sentinel lymph node biopsy and bilateral lumpectomies with prior seed placement.  the surgical pathology Showing left breast lumpectomy was a tumor size of 13 mm grade 2 of 3, angiolymphatic invasion was not identified the tumor was unifocal associated with ductal carcinoma in situ high-grade. Surgical margins where negative. [place] lymph node was negative for metastatic tumor. Stage is T1cN0 M0 On the right breast and there was invasive ductal carcinoma with a tumor size of 3 mm grade 3 of 3. [place] lymph nodes were negative for metastatic disease. The tumor stage is T1aN0 M0. The tumor on the left was positive for estrogen and progesterone receptor. It did show no HER-2/praveen amplification. The tumor on the right is positive for estrogen receptor and negative for progesterone receptor and shows no HER-2/praveen amplification. Subsequently the patient concluded the radiation 
Head Lac 2/2 Fall, Hyponatremia, Sepsis
therapy. She is currently on hormonal therapy with Arimidex. She developed a skin rash and Arimidex was switched to Aromasin.  
Head Lac 2/2 Fall, Hyponatremia, Sepsis

## 2025-04-22 NOTE — PROGRESS NOTE ADULT - ASSESSMENT
75 y/o F with PMHx COPD on 2-4L Home O2 baseline, CAD, NICM, HFimprovedEF, HTN, HLD, MDD, Anxiety, Mood Disorder, Insomnia BIBEMS with forehead laceration s/p mechanical fall admitted for Sepsis 2/2 Enterorhinovirus and Mucopurulent Bronchitis. Hospital course complicated by Hyponatremia.        #Hyponatremia 2/2 primary polydipsia  - patient reports has NaCl tablets at home, noncompliant  - Na 127 --> 128 -->> 131--> 128 -- > 130  - c/w 1L Fluid Restriction, c/w NaCl 1g oral TID, c/w  Urea 15g Oral, daily  - repeat bmp in am    - f/u urine studies  - nephro consulted    #Sepsis 2/2 Enterorhinovirus and Mucopurulent Bronchitis  -s/p 2L IVFB NSS given in ED, hold further IVF  -Lactate negative 1.3  -UA negative  -4/14 BClx +Staph Epi & Sputum Clx w/ Gram + Cocci (contaminant)  -4/13 RVP +Enterorhinovirus  -c/w Benzonatate 100mg, oral TID  -c/w Guaifenesin 200mg oral q6h, PRN    #AMS, resolved  -4/13 CT Head unremarkable  -AMS likely 2/2 fever, now resolved     #Scalp Hematoma 2/2 Fall, resolving  -CTH/CT Cervical negative +trace L frontal scalp soft tissue swelling  -PT Consulted, rec home PT w/ home assist ( willing to provide)  -Fall Precautions     #MDD, Anxiety, Mood Disorder, Insomnia  -No longer takes Seroquel 300mg qHS per patient  -c/w Xanax 0.5mg PO BID PRN Anxiety  -c/w Zolpidem 5-10mg qHS PRN  -c/w Venlafaxine 225mg oral daily     #CAD, NICM, HFimprovedEF, HTN, HLD  -c/w Carvedilol 3.125mg PO BID  -c/w Valsartan 80mg, oral daily  -c/w Atorvastatin 40mg qHS     #Chronic Pain Syndrome  -c/w Gabapentin 600mg BID  -c/w Cyclobenzaprine 5mg, oral daily PRN  -Hold Ibuprofen 600mg BID PRN for now     #COPD  -O2 via NC 2-4LPM home O2 baseline PRN  -c/w Advair 100/50 Inhaler BID & Spiriva 2.5mcg Inhaler daily  -c/w Duoneb 3mL, nebulizer q6h     #Overactive Bladder  -c/w Oxybutynin 10mg, oral daily     VTE PPX: SCD/LMWH  DISPO: DC today

## 2025-04-22 NOTE — PROGRESS NOTE ADULT - NUTRITIONAL ASSESSMENT
This patient has been assessed with a concern for Malnutrition and has been determined to have a diagnosis/diagnoses of Moderate protein-calorie malnutrition.    This patient is being managed with:   Diet Regular-  1000mL Fluid Restriction ([id number])  Entered: Apr 16 2025 10:55AM  
This patient has been assessed with a concern for Malnutrition and has been determined to have a diagnosis/diagnoses of Moderate protein-calorie malnutrition.    This patient is being managed with:   Diet Regular-  1000mL Fluid Restriction ([id number])  Entered: Apr 16 2025 10:55AM  
This patient has been assessed with a concern for Malnutrition and has been determined to have a diagnosis/diagnoses of Moderate protein-calorie malnutrition.    This patient is being managed with:   Diet Regular-  1000mL Fluid Restriction ([id number])  Entered: Apr 16 2025 10:55AM  
This patient has been assessed with a concern for Malnutrition and has been determined to have a diagnosis/diagnoses of Moderate protein-calorie malnutrition.    This patient is being managed with:   Diet Regular-  1000mL Fluid Restriction ([id number])  Entered: Apr 16 2025 10:55AM  
This patient has been assessed with a concern for Malnutrition and has been determined to have a diagnosis/diagnoses of Moderate protein-calorie malnutrition.    This patient is being managed with:   Diet Regular-  1000mL Fluid Restriction ([id number])  Entered: Apr 16 2025 10:55AM

## 2025-04-22 NOTE — PROGRESS NOTE ADULT - SUBJECTIVE AND OBJECTIVE BOX
SUBJECTIVE    LAST 24 HOURS: No overnight events  TODAY: Pt alert, awake at bedside, hemodynamically stable on 2L NC, AAOx3. Pt reported adequate po intake, limiting fluids. No issues with urination or BMs, denies pain. Endorses dry cough, otherwise no acute medical complaints. ROS negative     OBJECTIVE    Vital Signs Last 24 Hrs  T(C): 37 (22 Apr 2025 04:03), Max: 37.8 (21 Apr 2025 18:10)  T(F): 98.6 (22 Apr 2025 04:03), Max: 100.1 (21 Apr 2025 18:10)  HR: 86 (22 Apr 2025 04:03) (78 - 101)  BP: 136/74 (22 Apr 2025 04:03) (109/82 - 147/79)  BP(mean): --  RR: 18 (22 Apr 2025 04:03) (18 - 18)  SpO2: 98% (22 Apr 2025 04:03) (92% - 100%)    Parameters below as of 22 Apr 2025 04:03  Patient On (Oxygen Delivery Method): nasal cannula  O2 Flow (L/min): 2    PHYSICAL EXAM:  General: NAD, A/O x 3  ENT: MMM, no thrush  Neck: Supple, No JVD  Lungs: Rhonchi ausculated across lung lobes b/l, good air entry, non-labored breathing  Cardio: RRR, S1/S2, No murmur  Abdomen: Soft, Nontender, Nondistended; Bowel sounds present  Extremities: No calf tenderness, No pitting edema    MEDICATIONS  (STANDING):  albuterol/ipratropium for Nebulization 3 milliLiter(s) Nebulizer every 6 hours  atorvastatin 40 milliGRAM(s) Oral at bedtime  benzonatate 100 milliGRAM(s) Oral three times a day  carvedilol 3.125 milliGRAM(s) Oral every 12 hours  enoxaparin Injectable 40 milliGRAM(s) SubCutaneous every 24 hours  fluticasone propionate/ salmeterol 100-50 MICROgram(s) Diskus 1 Dose(s) Inhalation two times a day  gabapentin 600 milliGRAM(s) Oral two times a day  influenza  Vaccine (HIGH DOSE) 0.5 milliLiter(s) IntraMuscular once  oxybutynin XL 10 milliGRAM(s) Oral daily  pantoprazole    Tablet 40 milliGRAM(s) Oral before breakfast  sodium chloride 1 Gram(s) Oral three times a day  tiotropium 2.5 MICROgram(s) Inhaler 2 Puff(s) Inhalation daily  valsartan 80 milliGRAM(s) Oral daily  venlafaxine XR. 225 milliGRAM(s) Oral daily    MEDICATIONS  (PRN):  acetaminophen     Tablet .. 650 milliGRAM(s) Oral every 6 hours PRN Temp greater or equal to 38C (100.4F), Mild Pain (1 - 3)  ALPRAZolam 0.5 milliGRAM(s) Oral two times a day PRN Anxiety  aluminum hydroxide/magnesium hydroxide/simethicone Suspension 30 milliLiter(s) Oral every 4 hours PRN Dyspepsia  cyclobenzaprine 5 milliGRAM(s) Oral daily PRN Muscle Spasm  guaiFENesin Oral Liquid (Sugar-Free) 200 milliGRAM(s) Oral every 6 hours PRN Cough  melatonin 3 milliGRAM(s) Oral at bedtime PRN Insomnia  ondansetron Injectable 4 milliGRAM(s) IV Push every 8 hours PRN Nausea and/or Vomiting  oxyCODONE    IR 2.5 milliGRAM(s) Oral every 8 hours PRN Severe Pain (7 - 10)  zolpidem 5 milliGRAM(s) Oral at bedtime PRN Insomnia  zolpidem 5 milliGRAM(s) Oral at bedtime PRN Insomnia    Allergies    No Known Allergies    Intolerances        LABS:                        10.3   9.52  )-----------( 406      ( 21 Apr 2025 08:37 )             31.7     04-21    130[L]  |  90[L]  |  16.4  ----------------------------<  89  4.2   |  29.0  |  0.44[L]    Ca    8.4      21 Apr 2025 08:37  Mg     1.8     04-20    TPro  6.0[L]  /  Alb  3.4  /  TBili  <0.2[L]  /  DBili  x   /  AST  17  /  ALT  16  /  AlkPhos  112  04-21      Urinalysis Basic - ( 21 Apr 2025 08:37 )    Color: x / Appearance: x / SG: x / pH: x  Gluc: 89 mg/dL / Ketone: x  / Bili: x / Urobili: x   Blood: x / Protein: x / Nitrite: x   Leuk Esterase: x / RBC: x / WBC x   Sq Epi: x / Non Sq Epi: x / Bacteria: x      CAPILLARY BLOOD GLUCOSE    CULTURE DATA:    Urinalysis with Rflx Culture (collected 04-14-25 @ 00:38)    Culture - Blood (collected 04-14-25 @ 01:00)  Source: Blood Blood  Gram Stain (04-15-25 @ 00:52):    Growth in aerobic bottle: Gram Positive Cocci in Clusters  Final Report (04-15-25 @ 18:18):    Growth in aerobic bottle: Staphylococcus epidermidis    Isolation of Coagulase negative Staphylococcus from single blood culture    sets may represent    contamination. Contact the Microbiology Department at [phone] if    susceptibility testing is needed.    clinically indicated.    Direct identification is available within approximately 3-5    hours either by Blood Panel Multiplexed PCR or Direct    MALDI-TOF. Details: [email or web address]  Organism: Blood Culture PCR (04-15-25 @ 18:18)  Organism: Blood Culture PCR (04-15-25 @ 18:18)    Sensitivities:      -  Staphylococcus epidermidis: Detec      Method Type: PCR    Culture - Sputum (collected 04-14-25 @ 10:29)  Source: Sputum Sputum  Gram Stain (04-14-25 @ 23:57):    Moderate polymorphonuclear leukocytes per low power field    Rare Squamous epithelial cells per low power field    Rare Yeast like cells per oil power field    Rare Gram positive cocci in pairs per oil power field  Final Report (04-16-25 @ 16:06):    Commensal ruby consistent with body site    Culture - Blood (collected 04-16-25 @ 09:05)  Source: Blood Blood  Final Report (04-21-25 @ 16:00):    No growth at 5 days        RADIOLOGY & ADDITIONAL TESTS:

## 2025-04-22 NOTE — PROGRESS NOTE ADULT - PROVIDER SPECIALTY LIST ADULT
Hospitalist
Internal Medicine
Hospitalist
Internal Medicine

## 2025-05-07 ENCOUNTER — APPOINTMENT (OUTPATIENT)
Dept: ORTHOPEDIC SURGERY | Facility: CLINIC | Age: 75
End: 2025-05-07
Payer: MEDICARE

## 2025-05-07 VITALS
SYSTOLIC BLOOD PRESSURE: 103 MMHG | HEIGHT: 61 IN | DIASTOLIC BLOOD PRESSURE: 66 MMHG | WEIGHT: 125 LBS | BODY MASS INDEX: 23.6 KG/M2 | HEART RATE: 90 BPM

## 2025-05-07 DIAGNOSIS — M70.61 TROCHANTERIC BURSITIS, RIGHT HIP: ICD-10-CM

## 2025-05-07 DIAGNOSIS — Z96.649 PRESENCE OF UNSPECIFIED ARTIFICIAL HIP JOINT: ICD-10-CM

## 2025-05-07 PROCEDURE — G2211 COMPLEX E/M VISIT ADD ON: CPT

## 2025-05-07 PROCEDURE — 99214 OFFICE O/P EST MOD 30 MIN: CPT

## 2025-05-07 PROCEDURE — 73502 X-RAY EXAM HIP UNI 2-3 VIEWS: CPT

## 2025-05-07 RX ORDER — DICLOFENAC SODIUM 10 MG/G
1 GEL TOPICAL DAILY
Qty: 1 | Refills: 2 | Status: ACTIVE | COMMUNITY
Start: 2025-05-07 | End: 1900-01-01

## 2025-05-07 RX ORDER — DICLOFENAC SODIUM 10 MG/G
0 GEL TOPICAL
Qty: 0 | Refills: 0 | DISCHARGE
Start: 2025-05-07

## 2025-05-30 ENCOUNTER — NON-APPOINTMENT (OUTPATIENT)
Age: 75
End: 2025-05-30

## 2025-06-06 ENCOUNTER — APPOINTMENT (OUTPATIENT)
Dept: PULMONOLOGY | Facility: CLINIC | Age: 75
End: 2025-06-06
Payer: MEDICARE

## 2025-06-06 VITALS
OXYGEN SATURATION: 96 % | BODY MASS INDEX: 24.35 KG/M2 | RESPIRATION RATE: 16 BRPM | SYSTOLIC BLOOD PRESSURE: 120 MMHG | HEIGHT: 61 IN | DIASTOLIC BLOOD PRESSURE: 65 MMHG | WEIGHT: 129 LBS | HEART RATE: 88 BPM

## 2025-06-06 PROCEDURE — 99214 OFFICE O/P EST MOD 30 MIN: CPT

## 2025-06-06 PROCEDURE — G2211 COMPLEX E/M VISIT ADD ON: CPT

## 2025-06-11 ENCOUNTER — APPOINTMENT (OUTPATIENT)
Dept: CARDIOLOGY | Facility: CLINIC | Age: 75
End: 2025-06-11
Payer: MEDICARE

## 2025-06-11 ENCOUNTER — NON-APPOINTMENT (OUTPATIENT)
Age: 75
End: 2025-06-11

## 2025-06-11 VITALS
HEART RATE: 68 BPM | HEIGHT: 61 IN | WEIGHT: 129 LBS | BODY MASS INDEX: 24.35 KG/M2 | SYSTOLIC BLOOD PRESSURE: 142 MMHG | DIASTOLIC BLOOD PRESSURE: 82 MMHG

## 2025-06-11 PROCEDURE — 93000 ELECTROCARDIOGRAM COMPLETE: CPT

## 2025-06-11 PROCEDURE — 99214 OFFICE O/P EST MOD 30 MIN: CPT

## 2025-06-16 ENCOUNTER — INPATIENT (INPATIENT)
Facility: HOSPITAL | Age: 75
LOS: 2 days | Discharge: HOME | DRG: 286 | End: 2025-06-19
Attending: HOSPITALIST | Admitting: INTERNAL MEDICINE
Payer: MEDICARE

## 2025-06-16 VITALS
SYSTOLIC BLOOD PRESSURE: 118 MMHG | RESPIRATION RATE: 18 BRPM | OXYGEN SATURATION: 98 % | DIASTOLIC BLOOD PRESSURE: 67 MMHG | HEIGHT: 61 IN | HEART RATE: 89 BPM | WEIGHT: 128.97 LBS | TEMPERATURE: 101 F

## 2025-06-16 DIAGNOSIS — J96.01 ACUTE RESPIRATORY FAILURE WITH HYPOXIA: ICD-10-CM

## 2025-06-16 DIAGNOSIS — Z90.710 ACQUIRED ABSENCE OF BOTH CERVIX AND UTERUS: Chronic | ICD-10-CM

## 2025-06-16 DIAGNOSIS — Z98.891 HISTORY OF UTERINE SCAR FROM PREVIOUS SURGERY: Chronic | ICD-10-CM

## 2025-06-16 LAB
ALBUMIN SERPL ELPH-MCNC: 3.1 G/DL — LOW (ref 3.3–5.2)
ALP SERPL-CCNC: 135 U/L — HIGH (ref 40–120)
ALT FLD-CCNC: 19 U/L — SIGNIFICANT CHANGE UP
ANION GAP SERPL CALC-SCNC: 14 MMOL/L — SIGNIFICANT CHANGE UP (ref 5–17)
ANISOCYTOSIS BLD QL: SLIGHT — SIGNIFICANT CHANGE UP
APTT BLD: 33.8 SEC — SIGNIFICANT CHANGE UP (ref 26.1–36.8)
AST SERPL-CCNC: 24 U/L — SIGNIFICANT CHANGE UP
BASOPHILS # BLD AUTO: 0.02 K/UL — SIGNIFICANT CHANGE UP (ref 0–0.2)
BASOPHILS # BLD MANUAL: 0 K/UL — SIGNIFICANT CHANGE UP (ref 0–0.2)
BASOPHILS NFR BLD AUTO: 0.2 % — SIGNIFICANT CHANGE UP (ref 0–2)
BASOPHILS NFR BLD MANUAL: 0 % — SIGNIFICANT CHANGE UP (ref 0–2)
BILIRUB SERPL-MCNC: 0.2 MG/DL — LOW (ref 0.4–2)
BUN SERPL-MCNC: 12.7 MG/DL — SIGNIFICANT CHANGE UP (ref 8–20)
CALCIUM SERPL-MCNC: 8.9 MG/DL — SIGNIFICANT CHANGE UP (ref 8.4–10.5)
CHLORIDE SERPL-SCNC: 97 MMOL/L — SIGNIFICANT CHANGE UP (ref 96–108)
CO2 SERPL-SCNC: 24 MMOL/L — SIGNIFICANT CHANGE UP (ref 22–29)
CREAT SERPL-MCNC: 0.61 MG/DL — SIGNIFICANT CHANGE UP (ref 0.5–1.3)
EGFR: 94 ML/MIN/1.73M2 — SIGNIFICANT CHANGE UP
EGFR: 94 ML/MIN/1.73M2 — SIGNIFICANT CHANGE UP
EOSINOPHIL # BLD AUTO: 0.04 K/UL — SIGNIFICANT CHANGE UP (ref 0–0.5)
EOSINOPHIL # BLD MANUAL: 0.07 K/UL — SIGNIFICANT CHANGE UP (ref 0–0.5)
EOSINOPHIL NFR BLD AUTO: 0.5 % — SIGNIFICANT CHANGE UP (ref 0–6)
EOSINOPHIL NFR BLD MANUAL: 0.8 % — SIGNIFICANT CHANGE UP (ref 0–6)
GIANT PLATELETS BLD QL SMEAR: PRESENT
GLUCOSE SERPL-MCNC: 118 MG/DL — HIGH (ref 70–99)
HCT VFR BLD CALC: 34 % — LOW (ref 34.5–45)
HGB BLD-MCNC: 11 G/DL — LOW (ref 11.5–15.5)
IMM GRANULOCYTES # BLD AUTO: 0.04 K/UL — SIGNIFICANT CHANGE UP (ref 0–0.07)
IMM GRANULOCYTES NFR BLD AUTO: 0.5 % — SIGNIFICANT CHANGE UP (ref 0–0.9)
INR BLD: 1.05 RATIO — SIGNIFICANT CHANGE UP (ref 0.85–1.16)
LACTATE BLDV-MCNC: 1 MMOL/L — SIGNIFICANT CHANGE UP (ref 0.5–2)
LYMPHOCYTES # BLD AUTO: 0.81 K/UL — LOW (ref 1–3.3)
LYMPHOCYTES # BLD MANUAL: 0.81 K/UL — LOW (ref 1–3.3)
LYMPHOCYTES NFR BLD AUTO: 9.2 % — LOW (ref 13–44)
LYMPHOCYTES NFR BLD MANUAL: 9.2 % — LOW (ref 13–44)
MANUAL NEUTROPHIL BANDS #: 0.37 K/UL — SIGNIFICANT CHANGE UP (ref 0–0.84)
MCHC RBC-ENTMCNC: 28.1 PG — SIGNIFICANT CHANGE UP (ref 27–34)
MCHC RBC-ENTMCNC: 32.4 G/DL — SIGNIFICANT CHANGE UP (ref 32–36)
MCV RBC AUTO: 87 FL — SIGNIFICANT CHANGE UP (ref 80–100)
MONOCYTES # BLD AUTO: 1.72 K/UL — HIGH (ref 0–0.9)
MONOCYTES # BLD MANUAL: 1.25 K/UL — HIGH (ref 0–0.9)
MONOCYTES NFR BLD AUTO: 19.6 % — HIGH (ref 2–14)
MONOCYTES NFR BLD MANUAL: 14.3 % — HIGH (ref 2–14)
NEUTROPHILS # BLD AUTO: 6.14 K/UL — SIGNIFICANT CHANGE UP (ref 1.8–7.4)
NEUTROPHILS # BLD MANUAL: 6.27 K/UL — SIGNIFICANT CHANGE UP (ref 1.8–7.4)
NEUTROPHILS NFR BLD AUTO: 70 % — SIGNIFICANT CHANGE UP (ref 43–77)
NEUTROPHILS NFR BLD MANUAL: 71.5 % — SIGNIFICANT CHANGE UP (ref 43–77)
NEUTS BAND # BLD: 4.2 % — SIGNIFICANT CHANGE UP (ref 0–8)
NEUTS BAND NFR BLD: 4.2 % — SIGNIFICANT CHANGE UP (ref 0–8)
NRBC # BLD AUTO: 0 K/UL — SIGNIFICANT CHANGE UP (ref 0–0)
NRBC # FLD: 0 K/UL — SIGNIFICANT CHANGE UP (ref 0–0)
NRBC BLD AUTO-RTO: 0 /100 WBCS — SIGNIFICANT CHANGE UP (ref 0–0)
NT-PROBNP SERPL-SCNC: 3701 PG/ML — HIGH (ref 0–300)
OVALOCYTES BLD QL SMEAR: SLIGHT — SIGNIFICANT CHANGE UP
PLAT MORPH BLD: NORMAL — SIGNIFICANT CHANGE UP
PLATELET # BLD AUTO: 275 K/UL — SIGNIFICANT CHANGE UP (ref 150–400)
PMV BLD: 10.2 FL — SIGNIFICANT CHANGE UP (ref 7–13)
POIKILOCYTOSIS BLD QL AUTO: SLIGHT — SIGNIFICANT CHANGE UP
POLYCHROMASIA BLD QL SMEAR: SLIGHT — SIGNIFICANT CHANGE UP
POTASSIUM SERPL-MCNC: 3.6 MMOL/L — SIGNIFICANT CHANGE UP (ref 3.5–5.3)
POTASSIUM SERPL-SCNC: 3.6 MMOL/L — SIGNIFICANT CHANGE UP (ref 3.5–5.3)
PROT SERPL-MCNC: 6.7 G/DL — SIGNIFICANT CHANGE UP (ref 6.6–8.7)
PROTHROM AB SERPL-ACNC: 11.9 SEC — SIGNIFICANT CHANGE UP (ref 9.9–13.4)
RAPID RVP RESULT: SIGNIFICANT CHANGE UP
RBC # BLD: 3.91 M/UL — SIGNIFICANT CHANGE UP (ref 3.8–5.2)
RBC # FLD: 16.4 % — HIGH (ref 10.3–14.5)
RBC BLD AUTO: ABNORMAL
SARS-COV-2 RNA SPEC QL NAA+PROBE: SIGNIFICANT CHANGE UP
SODIUM SERPL-SCNC: 135 MMOL/L — SIGNIFICANT CHANGE UP (ref 135–145)
TROPONIN T, HIGH SENSITIVITY RESULT: 83 NG/L — HIGH (ref 0–51)
WBC # BLD: 8.77 K/UL — SIGNIFICANT CHANGE UP (ref 3.8–10.5)
WBC # FLD AUTO: 8.77 K/UL — SIGNIFICANT CHANGE UP (ref 3.8–10.5)

## 2025-06-16 PROCEDURE — 93970 EXTREMITY STUDY: CPT | Mod: 26

## 2025-06-16 PROCEDURE — 99223 1ST HOSP IP/OBS HIGH 75: CPT

## 2025-06-16 PROCEDURE — 71250 CT THORAX DX C-: CPT | Mod: 26

## 2025-06-16 PROCEDURE — 71045 X-RAY EXAM CHEST 1 VIEW: CPT | Mod: 26

## 2025-06-16 PROCEDURE — 99285 EMERGENCY DEPT VISIT HI MDM: CPT

## 2025-06-16 RX ORDER — BREXPIPRAZOLE 0.5 MG/1
1 TABLET ORAL
Refills: 0 | DISCHARGE

## 2025-06-16 RX ORDER — DOCUSATE SODIUM 100 MG
1 CAPSULE ORAL
Refills: 0 | DISCHARGE

## 2025-06-16 RX ORDER — VENLAFAXINE HYDROCHLORIDE 37.5 MG/1
1 CAPSULE, EXTENDED RELEASE ORAL
Refills: 0 | DISCHARGE

## 2025-06-16 RX ORDER — ALPRAZOLAM 0.5 MG
0.5 TABLET, EXTENDED RELEASE 24 HR ORAL
Refills: 0 | Status: DISCONTINUED | OUTPATIENT
Start: 2025-06-16 | End: 2025-06-19

## 2025-06-16 RX ORDER — AZITHROMYCIN 250 MG
500 CAPSULE ORAL ONCE
Refills: 0 | Status: COMPLETED | OUTPATIENT
Start: 2025-06-16 | End: 2025-06-16

## 2025-06-16 RX ORDER — ACETAMINOPHEN 500 MG/5ML
650 LIQUID (ML) ORAL EVERY 6 HOURS
Refills: 0 | Status: DISCONTINUED | OUTPATIENT
Start: 2025-06-16 | End: 2025-06-19

## 2025-06-16 RX ORDER — ATORVASTATIN CALCIUM 80 MG/1
40 TABLET, FILM COATED ORAL AT BEDTIME
Refills: 0 | Status: DISCONTINUED | OUTPATIENT
Start: 2025-06-16 | End: 2025-06-19

## 2025-06-16 RX ORDER — CARVEDILOL 3.12 MG/1
3.12 TABLET, FILM COATED ORAL EVERY 12 HOURS
Refills: 0 | Status: DISCONTINUED | OUTPATIENT
Start: 2025-06-16 | End: 2025-06-19

## 2025-06-16 RX ORDER — ACETAMINOPHEN 500 MG/5ML
1000 LIQUID (ML) ORAL ONCE
Refills: 0 | Status: COMPLETED | OUTPATIENT
Start: 2025-06-16 | End: 2025-06-16

## 2025-06-16 RX ORDER — MELATONIN 5 MG
3 TABLET ORAL AT BEDTIME
Refills: 0 | Status: DISCONTINUED | OUTPATIENT
Start: 2025-06-16 | End: 2025-06-19

## 2025-06-16 RX ORDER — MAGNESIUM, ALUMINUM HYDROXIDE 200-200 MG
30 TABLET,CHEWABLE ORAL EVERY 4 HOURS
Refills: 0 | Status: DISCONTINUED | OUTPATIENT
Start: 2025-06-16 | End: 2025-06-19

## 2025-06-16 RX ORDER — ASPIRIN 325 MG
81 TABLET ORAL DAILY
Refills: 0 | Status: DISCONTINUED | OUTPATIENT
Start: 2025-06-17 | End: 2025-06-19

## 2025-06-16 RX ORDER — ONDANSETRON HCL/PF 4 MG/2 ML
4 VIAL (ML) INJECTION EVERY 8 HOURS
Refills: 0 | Status: DISCONTINUED | OUTPATIENT
Start: 2025-06-16 | End: 2025-06-19

## 2025-06-16 RX ORDER — TIOTROPIUM BROMIDE INHALATION SPRAY 3.12 UG/1
2 SPRAY, METERED RESPIRATORY (INHALATION) DAILY
Refills: 0 | Status: DISCONTINUED | OUTPATIENT
Start: 2025-06-16 | End: 2025-06-19

## 2025-06-16 RX ORDER — ASPIRIN 325 MG
324 TABLET ORAL ONCE
Refills: 0 | Status: COMPLETED | OUTPATIENT
Start: 2025-06-16 | End: 2025-06-16

## 2025-06-16 RX ORDER — FLUTICASONE PROPIONATE 50 UG/1
1 SPRAY, METERED NASAL
Refills: 0 | DISCHARGE

## 2025-06-16 RX ORDER — ATORVASTATIN CALCIUM 80 MG/1
1 TABLET, FILM COATED ORAL
Refills: 0 | DISCHARGE

## 2025-06-16 RX ORDER — IPRATROPIUM BROMIDE AND ALBUTEROL SULFATE .5; 2.5 MG/3ML; MG/3ML
3 SOLUTION RESPIRATORY (INHALATION) EVERY 6 HOURS
Refills: 0 | Status: DISCONTINUED | OUTPATIENT
Start: 2025-06-16 | End: 2025-06-19

## 2025-06-16 RX ORDER — OXYBUTYNIN CHLORIDE 5 MG/1
10 TABLET, FILM COATED, EXTENDED RELEASE ORAL DAILY
Refills: 0 | Status: DISCONTINUED | OUTPATIENT
Start: 2025-06-16 | End: 2025-06-19

## 2025-06-16 RX ORDER — SODIUM CHLORIDE 9 G/1000ML
1000 INJECTION, SOLUTION INTRAVENOUS ONCE
Refills: 0 | Status: COMPLETED | OUTPATIENT
Start: 2025-06-16 | End: 2025-06-16

## 2025-06-16 RX ORDER — FUROSEMIDE 10 MG/ML
40 INJECTION INTRAMUSCULAR; INTRAVENOUS ONCE
Refills: 0 | Status: COMPLETED | OUTPATIENT
Start: 2025-06-16 | End: 2025-06-16

## 2025-06-16 RX ORDER — FUROSEMIDE 10 MG/ML
40 INJECTION INTRAMUSCULAR; INTRAVENOUS DAILY
Refills: 0 | Status: COMPLETED | OUTPATIENT
Start: 2025-06-17 | End: 2025-06-17

## 2025-06-16 RX ORDER — FLUTICASONE FUROATE, UMECLIDINIUM BROMIDE AND VILANTEROL TRIFENATATE 100; 62.5; 25 UG/1; UG/1; UG/1
1 POWDER RESPIRATORY (INHALATION)
Refills: 0 | DISCHARGE

## 2025-06-16 RX ORDER — AZITHROMYCIN 250 MG
500 CAPSULE ORAL EVERY 24 HOURS
Refills: 0 | Status: DISCONTINUED | OUTPATIENT
Start: 2025-06-17 | End: 2025-06-19

## 2025-06-16 RX ORDER — IPRATROPIUM BROMIDE AND ALBUTEROL SULFATE .5; 2.5 MG/3ML; MG/3ML
3 SOLUTION RESPIRATORY (INHALATION) ONCE
Refills: 0 | Status: COMPLETED | OUTPATIENT
Start: 2025-06-16 | End: 2025-06-16

## 2025-06-16 RX ORDER — GABAPENTIN 400 MG/1
600 CAPSULE ORAL
Refills: 0 | Status: DISCONTINUED | OUTPATIENT
Start: 2025-06-16 | End: 2025-06-19

## 2025-06-16 RX ORDER — ENOXAPARIN SODIUM 100 MG/ML
60 INJECTION SUBCUTANEOUS EVERY 24 HOURS
Refills: 0 | Status: DISCONTINUED | OUTPATIENT
Start: 2025-06-16 | End: 2025-06-18

## 2025-06-16 RX ORDER — ROMOSOZUMAB-AQQG 105 MG/1.17ML
210 INJECTION, SOLUTION SUBCUTANEOUS
Refills: 0 | DISCHARGE

## 2025-06-16 RX ORDER — CEFTRIAXONE 500 MG/1
1000 INJECTION, POWDER, FOR SOLUTION INTRAMUSCULAR; INTRAVENOUS EVERY 24 HOURS
Refills: 0 | Status: COMPLETED | OUTPATIENT
Start: 2025-06-16 | End: 2025-06-17

## 2025-06-16 RX ORDER — OXYBUTYNIN CHLORIDE 5 MG/1
10 TABLET, FILM COATED, EXTENDED RELEASE ORAL DAILY
Refills: 0 | Status: DISCONTINUED | OUTPATIENT
Start: 2025-06-16 | End: 2025-06-16

## 2025-06-16 RX ORDER — VENLAFAXINE HYDROCHLORIDE 37.5 MG/1
225 CAPSULE, EXTENDED RELEASE ORAL DAILY
Refills: 0 | Status: DISCONTINUED | OUTPATIENT
Start: 2025-06-16 | End: 2025-06-19

## 2025-06-16 RX ADMIN — SODIUM CHLORIDE 1000 MILLILITER(S): 9 INJECTION, SOLUTION INTRAVENOUS at 10:55

## 2025-06-16 RX ADMIN — Medication 1 DOSE(S): at 21:29

## 2025-06-16 RX ADMIN — GABAPENTIN 600 MILLIGRAM(S): 400 CAPSULE ORAL at 18:05

## 2025-06-16 RX ADMIN — Medication 400 MILLIGRAM(S): at 10:54

## 2025-06-16 RX ADMIN — ATORVASTATIN CALCIUM 40 MILLIGRAM(S): 80 TABLET, FILM COATED ORAL at 21:43

## 2025-06-16 RX ADMIN — CEFTRIAXONE 1000 MILLIGRAM(S): 500 INJECTION, POWDER, FOR SOLUTION INTRAMUSCULAR; INTRAVENOUS at 14:32

## 2025-06-16 RX ADMIN — Medication 650 MILLIGRAM(S): at 21:43

## 2025-06-16 RX ADMIN — Medication 250 MILLIGRAM(S): at 14:31

## 2025-06-16 RX ADMIN — ENOXAPARIN SODIUM 60 MILLIGRAM(S): 100 INJECTION SUBCUTANEOUS at 21:43

## 2025-06-16 RX ADMIN — FUROSEMIDE 40 MILLIGRAM(S): 10 INJECTION INTRAMUSCULAR; INTRAVENOUS at 13:13

## 2025-06-16 RX ADMIN — SODIUM CHLORIDE 1000 MILLILITER(S): 9 INJECTION, SOLUTION INTRAVENOUS at 11:20

## 2025-06-16 RX ADMIN — IPRATROPIUM BROMIDE AND ALBUTEROL SULFATE 3 MILLILITER(S): .5; 2.5 SOLUTION RESPIRATORY (INHALATION) at 21:28

## 2025-06-16 RX ADMIN — IPRATROPIUM BROMIDE AND ALBUTEROL SULFATE 3 MILLILITER(S): .5; 2.5 SOLUTION RESPIRATORY (INHALATION) at 10:54

## 2025-06-16 RX ADMIN — CARVEDILOL 3.12 MILLIGRAM(S): 3.12 TABLET, FILM COATED ORAL at 18:05

## 2025-06-16 RX ADMIN — Medication 650 MILLIGRAM(S): at 22:43

## 2025-06-16 RX ADMIN — Medication 324 MILLIGRAM(S): at 13:13

## 2025-06-16 NOTE — ED PROVIDER NOTE - TEMPLATE, MLM
Principal Discharge DX:	Primary osteoarthritis of right hip  Goal:	pain control-> pain med may cause drowsiness, refrain from activities require decision making; physical therapy to help resume activities of daily living  Instructions for follow-up, activity and diet:	call Surgeon's office to make appointment in 1 week Dr. Clements [phone]  weight bearing as tolerated to Right leg  TOTAL HIP PRECAUTIONS General Principal Discharge DX:	Primary osteoarthritis of right hip  Goal:	pain control-> pain med may cause drowsiness, refrain from activities require decision making; physical therapy to help resume activities of daily living  Instructions for follow-up, activity and diet:	call Surgeon's office to make appointment in 1 week Dr. Clements [phone]  weight bearing as tolerated to Right leg  TOTAL HIP PRECAUTIONS Principal Discharge DX:	Primary osteoarthritis of right hip  Goal:	pain control-> pain med may cause drowsiness, refrain from activities require decision making; physical therapy to help resume activities of daily living  Instructions for follow-up, activity and diet:	call Surgeon's office to make appointment in 1 week Dr. Clements [phone]  weight bearing as tolerated to Right leg  TOTAL HIP PRECAUTIONS Principal Discharge DX:	Primary osteoarthritis of right hip  Goal:	pain control-> pain med may cause drowsiness, refrain from activities require decision making; physical therapy to help resume activities of daily living  Instructions for follow-up, activity and diet:	call Surgeon's office to make appointment in 1 week Dr. Clements [phone]  weight bearing as tolerated to Right leg  TOTAL HIP PRECAUTIONS Principal Discharge DX:	Primary osteoarthritis of right hip  Goal:	pain control-> pain med may cause drowsiness, refrain from activities require decision making; physical therapy to help resume activities of daily living  Instructions for follow-up, activity and diet:	call Surgeon's office to make appointment in 1 week Dr. Clements [phone]  weight bearing as tolerated to Right leg  TOTAL HIP PRECAUTIONS Principal Discharge DX:	Primary osteoarthritis of right hip  Goal:	pain control-> pain med may cause drowsiness, refrain from activities require decision making; physical therapy to help resume activities of daily living  Instructions for follow-up, activity and diet:	call Surgeon's office to make appointment in 1 week Dr. Clements [phone]  weight bearing as tolerated to Right leg  TOTAL HIP PRECAUTIONS Principal Discharge DX:	Primary osteoarthritis of right hip  Goal:	pain control-> pain med may cause drowsiness, refrain from activities require decision making; physical therapy to help resume activities of daily living  Instructions for follow-up, activity and diet:	call Surgeon's office to make appointment in 1 week Dr. Clements [phone]  weight bearing as tolerated to Right leg  TOTAL HIP PRECAUTIONS Principal Discharge DX:	Primary osteoarthritis of right hip  Goal:	pain control-> pain med may cause drowsiness, refrain from activities require decision making; physical therapy to help resume activities of daily living  Instructions for follow-up, activity and diet:	call Surgeon's office to make appointment in 1 week Dr. Clements [phone]  weight bearing as tolerated to Right leg  TOTAL HIP PRECAUTIONS Principal Discharge DX:	Primary osteoarthritis of right hip  Goal:	pain control-> pain med may cause drowsiness, refrain from activities require decision making; physical therapy to help resume activities of daily living  Instructions for follow-up, activity and diet:	call Surgeon's office to make appointment in 1 week Dr. Clements [phone]  weight bearing as tolerated to Right leg  TOTAL HIP PRECAUTIONS

## 2025-06-16 NOTE — H&P ADULT - ASSESSMENT
ASSESSMENT:  74F with PMHX CAD, NICM, HFimpEF (54%), HTN, HLD, COPD on O2 NC 2-4L PRN, MDD, Anxiety, Insomnia presented to [facility] ER c/o SOB/DAVENPORT and palpitations and cough admitted for Acute on Chronic Hypoxic Respiratory Failure 2/2 PNA vs CHF Exacerbation c/b NSTEMI.    PLAN:  Acute on Chronic Hypoxic Respiratory Failure 2/2 PNA vs CHF Exacerbation  -pBNP 3701  -Trend Cardiac Enzymes  -Check TTE  -BL LE Dopplers negative  -CXR +RLL airspace opacity vs effusion  -Check CT Chest WO   -RVP negative  -Lactate Negative  -BCX pending  -Check Atypicals/MRSA/Sputum  -Ceftriaxone 1g q24 + Azithromycin 500mg IV q24  -Lasix 40mg IV q24    NSTEMI  -Likely demand from CHF vs PNA  -CT Chest WO to evaluat RLL infiltrates CHF v PNA  -TTE pending   -Lovenox 60mg q24   -ASA 325mg PO x1 -> Cont ASA 81mg q24  -Cardio Consulted reccs appreciated    Chronic Hyponatreima  -NaCl 1g BID  -Monitor BMP    MDD, Anxiety  -Xanax 0.5mg BID PRN  -Venlafaxine 225mg q24  -Brexipaprazole 0.25mg q24 -> need to obtain from home and restart when able  -Ambien qhs PRN    CAD, NICM, HFimpEF (54%), HTN, HLD  -ASA 81mg q24  -Atorvastatin 40mg qHS  -Carvedilol 3.125mg BID  -Valsartan 80mg q24    COPD  -O2 NC 2-4L PRN  -No wheezing to suggest acute exacerbation  -Breo Ellipta   -Duoneb q6    VTE PPX: Weight-Based Lovenox per Cardio 60mg q24  DISPO: Admit for workup above.

## 2025-06-16 NOTE — H&P ADULT - NSHPLABSRESULTS_GEN_ALL_CORE
CXR IMPRESSION:  Elevated RIGHT hemidiaphragm with an RIGHT and mid/lower zone airspace   disease and/or effusion. Confirmatory lateral radiograph recommended.    BL LE Venous Dopplers IMPRESSION:  No evidence of deep venous thrombosis in either lower extremity.

## 2025-06-16 NOTE — H&P ADULT - HISTORY OF PRESENT ILLNESS
74F with PMHX CAD, NICM, HFimpEF (54%), HTN, HLD, COPD on O2 NC 2-4L PRN, MDD, Anxiety, Insomnia presented to [facility] ER c/o SOB/[place]VENPORT and palpitations. ROS +nonproductive cough. Febrile in ED Tmax 100.9F. Pt uses O2 via NC as needed but currently satting 84% on RA at rest and improved on 3L NC to mid 90s. CXR +RLL infiltrate effusion vs opacity. Labwork notable for elevated troponin. RVP negative. Noted to be febrile in ED Tmax 100.9F. Pt treated with Ceftriaxone/Azithromycin for suspected PNA. Given Lasix 40mg IV x1 for suspected CHF. Denies other complaints.    ROS negative unless mentioned.

## 2025-06-16 NOTE — PHARMACOTHERAPY INTERVENTION NOTE - COMMENTS
Referenced outpatient medical fill records and spoke with outpatient pharmacies to obtain medication list.

## 2025-06-16 NOTE — ED PROVIDER NOTE - CARE PLAN
1 Principal Discharge DX:	Acute respiratory failure with hypoxia  Secondary Diagnosis:	Elevated troponin

## 2025-06-16 NOTE — ED ADULT NURSE NOTE - CHPI ED NUR RELIEVING FX
Spoke with pt. She stated she no longer needs appt, she is feeling better. Thank you  
none
Acute illness or injury

## 2025-06-16 NOTE — CONSULT NOTE ADULT - ASSESSMENT
74-year-old female with a past medical history of CAD s/p PCI, prior severe CMP with improved EF, hypertension, COPD, presents with SOB. pt reports 3 days of exertional dyspnea and palpitations, denies SOB at rest and no chest pain. She reports associated non-productive cough. Pt found to be febrile and hypoxic upon arrival here. In the ED patient noted to have elevated troponin levels prompting cardiology consultation.       Elevated troponin level of unclear etiology     Plan   continue with telemetry   trend troponin level to peak   echocardiogram   asa+ statin   full dose of AC with lovenox   CT chest for further characterization of recurrent PNAs  rest as per primary team       d/w Dr Guerra

## 2025-06-16 NOTE — ED ADULT NURSE REASSESSMENT NOTE - NS ED NURSE REASSESS COMMENT FT1
PT resting in stretcher at this time. Patient pending admission to CDU unit. Maintained on cardiac monitor.

## 2025-06-16 NOTE — H&P ADULT - NSHPPHYSICALEXAM_GEN_ALL_CORE
Vital Signs Last 24 Hrs  T(C): 37 (16 Jun 2025 16:15), Max: 38.3 (16 Jun 2025 10:00)  T(F): 98.6 (16 Jun 2025 16:15), Max: 100.9 (16 Jun 2025 10:00)  HR: 80 (16 Jun 2025 16:15) (79 - 89)  BP: 101/57 (16 Jun 2025 16:15) (101/57 - 122/73)  BP(mean): 84 (16 Jun 2025 11:24) (84 - 84)  RR: 20 (16 Jun 2025 16:15) (18 - 30)  SpO2: 100% (16 Jun 2025 16:15) (84% - 100%)    Parameters below as of 16 Jun 2025 16:15  Patient On (Oxygen Delivery Method): nasal cannula, high flow  O2 Flow (L/min): 2    Constitutional: NAD, VSS  Head: NC/AT  Eyes: PERRL, EOMI, anicteric sclera, conjunctiva WNL  ENT: Normal Pharynx, MMM, No tonsillar exudate/erythema  Neck: Supple, Non-tender  Chest: Non-tender, no rashes  Cardio: RRR, s1/s2, no appreciable murmurs/rubs/gallops  Resp: Decreased R side  Abd: Soft, Non-tender, Non-distended, no rebound/guarding/rigidity  : not examined  Rectal: not examined  MSK: moving all extremities, no motor weakness, full ROM x4  Ext: palpable distal pulses, good capillary refill, no clubbing/cyanosis/edema  Psych: appropriate, cooperative  Neuro: CN II-XII grossly intact, no focal deficits  Skin: Warm/Dry. No rashes.

## 2025-06-16 NOTE — ED ADULT NURSE NOTE - NSFALLRISKINTERV_ED_ALL_ED
Assistance with ambulation/Communicate fall risk and risk factors to all staff, patient, and family/Provide visual cue: yellow wristband, yellow gown, etc/Reinforce activity limits and safety measures with patient and family/Call bell, personal items and telephone in reach/Instruct patient to call for assistance before getting out of bed/chair/stretcher/Non-slip footwear applied when patient is off stretcher/[place] to call system/Physically safe environment - no spills, clutter or unnecessary equipment/Purposeful Proactive Rounding/Room/bathroom lighting operational, light cord in reach

## 2025-06-16 NOTE — ED ADULT TRIAGE NOTE - CHIEF COMPLAINT QUOTE
C/O palpations and SOB x3 days. Denies chest pain. As per EMS pt was 90% o2 saturation on room air  and placed on 4L nc. Hx of asthma.

## 2025-06-16 NOTE — ED ADULT NURSE NOTE - OBJECTIVE STATEMENT
PT presents to ED for 3-4 days of intermittent palpitations exertional SOB with increased O2 requirement, and headache. PT states she wears 2L NC at home PRN. Recently has been requiring 4LNC. PT found to be febrile in ED. Sepsis labs obtained. PT medicated per orders

## 2025-06-16 NOTE — ED PROVIDER NOTE - IV ALTEPLASE EXCL REL HIDDEN
Pt to be seen in  [facility] today. Writer to review Warfarin instructions.  Chart review notes instructions for 3 day HOLD.  Please confirm ok to hold x 3 days for upcoming cath on 1/20 and no bridge required.     show

## 2025-06-16 NOTE — CONSULT NOTE ADULT - SUBJECTIVE AND OBJECTIVE BOX
CHIEF COMPLAINT:    HPI: 74-year-old female with a past medical history of CAD s/p PCI, prior severe CMP with improved EF, hypertension, COPD, presents with SOB. pt reports 3 days of exertional dyspnea and palpitations NO SOB at rest and no chest pain. She reports associated non-productive cough. Pt found to be febrile and hypoxic upon arrival here. In the ED patient noted to have elevated troponin levels prompting cardiology consultation       PAST MEDICAL & SURGICAL HISTORY:  NICM (nonischemic cardiomyopathy)      HTN (hypertension)      Rheumatoid arthritis      Chronic back pain      HLD (hyperlipidemia)      Chronic obstructive asthma      Cellulitis      S/P       S/P hysterectomy          Allergies    No Known Allergies    Intolerances        MEDICATIONS  (STANDING):  azithromycin  IVPB 500 milliGRAM(s) IV Intermittent once  cefTRIAXone Injectable. 1000 milliGRAM(s) IV Push every 24 hours    MEDICATIONS  (PRN):      FAMILY HISTORY:  Family history of diabetes mellitus (DM)    FH: CAD (coronary artery disease)        ***No family history of premature coronary artery disease or sudden cardiac death    SOCIAL HISTORY:  Smoking-  Alcohol-  Illicit Drug use-    REVIEW OF SYSTEMS:  Constitutional: [ ] fever, [ ]weight loss,  [ ]fatigue  Eyes: [ ] visual changes  Respiratory: [ ]shortness of breath;  [ ] cough, [ ]wheezing, [ ]chills, [ ]hemoptysis  Cardiovascular: [ ] chest pain, [ ]palpitations, [ ]dizziness,  [ ]leg swelling [ ]syncope  Gastrointestinal: [ ] abdominal pain, [ ]nausea, [ ]vomiting,  [ ]diarrhea   Genitourinary: [ ] dysuria, [ ] hematuria  Neurologic: [ ] headaches [ ] tremors  [ ] weakness [ ] lightheadedness  Skin: [ ] itching, [ ]burning, [ ] rashes  Endocrine: [ ] heat or cold intolerance  Musculoskeletal: [ ] joint pain or swelling; [ ] muscle, back, or extremity pain  Psychiatric: [ ] depression, [ ]anxiety, [ ]mood swings, or [ ]difficulty sleeping  Hematologic: [ ] easy bruising, [ ] bleeding gums     [ x] All others negative	  [ ] Unable to obtain    Vital Signs Last 24 Hrs  T(C): 37.1 (2025 13:17), Max: 38.3 (2025 10:00)  T(F): 98.8 (2025 13:17), Max: 100.9 (2025 10:00)  HR: 79 (2025 13:17) (79 - 89)  BP: 122/73 (2025 13:17) (114/69 - 122/73)  BP(mean): 84 (2025 11:24) (84 - 84)  RR: 20 (2025 13:17) (18 - 30)  SpO2: 100% (2025 13:17) (84% - 100%)    Parameters below as of 2025 13:17  Patient On (Oxygen Delivery Method): nasal cannula  O2 Flow (L/min): 2    I&O's Summary      PHYSICAL EXAM:  General: No acute distress  HEENT: EOMI  Neck:  No JVD  Lungs: Clear to auscultation bilaterally; No rales or wheezing  Heart: Regular rate and rhythm; No murmurs, rubs, or gallops  Abdomen: soft, non tender, non distended   Extremities: warm, no edema   Nervous system:  Alert & Oriented X3  Psychiatric: Normal affect  Skin: No rashes or lesions    LABS:      135  |  97  |  12.7  ----------------------------<  118[H]  3.6   |  24.0  |  0.61    Ca    8.9      2025 10:20  Mg     1.8     -    TPro  6.7  /  Alb  3.1[L]  /  TBili  0.2[L]  /  DBili  x   /  AST  24  /  ALT  19  /  AlkPhos  135[H]  06-16    Creatinine Trend: 0.61<--                        11.0   8.77  )-----------( 275      ( 2025 10:20 )             34.0     PT/INR - ( 2025 10:20 )   PT: 11.9 sec;   INR: 1.05 ratio         PTT - ( 2025 10:20 )  PTT:33.8 sec    Lipid Panel: Cholesterol, Serum 101  Direct LDL --  HDL Cholesterol, Serum 16  Triglycerides, Serum 197    Cardiac Enzymes:           RADIOLOGY:    ECG25: NSR, LAFB    TELEMETRY:    Nuclear stress test 2023 was a pharmacologic study which was tolerated well. Blood pressure response to infusion was normal. EKG showed no evidence of ischemia with infusion. Evaluation of nuclear imaging showed no evidence of ischemia infarct and ejection fraction of 58%.  ?  Echocardiogram at [facility] 2024 demonstrated left ventricle normal in size and function with calculated ejection fraction of 56%. Grade 1 left ventricular diastolic dysfunction. Trace aortic, mild mitral and mild tricuspid regurgitation noted.    CATHETERIZATION: CHIEF COMPLAINT:    HPI: 74-year-old female with a past medical history of CAD s/p PCI, prior severe CMP with improved EF, hypertension, COPD, presents with SOB. pt reports 3 days of exertional dyspnea and palpitations, denies SOB at rest and no chest pain. She reports associated non-productive cough. Pt found to be febrile and hypoxic upon arrival here. In the ED patient noted to have elevated troponin levels prompting cardiology consultation.       PAST MEDICAL & SURGICAL HISTORY:  NICM (nonischemic cardiomyopathy)      HTN (hypertension)      Rheumatoid arthritis      Chronic back pain      HLD (hyperlipidemia)      Chronic obstructive asthma      Cellulitis      S/P       S/P hysterectomy          Allergies    No Known Allergies    Intolerances        MEDICATIONS  (STANDING):  azithromycin  IVPB 500 milliGRAM(s) IV Intermittent once  cefTRIAXone Injectable. 1000 milliGRAM(s) IV Push every 24 hours    MEDICATIONS  (PRN):      FAMILY HISTORY:  Family history of diabetes mellitus (DM)    FH: CAD (coronary artery disease)        ***No family history of premature coronary artery disease or sudden cardiac death    SOCIAL HISTORY:  Smoking-  Alcohol-  Illicit Drug use-    REVIEW OF SYSTEMS:  Constitutional: [ ] fever, [ ]weight loss,  [ ]fatigue  Eyes: [ ] visual changes  Respiratory: [x ]shortness of breath;  [ ] cough, [ ]wheezing, [ ]chills, [ ]hemoptysis  Cardiovascular: [ ] chest pain, [ ]palpitations, [ ]dizziness,  [ ]leg swelling [ ]syncope  Gastrointestinal: [ ] abdominal pain, [ ]nausea, [ ]vomiting,  [ ]diarrhea   Genitourinary: [ ] dysuria, [ ] hematuria  Neurologic: [ ] headaches [ ] tremors  [ ] weakness [ ] lightheadedness  Skin: [ ] itching, [ ]burning, [ ] rashes  Endocrine: [ ] heat or cold intolerance  Musculoskeletal: [ ] joint pain or swelling; [ ] muscle, back, or extremity pain  Psychiatric: [ ] depression, [ ]anxiety, [ ]mood swings, or [ ]difficulty sleeping  Hematologic: [ ] easy bruising, [ ] bleeding gums     [ x] All others negative	  [ ] Unable to obtain    Vital Signs Last 24 Hrs  T(C): 37.1 (2025 13:17), Max: 38.3 (2025 10:00)  T(F): 98.8 (2025 13:17), Max: 100.9 (2025 10:00)  HR: 79 (2025 13:17) (79 - 89)  BP: 122/73 (2025 13:17) (114/69 - 122/73)  BP(mean): 84 (2025 11:24) (84 - 84)  RR: 20 (2025 13:17) (18 - 30)  SpO2: 100% (2025 13:17) (84% - 100%)    Parameters below as of 2025 13:17  Patient On (Oxygen Delivery Method): nasal cannula  O2 Flow (L/min): 2    I&O's Summary      PHYSICAL EXAM:  General: No acute distress  HEENT: EOMI  Neck:  No JVD  Lungs: Clear to auscultation bilaterally; No rales or wheezing  Heart: Regular rate and rhythm; No murmurs, rubs, or gallops  Abdomen: soft, non tender, non distended   Extremities: warm, no edema   Nervous system:  Alert & Oriented X3  Psychiatric: Normal affect  Skin: No rashes or lesions    LABS:      135  |  97  |  12.7  ----------------------------<  118[H]  3.6   |  24.0  |  0.61    Ca    8.9      2025 10:20  Mg     1.8     06-16    TPro  6.7  /  Alb  3.1[L]  /  TBili  0.2[L]  /  DBili  x   /  AST  24  /  ALT  19  /  AlkPhos  135[H]  06-16    Creatinine Trend: 0.61<--                        11.0   8.77  )-----------( 275      ( 2025 10:20 )             34.0     PT/INR - ( 2025 10:20 )   PT: 11.9 sec;   INR: 1.05 ratio         PTT - ( 2025 10:20 )  PTT:33.8 sec    Lipid Panel: Cholesterol, Serum 101  Direct LDL --  HDL Cholesterol, Serum 16  Triglycerides, Serum 197    Cardiac Enzymes:           RADIOLOGY:    ECG25: NSR, LAFB    TELEMETRY: SR    Nuclear stress test 2023 was a pharmacologic study which was tolerated well. Blood pressure response to infusion was normal. EKG showed no evidence of ischemia with infusion. Evaluation of nuclear imaging showed no evidence of ischemia infarct and ejection fraction of 58%.  ?  Echocardiogram at [facility] 2024 demonstrated left ventricle normal in size and function with calculated ejection fraction of 56%. Grade 1 left ventricular diastolic dysfunction. Trace aortic, mild mitral and mild tricuspid regurgitation noted.    CATHETERIZATION:

## 2025-06-16 NOTE — ED PROVIDER NOTE - CLINICAL SUMMARY MEDICAL DECISION MAKING FREE TEXT BOX
pt with hypoxia, likely multifactorial due to CHF and possibly infectious etiology. Pt noted to have elevated trop with no acute ischemia on ekg and no chest pain, likely secondary to the hypoxia and chf. Will trend, pt admitted to tele

## 2025-06-16 NOTE — ED PROVIDER NOTE - OBJECTIVE STATEMENT
74 year old female with PMH COPD on home O2 prn, NICM and HCF< HTN, HLD presents with SOB. pt reports 3 days of exertional dyspnea and palpitations NO SOB at rest and no chest pain. She reports associated non-productive cough. Pt found to be febrile and hypoxic upon arrival here.

## 2025-06-17 ENCOUNTER — RESULT REVIEW (OUTPATIENT)
Age: 75
End: 2025-06-17

## 2025-06-17 LAB
ALBUMIN SERPL ELPH-MCNC: 3.1 G/DL — LOW (ref 3.3–5.2)
ALP SERPL-CCNC: 129 U/L — HIGH (ref 40–120)
ALT FLD-CCNC: 20 U/L — SIGNIFICANT CHANGE UP
ANION GAP SERPL CALC-SCNC: 12 MMOL/L — SIGNIFICANT CHANGE UP (ref 5–17)
APPEARANCE UR: CLEAR — SIGNIFICANT CHANGE UP
AST SERPL-CCNC: 31 U/L — SIGNIFICANT CHANGE UP
BACTERIA # UR AUTO: NEGATIVE /HPF — SIGNIFICANT CHANGE UP
BASOPHILS # BLD AUTO: 0.02 K/UL — SIGNIFICANT CHANGE UP (ref 0–0.2)
BASOPHILS NFR BLD AUTO: 0.3 % — SIGNIFICANT CHANGE UP (ref 0–2)
BILIRUB SERPL-MCNC: <0.2 MG/DL — LOW (ref 0.4–2)
BILIRUB UR-MCNC: NEGATIVE — SIGNIFICANT CHANGE UP
BUN SERPL-MCNC: 12.1 MG/DL — SIGNIFICANT CHANGE UP (ref 8–20)
CALCIUM SERPL-MCNC: 8.8 MG/DL — SIGNIFICANT CHANGE UP (ref 8.4–10.5)
CAST: 1 /LPF — SIGNIFICANT CHANGE UP (ref 0–4)
CHLORIDE SERPL-SCNC: 97 MMOL/L — SIGNIFICANT CHANGE UP (ref 96–108)
CO2 SERPL-SCNC: 26 MMOL/L — SIGNIFICANT CHANGE UP (ref 22–29)
COLOR SPEC: YELLOW — SIGNIFICANT CHANGE UP
CREAT SERPL-MCNC: 0.58 MG/DL — SIGNIFICANT CHANGE UP (ref 0.5–1.3)
DIFF PNL FLD: NEGATIVE — SIGNIFICANT CHANGE UP
EGFR: 95 ML/MIN/1.73M2 — SIGNIFICANT CHANGE UP
EGFR: 95 ML/MIN/1.73M2 — SIGNIFICANT CHANGE UP
EOSINOPHIL # BLD AUTO: 0.13 K/UL — SIGNIFICANT CHANGE UP (ref 0–0.5)
EOSINOPHIL NFR BLD AUTO: 1.9 % — SIGNIFICANT CHANGE UP (ref 0–6)
GLUCOSE SERPL-MCNC: 110 MG/DL — HIGH (ref 70–99)
GLUCOSE UR QL: NEGATIVE MG/DL — SIGNIFICANT CHANGE UP
HCT VFR BLD CALC: 35 % — SIGNIFICANT CHANGE UP (ref 34.5–45)
HGB BLD-MCNC: 11.4 G/DL — LOW (ref 11.5–15.5)
IMM GRANULOCYTES # BLD AUTO: 0.04 K/UL — SIGNIFICANT CHANGE UP (ref 0–0.07)
IMM GRANULOCYTES NFR BLD AUTO: 0.6 % — SIGNIFICANT CHANGE UP (ref 0–0.9)
KETONES UR QL: NEGATIVE MG/DL — SIGNIFICANT CHANGE UP
LEUKOCYTE ESTERASE UR-ACNC: NEGATIVE — SIGNIFICANT CHANGE UP
LYMPHOCYTES # BLD AUTO: 1.46 K/UL — SIGNIFICANT CHANGE UP (ref 1–3.3)
LYMPHOCYTES NFR BLD AUTO: 21.1 % — SIGNIFICANT CHANGE UP (ref 13–44)
MAGNESIUM SERPL-MCNC: 1.6 MG/DL — SIGNIFICANT CHANGE UP (ref 1.6–2.6)
MCHC RBC-ENTMCNC: 27.9 PG — SIGNIFICANT CHANGE UP (ref 27–34)
MCHC RBC-ENTMCNC: 32.6 G/DL — SIGNIFICANT CHANGE UP (ref 32–36)
MCV RBC AUTO: 85.6 FL — SIGNIFICANT CHANGE UP (ref 80–100)
MONOCYTES # BLD AUTO: 1.32 K/UL — HIGH (ref 0–0.9)
MONOCYTES NFR BLD AUTO: 19 % — HIGH (ref 2–14)
MRSA PCR RESULT.: SIGNIFICANT CHANGE UP
NEUTROPHILS # BLD AUTO: 3.96 K/UL — SIGNIFICANT CHANGE UP (ref 1.8–7.4)
NEUTROPHILS NFR BLD AUTO: 57.1 % — SIGNIFICANT CHANGE UP (ref 43–77)
NITRITE UR-MCNC: NEGATIVE — SIGNIFICANT CHANGE UP
NRBC # BLD AUTO: 0 K/UL — SIGNIFICANT CHANGE UP (ref 0–0)
NRBC # FLD: 0 K/UL — SIGNIFICANT CHANGE UP (ref 0–0)
NRBC BLD AUTO-RTO: 0 /100 WBCS — SIGNIFICANT CHANGE UP (ref 0–0)
PH UR: 6.5 — SIGNIFICANT CHANGE UP (ref 5–8)
PHOSPHATE SERPL-MCNC: 3.2 MG/DL — SIGNIFICANT CHANGE UP (ref 2.4–4.7)
PLATELET # BLD AUTO: 252 K/UL — SIGNIFICANT CHANGE UP (ref 150–400)
PMV BLD: 10.3 FL — SIGNIFICANT CHANGE UP (ref 7–13)
POTASSIUM SERPL-MCNC: 3.4 MMOL/L — LOW (ref 3.5–5.3)
POTASSIUM SERPL-SCNC: 3.4 MMOL/L — LOW (ref 3.5–5.3)
PROT SERPL-MCNC: 6.7 G/DL — SIGNIFICANT CHANGE UP (ref 6.6–8.7)
PROT UR-MCNC: 30 MG/DL
RBC # BLD: 4.09 M/UL — SIGNIFICANT CHANGE UP (ref 3.8–5.2)
RBC # FLD: 16.3 % — HIGH (ref 10.3–14.5)
RBC CASTS # UR COMP ASSIST: 1 /HPF — SIGNIFICANT CHANGE UP (ref 0–4)
S AUREUS DNA NOSE QL NAA+PROBE: DETECTED
SODIUM SERPL-SCNC: 135 MMOL/L — SIGNIFICANT CHANGE UP (ref 135–145)
SP GR SPEC: 1.01 — SIGNIFICANT CHANGE UP (ref 1–1.03)
SQUAMOUS # UR AUTO: 1 /HPF — SIGNIFICANT CHANGE UP (ref 0–5)
TROPONIN T, HIGH SENSITIVITY RESULT: 66 NG/L — HIGH (ref 0–51)
UROBILINOGEN FLD QL: 0.2 MG/DL — SIGNIFICANT CHANGE UP (ref 0.2–1)
WBC # BLD: 6.93 K/UL — SIGNIFICANT CHANGE UP (ref 3.8–10.5)
WBC # FLD AUTO: 6.93 K/UL — SIGNIFICANT CHANGE UP (ref 3.8–10.5)
WBC UR QL: 1 /HPF — SIGNIFICANT CHANGE UP (ref 0–5)

## 2025-06-17 PROCEDURE — 99232 SBSQ HOSP IP/OBS MODERATE 35: CPT

## 2025-06-17 PROCEDURE — 99233 SBSQ HOSP IP/OBS HIGH 50: CPT

## 2025-06-17 PROCEDURE — 93306 TTE W/DOPPLER COMPLETE: CPT | Mod: 26

## 2025-06-17 RX ORDER — OXYCODONE HYDROCHLORIDE 30 MG/1
5 TABLET ORAL ONCE
Refills: 0 | Status: DISCONTINUED | OUTPATIENT
Start: 2025-06-17 | End: 2025-06-17

## 2025-06-17 RX ORDER — MAGNESIUM SULFATE 500 MG/ML
2 SYRINGE (ML) INJECTION ONCE
Refills: 0 | Status: COMPLETED | OUTPATIENT
Start: 2025-06-17 | End: 2025-06-17

## 2025-06-17 RX ORDER — PREDNISONE 20 MG/1
40 TABLET ORAL DAILY
Refills: 0 | Status: DISCONTINUED | OUTPATIENT
Start: 2025-06-17 | End: 2025-06-19

## 2025-06-17 RX ORDER — OXYCODONE HYDROCHLORIDE 30 MG/1
5 TABLET ORAL THREE TIMES A DAY
Refills: 0 | Status: DISCONTINUED | OUTPATIENT
Start: 2025-06-17 | End: 2025-06-19

## 2025-06-17 RX ORDER — MAGNESIUM OXIDE 400 MG
400 TABLET ORAL
Refills: 0 | Status: COMPLETED | OUTPATIENT
Start: 2025-06-17 | End: 2025-06-19

## 2025-06-17 RX ADMIN — Medication 1 GRAM(S): at 05:09

## 2025-06-17 RX ADMIN — OXYCODONE HYDROCHLORIDE 5 MILLIGRAM(S): 30 TABLET ORAL at 20:48

## 2025-06-17 RX ADMIN — Medication 1 DOSE(S): at 08:13

## 2025-06-17 RX ADMIN — Medication 40 MILLIEQUIVALENT(S): at 09:29

## 2025-06-17 RX ADMIN — Medication 5 MILLIGRAM(S): at 00:25

## 2025-06-17 RX ADMIN — FUROSEMIDE 40 MILLIGRAM(S): 10 INJECTION INTRAMUSCULAR; INTRAVENOUS at 05:09

## 2025-06-17 RX ADMIN — Medication 25 GRAM(S): at 09:00

## 2025-06-17 RX ADMIN — GABAPENTIN 600 MILLIGRAM(S): 400 CAPSULE ORAL at 18:04

## 2025-06-17 RX ADMIN — Medication 250 MILLIGRAM(S): at 14:20

## 2025-06-17 RX ADMIN — PREDNISONE 40 MILLIGRAM(S): 20 TABLET ORAL at 18:04

## 2025-06-17 RX ADMIN — GABAPENTIN 600 MILLIGRAM(S): 400 CAPSULE ORAL at 05:09

## 2025-06-17 RX ADMIN — IPRATROPIUM BROMIDE AND ALBUTEROL SULFATE 3 MILLILITER(S): .5; 2.5 SOLUTION RESPIRATORY (INHALATION) at 08:13

## 2025-06-17 RX ADMIN — Medication 400 MILLIGRAM(S): at 18:04

## 2025-06-17 RX ADMIN — OXYCODONE HYDROCHLORIDE 5 MILLIGRAM(S): 30 TABLET ORAL at 11:19

## 2025-06-17 RX ADMIN — Medication 81 MILLIGRAM(S): at 11:13

## 2025-06-17 RX ADMIN — OXYCODONE HYDROCHLORIDE 5 MILLIGRAM(S): 30 TABLET ORAL at 03:48

## 2025-06-17 RX ADMIN — Medication 80 MILLIGRAM(S): at 05:09

## 2025-06-17 RX ADMIN — ENOXAPARIN SODIUM 60 MILLIGRAM(S): 100 INJECTION SUBCUTANEOUS at 21:14

## 2025-06-17 RX ADMIN — OXYCODONE HYDROCHLORIDE 5 MILLIGRAM(S): 30 TABLET ORAL at 19:48

## 2025-06-17 RX ADMIN — OXYBUTYNIN CHLORIDE 10 MILLIGRAM(S): 5 TABLET, FILM COATED, EXTENDED RELEASE ORAL at 11:13

## 2025-06-17 RX ADMIN — Medication 5 MILLIGRAM(S): at 01:40

## 2025-06-17 RX ADMIN — TIOTROPIUM BROMIDE INHALATION SPRAY 2 PUFF(S): 3.12 SPRAY, METERED RESPIRATORY (INHALATION) at 08:17

## 2025-06-17 RX ADMIN — CARVEDILOL 3.12 MILLIGRAM(S): 3.12 TABLET, FILM COATED ORAL at 05:09

## 2025-06-17 RX ADMIN — VENLAFAXINE HYDROCHLORIDE 225 MILLIGRAM(S): 37.5 CAPSULE, EXTENDED RELEASE ORAL at 11:14

## 2025-06-17 RX ADMIN — CEFTRIAXONE 1000 MILLIGRAM(S): 500 INJECTION, POWDER, FOR SOLUTION INTRAMUSCULAR; INTRAVENOUS at 14:20

## 2025-06-17 RX ADMIN — ATORVASTATIN CALCIUM 40 MILLIGRAM(S): 80 TABLET, FILM COATED ORAL at 21:14

## 2025-06-17 RX ADMIN — IPRATROPIUM BROMIDE AND ALBUTEROL SULFATE 3 MILLILITER(S): .5; 2.5 SOLUTION RESPIRATORY (INHALATION) at 02:55

## 2025-06-17 NOTE — PATIENT PROFILE ADULT - FALL HARM RISK - HARM RISK INTERVENTIONS
Assistance with ambulation/Assistance OOB with selected safe patient handling equipment/Communicate Risk of Fall with Harm to all staff/Discuss with provider need for PT consult/Monitor gait and stability/Provide patient with walking aids - walker, cane, crutches/Reinforce activity limits and safety measures with patient and family/Tailored Fall Risk Interventions/Visual Cue: Yellow wristband and red socks/Bed in lowest position, wheels locked, appropriate side rails in place/Call bell, personal items and telephone in reach/Instruct patient to call for assistance before getting out of bed or chair/Non-slip footwear when patient is out of bed/[place] to call system/Physically safe environment - no spills, clutter or unnecessary equipment/Purposeful Proactive Rounding/Room/bathroom lighting operational, light cord in reach
Medical Necessity Information: It is in your best interest to select a reason for this procedure from the list below. All of these items fulfill various CMS LCD requirements except the new and changing color options.
Include Z78.9 (Other Specified Conditions Influencing Health Status) As An Associated Diagnosis?: No
Medical Necessity Clause: This procedure was medically necessary because the lesion that was treated was:
Detail Level: Detailed
Size Of Lesion In Cm: 0.5
X Size Of Lesion In Cm (Optional): 0
Anesthesia Type: 1% lidocaine with epinephrine
Hemostasis: Drysol
Wound Care: Petrolatum
Consent was obtained from the patient. The risks and benefits to therapy were discussed in detail. Specifically, the risks of infection, scarring, bleeding, prolonged wound healing, incomplete removal, allergy to anesthesia, nerve injury and recurrence were addressed. Prior to the procedure, the treatment site was clearly identified and confirmed by the patient. All components of Universal Protocol/PAUSE Rule completed.
Post-Care Instructions: I reviewed with the patient in detail post-care instructions. Patient is to keep the biopsy site dry overnight, and then apply bacitracin twice daily until healed. Patient may apply hydrogen peroxide soaks to remove any crusting.

## 2025-06-17 NOTE — PROGRESS NOTE ADULT - ASSESSMENT
4F with PMHX CAD, NICM, HFimpEF (54%), HTN, HLD, COPD on O2 NC 2-4L PRN, MDD, Anxiety, Insomnia presented to [facility] ER c/o SOB/[place] and palpitations and cough admitted for Acute on Chronic Hypoxic Respiratory Failure 2/2 PNA vs CHF Exacerbation c/b NSTEMI.      1-Acute on Chronic Hypoxic Respiratory Failure   pneumonia ruled out   CT chest with atelectasis   -Check TTE  -BL LE Dopplers negative  -afebrile   -dc iv rocephin   cont azithro in setting of COPD   add short course of po steroid     2- h/o COPD on cronic oxygen prn   cont neb   short course of steroid     3- Chest pain h/o CAD   abnormal troponin , possible ischemia   cont tele , aspirin , statin   TTE to assess EF wall motion   -Lovenox 60mg q24   cardiology follow re further ischemic work up     4-Chronic Hyponatreima  -NaCl 1g BID  -Monitor BMP    5-MDD, Anxiety  -Xanax 0.5mg BID PRN  -Venlafaxine 225mg q24  -Brexipaprazole 0.25mg q24 -> need to obtain from home and restart when able  -Ambien qhs PRN    6- NICM, HFimpEF (54%), HTN,   -ASA 81mg q24  -Atorvastatin 40mg qHS  -Carvedilol 3.125mg BID  -Valsartan 80mg q24      Dc in few days likely if improves pending cardiac work up  4F with PMHX CAD, NICM, HFimpEF (54%), HTN, HLD, COPD on O2 NC 2-4L PRN, MDD, Anxiety, Insomnia presented to [facility] ER c/o SOB/[place] and palpitations and cough admitted for Acute on Chronic Hypoxic Respiratory Failure 2/2 PNA vs CHF Exacerbation c/b NSTEMI.      1-Acute on Chronic Hypoxic Respiratory Failure   pneumonia ruled out   CT chest with atelectasis   -Check TTE  -BL LE Dopplers negative  -afebrile   -dc iv rocephin   cont azithro in setting of COPD   add short course of po steroid     2- h/o COPD on cronic oxygen prn   cont neb   short course of steroid     3- Chest pain h/o CAD   abnormal troponin , possible ischemia   cont tele , aspirin , statin   TTE to assess EF wall motion   -Lovenox 60mg q24   cardiology follow re further ischemic work up     4-Chronic Hyponatreima  stable   pt states that she does not take any na tablets at home   -Monitor BMP    5-MDD, Anxiety  -Xanax 0.5mg BID PRN  -Venlafaxine 225mg q24  -Brexipaprazole 0.25mg q24 -> need to obtain from home and restart when able  -Ambien qhs PRN    6- NICM, HFimpEF (54%), HTN,   -ASA 81mg q24  -Atorvastatin 40mg qHS  -Carvedilol 3.125mg BID  -Valsartan 80mg q24      Dc in few days likely if improves pending cardiac work up

## 2025-06-17 NOTE — PROGRESS NOTE ADULT - SUBJECTIVE AND OBJECTIVE BOX
Patient is a 74y old  Female who presents with a chief complaint of Acute on Chronic Hypoxic Respiratory Failure (16 Jun 2025 17:02)      Patient seen and examined at bedside. No overnight events reported.     ALLERGIES:  No Known Allergies    MEDICATIONS  (STANDING):  albuterol/ipratropium for Nebulization 3 milliLiter(s) Nebulizer every 6 hours  aspirin  chewable 81 milliGRAM(s) Oral daily  atorvastatin 40 milliGRAM(s) Oral at bedtime  azithromycin  IVPB 500 milliGRAM(s) IV Intermittent every 24 hours  carvedilol 3.125 milliGRAM(s) Oral every 12 hours  cefTRIAXone Injectable. 1000 milliGRAM(s) IV Push every 24 hours  enoxaparin Injectable 60 milliGRAM(s) SubCutaneous every 24 hours  fluticasone propionate/ salmeterol 100-50 MICROgram(s) Diskus 1 Dose(s) Inhalation two times a day  furosemide   Injectable 40 milliGRAM(s) IV Push daily  gabapentin 600 milliGRAM(s) Oral two times a day  oxybutynin XL 10 milliGRAM(s) Oral daily  sodium chloride 1 Gram(s) Oral two times a day  tiotropium 2.5 MICROgram(s) Inhaler 2 Puff(s) Inhalation daily  valsartan 80 milliGRAM(s) Oral daily  venlafaxine XR. 225 milliGRAM(s) Oral daily    MEDICATIONS  (PRN):  acetaminophen     Tablet .. 650 milliGRAM(s) Oral every 6 hours PRN Temp greater or equal to 38C (100.4F), Mild Pain (1 - 3)  ALPRAZolam 0.5 milliGRAM(s) Oral two times a day PRN for anxiety  aluminum hydroxide/magnesium hydroxide/simethicone Suspension 30 milliLiter(s) Oral every 4 hours PRN Dyspepsia  melatonin 3 milliGRAM(s) Oral at bedtime PRN Insomnia  ondansetron Injectable 4 milliGRAM(s) IV Push every 8 hours PRN Nausea and/or Vomiting  oxyCODONE    IR 5 milliGRAM(s) Oral three times a day PRN Severe Pain (7 - 10)  zolpidem 5 milliGRAM(s) Oral at bedtime PRN Insomnia  zolpidem 5 milliGRAM(s) Oral at bedtime PRN Insomnia    Vital Signs Last 24 Hrs  T(F): 99.1 (17 Jun 2025 11:14), Max: 99.1 (17 Jun 2025 11:14)  HR: 99 (17 Jun 2025 11:14) (71 - 99)  BP: 102/66 (17 Jun 2025 11:14) (101/57 - 145/64)  RR: 18 (17 Jun 2025 11:14) (18 - 20)  SpO2: 98% (17 Jun 2025 11:14) (94% - 100%)  I&O's Summary      PHYSICAL EXAM:  General:   ENT:   Neck:   Lungs:   Cardio: RRR, S1/S2, No murmur  Abdomen: Soft, Nontender, Nondistended; Bowel sounds present  Extremities: No calf tenderness, No pitting edema    LABS:                        11.4   6.93  )-----------( 252      ( 17 Jun 2025 04:00 )             35.0     06-17    135  |  97  |  12.1  ----------------------------<  110  3.4   |  26.0  |  0.58    Ca    8.8      17 Jun 2025 04:00  Phos  3.2     06-17  Mg     1.6     06-17    TPro  6.7  /  Alb  3.1  /  TBili  <0.2  /  DBili  x   /  AST  31  /  ALT  20  /  AlkPhos  129  06-17          PT/INR - ( 16 Jun 2025 10:20 )   PT: 11.9 sec;   INR: 1.05 ratio         PTT - ( 16 Jun 2025 10:20 )  PTT:33.8 sec          06-16 Chol 101 mg/dL LDL -- HDL 16 mg/dL Trig 197 mg/dL    10:20 - VBG - pH:       | pCO2:       | pO2:       | Lactate: 1.00                 Urinalysis Basic - ( 17 Jun 2025 04:00 )    Color: x / Appearance: x / SG: x / pH: x  Gluc: 110 mg/dL / Ketone: x  / Bili: x / Urobili: x   Blood: x / Protein: x / Nitrite: x   Leuk Esterase: x / RBC: x / WBC x   Sq Epi: x / Non Sq Epi: x / Bacteria: x          RADIOLOGY & ADDITIONAL TESTS:      < from: CT Chest No Cont (06.16.25 @ 18:30) >  CT scan of the chest was obtained without administration of intravenous   contrast.    Few small lymph nodes are present in the mediastinum.    Heart is normal in size. Calcification the coronary arteries is noted.   Aberrant right subclavian artery is noted. Small pericardial effusion is   noted.    No endobronchial lesions are noted. Linear opacities representing   atelectasis in the lingula and left lower lobe as well as compressive   atelectasis involving portions of the right middle and right lower lobes   are noted. This is secondary to elevation of the right hemidiaphragm. No   pleural effusions are noted.     Patient is a 74y old  Female who presents with a chief complaint of Acute on Chronic Hypoxic Respiratory Failure (16 Jun 2025 17:02)      Patient seen and examined at bedside.  chart reviewed   feeling better     ALLERGIES:  No Known Allergies    MEDICATIONS  (STANDING):  albuterol/ipratropium for Nebulization 3 milliLiter(s) Nebulizer every 6 hours  aspirin  chewable 81 milliGRAM(s) Oral daily  atorvastatin 40 milliGRAM(s) Oral at bedtime  azithromycin  IVPB 500 milliGRAM(s) IV Intermittent every 24 hours  carvedilol 3.125 milliGRAM(s) Oral every 12 hours  cefTRIAXone Injectable. 1000 milliGRAM(s) IV Push every 24 hours  enoxaparin Injectable 60 milliGRAM(s) SubCutaneous every 24 hours  fluticasone propionate/ salmeterol 100-50 MICROgram(s) Diskus 1 Dose(s) Inhalation two times a day  furosemide   Injectable 40 milliGRAM(s) IV Push daily  gabapentin 600 milliGRAM(s) Oral two times a day  oxybutynin XL 10 milliGRAM(s) Oral daily  sodium chloride 1 Gram(s) Oral two times a day  tiotropium 2.5 MICROgram(s) Inhaler 2 Puff(s) Inhalation daily  valsartan 80 milliGRAM(s) Oral daily  venlafaxine XR. 225 milliGRAM(s) Oral daily    MEDICATIONS  (PRN):  acetaminophen     Tablet .. 650 milliGRAM(s) Oral every 6 hours PRN Temp greater or equal to 38C (100.4F), Mild Pain (1 - 3)  ALPRAZolam 0.5 milliGRAM(s) Oral two times a day PRN for anxiety  aluminum hydroxide/magnesium hydroxide/simethicone Suspension 30 milliLiter(s) Oral every 4 hours PRN Dyspepsia  melatonin 3 milliGRAM(s) Oral at bedtime PRN Insomnia  ondansetron Injectable 4 milliGRAM(s) IV Push every 8 hours PRN Nausea and/or Vomiting  oxyCODONE    IR 5 milliGRAM(s) Oral three times a day PRN Severe Pain (7 - 10)  zolpidem 5 milliGRAM(s) Oral at bedtime PRN Insomnia  zolpidem 5 milliGRAM(s) Oral at bedtime PRN Insomnia    Vital Signs Last 24 Hrs  T(F): 99.1 (17 Jun 2025 11:14), Max: 99.1 (17 Jun 2025 11:14)  HR: 99 (17 Jun 2025 11:14) (71 - 99)  BP: 102/66 (17 Jun 2025 11:14) (101/57 - 145/64)  RR: 18 (17 Jun 2025 11:14) (18 - 20)  SpO2: 98% (17 Jun 2025 11:14) (94% - 100%)  I&O's Summary      PHYSICAL EXAM:  General: awake   Neck: supple   Lungs: CTA   Cardio: RRR, S1/S2, No murmur  Abdomen: Soft, Nontender, Nondistended; Bowel sounds present  Extremities: No calf tenderness, No pitting edema    LABS:                        11.4   6.93  )-----------( 252      ( 17 Jun 2025 04:00 )             35.0     06-17    135  |  97  |  12.1  ----------------------------<  110  3.4   |  26.0  |  0.58    Ca    8.8      17 Jun 2025 04:00  Phos  3.2     06-17  Mg     1.6     06-17    TPro  6.7  /  Alb  3.1  /  TBili  <0.2  /  DBili  x   /  AST  31  /  ALT  20  /  AlkPhos  129  06-17          PT/INR - ( 16 Jun 2025 10:20 )   PT: 11.9 sec;   INR: 1.05 ratio         PTT - ( 16 Jun 2025 10:20 )  PTT:33.8 sec          06-16 Chol 101 mg/dL LDL -- HDL 16 mg/dL Trig 197 mg/dL    10:20 - VBG - pH:       | pCO2:       | pO2:       | Lactate: 1.00                 Urinalysis Basic - ( 17 Jun 2025 04:00 )    Color: x / Appearance: x / SG: x / pH: x  Gluc: 110 mg/dL / Ketone: x  / Bili: x / Urobili: x   Blood: x / Protein: x / Nitrite: x   Leuk Esterase: x / RBC: x / WBC x   Sq Epi: x / Non Sq Epi: x / Bacteria: x          RADIOLOGY & ADDITIONAL TESTS:      < from: CT Chest No Cont (06.16.25 @ 18:30) >  CT scan of the chest was obtained without administration of intravenous   contrast.    Few small lymph nodes are present in the mediastinum.    Heart is normal in size. Calcification the coronary arteries is noted.   Aberrant right subclavian artery is noted. Small pericardial effusion is   noted.    No endobronchial lesions are noted. Linear opacities representing   atelectasis in the lingula and left lower lobe as well as compressive   atelectasis involving portions of the right middle and right lower lobes   are noted. This is secondary to elevation of the right hemidiaphragm. No   pleural effusions are noted.

## 2025-06-17 NOTE — PROGRESS NOTE ADULT - SUBJECTIVE AND OBJECTIVE BOX
TULIO SUGGS  [id number]      Chief Complaint:  Follow up of + trop/ CAD/ SOB    Interval History:  No events overnight     Tele:  SR      acetaminophen     Tablet .. 650 milliGRAM(s) Oral every 6 hours PRN  albuterol/ipratropium for Nebulization 3 milliLiter(s) Nebulizer every 6 hours  ALPRAZolam 0.5 milliGRAM(s) Oral two times a day PRN  aluminum hydroxide/magnesium hydroxide/simethicone Suspension 30 milliLiter(s) Oral every 4 hours PRN  aspirin  chewable 81 milliGRAM(s) Oral daily  atorvastatin 40 milliGRAM(s) Oral at bedtime  azithromycin  IVPB 500 milliGRAM(s) IV Intermittent every 24 hours  carvedilol 3.125 milliGRAM(s) Oral every 12 hours  cefTRIAXone Injectable. 1000 milliGRAM(s) IV Push every 24 hours  enoxaparin Injectable 60 milliGRAM(s) SubCutaneous every 24 hours  fluticasone propionate/ salmeterol 100-50 MICROgram(s) Diskus 1 Dose(s) Inhalation two times a day  furosemide   Injectable 40 milliGRAM(s) IV Push daily  gabapentin 600 milliGRAM(s) Oral two times a day  melatonin 3 milliGRAM(s) Oral at bedtime PRN  ondansetron Injectable 4 milliGRAM(s) IV Push every 8 hours PRN  oxybutynin XL 10 milliGRAM(s) Oral daily  oxyCODONE    IR 5 milliGRAM(s) Oral three times a day PRN  predniSONE   Tablet 40 milliGRAM(s) Oral daily  sodium chloride 1 Gram(s) Oral two times a day  tiotropium 2.5 MICROgram(s) Inhaler 2 Puff(s) Inhalation daily  valsartan 80 milliGRAM(s) Oral daily  venlafaxine XR. 225 milliGRAM(s) Oral daily  zolpidem 5 milliGRAM(s) Oral at bedtime PRN  zolpidem 5 milliGRAM(s) Oral at bedtime PRN          Physical Exam:  T(C): 37.3 (06-17-25 @ 11:14), Max: 37.3 (06-17-25 @ 11:14)  HR: 99 (06-17-25 @ 11:14) (71 - 99)  BP: 102/66 (06-17-25 @ 11:14) (101/57 - 145/64)  RR: 18 (06-17-25 @ 11:14) (18 - 20)  SpO2: 98% (06-17-25 @ 11:14) (94% - 100%)  Wt(kg): --  General: Comfortable in NAD  Neck: No JVD  CVS: nl s1s2, no s3  Pulm: CTA b/l  Abd: soft, non-tender  Ext: No c/c/e  Neuro A&O x3  Psych: Normal affect      Labs:   17 Jun 2025 04:00    135    |  97     |  12.1   ----------------------------<  110    3.4     |  26.0   |  0.58     Ca    8.8        17 Jun 2025 04:00  Phos  3.2       17 Jun 2025 04:00  Mg     1.6       17 Jun 2025 04:00    TPro  6.7    /  Alb  3.1    /  TBili  <0.2   /  DBili  x      /  AST  31     /  ALT  20     /  AlkPhos  129    17 Jun 2025 04:00                          11.4   6.93  )-----------( 252      ( 17 Jun 2025 04:00 )             35.0     PT/INR - ( 16 Jun 2025 10:20 )   PT: 11.9 sec;   INR: 1.05 ratio         PTT - ( 16 Jun 2025 10:20 )  PTT:33.8 sec          Assessment:  74-year-old female with a past medical history of CAD s/p PCI, prior severe CMP with improved EF, hypertension, COPD, presents with SOB. pt reports 3 days of exertional dyspnea and palpitations, denies SOB at rest and no chest pain. She reports associated non-productive cough. Pt found to be febrile and hypoxic upon arrival here. In the ED patient noted to have elevated troponin levels prompting cardiology consultation.       Elevated troponin level of unclear etiology, Down trending  CT chest with no evidence of PNA    Plan   continue with telemetry   echocardiogram, IF unremarkable, patient can be dc from cardiac perspective.   asa+ statin   AC with lovenox, DC if unremarkable TTE  rest as per primary team

## 2025-06-18 LAB
ANION GAP SERPL CALC-SCNC: 15 MMOL/L — SIGNIFICANT CHANGE UP (ref 5–17)
BUN SERPL-MCNC: 19.3 MG/DL — SIGNIFICANT CHANGE UP (ref 8–20)
CALCIUM SERPL-MCNC: 8.8 MG/DL — SIGNIFICANT CHANGE UP (ref 8.4–10.5)
CHLORIDE SERPL-SCNC: 96 MMOL/L — SIGNIFICANT CHANGE UP (ref 96–108)
CO2 SERPL-SCNC: 26 MMOL/L — SIGNIFICANT CHANGE UP (ref 22–29)
CREAT SERPL-MCNC: 0.61 MG/DL — SIGNIFICANT CHANGE UP (ref 0.5–1.3)
EGFR: 94 ML/MIN/1.73M2 — SIGNIFICANT CHANGE UP
EGFR: 94 ML/MIN/1.73M2 — SIGNIFICANT CHANGE UP
FOLATE SERPL-MCNC: 10.6 NG/ML — SIGNIFICANT CHANGE UP
GLUCOSE SERPL-MCNC: 166 MG/DL — HIGH (ref 70–99)
HCT VFR BLD CALC: 34.7 % — SIGNIFICANT CHANGE UP (ref 34.5–45)
HGB BLD-MCNC: 11.1 G/DL — LOW (ref 11.5–15.5)
IRON SATN MFR SERPL: 14 % — SIGNIFICANT CHANGE UP (ref 14–50)
IRON SATN MFR SERPL: 33 UG/DL — LOW (ref 37–145)
MAGNESIUM SERPL-MCNC: 2.2 MG/DL — SIGNIFICANT CHANGE UP (ref 1.6–2.6)
MCHC RBC-ENTMCNC: 27.7 PG — SIGNIFICANT CHANGE UP (ref 27–34)
MCHC RBC-ENTMCNC: 32 G/DL — SIGNIFICANT CHANGE UP (ref 32–36)
MCV RBC AUTO: 86.5 FL — SIGNIFICANT CHANGE UP (ref 80–100)
NRBC # BLD AUTO: 0 K/UL — SIGNIFICANT CHANGE UP (ref 0–0)
NRBC # FLD: 0 K/UL — SIGNIFICANT CHANGE UP (ref 0–0)
NRBC BLD AUTO-RTO: 0 /100 WBCS — SIGNIFICANT CHANGE UP (ref 0–0)
PHOSPHATE SERPL-MCNC: 3.1 MG/DL — SIGNIFICANT CHANGE UP (ref 2.4–4.7)
PLATELET # BLD AUTO: 327 K/UL — SIGNIFICANT CHANGE UP (ref 150–400)
PMV BLD: 10.5 FL — SIGNIFICANT CHANGE UP (ref 7–13)
POTASSIUM SERPL-MCNC: 5 MMOL/L — SIGNIFICANT CHANGE UP (ref 3.5–5.3)
POTASSIUM SERPL-SCNC: 5 MMOL/L — SIGNIFICANT CHANGE UP (ref 3.5–5.3)
RBC # BLD: 4.01 M/UL — SIGNIFICANT CHANGE UP (ref 3.8–5.2)
RBC # FLD: 16.3 % — HIGH (ref 10.3–14.5)
SODIUM SERPL-SCNC: 137 MMOL/L — SIGNIFICANT CHANGE UP (ref 135–145)
TIBC SERPL-MCNC: 239 UG/DL — SIGNIFICANT CHANGE UP (ref 220–430)
TRANSFERRIN SERPL-MCNC: 167 MG/DL — LOW (ref 192–382)
VIT B12 SERPL-MCNC: 847 PG/ML — SIGNIFICANT CHANGE UP (ref 232–1245)
WBC # BLD: 4.84 K/UL — SIGNIFICANT CHANGE UP (ref 3.8–10.5)
WBC # FLD AUTO: 4.84 K/UL — SIGNIFICANT CHANGE UP (ref 3.8–10.5)

## 2025-06-18 PROCEDURE — 99232 SBSQ HOSP IP/OBS MODERATE 35: CPT

## 2025-06-18 PROCEDURE — 99233 SBSQ HOSP IP/OBS HIGH 50: CPT

## 2025-06-18 PROCEDURE — 99152 MOD SED SAME PHYS/QHP 5/>YRS: CPT

## 2025-06-18 PROCEDURE — 93458 L HRT ARTERY/VENTRICLE ANGIO: CPT | Mod: 26

## 2025-06-18 PROCEDURE — 93010 ELECTROCARDIOGRAM REPORT: CPT

## 2025-06-18 PROCEDURE — 99222 1ST HOSP IP/OBS MODERATE 55: CPT | Mod: 25

## 2025-06-18 RX ORDER — BISACODYL 5 MG
5 TABLET, DELAYED RELEASE (ENTERIC COATED) ORAL EVERY 12 HOURS
Refills: 0 | Status: DISCONTINUED | OUTPATIENT
Start: 2025-06-18 | End: 2025-06-19

## 2025-06-18 RX ORDER — POLYETHYLENE GLYCOL 3350 17 G/17G
17 POWDER, FOR SOLUTION ORAL DAILY
Refills: 0 | Status: DISCONTINUED | OUTPATIENT
Start: 2025-06-18 | End: 2025-06-18

## 2025-06-18 RX ORDER — SENNA 187 MG
1 TABLET ORAL ONCE
Refills: 0 | Status: COMPLETED | OUTPATIENT
Start: 2025-06-18 | End: 2025-06-18

## 2025-06-18 RX ADMIN — ATORVASTATIN CALCIUM 40 MILLIGRAM(S): 80 TABLET, FILM COATED ORAL at 23:19

## 2025-06-18 RX ADMIN — Medication 0.5 MILLIGRAM(S): at 22:05

## 2025-06-18 RX ADMIN — IPRATROPIUM BROMIDE AND ALBUTEROL SULFATE 3 MILLILITER(S): .5; 2.5 SOLUTION RESPIRATORY (INHALATION) at 21:46

## 2025-06-18 RX ADMIN — IPRATROPIUM BROMIDE AND ALBUTEROL SULFATE 3 MILLILITER(S): .5; 2.5 SOLUTION RESPIRATORY (INHALATION) at 03:03

## 2025-06-18 RX ADMIN — Medication 250 MILLIGRAM(S): at 13:36

## 2025-06-18 RX ADMIN — GABAPENTIN 600 MILLIGRAM(S): 400 CAPSULE ORAL at 05:12

## 2025-06-18 RX ADMIN — Medication 5 MILLIGRAM(S): at 00:10

## 2025-06-18 RX ADMIN — OXYBUTYNIN CHLORIDE 10 MILLIGRAM(S): 5 TABLET, FILM COATED, EXTENDED RELEASE ORAL at 11:28

## 2025-06-18 RX ADMIN — PREDNISONE 40 MILLIGRAM(S): 20 TABLET ORAL at 05:12

## 2025-06-18 RX ADMIN — GABAPENTIN 600 MILLIGRAM(S): 400 CAPSULE ORAL at 23:19

## 2025-06-18 RX ADMIN — Medication 1 DOSE(S): at 21:46

## 2025-06-18 RX ADMIN — CARVEDILOL 3.12 MILLIGRAM(S): 3.12 TABLET, FILM COATED ORAL at 19:11

## 2025-06-18 RX ADMIN — Medication 400 MILLIGRAM(S): at 08:35

## 2025-06-18 RX ADMIN — Medication 5 MILLIGRAM(S): at 23:42

## 2025-06-18 RX ADMIN — VENLAFAXINE HYDROCHLORIDE 225 MILLIGRAM(S): 37.5 CAPSULE, EXTENDED RELEASE ORAL at 11:28

## 2025-06-18 RX ADMIN — CARVEDILOL 3.12 MILLIGRAM(S): 3.12 TABLET, FILM COATED ORAL at 05:12

## 2025-06-18 RX ADMIN — Medication 81 MILLIGRAM(S): at 11:28

## 2025-06-18 RX ADMIN — Medication 400 MILLIGRAM(S): at 19:11

## 2025-06-18 NOTE — PROGRESS NOTE ADULT - SUBJECTIVE AND OBJECTIVE BOX
TULIO SUGGS  [id number]        Chief Complaint: follow up dyspnea/positive trops      Subjective: no chest pain      24 hour Tele: SR         acetaminophen     Tablet .. 650 milliGRAM(s) Oral every 6 hours PRN  albuterol/ipratropium for Nebulization 3 milliLiter(s) Nebulizer every 6 hours  ALPRAZolam 0.5 milliGRAM(s) Oral two times a day PRN  aluminum hydroxide/magnesium hydroxide/simethicone Suspension 30 milliLiter(s) Oral every 4 hours PRN  aspirin  chewable 81 milliGRAM(s) Oral daily  atorvastatin 40 milliGRAM(s) Oral at bedtime  azithromycin  IVPB 500 milliGRAM(s) IV Intermittent every 24 hours  carvedilol 3.125 milliGRAM(s) Oral every 12 hours  enoxaparin Injectable 60 milliGRAM(s) SubCutaneous every 24 hours  fluticasone propionate/ salmeterol 100-50 MICROgram(s) Diskus 1 Dose(s) Inhalation two times a day  gabapentin 600 milliGRAM(s) Oral two times a day  magnesium oxide 400 milliGRAM(s) Oral two times a day with meals  melatonin 3 milliGRAM(s) Oral at bedtime PRN  ondansetron Injectable 4 milliGRAM(s) IV Push every 8 hours PRN  oxybutynin XL 10 milliGRAM(s) Oral daily  oxyCODONE    IR 5 milliGRAM(s) Oral three times a day PRN  predniSONE   Tablet 40 milliGRAM(s) Oral daily  tiotropium 2.5 MICROgram(s) Inhaler 2 Puff(s) Inhalation daily  venlafaxine XR. 225 milliGRAM(s) Oral daily  zolpidem 5 milliGRAM(s) Oral at bedtime PRN  zolpidem 5 milliGRAM(s) Oral at bedtime PRN          Physical Exam:  T(C): 36.9 (06-18-25 @ 07:46), Max: 37.3 (06-17-25 @ 11:14)  HR: 80 (06-18-25 @ 07:46) (71 - 99)  BP: 131/76 (06-18-25 @ 07:46) (80/40 - 131/76)  RR: 18 (06-18-25 @ 07:46) (17 - 18)  SpO2: 100% (06-18-25 @ 07:46) (96% - 100%)  Wt(kg): --  General: Comfortable in NAD  HEENT: MMM, sclera anicteric  Resp: CTA bilaterally  CVS: nl s1s2, rrr, no s3/JVD  Abd: soft, NT, ND, BS+  Ext: No c/c/e  Neuro A&O x3  Psych: Normal affect    I&O's Summary    17 Jun 2025 07:01  -  18 Jun 2025 07:00  --------------------------------------------------------  IN: 0 mL / OUT: 650 mL / NET: -650 mL          Labs:   18 Jun 2025 03:31    137    |  96     |  19.3   ----------------------------<  166    5.0     |  26.0   |  0.61     Ca    8.8        18 Jun 2025 03:31  Phos  3.1       18 Jun 2025 03:31  Mg     2.2       18 Jun 2025 03:31    TPro  6.7    /  Alb  3.1    /  TBili  <0.2   /  DBili  x      /  AST  31     /  ALT  20     /  AlkPhos  129    17 Jun 2025 04:00                          11.1   4.84  )-----------( 327      ( 18 Jun 2025 03:31 )             34.7     PT/INR - ( 16 Jun 2025 10:20 )   PT: 11.9 sec;   INR: 1.05 ratio         PTT - ( 16 Jun 2025 10:20 )  PTT:33.8 sec    ECHO 9/2024:    1. Left ventricular cavity is normal in size. Left ventricular wall thickness is mildly increased. Left ventricular systolic function is normal with an ejection fraction of 56 % by Mace's method of disks.   2. There is mild (grade 1) left ventricular diastolic dysfunction.   3. Normal right ventricular cavity size and normal right ventricular systolic function.   4. Trileaflet aortic valve with normal systolic excursion. Fibrocalcific aortic valve sclerosis without stenosis.   5. Trace aortic regurgitation.   6. There is mild calcification of the mitral valve annulus.   7. Structurally normal mitral valve with normal leaflet excursion.   8. Mild mitral regurgitation.   9. Mild tricuspid regurgitation.  10. Aortic root at the sinuses of Valsalva is normal in size, measuring 2.70 cm (indexed 1.62 cm/m²). Ascending aorta is normal in size, measuring 2.60 cm (indexed 1.56 cm/m²).  11. Left atrium is normal in size.  12. The right atrium is normal in size.  13. Estimated pulmonary artery systolic pressure is 33 mmHg.      ECHO 6/2025:    1. Left ventricular systolic function is mildly to moderately decreased with an ejection fraction of 42 % by Mace's method of disks with an ejection fraction visually estimated at 35 to 40 %.   2. Moderately enlarged right ventricular cavity size and probably normal right ventricular systolic function.   3. No pericardial effusion seen.   4. Estimated pulmonary artery systolic pressure is 24 mmHg.   5. Mild left ventricular hypertrophy.   6. Mild tricuspid regurgitation.    ECG: SR, LAFB (unchanged from prior)     74-year-old female with a past medical history of CAD s/p PCI, prior severe CMP with improved EF, hypertension, COPD, presents with SOB. pt reports 3 days of exertional dyspnea and palpitations, denies SOB at rest and no chest pain. She reports associated non-productive cough. Pt found to be febrile and hypoxic upon arrival here. In the ED patient noted to have elevated troponin levels prompting cardiology consultation.       -Elevated troponin level, minimal and trending down, echo with drop in EF  -CT chest with no evidence of PNA    Plan (TO BE SEEN)   1.   2.   3.       Alvni Owens MD TULIO SUGGS  [id number]        Chief Complaint: follow up dyspnea/positive trops      Subjective: no chest pain      24 hour Tele: SR         acetaminophen     Tablet .. 650 milliGRAM(s) Oral every 6 hours PRN  albuterol/ipratropium for Nebulization 3 milliLiter(s) Nebulizer every 6 hours  ALPRAZolam 0.5 milliGRAM(s) Oral two times a day PRN  aluminum hydroxide/magnesium hydroxide/simethicone Suspension 30 milliLiter(s) Oral every 4 hours PRN  aspirin  chewable 81 milliGRAM(s) Oral daily  atorvastatin 40 milliGRAM(s) Oral at bedtime  azithromycin  IVPB 500 milliGRAM(s) IV Intermittent every 24 hours  carvedilol 3.125 milliGRAM(s) Oral every 12 hours  enoxaparin Injectable 60 milliGRAM(s) SubCutaneous every 24 hours  fluticasone propionate/ salmeterol 100-50 MICROgram(s) Diskus 1 Dose(s) Inhalation two times a day  gabapentin 600 milliGRAM(s) Oral two times a day  magnesium oxide 400 milliGRAM(s) Oral two times a day with meals  melatonin 3 milliGRAM(s) Oral at bedtime PRN  ondansetron Injectable 4 milliGRAM(s) IV Push every 8 hours PRN  oxybutynin XL 10 milliGRAM(s) Oral daily  oxyCODONE    IR 5 milliGRAM(s) Oral three times a day PRN  predniSONE   Tablet 40 milliGRAM(s) Oral daily  tiotropium 2.5 MICROgram(s) Inhaler 2 Puff(s) Inhalation daily  venlafaxine XR. 225 milliGRAM(s) Oral daily  zolpidem 5 milliGRAM(s) Oral at bedtime PRN  zolpidem 5 milliGRAM(s) Oral at bedtime PRN          Physical Exam:  T(C): 36.9 (06-18-25 @ 07:46), Max: 37.3 (06-17-25 @ 11:14)  HR: 80 (06-18-25 @ 07:46) (71 - 99)  BP: 131/76 (06-18-25 @ 07:46) (80/40 - 131/76)  RR: 18 (06-18-25 @ 07:46) (17 - 18)  SpO2: 100% (06-18-25 @ 07:46) (96% - 100%)  Wt(kg): --  General: Comfortable in NAD  HEENT: MMM, sclera anicteric  Resp: CTA bilaterally  CVS: nl s1s2, rrr, no s3/JVD  Abd: soft, NT, ND, BS+  Ext: No c/c/e  Neuro A&O x3  Psych: Normal affect    I&O's Summary    17 Jun 2025 07:01  -  18 Jun 2025 07:00  --------------------------------------------------------  IN: 0 mL / OUT: 650 mL / NET: -650 mL          Labs:   18 Jun 2025 03:31    137    |  96     |  19.3   ----------------------------<  166    5.0     |  26.0   |  0.61     Ca    8.8        18 Jun 2025 03:31  Phos  3.1       18 Jun 2025 03:31  Mg     2.2       18 Jun 2025 03:31    TPro  6.7    /  Alb  3.1    /  TBili  <0.2   /  DBili  x      /  AST  31     /  ALT  20     /  AlkPhos  129    17 Jun 2025 04:00                          11.1   4.84  )-----------( 327      ( 18 Jun 2025 03:31 )             34.7     PT/INR - ( 16 Jun 2025 10:20 )   PT: 11.9 sec;   INR: 1.05 ratio         PTT - ( 16 Jun 2025 10:20 )  PTT:33.8 sec    ECHO 9/2024:    1. Left ventricular cavity is normal in size. Left ventricular wall thickness is mildly increased. Left ventricular systolic function is normal with an ejection fraction of 56 % by Mace's method of disks.   2. There is mild (grade 1) left ventricular diastolic dysfunction.   3. Normal right ventricular cavity size and normal right ventricular systolic function.   4. Trileaflet aortic valve with normal systolic excursion. Fibrocalcific aortic valve sclerosis without stenosis.   5. Trace aortic regurgitation.   6. There is mild calcification of the mitral valve annulus.   7. Structurally normal mitral valve with normal leaflet excursion.   8. Mild mitral regurgitation.   9. Mild tricuspid regurgitation.  10. Aortic root at the sinuses of Valsalva is normal in size, measuring 2.70 cm (indexed 1.62 cm/m²). Ascending aorta is normal in size, measuring 2.60 cm (indexed 1.56 cm/m²).  11. Left atrium is normal in size.  12. The right atrium is normal in size.  13. Estimated pulmonary artery systolic pressure is 33 mmHg.    ECHO 6/2025:    1. Left ventricular systolic function is mildly to moderately decreased with an ejection fraction of 42 % by Mace's method of disks with an ejection fraction visually estimated at 35 to 40 %.   2. Moderately enlarged right ventricular cavity size and probably normal right ventricular systolic function.   3. No pericardial effusion seen.   4. Estimated pulmonary artery systolic pressure is 24 mmHg.   5. Mild left ventricular hypertrophy.   6. Mild tricuspid regurgitation.      LHC 9/2024:  LM   Left main artery: Patent, normal caliber vessel with mild luminal  irregularities. TERRI III. .    LAD   Left anterior descending artery: Normal caliber vessel that gives rise  to patent D1 branch. Angiographically-intermediate  severity, eccentric mid LAD stenosis (50%) that warrants further  investigation. TERRI III. . Instant wave-free ratio was performed  using a OMNIWIRE 185CM STR TIP with a calculated value of 0.94. Based  on the results of this study, the lesion was judged  to be non-significant and no intervention was performed.      CX   Circumflex: Dominant, normal caliber vessel that gives rise to a  large, branching LPL1 and LPDA. Mild, diffuse luminal  irregularities. TERRI III..      RCA   Right coronary artery: Small caliber vessel, non-dominant. Mild  luminal irregularities. TERRI III..        ECG: SR, LAFB (unchanged from prior)     74-year-old female with a past medical history of CAD s/p PCI, prior severe CMP with improved EF, hypertension, COPD, presents with SOB. pt reports 3 days of exertional dyspnea and palpitations, denies SOB at rest and no chest pain. She reports associated non-productive cough. Pt found to be febrile and hypoxic upon arrival here. In the ED patient noted to have elevated troponin levels prompting cardiology consultation.       -Elevated troponin level, minimal and trending down, echo with drop in EF from last echo 9/2024  -LHC from 9/2024 with moderate non obstructive LAD disease and patent LCX stent, ? progression, could also have myocarditis   -CT chest with no evidence of PNA    Plan (TO BE SEEN)   1.   2.   3.       Alvin Owens MD TULIO SUGGS  [id number]        Chief Complaint: follow up dyspnea/positive trops      Subjective: no chest pain, pounding in chest has resolved      24 hour Tele: SR , no events        acetaminophen     Tablet .. 650 milliGRAM(s) Oral every 6 hours PRN  albuterol/ipratropium for Nebulization 3 milliLiter(s) Nebulizer every 6 hours  ALPRAZolam 0.5 milliGRAM(s) Oral two times a day PRN  aluminum hydroxide/magnesium hydroxide/simethicone Suspension 30 milliLiter(s) Oral every 4 hours PRN  aspirin  chewable 81 milliGRAM(s) Oral daily  atorvastatin 40 milliGRAM(s) Oral at bedtime  azithromycin  IVPB 500 milliGRAM(s) IV Intermittent every 24 hours  carvedilol 3.125 milliGRAM(s) Oral every 12 hours  enoxaparin Injectable 60 milliGRAM(s) SubCutaneous every 24 hours  fluticasone propionate/ salmeterol 100-50 MICROgram(s) Diskus 1 Dose(s) Inhalation two times a day  gabapentin 600 milliGRAM(s) Oral two times a day  magnesium oxide 400 milliGRAM(s) Oral two times a day with meals  melatonin 3 milliGRAM(s) Oral at bedtime PRN  ondansetron Injectable 4 milliGRAM(s) IV Push every 8 hours PRN  oxybutynin XL 10 milliGRAM(s) Oral daily  oxyCODONE    IR 5 milliGRAM(s) Oral three times a day PRN  predniSONE   Tablet 40 milliGRAM(s) Oral daily  tiotropium 2.5 MICROgram(s) Inhaler 2 Puff(s) Inhalation daily  venlafaxine XR. 225 milliGRAM(s) Oral daily  zolpidem 5 milliGRAM(s) Oral at bedtime PRN  zolpidem 5 milliGRAM(s) Oral at bedtime PRN          Physical Exam:  T(C): 36.9 (06-18-25 @ 07:46), Max: 37.3 (06-17-25 @ 11:14)  HR: 80 (06-18-25 @ 07:46) (71 - 99)  BP: 131/76 (06-18-25 @ 07:46) (80/40 - 131/76)  RR: 18 (06-18-25 @ 07:46) (17 - 18)  SpO2: 100% (06-18-25 @ 07:46) (96% - 100%)  Wt(kg): --  General: Comfortable in NAD  HEENT: MMM, sclera anicteric, on NC  Resp: CTA bilaterally  CVS: nl s1s2, rrr, no s3/JVD  Abd: soft, NT, ND, BS+  Ext: No c/c/e  Neuro A&O x3  Psych: Normal affect    I&O's Summary    17 Jun 2025 07:01  -  18 Jun 2025 07:00  --------------------------------------------------------  IN: 0 mL / OUT: 650 mL / NET: -650 mL          Labs:   18 Jun 2025 03:31    137    |  96     |  19.3   ----------------------------<  166    5.0     |  26.0   |  0.61     Ca    8.8        18 Jun 2025 03:31  Phos  3.1       18 Jun 2025 03:31  Mg     2.2       18 Jun 2025 03:31    TPro  6.7    /  Alb  3.1    /  TBili  <0.2   /  DBili  x      /  AST  31     /  ALT  20     /  AlkPhos  129    17 Jun 2025 04:00                          11.1   4.84  )-----------( 327      ( 18 Jun 2025 03:31 )             34.7     PT/INR - ( 16 Jun 2025 10:20 )   PT: 11.9 sec;   INR: 1.05 ratio         PTT - ( 16 Jun 2025 10:20 )  PTT:33.8 sec    ECHO 9/2024:    1. Left ventricular cavity is normal in size. Left ventricular wall thickness is mildly increased. Left ventricular systolic function is normal with an ejection fraction of 56 % by Mace's method of disks.   2. There is mild (grade 1) left ventricular diastolic dysfunction.   3. Normal right ventricular cavity size and normal right ventricular systolic function.   4. Trileaflet aortic valve with normal systolic excursion. Fibrocalcific aortic valve sclerosis without stenosis.   5. Trace aortic regurgitation.   6. There is mild calcification of the mitral valve annulus.   7. Structurally normal mitral valve with normal leaflet excursion.   8. Mild mitral regurgitation.   9. Mild tricuspid regurgitation.  10. Aortic root at the sinuses of Valsalva is normal in size, measuring 2.70 cm (indexed 1.62 cm/m²). Ascending aorta is normal in size, measuring 2.60 cm (indexed 1.56 cm/m²).  11. Left atrium is normal in size.  12. The right atrium is normal in size.  13. Estimated pulmonary artery systolic pressure is 33 mmHg.    ECHO 6/2025:    1. Left ventricular systolic function is mildly to moderately decreased with an ejection fraction of 42 % by Mace's method of disks with an ejection fraction visually estimated at 35 to 40 %.   2. Moderately enlarged right ventricular cavity size and probably normal right ventricular systolic function.   3. No pericardial effusion seen.   4. Estimated pulmonary artery systolic pressure is 24 mmHg.   5. Mild left ventricular hypertrophy.   6. Mild tricuspid regurgitation.      LHC 9/2024:  LM   Left main artery: Patent, normal caliber vessel with mild luminal  irregularities. TERRI III. .    LAD   Left anterior descending artery: Normal caliber vessel that gives rise  to patent D1 branch. Angiographically-intermediate  severity, eccentric mid LAD stenosis (50%) that warrants further  investigation. TERRI III. . Instant wave-free ratio was performed  using a OMNIWIRE 185CM STR TIP with a calculated value of 0.94. Based  on the results of this study, the lesion was judged  to be non-significant and no intervention was performed.      CX   Circumflex: Dominant, normal caliber vessel that gives rise to a  large, branching LPL1 and LPDA. Mild, diffuse luminal  irregularities. TERRI III..      RCA   Right coronary artery: Small caliber vessel, non-dominant. Mild  luminal irregularities. TERRI III..        ECG: SR, LAFB (unchanged from prior)     74-year-old female with a past medical history of CAD s/p PCI, prior severe CMP with improved EF, hypertension, COPD, presents with SOB. pt reports 3 days of exertional dyspnea and palpitations, denies SOB at rest and no chest pain. She reports associated non-productive cough. Pt found to be febrile and hypoxic upon arrival here. In the ED patient noted to have elevated troponin levels prompting cardiology consultation.       -Elevated troponin level, minimal and trending down, echo with drop in EF from last echo 9/2024  -LHC from 9/2024 with moderate non obstructive LAD disease and patent LCX stent, ? progression, could also have myocarditis   -CT chest with no evidence of PNA, BNP high  -Combination mild acute systolic CHF and bronchitis    Plan   1. Will arrange LHC today given drop in EF  2. Continue antibiotics  3. ADd diuretics if elevated LVEDP on cath  4. Change to entresto 24/26mg po bid starting tomorrow, continue carvedilol  5. Titrate GDMT further as outpatient post discharge         Alvin Owens MD

## 2025-06-18 NOTE — PROGRESS NOTE ADULT - ASSESSMENT
4F with PMHX CAD, NICM, HFimpEF (54%), HTN, HLD, COPD on O2 NC 2-4L PRN, MDD, Anxiety, Insomnia presented to [facility] ER c/o SOB/[place] and palpitations and cough admitted for Acute on Chronic Hypoxic Respiratory Failure 2/2 PNA vs CHF Exacerbation c/b NSTEMI.      1-Acute on Chronic Hypoxic Respiratory Failure   pneumonia ruled out   CT chest with atelectasis   CHF     2- CM acute HFrEF   drop in EF on TTE done 6/17   cardiology follow up appreciated   -BL LE Dopplers negative  -afebrile   -dc iv rocephin   cont azithro in setting of COPD   add short course of po steroid     2- h/o COPD on cronic oxygen prn   cont neb   short course of steroid     3- Chest pain h/o CAD   abnormal troponin , possible ischemia   cont tele , aspirin , statin   TTE to assess EF wall motion   -Lovenox 60mg q24   cardiology follow re further ischemic work up     4-Chronic Hyponatreima  stable   pt states that she does not take any na tablets at home   -Monitor BMP    5-MDD, Anxiety  -Xanax 0.5mg BID PRN  -Venlafaxine 225mg q24  -Brexipaprazole 0.25mg q24 -> need to obtain from home and restart when able  -Ambien qhs PRN    6- NICM, HFimpEF (54%), HTN,   -ASA 81mg q24  -Atorvastatin 40mg qHS  -Carvedilol 3.125mg BID  -Valsartan 80mg q24      Dc in few days likely if improves pending cardiac work up  4F with PMHX CAD, NICM, HFimpEF (54%), HTN, HLD, COPD on O2 NC 2-4L PRN, MDD, Anxiety, Insomnia presented to [facility] ER c/o SOB/[place] and palpitations and cough admitted for Acute on Chronic Hypoxic Respiratory Failure 2/2 PNA vs CHF Exacerbation c/b NSTEMI.      1-Acute on Chronic Hypoxic Respiratory Failure   pneumonia ruled out   CT chest with atelectasis   probable CHf component and COPD bronchitis     2- CM , acute HFrEF   drop in EF on TTE done 6/17   cardiology follow up appreciated   -[facility] today r/o CAD     3-h/o COPD on cronic oxygen prn   cont nebulizer  short course of steroid ordered       4-Chronic Hyponatreima  stable   pt states that she does not take any na tablets at home   -Monitor BMP    5-MDD, Anxiety  -Xanax 0.5mg BID PRN  -Venlafaxine 225mg q24  -Brexipaprazole 0.25mg q24 -> need to obtain from home and restart when able  -Ambien qhs PRN      Dc home in 24 hours pending work up

## 2025-06-18 NOTE — CHART NOTE - NSCHARTNOTEFT_GEN_A_CORE
Now s/p C via ...RRA  . with Dr. Sellers .., tolerated procedure well. Pt arrived to recovery in NAD and HDS, **RRA * access site stable, no bleed/hematoma, distal pulses brisk capilary refill . No complaints post cath       Intraprocedural findings:   Diagnostic Conclusions:   1. Left dominant circulation with angiographically  intermediate-severity mid LAD stenosis, found to be non-flow limiting  given iFR  0.94. The remainder of the coronary circulation has mild diffuse  luminal irregularities.  2. LVEDP 16 mmHg.    Recommendations:   1. Aggressive medical optimization of CAD risk factors with close  outpatient cardiology follow-up.    Medications:   P2Y12 inhibitor: baby aspirin   Fentanyl: 25mcg  Versed: 1mg   Heparin: 3000 units   Omnipaque:  54ml   Closure Device: TR BAND   Post Cath EKG:  NA       Reasons for Admission (if underwent PCI):       Patient is already an inpatient: N/A       ACS (STEMI/NSTEMI/UA): N/A       Clinical Heart Failure: N/A        Age > 75 years: N/A       Cerebrovascular Disease: N/A       Chronic Lung Disease: N/A       Peripheral Arterial Disease: N/A       Chronic Kidney Disease (if yes, what is GFR): N/A       Uncontrolled Diabetes (if yes, what is HgbA1C or FBS): N/A       Poorly Controlled Hypertension (if yes, what is SBP): N/A       Morbid Obesity (if yes, what is BMI): N/A       History of Recent Ventricular Arrhythmia: N/A       Inability to Ambulate Safely: N/A       Need for Therapeutic Anticoagulation: N/A       Antiplatelet or Contrast Allergy: N/A    Plan:  -Formal cath report pending  -Post procedure management/monitoring per protocol  -Access site precautions  -Radial compression band removal at ***1 hour post cath   -Bedrest x **2*hours post procedure  -Labs and EKG in am  -NS 0.9% 250ml/hr x 1 bolus: post procedure ELIDA ppx  -Repeat ECG if any clinical indication or change on tele  -Continue current medical therapy  MEDICATIONS  (STANDING):  albuterol/ipratropium for Nebulization 3 milliLiter(s) Nebulizer every 6 hours  aspirin  chewable 81 milliGRAM(s) Oral daily  atorvastatin 40 milliGRAM(s) Oral at bedtime  azithromycin  IVPB 500 milliGRAM(s) IV Intermittent every 24 hours  carvedilol 3.125 milliGRAM(s) Oral every 12 hours  fluticasone propionate/ salmeterol 100-50 MICROgram(s) Diskus 1 Dose(s) Inhalation two times a day  gabapentin 600 milliGRAM(s) Oral two times a day  magnesium oxide 400 milliGRAM(s) Oral two times a day with meals  oxybutynin XL 10 milliGRAM(s) Oral daily  predniSONE   Tablet 40 milliGRAM(s) Oral daily  tiotropium 2.5 MICROgram(s) Inhaler 2 Puff(s) Inhalation daily  venlafaxine XR. 225 milliGRAM(s) Oral daily    -Educated regarding post procedure management and care  -Discussed the importance of RF modification  -Cardiac rehab info provided/referral and communication to cardiac rehab completed  -F/U outpt in 1-2 weeks with Cardiologist Dr. Dr hallman

## 2025-06-18 NOTE — CONSULT NOTE ADULT - SUBJECTIVE AND OBJECTIVE BOX
Department of Cardiology                                                                  [facility]/[place] [address] [address] [facility]                                                            Telephone: [phone]. Fax:[phone]                                                                                    PST H & P     Chief complaint:    Patient is a 74y old  Female who presents with a chief complaint of Acute on Chronic Hypoxic Respiratory Failure.       HPI:  74F with PMHX CAD, NICM, HFimpEF (54%), HTN, HLD, COPD on O2 NC 2-4L PRN, MDD, Anxiety, Insomnia presented to [facility] ER c/o SOB/DAVENPORT and palpitations. ROS +nonproductive cough. Febrile in ED Tmax 100.9F. Pt uses O2 via NC as needed but currently satting 84% on RA at rest and improved on 3L NC to mid 90s. CXR +RLL infiltrate effusion vs opacity. Labwork notable for elevated troponin. RVP negative. Noted to be febrile in ED Tmax 100.9F. Pt treated with Ceftriaxone/Azithromycin for suspected PNA. Given Lasix 40mg IV x1 for suspected CHF. Denies other complaints.    ROS negative unless mentioned.         Cardiology Testing Summary   CONCLUSIONS: ECHO 6/17/2025    1. Left ventricular systolic function is mildly to moderately decreased with an ejection fraction of 42 % by Mace's method of disks with an ejection fraction visually estimated at 35 to 40 %.   2. Moderately enlarged right ventricular cavity size and probably normal right ventricular systolic function.   3. No pericardial effusion seen.   4. Estimated pulmonary artery systolic pressure is 24 mmHg.   5. Mild left ventricular hypertrophy.   6. Mild tricuspid regurgitation.     Cardiac cath  9/17/24   1. Left dominant circulation with angiographically  intermediate-severity mid LAD stenosis, found to be non-flow limiting  given iFR  0.94. The remainder of the coronary circulation has mild diffuse  luminal irregularities.      Antianginal Therapies:        Beta Blockers:         Calcium Channel Blockers:        Long Acting Nitrates:        Ranexa:     Associated Risk Factors:        Cerebrovascular Disease: N/A       Chronic Lung Disease: N/A       Peripheral Arterial Disease: N/A       Chronic Kidney Disease (if yes, what is GFR): N/A       Uncontrolled Diabetes (if yes, what is HgbA1C or FBS): N/A       Poorly Controlled Hypertension (if yes, what is SBP): N/A       Morbid Obesity (if yes, what is BMI): N/A       History of Recent Ventricular Arrhythmia: N/A       Inability to Ambulate Safely: N/A       Need for Therapeutic Anticoagulation: N/A       Antiplatelet or Contrast Allergy: N/A    MEDICATIONS:  carvedilol 3.125 milliGRAM(s) Oral every 12 hours  azithromycin  IVPB 500 milliGRAM(s) IV Intermittent every 24 hours  albuterol/ipratropium for Nebulization 3 milliLiter(s) Nebulizer every 6 hours  fluticasone propionate/ salmeterol 100-50 MICROgram(s) Diskus 1 Dose(s) Inhalation two times a day  tiotropium 2.5 MICROgram(s) Inhaler 2 Puff(s) Inhalation daily  acetaminophen     Tablet .. 650 milliGRAM(s) Oral every 6 hours PRN  ALPRAZolam 0.5 milliGRAM(s) Oral two times a day PRN  gabapentin 600 milliGRAM(s) Oral two times a day  melatonin 3 milliGRAM(s) Oral at bedtime PRN  ondansetron Injectable 4 milliGRAM(s) IV Push every 8 hours PRN  oxyCODONE    IR 5 milliGRAM(s) Oral three times a day PRN  venlafaxine XR. 225 milliGRAM(s) Oral daily  zolpidem 5 milliGRAM(s) Oral at bedtime PRN  zolpidem 5 milliGRAM(s) Oral at bedtime PRN  aluminum hydroxide/magnesium hydroxide/simethicone Suspension 30 milliLiter(s) Oral every 4 hours PRN  polyethylene glycol 3350 17 Gram(s) Oral daily  atorvastatin 40 milliGRAM(s) Oral at bedtime  predniSONE   Tablet 40 milliGRAM(s) Oral daily  aspirin  chewable 81 milliGRAM(s) Oral daily  magnesium oxide 400 milliGRAM(s) Oral two times a day with meals  oxybutynin XL 10 milliGRAM(s) Oral daily      ROS: as stated above, otherwise negative    PHYSICAL EXAM:  Constitutional: A & O x 3  HEENT:   Normal oral mucosa, PERRL, EOMI	  Cardiovascular: Normal S1 S2, No JVD, No murmurs  Respiratory: Lungs clear to auscultation bilaterally   Gastrointestinal:  Soft, Non-tender, + BS	  Skin: No rashes, No ecchymoses, No cyanosis  Neurologic: Non-focal  Extremities: Normal range of motion, no edema  Vascular: Peripheral pulses trace through out     ECG:  	 Sinus     LABS:	                           11.1   4.84  )-----------( 327      ( 18 Jun 2025 03:31 )             34.7     06-18    137  |  96  |  19.3  ----------------------------<  166[H]  5.0   |  26.0  |  0.61    Ca    8.8      18 Jun 2025 03:31  Phos  3.1     06-18  Mg     2.2     06-18    TPro  6.7  /  Alb  3.1[L]  /  TBili  <0.2[L]  /  DBili  x   /  AST  31  /  ALT  20  /  AlkPhos  129[H]  06-17      HPI:  74F with PMHX CAD, NICM, HFimpEF (54%), HTN, HLD, COPD on O2 NC 2-4L PRN, MDD, Anxiety, Insomnia presented to [facility] ER c/o SOB/DAVENPORT and palpitations. ROS +nonproductive cough. Febrile in ED Tmax 100.9F. Pt uses O2 via NC as needed but currently satting 84% on RA at rest and improved on 3L NC to mid 90s. CXR +RLL infiltrate effusion vs opacity. Labwork notable for elevated troponin. RVP negative. Noted to be febrile in ED Tmax 100.9F. Pt treated with Ceftriaxone/Azithromycin for suspected PNA. Given Lasix 40mg IV x1 for suspected CHF. Denies other complaints.    ROS negative unless mentioned. (16 Jun 2025 17:02)    Plan/Recommendations:   -plan for LHC via RFA   -Normal saline 250cc x 1 bolus pre cath   -patient seen and examined  -ECG and Labs reviewed  -NPO after midnight prior with exception of sip of water with morning medications  -ASA/[place]:   pt. assessed, appropriate for sedation, pt.  educated regarding the plan for Versed/fentanyl as needed  -Intermediate risk for ELIDA, explained to pt including the possibility of worsening RFT post cath and if no recovery of RF, may need RRT/dialysis. Pt verbalized understanding. Proceed w/informed consent. Optimized renal stand point.   MALLAMPATI: 3  ASA: 3  BRA: 3   GFR: 91

## 2025-06-18 NOTE — PROGRESS NOTE ADULT - SUBJECTIVE AND OBJECTIVE BOX
Patient is a 74y old  Female who presents with a chief complaint of Acute on Chronic Hypoxic Respiratory Failure       Patient seen and examined at bedside.  chart reviewed   feeling better ,    ALLERGIES:  No Known Allergies    MEDICATIONS  (STANDING):  albuterol/ipratropium for Nebulization 3 milliLiter(s) Nebulizer every 6 hours  aspirin  chewable 81 milliGRAM(s) Oral daily  atorvastatin 40 milliGRAM(s) Oral at bedtime  azithromycin  IVPB 500 milliGRAM(s) IV Intermittent every 24 hours  carvedilol 3.125 milliGRAM(s) Oral every 12 hours  cefTRIAXone Injectable. 1000 milliGRAM(s) IV Push every 24 hours  enoxaparin Injectable 60 milliGRAM(s) SubCutaneous every 24 hours  fluticasone propionate/ salmeterol 100-50 MICROgram(s) Diskus 1 Dose(s) Inhalation two times a day  furosemide   Injectable 40 milliGRAM(s) IV Push daily  gabapentin 600 milliGRAM(s) Oral two times a day  oxybutynin XL 10 milliGRAM(s) Oral daily  sodium chloride 1 Gram(s) Oral two times a day  tiotropium 2.5 MICROgram(s) Inhaler 2 Puff(s) Inhalation daily  valsartan 80 milliGRAM(s) Oral daily  venlafaxine XR. 225 milliGRAM(s) Oral daily    MEDICATIONS  (PRN):  acetaminophen     Tablet .. 650 milliGRAM(s) Oral every 6 hours PRN Temp greater or equal to 38C (100.4F), Mild Pain (1 - 3)  ALPRAZolam 0.5 milliGRAM(s) Oral two times a day PRN for anxiety  aluminum hydroxide/magnesium hydroxide/simethicone Suspension 30 milliLiter(s) Oral every 4 hours PRN Dyspepsia  melatonin 3 milliGRAM(s) Oral at bedtime PRN Insomnia  ondansetron Injectable 4 milliGRAM(s) IV Push every 8 hours PRN Nausea and/or Vomiting  oxyCODONE    IR 5 milliGRAM(s) Oral three times a day PRN Severe Pain (7 - 10)  zolpidem 5 milliGRAM(s) Oral at bedtime PRN Insomnia  zolpidem 5 milliGRAM(s) Oral at bedtime PRN Insomnia    Vital Signs Last 24 Hrs  T(C): 36.9 (18 Jun 2025 07:46), Max: 37.3 (17 Jun 2025 11:14)  T(F): 98.4 (18 Jun 2025 07:46), Max: 99.1 (17 Jun 2025 11:14)  HR: 80 (18 Jun 2025 07:46) (80 - 99)  BP: 131/76 (18 Jun 2025 07:46) (80/40 - 131/76)  BP(mean): 71 (17 Jun 2025 18:00) (71 - 71)  RR: 18 (18 Jun 2025 07:46) (17 - 18)  SpO2: 100% (18 Jun 2025 07:46) (96% - 100%)    Parameters below as of 18 Jun 2025 07:46  Patient On (Oxygen Delivery Method): nasal cannula        PHYSICAL EXAM:  General: awake   Neck: supple   Lungs: CTA   Cardio: RRR, S1/S2, No murmur  Abdomen: Soft, Nontender, Nondistended; Bowel sounds present  Extremities: No calf tenderness, No pitting edema    LABS:                        11.4   6.93  )-----------( 252      ( 17 Jun 2025 04:00 )             35.0     06-17    135  |  97  |  12.1  ----------------------------<  110  3.4   |  26.0  |  0.58    Ca    8.8      17 Jun 2025 04:00  Phos  3.2     06-17  Mg     1.6     06-17    TPro  6.7  /  Alb  3.1  /  TBili  <0.2  /  DBili  x   /  AST  31  /  ALT  20  /  AlkPhos  129  06-17          PT/INR - ( 16 Jun 2025 10:20 )   PT: 11.9 sec;   INR: 1.05 ratio         PTT - ( 16 Jun 2025 10:20 )  PTT:33.8 sec          06-16 Chol 101 mg/dL LDL -- HDL 16 mg/dL Trig 197 mg/dL    10:20 - VBG - pH:       | pCO2:       | pO2:       | Lactate: 1.00                 Urinalysis Basic - ( 17 Jun 2025 04:00 )    Color: x / Appearance: x / SG: x / pH: x  Gluc: 110 mg/dL / Ketone: x  / Bili: x / Urobili: x   Blood: x / Protein: x / Nitrite: x   Leuk Esterase: x / RBC: x / WBC x   Sq Epi: x / Non Sq Epi: x / Bacteria: x          RADIOLOGY & ADDITIONAL TESTS:      < from: CT Chest No Cont (06.16.25 @ 18:30) >  CT scan of the chest was obtained without administration of intravenous   contrast.    Few small lymph nodes are present in the mediastinum.    Heart is normal in size. Calcification the coronary arteries is noted.   Aberrant right subclavian artery is noted. Small pericardial effusion is   noted.    No endobronchial lesions are noted. Linear opacities representing   atelectasis in the lingula and left lower lobe as well as compressive   atelectasis involving portions of the right middle and right lower lobes   are noted. This is secondary to elevation of the right hemidiaphragm. No   pleural effusions are noted.

## 2025-06-19 ENCOUNTER — TRANSCRIPTION ENCOUNTER (OUTPATIENT)
Age: 75
End: 2025-06-19

## 2025-06-19 VITALS
HEART RATE: 73 BPM | RESPIRATION RATE: 16 BRPM | DIASTOLIC BLOOD PRESSURE: 67 MMHG | OXYGEN SATURATION: 94 % | TEMPERATURE: 98 F | SYSTOLIC BLOOD PRESSURE: 131 MMHG

## 2025-06-19 PROCEDURE — 80048 BASIC METABOLIC PNL TOTAL CA: CPT

## 2025-06-19 PROCEDURE — 85025 COMPLETE CBC W/AUTO DIFF WBC: CPT

## 2025-06-19 PROCEDURE — 84484 ASSAY OF TROPONIN QUANT: CPT

## 2025-06-19 PROCEDURE — 85610 PROTHROMBIN TIME: CPT

## 2025-06-19 PROCEDURE — 83605 ASSAY OF LACTIC ACID: CPT

## 2025-06-19 PROCEDURE — 93970 EXTREMITY STUDY: CPT

## 2025-06-19 PROCEDURE — C8929: CPT

## 2025-06-19 PROCEDURE — 94640 AIRWAY INHALATION TREATMENT: CPT

## 2025-06-19 PROCEDURE — 82746 ASSAY OF FOLIC ACID SERUM: CPT

## 2025-06-19 PROCEDURE — 36415 COLL VENOUS BLD VENIPUNCTURE: CPT

## 2025-06-19 PROCEDURE — 99285 EMERGENCY DEPT VISIT HI MDM: CPT

## 2025-06-19 PROCEDURE — 96374 THER/PROPH/DIAG INJ IV PUSH: CPT

## 2025-06-19 PROCEDURE — C1894: CPT

## 2025-06-19 PROCEDURE — 83540 ASSAY OF IRON: CPT

## 2025-06-19 PROCEDURE — 85730 THROMBOPLASTIN TIME PARTIAL: CPT

## 2025-06-19 PROCEDURE — 81001 URINALYSIS AUTO W/SCOPE: CPT

## 2025-06-19 PROCEDURE — 83550 IRON BINDING TEST: CPT

## 2025-06-19 PROCEDURE — 85027 COMPLETE CBC AUTOMATED: CPT

## 2025-06-19 PROCEDURE — 99239 HOSP IP/OBS DSCHRG MGMT >30: CPT

## 2025-06-19 PROCEDURE — 84466 ASSAY OF TRANSFERRIN: CPT

## 2025-06-19 PROCEDURE — 84100 ASSAY OF PHOSPHORUS: CPT

## 2025-06-19 PROCEDURE — 0225U NFCT DS DNA&RNA 21 SARSCOV2: CPT

## 2025-06-19 PROCEDURE — 71250 CT THORAX DX C-: CPT

## 2025-06-19 PROCEDURE — 80061 LIPID PANEL: CPT

## 2025-06-19 PROCEDURE — 83880 ASSAY OF NATRIURETIC PEPTIDE: CPT

## 2025-06-19 PROCEDURE — 71045 X-RAY EXAM CHEST 1 VIEW: CPT

## 2025-06-19 PROCEDURE — 80053 COMPREHEN METABOLIC PANEL: CPT

## 2025-06-19 PROCEDURE — 93005 ELECTROCARDIOGRAM TRACING: CPT

## 2025-06-19 PROCEDURE — 83735 ASSAY OF MAGNESIUM: CPT

## 2025-06-19 PROCEDURE — 87040 BLOOD CULTURE FOR BACTERIA: CPT

## 2025-06-19 PROCEDURE — C1887: CPT

## 2025-06-19 PROCEDURE — 87641 MR-STAPH DNA AMP PROBE: CPT

## 2025-06-19 PROCEDURE — 82607 VITAMIN B-12: CPT

## 2025-06-19 PROCEDURE — 87640 STAPH A DNA AMP PROBE: CPT

## 2025-06-19 PROCEDURE — C1769: CPT

## 2025-06-19 PROCEDURE — 93458 L HRT ARTERY/VENTRICLE ANGIO: CPT

## 2025-06-19 PROCEDURE — 99232 SBSQ HOSP IP/OBS MODERATE 35: CPT

## 2025-06-19 RX ORDER — SACUBITRIL AND VALSARTAN 49; 51 MG/1; MG/1
1 TABLET, FILM COATED ORAL
Refills: 0 | Status: DISCONTINUED | OUTPATIENT
Start: 2025-06-19 | End: 2025-06-19

## 2025-06-19 RX ORDER — SODIUM CHLORIDE 0.65 %
1 AEROSOL, SPRAY (ML) NASAL
Refills: 0 | Status: DISCONTINUED | OUTPATIENT
Start: 2025-06-19 | End: 2025-06-19

## 2025-06-19 RX ORDER — SACUBITRIL AND VALSARTAN 49; 51 MG/1; MG/1
1 TABLET, FILM COATED ORAL
Qty: 60 | Refills: 0
Start: 2025-06-19 | End: 2025-07-18

## 2025-06-19 RX ORDER — SODIUM CHLORIDE 0.65 %
2 AEROSOL, SPRAY (ML) NASAL
Qty: 1 | Refills: 0
Start: 2025-06-19 | End: 2025-07-18

## 2025-06-19 RX ORDER — CARVEDILOL 3.12 MG/1
1 TABLET, FILM COATED ORAL
Refills: 0 | DISCHARGE

## 2025-06-19 RX ORDER — CARVEDILOL 3.12 MG/1
1 TABLET, FILM COATED ORAL
Qty: 0 | Refills: 0 | DISCHARGE
Start: 2025-06-19

## 2025-06-19 RX ORDER — TIOTROPIUM BROMIDE INHALATION SPRAY 3.12 UG/1
2 SPRAY, METERED RESPIRATORY (INHALATION)
Qty: 1 | Refills: 0
Start: 2025-06-19 | End: 2025-07-18

## 2025-06-19 RX ADMIN — Medication 81 MILLIGRAM(S): at 12:47

## 2025-06-19 RX ADMIN — IPRATROPIUM BROMIDE AND ALBUTEROL SULFATE 3 MILLILITER(S): .5; 2.5 SOLUTION RESPIRATORY (INHALATION) at 03:37

## 2025-06-19 RX ADMIN — CARVEDILOL 3.12 MILLIGRAM(S): 3.12 TABLET, FILM COATED ORAL at 05:02

## 2025-06-19 RX ADMIN — TIOTROPIUM BROMIDE INHALATION SPRAY 2 PUFF(S): 3.12 SPRAY, METERED RESPIRATORY (INHALATION) at 09:54

## 2025-06-19 RX ADMIN — OXYBUTYNIN CHLORIDE 10 MILLIGRAM(S): 5 TABLET, FILM COATED, EXTENDED RELEASE ORAL at 12:47

## 2025-06-19 RX ADMIN — Medication 0.5 MILLIGRAM(S): at 00:25

## 2025-06-19 RX ADMIN — GABAPENTIN 600 MILLIGRAM(S): 400 CAPSULE ORAL at 05:02

## 2025-06-19 RX ADMIN — Medication 1 DOSE(S): at 09:51

## 2025-06-19 RX ADMIN — IPRATROPIUM BROMIDE AND ALBUTEROL SULFATE 3 MILLILITER(S): .5; 2.5 SOLUTION RESPIRATORY (INHALATION) at 09:51

## 2025-06-19 RX ADMIN — VENLAFAXINE HYDROCHLORIDE 225 MILLIGRAM(S): 37.5 CAPSULE, EXTENDED RELEASE ORAL at 12:47

## 2025-06-19 RX ADMIN — PREDNISONE 40 MILLIGRAM(S): 20 TABLET ORAL at 05:02

## 2025-06-19 RX ADMIN — Medication 400 MILLIGRAM(S): at 10:14

## 2025-06-19 NOTE — PROGRESS NOTE ADULT - SUBJECTIVE AND OBJECTIVE BOX
TULIO SUGGS  [id number]      Chief Complaint: follow up dyspnea/positive trops      Subjective: no chest pain, pounding in chest has resolved  s/p cath with stable non obstructive disease    24 hour Tele: SR , no events          acetaminophen     Tablet .. 650 milliGRAM(s) Oral every 6 hours PRN  albuterol/ipratropium for Nebulization 3 milliLiter(s) Nebulizer every 6 hours  ALPRAZolam 0.5 milliGRAM(s) Oral two times a day PRN  aluminum hydroxide/magnesium hydroxide/simethicone Suspension 30 milliLiter(s) Oral every 4 hours PRN  aspirin  chewable 81 milliGRAM(s) Oral daily  atorvastatin 40 milliGRAM(s) Oral at bedtime  azithromycin  IVPB 500 milliGRAM(s) IV Intermittent every 24 hours  bisacodyl 5 milliGRAM(s) Oral every 12 hours PRN  carvedilol 3.125 milliGRAM(s) Oral every 12 hours  fluticasone propionate/ salmeterol 100-50 MICROgram(s) Diskus 1 Dose(s) Inhalation two times a day  gabapentin 600 milliGRAM(s) Oral two times a day  magnesium oxide 400 milliGRAM(s) Oral two times a day with meals  melatonin 3 milliGRAM(s) Oral at bedtime PRN  ondansetron Injectable 4 milliGRAM(s) IV Push every 8 hours PRN  oxybutynin XL 10 milliGRAM(s) Oral daily  oxyCODONE    IR 5 milliGRAM(s) Oral three times a day PRN  predniSONE   Tablet 40 milliGRAM(s) Oral daily  tiotropium 2.5 MICROgram(s) Inhaler 2 Puff(s) Inhalation daily  venlafaxine XR. 225 milliGRAM(s) Oral daily  zolpidem 5 milliGRAM(s) Oral at bedtime PRN  zolpidem 5 milliGRAM(s) Oral at bedtime PRN          Physical Exam:  T(C): 36.2 (06-19-25 @ 04:45), Max: 37.1 (06-18-25 @ 14:20)  HR: 71 (06-19-25 @ 04:45) (71 - 100)  BP: 124/76 (06-19-25 @ 04:45) (106/63 - 134/74)  RR: 20 (06-19-25 @ 04:45) (16 - 20)  SpO2: 93% (06-19-25 @ 04:45) (90% - 100%)  Wt(kg): --  General: Comfortable in NAD  HEENT: MMM, sclera anicteric  Resp: CTA bilaterally  CVS: nl s1s2, rrr, no s3/JVD  Abd: soft, NT, ND, BS+  Ext: No c/c/e  Neuro A&O x3  Psych: Normal affect    I&O's Summary    18 Jun 2025 07:01  -  19 Jun 2025 07:00  --------------------------------------------------------  IN: 222 mL / OUT: 200 mL / NET: 22 mL          Labs:   18 Jun 2025 03:31    137    |  96     |  19.3   ----------------------------<  166    5.0     |  26.0   |  0.61     Ca    8.8        18 Jun 2025 03:31  Phos  3.1       18 Jun 2025 03:31  Mg     2.2       18 Jun 2025 03:31                            11.1   4.84  )-----------( 327      ( 18 Jun 2025 03:31 )             34.7         ECHO 9/2024:    1. Left ventricular cavity is normal in size. Left ventricular wall thickness is mildly increased. Left ventricular systolic function is normal with an ejection fraction of 56 % by Mace's method of disks.   2. There is mild (grade 1) left ventricular diastolic dysfunction.   3. Normal right ventricular cavity size and normal right ventricular systolic function.   4. Trileaflet aortic valve with normal systolic excursion. Fibrocalcific aortic valve sclerosis without stenosis.   5. Trace aortic regurgitation.   6. There is mild calcification of the mitral valve annulus.   7. Structurally normal mitral valve with normal leaflet excursion.   8. Mild mitral regurgitation.   9. Mild tricuspid regurgitation.  10. Aortic root at the sinuses of Valsalva is normal in size, measuring 2.70 cm (indexed 1.62 cm/m²). Ascending aorta is normal in size, measuring 2.60 cm (indexed 1.56 cm/m²).  11. Left atrium is normal in size.  12. The right atrium is normal in size.  13. Estimated pulmonary artery systolic pressure is 33 mmHg.    ECHO 6/2025:    1. Left ventricular systolic function is mildly to moderately decreased with an ejection fraction of 42 % by Mace's method of disks with an ejection fraction visually estimated at 35 to 40 %.   2. Moderately enlarged right ventricular cavity size and probably normal right ventricular systolic function.   3. No pericardial effusion seen.   4. Estimated pulmonary artery systolic pressure is 24 mmHg.   5. Mild left ventricular hypertrophy.   6. Mild tricuspid regurgitation.      LHC 9/2024:  LM   Left main artery: Patent, normal caliber vessel with mild luminal  irregularities. TERRI III. .    LAD   Left anterior descending artery: Normal caliber vessel that gives rise  to patent D1 branch. Angiographically-intermediate  severity, eccentric mid LAD stenosis (50%) that warrants further  investigation. TERRI III. . Instant wave-free ratio was performed  using a OMNIWIRE 185CM STR TIP with a calculated value of 0.94. Based  on the results of this study, the lesion was judged  to be non-significant and no intervention was performed.      CX   Circumflex: Dominant, normal caliber vessel that gives rise to a  large, branching LPL1 and LPDA. Mild, diffuse luminal  irregularities. TERRI III..      RCA   Right coronary artery: Small caliber vessel, non-dominant. Mild  luminal irregularities. TERRI III..        ECG: SR, LAFB (unchanged from prior)     74-year-old female with a past medical history of CAD s/p PCI, prior severe CMP with improved EF, hypertension, COPD, presents with SOB. pt reports 3 days of exertional dyspnea and palpitations, denies SOB at rest and no chest pain. She reports associated non-productive cough. Pt found to be febrile and hypoxic upon arrival here. In the ED patient noted to have elevated troponin levels prompting cardiology consultation.       -Elevated troponin level, minimal and trending down, echo with drop in EF from last echo 9/2024  -LHC from 9/2024 with moderate non obstructive LAD disease and patent LCX stent, repeat cath today without progressive CAD. CMP non ischemic  -CT chest with no evidence of PNA, BNP high  -Combination mild acute systolic CHF and bronchitis    Plan (TO BE SEEN)   1.  2. Continue antibiotics  3.   4. Change to entresto 24/26mg po bid continue carvedilol  5. Titrate GDMT further as outpatient post discharge         Alvin Owens MD TULIO SUGGS  [id number]      Chief Complaint: follow up dyspnea/positive trops      Subjective: no chest pain, pounding in chest has resolved  s/p cath with stable non obstructive disease    24 hour Tele: SR , no events          acetaminophen     Tablet .. 650 milliGRAM(s) Oral every 6 hours PRN  albuterol/ipratropium for Nebulization 3 milliLiter(s) Nebulizer every 6 hours  ALPRAZolam 0.5 milliGRAM(s) Oral two times a day PRN  aluminum hydroxide/magnesium hydroxide/simethicone Suspension 30 milliLiter(s) Oral every 4 hours PRN  aspirin  chewable 81 milliGRAM(s) Oral daily  atorvastatin 40 milliGRAM(s) Oral at bedtime  azithromycin  IVPB 500 milliGRAM(s) IV Intermittent every 24 hours  bisacodyl 5 milliGRAM(s) Oral every 12 hours PRN  carvedilol 3.125 milliGRAM(s) Oral every 12 hours  fluticasone propionate/ salmeterol 100-50 MICROgram(s) Diskus 1 Dose(s) Inhalation two times a day  gabapentin 600 milliGRAM(s) Oral two times a day  magnesium oxide 400 milliGRAM(s) Oral two times a day with meals  melatonin 3 milliGRAM(s) Oral at bedtime PRN  ondansetron Injectable 4 milliGRAM(s) IV Push every 8 hours PRN  oxybutynin XL 10 milliGRAM(s) Oral daily  oxyCODONE    IR 5 milliGRAM(s) Oral three times a day PRN  predniSONE   Tablet 40 milliGRAM(s) Oral daily  tiotropium 2.5 MICROgram(s) Inhaler 2 Puff(s) Inhalation daily  venlafaxine XR. 225 milliGRAM(s) Oral daily  zolpidem 5 milliGRAM(s) Oral at bedtime PRN  zolpidem 5 milliGRAM(s) Oral at bedtime PRN          Physical Exam:  T(C): 36.2 (06-19-25 @ 04:45), Max: 37.1 (06-18-25 @ 14:20)  HR: 71 (06-19-25 @ 04:45) (71 - 100)  BP: 124/76 (06-19-25 @ 04:45) (106/63 - 134/74)  RR: 20 (06-19-25 @ 04:45) (16 - 20)  SpO2: 93% (06-19-25 @ 04:45) (90% - 100%)  Wt(kg): --  General: Comfortable in NAD  HEENT: MMM, sclera anicteric  Resp: CTA bilaterally  CVS: nl s1s2, rrr, no s3/JVD  Abd: soft, NT, ND, BS+  Ext: No c/c/e  Neuro A&O x3  Psych: Normal affect    I&O's Summary    18 Jun 2025 07:01  -  19 Jun 2025 07:00  --------------------------------------------------------  IN: 222 mL / OUT: 200 mL / NET: 22 mL          Labs:   18 Jun 2025 03:31    137    |  96     |  19.3   ----------------------------<  166    5.0     |  26.0   |  0.61     Ca    8.8        18 Jun 2025 03:31  Phos  3.1       18 Jun 2025 03:31  Mg     2.2       18 Jun 2025 03:31                            11.1   4.84  )-----------( 327      ( 18 Jun 2025 03:31 )             34.7         ECHO 9/2024:    1. Left ventricular cavity is normal in size. Left ventricular wall thickness is mildly increased. Left ventricular systolic function is normal with an ejection fraction of 56 % by Mace's method of disks.   2. There is mild (grade 1) left ventricular diastolic dysfunction.   3. Normal right ventricular cavity size and normal right ventricular systolic function.   4. Trileaflet aortic valve with normal systolic excursion. Fibrocalcific aortic valve sclerosis without stenosis.   5. Trace aortic regurgitation.   6. There is mild calcification of the mitral valve annulus.   7. Structurally normal mitral valve with normal leaflet excursion.   8. Mild mitral regurgitation.   9. Mild tricuspid regurgitation.  10. Aortic root at the sinuses of Valsalva is normal in size, measuring 2.70 cm (indexed 1.62 cm/m²). Ascending aorta is normal in size, measuring 2.60 cm (indexed 1.56 cm/m²).  11. Left atrium is normal in size.  12. The right atrium is normal in size.  13. Estimated pulmonary artery systolic pressure is 33 mmHg.    ECHO 6/2025:    1. Left ventricular systolic function is mildly to moderately decreased with an ejection fraction of 42 % by Mace's method of disks with an ejection fraction visually estimated at 35 to 40 %.   2. Moderately enlarged right ventricular cavity size and probably normal right ventricular systolic function.   3. No pericardial effusion seen.   4. Estimated pulmonary artery systolic pressure is 24 mmHg.   5. Mild left ventricular hypertrophy.   6. Mild tricuspid regurgitation.      LHC 9/2024:  LM   Left main artery: Patent, normal caliber vessel with mild luminal  irregularities. TERRI III. .    LAD   Left anterior descending artery: Normal caliber vessel that gives rise  to patent D1 branch. Angiographically-intermediate  severity, eccentric mid LAD stenosis (50%) that warrants further  investigation. TERRI III. . Instant wave-free ratio was performed  using a OMNIWIRE 185CM STR TIP with a calculated value of 0.94. Based  on the results of this study, the lesion was judged  to be non-significant and no intervention was performed.      CX   Circumflex: Dominant, normal caliber vessel that gives rise to a  large, branching LPL1 and LPDA. Mild, diffuse luminal  irregularities. TERRI III..      RCA   Right coronary artery: Small caliber vessel, non-dominant. Mild  luminal irregularities. TERRI III..        ECG: SR, LAFB (unchanged from prior)     74-year-old female with a past medical history of CAD s/p PCI, prior severe CMP with improved EF, hypertension, COPD, presents with SOB. pt reports 3 days of exertional dyspnea and palpitations, denies SOB at rest and no chest pain. She reports associated non-productive cough. Pt found to be febrile and hypoxic upon arrival here. In the ED patient noted to have elevated troponin levels prompting cardiology consultation.       -Elevated troponin level, minimal and trending down, echo with drop in EF from last echo 9/2024  -LHC from 9/2024 with moderate non obstructive LAD disease and patent LCX stent, repeat cath yesterday without progressive CAD. CMP non ischemic  -CT chest with no evidence of PNA, BNP high  -Combination mild acute systolic CHF and bronchitis    Plan  1. CV stable for discharge  2. Continue antibiotics  3. Add entresto 24/26mg po bid continue carvedilol  4. Titrate GDMT further as outpatient post discharge  5. Office follow up with Dr hallman 1-2 weeks      Alvin Owens MD

## 2025-06-19 NOTE — PROGRESS NOTE ADULT - REASON FOR ADMISSION
Acute on Chronic Hypoxic Respiratory Failure

## 2025-06-19 NOTE — DISCHARGE NOTE PROVIDER - NSDCCPCAREPLAN_GEN_ALL_CORE_FT
On baseline 3 L. Currently at baseline. PRINCIPAL DISCHARGE DIAGNOSIS  Diagnosis: Acute on chronic hypoxic respiratory failure  Assessment and Plan of Treatment: continue oxygen as needed      SECONDARY DISCHARGE DIAGNOSES  Diagnosis: CAD (coronary artery disease)  Assessment and Plan of Treatment:     Diagnosis: Elevated troponin  Assessment and Plan of Treatment:     Diagnosis: COPD with hypoxia  Assessment and Plan of Treatment:

## 2025-06-19 NOTE — DISCHARGE NOTE PROVIDER - NSDCMRMEDTOKEN_GEN_ALL_CORE_FT
Afrin No Drip 0.05% nasal spray: 2 spray(s) in each nostril 2 times a day as needed for  nose bleed  ALPRAZolam 0.5 mg oral tablet: 1 tab(s) orally 2 times a day as needed for  anxiety do not combine with sleep meds or alcohol. do not drive after taking meds  aspirin 81 mg oral tablet, chewable: 1 tab(s) orally once a day  atorvastatin 40 mg oral tablet: 1 tab(s) orally once a day (at bedtime)  brexpiprazole 0.25 mg oral tablet: 1 tab(s) orally once a day  carvedilol 3.125 mg oral tablet: 1 tab(s) orally every 12 hours  docusate sodium 100 mg oral capsule: 1 cap(s) orally 2 times a day  fluticasone 50 mcg/inh nasal spray: 1 spray(s) in each nostril 2 times a day  gabapentin 600 mg oral tablet: 1 tab(s) orally 2 times a day  oxyBUTYnin 10 mg/24 hr oral tablet, extended release: 1 tab(s) orally once a day  romosozumab-aqqg 105 mg/1.17 mL subcutaneous solution: 210 milligram(s) subcutaneously once a month  sacubitril-valsartan 24 mg-26 mg oral tablet: 1 tab(s) orally 2 times a day  sodium chloride 0.65% nasal spray: 2 spray(s) nasal 4 times a day  Spiriva Respimat 60 ACT 2.5 mcg/inh inhalation aerosol: 2 puff(s) inhaled once a day  Trelegy Ellipta 200 mcg-62.5 mcg-25 mcg/inh inhalation powder: 1 inhaled 2 times a day  venlafaxine 225 mg oral tablet, extended release: 1 tab(s) orally once a day

## 2025-06-19 NOTE — DISCHARGE NOTE NURSING/CASE MANAGEMENT/SOCIAL WORK - PATIENT PORTAL LINK FT
You can access the FollowMyHealth Patient Portal offered by [facility] by registering at the following website: [email or web address]. By joining [facility]’s FollowMyHealth portal, you will also be able to view your health information using other applications (apps) compatible with our system.

## 2025-06-19 NOTE — DIETITIAN INITIAL EVALUATION ADULT - PERTINENT LABORATORY DATA
06-18    137  |  96  |  19.3  ----------------------------<  166[H]  5.0   |  26.0  |  0.61    Ca    8.8      18 Jun 2025 03:31  Phos  3.1     06-18  Mg     2.2     06-18

## 2025-06-19 NOTE — DISCHARGE NOTE NURSING/CASE MANAGEMENT/SOCIAL WORK - FINANCIAL ASSISTANCE
[facility] provides services at a reduced cost to those who are determined to be eligible through [facility]’s financial assistance program. Information regarding [facility]’s financial assistance program can be found by going to [email or web address] or by calling [phone].

## 2025-06-19 NOTE — DISCHARGE NOTE PROVIDER - HOSPITAL COURSE
F with PMHX CAD, NICM, HFimpEF (54%), HTN, HLD, COPD on O2 NC 2-4L PRN, MDD, Anxiety, Insomnia presented to [facility] ER c/o SOB/DAVENPORT and palpitations. ROS +nonproductive cough. Febrile in ED Tmax 100.9F. Pt uses O2 via NC as needed but currently satting 84% on RA at rest and improved on 3L NC to mid 90s. CXR +RLL infiltrate effusion vs opacity. Labwork notable for elevated troponin. RVP negative. Noted to be febrile in ED Tmax 100.9F. Pt started with Ceftriaxone/Azithromycin for suspected PNA.and iv lasix given   CT of chest no pneumonia rocephin stopped   pt is improving . TTE showed drop in EF from baseline , cardiology performed [facility] non obstructive CAD   DC home with neb , oxygen she has and follow up with PCP cardiology as needed   .  
30.2

## 2025-06-19 NOTE — DISCHARGE NOTE NURSING/CASE MANAGEMENT/SOCIAL WORK - NSDCPEFALRISK_GEN_ALL_CORE
For information on Fall & Injury Prevention, visit: [email or web address] OR  [email or web address] OR  https://www.cdc.gov/steadi/patient.html
Yes

## 2025-06-19 NOTE — DIETITIAN INITIAL EVALUATION ADULT - PERTINENT MEDS FT
MEDICATIONS  (STANDING):  albuterol/ipratropium for Nebulization 3 milliLiter(s) Nebulizer every 6 hours  aspirin  chewable 81 milliGRAM(s) Oral daily  atorvastatin 40 milliGRAM(s) Oral at bedtime  azithromycin  IVPB 500 milliGRAM(s) IV Intermittent every 24 hours  carvedilol 3.125 milliGRAM(s) Oral every 12 hours  fluticasone propionate/ salmeterol 100-50 MICROgram(s) Diskus 1 Dose(s) Inhalation two times a day  gabapentin 600 milliGRAM(s) Oral two times a day  oxybutynin XL 10 milliGRAM(s) Oral daily  predniSONE   Tablet 40 milliGRAM(s) Oral daily  sacubitril 24 mG/valsartan 26 mG 1 Tablet(s) Oral two times a day  tiotropium 2.5 MICROgram(s) Inhaler 2 Puff(s) Inhalation daily  venlafaxine XR. 225 milliGRAM(s) Oral daily    MEDICATIONS  (PRN):  acetaminophen     Tablet .. 650 milliGRAM(s) Oral every 6 hours PRN Temp greater or equal to 38C (100.4F), Mild Pain (1 - 3)  ALPRAZolam 0.5 milliGRAM(s) Oral two times a day PRN for anxiety  aluminum hydroxide/magnesium hydroxide/simethicone Suspension 30 milliLiter(s) Oral every 4 hours PRN Dyspepsia  bisacodyl 5 milliGRAM(s) Oral every 12 hours PRN Constipation  melatonin 3 milliGRAM(s) Oral at bedtime PRN Insomnia  ondansetron Injectable 4 milliGRAM(s) IV Push every 8 hours PRN Nausea and/or Vomiting  oxyCODONE    IR 5 milliGRAM(s) Oral three times a day PRN Severe Pain (7 - 10)  sodium chloride 0.65% Nasal 1 Spray(s) Both Nostrils five times a day PRN Nasal Congestion  zolpidem 5 milliGRAM(s) Oral at bedtime PRN Insomnia  zolpidem 5 milliGRAM(s) Oral at bedtime PRN Insomnia

## 2025-06-19 NOTE — CHART NOTE - NSCHARTNOTEFT_GEN_A_CORE
CCl NP    POD #1 s/p diagnostic LHC via RRA    Right radial dressing removed.  Site benign.  No bleeding/hematoma/ecchymosis.  + palp radial pulse    -radial precautions reviewed  -f/u Dr. Ervin

## 2025-06-19 NOTE — DIETITIAN INITIAL EVALUATION ADULT - ORAL INTAKE PTA/DIET HISTORY
Met with pt who reports fair po intake, constipation noted. Provided low fat diet education verbally, pt refusing formal education. Pt to be discharged today.

## 2025-06-19 NOTE — DIETITIAN INITIAL EVALUATION ADULT - OTHER INFO
4F with PMHX CAD, NICM, HFimpEF (54%), HTN, HLD, COPD on O2 NC 2-4L PRN, MDD, Anxiety, Insomnia presented to [facility] ER c/o SOB/DAVENPORT and palpitations and cough admitted for Acute on Chronic Hypoxic Respiratory Failure 2/2 PNA vs CHF Exacerbation c/b NSTEMI.

## 2025-06-21 LAB
CULTURE RESULTS: SIGNIFICANT CHANGE UP
SPECIMEN SOURCE: SIGNIFICANT CHANGE UP

## 2025-06-27 ENCOUNTER — APPOINTMENT (OUTPATIENT)
Dept: CARDIOLOGY | Facility: CLINIC | Age: 75
End: 2025-06-27
Payer: MEDICARE

## 2025-06-27 ENCOUNTER — TRANSCRIPTION ENCOUNTER (OUTPATIENT)
Age: 75
End: 2025-06-27

## 2025-06-27 VITALS
WEIGHT: 124 LBS | HEART RATE: 88 BPM | SYSTOLIC BLOOD PRESSURE: 95 MMHG | BODY MASS INDEX: 23.41 KG/M2 | RESPIRATION RATE: 16 BRPM | HEIGHT: 61 IN | DIASTOLIC BLOOD PRESSURE: 63 MMHG

## 2025-06-27 PROCEDURE — 93000 ELECTROCARDIOGRAM COMPLETE: CPT

## 2025-06-27 PROCEDURE — 99215 OFFICE O/P EST HI 40 MIN: CPT

## 2025-06-27 RX ORDER — BREXPIPRAZOLE 0.25 MG/1
0.25 TABLET ORAL
Refills: 0 | Status: ACTIVE | COMMUNITY

## 2025-06-27 RX ORDER — SACUBITRIL AND VALSARTAN 24; 26 MG/1; MG/1
24-26 TABLET, FILM COATED ORAL TWICE DAILY
Refills: 0 | Status: ACTIVE | COMMUNITY

## 2025-07-01 ENCOUNTER — APPOINTMENT (OUTPATIENT)
Dept: ORTHOPEDIC SURGERY | Facility: CLINIC | Age: 75
End: 2025-07-01
Payer: MEDICARE

## 2025-07-01 VITALS — HEIGHT: 61 IN | WEIGHT: 124 LBS | BODY MASS INDEX: 23.41 KG/M2

## 2025-07-01 PROBLEM — M25.511 CHRONIC PAIN OF BOTH SHOULDERS: Status: ACTIVE | Noted: 2025-07-01

## 2025-07-01 PROCEDURE — 20610 DRAIN/INJ JOINT/BURSA W/O US: CPT | Mod: 50

## 2025-07-01 PROCEDURE — 73030 X-RAY EXAM OF SHOULDER: CPT | Mod: 50

## 2025-07-01 PROCEDURE — 99215 OFFICE O/P EST HI 40 MIN: CPT | Mod: 25

## 2025-07-02 LAB
ANION GAP SERPL CALC-SCNC: 15 MMOL/L
BUN SERPL-MCNC: 25 MG/DL
CALCIUM SERPL-MCNC: 9.9 MG/DL
CHLORIDE SERPL-SCNC: 98 MMOL/L
CHOLEST SERPL-MCNC: 185 MG/DL
CO2 SERPL-SCNC: 23 MMOL/L
CREAT SERPL-MCNC: 0.79 MG/DL
EGFRCR SERPLBLD CKD-EPI 2021: 78 ML/MIN/1.73M2
GLUCOSE SERPL-MCNC: 101 MG/DL
HDLC SERPL-MCNC: 58 MG/DL
LDLC SERPL-MCNC: 104 MG/DL
MAGNESIUM SERPL-MCNC: 2.3 MG/DL
NONHDLC SERPL-MCNC: 127 MG/DL
NT-PROBNP SERPL-MCNC: 299 PG/ML
POTASSIUM SERPL-SCNC: 4.5 MMOL/L
SODIUM SERPL-SCNC: 136 MMOL/L
TRIGL SERPL-MCNC: 130 MG/DL

## 2025-07-03 ENCOUNTER — OUTPATIENT (OUTPATIENT)
Dept: OUTPATIENT SERVICES | Facility: HOSPITAL | Age: 75
LOS: 1 days | End: 2025-07-03
Payer: MEDICARE

## 2025-07-03 DIAGNOSIS — K21.9 GASTRO-ESOPHAGEAL REFLUX DISEASE WITHOUT ESOPHAGITIS: ICD-10-CM

## 2025-07-03 DIAGNOSIS — Z98.891 HISTORY OF UTERINE SCAR FROM PREVIOUS SURGERY: Chronic | ICD-10-CM

## 2025-07-03 DIAGNOSIS — R10.13 EPIGASTRIC PAIN: ICD-10-CM

## 2025-07-03 DIAGNOSIS — R07.9 CHEST PAIN, UNSPECIFIED: ICD-10-CM

## 2025-07-03 DIAGNOSIS — R49.0 DYSPHONIA: ICD-10-CM

## 2025-07-03 DIAGNOSIS — Z90.710 ACQUIRED ABSENCE OF BOTH CERVIX AND UTERUS: Chronic | ICD-10-CM

## 2025-07-03 DIAGNOSIS — Z12.11 ENCOUNTER FOR SCREENING FOR MALIGNANT NEOPLASM OF COLON: ICD-10-CM

## 2025-07-03 PROCEDURE — 74240 X-RAY XM UPR GI TRC 1CNTRST: CPT

## 2025-07-03 PROCEDURE — 74240 X-RAY XM UPR GI TRC 1CNTRST: CPT | Mod: 26

## 2025-07-08 ENCOUNTER — APPOINTMENT (OUTPATIENT)
Dept: CARE COORDINATION | Facility: HOME HEALTH | Age: 75
End: 2025-07-08

## 2025-07-08 PROCEDURE — 99349 HOME/RES VST EST MOD MDM 40: CPT

## 2025-07-14 VITALS
SYSTOLIC BLOOD PRESSURE: 114 MMHG | RESPIRATION RATE: 15 BRPM | DIASTOLIC BLOOD PRESSURE: 64 MMHG | WEIGHT: 152.13 LBS | BODY MASS INDEX: 28.74 KG/M2 | HEART RATE: 82 BPM | OXYGEN SATURATION: 99 %

## 2025-07-14 PROBLEM — I50.22 CHRONIC SYSTOLIC CHF (CONGESTIVE HEART FAILURE): Status: ACTIVE | Noted: 2025-06-27

## 2025-07-14 PROBLEM — J44.9 CHRONIC OBSTRUCTIVE PULMONARY DISEASE, UNSPECIFIED COPD TYPE: Status: ACTIVE | Noted: 2025-07-14

## 2025-07-14 RX ORDER — TIOTROPIUM BROMIDE 18 UG/1
18 CAPSULE ORAL; RESPIRATORY (INHALATION) DAILY
Refills: 0 | Status: ACTIVE | COMMUNITY

## 2025-07-15 ENCOUNTER — TRANSCRIPTION ENCOUNTER (OUTPATIENT)
Age: 75
End: 2025-07-15

## 2025-07-21 ENCOUNTER — TRANSCRIPTION ENCOUNTER (OUTPATIENT)
Age: 75
End: 2025-07-21

## 2025-08-07 ENCOUNTER — APPOINTMENT (OUTPATIENT)
Dept: ORTHOPEDIC SURGERY | Facility: CLINIC | Age: 75
End: 2025-08-07
Payer: MEDICARE

## 2025-08-07 ENCOUNTER — APPOINTMENT (OUTPATIENT)
Dept: CARDIOLOGY | Facility: CLINIC | Age: 75
End: 2025-08-07
Payer: MEDICARE

## 2025-08-07 VITALS
DIASTOLIC BLOOD PRESSURE: 64 MMHG | SYSTOLIC BLOOD PRESSURE: 103 MMHG | BODY MASS INDEX: 28.7 KG/M2 | HEIGHT: 61 IN | WEIGHT: 152 LBS

## 2025-08-07 VITALS
RESPIRATION RATE: 16 BRPM | HEART RATE: 67 BPM | SYSTOLIC BLOOD PRESSURE: 110 MMHG | BODY MASS INDEX: 24.73 KG/M2 | WEIGHT: 131 LBS | DIASTOLIC BLOOD PRESSURE: 67 MMHG | HEIGHT: 61 IN

## 2025-08-07 DIAGNOSIS — M75.52 BURSITIS OF RIGHT SHOULDER: ICD-10-CM

## 2025-08-07 DIAGNOSIS — M19.011 PRIMARY OSTEOARTHRITIS, RIGHT SHOULDER: ICD-10-CM

## 2025-08-07 DIAGNOSIS — R94.31 ABNORMAL ELECTROCARDIOGRAM [ECG] [EKG]: ICD-10-CM

## 2025-08-07 DIAGNOSIS — M25.611 STIFFNESS OF RIGHT SHOULDER, NOT ELSEWHERE CLASSIFIED: ICD-10-CM

## 2025-08-07 DIAGNOSIS — M25.612 STIFFNESS OF RIGHT SHOULDER, NOT ELSEWHERE CLASSIFIED: ICD-10-CM

## 2025-08-07 DIAGNOSIS — M75.51 BURSITIS OF RIGHT SHOULDER: ICD-10-CM

## 2025-08-07 DIAGNOSIS — I42.8 OTHER CARDIOMYOPATHIES: ICD-10-CM

## 2025-08-07 DIAGNOSIS — I25.10 ATHEROSCLEROTIC HEART DISEASE OF NATIVE CORONARY ARTERY W/OUT ANGINA PECTORIS: ICD-10-CM

## 2025-08-07 DIAGNOSIS — M19.012 PRIMARY OSTEOARTHRITIS, RIGHT SHOULDER: ICD-10-CM

## 2025-08-07 PROCEDURE — 93000 ELECTROCARDIOGRAM COMPLETE: CPT

## 2025-08-07 PROCEDURE — G2211 COMPLEX E/M VISIT ADD ON: CPT

## 2025-08-07 PROCEDURE — 20610 DRAIN/INJ JOINT/BURSA W/O US: CPT | Mod: 50

## 2025-08-07 PROCEDURE — 99214 OFFICE O/P EST MOD 30 MIN: CPT | Mod: 25

## 2025-08-07 PROCEDURE — 99214 OFFICE O/P EST MOD 30 MIN: CPT

## 2025-08-07 RX ORDER — DAPAGLIFLOZIN 10 MG/1
10 TABLET, FILM COATED ORAL DAILY
Qty: 30 | Refills: 6 | Status: ACTIVE | COMMUNITY
Start: 2025-08-07 | End: 1900-01-01

## 2025-08-10 ENCOUNTER — EMERGENCY (EMERGENCY)
Facility: HOSPITAL | Age: 75
LOS: 1 days | End: 2025-08-10
Attending: STUDENT IN AN ORGANIZED HEALTH CARE EDUCATION/TRAINING PROGRAM
Payer: MEDICARE

## 2025-08-10 ENCOUNTER — NON-APPOINTMENT (OUTPATIENT)
Age: 75
End: 2025-08-10

## 2025-08-10 VITALS
DIASTOLIC BLOOD PRESSURE: 60 MMHG | SYSTOLIC BLOOD PRESSURE: 112 MMHG | OXYGEN SATURATION: 100 % | HEART RATE: 87 BPM | RESPIRATION RATE: 17 BRPM

## 2025-08-10 VITALS
RESPIRATION RATE: 20 BRPM | SYSTOLIC BLOOD PRESSURE: 117 MMHG | HEART RATE: 123 BPM | OXYGEN SATURATION: 96 % | TEMPERATURE: 98 F | DIASTOLIC BLOOD PRESSURE: 75 MMHG | HEIGHT: 61 IN

## 2025-08-10 DIAGNOSIS — Z98.891 HISTORY OF UTERINE SCAR FROM PREVIOUS SURGERY: Chronic | ICD-10-CM

## 2025-08-10 DIAGNOSIS — Z90.710 ACQUIRED ABSENCE OF BOTH CERVIX AND UTERUS: Chronic | ICD-10-CM

## 2025-08-10 LAB
ALBUMIN SERPL ELPH-MCNC: 4 G/DL — SIGNIFICANT CHANGE UP (ref 3.3–5.2)
ALP SERPL-CCNC: 112 U/L — SIGNIFICANT CHANGE UP (ref 40–120)
ALT FLD-CCNC: 13 U/L — SIGNIFICANT CHANGE UP
ANION GAP SERPL CALC-SCNC: 14 MMOL/L — SIGNIFICANT CHANGE UP (ref 5–17)
APTT BLD: 29.7 SEC — SIGNIFICANT CHANGE UP (ref 26.1–36.8)
AST SERPL-CCNC: 17 U/L — SIGNIFICANT CHANGE UP
BASOPHILS # BLD AUTO: 0.04 K/UL — SIGNIFICANT CHANGE UP (ref 0–0.2)
BASOPHILS NFR BLD AUTO: 0.4 % — SIGNIFICANT CHANGE UP (ref 0–2)
BILIRUB SERPL-MCNC: <0.2 MG/DL — LOW (ref 0.4–2)
BUN SERPL-MCNC: 46 MG/DL — HIGH (ref 8–20)
CALCIUM SERPL-MCNC: 9.3 MG/DL — SIGNIFICANT CHANGE UP (ref 8.4–10.5)
CHLORIDE SERPL-SCNC: 95 MMOL/L — LOW (ref 96–108)
CO2 SERPL-SCNC: 24 MMOL/L — SIGNIFICANT CHANGE UP (ref 22–29)
CREAT SERPL-MCNC: 0.89 MG/DL — SIGNIFICANT CHANGE UP (ref 0.5–1.3)
EGFR: 68 ML/MIN/1.73M2 — SIGNIFICANT CHANGE UP
EGFR: 68 ML/MIN/1.73M2 — SIGNIFICANT CHANGE UP
EOSINOPHIL # BLD AUTO: 0.05 K/UL — SIGNIFICANT CHANGE UP (ref 0–0.5)
EOSINOPHIL NFR BLD AUTO: 0.4 % — SIGNIFICANT CHANGE UP (ref 0–6)
FLUAV AG NPH QL: SIGNIFICANT CHANGE UP
FLUBV AG NPH QL: SIGNIFICANT CHANGE UP
GLUCOSE SERPL-MCNC: 106 MG/DL — HIGH (ref 70–99)
HCT VFR BLD CALC: 38 % — SIGNIFICANT CHANGE UP (ref 34.5–45)
HGB BLD-MCNC: 13 G/DL — SIGNIFICANT CHANGE UP (ref 11.5–15.5)
IMM GRANULOCYTES # BLD AUTO: 0.06 K/UL — SIGNIFICANT CHANGE UP (ref 0–0.07)
IMM GRANULOCYTES NFR BLD AUTO: 0.5 % — SIGNIFICANT CHANGE UP (ref 0–0.9)
INR BLD: 0.87 RATIO — SIGNIFICANT CHANGE UP (ref 0.85–1.16)
LYMPHOCYTES # BLD AUTO: 2.05 K/UL — SIGNIFICANT CHANGE UP (ref 1–3.3)
LYMPHOCYTES NFR BLD AUTO: 18.1 % — SIGNIFICANT CHANGE UP (ref 13–44)
MCHC RBC-ENTMCNC: 29.4 PG — SIGNIFICANT CHANGE UP (ref 27–34)
MCHC RBC-ENTMCNC: 34.2 G/DL — SIGNIFICANT CHANGE UP (ref 32–36)
MCV RBC AUTO: 86 FL — SIGNIFICANT CHANGE UP (ref 80–100)
MONOCYTES # BLD AUTO: 1.48 K/UL — HIGH (ref 0–0.9)
MONOCYTES NFR BLD AUTO: 13.1 % — SIGNIFICANT CHANGE UP (ref 2–14)
NEUTROPHILS # BLD AUTO: 7.65 K/UL — HIGH (ref 1.8–7.4)
NEUTROPHILS NFR BLD AUTO: 67.5 % — SIGNIFICANT CHANGE UP (ref 43–77)
NRBC # BLD AUTO: 0 K/UL — SIGNIFICANT CHANGE UP (ref 0–0)
NRBC # FLD: 0 K/UL — SIGNIFICANT CHANGE UP (ref 0–0)
NRBC BLD AUTO-RTO: 0 /100 WBCS — SIGNIFICANT CHANGE UP (ref 0–0)
NT-PROBNP SERPL-SCNC: 448 PG/ML — HIGH (ref 0–300)
PLATELET # BLD AUTO: 364 K/UL — SIGNIFICANT CHANGE UP (ref 150–400)
PMV BLD: 8.9 FL — SIGNIFICANT CHANGE UP (ref 7–13)
POTASSIUM SERPL-MCNC: 4.3 MMOL/L — SIGNIFICANT CHANGE UP (ref 3.5–5.3)
POTASSIUM SERPL-SCNC: 4.3 MMOL/L — SIGNIFICANT CHANGE UP (ref 3.5–5.3)
PROT SERPL-MCNC: 6.9 G/DL — SIGNIFICANT CHANGE UP (ref 6.6–8.7)
PROTHROM AB SERPL-ACNC: 9.8 SEC — LOW (ref 9.9–13.4)
RBC # BLD: 4.42 M/UL — SIGNIFICANT CHANGE UP (ref 3.8–5.2)
RBC # FLD: 14.8 % — HIGH (ref 10.3–14.5)
RSV RNA NPH QL NAA+NON-PROBE: SIGNIFICANT CHANGE UP
SARS-COV-2 RNA SPEC QL NAA+PROBE: SIGNIFICANT CHANGE UP
SODIUM SERPL-SCNC: 133 MMOL/L — LOW (ref 135–145)
SOURCE RESPIRATORY: SIGNIFICANT CHANGE UP
TROPONIN T, HIGH SENSITIVITY RESULT: 37 NG/L — SIGNIFICANT CHANGE UP (ref 0–51)
TROPONIN T, HIGH SENSITIVITY RESULT: 41 NG/L — SIGNIFICANT CHANGE UP (ref 0–51)
WBC # BLD: 11.33 K/UL — HIGH (ref 3.8–10.5)
WBC # FLD AUTO: 11.33 K/UL — HIGH (ref 3.8–10.5)

## 2025-08-10 PROCEDURE — 99284 EMERGENCY DEPT VISIT MOD MDM: CPT

## 2025-08-10 PROCEDURE — 85610 PROTHROMBIN TIME: CPT

## 2025-08-10 PROCEDURE — 94640 AIRWAY INHALATION TREATMENT: CPT

## 2025-08-10 PROCEDURE — 36415 COLL VENOUS BLD VENIPUNCTURE: CPT

## 2025-08-10 PROCEDURE — 80053 COMPREHEN METABOLIC PANEL: CPT

## 2025-08-10 PROCEDURE — 85379 FIBRIN DEGRADATION QUANT: CPT

## 2025-08-10 PROCEDURE — 93005 ELECTROCARDIOGRAM TRACING: CPT

## 2025-08-10 PROCEDURE — 84484 ASSAY OF TROPONIN QUANT: CPT

## 2025-08-10 PROCEDURE — 71045 X-RAY EXAM CHEST 1 VIEW: CPT | Mod: 26

## 2025-08-10 PROCEDURE — 99285 EMERGENCY DEPT VISIT HI MDM: CPT | Mod: 25

## 2025-08-10 PROCEDURE — G0378: CPT

## 2025-08-10 PROCEDURE — 87637 SARSCOV2&INF A&B&RSV AMP PRB: CPT

## 2025-08-10 PROCEDURE — 71045 X-RAY EXAM CHEST 1 VIEW: CPT

## 2025-08-10 PROCEDURE — 93010 ELECTROCARDIOGRAM REPORT: CPT

## 2025-08-10 PROCEDURE — 99223 1ST HOSP IP/OBS HIGH 75: CPT | Mod: FS

## 2025-08-10 PROCEDURE — 85025 COMPLETE CBC W/AUTO DIFF WBC: CPT

## 2025-08-10 PROCEDURE — 83880 ASSAY OF NATRIURETIC PEPTIDE: CPT

## 2025-08-10 PROCEDURE — 85730 THROMBOPLASTIN TIME PARTIAL: CPT

## 2025-08-10 RX ORDER — IPRATROPIUM BROMIDE AND ALBUTEROL SULFATE .5; 2.5 MG/3ML; MG/3ML
3 SOLUTION RESPIRATORY (INHALATION) ONCE
Refills: 0 | Status: COMPLETED | OUTPATIENT
Start: 2025-08-10 | End: 2025-08-10

## 2025-08-10 RX ORDER — SACUBITRIL AND VALSARTAN 6; 6 MG/1; MG/1
1 PELLET ORAL
Qty: 60 | Refills: 0
Start: 2025-08-10 | End: 2025-09-08

## 2025-08-10 RX ADMIN — IPRATROPIUM BROMIDE AND ALBUTEROL SULFATE 3 MILLILITER(S): .5; 2.5 SOLUTION RESPIRATORY (INHALATION) at 08:05

## 2025-08-20 ENCOUNTER — APPOINTMENT (OUTPATIENT)
Dept: ORTHOPEDIC SURGERY | Facility: CLINIC | Age: 75
End: 2025-08-20
Payer: MEDICARE

## 2025-08-20 VITALS
BODY MASS INDEX: 25.72 KG/M2 | HEIGHT: 60 IN | DIASTOLIC BLOOD PRESSURE: 83 MMHG | WEIGHT: 131 LBS | HEART RATE: 64 BPM | SYSTOLIC BLOOD PRESSURE: 124 MMHG

## 2025-08-20 DIAGNOSIS — M25.512 PAIN IN LEFT SHOULDER: ICD-10-CM

## 2025-08-20 PROCEDURE — 99215 OFFICE O/P EST HI 40 MIN: CPT

## 2025-08-20 RX ORDER — VORTIOXETINE 20 MG/1
TABLET, FILM COATED ORAL
Refills: 0 | Status: ACTIVE | COMMUNITY

## 2025-09-04 DIAGNOSIS — M19.012 PRIMARY OSTEOARTHRITIS, LEFT SHOULDER: ICD-10-CM

## 2025-09-08 ENCOUNTER — APPOINTMENT (OUTPATIENT)
Dept: PULMONOLOGY | Facility: CLINIC | Age: 75
End: 2025-09-08
Payer: MEDICARE

## 2025-09-08 VITALS
WEIGHT: 130 LBS | BODY MASS INDEX: 24.55 KG/M2 | RESPIRATION RATE: 16 BRPM | OXYGEN SATURATION: 93 % | HEIGHT: 61 IN | HEART RATE: 57 BPM

## 2025-09-08 DIAGNOSIS — J45.50 SEVERE PERSISTENT ASTHMA, UNCOMPLICATED: ICD-10-CM

## 2025-09-08 DIAGNOSIS — J98.11 ATELECTASIS: ICD-10-CM

## 2025-09-08 DIAGNOSIS — R06.83 SNORING: ICD-10-CM

## 2025-09-08 DIAGNOSIS — J98.6 DISORDERS OF DIAPHRAGM: ICD-10-CM

## 2025-09-08 DIAGNOSIS — I42.9 CARDIOMYOPATHY, UNSPECIFIED: ICD-10-CM

## 2025-09-08 PROCEDURE — 99214 OFFICE O/P EST MOD 30 MIN: CPT

## 2025-09-08 PROCEDURE — G2211 COMPLEX E/M VISIT ADD ON: CPT

## 2025-09-08 RX ORDER — ZOLPIDEM TARTRATE 10 MG/1
10 TABLET ORAL
Qty: 1 | Refills: 0 | Status: ACTIVE | COMMUNITY
Start: 2025-09-08 | End: 1900-01-01

## 2025-09-18 ENCOUNTER — APPOINTMENT (OUTPATIENT)
Dept: ORTHOPEDIC SURGERY | Facility: CLINIC | Age: 75
End: 2025-09-18
Payer: MEDICARE

## 2025-09-18 VITALS
SYSTOLIC BLOOD PRESSURE: 111 MMHG | WEIGHT: 129 LBS | HEART RATE: 74 BPM | DIASTOLIC BLOOD PRESSURE: 66 MMHG | BODY MASS INDEX: 24.35 KG/M2 | HEIGHT: 61 IN

## 2025-09-18 DIAGNOSIS — M25.512 PAIN IN LEFT SHOULDER: ICD-10-CM

## 2025-09-18 PROCEDURE — 99214 OFFICE O/P EST MOD 30 MIN: CPT | Mod: 25

## 2025-09-18 PROCEDURE — 20611 DRAIN/INJ JOINT/BURSA W/US: CPT | Mod: RT

## 2025-09-18 RX ORDER — DICLOFENAC SODIUM 50 MG/1
50 TABLET, DELAYED RELEASE ORAL
Qty: 60 | Refills: 0 | Status: ACTIVE | COMMUNITY
Start: 2025-09-18 | End: 1900-01-01

## 2025-09-19 ENCOUNTER — APPOINTMENT (OUTPATIENT)
Dept: ORTHOPEDIC SURGERY | Facility: CLINIC | Age: 75
End: 2025-09-19

## 2025-09-25 PROBLEM — Z01.810 PRE-OPERATIVE CARDIOVASCULAR EXAMINATION: Status: ACTIVE | Noted: 2025-09-25

## 2025-09-25 PROBLEM — I25.10 CAD (CORONARY ARTERY DISEASE): Status: ACTIVE | Noted: 2025-09-25

## 2025-09-26 PROBLEM — E55.9 VITAMIN D DEFICIENCY: Status: ACTIVE | Noted: 2025-09-26

## 2025-09-26 PROBLEM — E27.40 ADRENAL INSUFFICIENCY: Status: ACTIVE | Noted: 2025-09-22

## (undated) DEVICE — SUT MONOCRYL 3-0 27" PS-2 UNDYED

## (undated) DEVICE — OMNIPAQUE 180MG/ML 10ML 10/BX

## (undated) DEVICE — GLV 8 PROTEXIS (WHITE)

## (undated) DEVICE — SUT VICRYL 2-0 27" CT-2 UNDYED

## (undated) DEVICE — DRSG TEGADERM 6 X 8"

## (undated) DEVICE — DRAPE 3/4 SHEET 52X76"

## (undated) DEVICE — SOL IRR BAG NS 0.9% 3000ML

## (undated) DEVICE — PACK TOTAL HIP

## (undated) DEVICE — DRAPE U LONG (CLEAR) 47 X 70"

## (undated) DEVICE — SOL IRR POUR NS 0.9% 500ML

## (undated) DEVICE — DRAPE 1/2 SHEET 40X57"

## (undated) DEVICE — SOL IRR POUR NS 0.9% 1000ML

## (undated) DEVICE — VISITEC 4X4

## (undated) DEVICE — SUT VICRYL PLUS 0 27" CT-2 UNDYED

## (undated) DEVICE — SUT VICRYL 0 27" CP-1 UNDYED

## (undated) DEVICE — NDL HYPO SAFE 20G X 1.5" (YELLOW)

## (undated) DEVICE — WARMING BLANKET UPPER ADULT

## (undated) DEVICE — SUT HEWSON RETRIEVER

## (undated) DEVICE — SAW BLADE ZIMMER RECIPROCATING SINGLE SIDED 10X70X1.19MM

## (undated) DEVICE — SYR LUER LOK 50CC

## (undated) DEVICE — DRAPE HIP W POUCHES 87X115X134"

## (undated) DEVICE — SUT ETHIBOND 5 4-30" CCS

## (undated) DEVICE — WOUND IRR SURGIPHOR

## (undated) DEVICE — DRAPE XL SHEET 77X98"

## (undated) DEVICE — DRSG DERMABOND PRINEO 60CM

## (undated) DEVICE — SOL IRR POUR H2O 1500ML